# Patient Record
Sex: FEMALE | Race: WHITE | Employment: UNEMPLOYED | URBAN - METROPOLITAN AREA
[De-identification: names, ages, dates, MRNs, and addresses within clinical notes are randomized per-mention and may not be internally consistent; named-entity substitution may affect disease eponyms.]

---

## 2017-02-20 ENCOUNTER — OFFICE VISIT (OUTPATIENT)
Dept: SURGERY | Facility: CLINIC | Age: 42
End: 2017-02-20

## 2017-02-20 VITALS
SYSTOLIC BLOOD PRESSURE: 141 MMHG | HEIGHT: 60 IN | DIASTOLIC BLOOD PRESSURE: 91 MMHG | RESPIRATION RATE: 14 BRPM | HEART RATE: 71 BPM | WEIGHT: 173 LBS | BODY MASS INDEX: 33.96 KG/M2 | TEMPERATURE: 98 F

## 2017-02-20 DIAGNOSIS — K43.9 VENTRAL HERNIA WITHOUT OBSTRUCTION OR GANGRENE: Primary | ICD-10-CM

## 2017-02-20 DIAGNOSIS — Z98.891 H/O: CESAREAN SECTION: ICD-10-CM

## 2017-02-20 PROCEDURE — 99243 OFF/OP CNSLTJ NEW/EST LOW 30: CPT | Performed by: COLON & RECTAL SURGERY

## 2017-02-20 NOTE — H&P
New Patient Visit Note       Active Problems      1. Ventral hernia without obstruction or gangrene    2. H/O:  section        Chief Complaint   Patient presents with:  Hernia: New Pt.  Umbilical Hernia consult      History of Present Illness     Th Onset   • Heart Disorder Father      Stent   • Diabetes Father    • Hypertension Father    • Other[other] [OTHER] Mother      Thyroid disease   • Diabetes Mother    • Diabetes Maternal Grandmother    • Diabetes Maternal Grandfather    • Diabetes Paternal G constipation, blood in stool, abdominal distention and anal bleeding. Genitourinary: Negative for dysuria, urgency, frequency and difficulty urinating. Musculoskeletal: Negative for myalgias and arthralgias. Skin: Negative for color change and rash. midline incision from the umbilicus down to the pubic symphysis. At the apex of that incision there are 3 separate hernias that are palpable. They are separate from the umbilicus.   They appear to be incisional hernias possibly in combination with a previ palpable. They are separate from the umbilicus. They appear to be incisional hernias possibly in combination with a previous umbilical hernia. None are reducible. None are currently tender. There is no evidence of recurrent inguinal hernia.   Bowel lashell

## 2017-02-22 PROBLEM — K43.9 VENTRAL HERNIA WITHOUT OBSTRUCTION OR GANGRENE: Status: ACTIVE | Noted: 2017-02-22

## 2017-02-22 NOTE — PATIENT INSTRUCTIONS
This patient presents for evaluation of a bulge near the umbilicus. This patient has had a  and 2016. The baby is present in my office at the time of my evaluation. She has no pain at the umbilical region.   She has several lump surgery was confirmed with the patient.

## 2017-02-23 ENCOUNTER — HOSPITAL ENCOUNTER (EMERGENCY)
Age: 42
Discharge: HOME | End: 2017-02-23
Attending: EMERGENCY MEDICINE
Payer: MEDICAID

## 2017-02-23 ENCOUNTER — TELEPHONE (OUTPATIENT)
Dept: INTERNAL MEDICINE CLINIC | Facility: CLINIC | Age: 42
End: 2017-02-23

## 2017-02-23 VITALS
HEART RATE: 61 BPM | HEIGHT: 60 IN | SYSTOLIC BLOOD PRESSURE: 129 MMHG | WEIGHT: 160 LBS | RESPIRATION RATE: 18 BRPM | DIASTOLIC BLOOD PRESSURE: 75 MMHG | OXYGEN SATURATION: 98 % | TEMPERATURE: 97 F | BODY MASS INDEX: 31.41 KG/M2

## 2017-02-23 DIAGNOSIS — R51.9 ACUTE NONINTRACTABLE HEADACHE, UNSPECIFIED HEADACHE TYPE: Primary | ICD-10-CM

## 2017-02-23 LAB
BILIRUB UR QL STRIP.AUTO: NEGATIVE
COLOR UR AUTO: YELLOW
GLUCOSE UR STRIP.AUTO-MCNC: NEGATIVE MG/DL
KETONES UR STRIP.AUTO-MCNC: NEGATIVE MG/DL
NITRITE UR QL STRIP.AUTO: NEGATIVE
PH UR STRIP.AUTO: 7 [PH] (ref 4.5–8)
SP GR UR STRIP.AUTO: 1.02 (ref 1–1.03)
UROBILINOGEN UR STRIP.AUTO-MCNC: 0.2 MG/DL
WBC CLUMPS UR QL AUTO: PRESENT

## 2017-02-23 PROCEDURE — 96372 THER/PROPH/DIAG INJ SC/IM: CPT

## 2017-02-23 PROCEDURE — 99283 EMERGENCY DEPT VISIT LOW MDM: CPT

## 2017-02-23 PROCEDURE — 87086 URINE CULTURE/COLONY COUNT: CPT | Performed by: EMERGENCY MEDICINE

## 2017-02-23 PROCEDURE — 81001 URINALYSIS AUTO W/SCOPE: CPT | Performed by: EMERGENCY MEDICINE

## 2017-02-23 RX ORDER — KETOROLAC TROMETHAMINE 30 MG/ML
60 INJECTION, SOLUTION INTRAMUSCULAR; INTRAVENOUS ONCE
Status: COMPLETED | OUTPATIENT
Start: 2017-02-23 | End: 2017-02-23

## 2017-02-23 RX ORDER — AMOXICILLIN AND CLAVULANATE POTASSIUM 875; 125 MG/1; MG/1
1 TABLET, FILM COATED ORAL 2 TIMES DAILY
Qty: 20 TABLET | Refills: 0 | Status: SHIPPED | OUTPATIENT
Start: 2017-02-23 | End: 2017-03-05

## 2017-02-23 RX ORDER — KETOROLAC TROMETHAMINE 10 MG/1
10 TABLET, FILM COATED ORAL EVERY 6 HOURS PRN
Qty: 30 TABLET | Refills: 0 | Status: SHIPPED | OUTPATIENT
Start: 2017-02-23 | End: 2017-03-02

## 2017-02-23 NOTE — ED INITIAL ASSESSMENT (HPI)
Pt states she has head pressure and sinus congestion since Saturday. Pt states she has nasal drainage and slight sore throat.

## 2017-02-23 NOTE — TELEPHONE ENCOUNTER
Pt called w c/o pressure headache/ cough/ mild fever. Pt stated has tried sudafed and has not helped. Pt breastfeeding. Pt's daughter w similar s&s.

## 2017-02-23 NOTE — ED PROVIDER NOTES
Patient Seen in: [facility] Emergency Department In [place]    History   Patient presents with:  Headache (neurologic)    Stated Complaint: headache    HPI    21-year-old female presents for evaluation of headache and sinus congestion.   Symptoms have been daily       Family History   Problem Relation Age of Onset   • Heart Disorder Father      Stent   • Diabetes Father    • Hypertension Father    • Other[other] [OTHER] Mother      Thyroid disease   • Diabetes Mother    • Diabetes Maternal Grandmother    • D other components within normal limits   URINE MICROSCOPIC W REFLEX CULTURE   URINE CULTURE, ROUTINE       MDM     Medications   Ketorolac Tromethamine (TORADOL) 60 MG/2ML injection 60 mg (60 mg Intramuscular Given 2/23/17 1539)     Well-appearing 41-year-o

## 2017-02-24 ENCOUNTER — TELEPHONE (OUTPATIENT)
Dept: SURGERY | Facility: CLINIC | Age: 42
End: 2017-02-24

## 2017-02-24 NOTE — TELEPHONE ENCOUNTER
Pt was in [facility] 2/23, getting severe headaches when having BM. Pt. saw Tom Wakefield on 2/20 and has multiple umbilical hernias. Pt scheduled surgery 3/29. Pt denies NV, no light sensitivity. Per Pt pain severe \"she drops to her knees. \"

## 2017-02-24 NOTE — TELEPHONE ENCOUNTER
Pt was seen for this in ED, they informed her that they did not think the headaches are related to the hernia, gave her a shot of toradol and this seemed to help, but then she had another BM and now has headache. States she is not constipated and doesn't st

## 2017-02-26 ENCOUNTER — HOSPITAL ENCOUNTER (EMERGENCY)
Age: 42
Discharge: HOME | End: 2017-02-27
Attending: EMERGENCY MEDICINE
Payer: MEDICAID

## 2017-02-26 ENCOUNTER — APPOINTMENT (OUTPATIENT)
Dept: CT IMAGING | Age: 42
End: 2017-02-26
Attending: EMERGENCY MEDICINE
Payer: MEDICAID

## 2017-02-26 DIAGNOSIS — G44.009 CLUSTER HEADACHE, NOT INTRACTABLE, UNSPECIFIED CHRONICITY PATTERN: Primary | ICD-10-CM

## 2017-02-26 LAB
ALBUMIN SERPL-MCNC: 4.1 G/DL (ref 3.5–4.8)
ALP LIVER SERPL-CCNC: 101 U/L (ref 37–98)
ALT SERPL-CCNC: 71 U/L (ref 14–54)
AST SERPL-CCNC: 33 U/L (ref 15–41)
BASOPHILS # BLD AUTO: 0.02 X10(3) UL (ref 0–0.1)
BASOPHILS NFR BLD AUTO: 0.3 %
BILIRUB SERPL-MCNC: 0.4 MG/DL (ref 0.1–2)
BILIRUB UR QL STRIP.AUTO: NEGATIVE
BUN BLD-MCNC: 12 MG/DL (ref 8–20)
CALCIUM BLD-MCNC: 8.9 MG/DL (ref 8.3–10.3)
CHLORIDE: 105 MMOL/L (ref 101–111)
CO2: 25 MMOL/L (ref 22–32)
COLOR UR AUTO: YELLOW
CREAT BLD-MCNC: 0.79 MG/DL (ref 0.55–1.02)
EOSINOPHIL # BLD AUTO: 0.19 X10(3) UL (ref 0–0.3)
EOSINOPHIL NFR BLD AUTO: 2.9 %
ERYTHROCYTE [DISTWIDTH] IN BLOOD BY AUTOMATED COUNT: 13.4 % (ref 11.5–16)
GLUCOSE BLD-MCNC: 101 MG/DL (ref 70–99)
GLUCOSE UR STRIP.AUTO-MCNC: NEGATIVE MG/DL
HCT VFR BLD AUTO: 43.1 % (ref 34–50)
HGB BLD-MCNC: 14.7 G/DL (ref 12–16)
IMMATURE GRANULOCYTE COUNT: 0.01 X10(3) UL (ref 0–1)
IMMATURE GRANULOCYTE RATIO %: 0.2 %
KETONES UR STRIP.AUTO-MCNC: NEGATIVE MG/DL
LYMPHOCYTES # BLD AUTO: 2.84 X10(3) UL (ref 0.9–4)
LYMPHOCYTES NFR BLD AUTO: 43.4 %
M PROTEIN MFR SERPL ELPH: 8.1 G/DL (ref 6.1–8.3)
MCH RBC QN AUTO: 29.3 PG (ref 27–33.2)
MCHC RBC AUTO-ENTMCNC: 34.1 G/DL (ref 31–37)
MCV RBC AUTO: 86 FL (ref 81–100)
MONOCYTES # BLD AUTO: 0.51 X10(3) UL (ref 0.1–0.6)
MONOCYTES NFR BLD AUTO: 7.8 %
NEUTROPHIL ABS PRELIM: 2.97 X10 (3) UL (ref 1.3–6.7)
NEUTROPHILS # BLD AUTO: 2.97 X10(3) UL (ref 1.3–6.7)
NEUTROPHILS NFR BLD AUTO: 45.4 %
NITRITE UR QL STRIP.AUTO: NEGATIVE
PH UR STRIP.AUTO: 6.5 [PH] (ref 4.5–8)
PLATELET # BLD AUTO: 276 10(3)UL (ref 150–450)
POCT LOT NUMBER: NORMAL
POCT URINE PREGNANCY: NEGATIVE
POTASSIUM SERPL-SCNC: 3.1 MMOL/L (ref 3.6–5.1)
RBC # BLD AUTO: 5.01 X10(6)UL (ref 3.8–5.1)
RED CELL DISTRIBUTION WIDTH-SD: 42.2 FL (ref 35.1–46.3)
SED RATE-ML: 25 MM/HR (ref 0–25)
SODIUM SERPL-SCNC: 140 MMOL/L (ref 136–144)
SP GR UR STRIP.AUTO: 1.01 (ref 1–1.03)
UROBILINOGEN UR STRIP.AUTO-MCNC: 0.2 MG/DL
WBC # BLD AUTO: 6.5 X10(3) UL (ref 4–13)

## 2017-02-26 PROCEDURE — 81025 URINE PREGNANCY TEST: CPT

## 2017-02-26 PROCEDURE — 96374 THER/PROPH/DIAG INJ IV PUSH: CPT

## 2017-02-26 PROCEDURE — 99284 EMERGENCY DEPT VISIT MOD MDM: CPT

## 2017-02-26 PROCEDURE — 96375 TX/PRO/DX INJ NEW DRUG ADDON: CPT

## 2017-02-26 PROCEDURE — 96376 TX/PRO/DX INJ SAME DRUG ADON: CPT

## 2017-02-26 PROCEDURE — 85652 RBC SED RATE AUTOMATED: CPT | Performed by: EMERGENCY MEDICINE

## 2017-02-26 PROCEDURE — 80053 COMPREHEN METABOLIC PANEL: CPT | Performed by: EMERGENCY MEDICINE

## 2017-02-26 PROCEDURE — 81001 URINALYSIS AUTO W/SCOPE: CPT | Performed by: EMERGENCY MEDICINE

## 2017-02-26 PROCEDURE — 85025 COMPLETE CBC W/AUTO DIFF WBC: CPT | Performed by: EMERGENCY MEDICINE

## 2017-02-26 PROCEDURE — 87086 URINE CULTURE/COLONY COUNT: CPT | Performed by: EMERGENCY MEDICINE

## 2017-02-26 PROCEDURE — 70496 CT ANGIOGRAPHY HEAD: CPT

## 2017-02-26 RX ORDER — METOCLOPRAMIDE HYDROCHLORIDE 5 MG/ML
10 INJECTION INTRAMUSCULAR; INTRAVENOUS ONCE
Status: COMPLETED | OUTPATIENT
Start: 2017-02-26 | End: 2017-02-26

## 2017-02-26 RX ORDER — HYDROMORPHONE HYDROCHLORIDE 1 MG/ML
1 INJECTION, SOLUTION INTRAMUSCULAR; INTRAVENOUS; SUBCUTANEOUS EVERY 30 MIN PRN
Status: COMPLETED | OUTPATIENT
Start: 2017-02-26 | End: 2017-02-27

## 2017-02-26 RX ORDER — DIPHENHYDRAMINE HYDROCHLORIDE 50 MG/ML
25 INJECTION INTRAMUSCULAR; INTRAVENOUS ONCE
Status: COMPLETED | OUTPATIENT
Start: 2017-02-26 | End: 2017-02-26

## 2017-02-26 RX ORDER — KETOROLAC TROMETHAMINE 30 MG/ML
30 INJECTION, SOLUTION INTRAMUSCULAR; INTRAVENOUS ONCE
Status: COMPLETED | OUTPATIENT
Start: 2017-02-26 | End: 2017-02-26

## 2017-02-27 ENCOUNTER — TELEPHONE (OUTPATIENT)
Dept: SURGERY | Facility: CLINIC | Age: 42
End: 2017-02-27

## 2017-02-27 VITALS
DIASTOLIC BLOOD PRESSURE: 84 MMHG | OXYGEN SATURATION: 98 % | HEART RATE: 66 BPM | HEIGHT: 64 IN | WEIGHT: 160 LBS | RESPIRATION RATE: 16 BRPM | SYSTOLIC BLOOD PRESSURE: 140 MMHG | TEMPERATURE: 98 F | BODY MASS INDEX: 27.31 KG/M2

## 2017-02-27 RX ORDER — CHOLECALCIFEROL (VITAMIN D3) 25 MCG
1 TABLET,CHEWABLE ORAL DAILY
Status: ON HOLD | COMMUNITY
End: 2017-03-29

## 2017-02-27 RX ORDER — HYDROMORPHONE HYDROCHLORIDE 1 MG/ML
0.5 INJECTION, SOLUTION INTRAMUSCULAR; INTRAVENOUS; SUBCUTANEOUS ONCE
Status: COMPLETED | OUTPATIENT
Start: 2017-02-27 | End: 2017-02-27

## 2017-02-27 RX ORDER — HYDROCODONE BITARTRATE AND ACETAMINOPHEN 10; 325 MG/1; MG/1
1-2 TABLET ORAL EVERY 4 HOURS PRN
Qty: 20 TABLET | Refills: 0 | Status: SHIPPED | OUTPATIENT
Start: 2017-02-27 | End: 2017-03-06

## 2017-02-27 RX ORDER — ONDANSETRON 8 MG/1
8 TABLET, ORALLY DISINTEGRATING ORAL EVERY 8 HOURS PRN
Qty: 10 TABLET | Refills: 0 | Status: SHIPPED | OUTPATIENT
Start: 2017-02-27 | End: 2017-03-06

## 2017-02-27 NOTE — ED NOTES
Patient states pain slightly improved 8/10.  Awaiting for lab results to come back before going to ct

## 2017-02-27 NOTE — ED INITIAL ASSESSMENT (HPI)
Presents for co sharp headache onset at 2045 pain more on left side started in back of head took prescribed Toradol no relief seen here last Thursday for same became nauseated on the ride over

## 2017-02-27 NOTE — TELEPHONE ENCOUNTER
Pt dropped off [place] paperwork for  to take of her after surgery. Pt requests call when paperwork ready for pickup. Fwd to James Tovar for completion.

## 2017-03-01 ENCOUNTER — TELEPHONE (OUTPATIENT)
Dept: INTERNAL MEDICINE CLINIC | Facility: CLINIC | Age: 42
End: 2017-03-01

## 2017-03-09 ENCOUNTER — TELEPHONE (OUTPATIENT)
Dept: SURGERY | Facility: CLINIC | Age: 42
End: 2017-03-09

## 2017-03-29 ENCOUNTER — ANESTHESIA EVENT (OUTPATIENT)
Dept: SURGERY | Facility: HOSPITAL | Age: 42
End: 2017-03-29
Payer: MEDICAID

## 2017-03-29 ENCOUNTER — HOSPITAL ENCOUNTER (OUTPATIENT)
Facility: HOSPITAL | Age: 42
Discharge: HOME | End: 2017-03-31
Attending: COLON & RECTAL SURGERY | Admitting: COLON & RECTAL SURGERY
Payer: MEDICAID

## 2017-03-29 ENCOUNTER — ANESTHESIA (OUTPATIENT)
Dept: SURGERY | Facility: HOSPITAL | Age: 42
End: 2017-03-29
Payer: MEDICAID

## 2017-03-29 ENCOUNTER — SURGERY (OUTPATIENT)
Age: 42
End: 2017-03-29

## 2017-03-29 DIAGNOSIS — K43.9 VENTRAL HERNIA WITHOUT OBSTRUCTION OR GANGRENE: ICD-10-CM

## 2017-03-29 PROCEDURE — 81025 URINE PREGNANCY TEST: CPT | Performed by: COLON & RECTAL SURGERY

## 2017-03-29 PROCEDURE — S0028 INJECTION, FAMOTIDINE, 20 MG: HCPCS | Performed by: PHYSICIAN ASSISTANT

## 2017-03-29 PROCEDURE — 0WUF0JZ SUPPLEMENT ABDOMINAL WALL WITH SYNTHETIC SUBSTITUTE, OPEN APPROACH: ICD-10-PCS | Performed by: COLON & RECTAL SURGERY

## 2017-03-29 DEVICE — VENTRIO HERNIA PATCH CIRCLE
Type: IMPLANTABLE DEVICE | Site: ABDOMEN | Status: FUNCTIONAL
Brand: VENTRIO HERNIA PATCH

## 2017-03-29 RX ORDER — METOCLOPRAMIDE HYDROCHLORIDE 5 MG/ML
10 INJECTION INTRAMUSCULAR; INTRAVENOUS AS NEEDED
Status: DISCONTINUED | OUTPATIENT
Start: 2017-03-29 | End: 2017-03-29 | Stop reason: HOSPADM

## 2017-03-29 RX ORDER — ONDANSETRON 2 MG/ML
4 INJECTION INTRAMUSCULAR; INTRAVENOUS EVERY 6 HOURS PRN
Status: DISCONTINUED | OUTPATIENT
Start: 2017-03-29 | End: 2017-03-31

## 2017-03-29 RX ORDER — HYDRALAZINE HYDROCHLORIDE 20 MG/ML
10 INJECTION INTRAMUSCULAR; INTRAVENOUS ONCE
Status: COMPLETED | OUTPATIENT
Start: 2017-03-29 | End: 2017-03-29

## 2017-03-29 RX ORDER — MORPHINE SULFATE 2 MG/ML
INJECTION, SOLUTION INTRAMUSCULAR; INTRAVENOUS
Status: COMPLETED
Start: 2017-03-29 | End: 2017-03-29

## 2017-03-29 RX ORDER — DEXTROSE MONOHYDRATE 25 G/50ML
50 INJECTION, SOLUTION INTRAVENOUS
Status: DISCONTINUED | OUTPATIENT
Start: 2017-03-29 | End: 2017-03-29 | Stop reason: HOSPADM

## 2017-03-29 RX ORDER — TRAMADOL HYDROCHLORIDE 50 MG/1
100 TABLET ORAL ONCE AS NEEDED
Status: DISCONTINUED | OUTPATIENT
Start: 2017-03-29 | End: 2017-03-29 | Stop reason: HOSPADM

## 2017-03-29 RX ORDER — FAMOTIDINE 10 MG/ML
20 INJECTION, SOLUTION INTRAVENOUS 2 TIMES DAILY
Status: DISCONTINUED | OUTPATIENT
Start: 2017-03-29 | End: 2017-03-31

## 2017-03-29 RX ORDER — HYDRALAZINE HYDROCHLORIDE 20 MG/ML
INJECTION INTRAMUSCULAR; INTRAVENOUS
Status: COMPLETED
Start: 2017-03-29 | End: 2017-03-29

## 2017-03-29 RX ORDER — IBUPROFEN 400 MG/1
400 TABLET ORAL EVERY 6 HOURS PRN
Status: DISCONTINUED | OUTPATIENT
Start: 2017-03-29 | End: 2017-03-31

## 2017-03-29 RX ORDER — BACITRACIN 50000 [USP'U]/1
INJECTION, POWDER, LYOPHILIZED, FOR SOLUTION INTRAMUSCULAR AS NEEDED
Status: DISCONTINUED | OUTPATIENT
Start: 2017-03-29 | End: 2017-03-29 | Stop reason: HOSPADM

## 2017-03-29 RX ORDER — ONDANSETRON 2 MG/ML
4 INJECTION INTRAMUSCULAR; INTRAVENOUS AS NEEDED
Status: DISCONTINUED | OUTPATIENT
Start: 2017-03-29 | End: 2017-03-29 | Stop reason: HOSPADM

## 2017-03-29 RX ORDER — DIPHENHYDRAMINE HCL 25 MG
25 CAPSULE ORAL EVERY 4 HOURS PRN
Status: DISCONTINUED | OUTPATIENT
Start: 2017-03-29 | End: 2017-03-31

## 2017-03-29 RX ORDER — DEXTROSE, SODIUM CHLORIDE, AND POTASSIUM CHLORIDE 5; .45; .15 G/100ML; G/100ML; G/100ML
INJECTION INTRAVENOUS
Status: COMPLETED
Start: 2017-03-29 | End: 2017-03-29

## 2017-03-29 RX ORDER — TRAMADOL HYDROCHLORIDE 50 MG/1
50 TABLET ORAL EVERY 6 HOURS PRN
Status: DISCONTINUED | OUTPATIENT
Start: 2017-03-29 | End: 2017-03-31

## 2017-03-29 RX ORDER — DIPHENHYDRAMINE HYDROCHLORIDE 50 MG/ML
12.5 INJECTION INTRAMUSCULAR; INTRAVENOUS AS NEEDED
Status: DISCONTINUED | OUTPATIENT
Start: 2017-03-29 | End: 2017-03-29 | Stop reason: HOSPADM

## 2017-03-29 RX ORDER — SODIUM CHLORIDE, SODIUM LACTATE, POTASSIUM CHLORIDE, CALCIUM CHLORIDE 600; 310; 30; 20 MG/100ML; MG/100ML; MG/100ML; MG/100ML
INJECTION, SOLUTION INTRAVENOUS CONTINUOUS
Status: DISCONTINUED | OUTPATIENT
Start: 2017-03-29 | End: 2017-03-29

## 2017-03-29 RX ORDER — NALOXONE HYDROCHLORIDE 0.4 MG/ML
80 INJECTION, SOLUTION INTRAMUSCULAR; INTRAVENOUS; SUBCUTANEOUS AS NEEDED
Status: DISCONTINUED | OUTPATIENT
Start: 2017-03-29 | End: 2017-03-29 | Stop reason: HOSPADM

## 2017-03-29 RX ORDER — ACETAMINOPHEN 500 MG
1000 TABLET ORAL ONCE AS NEEDED
Status: DISCONTINUED | OUTPATIENT
Start: 2017-03-29 | End: 2017-03-29 | Stop reason: HOSPADM

## 2017-03-29 RX ORDER — DIPHENHYDRAMINE HYDROCHLORIDE 50 MG/ML
12.5 INJECTION INTRAMUSCULAR; INTRAVENOUS EVERY 4 HOURS PRN
Status: DISCONTINUED | OUTPATIENT
Start: 2017-03-29 | End: 2017-03-31

## 2017-03-29 RX ORDER — IBUPROFEN 600 MG/1
600 TABLET ORAL EVERY 6 HOURS PRN
Status: DISCONTINUED | OUTPATIENT
Start: 2017-03-29 | End: 2017-03-31

## 2017-03-29 RX ORDER — MIDAZOLAM HYDROCHLORIDE 1 MG/ML
INJECTION INTRAMUSCULAR; INTRAVENOUS
Status: COMPLETED
Start: 2017-03-29 | End: 2017-03-29

## 2017-03-29 RX ORDER — KETOROLAC TROMETHAMINE 30 MG/ML
30 INJECTION, SOLUTION INTRAMUSCULAR; INTRAVENOUS EVERY 6 HOURS PRN
Status: DISCONTINUED | OUTPATIENT
Start: 2017-03-29 | End: 2017-03-31

## 2017-03-29 RX ORDER — HYDROCODONE BITARTRATE AND ACETAMINOPHEN 5; 325 MG/1; MG/1
1 TABLET ORAL AS NEEDED
Status: DISCONTINUED | OUTPATIENT
Start: 2017-03-29 | End: 2017-03-29 | Stop reason: HOSPADM

## 2017-03-29 RX ORDER — MORPHINE SULFATE 2 MG/ML
2 INJECTION, SOLUTION INTRAMUSCULAR; INTRAVENOUS EVERY 2 HOUR PRN
Status: DISCONTINUED | OUTPATIENT
Start: 2017-03-29 | End: 2017-03-31

## 2017-03-29 RX ORDER — MORPHINE SULFATE 2 MG/ML
2 INJECTION, SOLUTION INTRAMUSCULAR; INTRAVENOUS EVERY 5 MIN PRN
Status: DISCONTINUED | OUTPATIENT
Start: 2017-03-29 | End: 2017-03-29 | Stop reason: HOSPADM

## 2017-03-29 RX ORDER — BUPIVACAINE HYDROCHLORIDE AND EPINEPHRINE 5; 5 MG/ML; UG/ML
INJECTION, SOLUTION EPIDURAL; INTRACAUDAL; PERINEURAL AS NEEDED
Status: DISCONTINUED | OUTPATIENT
Start: 2017-03-29 | End: 2017-03-29 | Stop reason: HOSPADM

## 2017-03-29 RX ORDER — KETOROLAC TROMETHAMINE 15 MG/ML
15 INJECTION, SOLUTION INTRAMUSCULAR; INTRAVENOUS EVERY 6 HOURS PRN
Status: DISCONTINUED | OUTPATIENT
Start: 2017-03-29 | End: 2017-03-31

## 2017-03-29 RX ORDER — HEPARIN SODIUM 5000 [USP'U]/ML
5000 INJECTION, SOLUTION INTRAVENOUS; SUBCUTANEOUS ONCE
Status: COMPLETED | OUTPATIENT
Start: 2017-03-29 | End: 2017-03-29

## 2017-03-29 RX ORDER — MEPERIDINE HYDROCHLORIDE 25 MG/ML
12.5 INJECTION INTRAMUSCULAR; INTRAVENOUS; SUBCUTANEOUS AS NEEDED
Status: DISCONTINUED | OUTPATIENT
Start: 2017-03-29 | End: 2017-03-29 | Stop reason: HOSPADM

## 2017-03-29 RX ORDER — MIDAZOLAM HYDROCHLORIDE 1 MG/ML
1 INJECTION INTRAMUSCULAR; INTRAVENOUS EVERY 5 MIN PRN
Status: DISCONTINUED | OUTPATIENT
Start: 2017-03-29 | End: 2017-03-29 | Stop reason: HOSPADM

## 2017-03-29 RX ORDER — LABETALOL HYDROCHLORIDE 5 MG/ML
5 INJECTION, SOLUTION INTRAVENOUS EVERY 5 MIN PRN
Status: DISCONTINUED | OUTPATIENT
Start: 2017-03-29 | End: 2017-03-29 | Stop reason: HOSPADM

## 2017-03-29 RX ORDER — TEMAZEPAM 15 MG/1
15 CAPSULE ORAL NIGHTLY PRN
Status: DISCONTINUED | OUTPATIENT
Start: 2017-03-29 | End: 2017-03-31

## 2017-03-29 RX ORDER — FAMOTIDINE 20 MG/1
20 TABLET ORAL 2 TIMES DAILY
Status: DISCONTINUED | OUTPATIENT
Start: 2017-03-29 | End: 2017-03-31

## 2017-03-29 RX ORDER — HEPARIN SODIUM 5000 [USP'U]/ML
5000 INJECTION, SOLUTION INTRAVENOUS; SUBCUTANEOUS EVERY 8 HOURS
Status: DISCONTINUED | OUTPATIENT
Start: 2017-03-29 | End: 2017-03-31

## 2017-03-29 RX ORDER — HYDROCODONE BITARTRATE AND ACETAMINOPHEN 5; 325 MG/1; MG/1
2 TABLET ORAL AS NEEDED
Status: DISCONTINUED | OUTPATIENT
Start: 2017-03-29 | End: 2017-03-29 | Stop reason: HOSPADM

## 2017-03-29 RX ORDER — DEXTROSE, SODIUM CHLORIDE, AND POTASSIUM CHLORIDE 5; .45; .15 G/100ML; G/100ML; G/100ML
INJECTION INTRAVENOUS CONTINUOUS
Status: DISCONTINUED | OUTPATIENT
Start: 2017-03-29 | End: 2017-03-31

## 2017-03-29 NOTE — ANESTHESIA POSTPROCEDURE EVALUATION
[address] Patient Status:  Surgery Admit   Age/Gender 38year old female MRN [id number]   [facility] SURGERY Attending Karla Valdez MD   Hosp Day # 0 PCP Erwin Bhat MD       Anesthesia Post-op Note    Procedure(s)

## 2017-03-29 NOTE — PROGRESS NOTES
NURSING ADMISSION NOTE      Patient admitted via Cart  Oriented to room. Safety precautions initiated. Bed in low position. Call light in reach. RECEIVED PT FROM PACU , ALERT AND ORIENT X 4 , ON O2 2L/NC , CPOX , O2 SAT 98% .  NO RESP DISTRESS NOTED

## 2017-03-29 NOTE — PROGRESS NOTES
Status report called to anesthesia. ptnt is holding breathe during b/p readings. Needs constant reminder to relax and try to rest. asking for water, asking for . Ok to give hydralazine prior to going to floor.

## 2017-03-29 NOTE — PROGRESS NOTES
Floor RN Ta Romero called with update prior to sending patient. Requesting anxiety meds for ptnt.  Was denied by anesthesia

## 2017-03-29 NOTE — OPERATIVE REPORT
[facility]  Op Note    Treva Pun Location: OR   [facility] [id number] MRN [id number]   Admission Date 3/29/2017 Operation Date 3/29/2017   Attending Physician Lucia Jorgensen MD Operating Physician Gt Smiley MD     Pre-Operative Diagnosis: Ventral her immediately beneath the fascia of were removed. Flaps were raised caudad, cephalad, right and left. A Ventrio composix mesh was placed below the fascia. The mesh was secured in place to the hernia defect with a 1 inch margin.  The suture used was #1 [employer]

## 2017-03-29 NOTE — H&P
Active Problems      1. Ventral hernia without obstruction or gangrene     2. H/O:  section         Chief Complaint   Patient presents with:  Hernia: New Pt.  Umbilical Hernia consult      History of Present Illness     This patient presents for e •  Other[other] [OTHER]  Mother          Thyroid disease    •  Diabetes  Mother      •  Diabetes  Maternal Grandmother      •  Diabetes  Maternal Grandfather      •  Diabetes  Paternal Grandmother      •  Diabetes  Paternal Grandfather        Social Histor Gastrointestinal: Negative for nausea, vomiting, abdominal pain, diarrhea, constipation, blood in stool, abdominal distention and anal bleeding. Genitourinary: Negative for dysuria, urgency, frequency and difficulty urinating.    Musculoskeletal: Negative Clinical exam reveals her abdomen to be soft. There is extreme laxity of the abdominal wall secondary to her recent pregnancy. Her BMI is 33.78. There is no evidence of ascites. Liver is within normal limits, spleen is not palpable.   There is a well-he

## 2017-03-29 NOTE — ANESTHESIA PREPROCEDURE EVALUATION
PRE-OP EVALUATION    Patient Name: Emory Ratliff    Pre-op Diagnosis: Ventral hernia without obstruction or gangrene [K43.9]    Procedure(s):  VENTRAL HERNIA REPAIR WITH MESH COMPLEX    Surgeon(s) and Role:     Tammie Linton MD - Primary    Pre-op vit Alcohol Use: No       Drug Use: No     Available pre-op labs reviewed.     Lab Results  Component Value Date   WBC 6.5 02/26/2017   RBC 5.01 02/26/2017   HGB 14.7 02/26/2017   HCT 43.1 02/26/2017   MCV 86.0 02/26/2017   MCH 29.3 02/26/2017   MCHC 34.1 02/26

## 2017-03-30 PROCEDURE — 96376 TX/PRO/DX INJ SAME DRUG ADON: CPT

## 2017-03-30 PROCEDURE — 96372 THER/PROPH/DIAG INJ SC/IM: CPT

## 2017-03-30 NOTE — PROGRESS NOTES
[facility]  Progress Note    Vimal Santiago Patient Status:  Outpatient in a Bed    3/22/1975 MRN [id number]   [facility] 3NW-A Attending Nuha Montez MD   Hosp Day # 1 PCP Julieta Martinez MD     Subjective:  No new complaints.  9/10 p Disturbance of skin sensation     Cervicalgia     Enthesopathy of wrist and carpus     AMA (advanced maternal age) multigravida 35+     H/O:  section     Gestational diabetes mellitus (GDM), antepartum     Ventral hernia without obstruction or hari

## 2017-03-30 NOTE — PAYOR COMM NOTE
Attending Physician: Minh Solis MD    Review Type: ADMISSION   Reviewer: Anh Lopez       Date: March 30, 2017 - 8:30 AM  Payor: Karen Chavez  Authorization Number: [id number]  Admit date: 3/29/2017  9:45 AM        Pre-Operative Diagnosis: Ventral he UNIT/ML injection 5,000 Units     Date Action Dose Route User    3/30/2017 0605 Given 5000 Units Subcutaneous (Right Lower Abdomen) Michael De La Cruz RN    3/29/2017 2149 Given 5000 Units Subcutaneous (Right Lower Abdomen) Michael De La Cruz RN    3/29/2017 151 Dose Route User    3/30/2017 0756 Given 50 mg Oral Denice Palmer RN    3/29/2017 2222 Given 50 mg Oral Brendon Cardoso RN          RESULTS LAST 24HRS:  Labs Reviewed   POCT PREGNANCY, URINE - Normal

## 2017-03-31 VITALS
DIASTOLIC BLOOD PRESSURE: 74 MMHG | OXYGEN SATURATION: 97 % | BODY MASS INDEX: 30.04 KG/M2 | TEMPERATURE: 98 F | WEIGHT: 153 LBS | HEIGHT: 60 IN | HEART RATE: 62 BPM | SYSTOLIC BLOOD PRESSURE: 122 MMHG | RESPIRATION RATE: 16 BRPM

## 2017-03-31 PROCEDURE — 85049 AUTOMATED PLATELET COUNT: CPT | Performed by: COLON & RECTAL SURGERY

## 2017-03-31 RX ORDER — TRAMADOL HYDROCHLORIDE 50 MG/1
50 TABLET ORAL AS NEEDED
Qty: 30 TABLET | Refills: 0 | Status: SHIPPED | OUTPATIENT
Start: 2017-03-31 | End: 2018-04-01 | Stop reason: ALTCHOICE

## 2017-03-31 RX ORDER — CEFADROXIL 500 MG/1
500 CAPSULE ORAL 2 TIMES DAILY
Qty: 20 CAPSULE | Refills: 0 | Status: SHIPPED | OUTPATIENT
Start: 2017-03-31 | End: 2017-04-10

## 2017-03-31 NOTE — PLAN OF CARE
GASTROINTESTINAL - ADULT    • Minimal or absence of nausea and vomiting Adequate for Discharge    • Maintains or returns to baseline bowel function Adequate for Discharge        PAIN - ADULT    • Verbalizes/displays adequate comfort level or patient's stat

## 2017-03-31 NOTE — DISCHARGE SUMMARY
[facility]  Discharge Summary    Rosa Cook Patient Status:  Outpatient in a Bed    3/22/1975 MRN [id number]   [facility] 3NW-A Attending Steph Torres MD   Hosp Day # 2 PCP Tristian Batista MD     Date of Admission: 3/29/2017    Da are no discharge medications for this patient. Follow up Visits: Follow-up with Shad Mera in approximately the next 7 days. Other Discharge Instructions:    1. Home today with drain. 2. Do not lift more than 15 pounds.   3. Do not drive a car o

## 2017-03-31 NOTE — PLAN OF CARE
GASTROINTESTINAL - ADULT    • Minimal or absence of nausea and vomiting Progressing        Patient/Family Goals    • Patient/Family Long Term Goal Progressing        RESPIRATORY - ADULT    • Achieves optimal ventilation and oxygenation Progressing        S

## 2017-03-31 NOTE — PROGRESS NOTES
[facility]  Progress Note    Madalyn Anger Patient Status:  Outpatient in a Bed    3/22/1975 MRN [id number]   [facility] 3NW-A Attending Rebel Melgar MD   Hosp Day # 2 PCP Myah Gloria MD     Subjective:  No new complaints.  Pain u between 19-24, adult     Pain in limb     Lesion of ulnar nerve     Disturbance of skin sensation     Cervicalgia     Enthesopathy of wrist and carpus     AMA (advanced maternal age) multigravida 35+     H/O:  section     Gestational diabetes melli

## 2017-04-04 NOTE — PAYOR COMM NOTE
Review Type: CONTINUED STAY  Reviewer: Jermain Dale     Date: April 4, 2017 - 10:45 AM  Payor: Rosmery Coelho  Authorization Number: [id number]  Admit date: 3/29/2017  9:45 AM     Outpatient in a Bed Procedure

## 2017-04-06 ENCOUNTER — OFFICE VISIT (OUTPATIENT)
Dept: SURGERY | Facility: CLINIC | Age: 42
End: 2017-04-06

## 2017-04-06 VITALS
TEMPERATURE: 98 F | DIASTOLIC BLOOD PRESSURE: 81 MMHG | HEIGHT: 60 IN | SYSTOLIC BLOOD PRESSURE: 128 MMHG | WEIGHT: 153 LBS | RESPIRATION RATE: 16 BRPM | HEART RATE: 66 BPM | BODY MASS INDEX: 30.04 KG/M2

## 2017-04-06 DIAGNOSIS — K43.9 VENTRAL HERNIA WITHOUT OBSTRUCTION OR GANGRENE: Primary | ICD-10-CM

## 2017-04-06 PROCEDURE — 99024 POSTOP FOLLOW-UP VISIT: CPT | Performed by: PHYSICIAN ASSISTANT

## 2017-04-06 NOTE — PROGRESS NOTES
Post Operative Visit Note       Active Problems  1. Ventral hernia without obstruction or gangrene         Chief Complaint   Patient presents with:  Post-Op: p/o VENTRAL HERNIA REPAIR WITH MESH COMPLEX on 3/29.  PT has RUQ ABD PN and a tingling feeling by d Hypertension Father    • Other[other] [OTHER] Mother      Thyroid disease   • Diabetes Mother    • Diabetes Maternal Grandmother    • Diabetes Maternal Grandfather    • Diabetes Paternal Grandmother    • Diabetes Paternal Grandfather      Social History behavioral problems and sleep disturbance. Physical Findings   /81 mmHg  Pulse 66  Temp(Src) 97.8 °F (36.6 °C) (Oral)  Resp 16  Ht 60\"  Wt 153 lb  BMI 29.88 kg/m2  Physical Exam   Constitutional: She is oriented to person, place, and time.  She until she is 6 weeks out from surgery. She does verbalize understanding of this. I have no further follow-up scheduled with this patient at this time. This patient can see me on an as-needed basis.  This patient should return urgently for any problems o

## 2018-01-15 ENCOUNTER — TELEPHONE (OUTPATIENT)
Dept: INTERNAL MEDICINE CLINIC | Facility: CLINIC | Age: 43
End: 2018-01-15

## 2018-01-15 ENCOUNTER — HOSPITAL ENCOUNTER (EMERGENCY)
Age: 43
Discharge: HOME | End: 2018-01-15
Payer: MEDICAID

## 2018-01-15 ENCOUNTER — HOSPITAL ENCOUNTER (EMERGENCY)
Age: 43
Discharge: HOME | End: 2018-01-15
Attending: EMERGENCY MEDICINE
Payer: MEDICAID

## 2018-01-15 VITALS
DIASTOLIC BLOOD PRESSURE: 106 MMHG | OXYGEN SATURATION: 99 % | HEIGHT: 60 IN | RESPIRATION RATE: 16 BRPM | TEMPERATURE: 98 F | HEART RATE: 72 BPM | BODY MASS INDEX: 30.43 KG/M2 | SYSTOLIC BLOOD PRESSURE: 140 MMHG | WEIGHT: 155 LBS

## 2018-01-15 VITALS
DIASTOLIC BLOOD PRESSURE: 98 MMHG | HEART RATE: 65 BPM | WEIGHT: 150 LBS | RESPIRATION RATE: 18 BRPM | BODY MASS INDEX: 29.45 KG/M2 | TEMPERATURE: 98 F | HEIGHT: 60 IN | OXYGEN SATURATION: 98 % | SYSTOLIC BLOOD PRESSURE: 172 MMHG

## 2018-01-15 DIAGNOSIS — K08.89 TOOTHACHE: Primary | ICD-10-CM

## 2018-01-15 DIAGNOSIS — K08.89 PAIN, DENTAL: ICD-10-CM

## 2018-01-15 DIAGNOSIS — K03.81 CRACKED TOOTH: Primary | ICD-10-CM

## 2018-01-15 PROCEDURE — 99284 EMERGENCY DEPT VISIT MOD MDM: CPT

## 2018-01-15 PROCEDURE — 99283 EMERGENCY DEPT VISIT LOW MDM: CPT

## 2018-01-15 RX ORDER — HYDROCODONE BITARTRATE AND ACETAMINOPHEN 5; 325 MG/1; MG/1
2 TABLET ORAL ONCE
Status: COMPLETED | OUTPATIENT
Start: 2018-01-15 | End: 2018-01-15

## 2018-01-15 RX ORDER — TRAMADOL HYDROCHLORIDE 50 MG/1
50 TABLET ORAL EVERY 6 HOURS PRN
Qty: 20 TABLET | Refills: 0 | Status: SHIPPED | OUTPATIENT
Start: 2018-01-15 | End: 2018-01-22

## 2018-01-15 RX ORDER — IBUPROFEN 600 MG/1
600 TABLET ORAL EVERY 8 HOURS PRN
Qty: 30 TABLET | Refills: 0 | Status: SHIPPED | OUTPATIENT
Start: 2018-01-15 | End: 2018-01-22

## 2018-01-15 RX ORDER — CLINDAMYCIN HYDROCHLORIDE 300 MG/1
300 CAPSULE ORAL 3 TIMES DAILY
Qty: 30 CAPSULE | Refills: 0 | Status: SHIPPED | OUTPATIENT
Start: 2018-01-15 | End: 2018-01-25

## 2018-01-15 NOTE — ED INITIAL ASSESSMENT (HPI)
\"I think I have a tooth infection, the right side of my face is swollen, chills, tooth is cracked\"

## 2018-01-15 NOTE — TELEPHONE ENCOUNTER
Patient called requesting to speak with the nurse.  Stated she was seen in the ER for cracked tooth and was given papin medication, but will be needing something stronger  Please call

## 2018-01-15 NOTE — TELEPHONE ENCOUNTER
Seen in Er today given tramadol , and abx ,and motrin .  Pain is 10 on pain scale tooth is throbbing , ear hurts on same side ,Took tramadol  at 2pm with no relief ,No dentist appt yet that she will make ,Asking for stronger pain med till seen by dentist

## 2018-01-15 NOTE — ED PROVIDER NOTES
Patient Seen in: [facility] In [place]    History   Patient presents with:  Dental Problem (dental)  Swelling Edema (cardiovascular, metabolic)    Stated Complaint: right side of face pain, possible tooth infection, tooth cracked    H SpO2: 99 %  O2 Device: None (Room air)    Current:BP (!) 140/106   Pulse 72   Temp 98.3 °F (36.8 °C)   Resp 16   Ht 152.4 cm (5')   Wt 70.3 kg   LMP 01/08/2018   SpO2 99%   BMI 30.27 kg/m²         Physical Exam   Constitutional: She is oriented to person, Close follow up with dentist, warm salt water gargles, patient could not tolerate amoxil in the past, clindamycin prescribed.      The patient is in good condition throughout her visit today and remains so upon discharge.  I discuss the plan of care with th

## 2018-01-16 NOTE — ED PROVIDER NOTES
Patient Seen in: [facility] In [place]    History   Patient presents with:  Dental Problem (dental)    Stated Complaint: dental pain, seen here earlier for same complaint    HPI    Patient presents here for pain control after practice Mouth/Throat: Uvula is midline and oropharynx is clear and moist. No oral lesions. No trismus in the jaw. Abnormal dentition. No dental abscesses, uvula swelling or dental caries.  No oropharyngeal exudate, posterior oropharyngeal edema, posterior oropharyn

## 2018-01-16 NOTE — ED INITIAL ASSESSMENT (HPI)
Pt to ed states seen here earlier for same states sent home w/Tramadol took one dose w/no relief pt co pain to right lower dental pain

## 2018-01-16 NOTE — TELEPHONE ENCOUNTER
Pt is on the phone trying to find dentist to pull tooth. Asking if unable to find dentist if dr Tara Mattson will rx small rx for norco took one last night in er and caused itching but can take with benadryl.  Will only call back if unable to locate dentist ( Pt i

## 2018-01-21 ENCOUNTER — TELEPHONE (OUTPATIENT)
Dept: FAMILY MEDICINE CLINIC | Facility: CLINIC | Age: 43
End: 2018-01-21

## 2018-01-22 NOTE — TELEPHONE ENCOUNTER
Pt called at 8:30pm reporting that she was started on Clindamycin for a dental infection about 6 days ago. She feels she is having a reaction to the medication. Since beginning medication she has felt dizzy and weak.   Today she had an episode of chest ti

## 2018-01-29 ENCOUNTER — TELEPHONE (OUTPATIENT)
Dept: INTERNAL MEDICINE CLINIC | Facility: CLINIC | Age: 43
End: 2018-01-29

## 2018-02-01 NOTE — TELEPHONE ENCOUNTER
Patient calling regarding [facility] results and medical record. Since Dr. Klaudia Dominguez was not an ordering Physician, we are unable to release a third party medical record to patient. She will have to get a hold of the facility and request it from there.       Ela

## 2018-04-01 ENCOUNTER — HOSPITAL ENCOUNTER (OUTPATIENT)
Age: 43
Discharge: HOME | End: 2018-04-01
Attending: FAMILY MEDICINE
Payer: MEDICAID

## 2018-04-01 VITALS
DIASTOLIC BLOOD PRESSURE: 95 MMHG | OXYGEN SATURATION: 97 % | RESPIRATION RATE: 20 BRPM | TEMPERATURE: 98 F | SYSTOLIC BLOOD PRESSURE: 138 MMHG | HEART RATE: 77 BPM

## 2018-04-01 DIAGNOSIS — J01.00 ACUTE MAXILLARY SINUSITIS, RECURRENCE NOT SPECIFIED: Primary | ICD-10-CM

## 2018-04-01 DIAGNOSIS — R09.82 POST-NASAL DRIP: ICD-10-CM

## 2018-04-01 DIAGNOSIS — J34.89 SINUS PRESSURE: ICD-10-CM

## 2018-04-01 PROCEDURE — 99204 OFFICE O/P NEW MOD 45 MIN: CPT

## 2018-04-01 PROCEDURE — 99213 OFFICE O/P EST LOW 20 MIN: CPT

## 2018-04-01 RX ORDER — AMOXICILLIN AND CLAVULANATE POTASSIUM 875; 125 MG/1; MG/1
1 TABLET, FILM COATED ORAL 2 TIMES DAILY
Qty: 20 TABLET | Refills: 0 | Status: SHIPPED | OUTPATIENT
Start: 2018-04-01 | End: 2018-04-11

## 2018-04-01 NOTE — ED PROVIDER NOTES
Patient [facility]en in: [facility]    History   Patient presents with:  Cough/URI    Stated Complaint: CONGESTION/HEADACHE X 1 WK/CHEST PAIN 1 DAY    HPI  42 yo F with hx of C.diff in the past considering your recent symptoms and pain 97.6 °F (36.4 °C)  Temp src: Temporal  SpO2: 97 %  O2 Device: None (Room air)    Current:/95   Pulse 77   Temp 97.6 °F (36.4 °C) (Temporal)   Resp 20   LMP 03/05/2018   SpO2 97%         Physical Exam  GEN: Not in any acute distress, making good conve heart problems or blood pressure issues like hypertension. It will raise your blood pressure.    · OTC Mucinex to help with thinning out and draining secretions  · Postural drainage discussed - after steaming and rinses sinuses with nasal saline / ej pot

## 2018-04-20 ENCOUNTER — HOSPITAL ENCOUNTER (EMERGENCY)
Age: 43
Discharge: HOME | End: 2018-04-20
Attending: EMERGENCY MEDICINE
Payer: MEDICAID

## 2018-04-20 ENCOUNTER — APPOINTMENT (OUTPATIENT)
Dept: ULTRASOUND IMAGING | Age: 43
End: 2018-04-20
Attending: EMERGENCY MEDICINE
Payer: MEDICAID

## 2018-04-20 VITALS
OXYGEN SATURATION: 98 % | WEIGHT: 160 LBS | HEIGHT: 60 IN | RESPIRATION RATE: 18 BRPM | BODY MASS INDEX: 31.41 KG/M2 | TEMPERATURE: 97 F | HEART RATE: 100 BPM | DIASTOLIC BLOOD PRESSURE: 85 MMHG | SYSTOLIC BLOOD PRESSURE: 154 MMHG

## 2018-04-20 DIAGNOSIS — O20.0 THREATENED ABORTION IN FIRST TRIMESTER: Primary | ICD-10-CM

## 2018-04-20 PROCEDURE — 86901 BLOOD TYPING SEROLOGIC RH(D): CPT | Performed by: EMERGENCY MEDICINE

## 2018-04-20 PROCEDURE — 36415 COLL VENOUS BLD VENIPUNCTURE: CPT

## 2018-04-20 PROCEDURE — 86900 BLOOD TYPING SEROLOGIC ABO: CPT | Performed by: EMERGENCY MEDICINE

## 2018-04-20 PROCEDURE — 84702 CHORIONIC GONADOTROPIN TEST: CPT | Performed by: EMERGENCY MEDICINE

## 2018-04-20 PROCEDURE — 76817 TRANSVAGINAL US OBSTETRIC: CPT | Performed by: EMERGENCY MEDICINE

## 2018-04-20 PROCEDURE — 80048 BASIC METABOLIC PNL TOTAL CA: CPT | Performed by: EMERGENCY MEDICINE

## 2018-04-20 PROCEDURE — 99284 EMERGENCY DEPT VISIT MOD MDM: CPT

## 2018-04-20 PROCEDURE — 85025 COMPLETE CBC W/AUTO DIFF WBC: CPT | Performed by: EMERGENCY MEDICINE

## 2018-04-20 PROCEDURE — 76801 OB US < 14 WKS SINGLE FETUS: CPT | Performed by: EMERGENCY MEDICINE

## 2018-04-20 NOTE — ED PROVIDER NOTES
Patient Seen in: [facility] Emergency Department In [place]    History   Patient presents with:  Pregnancy Issues (gynecologic)    Stated Complaint: vaginal bleeding in pregnancy    HPI    56-year-old female is here at the urging of her obstetrician for e Resp 18   Ht 152.4 cm (5')   Wt 72.6 kg   LMP 03/05/2018   SpO2 98%   BMI 31.25 kg/m²         Physical Exam      Constitutional: Pt is oriented to person, place, and time. Appears well-developed and well-nourished. Head: Normocephalic and atraumatic. RAINBOW DRAW GOLD       ED Course as of Apr 20 1700  ------------------------------------------------------------       MDM         Patient's beta-hCG today is just above 900, this is notably decreased from a beta-hCG of 1400 that the patient reports was

## 2018-04-20 NOTE — ED INITIAL ASSESSMENT (HPI)
Pt reports she is 6 weeks and 1 day pregnant and started spotting Wednesday. Bleeding got heavier Thursday and and saw her OB. She was referred to the ED today after reporting to her OB that she was bleeding.

## 2018-08-09 ENCOUNTER — TELEPHONE (OUTPATIENT)
Dept: INTERNAL MEDICINE CLINIC | Facility: CLINIC | Age: 43
End: 2018-08-09

## 2018-08-09 NOTE — TELEPHONE ENCOUNTER
Patient called requesting to speak to a nurse regarding severe headache and she feels very poorly please callback

## 2018-08-09 NOTE — TELEPHONE ENCOUNTER
Spoke with pt and c/o extreme nausea with some relief with tums then sx's returned ,not getting much sleep and having hot flashes,Pt does not think she is pregnant.  Called today since felt worse, Advised UC or appt in office next week

## 2018-08-27 ENCOUNTER — APPOINTMENT (OUTPATIENT)
Dept: GENERAL RADIOLOGY | Age: 43
End: 2018-08-27
Attending: EMERGENCY MEDICINE
Payer: MEDICAID

## 2018-08-27 ENCOUNTER — HOSPITAL ENCOUNTER (EMERGENCY)
Age: 43
Discharge: HOME | End: 2018-08-28
Attending: EMERGENCY MEDICINE
Payer: MEDICAID

## 2018-08-27 VITALS
DIASTOLIC BLOOD PRESSURE: 84 MMHG | BODY MASS INDEX: 31 KG/M2 | RESPIRATION RATE: 16 BRPM | SYSTOLIC BLOOD PRESSURE: 140 MMHG | TEMPERATURE: 98 F | OXYGEN SATURATION: 97 % | WEIGHT: 160.06 LBS | HEART RATE: 68 BPM

## 2018-08-27 DIAGNOSIS — T07.XXXA MULTIPLE CONTUSIONS: Primary | ICD-10-CM

## 2018-08-27 DIAGNOSIS — S52.125A CLOSED NONDISPLACED FRACTURE OF HEAD OF LEFT RADIUS, INITIAL ENCOUNTER: ICD-10-CM

## 2018-08-27 PROCEDURE — 73080 X-RAY EXAM OF ELBOW: CPT | Performed by: EMERGENCY MEDICINE

## 2018-08-27 PROCEDURE — 29105 APPLICATION LONG ARM SPLINT: CPT

## 2018-08-27 PROCEDURE — 99284 EMERGENCY DEPT VISIT MOD MDM: CPT

## 2018-08-27 PROCEDURE — 73562 X-RAY EXAM OF KNEE 3: CPT | Performed by: EMERGENCY MEDICINE

## 2018-08-28 ENCOUNTER — TELEPHONE (OUTPATIENT)
Dept: INTERNAL MEDICINE CLINIC | Facility: CLINIC | Age: 43
End: 2018-08-28

## 2018-08-28 DIAGNOSIS — S42.402B OPEN FRACTURE OF LEFT ELBOW, INITIAL ENCOUNTER: Primary | ICD-10-CM

## 2018-08-28 RX ORDER — TRAMADOL HYDROCHLORIDE 50 MG/1
TABLET ORAL EVERY 4 HOURS PRN
Qty: 20 TABLET | Refills: 0 | Status: SHIPPED | OUTPATIENT
Start: 2018-08-28 | End: 2018-09-04

## 2018-08-28 NOTE — TELEPHONE ENCOUNTER
Orthopedic  Dr. Prescott [facility] # [phone] [facility]  Pt calling would like a referral and a call back when ready and has apt with ortho tomorrow

## 2018-08-28 NOTE — ED INITIAL ASSESSMENT (HPI)
States she was mowing the lawn and fell through a window well approx 5 feet today.  C/o pain to both arms, chin, right lower leg

## 2018-08-28 NOTE — ED PROVIDER NOTES
Patient Seen in: [facility] Emergency Department In [place]    History   Patient presents with:  Fall (musculoskeletal, neurologic): pt fell down window well tonight, denies injury, pt c/o bilat arm amd left leg pain    Stated Complaint:     HPI    Mowing cyanosis or pallor  HEENT: Atraumatic  Neck: Supple and nontender  Left elbow: Bruising about the elbow. Tenderness over the olecranon process and radial head. Full active range of motion. Radial, median, ulnar nerves intact.   Radial pulse normal.  Left hours as needed., Normal, Disp-20 tablet, R-0

## 2018-08-29 ENCOUNTER — TELEPHONE (OUTPATIENT)
Dept: INTERNAL MEDICINE CLINIC | Facility: CLINIC | Age: 43
End: 2018-08-29

## 2018-08-29 NOTE — TELEPHONE ENCOUNTER
Pt fell in the window well and has a bruise on arm and leg. Pt had went to the ER and orthopedic. Pt was told to see her PCP to get a check up on her fall. Please call pt to discuss.

## 2018-08-30 ENCOUNTER — OFFICE VISIT (OUTPATIENT)
Dept: INTERNAL MEDICINE CLINIC | Facility: CLINIC | Age: 43
End: 2018-08-30
Payer: MEDICAID

## 2018-08-30 VITALS
RESPIRATION RATE: 16 BRPM | OXYGEN SATURATION: 99 % | WEIGHT: 166.75 LBS | DIASTOLIC BLOOD PRESSURE: 70 MMHG | BODY MASS INDEX: 33 KG/M2 | SYSTOLIC BLOOD PRESSURE: 114 MMHG | HEART RATE: 58 BPM | TEMPERATURE: 98 F

## 2018-08-30 DIAGNOSIS — T14.8XXA BRUISE: Primary | ICD-10-CM

## 2018-08-30 PROCEDURE — 99213 OFFICE O/P EST LOW 20 MIN: CPT | Performed by: FAMILY MEDICINE

## 2018-08-30 NOTE — PROGRESS NOTES
HPI:    Patient ID: Dusty Wright is a 41year old female.     HPI  3d ago cutting the grass and stepped onto [employer]ast[employer] cover window well    Feel down w injury to the R medial thigh, both elbows and chin   Seen in ER   Had ?fx L elbow   To see ortho nex t

## 2018-10-18 ENCOUNTER — IMMUNIZATION (OUTPATIENT)
Dept: INTERNAL MEDICINE CLINIC | Facility: CLINIC | Age: 43
End: 2018-10-18
Payer: MEDICAID

## 2018-10-18 DIAGNOSIS — Z23 NEED FOR VACCINATION: ICD-10-CM

## 2018-10-18 PROCEDURE — 90471 IMMUNIZATION ADMIN: CPT | Performed by: FAMILY MEDICINE

## 2018-10-18 PROCEDURE — 90686 IIV4 VACC NO PRSV 0.5 ML IM: CPT | Performed by: FAMILY MEDICINE

## 2018-11-24 ENCOUNTER — HOSPITAL ENCOUNTER (OUTPATIENT)
Age: 43
Discharge: HOME | End: 2018-11-24
Attending: FAMILY MEDICINE
Payer: MEDICAID

## 2018-11-24 VITALS
HEART RATE: 75 BPM | DIASTOLIC BLOOD PRESSURE: 84 MMHG | SYSTOLIC BLOOD PRESSURE: 142 MMHG | TEMPERATURE: 98 F | RESPIRATION RATE: 20 BRPM | OXYGEN SATURATION: 96 %

## 2018-11-24 DIAGNOSIS — J01.00 ACUTE NON-RECURRENT MAXILLARY SINUSITIS: Primary | ICD-10-CM

## 2018-11-24 PROCEDURE — 93005 ELECTROCARDIOGRAM TRACING: CPT

## 2018-11-24 PROCEDURE — 99214 OFFICE O/P EST MOD 30 MIN: CPT

## 2018-11-24 PROCEDURE — 93010 ELECTROCARDIOGRAM REPORT: CPT

## 2018-11-24 RX ORDER — BENZONATATE 100 MG/1
100 CAPSULE ORAL 3 TIMES DAILY PRN
Qty: 30 CAPSULE | Refills: 0 | Status: SHIPPED | OUTPATIENT
Start: 2018-11-24 | End: 2018-12-24

## 2018-11-24 RX ORDER — DOXYCYCLINE HYCLATE 100 MG/1
100 CAPSULE ORAL 2 TIMES DAILY
Qty: 14 CAPSULE | Refills: 0 | Status: SHIPPED | OUTPATIENT
Start: 2018-11-24 | End: 2018-12-01

## 2018-11-24 RX ORDER — FLUTICASONE PROPIONATE 50 MCG
2 SPRAY, SUSPENSION (ML) NASAL DAILY
Qty: 16 G | Refills: 0 | Status: SHIPPED | OUTPATIENT
Start: 2018-11-24 | End: 2018-12-24

## 2018-11-24 NOTE — ED INITIAL ASSESSMENT (HPI)
Complains of headaches, nasal congestion and cough for the last three days. Today started having bilateral ear pain.

## 2018-11-24 NOTE — ED PROVIDER NOTES
Patient Seen in: [facility] In [place]    History   Patient presents with:  Headache    Stated Complaint: HEADACHE/CHEST PAIN X 2 DAYS    HPI    82-year-old female with history of nephrolithiasis and migraine headache started getting some for stated complaint: HEADACHE/CHEST PAIN X 2 DAYS  Other systems are as noted in HPI. Constitutional and vital signs reviewed. All other systems reviewed and negative except as noted above.     Physical Exam     ED Triage Vitals [11/24/18 3907]   BP medications    benzonatate 100 MG Oral Cap  Take 1 capsule (100 mg total) by mouth 3 (three) times daily as needed for cough.   Qty: 30 capsule Refills: 0    Doxycycline Hyclate 100 MG Oral Cap  Take 1 capsule (100 mg total) by mouth 2 (two) times daily for

## 2018-11-26 ENCOUNTER — TELEPHONE (OUTPATIENT)
Dept: INTERNAL MEDICINE CLINIC | Facility: CLINIC | Age: 43
End: 2018-11-26

## 2018-11-26 NOTE — TELEPHONE ENCOUNTER
Patient still very sick and has ongoing headache, eyes hurt, entire body last Thursday she was seen IC for milder symptoms including chest pains on one side and slight sob; IC told her to call if feeling same or worse she should contact our office; she nee

## 2018-11-26 NOTE — TELEPHONE ENCOUNTER
C/o headache not relieved by excedrin or motrin, Ribs hurt along with back. ,has been clammy and sweaty but no fever Taking abx given at [facility]

## 2018-11-29 ENCOUNTER — OFFICE VISIT (OUTPATIENT)
Dept: INTERNAL MEDICINE CLINIC | Facility: CLINIC | Age: 43
End: 2018-11-29
Payer: MEDICAID

## 2018-11-29 VITALS
OXYGEN SATURATION: 97 % | SYSTOLIC BLOOD PRESSURE: 130 MMHG | RESPIRATION RATE: 16 BRPM | WEIGHT: 165.5 LBS | DIASTOLIC BLOOD PRESSURE: 82 MMHG | BODY MASS INDEX: 32.49 KG/M2 | TEMPERATURE: 99 F | HEIGHT: 59.75 IN | HEART RATE: 67 BPM

## 2018-11-29 DIAGNOSIS — J01.00 ACUTE MAXILLARY SINUSITIS, RECURRENCE NOT SPECIFIED: Primary | ICD-10-CM

## 2018-11-29 DIAGNOSIS — R00.2 HEART PALPITATIONS: ICD-10-CM

## 2018-11-29 PROCEDURE — 99213 OFFICE O/P EST LOW 20 MIN: CPT | Performed by: FAMILY MEDICINE

## 2018-11-29 NOTE — PROGRESS NOTES
CHIEF COMPLAINT:   Patient presents with: Follow - Up: [facility] 11/24/18 dt URI      HPI:   Sharlene Cook is a 41year old female who presents for f/u on Sinusitis from the .  Pt was put on Doxycycline,  Tessalon and flonase,  Pt has had symptoms for  5 day No    Drug use: No        REVIEW OF SYSTEMS:   GENERAL: feels well otherwise,  normal appetite  SKIN: no rashes or abnormal skin lesions  HEENT: See HPI  LUNGS: denies shortness of breath or wheezing, See HPI  CARDIOVASCULAR: denies chest pain or palpitati Px and labs. The patient indicates understanding of these issues and agrees to the plan. The patient is asked to call if sx's persist or worsen.

## 2018-12-04 ENCOUNTER — HOSPITAL ENCOUNTER (OUTPATIENT)
Dept: CV DIAGNOSTICS | Age: 43
Discharge: HOME | End: 2018-12-04
Attending: FAMILY MEDICINE
Payer: MEDICAID

## 2018-12-04 DIAGNOSIS — R00.2 HEART PALPITATIONS: ICD-10-CM

## 2018-12-04 PROCEDURE — 93226 XTRNL ECG REC<48 HR SCAN A/R: CPT | Performed by: FAMILY MEDICINE

## 2018-12-04 PROCEDURE — 93227 XTRNL ECG REC<48 HR R&I: CPT | Performed by: FAMILY MEDICINE

## 2018-12-04 PROCEDURE — 93225 XTRNL ECG REC<48 HRS REC: CPT | Performed by: FAMILY MEDICINE

## 2018-12-31 ENCOUNTER — LAB ENCOUNTER (OUTPATIENT)
Dept: LAB | Age: 43
End: 2018-12-31
Attending: OBSTETRICS & GYNECOLOGY
Payer: MEDICAID

## 2018-12-31 ENCOUNTER — OFFICE VISIT (OUTPATIENT)
Dept: OBGYN CLINIC | Facility: CLINIC | Age: 43
End: 2018-12-31
Payer: MEDICAID

## 2018-12-31 VITALS
HEART RATE: 65 BPM | BODY MASS INDEX: 32.2 KG/M2 | DIASTOLIC BLOOD PRESSURE: 88 MMHG | HEIGHT: 59.75 IN | WEIGHT: 164 LBS | SYSTOLIC BLOOD PRESSURE: 136 MMHG

## 2018-12-31 DIAGNOSIS — Z12.39 BREAST CANCER SCREENING: ICD-10-CM

## 2018-12-31 DIAGNOSIS — R30.0 DYSURIA: ICD-10-CM

## 2018-12-31 DIAGNOSIS — R10.2 PELVIC PAIN: ICD-10-CM

## 2018-12-31 DIAGNOSIS — E04.9 ENLARGED THYROID: ICD-10-CM

## 2018-12-31 DIAGNOSIS — Z12.4 CERVICAL CANCER SCREENING: ICD-10-CM

## 2018-12-31 DIAGNOSIS — Z01.419 WELL WOMAN EXAM: Primary | ICD-10-CM

## 2018-12-31 LAB
APPEARANCE: CLEAR
MULTISTIX LOT#: NORMAL NUMERIC
PH, URINE: 6 (ref 4.5–8)
PROTEIN (URINE DIPSTICK): 100 MG/DL
SPECIFIC GRAVITY: 1.02 (ref 1–1.03)
T4 FREE SERPL-MCNC: 1 NG/DL (ref 0.9–1.8)
TSI SER-ACNC: 1.33 MIU/ML (ref 0.35–5.5)
URINE-COLOR: YELLOW
UROBILINOGEN,SEMI-QN: 0.2 MG/DL (ref 0–1.9)

## 2018-12-31 PROCEDURE — 36415 COLL VENOUS BLD VENIPUNCTURE: CPT

## 2018-12-31 PROCEDURE — 88175 CYTOPATH C/V AUTO FLUID REDO: CPT

## 2018-12-31 PROCEDURE — 87086 URINE CULTURE/COLONY COUNT: CPT

## 2018-12-31 PROCEDURE — 99396 PREV VISIT EST AGE 40-64: CPT | Performed by: OBSTETRICS & GYNECOLOGY

## 2018-12-31 PROCEDURE — 84443 ASSAY THYROID STIM HORMONE: CPT

## 2018-12-31 PROCEDURE — 84439 ASSAY OF FREE THYROXINE: CPT

## 2018-12-31 PROCEDURE — 87624 HPV HI-RISK TYP POOLED RSLT: CPT

## 2018-12-31 PROCEDURE — 81002 URINALYSIS NONAUTO W/O SCOPE: CPT | Performed by: OBSTETRICS & GYNECOLOGY

## 2018-12-31 RX ORDER — PRENATAL VIT/IRON FUM/FOLIC AC 27MG-0.8MG
1 TABLET ORAL DAILY
COMMUNITY
End: 2019-08-05 | Stop reason: ALTCHOICE

## 2018-12-31 NOTE — PROGRESS NOTES
GYN H&P     12/31/2018  10:17 AM    CC: Patient presents with:  Physical: Pt would like to be chaecked for a UTI- Pt c/o sharp pains in her ovaries      HPI: patient is a 41year old [id number] here for her annual gyne exam.    Pt notes a sharp pain bila depression    • Sexually transmitted disease     HPV   • Unspecified condition originating in the  period 04   • Visual impairment     glasses, contacts     Past Surgical History:   Procedure Laterality Date   •       x2   • SHERRON Used    Substance and Sexual Activity      Alcohol use: No      Drug use: No      Sexual activity: Yes        Partners: Male    Other Topics      Concerns:         Service: Not Asked        Blood Transfusions: Not Asked        Caffeine Concern: Yes normal size          Rectal: not indicated  EXTREMITIES:  non tender, without edema      PLAN      1. Well woman exam  -discussed diet and exercise    2. Cervical cancer screening    - THINPREP PAP WITH HPV REFLEX REQUEST B; Future    3.  Breast cancer scre

## 2019-01-03 LAB — HPV I/H RISK 1 DNA SPEC QL NAA+PROBE: NEGATIVE

## 2019-01-04 ENCOUNTER — TELEPHONE (OUTPATIENT)
Dept: OBGYN CLINIC | Facility: CLINIC | Age: 44
End: 2019-01-04

## 2019-01-04 NOTE — TELEPHONE ENCOUNTER
PA initiated. [id number] Approved  RANR#[id number]  Valid 01/04/19-02/18/19    [id number] going to medical review. Case #[id number]  Faxed medical records to [phone].

## 2019-01-08 ENCOUNTER — OFFICE VISIT (OUTPATIENT)
Dept: INTERNAL MEDICINE CLINIC | Facility: CLINIC | Age: 44
End: 2019-01-08
Payer: MEDICAID

## 2019-01-08 ENCOUNTER — TELEPHONE (OUTPATIENT)
Dept: INTERNAL MEDICINE CLINIC | Facility: CLINIC | Age: 44
End: 2019-01-08

## 2019-01-08 VITALS
WEIGHT: 165.25 LBS | HEART RATE: 68 BPM | SYSTOLIC BLOOD PRESSURE: 128 MMHG | BODY MASS INDEX: 33.32 KG/M2 | TEMPERATURE: 98 F | OXYGEN SATURATION: 98 % | HEIGHT: 59 IN | RESPIRATION RATE: 16 BRPM | DIASTOLIC BLOOD PRESSURE: 74 MMHG

## 2019-01-08 DIAGNOSIS — E04.9 ENLARGED THYROID: Primary | ICD-10-CM

## 2019-01-08 DIAGNOSIS — I49.9 IRREGULAR HEART RATE: ICD-10-CM

## 2019-01-08 PROCEDURE — 99214 OFFICE O/P EST MOD 30 MIN: CPT | Performed by: FAMILY MEDICINE

## 2019-01-08 NOTE — PROGRESS NOTES
CHIEF COMPLAINT:     Patient presents with:  Thyroid Nodule: Was at Obgyn and they did thyroid check (lab), doctor felt a lump.  Lab work came out normal. wants pt to f/u with PCP for extensive testing for thryroid      HPI:   Hortensia Baron is a 41 year o suspicious lesions  HEAD: atraumatic, normocephalic  EYES: conjunctiva clear, EOM intact, PERRLA  EARS: TM's pearly, no bulging, no retraction, no fluid  NOSE: nostrils patent, no exudates, nasal mucosa pink and noninflamed  THROAT: oral mucosa pink, moist

## 2019-01-09 ENCOUNTER — PRIOR ORIGINAL RECORDS (OUTPATIENT)
Dept: OTHER | Age: 44
End: 2019-01-09

## 2019-01-09 NOTE — TELEPHONE ENCOUNTER
[facility] # [phone] (got off insurance card). Sheila berry. Gave me the info I needed to start PA. The reference # was [id number].  She then transferred me to the company that deals with the PA's (# [phone]), talked to Ramakrishna Jaime

## 2019-01-09 NOTE — TELEPHONE ENCOUNTER
[employer] faxed form stating we needed to send a recent physical exam, current sign and symptoms, past medical history, current/previous treatment, recent labs, and prior imaging of the neck. Sent fax, placed in the [address] file on my desk.

## 2019-01-10 NOTE — TELEPHONE ENCOUNTER
Received authorization for US from [employer]. Request ID [id number]. Aubrey Soto, LAURA aware. Pt aware as well and will call to schedule an appointment. Form sent to scan.

## 2019-01-15 ENCOUNTER — HOSPITAL ENCOUNTER (OUTPATIENT)
Dept: MAMMOGRAPHY | Age: 44
Discharge: HOME | End: 2019-01-15
Attending: OBSTETRICS & GYNECOLOGY
Payer: MEDICAID

## 2019-01-15 ENCOUNTER — HOSPITAL ENCOUNTER (OUTPATIENT)
Dept: ULTRASOUND IMAGING | Age: 44
Discharge: HOME | End: 2019-01-15
Attending: FAMILY MEDICINE
Payer: MEDICAID

## 2019-01-15 ENCOUNTER — HOSPITAL ENCOUNTER (OUTPATIENT)
Dept: ULTRASOUND IMAGING | Age: 44
Discharge: HOME | End: 2019-01-15
Attending: OBSTETRICS & GYNECOLOGY
Payer: MEDICAID

## 2019-01-15 DIAGNOSIS — Z12.39 BREAST CANCER SCREENING: ICD-10-CM

## 2019-01-15 DIAGNOSIS — R10.2 PELVIC PAIN: ICD-10-CM

## 2019-01-15 DIAGNOSIS — E04.9 ENLARGED THYROID: ICD-10-CM

## 2019-01-15 PROCEDURE — 76536 US EXAM OF HEAD AND NECK: CPT | Performed by: FAMILY MEDICINE

## 2019-01-15 PROCEDURE — 76856 US EXAM PELVIC COMPLETE: CPT | Performed by: OBSTETRICS & GYNECOLOGY

## 2019-01-15 PROCEDURE — 77063 BREAST TOMOSYNTHESIS BI: CPT | Performed by: OBSTETRICS & GYNECOLOGY

## 2019-01-15 PROCEDURE — 77067 SCR MAMMO BI INCL CAD: CPT | Performed by: OBSTETRICS & GYNECOLOGY

## 2019-01-15 PROCEDURE — 76830 TRANSVAGINAL US NON-OB: CPT | Performed by: OBSTETRICS & GYNECOLOGY

## 2019-01-17 ENCOUNTER — OFFICE VISIT (OUTPATIENT)
Dept: OTOLARYNGOLOGY | Facility: CLINIC | Age: 44
End: 2019-01-17
Payer: MEDICAID

## 2019-01-17 ENCOUNTER — TELEPHONE (OUTPATIENT)
Dept: OBGYN CLINIC | Facility: CLINIC | Age: 44
End: 2019-01-17

## 2019-01-17 VITALS
SYSTOLIC BLOOD PRESSURE: 143 MMHG | TEMPERATURE: 98 F | WEIGHT: 159 LBS | BODY MASS INDEX: 31.22 KG/M2 | DIASTOLIC BLOOD PRESSURE: 96 MMHG | HEIGHT: 60 IN

## 2019-01-17 DIAGNOSIS — E04.9 NODULAR GOITER, NON-TOXIC: ICD-10-CM

## 2019-01-17 DIAGNOSIS — R07.0 THROAT PAIN IN ADULT: Primary | ICD-10-CM

## 2019-01-17 PROCEDURE — 99243 OFF/OP CNSLTJ NEW/EST LOW 30: CPT | Performed by: OTOLARYNGOLOGY

## 2019-01-17 PROCEDURE — 99212 OFFICE O/P EST SF 10 MIN: CPT | Performed by: OTOLARYNGOLOGY

## 2019-01-17 RX ORDER — MONTELUKAST SODIUM 10 MG/1
10 TABLET ORAL NIGHTLY
Qty: 30 TABLET | Refills: 3 | Status: SHIPPED | OUTPATIENT
Start: 2019-01-17 | End: 2019-08-05 | Stop reason: ALTCHOICE

## 2019-01-17 RX ORDER — LORATADINE 10 MG/1
10 TABLET ORAL DAILY
Qty: 30 TABLET | Refills: 3 | Status: SHIPPED | OUTPATIENT
Start: 2019-01-17 | End: 2019-06-24

## 2019-01-17 RX ORDER — AZELASTINE 1 MG/ML
2 SPRAY, METERED NASAL 2 TIMES DAILY
Qty: 1 BOTTLE | Refills: 3 | Status: SHIPPED | OUTPATIENT
Start: 2019-01-17 | End: 2019-03-18 | Stop reason: ALTCHOICE

## 2019-01-17 NOTE — TELEPHONE ENCOUNTER
Pt sees pap results on MyChart but is not understanding the results   Pt would like to speak with a RN to go over some questions she has please

## 2019-01-17 NOTE — TELEPHONE ENCOUNTER
Patient had questions regarding ultrasound results. Explained results to patient. She verbalized understanding. No further questions or concerns.

## 2019-01-17 NOTE — PROGRESS NOTES
Blayne Pascual is a 41year old female.   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 Pupil - Right: Normal, Left: Normal. Fundus - Right: Normal, Left: Normal.   Neurological Normal Memory - Normal. Cranial nerves - Cranial nerves II through XII grossly intact.    Head/Face Normal Facial features -

## 2019-01-21 ENCOUNTER — OFFICE VISIT (OUTPATIENT)
Dept: INTERNAL MEDICINE CLINIC | Facility: CLINIC | Age: 44
End: 2019-01-21
Payer: MEDICAID

## 2019-01-21 ENCOUNTER — LAB ENCOUNTER (OUTPATIENT)
Dept: LAB | Age: 44
End: 2019-01-21
Attending: FAMILY MEDICINE
Payer: MEDICAID

## 2019-01-21 VITALS
HEART RATE: 70 BPM | OXYGEN SATURATION: 96 % | BODY MASS INDEX: 32 KG/M2 | SYSTOLIC BLOOD PRESSURE: 120 MMHG | TEMPERATURE: 99 F | RESPIRATION RATE: 19 BRPM | DIASTOLIC BLOOD PRESSURE: 82 MMHG | HEIGHT: 59.75 IN | WEIGHT: 163 LBS

## 2019-01-21 DIAGNOSIS — Z00.00 LABORATORY EXAM ORDERED AS PART OF ROUTINE GENERAL MEDICAL EXAMINATION: ICD-10-CM

## 2019-01-21 DIAGNOSIS — I49.3 FREQUENT PVCS: ICD-10-CM

## 2019-01-21 DIAGNOSIS — E04.1 THYROID NODULE: ICD-10-CM

## 2019-01-21 DIAGNOSIS — I10 ELEVATED BLOOD PRESSURE READING IN OFFICE WITH DIAGNOSIS OF HYPERTENSION: ICD-10-CM

## 2019-01-21 DIAGNOSIS — Z00.00 WELLNESS EXAMINATION: Primary | ICD-10-CM

## 2019-01-21 LAB
ALBUMIN SERPL-MCNC: 3.8 G/DL (ref 3.1–4.5)
ALBUMIN/GLOB SERPL: 0.9 {RATIO} (ref 1–2)
ALP LIVER SERPL-CCNC: 78 U/L (ref 37–98)
ALT SERPL-CCNC: 37 U/L (ref 14–54)
ANION GAP SERPL CALC-SCNC: 5 MMOL/L (ref 0–18)
AST SERPL-CCNC: 19 U/L (ref 15–41)
BASOPHILS # BLD AUTO: 0.04 X10(3) UL (ref 0–0.1)
BASOPHILS NFR BLD AUTO: 0.4 %
BILIRUB SERPL-MCNC: 0.4 MG/DL (ref 0.1–2)
BILIRUB UR QL STRIP.AUTO: NEGATIVE
BUN BLD-MCNC: 13 MG/DL (ref 8–20)
BUN/CREAT SERPL: 16.9 (ref 10–20)
CALCIUM BLD-MCNC: 9.1 MG/DL (ref 8.3–10.3)
CHLORIDE SERPL-SCNC: 108 MMOL/L (ref 101–111)
CHOLEST SMN-MCNC: 205 MG/DL (ref ?–200)
CLARITY UR REFRACT.AUTO: CLEAR
CO2 SERPL-SCNC: 26 MMOL/L (ref 22–32)
COLOR UR AUTO: YELLOW
CREAT BLD-MCNC: 0.77 MG/DL (ref 0.55–1.02)
EOSINOPHIL # BLD AUTO: 0.25 X10(3) UL (ref 0–0.3)
EOSINOPHIL NFR BLD AUTO: 2.7 %
ERYTHROCYTE [DISTWIDTH] IN BLOOD BY AUTOMATED COUNT: 12.3 % (ref 11.5–16)
GLOBULIN PLAS-MCNC: 4.2 G/DL (ref 2.8–4.4)
GLUCOSE BLD-MCNC: 88 MG/DL (ref 70–99)
GLUCOSE UR STRIP.AUTO-MCNC: NEGATIVE MG/DL
HAV IGM SER QL: 1.9 MG/DL (ref 1.8–2.5)
HCT VFR BLD AUTO: 41.8 % (ref 34–50)
HDLC SERPL-MCNC: 34 MG/DL (ref 40–59)
HGB BLD-MCNC: 13.6 G/DL (ref 12–16)
IMMATURE GRANULOCYTE COUNT: 0.03 X10(3) UL (ref 0–1)
IMMATURE GRANULOCYTE RATIO %: 0.3 %
KETONES UR STRIP.AUTO-MCNC: NEGATIVE MG/DL
LDLC SERPL CALC-MCNC: 138 MG/DL (ref ?–100)
LYMPHOCYTES # BLD AUTO: 1.4 X10(3) UL (ref 0.9–4)
LYMPHOCYTES NFR BLD AUTO: 15 %
M PROTEIN MFR SERPL ELPH: 8 G/DL (ref 6.4–8.2)
MCH RBC QN AUTO: 28.8 PG (ref 27–33.2)
MCHC RBC AUTO-ENTMCNC: 32.5 G/DL (ref 31–37)
MCV RBC AUTO: 88.6 FL (ref 81–100)
MONOCYTES # BLD AUTO: 0.58 X10(3) UL (ref 0.1–1)
MONOCYTES NFR BLD AUTO: 6.2 %
NEUTROPHIL ABS PRELIM: 7.03 X10 (3) UL (ref 1.3–6.7)
NEUTROPHILS # BLD AUTO: 7.03 X10(3) UL (ref 1.3–6.7)
NEUTROPHILS NFR BLD AUTO: 75.4 %
NITRITE UR QL STRIP.AUTO: NEGATIVE
NONHDLC SERPL-MCNC: 171 MG/DL (ref ?–130)
OSMOLALITY SERPL CALC.SUM OF ELEC: 288 MOSM/KG (ref 275–295)
PH UR STRIP.AUTO: 5 [PH] (ref 4.5–8)
PLATELET # BLD AUTO: 350 10(3)UL (ref 150–450)
POTASSIUM SERPL-SCNC: 3.8 MMOL/L (ref 3.6–5.1)
PROT UR STRIP.AUTO-MCNC: 30 MG/DL
RBC # BLD AUTO: 4.72 X10(6)UL (ref 3.8–5.1)
RED CELL DISTRIBUTION WIDTH-SD: 40.2 FL (ref 35.1–46.3)
SODIUM SERPL-SCNC: 139 MMOL/L (ref 136–144)
SP GR UR STRIP.AUTO: 1.01 (ref 1–1.03)
TRIGL SERPL-MCNC: 167 MG/DL (ref 30–149)
UROBILINOGEN UR STRIP.AUTO-MCNC: <2 MG/DL
VIT D+METAB SERPL-MCNC: 16.5 NG/ML (ref 30–100)
VLDLC SERPL CALC-MCNC: 33 MG/DL (ref 0–30)
WBC # BLD AUTO: 9.3 X10(3) UL (ref 4–13)

## 2019-01-21 PROCEDURE — 82306 VITAMIN D 25 HYDROXY: CPT

## 2019-01-21 PROCEDURE — 36415 COLL VENOUS BLD VENIPUNCTURE: CPT

## 2019-01-21 PROCEDURE — 80053 COMPREHEN METABOLIC PANEL: CPT

## 2019-01-21 PROCEDURE — 80061 LIPID PANEL: CPT

## 2019-01-21 PROCEDURE — 83735 ASSAY OF MAGNESIUM: CPT

## 2019-01-21 PROCEDURE — 81001 URINALYSIS AUTO W/SCOPE: CPT

## 2019-01-21 PROCEDURE — 99396 PREV VISIT EST AGE 40-64: CPT | Performed by: FAMILY MEDICINE

## 2019-01-21 PROCEDURE — 85025 COMPLETE CBC W/AUTO DIFF WBC: CPT

## 2019-01-21 NOTE — PROGRESS NOTES
HPI:    Patient ID: Devan Blue is a 41year old female.     HPI     HPI:   Devan Blue is a 41year old female who presents for a complete physical exam.     Wt Readings from Last 6 Encounters:  01/21/19 : 163 lb  01/17/19 : 159 lb  01/08/19 : 165 Onset   • Heart Disorder Father         Stent   • Diabetes Father    • Hypertension Father    • Diabetes Mother    • Other (Other) Mother         Thyroid disease   • Diabetes Maternal Grandmother    • Diabetes Maternal Grandfather    • Diabetes Paternal Gr cardiology   Consider B blocker   Discussed the holter monitor    Discussed the thyroid US and labs    Pt' s weight is Body mass index is 32.1 kg/m². , recommended low fat diet and aerobic exercise 30 minutes three times weekly.   The patient indicates under

## 2019-01-22 ENCOUNTER — OFFICE VISIT (OUTPATIENT)
Dept: CARDIOLOGY CLINIC | Facility: CLINIC | Age: 44
End: 2019-01-22
Payer: MEDICAID

## 2019-01-22 VITALS
BODY MASS INDEX: 32 KG/M2 | HEART RATE: 75 BPM | WEIGHT: 161 LBS | SYSTOLIC BLOOD PRESSURE: 154 MMHG | DIASTOLIC BLOOD PRESSURE: 105 MMHG | RESPIRATION RATE: 20 BRPM

## 2019-01-22 DIAGNOSIS — R00.2 PALPITATIONS: ICD-10-CM

## 2019-01-22 DIAGNOSIS — R07.9 CHEST PAIN, UNSPECIFIED TYPE: Primary | ICD-10-CM

## 2019-01-22 DIAGNOSIS — R06.00 DYSPNEA, UNSPECIFIED TYPE: ICD-10-CM

## 2019-01-22 PROCEDURE — 93005 ELECTROCARDIOGRAM TRACING: CPT | Performed by: INTERNAL MEDICINE

## 2019-01-22 PROCEDURE — 93000 ELECTROCARDIOGRAM COMPLETE: CPT | Performed by: INTERNAL MEDICINE

## 2019-01-22 PROCEDURE — 99212 OFFICE O/P EST SF 10 MIN: CPT | Performed by: INTERNAL MEDICINE

## 2019-01-22 PROCEDURE — 99244 OFF/OP CNSLTJ NEW/EST MOD 40: CPT | Performed by: INTERNAL MEDICINE

## 2019-01-22 NOTE — H&P
Nyasia Suazo is a 41year old female. HPI:   This is a pleasant 46-year-old with 7 children who is on no medications and now presents for cardiac assessment of vague chest pain dizziness shortness of breath palpitations since November.   We are asked by kidney    • Depression     not on meds   • Gestational diabetes 2014   • Kidney stones    • Migraines    • Ovarian cyst    • Post partum depression    • Sexually transmitted disease     HPV   • Unspecified condition originating in the  perio was told she had a murmur which I did not appreciate. If echocardiogram is abnormal Doppler study may be of benefit. Labs in January showed normal kidney function and hemoglobin.  - ELECTROCARDIOGRAM, COMPLETE    3.  Palpitations  Patient with isolated PA

## 2019-01-22 NOTE — PATIENT INSTRUCTIONS
Schedule stress echocardiogram  Drink plenty of water and avoid all caffeinated products  Monitor and record blood pressure and pulse for 1 week and call with results.   Also check blood pressure and pulse if not feeling well or headache

## 2019-01-23 ENCOUNTER — TELEPHONE (OUTPATIENT)
Dept: CARDIOLOGY CLINIC | Facility: CLINIC | Age: 44
End: 2019-01-23

## 2019-01-23 NOTE — TELEPHONE ENCOUNTER
PA obtined for CPT code [id number] from [address]. #[id number]. No PA necessary for CPT code [phone] 88 per [facility]. Reference #[id number]. Pt notified and has appt scheduled. Denies questions at this time.

## 2019-01-23 NOTE — TELEPHONE ENCOUNTER
PA required for stress/echo      CARD ECHO STRESS ECHO/REST AND STRESS(CPT=93350/65890 [facility] [place] [address]) [[id number]]  Order #: [id number] FUTURE   Priority: Routine  Class: EHV - No RFL   Resulting Agency: [facility]  Test ID: [id number]  Future Order Information

## 2019-01-24 ENCOUNTER — MED REC SCAN ONLY (OUTPATIENT)
Dept: CARDIOLOGY CLINIC | Facility: CLINIC | Age: 44
End: 2019-01-24

## 2019-01-24 DIAGNOSIS — R82.90 ABNORMAL URINE FINDING: Primary | ICD-10-CM

## 2019-01-24 DIAGNOSIS — E55.9 VITAMIN D DEFICIENCY: ICD-10-CM

## 2019-01-24 RX ORDER — ERGOCALCIFEROL 1.25 MG/1
50000 CAPSULE ORAL WEEKLY
Qty: 12 CAPSULE | Refills: 1 | Status: SHIPPED | OUTPATIENT
Start: 2019-01-24 | End: 2019-08-05 | Stop reason: ALTCHOICE

## 2019-01-26 ENCOUNTER — APPOINTMENT (OUTPATIENT)
Dept: LAB | Age: 44
End: 2019-01-26
Attending: FAMILY MEDICINE
Payer: MEDICAID

## 2019-01-26 DIAGNOSIS — R82.90 ABNORMAL URINE FINDING: ICD-10-CM

## 2019-01-26 PROCEDURE — 87086 URINE CULTURE/COLONY COUNT: CPT

## 2019-01-28 ENCOUNTER — TELEPHONE (OUTPATIENT)
Dept: OTOLARYNGOLOGY | Facility: CLINIC | Age: 44
End: 2019-01-28

## 2019-01-28 NOTE — TELEPHONE ENCOUNTER
Laryngocopy has been approved by [facility] from 1/22/19 thru 2/21/19. Approval number is [id number]. Called informed pt.  Pt has a follow up on 2/12/19

## 2019-02-04 ENCOUNTER — HOSPITAL ENCOUNTER (OUTPATIENT)
Dept: CV DIAGNOSTICS | Facility: HOSPITAL | Age: 44
Discharge: HOME | End: 2019-02-04
Attending: INTERNAL MEDICINE
Payer: MEDICAID

## 2019-02-04 DIAGNOSIS — R00.2 PALPITATIONS: ICD-10-CM

## 2019-02-04 DIAGNOSIS — R07.9 CHEST PAIN, UNSPECIFIED TYPE: ICD-10-CM

## 2019-02-04 DIAGNOSIS — R06.00 DYSPNEA, UNSPECIFIED TYPE: ICD-10-CM

## 2019-02-04 PROCEDURE — 93350 STRESS TTE ONLY: CPT | Performed by: INTERNAL MEDICINE

## 2019-02-04 PROCEDURE — 93018 CV STRESS TEST I&R ONLY: CPT | Performed by: INTERNAL MEDICINE

## 2019-02-04 PROCEDURE — 93017 CV STRESS TEST TRACING ONLY: CPT | Performed by: INTERNAL MEDICINE

## 2019-02-05 ENCOUNTER — TELEPHONE (OUTPATIENT)
Dept: CARDIOLOGY CLINIC | Facility: CLINIC | Age: 44
End: 2019-02-05

## 2019-02-05 NOTE — TELEPHONE ENCOUNTER
Pt requesting a call back to discuss results from echo stress test. Please call thank you [phone]

## 2019-02-12 ENCOUNTER — OFFICE VISIT (OUTPATIENT)
Dept: OTOLARYNGOLOGY | Facility: CLINIC | Age: 44
End: 2019-02-12
Payer: MEDICAID

## 2019-02-12 VITALS
DIASTOLIC BLOOD PRESSURE: 80 MMHG | BODY MASS INDEX: 31.61 KG/M2 | TEMPERATURE: 98 F | HEIGHT: 59.75 IN | WEIGHT: 161 LBS | SYSTOLIC BLOOD PRESSURE: 132 MMHG

## 2019-02-12 DIAGNOSIS — R07.0 THROAT PAIN IN ADULT: Primary | ICD-10-CM

## 2019-02-12 DIAGNOSIS — E04.9 NODULAR GOITER, NON-TOXIC: ICD-10-CM

## 2019-02-12 PROCEDURE — 99212 OFFICE O/P EST SF 10 MIN: CPT | Performed by: OTOLARYNGOLOGY

## 2019-02-12 PROCEDURE — 99214 OFFICE O/P EST MOD 30 MIN: CPT | Performed by: OTOLARYNGOLOGY

## 2019-02-12 PROCEDURE — 31575 DIAGNOSTIC LARYNGOSCOPY: CPT | Performed by: OTOLARYNGOLOGY

## 2019-02-12 NOTE — PROGRESS NOTES
Treva Arthur is a 41year old female. Patient presents with:   Follow - Up: Re throat pain/burning sensation; scope prior auth approved      HISTORY OF PRESENT ILLNESS  She presents with a burning sensation in the back of her throat which is worse when Exercise: No        Seat Belt: Yes      Family History   Problem Relation Age of Onset   • Heart Disorder Father         Stent   • Diabetes Father    • Hypertension Father    • Diabetes Mother    • Other (Other) Mother         Thyroid disease   • Diabetes Sitting, Cuff Size: adult)   Temp 98 °F (36.7 °C) (Tympanic)   Ht 4' 11.75\" (1.518 m)   Wt 161 lb (73 kg)   BMI 31.71 kg/m²        Constitutional Normal Overall appearance - Normal.   Psychiatric Normal Orientation - Oriented to time, place, person & situ follows.        Hypopharynx/Larynx:  Epiglottis is normal.  Arytenoids:  Bilateral: Arytenoids are normal.  Vocal folds-false  Bilateral: Vocal folds (false) are normal.  Vocal folds-true  Bilateral: Vocal folds (true) are normal.  Pyriform sinus:  Bilatera

## 2019-02-13 ENCOUNTER — TELEPHONE (OUTPATIENT)
Dept: CARDIOLOGY CLINIC | Facility: CLINIC | Age: 44
End: 2019-02-13

## 2019-02-13 NOTE — TELEPHONE ENCOUNTER
Pt was unclear if she was to call with her BP readings prior to appt, pls call [phone] thanks.

## 2019-02-13 NOTE — TELEPHONE ENCOUNTER
Your stress echo was normal for ischemia , do see note of pvc's noted with testing. Pt states she had arrhthymias. continue present management.  F/u with MB next week

## 2019-02-13 NOTE — TELEPHONE ENCOUNTER
Monitor and record blood pressure and pulse for 1 week and call with results, or thru MY CHART. explained relaxing for 5 minutes before checking. Pt states understanding.

## 2019-03-18 ENCOUNTER — OFFICE VISIT (OUTPATIENT)
Dept: INTERNAL MEDICINE CLINIC | Facility: CLINIC | Age: 44
End: 2019-03-18
Payer: MEDICAID

## 2019-03-18 VITALS
RESPIRATION RATE: 18 BRPM | DIASTOLIC BLOOD PRESSURE: 88 MMHG | TEMPERATURE: 99 F | SYSTOLIC BLOOD PRESSURE: 132 MMHG | OXYGEN SATURATION: 98 % | HEIGHT: 58.75 IN | WEIGHT: 161 LBS | HEART RATE: 88 BPM | BODY MASS INDEX: 32.89 KG/M2

## 2019-03-18 DIAGNOSIS — J01.40 ACUTE PANSINUSITIS, RECURRENCE NOT SPECIFIED: Primary | ICD-10-CM

## 2019-03-18 DIAGNOSIS — J02.9 SORE THROAT: ICD-10-CM

## 2019-03-18 LAB
CONTROL LINE PRESENT WITH A CLEAR BACKGROUND (YES/NO): YES YES/NO
KIT EXPIRATION DATE: NORMAL DATE
STREP GRP A CUL-SCR: NEGATIVE

## 2019-03-18 PROCEDURE — 87880 STREP A ASSAY W/OPTIC: CPT | Performed by: FAMILY MEDICINE

## 2019-03-18 PROCEDURE — 99213 OFFICE O/P EST LOW 20 MIN: CPT | Performed by: FAMILY MEDICINE

## 2019-03-18 RX ORDER — AZITHROMYCIN 250 MG/1
TABLET, FILM COATED ORAL
Qty: 6 TABLET | Refills: 0 | Status: ON HOLD | OUTPATIENT
Start: 2019-03-18 | End: 2019-06-16

## 2019-03-18 NOTE — PROGRESS NOTES
CHIEF COMPLAINT:   Patient presents with:  Sore Throat: started over weekend, Her son was seen by Dr. Deborah Ni as was sick. Pt states she has a very bad sore throat, chills, Body Aches, Sinus pressure, HA, Pt is tired, Cough.  Pts son was diagnosed with Pink e L hernia repair   • HERNIA VENTRAL REPAIR N/A 3/29/2017    Performed by Chandrakant Lee MD at [facility] MAIN OR   • LITHOTRIPSY  2001, 2007 & 11/11   • REMOVAL GALLBLADDER     • REPAIR ING HERNIA,5+Y/O,REDUCIBL           Social History    Tobacco Use      Smoki who presents with   Sore throat  Acute pansinusitis, recurrence not specified  (primary encounter diagnosis)    Orders Placed This Encounter      Rapid Strep      Meds & Refills for this Visit:  Requested Prescriptions     Signed Prescriptions Disp Refills

## 2019-03-19 ENCOUNTER — TELEPHONE (OUTPATIENT)
Dept: INTERNAL MEDICINE CLINIC | Facility: CLINIC | Age: 44
End: 2019-03-19

## 2019-03-19 RX ORDER — TRIAMTERENE AND HYDROCHLOROTHIAZIDE 37.5; 25 MG/1; MG/1
1 CAPSULE ORAL EVERY MORNING
Qty: 30 CAPSULE | Refills: 0 | Status: ON HOLD | OUTPATIENT
Start: 2019-03-19 | End: 2019-06-16

## 2019-03-19 NOTE — TELEPHONE ENCOUNTER
Pt called Mitra to report she remains with headache. Checked  B/p 1/2 hr ago 147/79 HR 72. Please advise.

## 2019-03-19 NOTE — TELEPHONE ENCOUNTER
Since Pt's BP keeps fluctuating up and down lets start her on just a diuretic to see if that will help her BP levels. Its mild enough that it should not cause her any issues. Dyazide 37.5/25 mg one daily #30. BP check in 2-3 weeks.

## 2019-03-19 NOTE — TELEPHONE ENCOUNTER
Patient has had headache since past weekend; Jarod Yell told her to take her bp while having these headaches; she would like to discuss bp and pulse numbers with nurse to triage please callback

## 2019-04-29 ENCOUNTER — LAB ENCOUNTER (OUTPATIENT)
Dept: LAB | Age: 44
End: 2019-04-29
Attending: OBSTETRICS & GYNECOLOGY
Payer: MEDICAID

## 2019-04-29 ENCOUNTER — TELEPHONE (OUTPATIENT)
Dept: OBGYN CLINIC | Facility: CLINIC | Age: 44
End: 2019-04-29

## 2019-04-29 DIAGNOSIS — N91.2 AMENORRHEA: Primary | ICD-10-CM

## 2019-04-29 DIAGNOSIS — N91.2 AMENORRHEA: ICD-10-CM

## 2019-04-29 PROCEDURE — 84703 CHORIONIC GONADOTROPIN ASSAY: CPT

## 2019-04-29 PROCEDURE — 36415 COLL VENOUS BLD VENIPUNCTURE: CPT

## 2019-04-29 NOTE — TELEPHONE ENCOUNTER
Patient called c/o missed menses. Her LMP was 04/01/19 but she stated was unlike her usual menses. Only lasted 3 days and was very light. She had 2 negative UPTs yesterday. She is also having some cramping and off and on nausea.  She states she has a histor

## 2019-05-13 ENCOUNTER — TELEPHONE (OUTPATIENT)
Dept: INTERNAL MEDICINE CLINIC | Facility: CLINIC | Age: 44
End: 2019-05-13

## 2019-05-13 DIAGNOSIS — Z11.59 MEASLES SCREENING: Primary | ICD-10-CM

## 2019-05-13 DIAGNOSIS — Z01.84 IMMUNITY STATUS TESTING: ICD-10-CM

## 2019-05-13 NOTE — TELEPHONE ENCOUNTER
Please callback patient she is concerned about measles outbreak and has household of young children as well as she and her  requests nurse callback

## 2019-05-18 ENCOUNTER — APPOINTMENT (OUTPATIENT)
Dept: LAB | Age: 44
End: 2019-05-18
Attending: FAMILY MEDICINE
Payer: MEDICAID

## 2019-05-18 DIAGNOSIS — Z01.84 IMMUNITY STATUS TESTING: ICD-10-CM

## 2019-05-18 PROCEDURE — 86765 RUBEOLA ANTIBODY: CPT

## 2019-05-18 PROCEDURE — 36415 COLL VENOUS BLD VENIPUNCTURE: CPT

## 2019-06-15 ENCOUNTER — HOSPITAL ENCOUNTER (INPATIENT)
Facility: HOSPITAL | Age: 44
LOS: 1 days | Discharge: HOME | DRG: 661 | End: 2019-06-16
Attending: EMERGENCY MEDICINE | Admitting: HOSPITALIST
Payer: MEDICAID

## 2019-06-15 ENCOUNTER — APPOINTMENT (OUTPATIENT)
Dept: CT IMAGING | Age: 44
DRG: 661 | End: 2019-06-15
Attending: EMERGENCY MEDICINE
Payer: MEDICAID

## 2019-06-15 DIAGNOSIS — N20.0 KIDNEY STONE: ICD-10-CM

## 2019-06-15 DIAGNOSIS — N10 ACUTE PYELONEPHRITIS: Primary | ICD-10-CM

## 2019-06-15 PROCEDURE — 99223 1ST HOSP IP/OBS HIGH 75: CPT | Performed by: HOSPITALIST

## 2019-06-15 PROCEDURE — 74177 CT ABD & PELVIS W/CONTRAST: CPT | Performed by: EMERGENCY MEDICINE

## 2019-06-15 RX ORDER — HYDROMORPHONE HYDROCHLORIDE 1 MG/ML
1 INJECTION, SOLUTION INTRAMUSCULAR; INTRAVENOUS; SUBCUTANEOUS ONCE
Status: COMPLETED | OUTPATIENT
Start: 2019-06-15 | End: 2019-06-15

## 2019-06-15 RX ORDER — DIPHENHYDRAMINE HYDROCHLORIDE 50 MG/ML
25 INJECTION INTRAMUSCULAR; INTRAVENOUS ONCE
Status: COMPLETED | OUTPATIENT
Start: 2019-06-15 | End: 2019-06-15

## 2019-06-15 RX ORDER — ONDANSETRON 2 MG/ML
4 INJECTION INTRAMUSCULAR; INTRAVENOUS EVERY 4 HOURS PRN
Status: DISCONTINUED | OUTPATIENT
Start: 2019-06-15 | End: 2019-06-16

## 2019-06-15 RX ORDER — ONDANSETRON 2 MG/ML
4 INJECTION INTRAMUSCULAR; INTRAVENOUS ONCE
Status: COMPLETED | OUTPATIENT
Start: 2019-06-15 | End: 2019-06-15

## 2019-06-15 RX ORDER — SODIUM CHLORIDE 9 MG/ML
INJECTION, SOLUTION INTRAVENOUS CONTINUOUS
Status: ACTIVE | OUTPATIENT
Start: 2019-06-16 | End: 2019-06-16

## 2019-06-16 ENCOUNTER — APPOINTMENT (OUTPATIENT)
Dept: GENERAL RADIOLOGY | Facility: HOSPITAL | Age: 44
DRG: 661 | End: 2019-06-16
Attending: UROLOGY
Payer: MEDICAID

## 2019-06-16 ENCOUNTER — ANESTHESIA (OUTPATIENT)
Dept: SURGERY | Facility: HOSPITAL | Age: 44
DRG: 661 | End: 2019-06-16
Payer: MEDICAID

## 2019-06-16 ENCOUNTER — ANESTHESIA EVENT (OUTPATIENT)
Dept: SURGERY | Facility: HOSPITAL | Age: 44
DRG: 661 | End: 2019-06-16
Payer: MEDICAID

## 2019-06-16 VITALS
TEMPERATURE: 98 F | HEIGHT: 59 IN | WEIGHT: 150 LBS | SYSTOLIC BLOOD PRESSURE: 111 MMHG | HEART RATE: 64 BPM | RESPIRATION RATE: 18 BRPM | BODY MASS INDEX: 30.24 KG/M2 | OXYGEN SATURATION: 98 % | DIASTOLIC BLOOD PRESSURE: 69 MMHG

## 2019-06-16 DIAGNOSIS — N39.0 UTI (URINARY TRACT INFECTION): Primary | ICD-10-CM

## 2019-06-16 DIAGNOSIS — N20.0 RENAL CALCULUS, BILATERAL: ICD-10-CM

## 2019-06-16 PROCEDURE — 99239 HOSP IP/OBS DSCHRG MGMT >30: CPT | Performed by: HOSPITALIST

## 2019-06-16 PROCEDURE — BT141ZZ FLUOROSCOPY OF KIDNEYS, URETERS AND BLADDER USING LOW OSMOLAR CONTRAST: ICD-10-PCS | Performed by: UROLOGY

## 2019-06-16 PROCEDURE — 0T788DZ DILATION OF BILATERAL URETERS WITH INTRALUMINAL DEVICE, VIA NATURAL OR ARTIFICIAL OPENING ENDOSCOPIC: ICD-10-PCS | Performed by: UROLOGY

## 2019-06-16 DEVICE — STENT URET 6F 28CM ULSMTH: Type: IMPLANTABLE DEVICE | Site: URETER | Status: FUNCTIONAL

## 2019-06-16 DEVICE — STENT URET 6F 26CM WO GW INL: Type: IMPLANTABLE DEVICE | Site: URETER | Status: FUNCTIONAL

## 2019-06-16 RX ORDER — METOCLOPRAMIDE HYDROCHLORIDE 5 MG/ML
10 INJECTION INTRAMUSCULAR; INTRAVENOUS AS NEEDED
Status: DISCONTINUED | OUTPATIENT
Start: 2019-06-16 | End: 2019-06-16 | Stop reason: HOSPADM

## 2019-06-16 RX ORDER — DEXTROSE MONOHYDRATE 25 G/50ML
50 INJECTION, SOLUTION INTRAVENOUS
Status: DISCONTINUED | OUTPATIENT
Start: 2019-06-16 | End: 2019-06-16 | Stop reason: HOSPADM

## 2019-06-16 RX ORDER — MIDAZOLAM HYDROCHLORIDE 1 MG/ML
1 INJECTION INTRAMUSCULAR; INTRAVENOUS EVERY 5 MIN PRN
Status: DISCONTINUED | OUTPATIENT
Start: 2019-06-16 | End: 2019-06-16 | Stop reason: HOSPADM

## 2019-06-16 RX ORDER — DIPHENHYDRAMINE HYDROCHLORIDE 50 MG/ML
INJECTION INTRAMUSCULAR; INTRAVENOUS
Status: COMPLETED
Start: 2019-06-16 | End: 2019-06-16

## 2019-06-16 RX ORDER — SODIUM CHLORIDE 9 MG/ML
INJECTION, SOLUTION INTRAVENOUS CONTINUOUS
Status: DISCONTINUED | OUTPATIENT
Start: 2019-06-16 | End: 2019-06-16 | Stop reason: HOSPADM

## 2019-06-16 RX ORDER — HYDROMORPHONE HYDROCHLORIDE 1 MG/ML
0.2 INJECTION, SOLUTION INTRAMUSCULAR; INTRAVENOUS; SUBCUTANEOUS EVERY 2 HOUR PRN
Status: DISCONTINUED | OUTPATIENT
Start: 2019-06-16 | End: 2019-06-16

## 2019-06-16 RX ORDER — ONDANSETRON 2 MG/ML
4 INJECTION INTRAMUSCULAR; INTRAVENOUS AS NEEDED
Status: DISCONTINUED | OUTPATIENT
Start: 2019-06-16 | End: 2019-06-16 | Stop reason: HOSPADM

## 2019-06-16 RX ORDER — HYDROMORPHONE HYDROCHLORIDE 1 MG/ML
0.4 INJECTION, SOLUTION INTRAMUSCULAR; INTRAVENOUS; SUBCUTANEOUS EVERY 5 MIN PRN
Status: DISCONTINUED | OUTPATIENT
Start: 2019-06-16 | End: 2019-06-16 | Stop reason: HOSPADM

## 2019-06-16 RX ORDER — MONTELUKAST SODIUM 10 MG/1
10 TABLET ORAL NIGHTLY
Status: DISCONTINUED | OUTPATIENT
Start: 2019-06-16 | End: 2019-06-16

## 2019-06-16 RX ORDER — ONDANSETRON 2 MG/ML
4 INJECTION INTRAMUSCULAR; INTRAVENOUS EVERY 6 HOURS PRN
Status: DISCONTINUED | OUTPATIENT
Start: 2019-06-16 | End: 2019-06-16

## 2019-06-16 RX ORDER — LIDOCAINE HYDROCHLORIDE 20 MG/ML
JELLY TOPICAL AS NEEDED
Status: DISCONTINUED | OUTPATIENT
Start: 2019-06-16 | End: 2019-06-16 | Stop reason: HOSPADM

## 2019-06-16 RX ORDER — PHENAZOPYRIDINE HYDROCHLORIDE 200 MG/1
200 TABLET, FILM COATED ORAL 3 TIMES DAILY PRN
Qty: 6 TABLET | Refills: 0 | Status: SHIPPED | OUTPATIENT
Start: 2019-06-16 | End: 2019-06-24 | Stop reason: ALTCHOICE

## 2019-06-16 RX ORDER — HYDROMORPHONE HYDROCHLORIDE 1 MG/ML
1 INJECTION, SOLUTION INTRAMUSCULAR; INTRAVENOUS; SUBCUTANEOUS ONCE
Status: COMPLETED | OUTPATIENT
Start: 2019-06-16 | End: 2019-06-16

## 2019-06-16 RX ORDER — DIPHENHYDRAMINE HYDROCHLORIDE 50 MG/ML
25 INJECTION INTRAMUSCULAR; INTRAVENOUS ONCE
Status: COMPLETED | OUTPATIENT
Start: 2019-06-16 | End: 2019-06-16

## 2019-06-16 RX ORDER — TIZANIDINE 2 MG/1
2 TABLET ORAL EVERY 6 HOURS PRN
Qty: 20 TABLET | Refills: 0 | Status: SHIPPED | OUTPATIENT
Start: 2019-06-16 | End: 2019-06-24 | Stop reason: ALTCHOICE

## 2019-06-16 RX ORDER — CEPHALEXIN 500 MG/1
500 CAPSULE ORAL 3 TIMES DAILY
Qty: 63 CAPSULE | Refills: 0 | Status: SHIPPED | OUTPATIENT
Start: 2019-06-16 | End: 2019-06-24 | Stop reason: ALTCHOICE

## 2019-06-16 RX ORDER — PHENAZOPYRIDINE HYDROCHLORIDE 200 MG/1
200 TABLET, FILM COATED ORAL 3 TIMES DAILY PRN
Status: DISCONTINUED | OUTPATIENT
Start: 2019-06-16 | End: 2019-06-16

## 2019-06-16 RX ORDER — CHOLECALCIFEROL (VITAMIN D3) 25 MCG
1 TABLET,CHEWABLE ORAL DAILY
Status: DISCONTINUED | OUTPATIENT
Start: 2019-06-16 | End: 2019-06-16

## 2019-06-16 RX ORDER — DIPHENHYDRAMINE HYDROCHLORIDE 50 MG/ML
12.5 INJECTION INTRAMUSCULAR; INTRAVENOUS AS NEEDED
Status: DISCONTINUED | OUTPATIENT
Start: 2019-06-16 | End: 2019-06-16 | Stop reason: HOSPADM

## 2019-06-16 RX ORDER — DIPHENHYDRAMINE HYDROCHLORIDE 50 MG/ML
12.5 INJECTION INTRAMUSCULAR; INTRAVENOUS EVERY 4 HOURS PRN
Status: DISCONTINUED | OUTPATIENT
Start: 2019-06-16 | End: 2019-06-16

## 2019-06-16 RX ORDER — CETIRIZINE HYDROCHLORIDE 10 MG/1
10 TABLET ORAL DAILY
Status: DISCONTINUED | OUTPATIENT
Start: 2019-06-16 | End: 2019-06-16

## 2019-06-16 RX ORDER — MEPERIDINE HYDROCHLORIDE 25 MG/ML
12.5 INJECTION INTRAMUSCULAR; INTRAVENOUS; SUBCUTANEOUS AS NEEDED
Status: DISCONTINUED | OUTPATIENT
Start: 2019-06-16 | End: 2019-06-16 | Stop reason: HOSPADM

## 2019-06-16 RX ORDER — HYDROMORPHONE HYDROCHLORIDE 1 MG/ML
0.8 INJECTION, SOLUTION INTRAMUSCULAR; INTRAVENOUS; SUBCUTANEOUS EVERY 2 HOUR PRN
Status: DISCONTINUED | OUTPATIENT
Start: 2019-06-16 | End: 2019-06-16

## 2019-06-16 RX ORDER — SODIUM CHLORIDE 9 MG/ML
INJECTION, SOLUTION INTRAVENOUS CONTINUOUS
Status: DISCONTINUED | OUTPATIENT
Start: 2019-06-16 | End: 2019-06-16

## 2019-06-16 RX ORDER — ACETAMINOPHEN 325 MG/1
650 TABLET ORAL EVERY 6 HOURS PRN
Status: DISCONTINUED | OUTPATIENT
Start: 2019-06-16 | End: 2019-06-16

## 2019-06-16 RX ORDER — TIZANIDINE 4 MG/1
2 TABLET ORAL EVERY 6 HOURS PRN
Status: DISCONTINUED | OUTPATIENT
Start: 2019-06-16 | End: 2019-06-16

## 2019-06-16 RX ORDER — HYDROMORPHONE HYDROCHLORIDE 1 MG/ML
0.4 INJECTION, SOLUTION INTRAMUSCULAR; INTRAVENOUS; SUBCUTANEOUS EVERY 2 HOUR PRN
Status: DISCONTINUED | OUTPATIENT
Start: 2019-06-16 | End: 2019-06-16

## 2019-06-16 RX ORDER — NALOXONE HYDROCHLORIDE 0.4 MG/ML
80 INJECTION, SOLUTION INTRAMUSCULAR; INTRAVENOUS; SUBCUTANEOUS AS NEEDED
Status: DISCONTINUED | OUTPATIENT
Start: 2019-06-16 | End: 2019-06-16 | Stop reason: HOSPADM

## 2019-06-16 RX ORDER — HYDROMORPHONE HYDROCHLORIDE 1 MG/ML
INJECTION, SOLUTION INTRAMUSCULAR; INTRAVENOUS; SUBCUTANEOUS
Status: COMPLETED
Start: 2019-06-16 | End: 2019-06-16

## 2019-06-16 NOTE — PLAN OF CARE
Pt returned from PACU, awake & alert. C/o abdominal cramping & pressure. Assisted to bathroom, voided 300ml. No nausea. Given ice chips & clear liquids. Will advance dieet as tolerated. Rates pain 6/10. Dilaudid not yet due.  Reviewed post op orders & plan

## 2019-06-16 NOTE — BRIEF OP NOTE
Pre-Operative Diagnosis: UTI (urinary tract infection) Right UPJ calculus, left renal pelvis calculus     Post-Operative Diagnosis: UTI (urinary tract infection) Right UPJ calculus, Left renal pelvis calculus      Procedure Performed:   Procedure(s):  Cyst

## 2019-06-16 NOTE — CONSULTS
[facility] Urology Consultation    Hortensia Baron Patient Status:  Inpatient    3/22/1975 MRN [id number]   Location [address] [place] [address] Attending Rufina Grey MD   Hosp Day # 1 PCP Preeti Gaspar MD     Reason for Consultation:  U Thyroid disease   • Diabetes Maternal Grandmother    • Diabetes Maternal Grandfather    • Diabetes Paternal Grandmother    • Ovarian Cancer Paternal Grandmother 47   • Diabetes Paternal Grandfather       reports that she has never smoked.  She has ne BMI 30.30 kg/m²     Intake/Output Summary (Last 24 hours) at 6/16/2019 0759  Last data filed at 6/16/2019 0522  Gross per 24 hour   Intake 374 ml   Output 375 ml   Net -1 ml     General: Calm, NAD  HEENT: NCAT, no scleral icterus  CV: RR  Pulmonary: breath hydronephrosis     2. Left renal pelvis calculus with mild hydronephrosis    3. UTI  -Non septic    4.  Nephrolithiasis  -Many bilateral stones up to ~10 mm on left and ~3 mm on right  -Hx of recurrent stones for ~25 yrs  -Strong family hx of stones      PL

## 2019-06-16 NOTE — H&P
[facility] HOSPITALIST  History and Physical     Hazle Desgarytis Patient Status:  Inpatient    3/22/1975 MRN [id number]   [facility] 3NW-A Attending Tod Mendez MD   Hosp Day # 1 PCP Sara Monreal MD     Chief Complaint: flank pain     Hi drink alcohol or use drugs.   Family History:   Family History   Problem Relation Age of Onset   • Heart Disorder Father         Stent   • Diabetes Father    • Hypertension Father    • Diabetes Mother    • Other (Other) Mother         Thyroid disease   • John Leigh Bilateral CVA tenderness  Abdomen: Soft, left lower quadrant tender, nondistended. Positive bowel sounds. No rebound or guarding. Neurologic: CNII-XII grossly intact. No focal neurological deficits. Musculoskeletal: Moves all extremities.   Extremities:

## 2019-06-16 NOTE — ED PROVIDER NOTES
Patient Seen in: THE [facility] Emergency Department In [place]    History   Patient presents with:  Abdomen/Flank Pain (GI/)    Stated Complaint: mid abd pain since this am    HPI    53-year-old female who presents to the emergency room today for complaint negative except as noted above.     Physical Exam     ED Triage Vitals [06/15/19 1932]   BP (!) 147/99   Pulse 82   Resp 18   Temp 99.2 °F (37.3 °C)   Temp src Temporal   SpO2 99 %   O2 Device None (Room air)       Current:/64   Pulse 75   Temp 99.2 ° W/ DIFFERENTIAL - Abnormal; Notable for the following components:    Lymphocyte Absolute 0.83 (*)     All other components within normal limits   LIPASE - Normal   CBC WITH DIFFERENTIAL WITH PLATELET    Narrative:      The following orders were created for identified.   ADRENALS:  No mass or enlargement.    AORTA/VASCULAR:  No aneurysm or dissection.    RETROPERITONEUM:  No mass or adenopathy.    BOWEL/MESENTERY:  No visible mass, obstruction, or bowel wall thickening.    ABDOMINAL WALL:  Postsurgical changes antibiotics. I spoke with the urologist and we discussed the case. Patient will be admitted to the hospital for further work-up and observation. She will need a ureteral stent at some point.   Patient will be admitted to the floor for the night and is st

## 2019-06-16 NOTE — ANESTHESIA POSTPROCEDURE EVALUATION
[address] Patient Status:  Inpatient   Age/Gender 45year old female MRN [id number]   Location [address] [facility] Attending Karina Razo MD   Hosp Day # 1 PCP Lena Alford MD       Anesthesia Post-op Note

## 2019-06-16 NOTE — PLAN OF CARE
Pt voiding in adequate amounts. Urine red/orange color. Received Zanaflex & pyridium for bladder discomfort & abdominal cramping. states relief. Pain currently 3/10. declined offer of Tylenol. Dr Chintan Tyler contacted re discharge.  Ok to Irene

## 2019-06-16 NOTE — H&P (VIEW-ONLY)
[facility]  History and Physical     Sharlene Cook Patient Status:  Inpatient    3/22/1975 MRN [id number]   [facility] 3NW-A Attending Alejandrina Steele MD   Hosp Day # 1 PCP Liya Alvares MD     Chief Complaint: flank pain     Hi drink alcohol or use drugs.   Family History:   Family History   Problem Relation Age of Onset   • Heart Disorder Father         Stent   • Diabetes Father    • Hypertension Father    • Diabetes Mother    • Other (Other) Mother         Thyroid disease   • Cherise Butler Bilateral CVA tenderness  Abdomen: Soft, left lower quadrant tender, nondistended. Positive bowel sounds. No rebound or guarding. Neurologic: CNII-XII grossly intact. No focal neurological deficits. Musculoskeletal: Moves all extremities.   Extremities:

## 2019-06-16 NOTE — ED INITIAL ASSESSMENT (HPI)
Pt, who had hernia repair in 2017, woke up this morning with nausea and diarrhea and abd pain by the umbilical area that radiates up to her chest. Pt sts she feels \"bloated. \"

## 2019-06-16 NOTE — PROGRESS NOTES
NURSING ADMISSION NOTE      Patient admitted via ambulance  Oriented to room. Safety precautions initiated. Bed in low position. Call light in reach. Alert and awake. Reports pain 4 on pain scale.  If sl to rt wrist instructed npo

## 2019-06-16 NOTE — ANESTHESIA PREPROCEDURE EVALUATION
PRE-OP EVALUATION    Patient Name: Lefty Mckinney    Pre-op Diagnosis: UTI (urinary tract infection) [N39.0]  Renal calculus, bilateral [N20.0]    Procedure(s):  Cystoscopy, Bilateral Retrograde Pyelogram, Bilateral Ureteral Stent Insertion    Surgeon(s) [COMPLETED] iohexol (OMNIPAQUE) 350 MG/ML injection 100 mL 100 mL Intravenous ONCE PRN   [COMPLETED] CefTRIAXone Sodium (ROCEPHIN) 1 g in sodium chloride 0.9% 100 mL MBP/ADD-vantage 1 g Intravenous Q24H   [] 0.9%  NaCl infusion  Intravenous Contin 3/29/2017    Performed by Adele Ny MD at [facility] MAIN OR   • LITHOTRIPSY  2001, 2007 & 11/11   • REMOVAL GALLBLADDER     • REPAIR ING HERNIA,5+Y/O,REDUCIBL       Social History    Tobacco Use      Smoking status: Never Smoker      Smokeless tobacco: Never

## 2019-06-17 NOTE — DISCHARGE SUMMARY
[facility] HOSPITALIST  DISCHARGE SUMMARY     Ayo Nice Patient Status:  Inpatient    3/22/1975 MRN [id number]   [facility] 3NW-A Attending No att. providers found   [facility] Day # 1 PCP Luciana Noriega MD     Date of Admission: 6/15/2019  Date 63 capsule  Refills:  0     Phenazopyridine HCl 200 MG Tabs  Commonly known as:  PYRIDIUM  Notes to patient:  Last given at 1:30 pm      Take 1 tablet (200 mg total) by mouth 3 (three) times daily as needed.    Quantity:  6 tablet  Refills:  0     tiZANidin [54-74] 64  Resp:  [11-18] 18  BP: (111-135)/(63-91) 111/69    Physical Exam:    General: No acute distress.    Respiratory: Clear to auscultation bilaterally  Cardiovascular: S1, S2  Abdomen: Soft, nontender, nondistended  ---------------------------------

## 2019-06-17 NOTE — OPERATIVE REPORT
[facility]    PATIENT'S NAME: Lena Labor   ATTENDING PHYSICIAN: Jannette Natarajan M.D.    OPERATING PHYSICIAN: Claudia Palacios DO   PATIENT ACCOUNT#:   [de-identified]    LOCATION:  [address] [place] [address]  MEDICAL RECORD #:   [id number]       DATE OF BIRTH hydronephrosis. There were numerous radiopaque bilateral renal calculi. There were diffuse cystitis changes seen within the bladder.     OPERATIVE TECHNIQUE:  After the appropriate informed consent was obtained and in the chart, the patient was given preo which was found to be too short and removed. A 6-Malaysian x 26 cm stent was then attempted to be placed, but this was also too short.   Finally, a 6-Malaysian x 28 cm stent was placed with some difficulty due to the high-grade obstructing stone, but finally it

## 2019-06-18 NOTE — PAYOR COMM NOTE
--------------  ADMISSION REVIEW     Payor: Joel Liang #:  [id number]  Authorization Number: [id number]    Admit date: 6/15/19  Admit time: 2354       Patient Seen in: [facility] In [place] Exam    Well-developed well-nourished female who is sitting on the gurney, she is awake and alert and appears in no acute discomfort or distress. Vital signs are stable she is afebrile    Head is normocephalic atraumatic conjunctiva is clear.   Sclerae a lesion, fluid collection, ductal dilatation, or atrophy.    SPLEEN:  No enlargement or focal lesion.    KIDNEYS:  There is marked right hydronephrosis and proximal right hydroureter.  At the right ureteropelvic junction there is a large 18 x 8 mm calculus. obstructing calculus in the proximal right ureter. 2. Moderate left hydronephrosis with a probable partially obstructing calculus in the left renal pelvis. 3. There are numerous smaller calculi in seen in both kidneys. 4. Probable fibroid uterus.   5. En hernia repair   • HERNIA VENTRAL REPAIR N/A 3/29/2017    Performed by Minh Solis MD at [address] [facility]   • LITHOTRIPSY  2001, 2007 & 11/11   • REMOVAL GALLBLADDER     • REPAIR ING HERNIA,5+Y/O,REDUCIBL       Allergies:    Wellbutrin [Bupropi*    DIZZINESS Psychiatric: Appropriate mood and affect.     Recent Labs   Lab 06/15/19  1939   WBC 7.6   HGB 14.2   MCV 88.5   .0     Recent Labs   Lab 06/15/19  1939   GLU 96   BUN 10   CREATSERUM 0.76   GFRAA 112   GFRNAA 97   CA 8.8   ALB 3.7      K 3.4 bedside.     History:       Past Medical History:   Diagnosis Date   • Arrhythmia     • Calculus of kidney              • Gestational diabetes 4/2014   • High blood pressure     • Kidney stones     • Migraines     • Ovarian cyst           • PVC (premature PRN  •  CefTRIAXone Sodium (ROCEPHIN) 1 g in sodium chloride 0.9% 100 mL MBP/ADD-vantage, 1 g, Intravenous, Q24H  •  [MAR Hold] diphenhydrAMINE HCl (BENADRYL) injection 12.5 mg, 12.5 mg, Intravenous, Q4H PRN     Review of Systems:  Pertinent items are note cystoscopy, bilateral retrograde pyelogram, bilateral ureteral stent placement.   We discussed risks including but not limited to bleeding, infection, pain, stent irritation, possible inability to place a stent requiring nephrostomy tube placement, ureteral

## 2019-06-24 RX ORDER — SODIUM CHLORIDE, SODIUM LACTATE, POTASSIUM CHLORIDE, CALCIUM CHLORIDE 600; 310; 30; 20 MG/100ML; MG/100ML; MG/100ML; MG/100ML
INJECTION, SOLUTION INTRAVENOUS CONTINUOUS
Status: CANCELLED | OUTPATIENT
Start: 2019-06-24

## 2019-06-24 RX ORDER — ACETAMINOPHEN 500 MG
1000 TABLET ORAL ONCE
Status: CANCELLED | OUTPATIENT
Start: 2019-06-24 | End: 2019-06-24

## 2019-07-08 ENCOUNTER — ANESTHESIA EVENT (OUTPATIENT)
Dept: SURGERY | Facility: HOSPITAL | Age: 44
End: 2019-07-08
Payer: MEDICAID

## 2019-07-09 ENCOUNTER — APPOINTMENT (OUTPATIENT)
Dept: GENERAL RADIOLOGY | Facility: HOSPITAL | Age: 44
End: 2019-07-09
Attending: UROLOGY
Payer: MEDICAID

## 2019-07-09 ENCOUNTER — ANESTHESIA (OUTPATIENT)
Dept: SURGERY | Facility: HOSPITAL | Age: 44
End: 2019-07-09
Payer: MEDICAID

## 2019-07-09 ENCOUNTER — HOSPITAL ENCOUNTER (OUTPATIENT)
Facility: HOSPITAL | Age: 44
Setting detail: HOSPITAL OUTPATIENT SURGERY
Discharge: HOME | End: 2019-07-09
Attending: UROLOGY | Admitting: UROLOGY
Payer: MEDICAID

## 2019-07-09 VITALS
HEIGHT: 59 IN | RESPIRATION RATE: 18 BRPM | OXYGEN SATURATION: 98 % | HEART RATE: 71 BPM | TEMPERATURE: 99 F | DIASTOLIC BLOOD PRESSURE: 63 MMHG | WEIGHT: 150 LBS | BODY MASS INDEX: 30.24 KG/M2 | SYSTOLIC BLOOD PRESSURE: 124 MMHG

## 2019-07-09 DIAGNOSIS — N20.0 RENAL CALCULI: ICD-10-CM

## 2019-07-09 LAB
POCT LOT NUMBER: NORMAL
POCT URINE PREGNANCY: NEGATIVE

## 2019-07-09 PROCEDURE — 81025 URINE PREGNANCY TEST: CPT | Performed by: UROLOGY

## 2019-07-09 PROCEDURE — 82365 CALCULUS SPECTROSCOPY: CPT | Performed by: UROLOGY

## 2019-07-09 PROCEDURE — 0TF68ZZ FRAGMENTATION IN RIGHT URETER, VIA NATURAL OR ARTIFICIAL OPENING ENDOSCOPIC: ICD-10-PCS | Performed by: UROLOGY

## 2019-07-09 PROCEDURE — 0T768DZ DILATION OF RIGHT URETER WITH INTRALUMINAL DEVICE, VIA NATURAL OR ARTIFICIAL OPENING ENDOSCOPIC: ICD-10-PCS | Performed by: UROLOGY

## 2019-07-09 DEVICE — STENT URET 6F 28CM ULSMTH: Type: IMPLANTABLE DEVICE | Site: URETER | Status: FUNCTIONAL

## 2019-07-09 RX ORDER — HYDROCODONE BITARTRATE AND ACETAMINOPHEN 5; 325 MG/1; MG/1
2 TABLET ORAL AS NEEDED
Status: COMPLETED | OUTPATIENT
Start: 2019-07-09 | End: 2019-07-09

## 2019-07-09 RX ORDER — SODIUM CHLORIDE, SODIUM LACTATE, POTASSIUM CHLORIDE, CALCIUM CHLORIDE 600; 310; 30; 20 MG/100ML; MG/100ML; MG/100ML; MG/100ML
INJECTION, SOLUTION INTRAVENOUS CONTINUOUS
Status: DISCONTINUED | OUTPATIENT
Start: 2019-07-09 | End: 2019-07-09

## 2019-07-09 RX ORDER — ACETAMINOPHEN 500 MG
1000 TABLET ORAL ONCE
Status: DISCONTINUED | OUTPATIENT
Start: 2019-07-09 | End: 2019-07-09 | Stop reason: HOSPADM

## 2019-07-09 RX ORDER — DEXTROSE MONOHYDRATE 25 G/50ML
50 INJECTION, SOLUTION INTRAVENOUS
Status: DISCONTINUED | OUTPATIENT
Start: 2019-07-09 | End: 2019-07-09

## 2019-07-09 RX ORDER — HYDROMORPHONE HYDROCHLORIDE 1 MG/ML
0.4 INJECTION, SOLUTION INTRAMUSCULAR; INTRAVENOUS; SUBCUTANEOUS EVERY 5 MIN PRN
Status: DISCONTINUED | OUTPATIENT
Start: 2019-07-09 | End: 2019-07-09

## 2019-07-09 RX ORDER — NALOXONE HYDROCHLORIDE 0.4 MG/ML
80 INJECTION, SOLUTION INTRAMUSCULAR; INTRAVENOUS; SUBCUTANEOUS AS NEEDED
Status: DISCONTINUED | OUTPATIENT
Start: 2019-07-09 | End: 2019-07-09

## 2019-07-09 RX ORDER — CEPHALEXIN 500 MG/1
500 TABLET ORAL EVERY EVENING
Qty: 30 TABLET | Refills: 0 | Status: SHIPPED | OUTPATIENT
Start: 2019-07-09 | End: 2019-08-05 | Stop reason: ALTCHOICE

## 2019-07-09 RX ORDER — METOCLOPRAMIDE HYDROCHLORIDE 5 MG/ML
10 INJECTION INTRAMUSCULAR; INTRAVENOUS AS NEEDED
Status: DISCONTINUED | OUTPATIENT
Start: 2019-07-09 | End: 2019-07-09

## 2019-07-09 RX ORDER — ONDANSETRON 2 MG/ML
4 INJECTION INTRAMUSCULAR; INTRAVENOUS AS NEEDED
Status: DISCONTINUED | OUTPATIENT
Start: 2019-07-09 | End: 2019-07-09

## 2019-07-09 RX ORDER — CEFAZOLIN SODIUM/WATER 2 G/20 ML
2 SYRINGE (ML) INTRAVENOUS ONCE
Status: COMPLETED | OUTPATIENT
Start: 2019-07-09 | End: 2019-07-09

## 2019-07-09 RX ORDER — LIDOCAINE HYDROCHLORIDE 20 MG/ML
JELLY TOPICAL AS NEEDED
Status: DISCONTINUED | OUTPATIENT
Start: 2019-07-09 | End: 2019-07-09 | Stop reason: HOSPADM

## 2019-07-09 RX ORDER — DEXAMETHASONE SODIUM PHOSPHATE 4 MG/ML
4 VIAL (ML) INJECTION AS NEEDED
Status: DISCONTINUED | OUTPATIENT
Start: 2019-07-09 | End: 2019-07-09

## 2019-07-09 RX ORDER — HYDROMORPHONE HYDROCHLORIDE 1 MG/ML
INJECTION, SOLUTION INTRAMUSCULAR; INTRAVENOUS; SUBCUTANEOUS
Status: COMPLETED
Start: 2019-07-09 | End: 2019-07-09

## 2019-07-09 RX ORDER — MEPERIDINE HYDROCHLORIDE 25 MG/ML
12.5 INJECTION INTRAMUSCULAR; INTRAVENOUS; SUBCUTANEOUS AS NEEDED
Status: DISCONTINUED | OUTPATIENT
Start: 2019-07-09 | End: 2019-07-09

## 2019-07-09 RX ORDER — HYDROCODONE BITARTRATE AND ACETAMINOPHEN 5; 325 MG/1; MG/1
1 TABLET ORAL AS NEEDED
Status: COMPLETED | OUTPATIENT
Start: 2019-07-09 | End: 2019-07-09

## 2019-07-09 RX ORDER — ACETAMINOPHEN 500 MG
1000 TABLET ORAL ONCE
COMMUNITY
End: 2019-08-05 | Stop reason: ALTCHOICE

## 2019-07-09 NOTE — ANESTHESIA POSTPROCEDURE EVALUATION
[address] Patient Status:  Hospital Outpatient Surgery   Age/Gender 45year old female MRN [id number]   [facility] SURGERY Attending Anay Moore, [address] Day # 0 PCP Dominique Figueredo MD       Anesthesia Post-op N

## 2019-07-09 NOTE — ANESTHESIA PREPROCEDURE EVALUATION
PRE-OP EVALUATION    Patient Name: Monica Alexis    Pre-op Diagnosis: Renal calculi [N20.0]    Procedure(s):  CYSTOSCOPY RIGHT RETROGRADE PYELOGRAM, RIGHT URETEROSCOPY, LASER LITHOTRIPSY, RIGHT URETERAL STENT EXCHANGE    Surgeon(s) and Role:     * [employer] CERVIX  2012   • CYSTOSCOPY STENT INSERTION Bilateral 6/16/2019    Performed by Eric Yu DO at [facility] MAIN OR   • CYSTOSCOPY,INSERT URETERAL STENT     • CYSTOSCOPY,MANIPULATN     • HERNIA SURGERY  1/2000    L hernia repair   • HERNIA VENTRAL REPAIR

## 2019-07-09 NOTE — OPERATIVE REPORT
[facility]    PATIENT'S NAME: hSraddha Gomez   ATTENDING PHYSICIAN: Miah Garcia DO   OPERATING PHYSICIAN: Miah Garcia DO   PATIENT ACCOUNT#:   [de-identified]    LOCATION:  PACU [facility] PACU 4 EDWP 10  MEDICAL RECORD #:   [id number]       DA was prepped and draped in the usual sterile fashion. A well-lubricated 21-Irish rigid cystoscope was then introduced into the bladder. Upon entering the bladder, there were no signs of cystitis.   The bilateral ureteral orifices were seen in their normal sign of any contrast extravasation. The ureteroscope was advanced up into the collecting system and there were only small calculi and fragments appreciated.   The ureteroscope was then slowly withdrawn along with the access sheath, reexamining the entire u La Cox DO  d: 07/09/2019 10:09:03  t: 07/09/2019 10:35:29  [facility] [id number]  [facility]/

## 2019-07-09 NOTE — INTERVAL H&P NOTE
H&P dated 6/16/19 by Dr. Christine Parmar was reviewed. The patient subsequently underwent bilateral ureteral stent placement. Patient seen in preoperative area.  We discussed the surgical plan for today for cystoscopy, right retrograde pyelogram, right ureterosco

## 2019-07-09 NOTE — BRIEF OP NOTE
Pre-Operative Diagnosis: Renal ureteral calculus     Post-Operative Diagnosis: Renal ureteral calculus     Procedure Performed:   Procedure(s):  CYSTOSCOPY RIGHT RETROGRADE PYELOGRAM, RIGHT URETEROSCOPY, LASER LITHOTRIPSY, RIGHT URETERAL STENT EXCHANGE

## 2019-07-09 NOTE — ANESTHESIA POSTPROCEDURE EVALUATION
[address] Patient Status:  Hospital Outpatient Surgery   Age/Gender 45year old female MRN [id number]   [facility] SURGERY Attending Celia [facility], [address] [facility] Day # 0 PCP Ras Anthony MD       Anesthesia Post-op N

## 2019-07-14 ENCOUNTER — HOSPITAL ENCOUNTER (EMERGENCY)
Age: 44
Discharge: HOME | End: 2019-07-14
Attending: EMERGENCY MEDICINE
Payer: MEDICAID

## 2019-07-14 ENCOUNTER — APPOINTMENT (OUTPATIENT)
Dept: GENERAL RADIOLOGY | Age: 44
End: 2019-07-14
Attending: EMERGENCY MEDICINE
Payer: MEDICAID

## 2019-07-14 VITALS
SYSTOLIC BLOOD PRESSURE: 124 MMHG | RESPIRATION RATE: 16 BRPM | HEIGHT: 60 IN | WEIGHT: 150 LBS | HEART RATE: 79 BPM | BODY MASS INDEX: 29.45 KG/M2 | TEMPERATURE: 98 F | OXYGEN SATURATION: 99 % | DIASTOLIC BLOOD PRESSURE: 69 MMHG

## 2019-07-14 DIAGNOSIS — N12 PYELONEPHRITIS: Primary | ICD-10-CM

## 2019-07-14 LAB
ALBUMIN SERPL-MCNC: 3.6 G/DL (ref 3.4–5)
ALBUMIN/GLOB SERPL: 0.8 {RATIO} (ref 1–2)
ALP LIVER SERPL-CCNC: 104 U/L (ref 37–98)
ALT SERPL-CCNC: 32 U/L (ref 13–56)
ANION GAP SERPL CALC-SCNC: 9 MMOL/L (ref 0–18)
AST SERPL-CCNC: 11 U/L (ref 15–37)
BASOPHILS # BLD AUTO: 0.04 X10(3) UL (ref 0–0.2)
BASOPHILS NFR BLD AUTO: 0.4 %
BILIRUB SERPL-MCNC: 0.4 MG/DL (ref 0.1–2)
BILIRUB UR QL STRIP.AUTO: NEGATIVE
BUN BLD-MCNC: 11 MG/DL (ref 7–18)
BUN/CREAT SERPL: 12.9 (ref 10–20)
CALCIUM BLD-MCNC: 9 MG/DL (ref 8.5–10.1)
CHLORIDE SERPL-SCNC: 108 MMOL/L (ref 98–112)
CO2 SERPL-SCNC: 23 MMOL/L (ref 21–32)
COLOR UR AUTO: YELLOW
CREAT BLD-MCNC: 0.85 MG/DL (ref 0.55–1.02)
DEPRECATED RDW RBC AUTO: 41.6 FL (ref 35.1–46.3)
EOSINOPHIL # BLD AUTO: 0.23 X10(3) UL (ref 0–0.7)
EOSINOPHIL NFR BLD AUTO: 2.2 %
ERYTHROCYTE [DISTWIDTH] IN BLOOD BY AUTOMATED COUNT: 13 % (ref 11–15)
GLOBULIN PLAS-MCNC: 4.5 G/DL (ref 2.8–4.4)
GLUCOSE BLD-MCNC: 96 MG/DL (ref 70–99)
GLUCOSE UR STRIP.AUTO-MCNC: NEGATIVE MG/DL
HCT VFR BLD AUTO: 43.5 % (ref 35–48)
HGB BLD-MCNC: 14.2 G/DL (ref 12–16)
IMM GRANULOCYTES # BLD AUTO: 0.04 X10(3) UL (ref 0–1)
IMM GRANULOCYTES NFR BLD: 0.4 %
KETONES UR STRIP.AUTO-MCNC: NEGATIVE MG/DL
LYMPHOCYTES # BLD AUTO: 1.83 X10(3) UL (ref 1–4)
LYMPHOCYTES NFR BLD AUTO: 17.5 %
M PROTEIN MFR SERPL ELPH: 8.1 G/DL (ref 6.4–8.2)
MCH RBC QN AUTO: 28.8 PG (ref 26–34)
MCHC RBC AUTO-ENTMCNC: 32.6 G/DL (ref 31–37)
MCV RBC AUTO: 88.2 FL (ref 80–100)
MONOCYTES # BLD AUTO: 0.67 X10(3) UL (ref 0.1–1)
MONOCYTES NFR BLD AUTO: 6.4 %
NEUTROPHILS # BLD AUTO: 7.62 X10 (3) UL (ref 1.5–7.7)
NEUTROPHILS # BLD AUTO: 7.62 X10(3) UL (ref 1.5–7.7)
NEUTROPHILS NFR BLD AUTO: 73.1 %
NITRITE UR QL STRIP.AUTO: POSITIVE
OSMOLALITY SERPL CALC.SUM OF ELEC: 289 MOSM/KG (ref 275–295)
PH UR STRIP.AUTO: 6 [PH] (ref 4.5–8)
PLATELET # BLD AUTO: 281 10(3)UL (ref 150–450)
POCT LOT NUMBER: NORMAL
POCT URINE PREGNANCY: NEGATIVE
POTASSIUM SERPL-SCNC: 3.5 MMOL/L (ref 3.5–5.1)
RBC # BLD AUTO: 4.93 X10(6)UL (ref 3.8–5.3)
SODIUM SERPL-SCNC: 140 MMOL/L (ref 136–145)
SP GR UR STRIP.AUTO: 1.02 (ref 1–1.03)
UROBILINOGEN UR STRIP.AUTO-MCNC: 0.2 MG/DL
WBC # BLD AUTO: 10.4 X10(3) UL (ref 4–11)
WBC #/AREA URNS AUTO: >50 /HPF
WBC CLUMPS UR QL AUTO: PRESENT

## 2019-07-14 PROCEDURE — 87077 CULTURE AEROBIC IDENTIFY: CPT | Performed by: EMERGENCY MEDICINE

## 2019-07-14 PROCEDURE — 80053 COMPREHEN METABOLIC PANEL: CPT | Performed by: EMERGENCY MEDICINE

## 2019-07-14 PROCEDURE — 87186 SC STD MICRODIL/AGAR DIL: CPT | Performed by: EMERGENCY MEDICINE

## 2019-07-14 PROCEDURE — 81001 URINALYSIS AUTO W/SCOPE: CPT | Performed by: EMERGENCY MEDICINE

## 2019-07-14 PROCEDURE — 85025 COMPLETE CBC W/AUTO DIFF WBC: CPT | Performed by: EMERGENCY MEDICINE

## 2019-07-14 PROCEDURE — 99284 EMERGENCY DEPT VISIT MOD MDM: CPT

## 2019-07-14 PROCEDURE — 99285 EMERGENCY DEPT VISIT HI MDM: CPT

## 2019-07-14 PROCEDURE — 87086 URINE CULTURE/COLONY COUNT: CPT | Performed by: EMERGENCY MEDICINE

## 2019-07-14 PROCEDURE — 74018 RADEX ABDOMEN 1 VIEW: CPT | Performed by: EMERGENCY MEDICINE

## 2019-07-14 PROCEDURE — 96361 HYDRATE IV INFUSION ADD-ON: CPT

## 2019-07-14 PROCEDURE — 96365 THER/PROPH/DIAG IV INF INIT: CPT

## 2019-07-14 PROCEDURE — 81025 URINE PREGNANCY TEST: CPT

## 2019-07-14 PROCEDURE — 96375 TX/PRO/DX INJ NEW DRUG ADDON: CPT

## 2019-07-14 PROCEDURE — 81015 MICROSCOPIC EXAM OF URINE: CPT | Performed by: EMERGENCY MEDICINE

## 2019-07-14 RX ORDER — LEVOFLOXACIN 500 MG/1
500 TABLET, FILM COATED ORAL DAILY
Qty: 10 TABLET | Refills: 0 | Status: SHIPPED | OUTPATIENT
Start: 2019-07-14 | End: 2019-08-07

## 2019-07-14 RX ORDER — KETOROLAC TROMETHAMINE 10 MG/1
10 TABLET, FILM COATED ORAL EVERY 6 HOURS PRN
Qty: 20 TABLET | Refills: 0 | Status: SHIPPED | OUTPATIENT
Start: 2019-07-14 | End: 2019-07-19

## 2019-07-14 RX ORDER — LEVOFLOXACIN 5 MG/ML
750 INJECTION, SOLUTION INTRAVENOUS ONCE
Status: COMPLETED | OUTPATIENT
Start: 2019-07-14 | End: 2019-07-14

## 2019-07-14 RX ORDER — KETOROLAC TROMETHAMINE 30 MG/ML
15 INJECTION, SOLUTION INTRAMUSCULAR; INTRAVENOUS ONCE
Status: COMPLETED | OUTPATIENT
Start: 2019-07-14 | End: 2019-07-14

## 2019-07-14 NOTE — ED PROVIDER NOTES
Patient Seen in: [facility] In [place]    History   Patient presents with:  Abdomen/Flank Pain (GI/)    Stated Complaint: Tuesday 7/9/19 had \"lithotripsy and cystoscopy\" and having bilateral flank pain*    HPI    65-year-old femal CYSTOSCOPY,INSERT URETERAL STENT     • CYSTOSCOPY,MANIPULATN     • HERNIA SURGERY  1/2000    L hernia repair   • HERNIA VENTRAL REPAIR N/A 3/29/2017    Performed by Argentina Hyatt MD at [facility]   • LITHOTRIPSY  2001, 2007 & 11/11   • OTHER Bilateral 201 Bowel sounds are normoactive. Bilateral flank discomfort on percussion. Extremities: No cyanosis, no edema or clubbing. Pulses are +2. Full range of motion is noted of the extremities without deformities. No tenderness. Neurologically intact. MDM   Patient had laboratory sent. Urinalysis performed. A KUB performed to assess for stent placement. Nonobstructive bowel gas pattern was noted on the KUB no free air bilateral nephrolithiasis and bilateral ureteral stents identified.   The pa possible for a visit in 2 days      Orestes Beverly MD  Cuello Siva Garcia [place] [phone]    Call in 2 days          Medications Prescribed:  Current Discharge Medication List    START taking these medications    levofloxacin 500 MG Oral

## 2019-07-18 ENCOUNTER — LAB ENCOUNTER (OUTPATIENT)
Dept: LAB | Age: 44
End: 2019-07-18
Attending: UROLOGY
Payer: MEDICAID

## 2019-07-18 DIAGNOSIS — N39.0 URINARY TRACT INFECTION WITHOUT HEMATURIA, SITE UNSPECIFIED: ICD-10-CM

## 2019-07-18 LAB
BILIRUB UR QL STRIP.AUTO: NEGATIVE
COLOR UR AUTO: YELLOW
GLUCOSE UR STRIP.AUTO-MCNC: NEGATIVE MG/DL
KETONES UR STRIP.AUTO-MCNC: NEGATIVE MG/DL
NITRITE UR QL STRIP.AUTO: NEGATIVE
PH UR STRIP.AUTO: 6 [PH] (ref 4.5–8)
PROT UR STRIP.AUTO-MCNC: 100 MG/DL
RBC #/AREA URNS AUTO: >10 /HPF
SP GR UR STRIP.AUTO: 1.01 (ref 1–1.03)
UROBILINOGEN UR STRIP.AUTO-MCNC: <2 MG/DL
WBC #/AREA URNS AUTO: >50 /HPF

## 2019-07-18 PROCEDURE — 81001 URINALYSIS AUTO W/SCOPE: CPT

## 2019-07-18 PROCEDURE — 87086 URINE CULTURE/COLONY COUNT: CPT

## 2019-07-19 ENCOUNTER — LAB ENCOUNTER (OUTPATIENT)
Dept: LAB | Age: 44
End: 2019-07-19
Attending: UROLOGY
Payer: MEDICAID

## 2019-07-19 DIAGNOSIS — E55.9 VITAMIN D DEFICIENCY: ICD-10-CM

## 2019-07-19 DIAGNOSIS — N20.0 RENAL CALCULI: ICD-10-CM

## 2019-07-19 LAB
PTH-INTACT SERPL-MCNC: 69.7 PG/ML (ref 18.5–88)
VIT D+METAB SERPL-MCNC: 40.5 NG/ML (ref 30–100)

## 2019-07-19 PROCEDURE — 83970 ASSAY OF PARATHORMONE: CPT

## 2019-07-19 PROCEDURE — 82330 ASSAY OF CALCIUM: CPT

## 2019-07-19 PROCEDURE — 82652 VIT D 1 25-DIHYDROXY: CPT

## 2019-07-19 PROCEDURE — 82306 VITAMIN D 25 HYDROXY: CPT

## 2019-07-19 PROCEDURE — 36415 COLL VENOUS BLD VENIPUNCTURE: CPT

## 2019-07-21 LAB
1,25-DIHYDROXYVITAMIN D: 78.9 PG/ML
CALCIUM IONIZED PH 7.4: 1.25 MMOL/L
CALCIUM IONIZED, SERUM: 1.29 MMOL/L

## 2019-07-23 ENCOUNTER — HOSPITAL ENCOUNTER (OUTPATIENT)
Facility: HOSPITAL | Age: 44
Setting detail: HOSPITAL OUTPATIENT SURGERY
Discharge: HOME | End: 2019-07-23
Attending: UROLOGY | Admitting: UROLOGY
Payer: MEDICAID

## 2019-07-23 ENCOUNTER — APPOINTMENT (OUTPATIENT)
Dept: GENERAL RADIOLOGY | Facility: HOSPITAL | Age: 44
End: 2019-07-23
Attending: UROLOGY
Payer: MEDICAID

## 2019-07-23 ENCOUNTER — ANESTHESIA (OUTPATIENT)
Dept: SURGERY | Facility: HOSPITAL | Age: 44
End: 2019-07-23

## 2019-07-23 ENCOUNTER — ANESTHESIA EVENT (OUTPATIENT)
Dept: SURGERY | Facility: HOSPITAL | Age: 44
End: 2019-07-23

## 2019-07-23 VITALS
SYSTOLIC BLOOD PRESSURE: 121 MMHG | RESPIRATION RATE: 16 BRPM | WEIGHT: 150 LBS | HEART RATE: 60 BPM | DIASTOLIC BLOOD PRESSURE: 82 MMHG | BODY MASS INDEX: 30.24 KG/M2 | TEMPERATURE: 98 F | HEIGHT: 59 IN | OXYGEN SATURATION: 97 %

## 2019-07-23 DIAGNOSIS — N20.0 RENAL CALCULI: ICD-10-CM

## 2019-07-23 LAB
POCT LOT NUMBER: NORMAL
POCT URINE PREGNANCY: NEGATIVE

## 2019-07-23 PROCEDURE — 81025 URINE PREGNANCY TEST: CPT | Performed by: UROLOGY

## 2019-07-23 PROCEDURE — 0TF48ZZ FRAGMENTATION IN LEFT KIDNEY PELVIS, VIA NATURAL OR ARTIFICIAL OPENING ENDOSCOPIC: ICD-10-PCS | Performed by: UROLOGY

## 2019-07-23 PROCEDURE — 0T778DZ DILATION OF LEFT URETER WITH INTRALUMINAL DEVICE, VIA NATURAL OR ARTIFICIAL OPENING ENDOSCOPIC: ICD-10-PCS | Performed by: UROLOGY

## 2019-07-23 DEVICE — STENT URET 6F 26CM WO GW INL: Type: IMPLANTABLE DEVICE | Site: URETER | Status: FUNCTIONAL

## 2019-07-23 RX ORDER — DEXTROSE MONOHYDRATE 25 G/50ML
50 INJECTION, SOLUTION INTRAVENOUS
Status: DISCONTINUED | OUTPATIENT
Start: 2019-07-23 | End: 2019-07-23

## 2019-07-23 RX ORDER — HYDROCODONE BITARTRATE AND ACETAMINOPHEN 5; 325 MG/1; MG/1
1 TABLET ORAL AS NEEDED
Status: DISCONTINUED | OUTPATIENT
Start: 2019-07-23 | End: 2019-07-23

## 2019-07-23 RX ORDER — SODIUM CHLORIDE, SODIUM LACTATE, POTASSIUM CHLORIDE, CALCIUM CHLORIDE 600; 310; 30; 20 MG/100ML; MG/100ML; MG/100ML; MG/100ML
INJECTION, SOLUTION INTRAVENOUS CONTINUOUS
Status: DISCONTINUED | OUTPATIENT
Start: 2019-07-23 | End: 2019-07-23

## 2019-07-23 RX ORDER — MIDAZOLAM HYDROCHLORIDE 1 MG/ML
1 INJECTION INTRAMUSCULAR; INTRAVENOUS EVERY 5 MIN PRN
Status: DISCONTINUED | OUTPATIENT
Start: 2019-07-23 | End: 2019-07-23

## 2019-07-23 RX ORDER — MEPERIDINE HYDROCHLORIDE 25 MG/ML
INJECTION INTRAMUSCULAR; INTRAVENOUS; SUBCUTANEOUS
Status: COMPLETED
Start: 2019-07-23 | End: 2019-07-23

## 2019-07-23 RX ORDER — CEFAZOLIN SODIUM/WATER 2 G/20 ML
2 SYRINGE (ML) INTRAVENOUS ONCE
Status: COMPLETED | OUTPATIENT
Start: 2019-07-23 | End: 2019-07-23

## 2019-07-23 RX ORDER — MEPERIDINE HYDROCHLORIDE 25 MG/ML
12.5 INJECTION INTRAMUSCULAR; INTRAVENOUS; SUBCUTANEOUS AS NEEDED
Status: DISCONTINUED | OUTPATIENT
Start: 2019-07-23 | End: 2019-07-23

## 2019-07-23 RX ORDER — ONDANSETRON 2 MG/ML
4 INJECTION INTRAMUSCULAR; INTRAVENOUS AS NEEDED
Status: DISCONTINUED | OUTPATIENT
Start: 2019-07-23 | End: 2019-07-23

## 2019-07-23 RX ORDER — DIPHENHYDRAMINE HYDROCHLORIDE 50 MG/ML
12.5 INJECTION INTRAMUSCULAR; INTRAVENOUS AS NEEDED
Status: DISCONTINUED | OUTPATIENT
Start: 2019-07-23 | End: 2019-07-23

## 2019-07-23 RX ORDER — KETOROLAC TROMETHAMINE 30 MG/ML
30 INJECTION, SOLUTION INTRAMUSCULAR; INTRAVENOUS ONCE
Status: COMPLETED | OUTPATIENT
Start: 2019-07-23 | End: 2019-07-23

## 2019-07-23 RX ORDER — HYDROMORPHONE HYDROCHLORIDE 1 MG/ML
0.4 INJECTION, SOLUTION INTRAMUSCULAR; INTRAVENOUS; SUBCUTANEOUS EVERY 5 MIN PRN
Status: DISCONTINUED | OUTPATIENT
Start: 2019-07-23 | End: 2019-07-23

## 2019-07-23 RX ORDER — LIDOCAINE HYDROCHLORIDE 20 MG/ML
JELLY TOPICAL AS NEEDED
Status: DISCONTINUED | OUTPATIENT
Start: 2019-07-23 | End: 2019-07-23 | Stop reason: HOSPADM

## 2019-07-23 RX ORDER — ACETAMINOPHEN 500 MG
1000 TABLET ORAL ONCE
Status: DISCONTINUED | OUTPATIENT
Start: 2019-07-23 | End: 2019-07-23

## 2019-07-23 RX ORDER — NALOXONE HYDROCHLORIDE 0.4 MG/ML
80 INJECTION, SOLUTION INTRAMUSCULAR; INTRAVENOUS; SUBCUTANEOUS AS NEEDED
Status: DISCONTINUED | OUTPATIENT
Start: 2019-07-23 | End: 2019-07-23

## 2019-07-23 RX ORDER — HYDROCODONE BITARTRATE AND ACETAMINOPHEN 5; 325 MG/1; MG/1
2 TABLET ORAL AS NEEDED
Status: DISCONTINUED | OUTPATIENT
Start: 2019-07-23 | End: 2019-07-23

## 2019-07-23 NOTE — ANESTHESIA PREPROCEDURE EVALUATION
PRE-OP EVALUATION    Patient Name: Blayne Pascual    Pre-op Diagnosis: Renal calculi [N20.0]    Procedure(s):  CYSTOSCOPY LEFT RETROGRADE PYELOGRAM, LEFT URETEROSCOPY, LASER LITHOTRIPSY, LEFT URETERAL STENT EXCHANGE, POSSIBLE RIGHT URETERAL STENT REMOVAL depression             (+) headaches                 Past Surgical History:   Procedure Laterality Date   •       x2   • CHOLECYSTECTOMY     • COLPOSCOPY, CERVIX W/UPPER ADJACENT VAGINA; W/BIOPSY(S), CERVIX     • CRYOCAUTERY OF CERVIX     • CY surgeon and patient. Plan/risks discussed with: patient                Options, risks and benefits of anesthesia as outlined in the anesthesia consent were reviewed with the patient. The consent was signed without further questions.

## 2019-07-23 NOTE — H&P
[facility] Urology H&P    Trey Beverage Patient Status:  Hospital Outpatient Surgery    3/22/1975 MRN [id number]   Location [address] [place] [address] PRE OP HOLDING Attending Cheli Rapp, [facility] Day # 0 PCP Deborah Cool MD     Chief complain arms about 1 month apart   • REMOVAL GALLBLADDER     • REPAIR ING HERNIA,5+Y/O,REDUCIBL       Family History   Problem Relation Age of Onset   • Heart Disorder Father         Stent   • Diabetes Father    • Hypertension Father    • Diabetes Mother    • Othe Migraine with aura, without mention of intractable migraine without mention of status migrainosus     Calculus of kidney     Body mass index between 19-24, adult     Pain in limb     Lesion of ulnar nerve     Disturbance of skin sensation     Cervicalgia

## 2019-07-23 NOTE — ANESTHESIA POSTPROCEDURE EVALUATION
[address] Patient Status:  Hospital Outpatient Surgery   Age/Gender 45year old female MRN [id number]   [facility] SURGERY Attending Fatmata Conrad, [address] [facility] Day # 0 PCP Preeti Gaspar MD       Anesthesia Post-op N

## 2019-07-24 NOTE — OPERATIVE REPORT
[address]    PATIENT'S NAME: Elisa Holt   ATTENDING PHYSICIAN: Alvarado Barber DO   OPERATING PHYSICIAN: Alvarado Barber DO   PATIENT ACCOUNT#:   [de-identified]    LOCATION:  [address] [place] [address] 1 [facility] [address] #:   [id number] and satisfactory general anesthesia had been achieved, the patient's legs were raised to a dorsal lithotomy position. The patient's groin was prepped and draped in the usual sterile fashion.   A well lubricated 21-Peruvian rigid cystoscope was then introduce ureteroscope was then slowly withdrawn, along with the access sheath, re-examining the entire ureter on the way out showing no ureteral trauma and no ureteral calculi fragments.   The Glidewire was replaced and coiled in the left renal pelvis and the ureter

## 2019-08-05 RX ORDER — ACETAMINOPHEN 325 MG/1
325 TABLET ORAL EVERY 6 HOURS PRN
COMMUNITY
End: 2019-11-13 | Stop reason: ALTCHOICE

## 2019-08-07 ENCOUNTER — HOSPITAL ENCOUNTER (OUTPATIENT)
Dept: GENERAL RADIOLOGY | Age: 44
Discharge: HOME | End: 2019-08-07
Attending: UROLOGY
Payer: MEDICAID

## 2019-08-07 ENCOUNTER — APPOINTMENT (OUTPATIENT)
Dept: LAB | Age: 44
End: 2019-08-07
Attending: FAMILY MEDICINE
Payer: MEDICAID

## 2019-08-07 DIAGNOSIS — N39.0 URINARY TRACT INFECTION WITHOUT HEMATURIA, SITE UNSPECIFIED: ICD-10-CM

## 2019-08-07 DIAGNOSIS — N20.0 KIDNEY STONE: ICD-10-CM

## 2019-08-07 LAB
BILIRUB UR QL STRIP.AUTO: NEGATIVE
COLOR UR AUTO: YELLOW
GLUCOSE UR STRIP.AUTO-MCNC: NEGATIVE MG/DL
KETONES UR STRIP.AUTO-MCNC: NEGATIVE MG/DL
NITRITE UR QL STRIP.AUTO: POSITIVE
PH UR STRIP.AUTO: 5 [PH] (ref 4.5–8)
PROT UR STRIP.AUTO-MCNC: 30 MG/DL
SP GR UR STRIP.AUTO: 1.01 (ref 1–1.03)
UROBILINOGEN UR STRIP.AUTO-MCNC: <2 MG/DL
WBC #/AREA URNS AUTO: >50 /HPF

## 2019-08-07 PROCEDURE — 87086 URINE CULTURE/COLONY COUNT: CPT

## 2019-08-07 PROCEDURE — 74018 RADEX ABDOMEN 1 VIEW: CPT | Performed by: UROLOGY

## 2019-08-07 PROCEDURE — 81001 URINALYSIS AUTO W/SCOPE: CPT

## 2019-08-07 PROCEDURE — 87186 SC STD MICRODIL/AGAR DIL: CPT

## 2019-08-07 PROCEDURE — 87077 CULTURE AEROBIC IDENTIFY: CPT

## 2019-08-13 ENCOUNTER — APPOINTMENT (OUTPATIENT)
Dept: GENERAL RADIOLOGY | Age: 44
End: 2019-08-13
Attending: EMERGENCY MEDICINE
Payer: MEDICAID

## 2019-08-13 ENCOUNTER — APPOINTMENT (OUTPATIENT)
Dept: CT IMAGING | Age: 44
End: 2019-08-13
Attending: EMERGENCY MEDICINE
Payer: MEDICAID

## 2019-08-13 ENCOUNTER — HOSPITAL ENCOUNTER (EMERGENCY)
Age: 44
Discharge: HOME | End: 2019-08-13
Attending: EMERGENCY MEDICINE
Payer: MEDICAID

## 2019-08-13 VITALS
BODY MASS INDEX: 30.24 KG/M2 | WEIGHT: 150 LBS | RESPIRATION RATE: 16 BRPM | TEMPERATURE: 98 F | DIASTOLIC BLOOD PRESSURE: 63 MMHG | HEIGHT: 59 IN | SYSTOLIC BLOOD PRESSURE: 123 MMHG | OXYGEN SATURATION: 99 % | HEART RATE: 74 BPM

## 2019-08-13 DIAGNOSIS — N13.4 HYDROURETER: ICD-10-CM

## 2019-08-13 DIAGNOSIS — N23 RENAL COLIC: ICD-10-CM

## 2019-08-13 DIAGNOSIS — N12 PYELONEPHRITIS: ICD-10-CM

## 2019-08-13 DIAGNOSIS — R55 SYNCOPE, VASOVAGAL: Primary | ICD-10-CM

## 2019-08-13 LAB
ALBUMIN SERPL-MCNC: 3.8 G/DL (ref 3.4–5)
ALBUMIN/GLOB SERPL: 0.9 {RATIO} (ref 1–2)
ALP LIVER SERPL-CCNC: 94 U/L (ref 37–98)
ALT SERPL-CCNC: 26 U/L (ref 13–56)
ANION GAP SERPL CALC-SCNC: 9 MMOL/L (ref 0–18)
AST SERPL-CCNC: 20 U/L (ref 15–37)
BASOPHILS # BLD AUTO: 0.04 X10(3) UL (ref 0–0.2)
BASOPHILS NFR BLD AUTO: 0.4 %
BILIRUB SERPL-MCNC: 0.5 MG/DL (ref 0.1–2)
BILIRUB UR QL STRIP.AUTO: NEGATIVE
BUN BLD-MCNC: 12 MG/DL (ref 7–18)
BUN/CREAT SERPL: 13.8 (ref 10–20)
CALCIUM BLD-MCNC: 8.7 MG/DL (ref 8.5–10.1)
CHLORIDE SERPL-SCNC: 108 MMOL/L (ref 98–112)
CLARITY UR REFRACT.AUTO: CLEAR
CO2 SERPL-SCNC: 22 MMOL/L (ref 21–32)
COLOR UR AUTO: YELLOW
CREAT BLD-MCNC: 0.87 MG/DL (ref 0.55–1.02)
DEPRECATED RDW RBC AUTO: 41 FL (ref 35.1–46.3)
EOSINOPHIL # BLD AUTO: 0.2 X10(3) UL (ref 0–0.7)
EOSINOPHIL NFR BLD AUTO: 1.9 %
ERYTHROCYTE [DISTWIDTH] IN BLOOD BY AUTOMATED COUNT: 13.1 % (ref 11–15)
GLOBULIN PLAS-MCNC: 4.2 G/DL (ref 2.8–4.4)
GLUCOSE BLD-MCNC: 93 MG/DL (ref 70–99)
GLUCOSE UR STRIP.AUTO-MCNC: NEGATIVE MG/DL
HCT VFR BLD AUTO: 41.9 % (ref 35–48)
HGB BLD-MCNC: 13.7 G/DL (ref 12–16)
IMM GRANULOCYTES # BLD AUTO: 0.03 X10(3) UL (ref 0–1)
IMM GRANULOCYTES NFR BLD: 0.3 %
KETONES UR STRIP.AUTO-MCNC: NEGATIVE MG/DL
LYMPHOCYTES # BLD AUTO: 2.96 X10(3) UL (ref 1–4)
LYMPHOCYTES NFR BLD AUTO: 27.5 %
M PROTEIN MFR SERPL ELPH: 8 G/DL (ref 6.4–8.2)
MCH RBC QN AUTO: 28.4 PG (ref 26–34)
MCHC RBC AUTO-ENTMCNC: 32.7 G/DL (ref 31–37)
MCV RBC AUTO: 86.7 FL (ref 80–100)
MONOCYTES # BLD AUTO: 0.82 X10(3) UL (ref 0.1–1)
MONOCYTES NFR BLD AUTO: 7.6 %
NEUTROPHILS # BLD AUTO: 6.73 X10 (3) UL (ref 1.5–7.7)
NEUTROPHILS # BLD AUTO: 6.73 X10(3) UL (ref 1.5–7.7)
NEUTROPHILS NFR BLD AUTO: 62.3 %
NITRITE UR QL STRIP.AUTO: POSITIVE
OSMOLALITY SERPL CALC.SUM OF ELEC: 287 MOSM/KG (ref 275–295)
PH UR STRIP.AUTO: 6 [PH] (ref 4.5–8)
PLATELET # BLD AUTO: 284 10(3)UL (ref 150–450)
POTASSIUM SERPL-SCNC: 3.2 MMOL/L (ref 3.5–5.1)
RBC # BLD AUTO: 4.83 X10(6)UL (ref 3.8–5.3)
SODIUM SERPL-SCNC: 139 MMOL/L (ref 136–145)
SP GR UR STRIP.AUTO: 1.01 (ref 1–1.03)
TROPONIN I SERPL-MCNC: <0.045 NG/ML (ref ?–0.04)
UROBILINOGEN UR STRIP.AUTO-MCNC: 0.2 MG/DL
WBC # BLD AUTO: 10.8 X10(3) UL (ref 4–11)
WBC #/AREA URNS AUTO: >50 /HPF
WBC CLUMPS UR QL AUTO: PRESENT

## 2019-08-13 PROCEDURE — 85025 COMPLETE CBC W/AUTO DIFF WBC: CPT | Performed by: EMERGENCY MEDICINE

## 2019-08-13 PROCEDURE — 87086 URINE CULTURE/COLONY COUNT: CPT | Performed by: EMERGENCY MEDICINE

## 2019-08-13 PROCEDURE — 71045 X-RAY EXAM CHEST 1 VIEW: CPT | Performed by: EMERGENCY MEDICINE

## 2019-08-13 PROCEDURE — 81001 URINALYSIS AUTO W/SCOPE: CPT | Performed by: EMERGENCY MEDICINE

## 2019-08-13 PROCEDURE — 36415 COLL VENOUS BLD VENIPUNCTURE: CPT

## 2019-08-13 PROCEDURE — 93010 ELECTROCARDIOGRAM REPORT: CPT

## 2019-08-13 PROCEDURE — 93005 ELECTROCARDIOGRAM TRACING: CPT

## 2019-08-13 PROCEDURE — 99285 EMERGENCY DEPT VISIT HI MDM: CPT

## 2019-08-13 PROCEDURE — 84484 ASSAY OF TROPONIN QUANT: CPT | Performed by: EMERGENCY MEDICINE

## 2019-08-13 PROCEDURE — 80053 COMPREHEN METABOLIC PANEL: CPT | Performed by: EMERGENCY MEDICINE

## 2019-08-13 PROCEDURE — 74176 CT ABD & PELVIS W/O CONTRAST: CPT | Performed by: EMERGENCY MEDICINE

## 2019-08-13 RX ORDER — SULFAMETHOXAZOLE AND TRIMETHOPRIM 800; 160 MG/1; MG/1
1 TABLET ORAL 2 TIMES DAILY
Qty: 14 TABLET | Refills: 0 | Status: SHIPPED | OUTPATIENT
Start: 2019-08-13 | End: 2019-08-20

## 2019-08-13 RX ORDER — TRAMADOL HYDROCHLORIDE 50 MG/1
50 TABLET ORAL EVERY 6 HOURS PRN
Status: ON HOLD | COMMUNITY
End: 2019-08-22

## 2019-08-14 LAB
ATRIAL RATE: 78 BPM
P AXIS: 47 DEGREES
P-R INTERVAL: 114 MS
Q-T INTERVAL: 406 MS
QRS DURATION: 72 MS
QTC CALCULATION (BEZET): 462 MS
R AXIS: 50 DEGREES
T AXIS: 35 DEGREES
VENTRICULAR RATE: 78 BPM

## 2019-08-14 NOTE — ED INITIAL ASSESSMENT (HPI)
Per - tramadol 50mg at 5pm, c/o dizziness/nausea/chest pain and passed out at home. H/o kidney stones.

## 2019-08-14 NOTE — ED PROVIDER NOTES
Patient Seen in: [facility] In [place]    History   Patient presents with:  Syncope (cardiovascular, neurologic)  Chest Pain Angina (cardiovascular)    Stated Complaint: syncope/chest pain    HPI    44-year-old female presents emergen Kristin Layton DO at [facility] MAIN OR   • CYSTOSCOPY URETEROSCOPY Right 7/9/2019    Performed by Fatmata Conrad DO at [facility] MAIN OR   • CYSTOSCOPY,INSERT URETERAL STENT     • CYSTOSCOPY,MANIPULATN     • HERNIA SURGERY  1/2000    L hernia repair   • HERNIA VENTRAL RE or edema 2+ distal pulses. Neuro: Cranial nerves II through XII intact with no gross focal sensory or motor abnormality.       ED Course     Labs Reviewed   COMP METABOLIC PANEL (14) - Abnormal; Notable for the following components:       Result Value    P Janet Hilton 2d Rndr(no Iv,no Oral)(cpt=74176)    Result Date: 8/13/2019  CONCLUSION:   1. There is interval placement of a left ureteral stent with resolution of previous left hydronephrosis.   There are multiple stone fragments along the course of the prox return to ED if any worsening problems. Patient was also instructed to followup with the appropriate physician. Patient verbalizes and agrees with plan. Patient discharged in good condition.                 Disposition and Plan     Clinical Impression:

## 2019-08-17 ENCOUNTER — MOBILE ENCOUNTER (OUTPATIENT)
Dept: FAMILY MEDICINE CLINIC | Facility: CLINIC | Age: 44
End: 2019-08-17

## 2019-08-18 NOTE — PROGRESS NOTES
Patient calling outpatient line for elevated BP and headache. Earlier this evening BP was normal but HA started coming on, last few BP checks were systolic 118H. Has lithotripsy planned for Thursday so was advised to avoid pain meds.    I recommended she ta

## 2019-08-22 ENCOUNTER — ANESTHESIA EVENT (OUTPATIENT)
Dept: SURGERY | Facility: HOSPITAL | Age: 44
End: 2019-08-22
Payer: MEDICAID

## 2019-08-22 ENCOUNTER — ANESTHESIA (OUTPATIENT)
Dept: SURGERY | Facility: HOSPITAL | Age: 44
End: 2019-08-22
Payer: MEDICAID

## 2019-08-22 ENCOUNTER — APPOINTMENT (OUTPATIENT)
Dept: GENERAL RADIOLOGY | Facility: HOSPITAL | Age: 44
End: 2019-08-22
Attending: UROLOGY
Payer: MEDICAID

## 2019-08-22 ENCOUNTER — HOSPITAL ENCOUNTER (OUTPATIENT)
Facility: HOSPITAL | Age: 44
Setting detail: HOSPITAL OUTPATIENT SURGERY
Discharge: HOME | End: 2019-08-22
Attending: UROLOGY | Admitting: UROLOGY
Payer: MEDICAID

## 2019-08-22 VITALS
WEIGHT: 154.88 LBS | HEART RATE: 78 BPM | RESPIRATION RATE: 14 BRPM | HEIGHT: 59 IN | OXYGEN SATURATION: 96 % | SYSTOLIC BLOOD PRESSURE: 101 MMHG | BODY MASS INDEX: 31.22 KG/M2 | DIASTOLIC BLOOD PRESSURE: 56 MMHG | TEMPERATURE: 98 F

## 2019-08-22 DIAGNOSIS — N20.0 NEPHROLITHIASIS: ICD-10-CM

## 2019-08-22 LAB
POCT LOT NUMBER: NORMAL
POCT URINE PREGNANCY: NEGATIVE

## 2019-08-22 PROCEDURE — 0TF68ZZ FRAGMENTATION IN RIGHT URETER, VIA NATURAL OR ARTIFICIAL OPENING ENDOSCOPIC: ICD-10-PCS | Performed by: UROLOGY

## 2019-08-22 PROCEDURE — 0T768DZ DILATION OF RIGHT URETER WITH INTRALUMINAL DEVICE, VIA NATURAL OR ARTIFICIAL OPENING ENDOSCOPIC: ICD-10-PCS | Performed by: UROLOGY

## 2019-08-22 PROCEDURE — 81025 URINE PREGNANCY TEST: CPT | Performed by: UROLOGY

## 2019-08-22 PROCEDURE — 0T9780Z DRAINAGE OF LEFT URETER WITH DRAINAGE DEVICE, VIA NATURAL OR ARTIFICIAL OPENING ENDOSCOPIC: ICD-10-PCS | Performed by: UROLOGY

## 2019-08-22 PROCEDURE — 82365 CALCULUS SPECTROSCOPY: CPT | Performed by: UROLOGY

## 2019-08-22 PROCEDURE — 0TC48ZZ EXTIRPATION OF MATTER FROM LEFT KIDNEY PELVIS, VIA NATURAL OR ARTIFICIAL OPENING ENDOSCOPIC: ICD-10-PCS | Performed by: UROLOGY

## 2019-08-22 PROCEDURE — BT141ZZ FLUOROSCOPY OF KIDNEYS, URETERS AND BLADDER USING LOW OSMOLAR CONTRAST: ICD-10-PCS | Performed by: UROLOGY

## 2019-08-22 DEVICE — STENT URET 6F 26CM WO GW INL: Type: IMPLANTABLE DEVICE | Site: URETER | Status: FUNCTIONAL

## 2019-08-22 DEVICE — STENT URET 6F 28CM ULSMTH: Type: IMPLANTABLE DEVICE | Site: URETER | Status: FUNCTIONAL

## 2019-08-22 RX ORDER — ACETAMINOPHEN 500 MG
1000 TABLET ORAL ONCE
Status: DISCONTINUED | OUTPATIENT
Start: 2019-08-22 | End: 2019-08-22 | Stop reason: HOSPADM

## 2019-08-22 RX ORDER — LEVOFLOXACIN 500 MG/1
500 TABLET, FILM COATED ORAL DAILY
Qty: 14 TABLET | Refills: 0 | Status: ON HOLD | OUTPATIENT
Start: 2019-08-22 | End: 2019-09-02

## 2019-08-22 RX ORDER — LIDOCAINE HYDROCHLORIDE 20 MG/ML
JELLY TOPICAL AS NEEDED
Status: DISCONTINUED | OUTPATIENT
Start: 2019-08-22 | End: 2019-08-22 | Stop reason: HOSPADM

## 2019-08-22 RX ORDER — CEFAZOLIN SODIUM/WATER 2 G/20 ML
SYRINGE (ML) INTRAVENOUS
Status: DISCONTINUED | OUTPATIENT
Start: 2019-08-22 | End: 2019-08-22 | Stop reason: HOSPADM

## 2019-08-22 RX ORDER — CEFAZOLIN SODIUM/WATER 2 G/20 ML
2 SYRINGE (ML) INTRAVENOUS ONCE
Status: DISCONTINUED | OUTPATIENT
Start: 2019-08-22 | End: 2019-08-22

## 2019-08-22 RX ORDER — KETOROLAC TROMETHAMINE 10 MG/1
10 TABLET, FILM COATED ORAL 3 TIMES DAILY PRN
Qty: 9 TABLET | Refills: 0 | Status: SHIPPED | OUTPATIENT
Start: 2019-08-22 | End: 2019-08-31

## 2019-08-22 RX ORDER — ONDANSETRON 2 MG/ML
4 INJECTION INTRAMUSCULAR; INTRAVENOUS AS NEEDED
Status: DISCONTINUED | OUTPATIENT
Start: 2019-08-22 | End: 2019-08-22

## 2019-08-22 RX ORDER — ACETAMINOPHEN 500 MG
1000 TABLET ORAL ONCE AS NEEDED
Status: COMPLETED | OUTPATIENT
Start: 2019-08-22 | End: 2019-08-22

## 2019-08-22 RX ORDER — SODIUM CHLORIDE, SODIUM LACTATE, POTASSIUM CHLORIDE, CALCIUM CHLORIDE 600; 310; 30; 20 MG/100ML; MG/100ML; MG/100ML; MG/100ML
INJECTION, SOLUTION INTRAVENOUS CONTINUOUS
Status: DISCONTINUED | OUTPATIENT
Start: 2019-08-22 | End: 2019-08-22

## 2019-08-22 RX ORDER — NALOXONE HYDROCHLORIDE 0.4 MG/ML
80 INJECTION, SOLUTION INTRAMUSCULAR; INTRAVENOUS; SUBCUTANEOUS AS NEEDED
Status: DISCONTINUED | OUTPATIENT
Start: 2019-08-22 | End: 2019-08-22

## 2019-08-22 RX ORDER — MEPERIDINE HYDROCHLORIDE 25 MG/ML
12.5 INJECTION INTRAMUSCULAR; INTRAVENOUS; SUBCUTANEOUS AS NEEDED
Status: DISCONTINUED | OUTPATIENT
Start: 2019-08-22 | End: 2019-08-22

## 2019-08-22 RX ORDER — IBUPROFEN 600 MG/1
600 TABLET ORAL ONCE AS NEEDED
Status: DISCONTINUED | OUTPATIENT
Start: 2019-08-22 | End: 2019-08-22

## 2019-08-22 RX ORDER — CIPROFLOXACIN 2 MG/ML
400 INJECTION, SOLUTION INTRAVENOUS ONCE
Status: DISCONTINUED | OUTPATIENT
Start: 2019-08-22 | End: 2019-08-22

## 2019-08-22 NOTE — ANESTHESIA POSTPROCEDURE EVALUATION
[address] Patient Status:  Hospital Outpatient Surgery   Age/Gender 45year old female MRN [id number]   Location [facility] Attending Justice Matias, [address] [facility] Day # 0 PCP Dhruv Paz MD       An

## 2019-08-22 NOTE — H&P
[facility] Urology H&P    Madalyn Morrison Patient Status:  Hospital Outpatient Surgery    3/22/1975 MRN [id number]   Location [facility] PRE OP HOLDING Attending Fabio Burgos, [address] [facility] Day # 0 PCP Myah Gloira MD     Chief complain repair   • HERNIA VENTRAL REPAIR N/A 3/29/2017    Performed by Arely Berg MD at [facility] MAIN OR   • LITHOTRIPSY  2001, 2007 & 11/11   • OTHER Bilateral 2012    nerve surgery to bilateral arms about 1 month apart   • REMOVAL GALLBLADDER     • [facility] Woo soft, NT, ND, no masses, no rebound, no guarding, no rigidity  : No CVAT        Laboratory Data:       Imaging:  CT Scan images reviewed    Hospital Problem List:  Patient Active Problem List:     Headache(784.0)     Dyspepsia and other specified disorde for stent with later treatment (ESWL or ureteroscopy), sepsis, urine extravasation, and urinary retention. The need for stent removal in 3-4 months (at the longest) was discussed and the fact that the stent is not permanent.         Patient verbalized under

## 2019-08-22 NOTE — BRIEF OP NOTE
Pre-Operative Diagnosis: Bilateral ureteral and renal calculi     Post-Operative Diagnosis: Same     Procedure Performed:   Procedure(s):  CYSTOSCOPY, BILATERAL RETROGRADE PYELOGRAM, BILATERAL URETEROSCOPY, LASER HOLMIUM LITHOTRIPSY, LEFT URETERAL STENT EX

## 2019-08-22 NOTE — ANESTHESIA PREPROCEDURE EVALUATION
PRE-OP EVALUATION    Patient Name: Devan Blue    Pre-op Diagnosis: Nephrolithiasis [N20.0]    Procedure(s):  CYSTOSCOPY, LEFT RETROGRADE PYELOGRAM, LEFT URETEROSCOPY, LASER HOLMIUM LITHOTRIPSY, LEFT URETERAL STENT EXCHANGE    Surgeon(s) and Role: 6/16/2019    Performed by Azeem Zhao DO at [address] URETEROSCOPY Left 7/23/2019    Performed by Azeem Zhao DO at [address] [place] URETEROSCOPY Right 7/9/2019    Performed by Azeem Zhao DO at [address] [facility]

## 2019-08-23 NOTE — OPERATIVE REPORT
[address]    PATIENT'S NAME: Harley Altagus   ATTENDING PHYSICIAN: Chandana Waters DO   OPERATING PHYSICIAN: Chandana Waters DO   PATIENT ACCOUNT#:   [de-identified]    LOCATION:  [address] [place] [address] [address]  MEDICAL RECORD #:   [id number] in the chart, the patient was given preoperative antibiotic, taken to the cystoscopy suite, and placed on the cystoscopy table in the supine position.   After bilateral sequential compression devices had been applied and satisfactory general anesthesia had entire ureter on the way out showing no ureteral trauma and no remaining ureteral calculi fragments.   The cystoscope was back-loaded over the safety Glidewire, and the 6-Nepali x 28 cm right ureteral stent was placed in conventional fashion and was seen to poor visualization, we elected to stop the ureteroscopy. The retrograde pyelogram was performed through the ureteroscope, opacifying the collecting system, showing no significant hydronephrosis and no sign of any contrast extravasation.   The ureteroscope

## 2019-08-26 LAB — CALCULI MASS: 70 MG

## 2019-08-30 ENCOUNTER — HOSPITAL ENCOUNTER (OUTPATIENT)
Dept: ULTRASOUND IMAGING | Age: 44
Discharge: HOME | End: 2019-08-30
Attending: UROLOGY
Payer: MEDICAID

## 2019-08-30 DIAGNOSIS — N20.0 NEPHROLITHIASIS: ICD-10-CM

## 2019-08-30 PROCEDURE — 76775 US EXAM ABDO BACK WALL LIM: CPT | Performed by: UROLOGY

## 2019-08-31 ENCOUNTER — APPOINTMENT (OUTPATIENT)
Dept: GENERAL RADIOLOGY | Age: 44
End: 2019-08-31
Attending: EMERGENCY MEDICINE
Payer: MEDICAID

## 2019-08-31 ENCOUNTER — HOSPITAL ENCOUNTER (OUTPATIENT)
Facility: HOSPITAL | Age: 44
Setting detail: OBSERVATION
Discharge: HOME | End: 2019-09-02
Attending: EMERGENCY MEDICINE | Admitting: HOSPITALIST
Payer: MEDICAID

## 2019-08-31 DIAGNOSIS — N12 PYELONEPHRITIS: Primary | ICD-10-CM

## 2019-08-31 PROBLEM — N13.2 HYDRONEPHROSIS WITH URINARY OBSTRUCTION DUE TO RENAL CALCULUS: Status: ACTIVE | Noted: 2019-08-31

## 2019-08-31 PROCEDURE — 74018 RADEX ABDOMEN 1 VIEW: CPT | Performed by: EMERGENCY MEDICINE

## 2019-08-31 PROCEDURE — 99225 SUBSEQUENT OBSERVATION CARE: CPT | Performed by: HOSPITALIST

## 2019-08-31 RX ORDER — ONDANSETRON 2 MG/ML
4 INJECTION INTRAMUSCULAR; INTRAVENOUS EVERY 4 HOURS PRN
Status: DISCONTINUED | OUTPATIENT
Start: 2019-08-31 | End: 2019-08-31

## 2019-08-31 RX ORDER — SODIUM CHLORIDE 9 MG/ML
INJECTION, SOLUTION INTRAVENOUS CONTINUOUS
Status: DISCONTINUED | OUTPATIENT
Start: 2019-08-31 | End: 2019-09-02

## 2019-08-31 RX ORDER — MORPHINE SULFATE 4 MG/ML
2 INJECTION, SOLUTION INTRAMUSCULAR; INTRAVENOUS EVERY 2 HOUR PRN
Status: DISCONTINUED | OUTPATIENT
Start: 2019-08-31 | End: 2019-09-02

## 2019-08-31 RX ORDER — POLYETHYLENE GLYCOL 3350 17 G/17G
17 POWDER, FOR SOLUTION ORAL DAILY PRN
Status: DISCONTINUED | OUTPATIENT
Start: 2019-08-31 | End: 2019-09-02

## 2019-08-31 RX ORDER — DOCUSATE SODIUM 100 MG/1
100 CAPSULE, LIQUID FILLED ORAL 2 TIMES DAILY
Status: DISCONTINUED | OUTPATIENT
Start: 2019-08-31 | End: 2019-09-02

## 2019-08-31 RX ORDER — SODIUM CHLORIDE 9 MG/ML
INJECTION, SOLUTION INTRAVENOUS CONTINUOUS
Status: ACTIVE | OUTPATIENT
Start: 2019-08-31 | End: 2019-08-31

## 2019-08-31 RX ORDER — BISACODYL 10 MG
10 SUPPOSITORY, RECTAL RECTAL
Status: DISCONTINUED | OUTPATIENT
Start: 2019-08-31 | End: 2019-09-02

## 2019-08-31 RX ORDER — METOCLOPRAMIDE HYDROCHLORIDE 5 MG/ML
10 INJECTION INTRAMUSCULAR; INTRAVENOUS EVERY 8 HOURS PRN
Status: DISCONTINUED | OUTPATIENT
Start: 2019-08-31 | End: 2019-09-02

## 2019-08-31 RX ORDER — SODIUM PHOSPHATE, DIBASIC AND SODIUM PHOSPHATE, MONOBASIC 7; 19 G/133ML; G/133ML
1 ENEMA RECTAL ONCE AS NEEDED
Status: DISCONTINUED | OUTPATIENT
Start: 2019-08-31 | End: 2019-09-02

## 2019-08-31 RX ORDER — MORPHINE SULFATE 4 MG/ML
4 INJECTION, SOLUTION INTRAMUSCULAR; INTRAVENOUS EVERY 2 HOUR PRN
Status: DISCONTINUED | OUTPATIENT
Start: 2019-08-31 | End: 2019-09-02

## 2019-08-31 RX ORDER — ONDANSETRON 2 MG/ML
4 INJECTION INTRAMUSCULAR; INTRAVENOUS EVERY 6 HOURS PRN
Status: DISCONTINUED | OUTPATIENT
Start: 2019-08-31 | End: 2019-09-02

## 2019-08-31 RX ORDER — DIPHENHYDRAMINE HYDROCHLORIDE 50 MG/ML
25 INJECTION INTRAMUSCULAR; INTRAVENOUS EVERY 6 HOURS PRN
Status: DISCONTINUED | OUTPATIENT
Start: 2019-08-31 | End: 2019-09-02

## 2019-08-31 RX ORDER — ACETAMINOPHEN 325 MG/1
650 TABLET ORAL EVERY 6 HOURS PRN
Status: DISCONTINUED | OUTPATIENT
Start: 2019-08-31 | End: 2019-09-02

## 2019-08-31 RX ORDER — HYDROMORPHONE HYDROCHLORIDE 1 MG/ML
1 INJECTION, SOLUTION INTRAMUSCULAR; INTRAVENOUS; SUBCUTANEOUS EVERY 2 HOUR PRN
Status: DISCONTINUED | OUTPATIENT
Start: 2019-08-31 | End: 2019-08-31

## 2019-08-31 RX ORDER — KETOROLAC TROMETHAMINE 30 MG/ML
30 INJECTION, SOLUTION INTRAMUSCULAR; INTRAVENOUS ONCE
Status: COMPLETED | OUTPATIENT
Start: 2019-08-31 | End: 2019-08-31

## 2019-08-31 NOTE — H&P
[facility]  History and Physical     Monica Alexis Patient Status:  Observation    3/22/1975 MRN [id number]   [facility] 3NW-A Attending Julia Mitchell MD   Hosp Day # 0 PCP Karyn Ross MD     Chief Complaint: Left flank pain STENT     • CYSTOSCOPY,MANIPULATN     • HERNIA SURGERY  1/2000    L hernia repair   • HERNIA VENTRAL REPAIR N/A 3/29/2017    Performed by Nuha Montez MD at [address] [facility] [address]   • LITHOTRIPSY  2001, 2007 & 11/11   • OTHER Bilateral 2012    nerve surgery to bilat Ht 4' 11\" (1.499 m)   Wt 150 lb (68 kg)   LMP 08/15/2019   SpO2 98%   Breastfeeding? No   BMI 30.30 kg/m²   General: No acute distress. Alert and oriented x 3. HEENT: Normocephalic atraumatic. Moist mucous membranes. EOM-I. PERRLA. Anicteric.   Neck: No l

## 2019-08-31 NOTE — PROGRESS NOTES
Routed to on call physician for review this morning, who subsequently contacted patient. Patient was advised to be seen at hospital and was subsequently admitted at EDW.

## 2019-08-31 NOTE — PROGRESS NOTES
Vss. Pt a&ox3. Pt on ra with 02 sats wnl. Lungs cta. Pt denies difficulty breathing or sob. Denies n/v. Reports she has been having episodes of diarrhea at home. States she had 4 bm's yesterday but none today.  Instructed to remain npo until seen by urology

## 2019-08-31 NOTE — ED PROVIDER NOTES
Patient Seen in: [facility] In [place]    History   Patient presents with:  Abdomen/Flank Pain (GI/)    Stated Complaint: flank pain; kidney ultrasound last night, MD called to say \"they looked swollen*    HPI    This is a 44-year- not well seen.     Dictated by: Soy Parry MD on 8/30/2019 at 22:10     Approved by: Soy Parry MD            Ct Abdomen+pelvis Kidneystone 2d Rndr(no Iv,no Oral)(cpt=74176)    Past Medical History:   Diagnosis Date   • Anxiety state    • Arrhythm use: No             Review of Systems    Positive for stated complaint: flank pain; kidney ultrasound last night, MD called to say \"they looked swollen*  Other systems are as noted in HPI. Constitutional and vital signs reviewed.       All other systems r Blood Urine Large (*)     Protein Urine 100 mg/dL (*)     Leukocyte Esterase Urine Small (*)     All other components within normal limits   URINE MICROSCOPIC W REFLEX CULTURE - Abnormal; Notable for the following components:    WBC Urine 21-50 (*)     RBC has a history of kidney stones and had lithotripsy performed on 8/22/19 and second stent on 6/22/19. FINDINGS:  Bowel gas pattern is non-obstructive. Cholecystectomy clips. Double pigtail nephroureteral stents.   Calcifications overlying expected loca Medication List              Present on Admission  Date Reviewed: 3/18/2019          ICD-10-CM Noted POA    Pyelonephritis N12 8/31/2019 Unknown

## 2019-08-31 NOTE — ED INITIAL ASSESSMENT (HPI)
Pt c/o left flank pain x 5 days ago. Now c/o bilateral flank pain and \"feels swollen\". Denies dysuria, but c/o hematuria.  Had out patient US kidneys last noc and was told to go home and take valium to see if it helps, but if pain got worse to go to ED fo

## 2019-09-01 ENCOUNTER — ANESTHESIA EVENT (OUTPATIENT)
Dept: SURGERY | Facility: HOSPITAL | Age: 44
End: 2019-09-01
Payer: MEDICAID

## 2019-09-01 ENCOUNTER — ANESTHESIA (OUTPATIENT)
Dept: SURGERY | Facility: HOSPITAL | Age: 44
End: 2019-09-01
Payer: MEDICAID

## 2019-09-01 ENCOUNTER — APPOINTMENT (OUTPATIENT)
Dept: GENERAL RADIOLOGY | Facility: HOSPITAL | Age: 44
End: 2019-09-01
Attending: UROLOGY
Payer: MEDICAID

## 2019-09-01 PROCEDURE — 0TP98DZ REMOVAL OF INTRALUMINAL DEVICE FROM URETER, VIA NATURAL OR ARTIFICIAL OPENING ENDOSCOPIC: ICD-10-PCS | Performed by: UROLOGY

## 2019-09-01 PROCEDURE — BT140ZZ FLUOROSCOPY OF KIDNEYS, URETERS AND BLADDER USING HIGH OSMOLAR CONTRAST: ICD-10-PCS | Performed by: UROLOGY

## 2019-09-01 PROCEDURE — 99225 SUBSEQUENT OBSERVATION CARE: CPT | Performed by: HOSPITALIST

## 2019-09-01 PROCEDURE — 0T768DZ DILATION OF RIGHT URETER WITH INTRALUMINAL DEVICE, VIA NATURAL OR ARTIFICIAL OPENING ENDOSCOPIC: ICD-10-PCS | Performed by: UROLOGY

## 2019-09-01 DEVICE — STENT URET 6F 28CM ULSMTH: Type: IMPLANTABLE DEVICE | Status: FUNCTIONAL

## 2019-09-01 RX ORDER — MEPERIDINE HYDROCHLORIDE 25 MG/ML
12.5 INJECTION INTRAMUSCULAR; INTRAVENOUS; SUBCUTANEOUS AS NEEDED
Status: DISCONTINUED | OUTPATIENT
Start: 2019-09-01 | End: 2019-09-01 | Stop reason: HOSPADM

## 2019-09-01 RX ORDER — SODIUM CHLORIDE, SODIUM LACTATE, POTASSIUM CHLORIDE, CALCIUM CHLORIDE 600; 310; 30; 20 MG/100ML; MG/100ML; MG/100ML; MG/100ML
INJECTION, SOLUTION INTRAVENOUS CONTINUOUS
Status: DISCONTINUED | OUTPATIENT
Start: 2019-09-01 | End: 2019-09-02

## 2019-09-01 RX ORDER — ONDANSETRON 2 MG/ML
4 INJECTION INTRAMUSCULAR; INTRAVENOUS AS NEEDED
Status: DISCONTINUED | OUTPATIENT
Start: 2019-09-01 | End: 2019-09-01 | Stop reason: HOSPADM

## 2019-09-01 RX ORDER — OXYBUTYNIN CHLORIDE 5 MG/1
5 TABLET ORAL 2 TIMES DAILY
Status: DISCONTINUED | OUTPATIENT
Start: 2019-09-01 | End: 2019-09-02

## 2019-09-01 RX ORDER — DIPHENHYDRAMINE HYDROCHLORIDE 50 MG/ML
12.5 INJECTION INTRAMUSCULAR; INTRAVENOUS AS NEEDED
Status: DISCONTINUED | OUTPATIENT
Start: 2019-09-01 | End: 2019-09-01 | Stop reason: HOSPADM

## 2019-09-01 RX ORDER — DIAZEPAM 5 MG/1
5 TABLET ORAL EVERY 8 HOURS PRN
Status: DISCONTINUED | OUTPATIENT
Start: 2019-09-01 | End: 2019-09-02

## 2019-09-01 RX ORDER — SODIUM CHLORIDE, SODIUM LACTATE, POTASSIUM CHLORIDE, CALCIUM CHLORIDE 600; 310; 30; 20 MG/100ML; MG/100ML; MG/100ML; MG/100ML
INJECTION, SOLUTION INTRAVENOUS CONTINUOUS
Status: DISCONTINUED | OUTPATIENT
Start: 2019-09-01 | End: 2019-09-01 | Stop reason: HOSPADM

## 2019-09-01 RX ORDER — HYDROMORPHONE HYDROCHLORIDE 1 MG/ML
0.4 INJECTION, SOLUTION INTRAMUSCULAR; INTRAVENOUS; SUBCUTANEOUS EVERY 5 MIN PRN
Status: DISCONTINUED | OUTPATIENT
Start: 2019-09-01 | End: 2019-09-01 | Stop reason: HOSPADM

## 2019-09-01 RX ORDER — MIDAZOLAM HYDROCHLORIDE 1 MG/ML
1 INJECTION INTRAMUSCULAR; INTRAVENOUS EVERY 5 MIN PRN
Status: DISCONTINUED | OUTPATIENT
Start: 2019-09-01 | End: 2019-09-01 | Stop reason: HOSPADM

## 2019-09-01 RX ORDER — ACETAMINOPHEN 500 MG
1000 TABLET ORAL ONCE AS NEEDED
Status: DISCONTINUED | OUTPATIENT
Start: 2019-09-01 | End: 2019-09-01 | Stop reason: HOSPADM

## 2019-09-01 RX ORDER — ACETAMINOPHEN AND CODEINE PHOSPHATE 300; 30 MG/1; MG/1
1 TABLET ORAL AS NEEDED
Status: DISCONTINUED | OUTPATIENT
Start: 2019-09-01 | End: 2019-09-01 | Stop reason: HOSPADM

## 2019-09-01 RX ORDER — NALOXONE HYDROCHLORIDE 0.4 MG/ML
80 INJECTION, SOLUTION INTRAMUSCULAR; INTRAVENOUS; SUBCUTANEOUS AS NEEDED
Status: DISCONTINUED | OUTPATIENT
Start: 2019-09-01 | End: 2019-09-01 | Stop reason: HOSPADM

## 2019-09-01 RX ORDER — LABETALOL HYDROCHLORIDE 5 MG/ML
5 INJECTION, SOLUTION INTRAVENOUS EVERY 5 MIN PRN
Status: DISCONTINUED | OUTPATIENT
Start: 2019-09-01 | End: 2019-09-01 | Stop reason: HOSPADM

## 2019-09-01 RX ORDER — ACETAMINOPHEN 325 MG/1
650 TABLET ORAL EVERY 6 HOURS PRN
Status: DISCONTINUED | OUTPATIENT
Start: 2019-09-01 | End: 2019-09-02

## 2019-09-01 RX ORDER — DEXTROSE MONOHYDRATE 25 G/50ML
50 INJECTION, SOLUTION INTRAVENOUS
Status: DISCONTINUED | OUTPATIENT
Start: 2019-09-01 | End: 2019-09-01 | Stop reason: HOSPADM

## 2019-09-01 RX ORDER — ENOXAPARIN SODIUM 100 MG/ML
40 INJECTION SUBCUTANEOUS DAILY
Status: DISCONTINUED | OUTPATIENT
Start: 2019-09-02 | End: 2019-09-02

## 2019-09-01 RX ORDER — METOCLOPRAMIDE HYDROCHLORIDE 5 MG/ML
10 INJECTION INTRAMUSCULAR; INTRAVENOUS AS NEEDED
Status: DISCONTINUED | OUTPATIENT
Start: 2019-09-01 | End: 2019-09-01 | Stop reason: HOSPADM

## 2019-09-01 RX ORDER — ACETAMINOPHEN AND CODEINE PHOSPHATE 300; 30 MG/1; MG/1
2 TABLET ORAL AS NEEDED
Status: DISCONTINUED | OUTPATIENT
Start: 2019-09-01 | End: 2019-09-01 | Stop reason: HOSPADM

## 2019-09-01 RX ORDER — ONDANSETRON 2 MG/ML
4 INJECTION INTRAMUSCULAR; INTRAVENOUS EVERY 6 HOURS PRN
Status: DISCONTINUED | OUTPATIENT
Start: 2019-09-01 | End: 2019-09-02

## 2019-09-01 RX ORDER — ONDANSETRON 4 MG/1
4 TABLET, FILM COATED ORAL EVERY 6 HOURS PRN
Status: DISCONTINUED | OUTPATIENT
Start: 2019-09-01 | End: 2019-09-02

## 2019-09-01 NOTE — ANESTHESIA POSTPROCEDURE EVALUATION
[address] Patient Status:  Observation   Age/Gender 45year old female MRN [id number]   [facility] SURGERY Attending [address] Day # 0 PCP Erwin Bhat MD       Anesthesia Post-op Note    Proced

## 2019-09-01 NOTE — PLAN OF CARE
Ml RN spoke with Delano Flores regarding urology consult, per  patient will be seen in the morning, okay to give patient a regular diet and NPO at midnight. Patient updated and verbalizes understanding. Will continue to monitor patient.

## 2019-09-01 NOTE — PROGRESS NOTES
[facility]  Progress Note     Jason Weiss Patient Status:  Observation    3/22/1975 MRN [id number]   [facility] 3NW-A Attending Wild Clau  [address] Day # 0 PCP Lena Alford MD     Chief Complaint: left flank pain Oral BID       ASSESSMENT / PLAN:     1. Acute pyelonephritis-we will continue on Zosyn blood and urine cultures pending. Urology on consult. Will continue IV pain medication  2. Bilateral hydronephrosis right greater than left.   We will continue IV flui

## 2019-09-01 NOTE — PLAN OF CARE
PT IS C/O OF  FLANK PAIN. DENIES ANY URINARY SYMPTOMS. PT IS VOIDING SOME KG URINE WITH SEDIMENT. PT ON OR SCHEDULED FOR THIS AFTERNOON. POC UPDATED, PT VERBALIZED UNDERSTANDING.

## 2019-09-01 NOTE — CONSULTS
[facility]  Report of Consultation    Mónica eSe Patient Status:  Observation    3/22/1975 MRN [id number]   [facility] 3NW-A Attending Monica Earing [address] Day # 0 PCP Arben Carias MD     Reason for Consultation:  CELESTE VENTRAL REPAIR N/A 3/29/2017    Performed by Anuja Jaffe MD at [facility] MAIN OR   • LITHOTRIPSY  2001, 2007 & 11/11   • OTHER Bilateral 2012    nerve surgery to bilateral arms about 1 month apart   • REMOVAL GALLBLADDER     • REPAIR ING HERNIA,5+Y/O,REDUCIBL Intravenous, Q6H PRN  •  morphINE sulfate (PF) 4 MG/ML injection 2 mg, 2 mg, Intravenous, Q2H PRN **OR** morphINE sulfate (PF) 4 MG/ML injection 4 mg, 4 mg, Intravenous, Q2H PRN    Review of Systems:  A comprehensive review of systems was negative except f Today, patient still complains of pain to bilateral sides of kidneys, epecially left with blood in   urine. Patient has a history of kidney stones and had lithotripsy performed on 8/22/19 and second stent on 6/22/19.            FINDINGS:    Bowel gas patte Bilateral urinary calculi are seen. The lower portions of the urinary tract stents are visible in the bladder, the upper   portions are not well seen.               Impression:  Patient Active Problem List:     Headache(784.0)     Dyspepsia and other speci

## 2019-09-01 NOTE — PLAN OF CARE
Patient medicated for pain, reports pain is mainly in her left flank and RLQ. Urine remains brown, strained with small amount of sediment noted. Patient remains NPO. Will continue to monitor patient.

## 2019-09-01 NOTE — BRIEF OP NOTE
Pre-Operative Diagnosis: B. Flank pain, B. Hydronephrosis, B. Renal stones, B. Stent pain     Post-Operative Diagnosis:  B. Flank pain, B. Hydronephrosis, B. Renal stones, B.  Stent pain      Procedure Performed:   Procedure(s):  CYSTOSCOPY, BILATERAL RETRO

## 2019-09-01 NOTE — PLAN OF CARE
Problem: PAIN - ADULT  Goal: Verbalizes/displays adequate comfort level or patient's stated pain goal  Description  INTERVENTIONS:  - Encourage pt to monitor pain and request assistance  - Assess pain using appropriate pain scale  - Administer analgesics patient instructed on plan to strain urine and verbalizes understanding. Contact plus precautions maintained, per patient she her last loose stool was yesterday. Denies nausea. Up ambulating independently. Will continue to monitor patient.

## 2019-09-02 VITALS
HEIGHT: 59 IN | OXYGEN SATURATION: 99 % | BODY MASS INDEX: 30.24 KG/M2 | TEMPERATURE: 98 F | RESPIRATION RATE: 18 BRPM | WEIGHT: 150 LBS | DIASTOLIC BLOOD PRESSURE: 80 MMHG | SYSTOLIC BLOOD PRESSURE: 128 MMHG | HEART RATE: 56 BPM

## 2019-09-02 PROCEDURE — 99217 OBSERVATION CARE DISCHARGE: CPT | Performed by: HOSPITALIST

## 2019-09-02 RX ORDER — IBUPROFEN 600 MG/1
600 TABLET ORAL EVERY 6 HOURS PRN
Status: DISCONTINUED | OUTPATIENT
Start: 2019-09-02 | End: 2019-09-02

## 2019-09-02 RX ORDER — IBUPROFEN 600 MG/1
600 TABLET ORAL EVERY 6 HOURS PRN
Qty: 30 TABLET | Refills: 0 | Status: ON HOLD | OUTPATIENT
Start: 2019-09-02 | End: 2019-10-15

## 2019-09-02 RX ORDER — CEFDINIR 300 MG/1
300 CAPSULE ORAL 2 TIMES DAILY
Qty: 14 CAPSULE | Refills: 0 | Status: SHIPPED | OUTPATIENT
Start: 2019-09-02 | End: 2019-09-18

## 2019-09-02 NOTE — PLAN OF CARE
Problem: PAIN - ADULT  Goal: Verbalizes/displays adequate comfort level or patient's stated pain goal  Description  INTERVENTIONS:  - Encourage pt to monitor pain and request assistance  - Assess pain using appropriate pain scale  - Administer analgesics understanding

## 2019-09-02 NOTE — OPERATIVE REPORT
[facility]    PATIENT'S NAME: Amna Tadeoorman   ATTENDING PHYSICIAN: Thad Sharp D.O.   OPERATING PHYSICIAN: Gurjit Arreola M.D.    PATIENT ACCOUNT#:   [de-identified]    LOCATION:  [address] [place] [address]  MEDICAL RECORD #:   [id number]       DATE OF BIRTH: gradually withdrawn back into the mid ureter, and a retrograde pyelogram was performed noting an abrupt transition with limited to any contrast going above the proximal ureter. The remainder of the ureter below the proximal ureter was normal in caliber.

## 2019-09-02 NOTE — PROGRESS NOTES
[facility]  Progress Note    Lisa Morales Patient Status:  Observation    3/22/1975 MRN [id number]   [facility] 3NW-A Attending Soy Reyes [address] Day # 0 PCP Ericka Gomez MD     Subjective:  Sendyaditi Carmen is a(n) 4 Palpitations     Acute pyelonephritis     Kidney stone     Elevated LFTs     Essential hypertension     Pyelonephritis     Hydronephrosis with urinary obstruction due to renal calculus      B. FLANK PAIN  B. HYDRONEPHROSIS  B.  RENAL STONES  R. Proximal ure

## 2019-09-02 NOTE — PROGRESS NOTES
Written and verbal discharge instructions given to patient, verbalizes understanding. All questions answered. IV discontinued in RAC, angio-cath tip intact, site free from redness, swelling, or drainage, patient denies pain at site. Dressing applied.   Pres

## 2019-09-02 NOTE — PROGRESS NOTES
[facility]  Progress Note     Kit Finger Patient Status:  Observation    3/22/1975 MRN [id number]   [facility] 3NW-A Attending Kael Smiley [address] Day # 0 PCP Mickey Vaughn MD     Chief Complaint: left flank pain in the last 168 hours. Imaging: Imaging data reviewed in Epic.     Medications:   • enoxaparin  40 mg Subcutaneous Daily   • Oxybutynin Chloride  5 mg Oral BID   • piperacillin-tazobactam  3.375 g Intravenous Q8H   • docusate sodium  100 mg Oral BID

## 2019-09-02 NOTE — PLAN OF CARE
Problem: PAIN - ADULT  Goal: Verbalizes/displays adequate comfort level or patient's stated pain goal  Description  INTERVENTIONS:  - Encourage pt to monitor pain and request assistance  - Assess pain using appropriate pain scale  - Administer analgesics applied, writing RN explained the benefits of wearing SCD's to the patient and the risks associated with not wearing them, patient verbalizes understanding and agreeable to wearing SCD's.  Patient will continue to maintain oxygen saturation at/above 93% on

## 2019-09-03 ENCOUNTER — PATIENT OUTREACH (OUTPATIENT)
Dept: CASE MANAGEMENT | Age: 44
End: 2019-09-03

## 2019-09-03 DIAGNOSIS — N10 ACUTE PYELONEPHRITIS: ICD-10-CM

## 2019-09-03 PROCEDURE — 1111F DSCHRG MED/CURRENT MED MERGE: CPT

## 2019-09-05 NOTE — PROGRESS NOTES
Condition Update Post Discharge   Discharge Date: 9/2/19  Contact Date: 9/3/2019    Consent Verification:  Assessment Completed With: Patient  HIPAA Verified? Yes    General:   • How have you been since your discharge from the hospital/facility?   Pt sta Oral Cap Take 1 capsule (300 mg total) by mouth 2 (two) times daily. Disp: 14 capsule Rfl: 0   diazepam 5 MG Oral Tab Take 1 tablet (5 mg total) by mouth every 6 (six) hours as needed.  Disp: 12 tablet Rfl: 0   acetaminophen 325 MG Oral Tab Take 325 mg by m No     NCM Reviewed upcoming Specialist Appt with patient     Yes    Interventions by NCM:  All d/c instructions reviewed with pt. Reviewed when to call MD vs when to go to ER/call 911. Reviewed s/s of worsening pyelonephritis.   Educated pt on the import

## 2019-09-06 PROCEDURE — 82507 ASSAY OF CITRATE: CPT

## 2019-09-06 PROCEDURE — 82340 ASSAY OF CALCIUM IN URINE: CPT

## 2019-09-06 PROCEDURE — 83945 ASSAY OF OXALATE: CPT

## 2019-09-06 PROCEDURE — 82436 ASSAY OF URINE CHLORIDE: CPT

## 2019-09-06 PROCEDURE — 84392 ASSAY OF URINE SULFATE: CPT

## 2019-09-07 ENCOUNTER — LAB ENCOUNTER (OUTPATIENT)
Dept: LAB | Age: 44
End: 2019-09-07
Attending: PHYSICIAN ASSISTANT
Payer: MEDICAID

## 2019-09-07 DIAGNOSIS — N20.0 NEPHROLITHIASIS: ICD-10-CM

## 2019-09-07 NOTE — DISCHARGE SUMMARY
[facility] HOSPITAL[facility]  DISCHARGE SUMMARY     Devan Blue Patient Status:  Observation    3/22/1975 MRN [id number]   [facility] 3NW-A Attending No att. providers found   [facility] Day # 0 PCP Oly Watson MD     Date of Admission: 2019  Da Instructions Prescription details   cefdinir 300 MG Caps  Commonly known as:  OMNICEF      Take 1 capsule (300 mg total) by mouth 2 (two) times daily.    Quantity:  14 capsule  Refills:  0     ibuprofen 600 MG Tabs  Commonly known as:  MOTRIN      Take 1 ta No focal neurological deficits. Musculoskeletal: Moves all extremities. Extremities: No edema.   -----------------------------------------------------------------------------------------------  PATIENT DISCHARGE INSTRUCTIONS: See electronic chart    Henrry Morgan

## 2019-09-13 LAB
CALCIUM URINE - PER 24H: 354 MG/D
CALCIUM URINE - PER VOL: 20.8 MG/DL
CHLORIDE URINE - PER 24H: 160 MMOL/D
CHLORIDE URINE - PER VOL.: 94 MMOL/L
CITRIC ACID, URINE - MG/DAY: 223 MG/D
CITRIC ACID, URINE - MG/L: 131 MG/L
CREATININE, URINE - PER 24H: 1428 MG/D
CREATININE, URINE - PER VOLUME: 84 MG/DL
HOURS COLLECTED: 24 HR
MAGNESIUM URINE - PER 24H: 121 MG/D
MAGNESIUM URINE - PER VOL: 7.1 MG/DL
OXALATE, URINE - MG/DAY: 20 MG/D
OXALATE, URINE - MG/L: 12 MG/L
PH, URINE: 5.75
PHOSPHORUS URINE - PER 24H: 1598 MG/D
PHOSPHORUS URINE - PER VOL: 94 MG/DL
POTASSIUM URINE - PER 24H: 51 MMOL/D
POTASSIUM URINE - PER VOL.: 30 MMOL/L
SODIUM URINE - PER VOL.: 112 MMOL/L
SODIUM URINE -PER 24H: 190 MMOL/D
SULFATE URINE - PER 24H: 26 MMOL/D
SULFATE URINE - PER VOL: 15 MMOL/L
TOTAL VOLUME: 1700 ML
URIC ACID URINE - PER 24H: 824 MG/D
URIC ACID URINE - PER VOL: 48.5 MG/DL
URINE SUPERSATURATION, CAHPO4: 3.28
URINE SUPERSATURATION, CAOX: 5.46
URINE SUPERSATURATION, UA CALC: 1.14

## 2019-09-15 NOTE — PROGRESS NOTES
Future Appointments  9/18/2019  10:40 AM   Sheree Colby, DO     [address] [address]  Patient notified via [address] Result has been released, patient account is currently active.
unsure

## 2019-09-18 PROCEDURE — 87077 CULTURE AEROBIC IDENTIFY: CPT | Performed by: UROLOGY

## 2019-09-18 PROCEDURE — 87086 URINE CULTURE/COLONY COUNT: CPT | Performed by: UROLOGY

## 2019-09-18 PROCEDURE — 87186 SC STD MICRODIL/AGAR DIL: CPT | Performed by: UROLOGY

## 2019-09-18 PROCEDURE — 81015 MICROSCOPIC EXAM OF URINE: CPT | Performed by: UROLOGY

## 2019-09-27 ENCOUNTER — APPOINTMENT (OUTPATIENT)
Dept: LAB | Age: 44
End: 2019-09-27
Attending: UROLOGY
Payer: MEDICAID

## 2019-09-27 DIAGNOSIS — N20.0 NEPHROLITHIASIS: ICD-10-CM

## 2019-09-27 PROCEDURE — 36415 COLL VENOUS BLD VENIPUNCTURE: CPT

## 2019-09-27 PROCEDURE — 80048 BASIC METABOLIC PNL TOTAL CA: CPT

## 2019-10-07 ENCOUNTER — OFFICE VISIT (OUTPATIENT)
Dept: INTERNAL MEDICINE CLINIC | Facility: CLINIC | Age: 44
End: 2019-10-07
Payer: MEDICAID

## 2019-10-07 VITALS
DIASTOLIC BLOOD PRESSURE: 84 MMHG | TEMPERATURE: 98 F | WEIGHT: 157 LBS | HEART RATE: 62 BPM | OXYGEN SATURATION: 98 % | RESPIRATION RATE: 16 BRPM | BODY MASS INDEX: 31.23 KG/M2 | SYSTOLIC BLOOD PRESSURE: 118 MMHG | HEIGHT: 59.5 IN

## 2019-10-07 DIAGNOSIS — I10 ESSENTIAL HYPERTENSION: ICD-10-CM

## 2019-10-07 DIAGNOSIS — N13.2 HYDRONEPHROSIS WITH URINARY OBSTRUCTION DUE TO RENAL CALCULUS: ICD-10-CM

## 2019-10-07 DIAGNOSIS — Z01.818 PREOP EXAMINATION: Primary | ICD-10-CM

## 2019-10-07 PROBLEM — N10 ACUTE PYELONEPHRITIS: Status: RESOLVED | Noted: 2019-06-15 | Resolved: 2019-10-07

## 2019-10-07 PROBLEM — R00.2 PALPITATIONS: Status: RESOLVED | Noted: 2019-01-22 | Resolved: 2019-10-07

## 2019-10-07 PROBLEM — N12 PYELONEPHRITIS: Status: RESOLVED | Noted: 2019-08-31 | Resolved: 2019-10-07

## 2019-10-07 PROBLEM — R07.9 CHEST PAIN: Status: RESOLVED | Noted: 2019-01-22 | Resolved: 2019-10-07

## 2019-10-07 PROBLEM — R06.00 DYSPNEA: Status: RESOLVED | Noted: 2019-01-22 | Resolved: 2019-10-07

## 2019-10-07 PROBLEM — N20.0 KIDNEY STONE: Status: RESOLVED | Noted: 2019-06-15 | Resolved: 2019-10-07

## 2019-10-07 PROCEDURE — 99243 OFF/OP CNSLTJ NEW/EST LOW 30: CPT | Performed by: FAMILY MEDICINE

## 2019-10-07 RX ORDER — CIPROFLOXACIN 500 MG/1
250 TABLET, FILM COATED ORAL DAILY
COMMUNITY
End: 2019-11-13 | Stop reason: ALTCHOICE

## 2019-10-07 NOTE — PROGRESS NOTES
HPI:    Patient ID: Jermain Thapa is a 45year old female. HPI  Jermain Thapa is a 45year old female.   HPI:   Here for preop exam for her upcoming urological procedure under the care of Dr Gurinder Sinclair      Date is 10/15/19 at [facility] PVC (premature ventricular contraction)    • Sexually transmitted disease     HPV   • Unspecified condition originating in the  period 04   • Visual impairment     glasses, contacts     Past Surgical History:   Procedure Laterality Date 98%   BMI 31.18 kg/m²   GENERAL: well developed, well nourished,in no apparent distress  SKIN: no rashes  NECK: supple,no adenopathy  LUNGS: clear to auscultation  CARDIO: RRR without murmur  GI: good BS's,no masses, HSM or tenderness  EXTREMITIES: no cyan

## 2019-10-08 ENCOUNTER — TELEPHONE (OUTPATIENT)
Dept: INTERNAL MEDICINE CLINIC | Facility: CLINIC | Age: 44
End: 2019-10-08

## 2019-10-10 ENCOUNTER — APPOINTMENT (OUTPATIENT)
Dept: LAB | Age: 44
End: 2019-10-10
Payer: MEDICAID

## 2019-10-10 DIAGNOSIS — N13.5 STRICTURE OF URETER: ICD-10-CM

## 2019-10-10 PROCEDURE — 36415 COLL VENOUS BLD VENIPUNCTURE: CPT

## 2019-10-10 PROCEDURE — 80051 ELECTROLYTE PANEL: CPT

## 2019-10-14 ENCOUNTER — ANESTHESIA EVENT (OUTPATIENT)
Dept: SURGERY | Facility: HOSPITAL | Age: 44
End: 2019-10-14
Payer: MEDICAID

## 2019-10-14 NOTE — ANESTHESIA PREPROCEDURE EVALUATION
PRE-OP EVALUATION    Patient Name: Lamar Barbosa    Pre-op Diagnosis: Stricture of ureter [N13.5]  Renal calculus, right [N20.0]    Procedure(s):  CYSTOSCOPY,  RIGHT RETROGRADE PYELOGRAM,  RIGHT URETEROSCOPY,  LASER LITHOTRIPSY,  BALLON DILATION OF RIGHT Roxy Carlin DO at [facility] MAIN OR   • CYSTOSCOPY URETEROSCOPY Left 7/23/2019    Performed by Azeem Zhao DO at [facility] MAIN OR   • CYSTOSCOPY URETEROSCOPY Right 7/9/2019    Performed by Azeem Zhao DO at [facility] MAIN OR   • Bowen

## 2019-10-15 ENCOUNTER — APPOINTMENT (OUTPATIENT)
Dept: GENERAL RADIOLOGY | Facility: HOSPITAL | Age: 44
End: 2019-10-15
Attending: UROLOGY
Payer: MEDICAID

## 2019-10-15 ENCOUNTER — HOSPITAL ENCOUNTER (OUTPATIENT)
Facility: HOSPITAL | Age: 44
Setting detail: HOSPITAL OUTPATIENT SURGERY
Discharge: HOME | End: 2019-10-15
Attending: UROLOGY | Admitting: UROLOGY
Payer: MEDICAID

## 2019-10-15 ENCOUNTER — ANESTHESIA (OUTPATIENT)
Dept: SURGERY | Facility: HOSPITAL | Age: 44
End: 2019-10-15
Payer: MEDICAID

## 2019-10-15 VITALS
BODY MASS INDEX: 31.84 KG/M2 | HEIGHT: 59.5 IN | DIASTOLIC BLOOD PRESSURE: 87 MMHG | TEMPERATURE: 98 F | OXYGEN SATURATION: 98 % | WEIGHT: 160.06 LBS | HEART RATE: 72 BPM | RESPIRATION RATE: 14 BRPM | SYSTOLIC BLOOD PRESSURE: 114 MMHG

## 2019-10-15 DIAGNOSIS — N13.5 STRICTURE OF URETER: Primary | ICD-10-CM

## 2019-10-15 DIAGNOSIS — N20.0 RENAL CALCULUS, RIGHT: ICD-10-CM

## 2019-10-15 PROCEDURE — BT1D0ZZ FLUOROSCOPY OF RIGHT KIDNEY, URETER AND BLADDER USING HIGH OSMOLAR CONTRAST: ICD-10-PCS | Performed by: UROLOGY

## 2019-10-15 PROCEDURE — 0T768DZ DILATION OF RIGHT URETER WITH INTRALUMINAL DEVICE, VIA NATURAL OR ARTIFICIAL OPENING ENDOSCOPIC: ICD-10-PCS | Performed by: UROLOGY

## 2019-10-15 PROCEDURE — 0TF38ZZ FRAGMENTATION IN RIGHT KIDNEY PELVIS, VIA NATURAL OR ARTIFICIAL OPENING ENDOSCOPIC: ICD-10-PCS | Performed by: UROLOGY

## 2019-10-15 PROCEDURE — 81025 URINE PREGNANCY TEST: CPT | Performed by: UROLOGY

## 2019-10-15 DEVICE — STENT URET 6F 28CM ULSMTH: Type: IMPLANTABLE DEVICE | Site: URETER | Status: FUNCTIONAL

## 2019-10-15 RX ORDER — ACETAMINOPHEN AND CODEINE PHOSPHATE 300; 30 MG/1; MG/1
2 TABLET ORAL AS NEEDED
Status: DISCONTINUED | OUTPATIENT
Start: 2019-10-15 | End: 2019-10-15

## 2019-10-15 RX ORDER — MEPERIDINE HYDROCHLORIDE 25 MG/ML
12.5 INJECTION INTRAMUSCULAR; INTRAVENOUS; SUBCUTANEOUS ONCE
Status: COMPLETED | OUTPATIENT
Start: 2019-10-15 | End: 2019-10-15

## 2019-10-15 RX ORDER — HYDROMORPHONE HYDROCHLORIDE 1 MG/ML
0.4 INJECTION, SOLUTION INTRAMUSCULAR; INTRAVENOUS; SUBCUTANEOUS EVERY 5 MIN PRN
Status: DISCONTINUED | OUTPATIENT
Start: 2019-10-15 | End: 2019-10-15

## 2019-10-15 RX ORDER — IBUPROFEN 600 MG/1
600 TABLET ORAL EVERY 6 HOURS PRN
Qty: 30 TABLET | Refills: 0 | Status: ON HOLD | OUTPATIENT
Start: 2019-10-15 | End: 2019-12-18

## 2019-10-15 RX ORDER — NALOXONE HYDROCHLORIDE 0.4 MG/ML
80 INJECTION, SOLUTION INTRAMUSCULAR; INTRAVENOUS; SUBCUTANEOUS AS NEEDED
Status: DISCONTINUED | OUTPATIENT
Start: 2019-10-15 | End: 2019-10-15

## 2019-10-15 RX ORDER — LABETALOL HYDROCHLORIDE 5 MG/ML
5 INJECTION, SOLUTION INTRAVENOUS EVERY 5 MIN PRN
Status: DISCONTINUED | OUTPATIENT
Start: 2019-10-15 | End: 2019-10-15

## 2019-10-15 RX ORDER — ACETAMINOPHEN 500 MG
1000 TABLET ORAL ONCE
Status: DISCONTINUED | OUTPATIENT
Start: 2019-10-15 | End: 2019-10-15

## 2019-10-15 RX ORDER — SODIUM CHLORIDE, SODIUM LACTATE, POTASSIUM CHLORIDE, CALCIUM CHLORIDE 600; 310; 30; 20 MG/100ML; MG/100ML; MG/100ML; MG/100ML
INJECTION, SOLUTION INTRAVENOUS CONTINUOUS
Status: DISCONTINUED | OUTPATIENT
Start: 2019-10-15 | End: 2019-10-15

## 2019-10-15 RX ORDER — ACETAMINOPHEN AND CODEINE PHOSPHATE 300; 30 MG/1; MG/1
1 TABLET ORAL AS NEEDED
Status: DISCONTINUED | OUTPATIENT
Start: 2019-10-15 | End: 2019-10-15

## 2019-10-15 RX ORDER — DIPHENHYDRAMINE HCL 25 MG
25 CAPSULE ORAL ONCE
Status: COMPLETED | OUTPATIENT
Start: 2019-10-15 | End: 2019-10-15

## 2019-10-15 RX ORDER — IBUPROFEN 200 MG
600 TABLET ORAL ONCE AS NEEDED
Status: DISCONTINUED | OUTPATIENT
Start: 2019-10-15 | End: 2019-10-15

## 2019-10-15 RX ORDER — ONDANSETRON 2 MG/ML
4 INJECTION INTRAMUSCULAR; INTRAVENOUS AS NEEDED
Status: DISCONTINUED | OUTPATIENT
Start: 2019-10-15 | End: 2019-10-15

## 2019-10-15 RX ORDER — DEXAMETHASONE SODIUM PHOSPHATE 4 MG/ML
4 VIAL (ML) INJECTION AS NEEDED
Status: DISCONTINUED | OUTPATIENT
Start: 2019-10-15 | End: 2019-10-15

## 2019-10-15 RX ORDER — CEFAZOLIN SODIUM/WATER 2 G/20 ML
2 SYRINGE (ML) INTRAVENOUS ONCE
Status: COMPLETED | OUTPATIENT
Start: 2019-10-15 | End: 2019-10-15

## 2019-10-15 RX ORDER — METOCLOPRAMIDE HYDROCHLORIDE 5 MG/ML
10 INJECTION INTRAMUSCULAR; INTRAVENOUS AS NEEDED
Status: DISCONTINUED | OUTPATIENT
Start: 2019-10-15 | End: 2019-10-15

## 2019-10-15 RX ORDER — MEPERIDINE HYDROCHLORIDE 25 MG/ML
INJECTION INTRAMUSCULAR; INTRAVENOUS; SUBCUTANEOUS
Status: COMPLETED
Start: 2019-10-15 | End: 2019-10-15

## 2019-10-15 RX ORDER — DEXTROSE MONOHYDRATE 25 G/50ML
50 INJECTION, SOLUTION INTRAVENOUS
Status: DISCONTINUED | OUTPATIENT
Start: 2019-10-15 | End: 2019-10-15

## 2019-10-15 NOTE — ANESTHESIA POSTPROCEDURE EVALUATION
[address] Patient Status:  Hospital Outpatient Surgery   Age/Gender 45year old female MRN [id number]   [facility] SURGERY Attending Celia [facility], [address] Day # 0 PCP Ras Anthony MD       Anesthesia Post-op N

## 2019-10-15 NOTE — H&P
H&P dated 10/7/19 by Dr. Stefany Zuleta was reviewed. No changes to be made. Patient seen in preoperative area with her  at the bedside.  We discussed the surgical plan for today and again discussed risks, benefits, alternatives, and post operative expect

## 2019-10-15 NOTE — BRIEF OP NOTE
Pre-Operative Diagnosis: Stricture of right ureter [N13.5]  Renal calculus, right [N20.0]     Post-Operative Diagnosis: Stricture of ureter [R75. 5]Renal calculus, right [N20.0]      Procedure Performed:   Procedure(s):  CYSTOSCOPY,  RIGHT RETROGRADE PYELOG

## 2019-10-15 NOTE — OPERATIVE REPORT
[address]    PATIENT'S NAME: Brittany Grant   ATTENDING PHYSICIAN: Jero Fitzpatrick DO   OPERATING PHYSICIAN: Jero Fitzpatrick DO   PATIENT ACCOUNT#:   [de-identified]    LOCATION:  [address] [place] [address] 9 [address]  MEDICAL RECORD #:   [id number] compression devices had been applied and satisfactory general anesthesia had been achieved, the patient's legs were raised to a dorsal lithotomy position. The patient's groin was prepped and draped in the usual sterile fashion.   A well-lubricated 21-Frenc holmium laser fiber was used with dusting settings to pulverize the calculi. A repeat retrograde pyelogram was performed and once again passed into the collecting system.   The access sheath was then removed along with the ureteroscope, reexamining the ent

## 2019-10-16 ENCOUNTER — TELEPHONE (OUTPATIENT)
Dept: INTERNAL MEDICINE CLINIC | Facility: CLINIC | Age: 44
End: 2019-10-16

## 2019-10-16 ENCOUNTER — IMMUNIZATION (OUTPATIENT)
Dept: INTERNAL MEDICINE CLINIC | Facility: CLINIC | Age: 44
End: 2019-10-16
Payer: MEDICAID

## 2019-10-16 DIAGNOSIS — Z23 NEED FOR VACCINATION: ICD-10-CM

## 2019-10-16 PROCEDURE — 90686 IIV4 VACC NO PRSV 0.5 ML IM: CPT | Performed by: FAMILY MEDICINE

## 2019-10-16 PROCEDURE — 90471 IMMUNIZATION ADMIN: CPT | Performed by: FAMILY MEDICINE

## 2019-10-16 NOTE — TELEPHONE ENCOUNTER
Patient calling in, requesting an appointment today for her flu shot. Pt just recently had surgery and was told to get the flu shot post-surg. Okay to schedule?

## 2019-10-30 ENCOUNTER — LAB ENCOUNTER (OUTPATIENT)
Dept: LAB | Age: 44
End: 2019-10-30
Attending: UROLOGY
Payer: MEDICAID

## 2019-10-30 ENCOUNTER — HOSPITAL ENCOUNTER (OUTPATIENT)
Dept: GENERAL RADIOLOGY | Age: 44
Discharge: HOME | End: 2019-10-30
Attending: UROLOGY
Payer: MEDICAID

## 2019-10-30 DIAGNOSIS — N20.0 NEPHROLITHIASIS: ICD-10-CM

## 2019-10-30 PROCEDURE — 87086 URINE CULTURE/COLONY COUNT: CPT

## 2019-10-30 PROCEDURE — 74018 RADEX ABDOMEN 1 VIEW: CPT | Performed by: UROLOGY

## 2019-10-30 PROCEDURE — 81001 URINALYSIS AUTO W/SCOPE: CPT

## 2019-12-13 ENCOUNTER — APPOINTMENT (OUTPATIENT)
Dept: LAB | Age: 44
End: 2019-12-13
Attending: UROLOGY
Payer: MEDICAID

## 2019-12-13 ENCOUNTER — HOSPITAL ENCOUNTER (OUTPATIENT)
Dept: ULTRASOUND IMAGING | Age: 44
Discharge: HOME | End: 2019-12-13
Attending: UROLOGY
Payer: MEDICAID

## 2019-12-13 DIAGNOSIS — N20.0 NEPHROLITHIASIS: ICD-10-CM

## 2019-12-13 DIAGNOSIS — N13.5 STRICTURE OF URETER: ICD-10-CM

## 2019-12-13 PROCEDURE — 83945 ASSAY OF OXALATE: CPT

## 2019-12-13 PROCEDURE — 82436 ASSAY OF URINE CHLORIDE: CPT

## 2019-12-13 PROCEDURE — 82340 ASSAY OF CALCIUM IN URINE: CPT

## 2019-12-13 PROCEDURE — 76770 US EXAM ABDO BACK WALL COMP: CPT | Performed by: UROLOGY

## 2019-12-13 PROCEDURE — 84392 ASSAY OF URINE SULFATE: CPT

## 2019-12-13 PROCEDURE — 82507 ASSAY OF CITRATE: CPT

## 2019-12-16 ENCOUNTER — HOSPITAL ENCOUNTER (EMERGENCY)
Age: 44
Discharge: LEFT BEFORE DISCHARGE | End: 2019-12-16
Payer: MEDICAID

## 2019-12-16 ENCOUNTER — HOSPITAL ENCOUNTER (OUTPATIENT)
Dept: GENERAL RADIOLOGY | Age: 44
Discharge: HOME | End: 2019-12-16
Attending: UROLOGY
Payer: MEDICAID

## 2019-12-16 ENCOUNTER — LAB ENCOUNTER (OUTPATIENT)
Dept: LAB | Age: 44
End: 2019-12-16
Attending: UROLOGY
Payer: MEDICAID

## 2019-12-16 ENCOUNTER — TELEPHONE (OUTPATIENT)
Dept: INTERNAL MEDICINE CLINIC | Facility: CLINIC | Age: 44
End: 2019-12-16

## 2019-12-16 DIAGNOSIS — N20.0 RENAL CALCULI: ICD-10-CM

## 2019-12-16 DIAGNOSIS — Z12.31 ENCOUNTER FOR SCREENING MAMMOGRAM FOR BREAST CANCER: Primary | ICD-10-CM

## 2019-12-16 DIAGNOSIS — R11.0 NAUSEA: ICD-10-CM

## 2019-12-16 PROCEDURE — 80048 BASIC METABOLIC PNL TOTAL CA: CPT

## 2019-12-16 PROCEDURE — 74018 RADEX ABDOMEN 1 VIEW: CPT | Performed by: UROLOGY

## 2019-12-16 PROCEDURE — 84702 CHORIONIC GONADOTROPIN TEST: CPT

## 2019-12-16 PROCEDURE — 81001 URINALYSIS AUTO W/SCOPE: CPT

## 2019-12-16 PROCEDURE — 36415 COLL VENOUS BLD VENIPUNCTURE: CPT

## 2019-12-16 PROCEDURE — 87086 URINE CULTURE/COLONY COUNT: CPT

## 2019-12-18 ENCOUNTER — ANESTHESIA (OUTPATIENT)
Dept: SURGERY | Facility: HOSPITAL | Age: 44
End: 2019-12-18
Payer: MEDICAID

## 2019-12-18 ENCOUNTER — APPOINTMENT (OUTPATIENT)
Dept: CT IMAGING | Age: 44
End: 2019-12-18
Attending: EMERGENCY MEDICINE
Payer: MEDICAID

## 2019-12-18 ENCOUNTER — ANESTHESIA EVENT (OUTPATIENT)
Dept: SURGERY | Facility: HOSPITAL | Age: 44
End: 2019-12-18
Payer: MEDICAID

## 2019-12-18 ENCOUNTER — HOSPITAL ENCOUNTER (OUTPATIENT)
Facility: HOSPITAL | Age: 44
Setting detail: OBSERVATION
Discharge: HOME | End: 2019-12-19
Attending: EMERGENCY MEDICINE | Admitting: HOSPITALIST
Payer: MEDICAID

## 2019-12-18 DIAGNOSIS — N23 RENAL COLIC: Primary | ICD-10-CM

## 2019-12-18 DIAGNOSIS — N20.1 RIGHT URETERAL STONE: ICD-10-CM

## 2019-12-18 PROBLEM — N13.5 URETERAL STRICTURE, RIGHT: Status: ACTIVE | Noted: 2019-12-18

## 2019-12-18 PROBLEM — N13.2 URETERAL STONE WITH HYDRONEPHROSIS: Status: ACTIVE | Noted: 2019-08-31

## 2019-12-18 PROCEDURE — 0TC68ZZ EXTIRPATION OF MATTER FROM RIGHT URETER, VIA NATURAL OR ARTIFICIAL OPENING ENDOSCOPIC: ICD-10-PCS | Performed by: UROLOGY

## 2019-12-18 PROCEDURE — 74176 CT ABD & PELVIS W/O CONTRAST: CPT | Performed by: EMERGENCY MEDICINE

## 2019-12-18 PROCEDURE — 99220 INITIAL OBSERVATION CARE,LEVL III: CPT | Performed by: INTERNAL MEDICINE

## 2019-12-18 PROCEDURE — 0T768DZ DILATION OF RIGHT URETER WITH INTRALUMINAL DEVICE, VIA NATURAL OR ARTIFICIAL OPENING ENDOSCOPIC: ICD-10-PCS | Performed by: UROLOGY

## 2019-12-18 DEVICE — STENT URET 6F 28CM ULSMTH: Type: IMPLANTABLE DEVICE | Site: URETER | Status: FUNCTIONAL

## 2019-12-18 RX ORDER — KETOROLAC TROMETHAMINE 30 MG/ML
30 INJECTION, SOLUTION INTRAMUSCULAR; INTRAVENOUS ONCE
Status: COMPLETED | OUTPATIENT
Start: 2019-12-18 | End: 2019-12-18

## 2019-12-18 RX ORDER — MORPHINE SULFATE 4 MG/ML
1 INJECTION, SOLUTION INTRAMUSCULAR; INTRAVENOUS EVERY 2 HOUR PRN
Status: DISCONTINUED | OUTPATIENT
Start: 2019-12-18 | End: 2019-12-19

## 2019-12-18 RX ORDER — ONDANSETRON 2 MG/ML
4 INJECTION INTRAMUSCULAR; INTRAVENOUS ONCE
Status: COMPLETED | OUTPATIENT
Start: 2019-12-18 | End: 2019-12-18

## 2019-12-18 RX ORDER — DIPHENHYDRAMINE HYDROCHLORIDE 50 MG/ML
12.5 INJECTION INTRAMUSCULAR; INTRAVENOUS EVERY 4 HOURS PRN
Status: DISCONTINUED | OUTPATIENT
Start: 2019-12-18 | End: 2019-12-19

## 2019-12-18 RX ORDER — METOCLOPRAMIDE HYDROCHLORIDE 5 MG/ML
10 INJECTION INTRAMUSCULAR; INTRAVENOUS EVERY 8 HOURS PRN
Status: DISCONTINUED | OUTPATIENT
Start: 2019-12-18 | End: 2019-12-19

## 2019-12-18 RX ORDER — ENOXAPARIN SODIUM 100 MG/ML
40 INJECTION SUBCUTANEOUS DAILY
Status: DISCONTINUED | OUTPATIENT
Start: 2019-12-18 | End: 2019-12-19

## 2019-12-18 RX ORDER — DEXAMETHASONE SODIUM PHOSPHATE 4 MG/ML
VIAL (ML) INJECTION AS NEEDED
Status: DISCONTINUED | OUTPATIENT
Start: 2019-12-18 | End: 2019-12-18 | Stop reason: SURG

## 2019-12-18 RX ORDER — MORPHINE SULFATE 4 MG/ML
2 INJECTION, SOLUTION INTRAMUSCULAR; INTRAVENOUS EVERY 2 HOUR PRN
Status: DISCONTINUED | OUTPATIENT
Start: 2019-12-18 | End: 2019-12-19

## 2019-12-18 RX ORDER — MORPHINE SULFATE 4 MG/ML
4 INJECTION, SOLUTION INTRAMUSCULAR; INTRAVENOUS EVERY 2 HOUR PRN
Status: DISCONTINUED | OUTPATIENT
Start: 2019-12-18 | End: 2019-12-19

## 2019-12-18 RX ORDER — SODIUM CHLORIDE 9 MG/ML
INJECTION, SOLUTION INTRAVENOUS CONTINUOUS
Status: DISCONTINUED | OUTPATIENT
Start: 2019-12-18 | End: 2019-12-18

## 2019-12-18 RX ORDER — SODIUM CHLORIDE 9 MG/ML
INJECTION, SOLUTION INTRAVENOUS CONTINUOUS
Status: DISCONTINUED | OUTPATIENT
Start: 2019-12-18 | End: 2019-12-19

## 2019-12-18 RX ORDER — MORPHINE SULFATE 4 MG/ML
4 INJECTION, SOLUTION INTRAMUSCULAR; INTRAVENOUS EVERY 30 MIN PRN
Status: DISCONTINUED | OUTPATIENT
Start: 2019-12-18 | End: 2019-12-18 | Stop reason: HOSPADM

## 2019-12-18 RX ORDER — MIDAZOLAM HYDROCHLORIDE 1 MG/ML
INJECTION INTRAMUSCULAR; INTRAVENOUS AS NEEDED
Status: DISCONTINUED | OUTPATIENT
Start: 2019-12-18 | End: 2019-12-18 | Stop reason: SURG

## 2019-12-18 RX ORDER — SODIUM CHLORIDE, SODIUM LACTATE, POTASSIUM CHLORIDE, CALCIUM CHLORIDE 600; 310; 30; 20 MG/100ML; MG/100ML; MG/100ML; MG/100ML
INJECTION, SOLUTION INTRAVENOUS CONTINUOUS PRN
Status: DISCONTINUED | OUTPATIENT
Start: 2019-12-18 | End: 2019-12-18 | Stop reason: SURG

## 2019-12-18 RX ORDER — KETOROLAC TROMETHAMINE 30 MG/ML
INJECTION, SOLUTION INTRAMUSCULAR; INTRAVENOUS AS NEEDED
Status: DISCONTINUED | OUTPATIENT
Start: 2019-12-18 | End: 2019-12-18 | Stop reason: SURG

## 2019-12-18 RX ORDER — LEVOFLOXACIN 5 MG/ML
500 INJECTION, SOLUTION INTRAVENOUS
Status: COMPLETED | OUTPATIENT
Start: 2019-12-18 | End: 2019-12-18

## 2019-12-18 RX ORDER — KETOROLAC TROMETHAMINE 15 MG/ML
15 INJECTION, SOLUTION INTRAMUSCULAR; INTRAVENOUS EVERY 6 HOURS PRN
Status: DISCONTINUED | OUTPATIENT
Start: 2019-12-18 | End: 2019-12-19

## 2019-12-18 RX ORDER — ONDANSETRON 2 MG/ML
4 INJECTION INTRAMUSCULAR; INTRAVENOUS EVERY 6 HOURS PRN
Status: DISCONTINUED | OUTPATIENT
Start: 2019-12-18 | End: 2019-12-19

## 2019-12-18 RX ORDER — DIPHENHYDRAMINE HCL 25 MG
25 CAPSULE ORAL EVERY 4 HOURS PRN
Status: DISCONTINUED | OUTPATIENT
Start: 2019-12-18 | End: 2019-12-19

## 2019-12-18 RX ORDER — MORPHINE SULFATE 4 MG/ML
4 INJECTION, SOLUTION INTRAMUSCULAR; INTRAVENOUS ONCE
Status: DISCONTINUED | OUTPATIENT
Start: 2019-12-18 | End: 2019-12-19

## 2019-12-18 RX ADMIN — LEVOFLOXACIN 500 MG: 5 INJECTION, SOLUTION INTRAVENOUS at 22:15:00

## 2019-12-18 RX ADMIN — SODIUM CHLORIDE, SODIUM LACTATE, POTASSIUM CHLORIDE, CALCIUM CHLORIDE: 600; 310; 30; 20 INJECTION, SOLUTION INTRAVENOUS at 22:04:00

## 2019-12-18 RX ADMIN — MIDAZOLAM HYDROCHLORIDE 2 MG: 1 INJECTION INTRAMUSCULAR; INTRAVENOUS at 22:00:00

## 2019-12-18 RX ADMIN — KETOROLAC TROMETHAMINE 30 MG: 30 INJECTION, SOLUTION INTRAMUSCULAR; INTRAVENOUS at 22:55:00

## 2019-12-18 RX ADMIN — DEXAMETHASONE SODIUM PHOSPHATE 4 MG: 4 MG/ML VIAL (ML) INJECTION at 22:41:00

## 2019-12-18 RX ADMIN — METOCLOPRAMIDE HYDROCHLORIDE 10 MG: 5 INJECTION INTRAMUSCULAR; INTRAVENOUS at 22:13:00

## 2019-12-18 NOTE — ED INITIAL ASSESSMENT (HPI)
States she has flank pain and likely a kidney stone. Had [address] and xrays as outpt.  C/o lower back pain and nausea

## 2019-12-18 NOTE — ED PROVIDER NOTES
Patient Seen in: [facility] [place]      History   Patient presents with:  Abdomen/Flank Pain  Headache  Nausea/Vomiting/Diarrhea    Stated Complaint: nausea head and \"swollen kidney\" really bad flank pain. \" came monday wait was • CYSTOSCOPY URETEROSCOPY Right 10/15/2019    Performed by Cheli Rapp DO at [facility] MAIN OR   • CYSTOSCOPY URETEROSCOPY Bilateral 9/1/2019    Performed by Abdirahman Dwyer MD at [address] [place] [address]   • CYSTOSCOPY URETEROSCOPY Left 8/22/2019    Performed by Edward Gonzalez Physical Exam    Alert and oriented patient appears uncomfortable HEENT exam is normal lungs are clear cardiovascular exam shows regular rhythm abdomen soft nontender extremity no, cyanosis or edema no rash back exam shows bilateral CVA tenderness

## 2019-12-19 VITALS
DIASTOLIC BLOOD PRESSURE: 55 MMHG | RESPIRATION RATE: 16 BRPM | BODY MASS INDEX: 30 KG/M2 | OXYGEN SATURATION: 98 % | SYSTOLIC BLOOD PRESSURE: 108 MMHG | HEART RATE: 53 BPM | WEIGHT: 155 LBS | TEMPERATURE: 99 F

## 2019-12-19 PROCEDURE — 99217 OBSERVATION CARE DISCHARGE: CPT | Performed by: HOSPITALIST

## 2019-12-19 RX ORDER — PHENAZOPYRIDINE HYDROCHLORIDE 200 MG/1
200 TABLET, FILM COATED ORAL 3 TIMES DAILY PRN
Status: DISCONTINUED | OUTPATIENT
Start: 2019-12-19 | End: 2019-12-19

## 2019-12-19 RX ORDER — ACETAMINOPHEN 500 MG
500 TABLET ORAL EVERY 6 HOURS PRN
Status: DISCONTINUED | OUTPATIENT
Start: 2019-12-19 | End: 2019-12-19

## 2019-12-19 RX ORDER — PHENAZOPYRIDINE HYDROCHLORIDE 200 MG/1
200 TABLET, FILM COATED ORAL 3 TIMES DAILY PRN
Qty: 6 TABLET | Refills: 0 | Status: SHIPPED | OUTPATIENT
Start: 2019-12-19 | End: 2019-12-21

## 2019-12-19 NOTE — ANESTHESIA POSTPROCEDURE EVALUATION
[address] Patient Status:  Observation   Age/Gender 45year old female MRN [id number]   [facility] SURGERY Attending Bassam Rand MD   Hosp Day # 0 PCP Luciana Noriega MD       Anesthesia Post-op Note    Procedure(s):

## 2019-12-19 NOTE — H&P
[facility]  History and Physical     Trey Beverage Patient Status:  Observation    3/22/1975 MRN [id number]   [facility] 3NW-A Attending Luciana Epley, MD   Hosp Day # 0 PCP Deborah Cool MD     Chief Complaint: flank pain    H Lucien Bertrand DO at [facility] MAIN OR   • CYSTOSCOPY URETEROSCOPY Right 7/9/2019    Performed by Joesph Flores DO at [facility] MAIN OR   • CYSTOSCOPY,INSERT URETERAL STENT     • CYSTOSCOPY,MANIPULATN     • HERNIA SURGERY  1/2000    L hernia repair   • HERNIA VENTRAL RE tablet, Rfl: 11        Review of Systems:   A comprehensive 14 point review of systems was completed. Pertinent positives and negatives noted in the HPI.     Physical Exam:    /88 (BP Location: Right arm)   Pulse 70   Temp 98.2 °F (36.8 °C) (Oral) full  · Jett: none    Plan of care discussed with pt and ER.      Deon Preston MD  12/18/2019

## 2019-12-19 NOTE — PROGRESS NOTES
PT RESTING IN BED, EASY NON LABORED BREATHING ON RA. CPOX AND TELEMETRY IN PLACE. PT TOLERATING REGULAR DIET. VOIDS ADEQUATE AMOUNT. UP AD RAHUL, STEADY GAIT. IV FLUIDS INFUSING WITHOUT DIFFICULTY. PLAN OF CARE DISCUSSED. ALL QUESTIONS ANSWERED.  INSTRUCTED T

## 2019-12-19 NOTE — ANESTHESIA PREPROCEDURE EVALUATION
PRE-OP EVALUATION    Patient Name: Staci Bailey    Pre-op Diagnosis: Right ureteral stone [N20.1]    Procedure(s):  CYSTOSCOPY, RIGHT URETEROSCOPY, LITHOTRIIPSY, STENT INSERTION    Surgeon(s) and Role:     * Zachary Beverly MD - Primary    Pre-op vitals re medications have been marked as taking for the 12/18/19 encounter [facility] Encounter). Allergies: Norco [Hydrocodone-Acetaminophen]; Wellbutrin [Bupropion Hcl]; Codeine Sulfate; Dilaudid [Hydromorphone];  Morphine; Seasonal; Vicodin [Hydrocodone-Aceta REPAIR N/A 3/29/2017    Performed by Kelly Machado MD at [facility] MAIN OR   • LITHOTRIPSY  2001, 2007 & 11/11   • OTHER Bilateral 2012    nerve surgery to bilateral arms about 1 month apart   • REMOVAL GALLBLADDER     • Venu Myles 12 Right 11/13/2

## 2019-12-19 NOTE — DISCHARGE PLANNING
DISCHARGE ORDERS DISCUSSED WITH PT, ALL QUESTIONS ANSWERED, DISCHARGE PAPERWORK GIVEN TO PT. PHARMACY TECH TO DELIVER HOME MEDICATION. [address] LEFT UNIT IN STABLE CONDITION.

## 2019-12-19 NOTE — BRIEF OP NOTE
Pre-Operative Diagnosis: Right ureteral stone [N20.1]     Post-Operative Diagnosis: Right ureteral stones [N20.1] , right ureter stricture, right renal stones     Procedure Performed:   Procedure(s):  CYSTOSCOPY, RIGHT URETEROSCOPY, RETROGRADE PYELOGRAM, S

## 2019-12-19 NOTE — OPERATIVE REPORT
[facility]    [facility]   ATTENDING PHYSICIAN: Gris Lizarraga D.O.   OPERATING PHYSICIAN: Maryanne Gomez M.D.    PATIENT ACCOUNT#:   [de-identified]    LOCATION:  [address] [place] [address]  MEDICAL RECORD #:   [id number]       DATE OF BIRTH:  03/ particle passage, the need for ureteral stent with subsequent removal, and the notable recurrent nature of her kidney stones. She would like to proceed. She understands we will only be working on the right side tonight.     OPERATIVE TECHNIQUE:  The patie still distal to this ureteral strictured area. I passed a flexible ureteroscope and with the stone basket was able to retrieve out these 2 fragments without difficulty. There was no resistance in removing them.   There was still edematous change in th [address] [facility] [id number]  JU/    cc: Michael Baez M.D.

## 2019-12-19 NOTE — PROGRESS NOTES
[facility]  Urology Progress Note    Monica Alexis Patient Status:  Observation    3/22/1975 MRN [id number]   [facility] 3NW-A Attending Ronit eSgura [address] Day # 0 PCP Karyn Ross MD     Subjective:  Monica Alexis i

## 2019-12-19 NOTE — CONSULTS
[facility]  Report of Consultation    Tobias Monsivais Patient Status:  Observation    3/22/1975 MRN [id number]   [facility] 3NW-A Attending Agueda Harrell MD   Hosp Day # 0 PCP Oleg Rodriguez MD     Reason for Consultation:  Right uret W/UP STRICTURE Right 10/15/2019    Cysto Rt RPG, Rt URS w/ Laser Litho Dilation of Rtight Stricture Rt URS Stent Exchange w/ Dr Hank House   • Gretta List Bilateral 6/16/2019    Performed by Janette Murry DO at [address] ITCHING    Comment:TABS  Dilaudid [Hydromorp*    ITCHING  Morphine                ITCHING  Seasonal                Runny nose  Vicodin [Hydrocodon*    ITCHING    Medications:    Current Facility-Administered Medications:   •  morphINE sulfate (PF) 4 MG/ML nontender      Laboratory Data:  Lab Results   Component Value Date    WBC 8.5 12/18/2019    HGB 13.6 12/18/2019    HCT 42.3 12/18/2019    .0 12/18/2019    CREATSERUM 0.79 12/18/2019    BUN 10 12/18/2019     12/18/2019    K 3.6 12/18/2019    C calcification. If needed a follow-up CT stone protocol may be done. 3. Interval removal of right double-J ureteral stent.     Dictated by: Blaine Thorpe MD on 12/16/2019 at 14:14     Approved by: Blaine Thorpe MD on 12/16/2019 at 14:15 gangrene     Thyroid nodule     Elevated LFTs     Essential hypertension     Hydronephrosis with urinary obstruction due to renal calculus     Renal colic      Bilateral renal stones  Large left sided burden but non obstructing  Right proximal ureter stone

## 2019-12-19 NOTE — ANESTHESIA PROCEDURE NOTES
Airway  Date/Time: 12/18/2019 10:05 PM  Urgency: elective    Airway not difficult    General Information and Staff    Patient location during procedure: OR  Anesthesiologist: Jannette Washington MD  Performed: anesthesiologist     Indications and Patient Condi

## 2019-12-19 NOTE — PROGRESS NOTES
[facility]OSPITAL[facility]  Progress Note     Hortensia Sensor Patient Status:  Observation    3/22/1975 MRN [id number]   [facility] 3NW-A Attending Felipa Cruz [address] Day # 0 PCP Preeti Gaspar MD     Chief Complaint: flank pain    S: Intravenous Once   • enoxaparin  40 mg Subcutaneous Daily       ASSESSMENT / PLAN:     1. Right proximal ureteral calculi with moderate hydronephrosis   1. NPO   2. Pain control  3. Antiemetic   4. UA neg for infection  5. Urology consulted   2.  HTN -being

## 2019-12-20 ENCOUNTER — PATIENT OUTREACH (OUTPATIENT)
Dept: CASE MANAGEMENT | Age: 44
End: 2019-12-20

## 2019-12-20 DIAGNOSIS — N23 RENAL COLIC: ICD-10-CM

## 2019-12-20 PROCEDURE — 1111F DSCHRG MED/CURRENT MED MERGE: CPT

## 2019-12-23 NOTE — PROGRESS NOTES
Initial Post Discharge Follow Up   Discharge Date: 12/19/19  Contact Date: 12/23/2019    Consent Verification:  Assessment Completed With: Patient  HIPAA Verified?   Yes    Discharge Dx:     Right proximal ureteral calculi with moderate hydronephrosis sodium restriction. A call was sent to Dr. Hayden Najera office on behalf of the patient. The patient has not been checking blood pressure while at home and denies any further weakness, confusion, nosebleeds, tingling/numbness, vision changes CP, or SOB.    • Do yo no  • Did you  your discharge medications when you left the hospital? Yes  • May I go over your medications with you to make sure we are not missing anything? yes  • Are there any reasons that keep you from taking your medication as prescribed?  No  A 911.  A call was made to Dr. Miriam Buckley office on the pt's behalf for a condition update/ pain management suggestions. Appointments were reviewed and stressed the importance of a close follow up with providers.  The patient verbalized understanding and will cont

## 2019-12-27 ENCOUNTER — OFFICE VISIT (OUTPATIENT)
Dept: INTERNAL MEDICINE CLINIC | Facility: CLINIC | Age: 44
End: 2019-12-27
Payer: MEDICAID

## 2019-12-27 ENCOUNTER — APPOINTMENT (OUTPATIENT)
Dept: LAB | Age: 44
End: 2019-12-27
Attending: FAMILY MEDICINE
Payer: MEDICAID

## 2019-12-27 VITALS
OXYGEN SATURATION: 98 % | DIASTOLIC BLOOD PRESSURE: 82 MMHG | TEMPERATURE: 98 F | HEIGHT: 60 IN | HEART RATE: 64 BPM | WEIGHT: 161.5 LBS | SYSTOLIC BLOOD PRESSURE: 136 MMHG | RESPIRATION RATE: 14 BRPM | BODY MASS INDEX: 31.71 KG/M2

## 2019-12-27 DIAGNOSIS — N64.52 BREAST DISCHARGE: ICD-10-CM

## 2019-12-27 DIAGNOSIS — E83.51 HYPOCALCEMIA: ICD-10-CM

## 2019-12-27 DIAGNOSIS — E78.00 HYPERCHOLESTEREMIA: ICD-10-CM

## 2019-12-27 DIAGNOSIS — N23 RENAL COLIC: Primary | ICD-10-CM

## 2019-12-27 DIAGNOSIS — E04.1 THYROID NODULE: ICD-10-CM

## 2019-12-27 PROCEDURE — 82310 ASSAY OF CALCIUM: CPT

## 2019-12-27 PROCEDURE — 84100 ASSAY OF PHOSPHORUS: CPT

## 2019-12-27 PROCEDURE — 1111F DSCHRG MED/CURRENT MED MERGE: CPT | Performed by: FAMILY MEDICINE

## 2019-12-27 PROCEDURE — 84146 ASSAY OF PROLACTIN: CPT

## 2019-12-27 PROCEDURE — 36415 COLL VENOUS BLD VENIPUNCTURE: CPT

## 2019-12-27 PROCEDURE — 80061 LIPID PANEL: CPT

## 2019-12-27 PROCEDURE — 83970 ASSAY OF PARATHORMONE: CPT

## 2019-12-27 PROCEDURE — 84443 ASSAY THYROID STIM HORMONE: CPT

## 2019-12-27 PROCEDURE — 99214 OFFICE O/P EST MOD 30 MIN: CPT | Performed by: FAMILY MEDICINE

## 2019-12-27 PROCEDURE — 84439 ASSAY OF FREE THYROXINE: CPT

## 2019-12-27 NOTE — PROGRESS NOTES
HPI:    Patient ID: Trinh Ty is a 45year old female.     HPI  Here for f/u from [facility] stay for stones   Has pain still in the flank area  L>R   Does have a stent in the R ureter    To see urology in office today to have stent removed  Is having breast issu diagnosis)  Breast discharge  Hypocalcemia  Thyroid nodule  Hypercholesteremia    Orders Placed This Encounter      Prolactin [E]      PTH, Intact [E]      TSH and Free T4 [E]      LIPID PANEL      Meds This Visit:  Requested Prescriptions      No prescrip

## 2019-12-30 ENCOUNTER — TELEPHONE (OUTPATIENT)
Dept: INTERNAL MEDICINE CLINIC | Facility: CLINIC | Age: 44
End: 2019-12-30

## 2019-12-30 NOTE — DISCHARGE SUMMARY
[facility] HOSPITALIST  DISCHARGE SUMMARY     Lizbet Jimenez Patient Status:  Observation    3/22/1975 MRN [id number]   [facility] 3NW-A Attending No att. providers found   [facility] Day # 0 PCP Cherylyn Sandhoff, MD     Date of Admission: 2019  Date (1080 MG) Tbcr  Commonly known as:  UROCIT-K      Take 1 tablet (10 mEq total) by mouth 2 (two) times daily with meals.    Quantity:  60 tablet  Refills:  11        ASK your doctor about these medications      Instructions Prescription details   Phenazopyri is important that the annual screening mammogram be scheduled at least a year and a day after your last screening mammogram to ensure your insurance plan will cover the cost, unless you are experiencing breast related symptoms.     Do NOT use deodorant, sonali rescheduling your appointment. You can also have your previous images sent to [facility] or [facility] AND [facility] prior to your appointment.  Films may be mailed to:     [facility] Attn: Radiology Imaging Library  [place] [address] Musculoskeletal: Moves all extremities. Extremities: No edema.   -----------------------------------------------------------------------------------------------  PATIENT DISCHARGE INSTRUCTIONS: See electronic chart    [facility]

## 2019-12-30 NOTE — TELEPHONE ENCOUNTER
Spoke with patient informing of results/Dr. Hernandez's message below: \"Other than the lipids, the labs are fine   Wt loss can help the chol\"    Patient verbalized understanding with no further questions or concerns.

## 2020-01-03 ENCOUNTER — APPOINTMENT (OUTPATIENT)
Dept: LAB | Age: 45
End: 2020-01-03
Attending: UROLOGY
Payer: MEDICAID

## 2020-01-03 ENCOUNTER — TELEPHONE (OUTPATIENT)
Dept: OTOLARYNGOLOGY | Facility: CLINIC | Age: 45
End: 2020-01-03

## 2020-01-03 DIAGNOSIS — N20.0 NEPHROLITHIASIS: ICD-10-CM

## 2020-01-03 DIAGNOSIS — E04.9 NODULAR GOITER, NON-TOXIC: Primary | ICD-10-CM

## 2020-01-03 LAB
ANION GAP SERPL CALC-SCNC: 8 MMOL/L (ref 0–18)
BUN BLD-MCNC: 19 MG/DL (ref 7–18)
BUN/CREAT SERPL: 22.1 (ref 10–20)
CALCIUM BLD-MCNC: 9.7 MG/DL (ref 8.5–10.1)
CHLORIDE SERPL-SCNC: 102 MMOL/L (ref 98–112)
CO2 SERPL-SCNC: 28 MMOL/L (ref 21–32)
CREAT BLD-MCNC: 0.86 MG/DL (ref 0.55–1.02)
GLUCOSE BLD-MCNC: 107 MG/DL (ref 70–99)
OSMOLALITY SERPL CALC.SUM OF ELEC: 289 MOSM/KG (ref 275–295)
PATIENT FASTING Y/N/NP: YES
POTASSIUM SERPL-SCNC: 2.9 MMOL/L (ref 3.5–5.1)
SODIUM SERPL-SCNC: 138 MMOL/L (ref 136–145)

## 2020-01-03 PROCEDURE — 80048 BASIC METABOLIC PNL TOTAL CA: CPT

## 2020-01-03 PROCEDURE — 36415 COLL VENOUS BLD VENIPUNCTURE: CPT

## 2020-01-06 ENCOUNTER — TELEPHONE (OUTPATIENT)
Dept: INTERNAL MEDICINE CLINIC | Facility: CLINIC | Age: 45
End: 2020-01-06

## 2020-01-06 NOTE — TELEPHONE ENCOUNTER
Pt advised no appts available today ,Advised wic for hives and benadryl over the counter     Pt also wanted Dr aware that her potassium is low but urologist is handling it and glucose was high

## 2020-01-10 ENCOUNTER — HOSPITAL ENCOUNTER (OUTPATIENT)
Dept: MAMMOGRAPHY | Facility: HOSPITAL | Age: 45
Discharge: HOME | End: 2020-01-10
Attending: FAMILY MEDICINE
Payer: MEDICAID

## 2020-01-10 ENCOUNTER — APPOINTMENT (OUTPATIENT)
Dept: LAB | Age: 45
End: 2020-01-10
Attending: UROLOGY
Payer: MEDICAID

## 2020-01-10 DIAGNOSIS — N64.52 BREAST DISCHARGE: ICD-10-CM

## 2020-01-10 DIAGNOSIS — N20.0 KIDNEY STONE: ICD-10-CM

## 2020-01-10 LAB
ANION GAP SERPL CALC-SCNC: 3 MMOL/L (ref 0–18)
BUN BLD-MCNC: 12 MG/DL (ref 7–18)
BUN/CREAT SERPL: 15.2 (ref 10–20)
CALCIUM BLD-MCNC: 8.9 MG/DL (ref 8.5–10.1)
CHLORIDE SERPL-SCNC: 111 MMOL/L (ref 98–112)
CO2 SERPL-SCNC: 26 MMOL/L (ref 21–32)
CREAT BLD-MCNC: 0.79 MG/DL (ref 0.55–1.02)
GLUCOSE BLD-MCNC: 92 MG/DL (ref 70–99)
OSMOLALITY SERPL CALC.SUM OF ELEC: 289 MOSM/KG (ref 275–295)
PATIENT FASTING Y/N/NP: YES
POTASSIUM SERPL-SCNC: 3.8 MMOL/L (ref 3.5–5.1)
SODIUM SERPL-SCNC: 140 MMOL/L (ref 136–145)

## 2020-01-10 PROCEDURE — 80048 BASIC METABOLIC PNL TOTAL CA: CPT

## 2020-01-10 PROCEDURE — 77062 BREAST TOMOSYNTHESIS BI: CPT | Performed by: FAMILY MEDICINE

## 2020-01-10 PROCEDURE — 36415 COLL VENOUS BLD VENIPUNCTURE: CPT

## 2020-01-10 PROCEDURE — 77066 DX MAMMO INCL CAD BI: CPT | Performed by: FAMILY MEDICINE

## 2020-01-17 ENCOUNTER — HOSPITAL ENCOUNTER (OUTPATIENT)
Dept: ULTRASOUND IMAGING | Age: 45
Discharge: HOME | End: 2020-01-17
Attending: OTOLARYNGOLOGY
Payer: MEDICAID

## 2020-01-17 DIAGNOSIS — E04.9 NODULAR GOITER, NON-TOXIC: ICD-10-CM

## 2020-01-17 PROCEDURE — 76536 US EXAM OF HEAD AND NECK: CPT | Performed by: OTOLARYNGOLOGY

## 2020-01-20 ENCOUNTER — TELEPHONE (OUTPATIENT)
Dept: INTERNAL MEDICINE CLINIC | Facility: CLINIC | Age: 45
End: 2020-01-20

## 2020-01-20 NOTE — TELEPHONE ENCOUNTER
Pt is concerned with findings: Atherosclerotic structures are noted within both breast.  Correlate for risk factors. Pt us still having stabbing breast pain with tingling and can express milk discharge. ,Pt has fam hx of cardiac issues .     Pt asking i

## 2020-01-20 NOTE — TELEPHONE ENCOUNTER
BREAST COMPOSITION:   Scattered areas fibroglandular density. FINDINGS:  No dominant lesion, skin thickening, nipple retraction or malignant calcification. Tomography demonstrates no focal or suspicious abnormality.   Atherosclerotic structures are not

## 2020-01-21 ENCOUNTER — OFFICE VISIT (OUTPATIENT)
Dept: OTOLARYNGOLOGY | Facility: CLINIC | Age: 45
End: 2020-01-21
Payer: MEDICAID

## 2020-01-21 VITALS
HEIGHT: 59 IN | SYSTOLIC BLOOD PRESSURE: 118 MMHG | WEIGHT: 156 LBS | BODY MASS INDEX: 31.45 KG/M2 | TEMPERATURE: 98 F | DIASTOLIC BLOOD PRESSURE: 75 MMHG

## 2020-01-21 DIAGNOSIS — E04.9 NODULAR GOITER, NON-TOXIC: Primary | ICD-10-CM

## 2020-01-21 PROCEDURE — 99213 OFFICE O/P EST LOW 20 MIN: CPT | Performed by: OTOLARYNGOLOGY

## 2020-01-21 RX ORDER — MONTELUKAST SODIUM 10 MG/1
10 TABLET ORAL NIGHTLY
COMMUNITY
Start: 2020-01-02 | End: 2020-05-05

## 2020-01-21 RX ORDER — LORATADINE 10 MG/1
10 TABLET ORAL
COMMUNITY
Start: 2020-01-02 | End: 2020-03-13 | Stop reason: ALTCHOICE

## 2020-01-21 RX ORDER — AZELASTINE 1 MG/ML
SPRAY, METERED NASAL
COMMUNITY
Start: 2020-01-02 | End: 2020-03-16 | Stop reason: ALTCHOICE

## 2020-01-21 RX ORDER — SULFAMETHOXAZOLE AND TRIMETHOPRIM 800; 160 MG/1; MG/1
TABLET ORAL
COMMUNITY
Start: 2020-01-02 | End: 2020-03-13 | Stop reason: ALTCHOICE

## 2020-01-21 NOTE — PROGRESS NOTES
Natalie Elena is a 45year old female.   Patient presents with:  Results: US thyroid done on 1-      HISTORY OF PRESENT ILLNESS  She presents with a burning sensation in the back of her throat which is worse when she lies down to sleep. Fritz Farias denies ref education: Not on file      Highest education level: Not on file    Tobacco Use      Smoking status: Never Smoker      Smokeless tobacco: Never Used    Substance and Sexual Activity      Alcohol use: No      Drug use: No      Sexual activity: Yes        Pa STENT INSERTION Bilateral 6/16/2019    Performed by Justino Mccall DO at [address] URETEROSCOPY Right 10/15/2019    Performed by Justino Mccall DO at [facility] MAIN OR   • CYSTOSCOPY URETEROSCOPY Bilateral 9/1/2019    Performed by Darylene Ear, Constitutional Normal Overall appearance - Normal.   Psychiatric Normal Orientation - Oriented to time, place, person & situation. Appropriate mood and affect.    Neck Exam Normal Inspection - Normal. Palpation - Normal. Parotid gland - Normal. Thyroid gl year to watch for stability of these nodules. Currently dealing with significant kidney issues. Jesica Barreto.  Judith Boyd MD    1/21/2020    1:12 PM

## 2020-02-18 ENCOUNTER — HOSPITAL ENCOUNTER (OUTPATIENT)
Dept: GENERAL RADIOLOGY | Age: 45
Discharge: HOME | End: 2020-02-18
Attending: UROLOGY
Payer: MEDICAID

## 2020-02-18 DIAGNOSIS — N20.0 KIDNEY STONE: ICD-10-CM

## 2020-02-18 PROCEDURE — 74018 RADEX ABDOMEN 1 VIEW: CPT | Performed by: UROLOGY

## 2020-02-27 ENCOUNTER — HOSPITAL ENCOUNTER (EMERGENCY)
Age: 45
Discharge: HOME | End: 2020-02-27
Attending: EMERGENCY MEDICINE
Payer: MEDICAID

## 2020-02-27 ENCOUNTER — TELEPHONE (OUTPATIENT)
Dept: INTERNAL MEDICINE CLINIC | Facility: CLINIC | Age: 45
End: 2020-02-27

## 2020-02-27 ENCOUNTER — APPOINTMENT (OUTPATIENT)
Dept: CT IMAGING | Age: 45
End: 2020-02-27
Attending: EMERGENCY MEDICINE
Payer: MEDICAID

## 2020-02-27 VITALS
RESPIRATION RATE: 16 BRPM | HEART RATE: 66 BPM | OXYGEN SATURATION: 99 % | HEIGHT: 59 IN | TEMPERATURE: 99 F | DIASTOLIC BLOOD PRESSURE: 66 MMHG | BODY MASS INDEX: 30.44 KG/M2 | SYSTOLIC BLOOD PRESSURE: 108 MMHG | WEIGHT: 151 LBS

## 2020-02-27 DIAGNOSIS — R10.9 ACUTE LEFT FLANK PAIN: Primary | ICD-10-CM

## 2020-02-27 LAB
ALBUMIN SERPL-MCNC: 3.9 G/DL (ref 3.4–5)
ALBUMIN/GLOB SERPL: 0.9 {RATIO} (ref 1–2)
ALP LIVER SERPL-CCNC: 68 U/L (ref 37–98)
ALT SERPL-CCNC: 40 U/L (ref 13–56)
ANION GAP SERPL CALC-SCNC: 6 MMOL/L (ref 0–18)
AST SERPL-CCNC: 21 U/L (ref 15–37)
BASOPHILS # BLD AUTO: 0.03 X10(3) UL (ref 0–0.2)
BASOPHILS NFR BLD AUTO: 0.4 %
BILIRUB SERPL-MCNC: 0.3 MG/DL (ref 0.1–2)
BILIRUB UR QL STRIP.AUTO: NEGATIVE
BUN BLD-MCNC: 11 MG/DL (ref 7–18)
BUN/CREAT SERPL: 14.7 (ref 10–20)
CALCIUM BLD-MCNC: 9.2 MG/DL (ref 8.5–10.1)
CHLORIDE SERPL-SCNC: 107 MMOL/L (ref 98–112)
CLARITY UR REFRACT.AUTO: CLEAR
CO2 SERPL-SCNC: 26 MMOL/L (ref 21–32)
COLOR UR AUTO: YELLOW
CREAT BLD-MCNC: 0.75 MG/DL (ref 0.55–1.02)
DEPRECATED RDW RBC AUTO: 42.1 FL (ref 35.1–46.3)
EOSINOPHIL # BLD AUTO: 0.14 X10(3) UL (ref 0–0.7)
EOSINOPHIL NFR BLD AUTO: 1.8 %
ERYTHROCYTE [DISTWIDTH] IN BLOOD BY AUTOMATED COUNT: 13 % (ref 11–15)
GLOBULIN PLAS-MCNC: 4.2 G/DL (ref 2.8–4.4)
GLUCOSE BLD-MCNC: 74 MG/DL (ref 70–99)
GLUCOSE UR STRIP.AUTO-MCNC: NEGATIVE MG/DL
HCT VFR BLD AUTO: 42.2 % (ref 35–48)
HGB BLD-MCNC: 13.5 G/DL (ref 12–16)
IMM GRANULOCYTES # BLD AUTO: 0.03 X10(3) UL (ref 0–1)
IMM GRANULOCYTES NFR BLD: 0.4 %
KETONES UR STRIP.AUTO-MCNC: NEGATIVE MG/DL
LYMPHOCYTES # BLD AUTO: 1.89 X10(3) UL (ref 1–4)
LYMPHOCYTES NFR BLD AUTO: 23.8 %
M PROTEIN MFR SERPL ELPH: 8.1 G/DL (ref 6.4–8.2)
MCH RBC QN AUTO: 28.5 PG (ref 26–34)
MCHC RBC AUTO-ENTMCNC: 32 G/DL (ref 31–37)
MCV RBC AUTO: 89.2 FL (ref 80–100)
MONOCYTES # BLD AUTO: 0.66 X10(3) UL (ref 0.1–1)
MONOCYTES NFR BLD AUTO: 8.3 %
NEUTROPHILS # BLD AUTO: 5.2 X10 (3) UL (ref 1.5–7.7)
NEUTROPHILS # BLD AUTO: 5.2 X10(3) UL (ref 1.5–7.7)
NEUTROPHILS NFR BLD AUTO: 65.3 %
NITRITE UR QL STRIP.AUTO: NEGATIVE
OSMOLALITY SERPL CALC.SUM OF ELEC: 286 MOSM/KG (ref 275–295)
PH UR STRIP.AUTO: 7 [PH] (ref 4.5–8)
PLATELET # BLD AUTO: 284 10(3)UL (ref 150–450)
POCT LOT NUMBER: NORMAL
POCT URINE PREGNANCY: NEGATIVE
POTASSIUM SERPL-SCNC: 3.5 MMOL/L (ref 3.5–5.1)
RBC # BLD AUTO: 4.73 X10(6)UL (ref 3.8–5.3)
SODIUM SERPL-SCNC: 139 MMOL/L (ref 136–145)
SP GR UR STRIP.AUTO: 1.01 (ref 1–1.03)
UROBILINOGEN UR STRIP.AUTO-MCNC: 0.2 MG/DL
WBC # BLD AUTO: 8 X10(3) UL (ref 4–11)

## 2020-02-27 PROCEDURE — 99284 EMERGENCY DEPT VISIT MOD MDM: CPT

## 2020-02-27 PROCEDURE — 96374 THER/PROPH/DIAG INJ IV PUSH: CPT

## 2020-02-27 PROCEDURE — 96376 TX/PRO/DX INJ SAME DRUG ADON: CPT

## 2020-02-27 PROCEDURE — 80053 COMPREHEN METABOLIC PANEL: CPT | Performed by: EMERGENCY MEDICINE

## 2020-02-27 PROCEDURE — 85025 COMPLETE CBC W/AUTO DIFF WBC: CPT | Performed by: EMERGENCY MEDICINE

## 2020-02-27 PROCEDURE — 81001 URINALYSIS AUTO W/SCOPE: CPT | Performed by: EMERGENCY MEDICINE

## 2020-02-27 PROCEDURE — 87086 URINE CULTURE/COLONY COUNT: CPT | Performed by: EMERGENCY MEDICINE

## 2020-02-27 PROCEDURE — 71275 CT ANGIOGRAPHY CHEST: CPT | Performed by: EMERGENCY MEDICINE

## 2020-02-27 PROCEDURE — 96361 HYDRATE IV INFUSION ADD-ON: CPT

## 2020-02-27 PROCEDURE — 81025 URINE PREGNANCY TEST: CPT

## 2020-02-27 PROCEDURE — 96375 TX/PRO/DX INJ NEW DRUG ADDON: CPT

## 2020-02-27 PROCEDURE — 76377 3D RENDER W/INTRP POSTPROCES: CPT

## 2020-02-27 PROCEDURE — 74176 CT ABD & PELVIS W/O CONTRAST: CPT | Performed by: EMERGENCY MEDICINE

## 2020-02-27 RX ORDER — KETOROLAC TROMETHAMINE 30 MG/ML
30 INJECTION, SOLUTION INTRAMUSCULAR; INTRAVENOUS ONCE
Status: COMPLETED | OUTPATIENT
Start: 2020-02-27 | End: 2020-02-27

## 2020-02-27 RX ORDER — DIPHENHYDRAMINE HYDROCHLORIDE 50 MG/ML
25 INJECTION INTRAMUSCULAR; INTRAVENOUS ONCE
Status: COMPLETED | OUTPATIENT
Start: 2020-02-27 | End: 2020-02-27

## 2020-02-27 RX ORDER — ONDANSETRON 2 MG/ML
4 INJECTION INTRAMUSCULAR; INTRAVENOUS ONCE
Status: COMPLETED | OUTPATIENT
Start: 2020-02-27 | End: 2020-02-27

## 2020-02-27 RX ORDER — DIAZEPAM 5 MG/1
5 TABLET ORAL 3 TIMES DAILY PRN
Qty: 15 TABLET | Refills: 0 | Status: SHIPPED | OUTPATIENT
Start: 2020-02-27 | End: 2020-03-05

## 2020-02-27 RX ORDER — HYDROMORPHONE HYDROCHLORIDE 1 MG/ML
0.5 INJECTION, SOLUTION INTRAMUSCULAR; INTRAVENOUS; SUBCUTANEOUS ONCE
Status: COMPLETED | OUTPATIENT
Start: 2020-02-27 | End: 2020-02-27

## 2020-02-27 RX ORDER — DIAZEPAM 5 MG/1
5 TABLET ORAL ONCE
Status: COMPLETED | OUTPATIENT
Start: 2020-02-27 | End: 2020-02-27

## 2020-02-27 NOTE — ED INITIAL ASSESSMENT (HPI)
States sharp left flank and abdomen onset yesterday with nausea and dizziness.  States has hx of kidney stones and has scheduled appointment for procedure

## 2020-02-27 NOTE — ED PROVIDER NOTES
Patient Seen in: [facility] Emergency Department In [place]      History   Patient presents with:  Abdomen/Flank Pain  Nausea/Vomiting/Diarrhea    Stated Complaint: left flank pain and nausea onset yesterday    HPI    Left flank pain associated with nause Physical Exam      Constitutional: Awake, alert, age appearing, non-toxic  Head: Normocephalic and atraumatic. Eyes: EOM are normal. Pupils are equal, round, and reactive to light. Neck: Normal range of motion. Neck supple. No JVD present. LIGHT GREEN   URINE CULTURE, ROUTINE   CBC W/ DIFFERENTIAL          I have personally visualized the Radiology studies and agree with interpretation from radiology.   Xr Abdomen (1 View) (cpt=74018)    Result Date: 2/18/2020  CONCLUSION:  Numerous left vijay Impression:  Acute left flank pain  (primary encounter diagnosis)    Disposition:  There is no disposition on file for this visit. There is no disposition time on file for this visit.     Follow-up:  Lorena Shi MD  [address] [place] [address] [address]  [place]

## 2020-02-27 NOTE — TELEPHONE ENCOUNTER
Patient would like nurse callback to discuss a supplement she has been taking:  Marine Collagen. It has been found to have lead in it.   Please call to triage

## 2020-02-27 NOTE — TELEPHONE ENCOUNTER
Pt green collagen /supplment for 5 days from 02/22-02/26 took dose of 2500 mg daily . Product has lead in it and not aware how much .  While on medication had metallic taste in mouth and developed  constipation and some dizziness on and off

## 2020-02-29 ENCOUNTER — APPOINTMENT (OUTPATIENT)
Dept: LAB | Age: 45
End: 2020-02-29
Attending: UROLOGY
Payer: MEDICAID

## 2020-02-29 DIAGNOSIS — N20.0 NEPHROLITHIASIS: ICD-10-CM

## 2020-02-29 PROCEDURE — 82340 ASSAY OF CALCIUM IN URINE: CPT

## 2020-02-29 PROCEDURE — 82436 ASSAY OF URINE CHLORIDE: CPT

## 2020-02-29 PROCEDURE — 82507 ASSAY OF CITRATE: CPT

## 2020-02-29 PROCEDURE — 83945 ASSAY OF OXALATE: CPT

## 2020-02-29 PROCEDURE — 84392 ASSAY OF URINE SULFATE: CPT

## 2020-03-04 ENCOUNTER — HOSPITAL ENCOUNTER (OUTPATIENT)
Dept: ULTRASOUND IMAGING | Age: 45
Discharge: HOME | End: 2020-03-04
Attending: UROLOGY
Payer: MEDICAID

## 2020-03-04 DIAGNOSIS — N20.0 KIDNEY STONE: ICD-10-CM

## 2020-03-04 PROCEDURE — 76775 US EXAM ABDO BACK WALL LIM: CPT | Performed by: UROLOGY

## 2020-03-05 ENCOUNTER — TELEPHONE (OUTPATIENT)
Dept: INTERNAL MEDICINE CLINIC | Facility: CLINIC | Age: 45
End: 2020-03-05

## 2020-03-05 LAB
CALCIUM URINE - PER 24H: 307 MG/D
CALCIUM URINE - PER VOL: 18.3 MG/DL
CHLORIDE URINE - PER 24H: 144 MMOL/D
CHLORIDE URINE - PER VOL.: 86 MMOL/L
CITRIC ACID, URINE - MG/DAY: 533 MG/D
CITRIC ACID, URINE - MG/L: 318 MG/L
CREATININE, URINE - PER 24H: 1759 MG/D
CREATININE, URINE - PER VOLUME: 105 MG/DL
HOURS COLLECTED: 24 HR
MAGNESIUM URINE - PER 24H: 69 MG/D
MAGNESIUM URINE - PER VOL: 4.1 MG/DL
OXALATE, URINE - MG/DAY: 17 MG/D
OXALATE, URINE - MG/L: 10 MG/L
PH, URINE: 6.49
PHOSPHORUS URINE - PER 24H: 804 MG/D
PHOSPHORUS URINE - PER VOL: 48 MG/DL
POTASSIUM URINE - PER 24H: 55 MMOL/D
POTASSIUM URINE - PER VOL.: 33 MMOL/L
SODIUM URINE - PER VOL.: 103 MMOL/L
SODIUM URINE -PER 24H: 173 MMOL/D
SULFATE URINE - PER 24H: 34 MMOL/D
SULFATE URINE - PER VOL: 20 MMOL/L
TOTAL VOLUME: 1675 ML
URIC ACID URINE - PER 24H: 717 MG/D
URIC ACID URINE - PER VOL: 42.8 MG/DL
URINE SUPERSATURATION, CAHPO4: 4.25
URINE SUPERSATURATION, CAOX: 4.17
URINE SUPERSATURATION, UA CALC: 0.22

## 2020-03-05 NOTE — TELEPHONE ENCOUNTER
Patient calling to request nurse callback; per a conversation she had with Dr Kaylin Hurst and they spoke about a surgery she was going to have; patient has scheduled surgery on 3/26/20 with DR Jim/Urology.  Please callback to discuss if she actually needs p

## 2020-03-07 ENCOUNTER — APPOINTMENT (OUTPATIENT)
Dept: CT IMAGING | Age: 45
End: 2020-03-07
Attending: EMERGENCY MEDICINE
Payer: MEDICAID

## 2020-03-07 ENCOUNTER — HOSPITAL ENCOUNTER (EMERGENCY)
Age: 45
Discharge: HOME | End: 2020-03-07
Attending: EMERGENCY MEDICINE
Payer: MEDICAID

## 2020-03-07 VITALS
HEART RATE: 78 BPM | DIASTOLIC BLOOD PRESSURE: 70 MMHG | SYSTOLIC BLOOD PRESSURE: 130 MMHG | RESPIRATION RATE: 16 BRPM | HEIGHT: 59 IN | WEIGHT: 140 LBS | OXYGEN SATURATION: 99 % | BODY MASS INDEX: 28.22 KG/M2 | TEMPERATURE: 98 F

## 2020-03-07 DIAGNOSIS — R10.9 FLANK PAIN: Primary | ICD-10-CM

## 2020-03-07 DIAGNOSIS — N30.01 ACUTE CYSTITIS WITH HEMATURIA: ICD-10-CM

## 2020-03-07 LAB
ALBUMIN SERPL-MCNC: 3.7 G/DL (ref 3.4–5)
ALBUMIN/GLOB SERPL: 0.8 {RATIO} (ref 1–2)
ALP LIVER SERPL-CCNC: 93 U/L (ref 37–98)
ALT SERPL-CCNC: 44 U/L (ref 13–56)
ANION GAP SERPL CALC-SCNC: 6 MMOL/L (ref 0–18)
AST SERPL-CCNC: 21 U/L (ref 15–37)
BASOPHILS # BLD AUTO: 0.03 X10(3) UL (ref 0–0.2)
BASOPHILS NFR BLD AUTO: 0.4 %
BILIRUB SERPL-MCNC: 0.3 MG/DL (ref 0.1–2)
BILIRUB UR QL STRIP.AUTO: NEGATIVE
BUN BLD-MCNC: 11 MG/DL (ref 7–18)
BUN/CREAT SERPL: 14.9 (ref 10–20)
CALCIUM BLD-MCNC: 8.9 MG/DL (ref 8.5–10.1)
CHLORIDE SERPL-SCNC: 109 MMOL/L (ref 98–112)
CO2 SERPL-SCNC: 23 MMOL/L (ref 21–32)
COLOR UR AUTO: YELLOW
CREAT BLD-MCNC: 0.74 MG/DL (ref 0.55–1.02)
DEPRECATED RDW RBC AUTO: 43 FL (ref 35.1–46.3)
EOSINOPHIL # BLD AUTO: 0.23 X10(3) UL (ref 0–0.7)
EOSINOPHIL NFR BLD AUTO: 2.7 %
ERYTHROCYTE [DISTWIDTH] IN BLOOD BY AUTOMATED COUNT: 13.4 % (ref 11–15)
GLOBULIN PLAS-MCNC: 4.5 G/DL (ref 2.8–4.4)
GLUCOSE BLD-MCNC: 79 MG/DL (ref 70–99)
GLUCOSE UR STRIP.AUTO-MCNC: NEGATIVE MG/DL
HCT VFR BLD AUTO: 42.6 % (ref 35–48)
HGB BLD-MCNC: 13.8 G/DL (ref 12–16)
IMM GRANULOCYTES # BLD AUTO: 0.02 X10(3) UL (ref 0–1)
IMM GRANULOCYTES NFR BLD: 0.2 %
KETONES UR STRIP.AUTO-MCNC: NEGATIVE MG/DL
LIPASE SERPL-CCNC: 203 U/L (ref 73–393)
LYMPHOCYTES # BLD AUTO: 1.77 X10(3) UL (ref 1–4)
LYMPHOCYTES NFR BLD AUTO: 20.9 %
M PROTEIN MFR SERPL ELPH: 8.2 G/DL (ref 6.4–8.2)
MCH RBC QN AUTO: 28.3 PG (ref 26–34)
MCHC RBC AUTO-ENTMCNC: 32.4 G/DL (ref 31–37)
MCV RBC AUTO: 87.5 FL (ref 80–100)
MONOCYTES # BLD AUTO: 0.73 X10(3) UL (ref 0.1–1)
MONOCYTES NFR BLD AUTO: 8.6 %
NEUTROPHILS # BLD AUTO: 5.68 X10 (3) UL (ref 1.5–7.7)
NEUTROPHILS # BLD AUTO: 5.68 X10(3) UL (ref 1.5–7.7)
NEUTROPHILS NFR BLD AUTO: 67.2 %
NITRITE UR QL STRIP.AUTO: NEGATIVE
OSMOLALITY SERPL CALC.SUM OF ELEC: 284 MOSM/KG (ref 275–295)
PH UR STRIP.AUTO: 6 [PH] (ref 4.5–8)
PLATELET # BLD AUTO: 284 10(3)UL (ref 150–450)
POCT URINE PREGNANCY: NEGATIVE
POTASSIUM SERPL-SCNC: 3.4 MMOL/L (ref 3.5–5.1)
PROCEDURE CONTROL: YES
RBC # BLD AUTO: 4.87 X10(6)UL (ref 3.8–5.3)
SODIUM SERPL-SCNC: 138 MMOL/L (ref 136–145)
SP GR UR STRIP.AUTO: 1.02 (ref 1–1.03)
UROBILINOGEN UR STRIP.AUTO-MCNC: 0.2 MG/DL
WBC # BLD AUTO: 8.5 X10(3) UL (ref 4–11)

## 2020-03-07 PROCEDURE — 87086 URINE CULTURE/COLONY COUNT: CPT | Performed by: EMERGENCY MEDICINE

## 2020-03-07 PROCEDURE — 80053 COMPREHEN METABOLIC PANEL: CPT | Performed by: EMERGENCY MEDICINE

## 2020-03-07 PROCEDURE — 83690 ASSAY OF LIPASE: CPT | Performed by: EMERGENCY MEDICINE

## 2020-03-07 PROCEDURE — 85025 COMPLETE CBC W/AUTO DIFF WBC: CPT | Performed by: EMERGENCY MEDICINE

## 2020-03-07 PROCEDURE — 96374 THER/PROPH/DIAG INJ IV PUSH: CPT

## 2020-03-07 PROCEDURE — 81025 URINE PREGNANCY TEST: CPT

## 2020-03-07 PROCEDURE — 99284 EMERGENCY DEPT VISIT MOD MDM: CPT

## 2020-03-07 PROCEDURE — 81001 URINALYSIS AUTO W/SCOPE: CPT | Performed by: EMERGENCY MEDICINE

## 2020-03-07 PROCEDURE — 74176 CT ABD & PELVIS W/O CONTRAST: CPT | Performed by: EMERGENCY MEDICINE

## 2020-03-07 PROCEDURE — 96361 HYDRATE IV INFUSION ADD-ON: CPT

## 2020-03-07 RX ORDER — SODIUM CHLORIDE 9 MG/ML
INJECTION, SOLUTION INTRAVENOUS ONCE
Status: COMPLETED | OUTPATIENT
Start: 2020-03-07 | End: 2020-03-07

## 2020-03-07 RX ORDER — KETOROLAC TROMETHAMINE 30 MG/ML
30 INJECTION, SOLUTION INTRAMUSCULAR; INTRAVENOUS ONCE
Status: COMPLETED | OUTPATIENT
Start: 2020-03-07 | End: 2020-03-07

## 2020-03-07 RX ORDER — POTASSIUM CHLORIDE 20 MEQ/1
20 TABLET, EXTENDED RELEASE ORAL ONCE
Status: COMPLETED | OUTPATIENT
Start: 2020-03-07 | End: 2020-03-07

## 2020-03-07 RX ORDER — SULFAMETHOXAZOLE AND TRIMETHOPRIM 800; 160 MG/1; MG/1
1 TABLET ORAL 2 TIMES DAILY
Qty: 20 TABLET | Refills: 0 | Status: SHIPPED | OUTPATIENT
Start: 2020-03-07 | End: 2020-03-17

## 2020-03-08 NOTE — ED PROVIDER NOTES
Patient Seen in: [facility] In [place]      History   Patient presents with:  Abdomen/Flank Pain: Left sided abd pain x2 weeks  Ear Problem Pain: left ear pain that began on Thursday    Stated Complaint: Left sided abd pain x2 weeks a     HPV   • Unspecified condition originating in the  period 04   • Visual impairment     glasses, contacts              Past Surgical History:   Procedure Laterality Date   •       x2   • CHOLECYSTECTOMY     • COLPOSCOPY, CERVIX Smoking status: Never Smoker      Smokeless tobacco: Never Used    Alcohol use: No    Drug use:  No             Review of Systems    Positive for stated complaint: Left sided abd pain x2 weeks and left ear pain that began on Thursday  Other systems are as Motor strength is 5 over 5 in all 4 extremities. There are no gross motor or sensory deficits appreciated. Patient is up and ambulatory in the emergency room with steady gait and no ataxia.            ED Course     Labs Reviewed   COMP METABOLIC PANEL (14) including a CBC, chemistries, BUN, and blood sugar all of which are unremarkable. Patient liver function tests and lipase are negative. Patient is not pregnant. Patient's urinalysis shows evidence of a urinary tract infection.   Patient was treated with

## 2020-03-10 ENCOUNTER — TELEPHONE (OUTPATIENT)
Dept: INTERNAL MEDICINE CLINIC | Facility: CLINIC | Age: 45
End: 2020-03-10

## 2020-03-10 ENCOUNTER — APPOINTMENT (OUTPATIENT)
Dept: LAB | Age: 45
End: 2020-03-10
Attending: FAMILY MEDICINE
Payer: MEDICAID

## 2020-03-10 DIAGNOSIS — Z32.00 ENCOUNTER FOR PREGNANCY TEST, RESULT UNKNOWN: ICD-10-CM

## 2020-03-10 DIAGNOSIS — Z32.00 ENCOUNTER FOR PREGNANCY TEST, RESULT UNKNOWN: Primary | ICD-10-CM

## 2020-03-10 LAB — HCG SERPL QL: NEGATIVE

## 2020-03-10 PROCEDURE — 36415 COLL VENOUS BLD VENIPUNCTURE: CPT

## 2020-03-10 PROCEDURE — 84703 CHORIONIC GONADOTROPIN ASSAY: CPT

## 2020-03-10 NOTE — TELEPHONE ENCOUNTER
Patient is feeling fatigue,nausea, headache, body heat and cramping and been spotting for 4 days.  So she would like to know if she can have an order put in for bld work for pregnancy

## 2020-03-13 ENCOUNTER — OFFICE VISIT (OUTPATIENT)
Dept: INTERNAL MEDICINE CLINIC | Facility: CLINIC | Age: 45
End: 2020-03-13
Payer: MEDICAID

## 2020-03-13 VITALS
HEART RATE: 64 BPM | BODY MASS INDEX: 31.65 KG/M2 | HEIGHT: 59 IN | RESPIRATION RATE: 16 BRPM | WEIGHT: 157 LBS | OXYGEN SATURATION: 98 % | SYSTOLIC BLOOD PRESSURE: 114 MMHG | DIASTOLIC BLOOD PRESSURE: 68 MMHG

## 2020-03-13 DIAGNOSIS — N13.2 URETERAL STONE WITH HYDRONEPHROSIS: ICD-10-CM

## 2020-03-13 DIAGNOSIS — I10 ESSENTIAL HYPERTENSION: ICD-10-CM

## 2020-03-13 DIAGNOSIS — Z01.818 PREOP EXAMINATION: Primary | ICD-10-CM

## 2020-03-13 PROCEDURE — 99243 OFF/OP CNSLTJ NEW/EST LOW 30: CPT | Performed by: FAMILY MEDICINE

## 2020-03-13 RX ORDER — OSELTAMIVIR PHOSPHATE 75 MG/1
75 CAPSULE ORAL DAILY
Qty: 10 CAPSULE | Refills: 0 | Status: SHIPPED | OUTPATIENT
Start: 2020-03-13 | End: 2020-05-05

## 2020-03-13 RX ORDER — PHENOL 1.4 %
1 AEROSOL, SPRAY (ML) MUCOUS MEMBRANE DAILY
COMMUNITY
Start: 2020-02-17 | End: 2020-07-29 | Stop reason: ALTCHOICE

## 2020-03-13 NOTE — PROGRESS NOTES
HPI:    Patient ID: Amador Castle is a 45year old female. HPI  Amador Presumtrent is a 45year old female.   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 Nestor Kim    • REMOVAL GALLBLADDER     • REMOVE STENT VIA Henrique Hernandeze Right 11/13/2019    Cysto Stent Removal w/ Dr Igor Grayson   • REPAIR ING HERNIA,5+Y/O,REDUCIBL          Social History:  Social History    Tobacco Use      Smoking status: Nev [Bupropi*    DIZZINESS    Comment:Headache  Toradol [Ketorolac *    OTHER (SEE COMMENTS)    Comment:Shortness of breath  Codeine Sulfate         ITCHING    Comment:TABS  Dilaudid [Hydromorp*    ITCHING  Morphine                ITCHING  Seasonal

## 2020-03-18 ENCOUNTER — APPOINTMENT (OUTPATIENT)
Dept: LAB | Age: 45
End: 2020-03-18
Payer: MEDICAID

## 2020-03-18 DIAGNOSIS — N20.0 LEFT NEPHROLITHIASIS: ICD-10-CM

## 2020-03-18 LAB
CHLORIDE SERPL-SCNC: 107 MMOL/L (ref 98–112)
CO2 SERPL-SCNC: 27 MMOL/L (ref 21–32)
POTASSIUM SERPL-SCNC: 3.9 MMOL/L (ref 3.5–5.1)
SODIUM SERPL-SCNC: 138 MMOL/L (ref 136–145)

## 2020-03-18 PROCEDURE — 80051 ELECTROLYTE PANEL: CPT

## 2020-03-18 PROCEDURE — 36415 COLL VENOUS BLD VENIPUNCTURE: CPT

## 2020-04-22 ENCOUNTER — LAB ENCOUNTER (OUTPATIENT)
Dept: LAB | Age: 45
End: 2020-04-22
Attending: UROLOGY
Payer: MEDICAID

## 2020-04-22 ENCOUNTER — HOSPITAL ENCOUNTER (OUTPATIENT)
Dept: GENERAL RADIOLOGY | Age: 45
Discharge: HOME | End: 2020-04-22
Attending: UROLOGY
Payer: MEDICAID

## 2020-04-22 DIAGNOSIS — N20.0 LEFT NEPHROLITHIASIS: ICD-10-CM

## 2020-04-22 PROCEDURE — 74018 RADEX ABDOMEN 1 VIEW: CPT | Performed by: UROLOGY

## 2020-04-22 PROCEDURE — 81001 URINALYSIS AUTO W/SCOPE: CPT

## 2020-04-22 PROCEDURE — 87086 URINE CULTURE/COLONY COUNT: CPT

## 2020-05-05 ENCOUNTER — OFFICE VISIT (OUTPATIENT)
Dept: OBGYN CLINIC | Facility: CLINIC | Age: 45
End: 2020-05-05
Payer: MEDICAID

## 2020-05-05 VITALS
DIASTOLIC BLOOD PRESSURE: 92 MMHG | SYSTOLIC BLOOD PRESSURE: 140 MMHG | TEMPERATURE: 99 F | HEIGHT: 59 IN | BODY MASS INDEX: 32.86 KG/M2 | WEIGHT: 163 LBS

## 2020-05-05 DIAGNOSIS — Z12.4 ENCOUNTER FOR SCREENING FOR CERVICAL CANCER: Primary | ICD-10-CM

## 2020-05-05 DIAGNOSIS — N93.0 POSTCOITAL BLEEDING: ICD-10-CM

## 2020-05-05 PROCEDURE — 87625 HPV TYPES 16 & 18 ONLY: CPT | Performed by: OBSTETRICS & GYNECOLOGY

## 2020-05-05 PROCEDURE — 99213 OFFICE O/P EST LOW 20 MIN: CPT | Performed by: OBSTETRICS & GYNECOLOGY

## 2020-05-05 PROCEDURE — 87624 HPV HI-RISK TYP POOLED RSLT: CPT | Performed by: OBSTETRICS & GYNECOLOGY

## 2020-05-05 PROCEDURE — 88175 CYTOPATH C/V AUTO FLUID REDO: CPT | Performed by: OBSTETRICS & GYNECOLOGY

## 2020-05-05 PROCEDURE — 87510 GARDNER VAG DNA DIR PROBE: CPT | Performed by: OBSTETRICS & GYNECOLOGY

## 2020-05-05 PROCEDURE — 87660 TRICHOMONAS VAGIN DIR PROBE: CPT | Performed by: OBSTETRICS & GYNECOLOGY

## 2020-05-05 PROCEDURE — 87480 CANDIDA DNA DIR PROBE: CPT | Performed by: OBSTETRICS & GYNECOLOGY

## 2020-05-05 NOTE — PROGRESS NOTES
GYN H&P     5/5/2020  12:36 PM    CC: Patient is here for abnormal vaginal bleeding    HPI: patient is a 39year old [id number] here for AUB. Pt reports that she has had occasional postcoital bleeding and has had prolonged menses.  Will last for 5 days, then due to anxiety according to patient   • Calculus of kidney    • Disorder of liver     elevated AST/ALT   • Disorder of thyroid     hyperthyroid- no meds   • Gestational diabetes 4/2014   • High blood pressure     No meds/diet control   • Kidney stones    • 2001, 2007 & 11/11   • OTHER Bilateral 2012    nerve surgery to bilateral arms about 1 month apart   • OTHER SURGICAL HISTORY  12/27/2019    cysto stent removal - dr. Triny Marsh    • REMOVAL GALLBLADDER     • Venu Myles 12 Right 11/13/2019 04/30/2020 (Exact Date)   BMI 32.92 kg/m²     Physical Exam   Vitals reviewed. Constitutional: She appears well-developed and well-nourished. Abdominal: Soft. Normal appearance. She exhibits no distension, no fluid wave and no mass.  There is tenderness she has her kidney surgery for further plan.  I stressed she could still get pregnant and needs to use condoms  - VAGINITIS/VAGINOSIS, DNA PROBE; Future      Marco Antonio Aldana MD

## 2020-05-08 ENCOUNTER — TELEPHONE (OUTPATIENT)
Dept: INTERNAL MEDICINE CLINIC | Facility: CLINIC | Age: 45
End: 2020-05-08

## 2020-05-08 DIAGNOSIS — E04.1 THYROID NODULE: ICD-10-CM

## 2020-05-08 DIAGNOSIS — R42 DIZZY: Primary | ICD-10-CM

## 2020-05-08 DIAGNOSIS — R11.0 NAUSEA: ICD-10-CM

## 2020-05-08 DIAGNOSIS — E83.51 HYPOCALCEMIA: ICD-10-CM

## 2020-05-08 NOTE — TELEPHONE ENCOUNTER
Pt states she just had kidney surgery and had suggested to her that she has full blood work ordered by pcp pt did say if had any questions to contact her

## 2020-05-08 NOTE — TELEPHONE ENCOUNTER
Pt has not had procedure yet for kidney it is scheduled 05/19/20. Pt saw Kala Richardson who is requesting her pcp order blood work to check thyroid,potassuimn , Iron, calcium,and Vit D due to her c/o of being nauseated and dizzy  . Gyne did not order labs since th

## 2020-05-09 ENCOUNTER — LABORATORY ENCOUNTER (OUTPATIENT)
Dept: LAB | Age: 45
End: 2020-05-09
Attending: FAMILY MEDICINE
Payer: MEDICAID

## 2020-05-09 DIAGNOSIS — R42 DIZZY: ICD-10-CM

## 2020-05-09 DIAGNOSIS — R11.0 NAUSEA: ICD-10-CM

## 2020-05-09 DIAGNOSIS — E83.51 HYPOCALCEMIA: ICD-10-CM

## 2020-05-09 DIAGNOSIS — E04.1 THYROID NODULE: ICD-10-CM

## 2020-05-09 PROCEDURE — 84443 ASSAY THYROID STIM HORMONE: CPT

## 2020-05-09 PROCEDURE — 36415 COLL VENOUS BLD VENIPUNCTURE: CPT

## 2020-05-09 PROCEDURE — 83540 ASSAY OF IRON: CPT

## 2020-05-09 PROCEDURE — 82306 VITAMIN D 25 HYDROXY: CPT

## 2020-05-09 PROCEDURE — 80053 COMPREHEN METABOLIC PANEL: CPT

## 2020-05-11 ENCOUNTER — TELEPHONE (OUTPATIENT)
Dept: OBGYN CLINIC | Facility: CLINIC | Age: 45
End: 2020-05-11

## 2020-05-11 RX ORDER — METRONIDAZOLE 7.5 MG/G
1 GEL VAGINAL NIGHTLY
Qty: 70 G | Refills: 0 | Status: SHIPPED | OUTPATIENT
Start: 2020-05-11 | End: 2020-05-16

## 2020-05-11 NOTE — PROGRESS NOTES
Please let pt know:  1. Her pap smear, cytology was normal but it was + for HPV.  It was not a high risk strain, so generally the recommendation is to repeat in 1 year, since her last pap in 2018 was normal. However, she has had postcoital bleeding--so I wo

## 2020-05-11 NOTE — PROGRESS NOTES
Patient informed of results. Colposcopy explained to patient. She will call to schedule. RX sent to pharmacy for BV. No further questions or concerns.

## 2020-05-12 ENCOUNTER — ULTRASOUND ENCOUNTER (OUTPATIENT)
Dept: OBGYN CLINIC | Facility: CLINIC | Age: 45
End: 2020-05-12
Payer: MEDICAID

## 2020-05-12 ENCOUNTER — TELEPHONE (OUTPATIENT)
Dept: OBGYN CLINIC | Facility: CLINIC | Age: 45
End: 2020-05-12

## 2020-05-12 ENCOUNTER — APPOINTMENT (OUTPATIENT)
Dept: LAB | Age: 45
End: 2020-05-12
Attending: FAMILY MEDICINE
Payer: MEDICAID

## 2020-05-12 DIAGNOSIS — N92.4 EXCESSIVE BLEEDING IN PREMENOPAUSAL PERIOD: Primary | ICD-10-CM

## 2020-05-12 DIAGNOSIS — R11.0 NAUSEA: ICD-10-CM

## 2020-05-12 DIAGNOSIS — E83.51 HYPOCALCEMIA: ICD-10-CM

## 2020-05-12 DIAGNOSIS — R42 DIZZY: ICD-10-CM

## 2020-05-12 DIAGNOSIS — E04.1 THYROID NODULE: ICD-10-CM

## 2020-05-12 PROCEDURE — 80053 COMPREHEN METABOLIC PANEL: CPT

## 2020-05-12 PROCEDURE — 83540 ASSAY OF IRON: CPT

## 2020-05-12 PROCEDURE — 36415 COLL VENOUS BLD VENIPUNCTURE: CPT

## 2020-05-12 PROCEDURE — 76856 US EXAM PELVIC COMPLETE: CPT | Performed by: OBSTETRICS & GYNECOLOGY

## 2020-05-12 PROCEDURE — 76830 TRANSVAGINAL US NON-OB: CPT | Performed by: OBSTETRICS & GYNECOLOGY

## 2020-05-12 RX ORDER — ERGOCALCIFEROL 1.25 MG/1
50000 CAPSULE ORAL WEEKLY
Qty: 12 CAPSULE | Refills: 0 | Status: SHIPPED | OUTPATIENT
Start: 2020-05-12 | End: 2020-07-29 | Stop reason: ALTCHOICE

## 2020-05-12 NOTE — TELEPHONE ENCOUNTER
Colposcopy should be scheduled with Dr. Timothy Dewey in [place] next available appt. Per Dr. Sophie Maki notes, this can wait until after patient has her kidney procedure, which is scheduled for 5/19/20.

## 2020-05-12 NOTE — TELEPHONE ENCOUNTER
Pt rescheduled   Future Appointments   Date Time Provider Duc Jeffries   5/19/2020 10:15 AM Triston Haile, DO [facility]   6/23/2020 10:30 AM Norma Rothman MD [facility]

## 2020-05-12 NOTE — TELEPHONE ENCOUNTER
Pt has a colpo scheduled on Thursday 5/14. I know it needs to be r/s to [place]. But the patient wanted to make sure she doesn't have to wait until she is dont with treatment. Her Urologist (Dr. Sanjuana Martínez) said it would be fine.   Please advise

## 2020-05-13 DIAGNOSIS — R77.9 ELEVATED BLOOD PROTEIN: Primary | ICD-10-CM

## 2020-05-13 NOTE — PROGRESS NOTES
Please let pt know her US showed her lining is slightly thickened. We need to do an EMB. We can do it at the same time as her colpo. This could be what is causing her bleeding.  Thank you

## 2020-05-14 ENCOUNTER — TELEPHONE (OUTPATIENT)
Dept: OBGYN CLINIC | Facility: CLINIC | Age: 45
End: 2020-05-14

## 2020-05-14 NOTE — TELEPHONE ENCOUNTER
Pt calling with questions about pelvic US results. Results reviewed and explained to patient; all questions answered. Pt advised EMB to be done at the same time as colpo. Patient verbalized understanding.

## 2020-05-15 ENCOUNTER — TELEPHONE (OUTPATIENT)
Dept: OBGYN CLINIC | Facility: CLINIC | Age: 45
End: 2020-05-15

## 2020-05-16 ENCOUNTER — LAB ENCOUNTER (OUTPATIENT)
Dept: LAB | Facility: HOSPITAL | Age: 45
End: 2020-05-16
Attending: UROLOGY
Payer: MEDICAID

## 2020-05-16 DIAGNOSIS — Z01.818 PRE-OP TESTING: ICD-10-CM

## 2020-05-18 ENCOUNTER — ANESTHESIA EVENT (OUTPATIENT)
Dept: SURGERY | Facility: HOSPITAL | Age: 45
DRG: 661 | End: 2020-05-18
Payer: MEDICAID

## 2020-05-18 NOTE — ANESTHESIA PREPROCEDURE EVALUATION
PRE-OP EVALUATION    Patient Name: Mónica See    Pre-op Diagnosis: Left nephrolithiasis [N20.0]    Procedure(s):  CYSTOSCOPY, LEFT URETERAL CATHETERIZATION, LEFT PERCUTANEOUS RENAL ACCESS, LEFT PERCUTANEOUS NEPHROLITHOTOMY  POSSIBLE LEFT URETERAL STEN • CYSTO/URETERO W/UP STRICTURE Right 10/15/2019    Cysto Rt RPG, Rt URS w/ Laser Litho Dilation of Rtight Stricture Rt URS Stent Exchange w/ Dr Alysha Chappell   • CYSTO/URETERO, STONE REMOVE Right 12/18/2019    right ureter and kidney stones extraction, URS, 03/07/2020    .0 03/07/2020     Lab Results   Component Value Date     05/12/2020    K 3.6 05/12/2020     05/12/2020    CO2 27.0 05/12/2020    BUN 10 05/12/2020    CREATSERUM 0.81 05/12/2020    GLU 86 05/12/2020    CA 8.9 05/12/2020

## 2020-05-18 NOTE — CM/SW NOTE
Patient was screened, no dc needs are identified at this time. RN to contact SW/CM if needs arise.     Britney Tavarez [facility]  [phone]

## 2020-05-19 ENCOUNTER — ANESTHESIA (OUTPATIENT)
Dept: SURGERY | Facility: HOSPITAL | Age: 45
DRG: 661 | End: 2020-05-19
Payer: MEDICAID

## 2020-05-19 ENCOUNTER — HOSPITAL ENCOUNTER (INPATIENT)
Facility: HOSPITAL | Age: 45
LOS: 1 days | Discharge: HOME | DRG: 661 | End: 2020-05-20
Attending: UROLOGY | Admitting: UROLOGY
Payer: MEDICAID

## 2020-05-19 ENCOUNTER — APPOINTMENT (OUTPATIENT)
Dept: GENERAL RADIOLOGY | Facility: HOSPITAL | Age: 45
DRG: 661 | End: 2020-05-19
Attending: UROLOGY
Payer: MEDICAID

## 2020-05-19 DIAGNOSIS — Z01.818 PRE-OP TESTING: ICD-10-CM

## 2020-05-19 DIAGNOSIS — N20.0 LEFT NEPHROLITHIASIS: Primary | ICD-10-CM

## 2020-05-19 PROCEDURE — 99252 IP/OBS CONSLTJ NEW/EST SF 35: CPT | Performed by: HOSPITALIST

## 2020-05-19 PROCEDURE — BT121ZZ FLUOROSCOPY OF LEFT KIDNEY USING LOW OSMOLAR CONTRAST: ICD-10-PCS | Performed by: UROLOGY

## 2020-05-19 PROCEDURE — 76000 FLUOROSCOPY <1 HR PHYS/QHP: CPT | Performed by: UROLOGY

## 2020-05-19 PROCEDURE — 0T778DZ DILATION OF LEFT URETER WITH INTRALUMINAL DEVICE, VIA NATURAL OR ARTIFICIAL OPENING ENDOSCOPIC: ICD-10-PCS | Performed by: UROLOGY

## 2020-05-19 PROCEDURE — 0TC43ZZ EXTIRPATION OF MATTER FROM LEFT KIDNEY PELVIS, PERCUTANEOUS APPROACH: ICD-10-PCS | Performed by: UROLOGY

## 2020-05-19 DEVICE — URETERAL STENT
Type: IMPLANTABLE DEVICE | Site: KIDNEY | Status: FUNCTIONAL
Brand: ASCERTA™

## 2020-05-19 RX ORDER — SODIUM CHLORIDE 9 MG/ML
INJECTION, SOLUTION INTRAVENOUS CONTINUOUS
Status: DISCONTINUED | OUTPATIENT
Start: 2020-05-19 | End: 2020-05-20

## 2020-05-19 RX ORDER — DIPHENHYDRAMINE HYDROCHLORIDE 50 MG/ML
12.5 INJECTION INTRAMUSCULAR; INTRAVENOUS EVERY 4 HOURS PRN
Status: DISCONTINUED | OUTPATIENT
Start: 2020-05-19 | End: 2020-05-20

## 2020-05-19 RX ORDER — DIPHENHYDRAMINE HYDROCHLORIDE 50 MG/ML
12.5 INJECTION INTRAMUSCULAR; INTRAVENOUS EVERY 10 MIN PRN
Status: DISCONTINUED | OUTPATIENT
Start: 2020-05-19 | End: 2020-05-19 | Stop reason: HOSPADM

## 2020-05-19 RX ORDER — SODIUM CHLORIDE, SODIUM LACTATE, POTASSIUM CHLORIDE, CALCIUM CHLORIDE 600; 310; 30; 20 MG/100ML; MG/100ML; MG/100ML; MG/100ML
INJECTION, SOLUTION INTRAVENOUS CONTINUOUS
Status: DISCONTINUED | OUTPATIENT
Start: 2020-05-19 | End: 2020-05-19 | Stop reason: HOSPADM

## 2020-05-19 RX ORDER — CEFAZOLIN SODIUM/WATER 2 G/20 ML
2 SYRINGE (ML) INTRAVENOUS ONCE
Status: COMPLETED | OUTPATIENT
Start: 2020-05-19 | End: 2020-05-19

## 2020-05-19 RX ORDER — ONDANSETRON 2 MG/ML
INJECTION INTRAMUSCULAR; INTRAVENOUS AS NEEDED
Status: DISCONTINUED | OUTPATIENT
Start: 2020-05-19 | End: 2020-05-19 | Stop reason: SURG

## 2020-05-19 RX ORDER — DEXAMETHASONE SODIUM PHOSPHATE 4 MG/ML
VIAL (ML) INJECTION AS NEEDED
Status: DISCONTINUED | OUTPATIENT
Start: 2020-05-19 | End: 2020-05-19 | Stop reason: SURG

## 2020-05-19 RX ORDER — ACETAMINOPHEN 325 MG/1
650 TABLET ORAL EVERY 4 HOURS PRN
Status: DISCONTINUED | OUTPATIENT
Start: 2020-05-19 | End: 2020-05-20

## 2020-05-19 RX ORDER — TRAMADOL HYDROCHLORIDE 50 MG/1
50 TABLET ORAL EVERY 6 HOURS PRN
Status: DISCONTINUED | OUTPATIENT
Start: 2020-05-19 | End: 2020-05-20

## 2020-05-19 RX ORDER — ONDANSETRON 4 MG/1
4 TABLET, FILM COATED ORAL EVERY 6 HOURS PRN
Status: DISCONTINUED | OUTPATIENT
Start: 2020-05-19 | End: 2020-05-20

## 2020-05-19 RX ORDER — DEXTROSE MONOHYDRATE 25 G/50ML
50 INJECTION, SOLUTION INTRAVENOUS
Status: DISCONTINUED | OUTPATIENT
Start: 2020-05-19 | End: 2020-05-19 | Stop reason: HOSPADM

## 2020-05-19 RX ORDER — DIPHENHYDRAMINE HYDROCHLORIDE 50 MG/ML
12.5 INJECTION INTRAMUSCULAR; INTRAVENOUS AS NEEDED
Status: DISCONTINUED | OUTPATIENT
Start: 2020-05-19 | End: 2020-05-19 | Stop reason: HOSPADM

## 2020-05-19 RX ORDER — HYDROMORPHONE HYDROCHLORIDE 1 MG/ML
1 INJECTION, SOLUTION INTRAMUSCULAR; INTRAVENOUS; SUBCUTANEOUS EVERY 2 HOUR PRN
Status: DISCONTINUED | OUTPATIENT
Start: 2020-05-19 | End: 2020-05-20

## 2020-05-19 RX ORDER — ROCURONIUM BROMIDE 10 MG/ML
INJECTION, SOLUTION INTRAVENOUS AS NEEDED
Status: DISCONTINUED | OUTPATIENT
Start: 2020-05-19 | End: 2020-05-19 | Stop reason: SURG

## 2020-05-19 RX ORDER — LIDOCAINE HYDROCHLORIDE 10 MG/ML
INJECTION, SOLUTION EPIDURAL; INFILTRATION; INTRACAUDAL; PERINEURAL AS NEEDED
Status: DISCONTINUED | OUTPATIENT
Start: 2020-05-19 | End: 2020-05-19 | Stop reason: SURG

## 2020-05-19 RX ORDER — HYDROMORPHONE HYDROCHLORIDE 1 MG/ML
0.5 INJECTION, SOLUTION INTRAMUSCULAR; INTRAVENOUS; SUBCUTANEOUS EVERY 2 HOUR PRN
Status: DISCONTINUED | OUTPATIENT
Start: 2020-05-19 | End: 2020-05-20

## 2020-05-19 RX ORDER — DIPHENHYDRAMINE HCL 25 MG
25 CAPSULE ORAL EVERY 4 HOURS PRN
Status: DISCONTINUED | OUTPATIENT
Start: 2020-05-19 | End: 2020-05-20

## 2020-05-19 RX ORDER — HYDROMORPHONE HYDROCHLORIDE 1 MG/ML
INJECTION, SOLUTION INTRAMUSCULAR; INTRAVENOUS; SUBCUTANEOUS
Status: COMPLETED
Start: 2020-05-19 | End: 2020-05-19

## 2020-05-19 RX ORDER — ACETAMINOPHEN 500 MG
1000 TABLET ORAL ONCE
Status: DISCONTINUED | OUTPATIENT
Start: 2020-05-19 | End: 2020-05-19 | Stop reason: HOSPADM

## 2020-05-19 RX ORDER — DIPHENHYDRAMINE HYDROCHLORIDE 50 MG/ML
INJECTION INTRAMUSCULAR; INTRAVENOUS
Status: COMPLETED
Start: 2020-05-19 | End: 2020-05-19

## 2020-05-19 RX ORDER — ONDANSETRON 2 MG/ML
4 INJECTION INTRAMUSCULAR; INTRAVENOUS EVERY 6 HOURS PRN
Status: DISCONTINUED | OUTPATIENT
Start: 2020-05-19 | End: 2020-05-20

## 2020-05-19 RX ORDER — HYDROMORPHONE HYDROCHLORIDE 1 MG/ML
0.4 INJECTION, SOLUTION INTRAMUSCULAR; INTRAVENOUS; SUBCUTANEOUS EVERY 5 MIN PRN
Status: DISCONTINUED | OUTPATIENT
Start: 2020-05-19 | End: 2020-05-19 | Stop reason: HOSPADM

## 2020-05-19 RX ORDER — ACETAMINOPHEN 500 MG
TABLET ORAL EVERY 6 HOURS PRN
Status: ON HOLD | COMMUNITY
End: 2020-07-07

## 2020-05-19 RX ORDER — ONDANSETRON 2 MG/ML
4 INJECTION INTRAMUSCULAR; INTRAVENOUS AS NEEDED
Status: DISCONTINUED | OUTPATIENT
Start: 2020-05-19 | End: 2020-05-19 | Stop reason: HOSPADM

## 2020-05-19 RX ORDER — CEFAZOLIN SODIUM/WATER 2 G/20 ML
2 SYRINGE (ML) INTRAVENOUS EVERY 8 HOURS
Status: COMPLETED | OUTPATIENT
Start: 2020-05-19 | End: 2020-05-20

## 2020-05-19 RX ORDER — MIDAZOLAM HYDROCHLORIDE 1 MG/ML
INJECTION INTRAMUSCULAR; INTRAVENOUS AS NEEDED
Status: DISCONTINUED | OUTPATIENT
Start: 2020-05-19 | End: 2020-05-19 | Stop reason: SURG

## 2020-05-19 RX ORDER — NALOXONE HYDROCHLORIDE 0.4 MG/ML
80 INJECTION, SOLUTION INTRAMUSCULAR; INTRAVENOUS; SUBCUTANEOUS AS NEEDED
Status: DISCONTINUED | OUTPATIENT
Start: 2020-05-19 | End: 2020-05-19 | Stop reason: HOSPADM

## 2020-05-19 RX ORDER — SODIUM CHLORIDE, SODIUM LACTATE, POTASSIUM CHLORIDE, CALCIUM CHLORIDE 600; 310; 30; 20 MG/100ML; MG/100ML; MG/100ML; MG/100ML
INJECTION, SOLUTION INTRAVENOUS CONTINUOUS
Status: DISCONTINUED | OUTPATIENT
Start: 2020-05-19 | End: 2020-05-19

## 2020-05-19 RX ORDER — BUPIVACAINE HYDROCHLORIDE AND EPINEPHRINE 5; 5 MG/ML; UG/ML
INJECTION, SOLUTION EPIDURAL; INTRACAUDAL; PERINEURAL AS NEEDED
Status: DISCONTINUED | OUTPATIENT
Start: 2020-05-19 | End: 2020-05-19 | Stop reason: HOSPADM

## 2020-05-19 RX ADMIN — ROCURONIUM BROMIDE 10 MG: 10 INJECTION, SOLUTION INTRAVENOUS at 11:38:00

## 2020-05-19 RX ADMIN — CEFAZOLIN SODIUM/WATER 2 G: 2 G/20 ML SYRINGE (ML) INTRAVENOUS at 10:45:00

## 2020-05-19 RX ADMIN — ROCURONIUM BROMIDE 70 MG: 10 INJECTION, SOLUTION INTRAVENOUS at 10:36:00

## 2020-05-19 RX ADMIN — SODIUM CHLORIDE, SODIUM LACTATE, POTASSIUM CHLORIDE, CALCIUM CHLORIDE: 600; 310; 30; 20 INJECTION, SOLUTION INTRAVENOUS at 10:30:00

## 2020-05-19 RX ADMIN — DEXAMETHASONE SODIUM PHOSPHATE 4 MG: 4 MG/ML VIAL (ML) INJECTION at 10:36:00

## 2020-05-19 RX ADMIN — ONDANSETRON 4 MG: 2 INJECTION INTRAMUSCULAR; INTRAVENOUS at 12:54:00

## 2020-05-19 RX ADMIN — LIDOCAINE HYDROCHLORIDE 50 MG: 10 INJECTION, SOLUTION EPIDURAL; INFILTRATION; INTRACAUDAL; PERINEURAL at 10:34:00

## 2020-05-19 RX ADMIN — SODIUM CHLORIDE, SODIUM LACTATE, POTASSIUM CHLORIDE, CALCIUM CHLORIDE: 600; 310; 30; 20 INJECTION, SOLUTION INTRAVENOUS at 11:46:00

## 2020-05-19 RX ADMIN — ROCURONIUM BROMIDE 10 MG: 10 INJECTION, SOLUTION INTRAVENOUS at 12:08:00

## 2020-05-19 RX ADMIN — MIDAZOLAM HYDROCHLORIDE 2 MG: 1 INJECTION INTRAMUSCULAR; INTRAVENOUS at 10:30:00

## 2020-05-19 NOTE — PLAN OF CARE
Pt is oriented x4 and drowsy. VSS. 2L NC and tolerating. Urine is red tinged in abernathy bag and draining to gravity. Nephrostomy drainage is red tinged and connected to abernathy bag. Pt has not tried clear liquids yet.  Pt is rating pain as severe and is being m saturation or ABGs  - Provide Smoking Cessation handout, if applicable  - Encourage broncho-pulmonary hygiene including cough, deep breathe, Incentive Spirometry  - Assess the need for suctioning and perform as needed  - Assess and instruct to report SOB o and assess patient for signs and symptoms of electrolyte imbalances  - Administer electrolyte replacement as ordered  - Monitor response to electrolyte replacements, including rhythm and repeat lab results as appropriate  - Fluid restriction as ordered  -

## 2020-05-19 NOTE — OPERATIVE REPORT
[address] [facility]  [id number]  5/19/2020      PREOPERATIVE DIAGNOSIS:  Left renal calculi  POSTOPERATIVE DIAGNOSIS:  Left renal calculi  PROCEDURE:    1.        Cystoscopy, left ureteral catheterization, left percutaneous renal access, lef was removed leaving the ureteral catheter in place. A 16-Yakut Jett catheter was placed in the bladder and was connected to gravity bag drainage. The ureteral catheter was then affixed to the Jett catheter externally with Tegaderm adhesive bandages. flexible cystoscope was passed and was used to access the superior and mid pole calyces all of which contained calculi up to 10 mm in size in the upper pole.  All of the calculi were grasped with a nitinol zero tipped basket and were able to be removed with admitted to the hospital for further care. We will plan to have her follow up in 1 to 2 weeks as an outpatient for office cystoscopy and stent removal.  A KUB will be necessary prior to that appointment.         Dennise Espana, [address]

## 2020-05-19 NOTE — H&P
[facility] Urology H&P    Jose Shahid Patient Status:  Inpatient    3/22/1975 MRN [id number]   Location [address] PRE OP HOLDING Attending Diego Flores, [address] [facility] Day # 0 PCP Dereje Christy MD     Chief complaint:  Left nephrolit 6/16/2019    Performed by Joesph Flores DO at [address] [place] [address] URETEROSCOPY Right 10/15/2019    Performed by Joesph Flores DO at [facility] MAIN OR   • CYSTOSCOPY URETEROSCOPY Bilateral 9/1/2019    Performed by Can Marie MD at [address] [place] [address] ITCHING    Comment:TABS  Dilaudid [Hydromorp*    ITCHING  Morphine                ITCHING  Seasonal                Runny nose  Vicodin [Hydrocodon*    ITCHING    Medications:    Current Facility-Administered Medications:   •  lactated ringers infusi the left collecting system which have not responded well to dusting attempts in past  -Intermittent flank pain though recent KUB showed no active stone passage     PLAN    1.  To OR for cystoscopy, left ureteral catheterization, left percutaneous renal acce

## 2020-05-19 NOTE — CONSULTS
[facility]  [address] Patient Status:  Inpatient    3/22/1975 MRN [id number]   [facility] 3NW-A Attending Erma Hall, [address] [facility] Day # 0 PCP Yaakov Rogers MD     Reason for consult: med mgmt    Requested b Performed by Justino Mccall DO at [address] URETEROSCOPY Right 10/15/2019    Performed by Justino Mccall DO at [facility] MAIN OR   • CYSTOSCOPY URETEROSCOPY Bilateral 9/1/2019    Performed by Roseann Eagle MD at [address] [place] Comment:Headache  Codeine Sulfate         ITCHING    Comment:TABS  Dilaudid [Hydromorp*    ITCHING  Morphine                ITCHING  Seasonal                Runny nose  Vicodin [Hydrocodon*    ITCHING    Medications:  No current facility-administered medic input(s): PTP, INR in the last 168 hours. No results for input(s): TROP, CK in the last 168 hours. Imaging: Imaging data reviewed in Epic. ASSESSMENT / PLAN:     1. L renal calculi s/p L percutaneous nephrostomy tube  1. Urology as primary  2.  L

## 2020-05-19 NOTE — ANESTHESIA PROCEDURE NOTES
Airway  Date/Time: 5/19/2020 10:39 AM  Urgency: elective    Airway not difficult    General Information and Staff    Patient location during procedure: OR  Anesthesiologist: Levy Kim MD  Performed: anesthesiologist     Indications and Patient Estrellita Baca

## 2020-05-19 NOTE — ANESTHESIA POSTPROCEDURE EVALUATION
[address] Patient Status:  Inpatient   Age/Gender 39year old female MRN [id number]   [facility] SURGERY Attending González Mitchell, [address] [facility] [place]note 2[place] Day # 0 PCP Agustin Gregorio MD       Anesthesia Post-op Note    Procedure(s

## 2020-05-20 VITALS
WEIGHT: 153 LBS | RESPIRATION RATE: 18 BRPM | BODY MASS INDEX: 30.84 KG/M2 | OXYGEN SATURATION: 96 % | TEMPERATURE: 98 F | HEIGHT: 59 IN | HEART RATE: 73 BPM | SYSTOLIC BLOOD PRESSURE: 120 MMHG | DIASTOLIC BLOOD PRESSURE: 72 MMHG

## 2020-05-20 PROCEDURE — 99232 SBSQ HOSP IP/OBS MODERATE 35: CPT | Performed by: HOSPITALIST

## 2020-05-20 RX ORDER — POLYETHYLENE GLYCOL 3350 17 G/17G
17 POWDER, FOR SOLUTION ORAL DAILY
Status: DISCONTINUED | OUTPATIENT
Start: 2020-05-20 | End: 2020-05-20

## 2020-05-20 RX ORDER — OXYCODONE HYDROCHLORIDE AND ACETAMINOPHEN 5; 325 MG/1; MG/1
2 TABLET ORAL EVERY 4 HOURS PRN
Status: DISCONTINUED | OUTPATIENT
Start: 2020-05-20 | End: 2020-05-20

## 2020-05-20 RX ORDER — CEFUROXIME AXETIL 500 MG/1
500 TABLET ORAL EVERY 12 HOURS SCHEDULED
Status: DISCONTINUED | OUTPATIENT
Start: 2020-05-20 | End: 2020-05-20

## 2020-05-20 RX ORDER — DIPHENHYDRAMINE HCL 25 MG
25 CAPSULE ORAL EVERY 6 HOURS PRN
Qty: 30 CAPSULE | Refills: 0 | Status: ON HOLD | OUTPATIENT
Start: 2020-05-20 | End: 2020-07-07

## 2020-05-20 RX ORDER — ONDANSETRON 4 MG/1
4 TABLET, ORALLY DISINTEGRATING ORAL EVERY 4 HOURS PRN
Qty: 20 TABLET | Refills: 0 | Status: ON HOLD | OUTPATIENT
Start: 2020-05-20 | End: 2020-07-07

## 2020-05-20 RX ORDER — OXYCODONE HYDROCHLORIDE AND ACETAMINOPHEN 5; 325 MG/1; MG/1
1 TABLET ORAL EVERY 6 HOURS PRN
Qty: 20 TABLET | Refills: 0 | Status: SHIPPED | OUTPATIENT
Start: 2020-05-20 | End: 2020-06-03 | Stop reason: ALTCHOICE

## 2020-05-20 RX ORDER — CEFUROXIME AXETIL 500 MG/1
500 TABLET ORAL 2 TIMES DAILY
Qty: 13 TABLET | Refills: 0 | Status: SHIPPED | OUTPATIENT
Start: 2020-05-20 | End: 2020-06-03 | Stop reason: ALTCHOICE

## 2020-05-20 RX ORDER — OXYCODONE HYDROCHLORIDE AND ACETAMINOPHEN 5; 325 MG/1; MG/1
1-2 TABLET ORAL EVERY 4 HOURS PRN
Status: DISCONTINUED | OUTPATIENT
Start: 2020-05-20 | End: 2020-05-20 | Stop reason: SDUPTHER

## 2020-05-20 RX ORDER — OXYCODONE HYDROCHLORIDE AND ACETAMINOPHEN 5; 325 MG/1; MG/1
1 TABLET ORAL EVERY 4 HOURS PRN
Status: DISCONTINUED | OUTPATIENT
Start: 2020-05-20 | End: 2020-05-20

## 2020-05-20 NOTE — PROGRESS NOTES
[facility]    Progress Note    Dusty Wright Patient Status:  Inpatient    3/22/1975 MRN [id number]   [facility] 3NW-A Attending Vaina Leger, DO   Hosp Day # 1 PCP Clarisa Whitt MD        Subjective:   Dusty Wright is a(n) AST 19 05/12/2020    ALT 31 05/12/2020    PTT 28.4 07/05/2012    INR 1.04 07/05/2012    PT 13.3 07/05/2012    T4F 0.9 12/27/2019    TSH 1.350 05/09/2020    SAUL 46 07/21/2011     03/07/2020    DDIMER 0.59 (H) 01/28/2014    ESRML 25 02/26/2017    M

## 2020-05-20 NOTE — PLAN OF CARE
Problem: PAIN - ADULT  Goal: Verbalizes/displays adequate comfort level or patient's stated pain goal  Description  INTERVENTIONS:  - Encourage pt to monitor pain and request assistance  - Assess pain using appropriate pain scale  - Administer analgesics Progressing     Problem: DISCHARGE PLANNING  Goal: Discharge to home or other facility with appropriate resources  Description  INTERVENTIONS:  - Identify barriers to discharge w/pt and caregiver  - Include patient/family/discharge partner in discharge daniella routine/schedule  - Consider collaborating with pharmacy to review patient's medication profile  5/20/2020 1641 by Castro Khan, RN  Outcome: Adequate for Discharge  5/20/2020 1101 by Castro Khan RN  Outcome: Progressing  Goal: Maintains adequate nutri intact  Description  INTERVENTIONS  - Assess and document risk factors for pressure ulcer development  - Assess and document skin integrity  - Monitor for areas of redness and/or skin breakdown  - Initiate interventions, skin care algorithm/standards of ca Scott Tran RN  Outcome: Progressing   Tolerated diet well , tolerated pain with oral pain medicine . Voids bloody urine . Nephrostomy site leaking , changed the dressing .  Vitals stable , updated with patient

## 2020-05-20 NOTE — PROGRESS NOTES
UROLOGY ADDENDUM    Pt tolerated 2 hours of NT clamping.    LEFT NEPH TUBE removed at bedside  Dressings applied    Surgically stable for d/c home later today  abx x 7 days  Stool regimen  Pain meds prn    F/u 1/2 wks office stent removal    Please refer to

## 2020-05-20 NOTE — PAYOR COMM NOTE
--------------  ADMISSION REVIEW     Payor: Joel Liang #:  [id number]  Authorization Number: [id number]    Admit date: 5/19/20  Admit time: 0947       Admitting Physician: DO Yonny Zaratei glasses, contacts     Past Surgical History:   Procedure Laterality Date   •       x2   • CHOLECYSTECTOMY     • COLPOSCOPY, CERVIX W/UPPER ADJACENT VAGINA; W/BIOPSY(S), CERVIX     • CRYOCAUTERY OF CERVIX     • CYSTO/URETERO W/UP STRICTURE Rig (Other) Mother         Thyroid disease   • Diabetes Maternal Grandmother    • Diabetes Maternal Grandfather    • Diabetes Paternal Grandmother    • Ovarian Cancer Paternal Grandmother 47   • Diabetes Paternal Grandfather       reports that she has never sm not elsewhere classified     Migraine with aura, without mention of intractable migraine without mention of status migrainosus     Calculus of kidney     Body mass index between 19-24, adult     Lesion of ulnar nerve     Cervicalgia     Enthesopathy of wri Hernán, [address] [place] [address] Group Urology            Electronically signed by Dionte May DO on 5/19/2020 10:15 AM         MEDICATIONS ADMINISTERED IN LAST 1 DAY:  acetaminophen (TYLENOL) tab 650 mg     Date Action Dose Route User    5/20/2020 1410 Given 0.4 mg Intravenous Rob Zaheer, RN    5/19/2020 1405 Given 0.4 mg Intravenous Rob Zaheer, RN    5/19/2020 1357 Given 0.4 mg Intravenous Rob Zaheer, RN    5/19/2020 1352 Given 0.4 mg Intravenous Rob Zaheer, RN    5/19/2020 1347 Giv Bag (none) Intravenous Lor Costa RN    5/19/2020 1801 New Bag (none) Intravenous India Mata RN      sugammadex Yousif Owusu) 200 MG/2ML injection     Date Action Dose Route User    5/19/2020 1309 Given 200 mg Intravenous Antonia Coles MD      traM and into the bladder.  Upon entering the bladder, the bilateral ureteral orifices were seen in their normal expected anatomic position.  A 5-Thai open-ended ureteral catheter was then used to intubate the left ureteral orifice and the ureteral catheter w guidance was used to dilate a tract from the skin into the renal pelvis.  A renal access sheath was then placed over the balloon dilator into the renal pelvis.  The balloon was deflated and removed.  The nephroscope was placed through the access sheath int skin was then closed using a 4-0 Monocryl in subcuticular fashion.  A sterile pressure dressing was applied.  The patient was then turned supine on the transfer cart where she was extubated and transferred to the postanesthesia care unit in stable conditio weeks - outpatient           Results:            Lab Results   Component Value Date     WBC 14.2 (H) 05/20/2020     HGB 11.8 (L) 05/20/2020     HCT 35.8 05/20/2020     .0 05/20/2020     CREATSERUM 0.78 05/20/2020     BUN 11 05/20/2020      05/

## 2020-05-20 NOTE — PLAN OF CARE
Problem: PAIN - ADULT  Goal: Verbalizes/displays adequate comfort level or patient's stated pain goal  Description  INTERVENTIONS:  - Encourage pt to monitor pain and request assistance  - Assess pain using appropriate pain scale  - Administer analgesics appropriate  - Identify discharge learning needs (meds, wound care, etc)  - Arrange for interpreters to assist at discharge as needed  - Consider post-discharge preferences of patient/family/discharge partner  - Complete POLST form as appropriate  - Assess symptoms of electrolyte imbalances  - Administer electrolyte replacement as ordered  - Monitor response to electrolyte replacements, including rhythm and repeat lab results as appropriate  - Fluid restriction as ordered  - Instruct patient on fluid and nut

## 2020-05-20 NOTE — PLAN OF CARE
Pt alert and orientatedx4. Room air. Pulse Ox. SCDs. Clear liquid diet and tolerating fairly well. Jett in place, will be removed at the end of the shift, output is light pink tingled urine.  Dilaudid and heat packs used for pain management, benadryl also falls.  - [place] fall precautions as indicated by assessment.  - Educate pt/family on patient safety including physical limitations  - Instruct pt to call for assistance with activity based on assessment  - Modify environment to reduce risk of injury  - indicated  - Manage/alleviate anxiety  - Monitor for signs/symptoms of CO2 retention  Outcome: Progressing     Problem: GASTROINTESTINAL - ADULT  Goal: Minimal or absence of nausea and vomiting  Description  INTERVENTIONS:  - Maintain adequate hydration wi retention  Description  INTERVENTIONS:  - Assess patient’s ability to void and empty bladder  - Monitor intake/output and perform bladder scan as needed  - Follow urinary retention protocol/standard of care  - Consider collaborating with pharmacy to review MUSCULOSKELETAL - ADULT  Goal: Return mobility to safest level of function  Description  INTERVENTIONS:  - Assess patient stability and activity tolerance for standing, transferring and ambulating w/ or w/o assistive devices  - Assist with transfers and am

## 2020-05-20 NOTE — PROGRESS NOTES
NURSING DISCHARGE NOTE    Discharged Home via Wheelchair. Accompanied by Support staff  Belongings Taken by patient/family discussed d/c instructions with patient . Answered all questions . Verbalized understanding . Pharmacy delivered all medicines .

## 2020-05-20 NOTE — PROGRESS NOTES
[facility]  Progress Note     Tevin Esteban Patient Status:  Inpatient    3/22/1975 MRN [id number]   [facility] 3NW-A Attending Yamileth Moseley, [address] [facility] Day # 1 PCP Terrence Law MD     Chief Complaint: L renal stones    S: P deficiency     Quality:  · DVT Prophylaxis: scd  · CODE status: full  · Jett: yes  ·   Estimated date of discharge: today?   Discharge is dependent on: pain control, tube removal. Ok to DC from my standpoint if cleared by urology  At this point Ms. Lita Gavin is

## 2020-05-21 ENCOUNTER — PATIENT OUTREACH (OUTPATIENT)
Dept: CASE MANAGEMENT | Age: 45
End: 2020-05-21

## 2020-05-21 DIAGNOSIS — Z02.9 ENCOUNTERS FOR ADMINISTRATIVE PURPOSE: ICD-10-CM

## 2020-05-21 DIAGNOSIS — N20.0 LEFT NEPHROLITHIASIS: ICD-10-CM

## 2020-05-21 PROCEDURE — 1111F DSCHRG MED/CURRENT MED MERGE: CPT

## 2020-05-21 NOTE — PROGRESS NOTES
Initial Post Discharge Follow Up   Discharge Date: 5/20/20  Contact Date: 5/21/2020    Consent Verification:  Assessment Completed With: Patient  HIPAA Verified?   Yes    Discharge Dx:   Nephrolithiasis    Was TCC ordered: yes    General:   • How have yo tablet (500 mg total) by mouth 2 (two) times daily. 13 tablet 0   • oxyCODONE-acetaminophen 5-325 MG Oral Tab Take 1 tablet by mouth every 6 (six) hours as needed. 20 tablet 0   • acetaminophen 500 MG Oral Tab Take 1,000 mg by mouth one time.      • ergocal are working properly. Once the video visit has started you will be placed in a waiting room until the provider begins the visit. If you are having additional issues or need to use a desktop/laptop, please follow the below steps:        1.        Close o instead. She denies having any fevers, n/v/c/d. She states that her using is a light pink color, like a watermelon. She has no issues urinating and did have a bowel movement today. NCM instructed on s/s of infection, which she denied having.  Med review com

## 2020-05-21 NOTE — PAYOR COMM NOTE
--------------  DISCHARGE REVIEW    Payor: Joel Liang #:  [id number]  Authorization Number: [id number]    Admit date: 5/19/20  Admit time:  4829  Discharge Date: 5/20/2020  5:01 PM     Admitting Physician: Ishan Moore

## 2020-05-22 ENCOUNTER — TELEMEDICINE (OUTPATIENT)
Dept: INTERNAL MEDICINE CLINIC | Facility: CLINIC | Age: 45
End: 2020-05-22

## 2020-05-22 DIAGNOSIS — N20.0 LEFT NEPHROLITHIASIS: Primary | ICD-10-CM

## 2020-05-22 PROCEDURE — 99214 OFFICE O/P EST MOD 30 MIN: CPT | Performed by: CLINICAL NURSE SPECIALIST

## 2020-05-22 NOTE — PROGRESS NOTES
TRANSITIONAL CARE CLINIC PHARMACIST MEDICATION RECONCILIATION        Amador Castle MRN [id number]    3/22/1975 PCP Erwin Bhat MD       Comments: Medication history completed by the [address] [place] [address] Pharmacist with the patient on the phone. for the nausea. Recommended taking the Ondansetron every 4 hours if she has nausea. Patient reports that she has been having a constant headache since she was in the hospital, and is complaining of pain from her procedure.   States her pain level was

## 2020-05-22 NOTE — PROGRESS NOTES
Video Visit  [address]      Please note that the following visit was completed using two-way, real-time interactive audio and/or video communication.   This has been done in good carine to provide continuity of care in the be the zofran for nausea. She reports she has not been eating well.         Allergies:    Hydrocodone-Acetami*    OTHER (SEE COMMENTS), PALPITATIONS,                           SHORTNESS OF BREATH    Comment:Chest felt heavy  Toradol [Ketorolac *    SHORTNESS Kidney stones    • Migraines    • Ovarian cyst    • Post partum depression    • PVC (premature ventricular contraction)    • Sexually transmitted disease     HPV   • Unspecified condition originating in the  period 04   • Visual impairment 12/27/2019    cysto stent removal - dr. Jing Hester    • [place] Right 11/13/2019    Cysto Stent Removal w/ Dr Kendal Moore   • REPAIR ING HERNIA,5+Y/O,REDUCIBL        Family History   Problem Relation Age of On syringe (21573) 10/18/2018, 10/16/2019   • FLUZONE 3 Yrs+ Quad Prsv Free 0.5 ml (27246) 10/18/2018   • HEP B, Ped/Adol 05/13/1995, 06/22/1995, 02/20/1996   • Hib, Unspecified Formulation 08/01/1995, 10/02/1995, 11/30/1995   • TDAP 06/16/2014, 10/04/2016 Time Appointment Department [facility])    Jun 03, 2020 11:00 AM CDT [facility] [place], [place])        Jun 05, 2020 11:15 AM CDT Video Visit with Julio C Wilkerson MD [facility] nausea is better controlled after zofran; able to tolerate eggs and toast for breakfast; reports taking oxycodone and states pain is now manageable 5/10 - patient states she is able to walk around the house after medication administration    Patient encour

## 2020-05-22 NOTE — PROGRESS NOTES
Dr. Nichole Hung Natasha 3, 11:00am  Scheduled in the [address] [place] [address] office    Dr. Jarvis Rao scheduled June 5, at 11:15am  Video or tele capable

## 2020-06-05 ENCOUNTER — TELEMEDICINE (OUTPATIENT)
Dept: INTERNAL MEDICINE CLINIC | Facility: CLINIC | Age: 45
End: 2020-06-05

## 2020-06-05 DIAGNOSIS — N13.2 URETERAL STONE WITH HYDRONEPHROSIS: ICD-10-CM

## 2020-06-05 DIAGNOSIS — I10 ESSENTIAL HYPERTENSION: ICD-10-CM

## 2020-06-05 DIAGNOSIS — E04.1 THYROID NODULE: Primary | ICD-10-CM

## 2020-06-05 DIAGNOSIS — R77.9 ELEVATED BLOOD PROTEIN: ICD-10-CM

## 2020-06-05 PROCEDURE — 99214 OFFICE O/P EST MOD 30 MIN: CPT | Performed by: FAMILY MEDICINE

## 2020-06-05 NOTE — PROGRESS NOTES
Mónica See is a 39year old female.   HPI:   Patient has agreed to do a video encounter w me today in lieu of a regular appointment in regards to her recent medical issues    Is seeing urology for the stones and recently has had surgery for it   Had a Ovarian cyst    • Post partum depression    • PVC (premature ventricular contraction)    • Sexually transmitted disease     HPV   • Unspecified condition originating in the  period 04   • Visual impairment     glasses, contacts      Social

## 2020-06-09 ENCOUNTER — TELEPHONE (OUTPATIENT)
Dept: INTERNAL MEDICINE CLINIC | Facility: CLINIC | Age: 45
End: 2020-06-09

## 2020-06-09 NOTE — TELEPHONE ENCOUNTER
Patient calling to ask if there are additional lab orders to be drawn besides protein lab?   Please advise

## 2020-06-11 ENCOUNTER — LAB ENCOUNTER (OUTPATIENT)
Dept: LAB | Age: 45
End: 2020-06-11
Attending: FAMILY MEDICINE
Payer: MEDICAID

## 2020-06-11 DIAGNOSIS — R77.9 ELEVATED BLOOD PROTEIN: ICD-10-CM

## 2020-06-11 PROCEDURE — 86334 IMMUNOFIX E-PHORESIS SERUM: CPT

## 2020-06-11 PROCEDURE — 84165 PROTEIN E-PHORESIS SERUM: CPT

## 2020-06-11 PROCEDURE — 36415 COLL VENOUS BLD VENIPUNCTURE: CPT

## 2020-06-11 PROCEDURE — 83883 ASSAY NEPHELOMETRY NOT SPEC: CPT

## 2020-06-12 ENCOUNTER — TELEPHONE (OUTPATIENT)
Dept: OBGYN CLINIC | Facility: CLINIC | Age: 45
End: 2020-06-12

## 2020-06-17 ENCOUNTER — APPOINTMENT (OUTPATIENT)
Dept: LAB | Age: 45
End: 2020-06-17
Attending: UROLOGY
Payer: MEDICAID

## 2020-06-17 ENCOUNTER — HOSPITAL ENCOUNTER (OUTPATIENT)
Dept: ULTRASOUND IMAGING | Age: 45
Discharge: HOME | End: 2020-06-17
Attending: UROLOGY
Payer: MEDICAID

## 2020-06-17 DIAGNOSIS — N20.0 KIDNEY STONE: ICD-10-CM

## 2020-06-17 DIAGNOSIS — N13.5 STRICTURE OF URETER: ICD-10-CM

## 2020-06-17 PROCEDURE — 82436 ASSAY OF URINE CHLORIDE: CPT

## 2020-06-17 PROCEDURE — 84392 ASSAY OF URINE SULFATE: CPT

## 2020-06-17 PROCEDURE — 76775 US EXAM ABDO BACK WALL LIM: CPT | Performed by: UROLOGY

## 2020-06-17 PROCEDURE — 82507 ASSAY OF CITRATE: CPT

## 2020-06-17 PROCEDURE — 83945 ASSAY OF OXALATE: CPT

## 2020-06-17 PROCEDURE — 82340 ASSAY OF CALCIUM IN URINE: CPT

## 2020-06-24 ENCOUNTER — TELEPHONE (OUTPATIENT)
Dept: INTERNAL MEDICINE CLINIC | Facility: CLINIC | Age: 45
End: 2020-06-24

## 2020-06-24 NOTE — TELEPHONE ENCOUNTER
Pt called to keep Dr Sabrina Day informed about recent specialist apt, and that was started on Triamterene-hctz.

## 2020-06-24 NOTE — TELEPHONE ENCOUNTER
Pt went to go see her urologist and had her do labs and is putting her on triamterene pt has question on this medication and would like a call back from nurse

## 2020-07-06 ENCOUNTER — APPOINTMENT (OUTPATIENT)
Dept: GENERAL RADIOLOGY | Age: 45
End: 2020-07-06
Attending: EMERGENCY MEDICINE
Payer: MEDICAID

## 2020-07-06 ENCOUNTER — TELEPHONE (OUTPATIENT)
Dept: INTERNAL MEDICINE CLINIC | Facility: CLINIC | Age: 45
End: 2020-07-06

## 2020-07-06 ENCOUNTER — TELEPHONE (OUTPATIENT)
Dept: CARDIOLOGY CLINIC | Facility: CLINIC | Age: 45
End: 2020-07-06

## 2020-07-06 ENCOUNTER — HOSPITAL ENCOUNTER (OUTPATIENT)
Facility: HOSPITAL | Age: 45
Setting detail: OBSERVATION
Discharge: HOME | End: 2020-07-07
Attending: EMERGENCY MEDICINE | Admitting: HOSPITALIST
Payer: MEDICAID

## 2020-07-06 DIAGNOSIS — R07.9 CHEST PAIN, RULE OUT ACUTE MYOCARDIAL INFARCTION: Primary | ICD-10-CM

## 2020-07-06 DIAGNOSIS — R07.89 OTHER CHEST PAIN: ICD-10-CM

## 2020-07-06 LAB
ALBUMIN SERPL-MCNC: 4.1 G/DL (ref 3.4–5)
ALBUMIN/GLOB SERPL: 0.8 {RATIO} (ref 1–2)
ALP LIVER SERPL-CCNC: 79 U/L (ref 37–98)
ALT SERPL-CCNC: 40 U/L (ref 13–56)
ANION GAP SERPL CALC-SCNC: 10 MMOL/L (ref 0–18)
AST SERPL-CCNC: 23 U/L (ref 15–37)
BASOPHILS # BLD AUTO: 0.06 X10(3) UL (ref 0–0.2)
BASOPHILS NFR BLD AUTO: 0.5 %
BILIRUB SERPL-MCNC: 0.3 MG/DL (ref 0.1–2)
BILIRUB UR QL STRIP.AUTO: NEGATIVE
BUN BLD-MCNC: 22 MG/DL (ref 7–18)
BUN/CREAT SERPL: 31.9 (ref 10–20)
CALCIUM BLD-MCNC: 9.1 MG/DL (ref 8.5–10.1)
CHLORIDE SERPL-SCNC: 105 MMOL/L (ref 98–112)
CLARITY UR REFRACT.AUTO: CLEAR
CO2 SERPL-SCNC: 20 MMOL/L (ref 21–32)
COLOR UR AUTO: YELLOW
CREAT BLD-MCNC: 0.69 MG/DL (ref 0.55–1.02)
D-DIMER: 0.34 UG/ML FEU (ref ?–0.5)
DEPRECATED RDW RBC AUTO: 40.9 FL (ref 35.1–46.3)
EOSINOPHIL # BLD AUTO: 0.18 X10(3) UL (ref 0–0.7)
EOSINOPHIL NFR BLD AUTO: 1.6 %
ERYTHROCYTE [DISTWIDTH] IN BLOOD BY AUTOMATED COUNT: 12.7 % (ref 11–15)
GLOBULIN PLAS-MCNC: 5 G/DL (ref 2.8–4.4)
GLUCOSE BLD-MCNC: 110 MG/DL (ref 70–99)
GLUCOSE UR STRIP.AUTO-MCNC: NEGATIVE MG/DL
HCT VFR BLD AUTO: 46 % (ref 35–48)
HGB BLD-MCNC: 15.3 G/DL (ref 12–16)
IMM GRANULOCYTES # BLD AUTO: 0.04 X10(3) UL (ref 0–1)
IMM GRANULOCYTES NFR BLD: 0.4 %
KETONES UR STRIP.AUTO-MCNC: NEGATIVE MG/DL
LEUKOCYTE ESTERASE UR QL STRIP.AUTO: NEGATIVE
LYMPHOCYTES # BLD AUTO: 2.26 X10(3) UL (ref 1–4)
LYMPHOCYTES NFR BLD AUTO: 20.6 %
M PROTEIN MFR SERPL ELPH: 9.1 G/DL (ref 6.4–8.2)
MCH RBC QN AUTO: 29.3 PG (ref 26–34)
MCHC RBC AUTO-ENTMCNC: 33.3 G/DL (ref 31–37)
MCV RBC AUTO: 88 FL (ref 80–100)
MONOCYTES # BLD AUTO: 0.72 X10(3) UL (ref 0.1–1)
MONOCYTES NFR BLD AUTO: 6.6 %
NEUTROPHILS # BLD AUTO: 7.72 X10 (3) UL (ref 1.5–7.7)
NEUTROPHILS # BLD AUTO: 7.72 X10(3) UL (ref 1.5–7.7)
NEUTROPHILS NFR BLD AUTO: 70.3 %
NITRITE UR QL STRIP.AUTO: NEGATIVE
OSMOLALITY SERPL CALC.SUM OF ELEC: 284 MOSM/KG (ref 275–295)
PH UR STRIP.AUTO: 6 [PH] (ref 4.5–8)
PLATELET # BLD AUTO: 389 10(3)UL (ref 150–450)
POCT LOT NUMBER: NORMAL
POCT URINE PREGNANCY: NEGATIVE
POTASSIUM SERPL-SCNC: 3.3 MMOL/L (ref 3.5–5.1)
PROT UR STRIP.AUTO-MCNC: NEGATIVE MG/DL
RBC # BLD AUTO: 5.23 X10(6)UL (ref 3.8–5.3)
SODIUM SERPL-SCNC: 135 MMOL/L (ref 136–145)
SP GR UR STRIP.AUTO: <=1.005 (ref 1–1.03)
TROPONIN I SERPL-MCNC: <0.045 NG/ML (ref ?–0.04)
UROBILINOGEN UR STRIP.AUTO-MCNC: 0.2 MG/DL
WBC # BLD AUTO: 11 X10(3) UL (ref 4–11)

## 2020-07-06 PROCEDURE — 71045 X-RAY EXAM CHEST 1 VIEW: CPT | Performed by: EMERGENCY MEDICINE

## 2020-07-06 NOTE — ED INITIAL ASSESSMENT (HPI)
States she has had elevated BP and intermittent elevated heart rate up to 120 and uneasy feeling in her chest.

## 2020-07-06 NOTE — TELEPHONE ENCOUNTER
Could all be anxiety    Rather unusual SE of Dyazide if that is the culprit     We can stop the med if OK w urology to see if the s/s resolve

## 2020-07-06 NOTE — TELEPHONE ENCOUNTER
Pt informed of Dr David Batista recommendations and will await for urologist recommendations. Will call our office w an update as needed.

## 2020-07-06 NOTE — TELEPHONE ENCOUNTER
Patient called and stated that her blood pressure was 149/100 with a pulse rate of 103. Patient stated that she gets adrenaline rushes that will come and go and last about 5 to 10 mins. She would like to speak with a nurse to discuss her symptoms.  Please a

## 2020-07-06 NOTE — TELEPHONE ENCOUNTER
Pt has c/o tachycardia & elevated bp readings. Per pt feels like \"rush\", labored breathing. Stated an average of bp above 956Q for systolic.      While on the phone pt took bp and got reading of 162/106 hr95    Per pt symptoms started since started

## 2020-07-06 NOTE — TELEPHONE ENCOUNTER
Spoke with Ramin Regan, states her blood pressures have been elevated was 141/91 at 4:40 today but has been elevated normal HR around 66 not it is 120 and slight head ache.   Chest pressure when climbing stairs and elevated heart rate also reports mild nausea this

## 2020-07-06 NOTE — TELEPHONE ENCOUNTER
Patient called in stating she has not seen Dr. Antonio Espinoza in about a year.  Patient states her PCP advised her to contact her cardiologist. Patient states she recently had kidney surgery on May 19th 2020 and is having some issues with her blood pressure 165/1

## 2020-07-07 ENCOUNTER — APPOINTMENT (OUTPATIENT)
Dept: CV DIAGNOSTICS | Facility: HOSPITAL | Age: 45
End: 2020-07-07
Attending: INTERNAL MEDICINE
Payer: MEDICAID

## 2020-07-07 VITALS
WEIGHT: 160.69 LBS | HEIGHT: 59 IN | RESPIRATION RATE: 18 BRPM | OXYGEN SATURATION: 98 % | BODY MASS INDEX: 32.4 KG/M2 | HEART RATE: 89 BPM | SYSTOLIC BLOOD PRESSURE: 130 MMHG | TEMPERATURE: 99 F | DIASTOLIC BLOOD PRESSURE: 77 MMHG

## 2020-07-07 PROBLEM — R07.9 CHEST PAIN: Status: ACTIVE | Noted: 2020-07-07

## 2020-07-07 PROBLEM — R07.9 CHEST PAIN, RULE OUT ACUTE MYOCARDIAL INFARCTION: Status: ACTIVE | Noted: 2020-07-07

## 2020-07-07 LAB
ANION GAP SERPL CALC-SCNC: 6 MMOL/L (ref 0–18)
ATRIAL RATE: 84 BPM
ATRIAL RATE: 88 BPM
BUN BLD-MCNC: 17 MG/DL (ref 7–18)
BUN/CREAT SERPL: 20.5 (ref 10–20)
CALCIUM BLD-MCNC: 9.3 MG/DL (ref 8.5–10.1)
CHLORIDE SERPL-SCNC: 105 MMOL/L (ref 98–112)
CHOLEST SMN-MCNC: 211 MG/DL (ref ?–200)
CO2 SERPL-SCNC: 25 MMOL/L (ref 21–32)
CREAT BLD-MCNC: 0.83 MG/DL (ref 0.55–1.02)
DEPRECATED RDW RBC AUTO: 41.4 FL (ref 35.1–46.3)
ERYTHROCYTE [DISTWIDTH] IN BLOOD BY AUTOMATED COUNT: 12.6 % (ref 11–15)
GLUCOSE BLD-MCNC: 108 MG/DL (ref 70–99)
HAV IGM SER QL: 2.4 MG/DL (ref 1.6–2.6)
HCT VFR BLD AUTO: 48.3 % (ref 35–48)
HDLC SERPL-MCNC: 34 MG/DL (ref 40–59)
HGB BLD-MCNC: 15.4 G/DL (ref 12–16)
LDLC SERPL CALC-MCNC: 131 MG/DL (ref ?–100)
MCH RBC QN AUTO: 28.6 PG (ref 26–34)
MCHC RBC AUTO-ENTMCNC: 31.9 G/DL (ref 31–37)
MCV RBC AUTO: 89.8 FL (ref 80–100)
NONHDLC SERPL-MCNC: 177 MG/DL (ref ?–130)
OSMOLALITY SERPL CALC.SUM OF ELEC: 284 MOSM/KG (ref 275–295)
P AXIS: 21 DEGREES
P AXIS: 24 DEGREES
P-R INTERVAL: 122 MS
P-R INTERVAL: 126 MS
PLATELET # BLD AUTO: 355 10(3)UL (ref 150–450)
POTASSIUM SERPL-SCNC: 3.4 MMOL/L (ref 3.5–5.1)
POTASSIUM SERPL-SCNC: 4.1 MMOL/L (ref 3.5–5.1)
Q-T INTERVAL: 346 MS
Q-T INTERVAL: 364 MS
QRS DURATION: 70 MS
QRS DURATION: 74 MS
QTC CALCULATION (BEZET): 418 MS
QTC CALCULATION (BEZET): 430 MS
R AXIS: 17 DEGREES
R AXIS: 28 DEGREES
RBC # BLD AUTO: 5.38 X10(6)UL (ref 3.8–5.3)
SARS-COV-2 RNA RESP QL NAA+PROBE: NOT DETECTED
SODIUM SERPL-SCNC: 136 MMOL/L (ref 136–145)
T AXIS: -14 DEGREES
T AXIS: -3 DEGREES
T4 FREE SERPL-MCNC: 1 NG/DL (ref 0.8–1.7)
TRIGL SERPL-MCNC: 229 MG/DL (ref 30–149)
TSI SER-ACNC: 1.89 MIU/ML (ref 0.36–3.74)
VENTRICULAR RATE: 84 BPM
VENTRICULAR RATE: 88 BPM
VLDLC SERPL CALC-MCNC: 46 MG/DL (ref 0–30)
WBC # BLD AUTO: 10.6 X10(3) UL (ref 4–11)

## 2020-07-07 PROCEDURE — 93350 STRESS TTE ONLY: CPT | Performed by: INTERNAL MEDICINE

## 2020-07-07 PROCEDURE — 93017 CV STRESS TEST TRACING ONLY: CPT | Performed by: INTERNAL MEDICINE

## 2020-07-07 PROCEDURE — 99245 OFF/OP CONSLTJ NEW/EST HI 55: CPT | Performed by: INTERNAL MEDICINE

## 2020-07-07 PROCEDURE — 93018 CV STRESS TEST I&R ONLY: CPT | Performed by: INTERNAL MEDICINE

## 2020-07-07 PROCEDURE — 99220 INITIAL OBSERVATION CARE,LEVL III: CPT | Performed by: HOSPITALIST

## 2020-07-07 RX ORDER — METOCLOPRAMIDE HYDROCHLORIDE 5 MG/ML
10 INJECTION INTRAMUSCULAR; INTRAVENOUS EVERY 8 HOURS PRN
Status: DISCONTINUED | OUTPATIENT
Start: 2020-07-07 | End: 2020-07-07

## 2020-07-07 RX ORDER — SODIUM PHOSPHATE, DIBASIC AND SODIUM PHOSPHATE, MONOBASIC 7; 19 G/133ML; G/133ML
1 ENEMA RECTAL ONCE AS NEEDED
Status: DISCONTINUED | OUTPATIENT
Start: 2020-07-07 | End: 2020-07-07

## 2020-07-07 RX ORDER — BISACODYL 10 MG
10 SUPPOSITORY, RECTAL RECTAL
Status: DISCONTINUED | OUTPATIENT
Start: 2020-07-07 | End: 2020-07-07

## 2020-07-07 RX ORDER — ONDANSETRON 2 MG/ML
4 INJECTION INTRAMUSCULAR; INTRAVENOUS EVERY 6 HOURS PRN
Status: DISCONTINUED | OUTPATIENT
Start: 2020-07-07 | End: 2020-07-07

## 2020-07-07 RX ORDER — TRIAMTERENE AND HYDROCHLOROTHIAZIDE 37.5; 25 MG/1; MG/1
1 CAPSULE ORAL EVERY MORNING
Status: DISCONTINUED | OUTPATIENT
Start: 2020-07-07 | End: 2020-07-07

## 2020-07-07 RX ORDER — POLYETHYLENE GLYCOL 3350 17 G/17G
17 POWDER, FOR SOLUTION ORAL DAILY PRN
Status: DISCONTINUED | OUTPATIENT
Start: 2020-07-07 | End: 2020-07-07

## 2020-07-07 RX ORDER — ACETAMINOPHEN 325 MG/1
650 TABLET ORAL EVERY 6 HOURS PRN
Status: DISCONTINUED | OUTPATIENT
Start: 2020-07-07 | End: 2020-07-07

## 2020-07-07 RX ORDER — POTASSIUM CHLORIDE 20 MEQ/1
40 TABLET, EXTENDED RELEASE ORAL EVERY 4 HOURS
Status: COMPLETED | OUTPATIENT
Start: 2020-07-07 | End: 2020-07-07

## 2020-07-07 NOTE — PROGRESS NOTES
Up walking in the hallway. Denies c/o chest discomfort. DC instruction given. Rx given. Verbalized understanding.

## 2020-07-07 NOTE — ED PROVIDER NOTES
Patient Seen in: [facility] [place]      History   Patient presents with:  Arrythmia/Palpitations    Stated Complaint: intermittent rapid heart rate     HPI    62-year-old  female who presents emergency room today for compl w/ Dr Toma Serrano   • CYSTO/URETERO, STONE REMOVE Right 12/18/2019    right ureter and kidney stones extraction, URS, stent placement   • CYSTOSCOPY STENT INSERTION Right 12/18/2019    Performed by Celso Menjivar MD at [address] [place] [address], [place] [address] HPI.  Constitutional and vital signs reviewed. All other systems reviewed and negative except as noted above. Physical Exam     ED Triage Vitals   BP 07/06/20 1845 (!) 147/96   Pulse 07/06/20 1845 95   Resp 07/06/20 1845 20   Temp 07/06/20 1845 99. Rare (*)     All other components within normal limits   CBC W/ DIFFERENTIAL - Abnormal; Notable for the following components:    Neutrophil Absolute Prelim 7.72 (*)     Neutrophil Absolute 7.72 (*)     All other components within normal limits   TROPONIN of 3.3. Troponin d-dimer negative. EKG however did show some new T wave inversions across inferior lateral leads. I contacted her cardiologist and we discussed the case. It was decided patient be admitted for further work-up.   I spoke with the Molly Da Silva

## 2020-07-07 NOTE — CONSULTS
[facility]  Cardiology Consultation    Treva Arthur Patient Status:  Observation    3/22/1975 MRN [id number]   [facility] 8NE-A Attending Ryan Cabral MD   Hosp Day # 0 PCP Dominique Figueredo MD     Reason for Consultation: Performed by Reggie Acuna MD at [address] [place] [address],  [address] Bilateral 6/16/2019    Performed by Brooks Mcdonnell DO at [address] URETEROSCOPY Right 10/15/2019    Performed by Brooks Mcdonnell DO at [address] [place] [address] she has never smoked. She has never used smokeless tobacco. She reports that she does not drink alcohol or use drugs.     Allergies:    Hydrocodone-Acetami*    OTHER (SEE COMMENTS), PALPITATIONS,                           SHORTNESS OF BREATH    Comment:Wilmer .0 07/06/2020    CREATSERUM 0.69 07/06/2020    BUN 22 07/06/2020     07/06/2020    K 3.3 07/06/2020     07/06/2020    CO2 20.0 07/06/2020     07/06/2020    CA 9.1 07/06/2020    ALB 4.1 07/06/2020    ALKPHO 79 07/06/2020    BILT 0.

## 2020-07-07 NOTE — H&P
[facility]  History and Physical     Lamar Barbosa Patient Status:  Observation    3/22/1975 MRN [id number]   [facility] 8NE-A Attending Annie Ace MD   Hosp Day # 0 PCP Nicki Jones MD     Chief Complaint: palpitations Azeem Zhao DO at [facility] MAIN OR   • CYSTOSCOPY URETEROSCOPY Bilateral 9/1/2019    Performed by Jillian Zacarias MD at [facility]   • CYSTOSCOPY URETEROSCOPY Left 8/22/2019    Performed by Azeem Zhao DO at [facility] MAIN OR   • CYSTOSCOPY URETEROSCOPY COMMENTS), PALPITATIONS,                           SHORTNESS OF BREATH    Comment:Chest felt heavy  Toradol [Ketorolac *    SHORTNESS OF BREATH  Tramadol                SHORTNESS OF BREATH  Percocet [Oxycodone*    PAIN    Comment:headache  Wellbutrin [Bupr extremities. Extremities: No edema or cyanosis. Integument: No rashes or lesions. Psychiatric: Appropriate mood and affect.       Diagnostic Data:      Labs:  Recent Labs   Lab 07/06/20  1856   WBC 11.0   HGB 15.3   MCV 88.0   .0       Recent Lab

## 2020-07-07 NOTE — TELEPHONE ENCOUNTER
FYI was admitted to [facility] if stress neg will be sent home on 30 day event monitor

## 2020-07-07 NOTE — PROGRESS NOTES
Preliminary Cardiac Diagnostic Note:    No EKG changes noted from baseline after pt walked 3:50 on carine protocol stopping due to dyspnea and left upper arm and shoulder discomfort  Few pvcs  Echo pending.     Soraida Bradford notified of completion of test

## 2020-07-07 NOTE — PLAN OF CARE
NURSING ADMISSION NOTE      Patient admitted via Ambulance  Oriented to room. Safety precautions initiated. Bed in low position. Call light in reach. pt brought by EMT's. Received report, pt stable on transport. Denied pain during transfer and now.  I

## 2020-07-08 ENCOUNTER — PATIENT OUTREACH (OUTPATIENT)
Dept: CASE MANAGEMENT | Age: 45
End: 2020-07-08

## 2020-07-08 ENCOUNTER — TELEPHONE (OUTPATIENT)
Dept: INTERNAL MEDICINE CLINIC | Facility: CLINIC | Age: 45
End: 2020-07-08

## 2020-07-08 DIAGNOSIS — R07.9 CHEST PAIN, RULE OUT ACUTE MYOCARDIAL INFARCTION: ICD-10-CM

## 2020-07-08 PROCEDURE — 1111F DSCHRG MED/CURRENT MED MERGE: CPT

## 2020-07-08 NOTE — PROGRESS NOTES
Initial Post Discharge Follow Up   Discharge Date: 7/7/20  Contact Date: 7/8/2020    Consent Verification:  Assessment Completed With: Patient  HIPAA Verified?   Yes    Discharge Dx:  Chest pain, rule out acute myocardial infarction    Was TCC ordered: n • metoprolol Tartrate 25 MG Oral Tab Take 1 tablet (25 mg total) by mouth 2x Daily(Beta Blocker). 60 tablet 1   • Triamterene-HCTZ 37.5-25 MG Oral Cap Take 1 capsule by mouth every morning.  30 capsule 11   • ergocalciferol 1.25 MG (67065 UT) Oral Cap [facility] approximately 90 minutes prior to your appointment time. Please finish all 16 ounces 60 minutes before your appointment time. DO NOT VOID/ELIMINATE THE WATER.      Patients having their menstrual cycle may have this exam; if you are wearing a tampon you concerns at this time and appreciated the call. CCM referral placed:  Not Applicable    [x]  Discharge Summary, Discharge medications reviewed/discussed/and reconciled against outpatient medications with patient,  and orders reviewed and discussed.  Any

## 2020-07-08 NOTE — TELEPHONE ENCOUNTER
Pt discharged 7-7-20, chest pain, rule out acute myocardial infarction. Pt does not have HFU appt scheduled at this time. HFU appt recommended by 7-21-20 as pt is a moderate risk for readmission.       Patient reports having some palpitations with exertion

## 2020-07-09 ENCOUNTER — TELEPHONE (OUTPATIENT)
Dept: INTERNAL MEDICINE CLINIC | Facility: CLINIC | Age: 45
End: 2020-07-09

## 2020-07-09 ENCOUNTER — TELEPHONE (OUTPATIENT)
Dept: CARDIOLOGY | Age: 45
End: 2020-07-09

## 2020-07-09 ENCOUNTER — HOSPITAL ENCOUNTER (EMERGENCY)
Age: 45
Discharge: STILL ADMITTED OR ADMINISTRATIVE | End: 2020-07-09
Payer: MEDICAID

## 2020-07-09 DIAGNOSIS — E04.1 THYROID NODULE: ICD-10-CM

## 2020-07-09 DIAGNOSIS — R07.89 OTHER CHEST PAIN: ICD-10-CM

## 2020-07-09 NOTE — TELEPHONE ENCOUNTER
Patient called stating that she was recently discharged from the hospital and was advised to contact PCP to get referral for cardiologist and endocrinologist.     See TE from 7/8.  TE sent to Dr. Leda Bruno for further recommendation

## 2020-07-13 ENCOUNTER — TELEPHONE (OUTPATIENT)
Dept: INTERNAL MEDICINE CLINIC | Facility: CLINIC | Age: 45
End: 2020-07-13

## 2020-07-13 ENCOUNTER — HOSPITAL ENCOUNTER (OUTPATIENT)
Dept: LAB | Facility: HOSPITAL | Age: 45
Discharge: HOME | End: 2020-07-13
Attending: UROLOGY
Payer: MEDICAID

## 2020-07-13 ENCOUNTER — HOSPITAL ENCOUNTER (OUTPATIENT)
Dept: CV DIAGNOSTICS | Facility: HOSPITAL | Age: 45
Discharge: HOME | End: 2020-07-13
Attending: INTERNAL MEDICINE
Payer: MEDICAID

## 2020-07-13 DIAGNOSIS — N20.0 NEPHROLITHIASIS: ICD-10-CM

## 2020-07-13 DIAGNOSIS — R07.89 OTHER CHEST PAIN: ICD-10-CM

## 2020-07-13 LAB
ANION GAP SERPL CALC-SCNC: 8 MMOL/L (ref 0–18)
BUN BLD-MCNC: 16 MG/DL (ref 7–18)
BUN/CREAT SERPL: 16.8 (ref 10–20)
CALCIUM BLD-MCNC: 9.5 MG/DL (ref 8.5–10.1)
CHLORIDE SERPL-SCNC: 106 MMOL/L (ref 98–112)
CO2 SERPL-SCNC: 23 MMOL/L (ref 21–32)
CREAT BLD-MCNC: 0.95 MG/DL (ref 0.55–1.02)
GLUCOSE BLD-MCNC: 112 MG/DL (ref 70–99)
OSMOLALITY SERPL CALC.SUM OF ELEC: 286 MOSM/KG (ref 275–295)
PATIENT FASTING Y/N/NP: YES
POTASSIUM SERPL-SCNC: 3.4 MMOL/L (ref 3.5–5.1)
SODIUM SERPL-SCNC: 137 MMOL/L (ref 136–145)

## 2020-07-13 PROCEDURE — 36415 COLL VENOUS BLD VENIPUNCTURE: CPT

## 2020-07-13 PROCEDURE — 93270 REMOTE 30 DAY ECG REV/REPORT: CPT | Performed by: INTERNAL MEDICINE

## 2020-07-13 PROCEDURE — 93272 ECG/REVIEW INTERPRET ONLY: CPT | Performed by: INTERNAL MEDICINE

## 2020-07-13 PROCEDURE — 93271 ECG/MONITORING AND ANALYSIS: CPT | Performed by: INTERNAL MEDICINE

## 2020-07-13 PROCEDURE — 80048 BASIC METABOLIC PNL TOTAL CA: CPT

## 2020-07-13 NOTE — TELEPHONE ENCOUNTER
Patient got multiple results from labs drawn and results that were given to her are not at all what she has been told by our office/pcp. She was told that she has \"liver disease, major thyroid issue as well as other results that alarmed her. \"  Please anil

## 2020-07-13 NOTE — TELEPHONE ENCOUNTER
Dr Basilia Knowles pt reports that when starting metoprolol she developed SOB. She contacted pharmacy which instructed her to d/c med. Once med was d/c SOB subsided. Pt notified that I will forward s/e to TO and update her chart.

## 2020-07-13 NOTE — TELEPHONE ENCOUNTER
Pt had multiple questions on health hx. Pt states that at her recent hospital stay and when scheduling test that she was told she has liver disease, thyroid dysfunction, hypertension, kidney disease and diabetes.  Pt states that she is unaware of any of

## 2020-07-18 ENCOUNTER — APPOINTMENT (OUTPATIENT)
Dept: ULTRASOUND IMAGING | Age: 45
End: 2020-07-18
Attending: EMERGENCY MEDICINE
Payer: MEDICAID

## 2020-07-18 ENCOUNTER — HOSPITAL ENCOUNTER (EMERGENCY)
Age: 45
Discharge: HOME | End: 2020-07-18
Attending: EMERGENCY MEDICINE
Payer: MEDICAID

## 2020-07-18 VITALS
HEART RATE: 72 BPM | TEMPERATURE: 97 F | DIASTOLIC BLOOD PRESSURE: 68 MMHG | HEIGHT: 59 IN | SYSTOLIC BLOOD PRESSURE: 111 MMHG | RESPIRATION RATE: 16 BRPM | OXYGEN SATURATION: 98 % | WEIGHT: 161 LBS | BODY MASS INDEX: 32.46 KG/M2

## 2020-07-18 DIAGNOSIS — M54.10 RADICULAR LEG PAIN: Primary | ICD-10-CM

## 2020-07-18 PROCEDURE — 99284 EMERGENCY DEPT VISIT MOD MDM: CPT

## 2020-07-18 PROCEDURE — 93971 EXTREMITY STUDY: CPT | Performed by: EMERGENCY MEDICINE

## 2020-07-18 RX ORDER — NAPROXEN 500 MG/1
500 TABLET ORAL 2 TIMES DAILY PRN
Qty: 20 TABLET | Refills: 0 | Status: SHIPPED | OUTPATIENT
Start: 2020-07-18 | End: 2020-08-18

## 2020-07-18 NOTE — ED PROVIDER NOTES
Patient Seen in: [facility] Emergency Department In [place]      History   Patient presents with:  Deep Vein Thrombosis    Stated Complaint: swelling and pain to right calf, no injury    HPI    Patient reports that she began having symptoms just less than Visual impairment     glasses, contacts              Past Surgical History:   Procedure Laterality Date   •   ,2016    x2   • CHOLECYSTECTOMY     • COLPOSCOPY, CERVIX W/UPPER ADJACENT VAGINA; W/BIOPSY(S), CERVIX     • CRYOCAUTERY OF CERVIX  20 REMOVE STENT VIA TRANSURETH Right 11/13/2019    Cysto Stent Removal w/ Dr Sher Button   • REPAIR ING HERNIA,5+Y/O,REDUCIBL                      Social History    Tobacco Use      Smoking status: Never Smoker      Smokeless tobacco: Never Used    Alcohol use: pronounced at L5-S1. There is grade 1 anterolisthesis secondary to bilateral L5 pars defects. MDM     Patient presents with pain radiating from the medial aspect of her foot up the posterior medial aspect of her leg.   This suggests radicular pain p

## 2020-07-18 NOTE — ED INITIAL ASSESSMENT (HPI)
Patient states she has had pain and swelling that started in left foot and has been spreading up to leg now with pain and swelling in left calf.  Painful to ambulate and now has numbness and tingling to the leg with cramps to the calf states headache and di

## 2020-07-20 ENCOUNTER — OFFICE VISIT (OUTPATIENT)
Dept: INTERNAL MEDICINE CLINIC | Facility: CLINIC | Age: 45
End: 2020-07-20
Payer: MEDICAID

## 2020-07-20 VITALS
BODY MASS INDEX: 33.26 KG/M2 | DIASTOLIC BLOOD PRESSURE: 80 MMHG | SYSTOLIC BLOOD PRESSURE: 122 MMHG | TEMPERATURE: 98 F | HEIGHT: 59 IN | WEIGHT: 165 LBS | HEART RATE: 78 BPM | RESPIRATION RATE: 16 BRPM

## 2020-07-20 DIAGNOSIS — I10 ESSENTIAL HYPERTENSION: ICD-10-CM

## 2020-07-20 DIAGNOSIS — E04.1 THYROID NODULE: ICD-10-CM

## 2020-07-20 DIAGNOSIS — M79.672 LEFT FOOT PAIN: Primary | ICD-10-CM

## 2020-07-20 PROCEDURE — 1111F DSCHRG MED/CURRENT MED MERGE: CPT | Performed by: FAMILY MEDICINE

## 2020-07-20 PROCEDURE — 3079F DIAST BP 80-89 MM HG: CPT | Performed by: FAMILY MEDICINE

## 2020-07-20 PROCEDURE — 3008F BODY MASS INDEX DOCD: CPT | Performed by: FAMILY MEDICINE

## 2020-07-20 PROCEDURE — 3074F SYST BP LT 130 MM HG: CPT | Performed by: FAMILY MEDICINE

## 2020-07-20 PROCEDURE — 99214 OFFICE O/P EST MOD 30 MIN: CPT | Performed by: FAMILY MEDICINE

## 2020-07-20 RX ORDER — PREDNISONE 10 MG/1
TABLET ORAL
Qty: 20 TABLET | Refills: 0 | Status: SHIPPED | OUTPATIENT
Start: 2020-07-20 | End: 2020-07-29 | Stop reason: ALTCHOICE

## 2020-07-20 NOTE — PROGRESS NOTES
Vida Yanez is a 39year old female.   HPI:   Here for f/u on his [facility] visits    Feels like people look at her like a hypochondriac    Was in ER again for the LLE that was swelling    Was worried about clot so went in     Started in the heel area to the a Social History:  Social History    Tobacco Use      Smoking status: Never Smoker      Smokeless tobacco: Never Used    Alcohol use: No    Drug use: No       REVIEW OF SYSTEMS:   GENERAL HEALTH: feels well otherwise  SKIN: denies rashes  RESPIRATORY: allison

## 2020-07-29 ENCOUNTER — OFFICE VISIT (OUTPATIENT)
Dept: INTERNAL MEDICINE CLINIC | Facility: CLINIC | Age: 45
End: 2020-07-29
Payer: MEDICAID

## 2020-07-29 VITALS
RESPIRATION RATE: 16 BRPM | SYSTOLIC BLOOD PRESSURE: 120 MMHG | DIASTOLIC BLOOD PRESSURE: 68 MMHG | WEIGHT: 162 LBS | TEMPERATURE: 98 F | BODY MASS INDEX: 32.66 KG/M2 | HEIGHT: 59 IN | HEART RATE: 70 BPM

## 2020-07-29 DIAGNOSIS — H00.015 HORDEOLUM EXTERNUM OF LEFT LOWER EYELID: Primary | ICD-10-CM

## 2020-07-29 PROCEDURE — 99213 OFFICE O/P EST LOW 20 MIN: CPT | Performed by: FAMILY MEDICINE

## 2020-07-29 PROCEDURE — 3078F DIAST BP <80 MM HG: CPT | Performed by: FAMILY MEDICINE

## 2020-07-29 PROCEDURE — 3074F SYST BP LT 130 MM HG: CPT | Performed by: FAMILY MEDICINE

## 2020-07-29 PROCEDURE — 3008F BODY MASS INDEX DOCD: CPT | Performed by: FAMILY MEDICINE

## 2020-07-29 RX ORDER — ERYTHROMYCIN 5 MG/G
1 OINTMENT OPHTHALMIC 3 TIMES DAILY
Qty: 1 TUBE | Refills: 0 | Status: SHIPPED | OUTPATIENT
Start: 2020-07-29 | End: 2020-08-05

## 2020-07-29 NOTE — PROGRESS NOTES
CHIEF COMPLAINT:     Patient presents with:  Sty: Pt states it started on Left lower eye lid and thinks it may be going onto the R eye. HPI:   Trey Goode is a 39year old female . Patient here for possible stye in the left eye.  It has been ther joints  LYMPH:  Denies lymphadenopathy  NEURO: Denies headaches or lightheadedness      EXAM:   /68 (BP Location: Left arm, Patient Position: Sitting, Cuff Size: adult)   Pulse 70   Temp 98.2 °F (36.8 °C) (Oral)   Resp 16   Ht 59\"   Wt 162 lb (73.5

## 2020-08-04 ENCOUNTER — TELEPHONE (OUTPATIENT)
Dept: OTOLARYNGOLOGY | Facility: CLINIC | Age: 45
End: 2020-08-04

## 2020-08-04 DIAGNOSIS — D35.1 PARATHYROID ADENOMA: Primary | ICD-10-CM

## 2020-08-04 NOTE — TELEPHONE ENCOUNTER
If she wishes to discuss having her thyroid removed please have her return to clinic to see me at her convenience

## 2020-08-04 NOTE — TELEPHONE ENCOUNTER
pt is concerned due to pt Hx family hx with thyroid cancer, pt recently protein and calcium elevated, increased fatigue, loss of hair,, pt states having increased palpations and wearing halter monitor.  Pt states she saw on new about person who ha

## 2020-08-04 NOTE — TELEPHONE ENCOUNTER
Patient has concerns about a lump in her neck, patient does not want to wait to due concerns about cancer, please call at:[phone],thanks.   *Family history of thyroid

## 2020-08-06 NOTE — TELEPHONE ENCOUNTER
Marilyn, can you get monitor results from [facility] and Advise? This is a patient of Dr. Fermín Carrera who was discharged from [facility] with [facility] Event Monitor. Thank you.

## 2020-08-11 ENCOUNTER — OFFICE VISIT (OUTPATIENT)
Dept: OTOLARYNGOLOGY | Facility: CLINIC | Age: 45
End: 2020-08-11
Payer: MEDICAID

## 2020-08-11 VITALS
WEIGHT: 162 LBS | SYSTOLIC BLOOD PRESSURE: 129 MMHG | DIASTOLIC BLOOD PRESSURE: 89 MMHG | TEMPERATURE: 98 F | BODY MASS INDEX: 32.66 KG/M2 | HEIGHT: 59 IN

## 2020-08-11 DIAGNOSIS — E04.1 THYROID NODULE: Primary | ICD-10-CM

## 2020-08-11 PROCEDURE — 3079F DIAST BP 80-89 MM HG: CPT | Performed by: OTOLARYNGOLOGY

## 2020-08-11 PROCEDURE — 99214 OFFICE O/P EST MOD 30 MIN: CPT | Performed by: OTOLARYNGOLOGY

## 2020-08-11 PROCEDURE — 3074F SYST BP LT 130 MM HG: CPT | Performed by: OTOLARYNGOLOGY

## 2020-08-11 PROCEDURE — 3008F BODY MASS INDEX DOCD: CPT | Performed by: OTOLARYNGOLOGY

## 2020-08-11 NOTE — PROGRESS NOTES
Sophia Ray is a 39year old female. Patient presents with:   Follow - Up: thyroid nodule- would like to discuss surgery      HISTORY OF PRESENT ILLNESS  She presents with a burning sensation in the back of her throat which is worse when she lies down kidney issues as well. Elevated levels of calcium in her urine. Chronically hypokalemic. Previous ultrasound in January of this year revealed subcentimeter nodules with one measuring 7 hyperparathyroid?   Last calcium level in early July was normal mm an  period 04   • Visual impairment     glasses, contacts       Past Surgical History:   Procedure Laterality Date   •   ,2016    x2   • CHOLECYSTECTOMY     • COLPOSCOPY, CERVIX W/UPPER ADJACENT VAGINA; W/BIOPSY(S), CERVIX     • CRYO GALLBLADDER  1998   • REMOVE STENT VIA TRANSURETH Right 11/13/2019    Cysto Stent Removal w/ Dr Stephen Schwarz   • REPAIR ING HERNIA,5+Y/O,REDUCIBL           REVIEW OF SYSTEMS    System Neg/Pos Details   Constitutional Negative Fatigue, fever and weight loss. Lips/teeth/gums - Normal. Tonsils - Normal. Oropharynx - Normal.   Nose/Mouth/Throat Normal Nares - Right: Normal Left: Normal. Septum -Normal  Turbinates - Right: Normal, Left: Normal.       Current Outpatient Medications:   •  naproxen 500 MG Oral Tab, T

## 2020-08-13 ENCOUNTER — TELEPHONE (OUTPATIENT)
Dept: OTOLARYNGOLOGY | Facility: CLINIC | Age: 45
End: 2020-08-13

## 2020-08-13 NOTE — TELEPHONE ENCOUNTER
Patient states insurance did not receive order. She is asking to resend it and asking for CPT code to follow up. Patient also wants to verify that [facility].  Please advise

## 2020-08-13 NOTE — TELEPHONE ENCOUNTER
Patient will be contacted once [facility] is approved. Turnaround time is 3-5 business days.

## 2020-08-18 ENCOUNTER — OFFICE VISIT (OUTPATIENT)
Dept: OBGYN CLINIC | Facility: CLINIC | Age: 45
End: 2020-08-18
Payer: MEDICAID

## 2020-08-18 VITALS
WEIGHT: 164 LBS | BODY MASS INDEX: 33 KG/M2 | TEMPERATURE: 99 F | SYSTOLIC BLOOD PRESSURE: 124 MMHG | DIASTOLIC BLOOD PRESSURE: 78 MMHG

## 2020-08-18 DIAGNOSIS — N93.0 POSTCOITAL BLEEDING: ICD-10-CM

## 2020-08-18 DIAGNOSIS — R87.810 CERVICAL HIGH RISK HUMAN PAPILLOMAVIRUS (HPV) DNA TEST POSITIVE: Primary | ICD-10-CM

## 2020-08-18 LAB — CONTROL LINE PRESENT WITH A CLEAR BACKGROUND (YES/NO): YES YES/NO

## 2020-08-18 PROCEDURE — 81025 URINE PREGNANCY TEST: CPT | Performed by: OBSTETRICS & GYNECOLOGY

## 2020-08-18 PROCEDURE — 3074F SYST BP LT 130 MM HG: CPT | Performed by: OBSTETRICS & GYNECOLOGY

## 2020-08-18 PROCEDURE — 88305 TISSUE EXAM BY PATHOLOGIST: CPT | Performed by: OBSTETRICS & GYNECOLOGY

## 2020-08-18 PROCEDURE — 57454 BX/CURETT OF CERVIX W/SCOPE: CPT | Performed by: OBSTETRICS & GYNECOLOGY

## 2020-08-18 PROCEDURE — 3078F DIAST BP <80 MM HG: CPT | Performed by: OBSTETRICS & GYNECOLOGY

## 2020-08-18 NOTE — PROGRESS NOTES
[facility]  Obstetrics and Gynecology  Colposcopy      Treva Arthur is a 39year old [id number] who presents today for colposcopy, secondary to postcoital bleeding, normal pap and HPV+  She has a h/o cryo about 6 years ago and has normal paps si

## 2020-08-22 ENCOUNTER — HOSPITAL ENCOUNTER (OUTPATIENT)
Dept: ULTRASOUND IMAGING | Age: 45
Discharge: HOME | End: 2020-08-22
Attending: OTOLARYNGOLOGY
Payer: MEDICAID

## 2020-08-22 ENCOUNTER — LAB ENCOUNTER (OUTPATIENT)
Dept: LAB | Age: 45
End: 2020-08-22
Attending: UROLOGY
Payer: MEDICAID

## 2020-08-22 DIAGNOSIS — N20.0 NEPHROLITHIASIS: ICD-10-CM

## 2020-08-22 DIAGNOSIS — E04.1 THYROID NODULE: ICD-10-CM

## 2020-08-22 LAB — PTH-INTACT SERPL-MCNC: 48.8 PG/ML (ref 18.5–88)

## 2020-08-22 PROCEDURE — 76536 US EXAM OF HEAD AND NECK: CPT | Performed by: OTOLARYNGOLOGY

## 2020-08-22 PROCEDURE — 84392 ASSAY OF URINE SULFATE: CPT

## 2020-08-22 PROCEDURE — 82507 ASSAY OF CITRATE: CPT

## 2020-08-22 PROCEDURE — 83945 ASSAY OF OXALATE: CPT

## 2020-08-22 PROCEDURE — 82340 ASSAY OF CALCIUM IN URINE: CPT

## 2020-08-22 PROCEDURE — 83970 ASSAY OF PARATHORMONE: CPT

## 2020-08-22 PROCEDURE — 36415 COLL VENOUS BLD VENIPUNCTURE: CPT

## 2020-08-22 PROCEDURE — 82436 ASSAY OF URINE CHLORIDE: CPT

## 2020-08-22 NOTE — PROGRESS NOTES
Please let pt know colpo bx MEHREEN 1. We will need to repeat her pap in 1 year.  She should keep appt for US as scheduled

## 2020-08-24 ENCOUNTER — PATIENT MESSAGE (OUTPATIENT)
Dept: OTOLARYNGOLOGY | Facility: CLINIC | Age: 45
End: 2020-08-24

## 2020-08-24 NOTE — TELEPHONE ENCOUNTER
Event monitor showed a brief episode of afib, otherwise no significant arrhythmias. Pt has appt with Friday with EP at [facility] so will defer to them.

## 2020-08-25 NOTE — TELEPHONE ENCOUNTER
Verified HIPAA and LM relaying RH instructions. To f/u with EP as she has scheduled, can call for appt for any further needs.

## 2020-08-26 NOTE — TELEPHONE ENCOUNTER
From: Geeta Alexander  To: Maria E Wong.  Ambrosio Noel MD  Sent: 8/24/2020 8:57 AM CDT  Subject: Test Results Question    I have a question about PTH, INTACT resulted on 8/22/20, 4:25 PM.    I realize that I most likely received this test result before Dr Ambrosio Noel had a

## 2020-08-27 LAB
CALCIUM URINE - PER 24H: 130 MG/D
CALCIUM URINE - PER VOL: 8.1 MG/DL
CHLORIDE URINE - PER 24H: 138 MMOL/D
CHLORIDE URINE - PER VOL.: 86 MMOL/L
CITRIC ACID, URINE - MG/DAY: 154 MG/D
CITRIC ACID, URINE - MG/L: 96 MG/L
CREATININE, URINE - PER 24H: 1504 MG/D
CREATININE, URINE - PER VOLUME: 94 MG/DL
HOURS COLLECTED: 24 HR
MAGNESIUM URINE - PER 24H: 64 MG/D
MAGNESIUM URINE - PER VOL: 4 MG/DL
OXALATE, URINE - MG/DAY: 24 MG/D
OXALATE, URINE - MG/L: 15 MG/L
PH, URINE: 6.36
PHOSPHORUS URINE - PER 24H: 976 MG/D
PHOSPHORUS URINE - PER VOL: 61 MG/DL
POTASSIUM URINE - PER 24H: 35 MMOL/D
POTASSIUM URINE - PER VOL.: 22 MMOL/L
SODIUM URINE - PER VOL.: 110 MMOL/L
SODIUM URINE -PER 24H: 176 MMOL/D
SULFATE URINE - PER 24H: 30 MMOL/D
SULFATE URINE - PER VOL: 19 MMOL/L
TOTAL VOLUME: 1600 ML
URIC ACID URINE - PER 24H: 824 MG/D
URIC ACID URINE - PER VOL: 51.5 MG/DL
URINE SUPERSATURATION, CAHPO4: 2.33
URINE SUPERSATURATION, CAOX: 3.4
URINE SUPERSATURATION, UA CALC: 0.36

## 2020-09-03 ENCOUNTER — TELEPHONE (OUTPATIENT)
Dept: OBGYN CLINIC | Facility: CLINIC | Age: 45
End: 2020-09-03

## 2020-09-03 ENCOUNTER — ULTRASOUND ENCOUNTER (OUTPATIENT)
Dept: OBGYN CLINIC | Facility: CLINIC | Age: 45
End: 2020-09-03
Payer: MEDICAID

## 2020-09-03 DIAGNOSIS — R93.89 THICKENED ENDOMETRIUM: Primary | ICD-10-CM

## 2020-09-03 PROCEDURE — 76856 US EXAM PELVIC COMPLETE: CPT | Performed by: OBSTETRICS & GYNECOLOGY

## 2020-09-03 PROCEDURE — 76830 TRANSVAGINAL US NON-OB: CPT | Performed by: OBSTETRICS & GYNECOLOGY

## 2020-09-03 NOTE — PROGRESS NOTES
Patient informed of results. Verbalized understanding. No further questions or concerns. Call transferred to [facility] to schedule.

## 2020-09-03 NOTE — PROGRESS NOTES
Please let pt know hre endometrium is still thickened. She has a complex medical issues and is still undergoing treatment for kidney them.  I would like her to come into the office so we can discuss treatment options, as the next step may be a D&C to diagno

## 2020-09-12 ENCOUNTER — OFFICE VISIT (OUTPATIENT)
Dept: OBGYN CLINIC | Facility: CLINIC | Age: 45
End: 2020-09-12
Payer: MEDICAID

## 2020-09-12 VITALS
DIASTOLIC BLOOD PRESSURE: 72 MMHG | SYSTOLIC BLOOD PRESSURE: 124 MMHG | HEIGHT: 59 IN | HEART RATE: 56 BPM | WEIGHT: 166.38 LBS | BODY MASS INDEX: 33.54 KG/M2

## 2020-09-12 DIAGNOSIS — R93.89 THICKENED ENDOMETRIUM: Primary | ICD-10-CM

## 2020-09-12 PROBLEM — R07.9 CHEST PAIN, RULE OUT ACUTE MYOCARDIAL INFARCTION: Status: RESOLVED | Noted: 2020-07-07 | Resolved: 2020-09-12

## 2020-09-12 PROBLEM — I49.3 PVC'S (PREMATURE VENTRICULAR CONTRACTIONS): Status: ACTIVE | Noted: 2020-09-12

## 2020-09-12 PROCEDURE — 3074F SYST BP LT 130 MM HG: CPT | Performed by: OBSTETRICS & GYNECOLOGY

## 2020-09-12 PROCEDURE — 3008F BODY MASS INDEX DOCD: CPT | Performed by: OBSTETRICS & GYNECOLOGY

## 2020-09-12 PROCEDURE — 3078F DIAST BP <80 MM HG: CPT | Performed by: OBSTETRICS & GYNECOLOGY

## 2020-09-12 PROCEDURE — 99213 OFFICE O/P EST LOW 20 MIN: CPT | Performed by: OBSTETRICS & GYNECOLOGY

## 2020-09-12 NOTE — PROGRESS NOTES
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 Niecy Donato    • OTHER SURGICAL HISTORY      C;ysto Lt PCNL Stent Placement    • OTHER SURGICAL HISTORY  06/03/2020    Cysto Stent Removal w/ Dr Robyn Madden Dyspepsia and other specified disorders of function of stomach     Depressive disorder, not elsewhere classified     Migraine with aura, without mention of intractable migraine without mention of status migrainosus     Left nephrolithiasis     Body mass in

## 2020-09-15 ENCOUNTER — TELEPHONE (OUTPATIENT)
Dept: OBGYN CLINIC | Facility: CLINIC | Age: 45
End: 2020-09-15

## 2020-09-15 ENCOUNTER — TELEPHONE (OUTPATIENT)
Dept: CARDIOLOGY | Age: 45
End: 2020-09-15

## 2020-09-15 DIAGNOSIS — R93.89 THICKENED ENDOMETRIUM: Primary | ICD-10-CM

## 2020-09-15 NOTE — TELEPHONE ENCOUNTER
Surgery scheduled for 10/8/2020 at 12:30PM  Post op   Future Appointments   Date Time Provider Duc Jeffries   10/8/2020  9:00 AM Slick Wagoner MD [address] [place] [address]   10/20/2020  3:45 PM Rosa Green MD [facility] [address]     Orders entered  Ad

## 2020-09-15 NOTE — TELEPHONE ENCOUNTER
----- Message from Gary Cash MD sent at 9/12/2020  3:46 PM CDT -----  Regarding: schedule for surgery  Please schedule for surgery:    Date: as per schedule    Surgeon: Jan Cushing    Assistant: none    Type of admit: outpatient    Preop dx:  Thickened en

## 2020-09-17 ENCOUNTER — TELEPHONE (OUTPATIENT)
Dept: INTERNAL MEDICINE CLINIC | Facility: CLINIC | Age: 45
End: 2020-09-17

## 2020-09-17 DIAGNOSIS — M79.672 LEFT FOOT PAIN: Primary | ICD-10-CM

## 2020-09-17 NOTE — TELEPHONE ENCOUNTER
Spoke with patient provided referral information to Dr. Tushar Augustin. Patient verbalized understanding and agreeable to POC.

## 2020-09-17 NOTE — TELEPHONE ENCOUNTER
Spoke with patient stating continued to have left foot discomfort, now radiating to her calf area, heat, burning sensation, no pain, no swelling, no redness, not warm to touch, no discoloration, patient denies SOB, CP or any other symptom at this time.  She

## 2020-09-17 NOTE — TELEPHONE ENCOUNTER
Patient was referred to see Dr. Kristi Ybarra podiatrist for left foot and calf discomfort. When she called to make an appointment they advised her that the doctor only sees patients for foot and ankle.  Please ask Dr. Lakisha Gooden is he can refer her to another special

## 2020-09-17 NOTE — TELEPHONE ENCOUNTER
Patient now has pain in calf and pain from ankle ongoing is traveling up leg; burning sensation started today patient would like nurse to triage

## 2020-09-18 NOTE — TELEPHONE ENCOUNTER
I spoke with pt for clarification. Pt states that she is having L calf pain that radiates to L ankle/foot. Describes pain as a burning/warmth sensation and as if \"something is dripping down her leg\".      Pt had a doppler 7/2020, but pt states this pain/s

## 2020-09-28 ENCOUNTER — LAB ENCOUNTER (OUTPATIENT)
Dept: LAB | Age: 45
End: 2020-09-28
Attending: UROLOGY
Payer: MEDICAID

## 2020-09-28 DIAGNOSIS — R10.9 FLANK PAIN: ICD-10-CM

## 2020-09-28 LAB
BILIRUB UR QL STRIP.AUTO: NEGATIVE
CLARITY UR REFRACT.AUTO: CLEAR
GLUCOSE UR STRIP.AUTO-MCNC: NEGATIVE MG/DL
KETONES UR STRIP.AUTO-MCNC: NEGATIVE MG/DL
LEUKOCYTE ESTERASE UR QL STRIP.AUTO: NEGATIVE
NITRITE UR QL STRIP.AUTO: NEGATIVE
PH UR STRIP.AUTO: 6 [PH] (ref 4.5–8)
PROT UR STRIP.AUTO-MCNC: NEGATIVE MG/DL
SP GR UR STRIP.AUTO: 1.01 (ref 1–1.03)
UROBILINOGEN UR STRIP.AUTO-MCNC: <2 MG/DL

## 2020-09-28 PROCEDURE — 81001 URINALYSIS AUTO W/SCOPE: CPT

## 2020-10-02 ENCOUNTER — TELEPHONE (OUTPATIENT)
Dept: INTERNAL MEDICINE CLINIC | Facility: CLINIC | Age: 45
End: 2020-10-02

## 2020-10-02 ENCOUNTER — TELEPHONE (OUTPATIENT)
Dept: OBGYN CLINIC | Facility: CLINIC | Age: 45
End: 2020-10-02

## 2020-10-02 DIAGNOSIS — N20.0 RENAL STONES: Primary | ICD-10-CM

## 2020-10-02 DIAGNOSIS — Z01.812 PRE-PROCEDURAL LABORATORY EXAMINATION: ICD-10-CM

## 2020-10-02 DIAGNOSIS — N91.2 AMENORRHEA: Primary | ICD-10-CM

## 2020-10-02 RX ORDER — ACETAMINOPHEN 500 MG
1000 TABLET ORAL ONCE
Status: CANCELLED | OUTPATIENT
Start: 2020-10-02 | End: 2020-10-02

## 2020-10-02 NOTE — TELEPHONE ENCOUNTER
Pt calling having questions about surgery. She is wanting to know about pain meds after surgery. And another doctor ordered a cat scan and would like Dr. Ulysses Garcia to be aware of findings.  Please advise

## 2020-10-02 NOTE — TELEPHONE ENCOUNTER
Per review of chart, patient is scheduled for hysteroscopy with polypectomy and d&c on 10/8/20. Has multiple side effects listed on chart with pain medication use.  CT scan of abd/pelvis done on 9/23/20 ordered by Dr. Aleida Caba:    IMPRESSION:     1. 1 mm o

## 2020-10-02 NOTE — TELEPHONE ENCOUNTER
Spoke with patient notified referral placed to Dr. Ana Aponte. Patient verbalized understanding and agreeable to POC. Bi-Rhombic Flap Text: The defect edges were debeveled with a #15 scalpel blade.  Given the location of the defect and the proximity to free margins a bi-rhombic flap was deemed most appropriate.  Using a sterile surgical marker, an appropriate rhombic flap was drawn incorporating the defect. The area thus outlined was incised deep to adipose tissue with a #15 scalpel blade.  The skin margins were undermined to an appropriate distance in all directions utilizing iris scissors.

## 2020-10-02 NOTE — TELEPHONE ENCOUNTER
Patient requesting referral for nephrologist  Dr. Nolberto Sánchez.     Referred by urologist to see nephrology

## 2020-10-02 NOTE — TELEPHONE ENCOUNTER
Per my chart message dated 09/29/2020, patient mentioned to Dr. Robyn Madden if it would be a good idea to see Nephrologist, Dr. Robyn Madden indicated would be a good idea to follow up with Nephrology. Referral pended. Please advise.

## 2020-10-05 ENCOUNTER — LABORATORY ENCOUNTER (OUTPATIENT)
Dept: LAB | Age: 45
End: 2020-10-05
Attending: OBSTETRICS & GYNECOLOGY
Payer: MEDICAID

## 2020-10-05 ENCOUNTER — OFFICE VISIT (OUTPATIENT)
Dept: PODIATRY CLINIC | Facility: CLINIC | Age: 45
End: 2020-10-05
Payer: MEDICAID

## 2020-10-05 ENCOUNTER — LAB ENCOUNTER (OUTPATIENT)
Dept: LAB | Age: 45
End: 2020-10-05
Attending: OBSTETRICS & GYNECOLOGY
Payer: MEDICAID

## 2020-10-05 DIAGNOSIS — R93.89 THICKENED ENDOMETRIUM: ICD-10-CM

## 2020-10-05 DIAGNOSIS — N91.2 AMENORRHEA: ICD-10-CM

## 2020-10-05 DIAGNOSIS — M76.829 POSTERIOR TIBIAL TENDON DYSFUNCTION: ICD-10-CM

## 2020-10-05 DIAGNOSIS — Z01.812 PRE-PROCEDURAL LABORATORY EXAMINATION: ICD-10-CM

## 2020-10-05 DIAGNOSIS — T14.8XXA SPRAIN: ICD-10-CM

## 2020-10-05 DIAGNOSIS — M79.672 LEFT FOOT PAIN: Primary | ICD-10-CM

## 2020-10-05 PROCEDURE — 84703 CHORIONIC GONADOTROPIN ASSAY: CPT

## 2020-10-05 PROCEDURE — 99203 OFFICE O/P NEW LOW 30 MIN: CPT | Performed by: PODIATRIST

## 2020-10-05 PROCEDURE — 85025 COMPLETE CBC W/AUTO DIFF WBC: CPT

## 2020-10-05 PROCEDURE — 36415 COLL VENOUS BLD VENIPUNCTURE: CPT

## 2020-10-05 PROCEDURE — 80048 BASIC METABOLIC PNL TOTAL CA: CPT

## 2020-10-05 NOTE — PROGRESS NOTES
yAo Nice is a 39year old female. Patient presents with:  New Patient: left foot pain - arch pin since July/2020  and toe pain after stubbing her foot onto diaper box last week - pain scale 6/10 - denies taking pain medication.         HPI:     Kaya Performed by Bahman Duarte MD at [address] [place] [address],  [address] Bilateral 6/16/2019    Performed by Irene Teran DO at [address] [place] [address] URETEROSCOPY Right 10/15/2019    Performed by Irene Teran DO at [address] [place] [address] History    Socioeconomic History      Marital status:       Spouse name: Not on file      Number of children: Not on file      Years of education: Not on file      Highest education level: Not on file    Tobacco Use      Smoking status: Never Smoke the foot are negative for fracture I do think use of the custom orthotic might be indicated but will start with a OTC orthotic brianna valdovinos and that was dispensed to the patient this date we will also initiate physical therapy for her posterior tib ten

## 2020-10-05 NOTE — TELEPHONE ENCOUNTER
Karla Martines MD  [facility] Nurse   Caller: Unspecified (3 days ago, 10:21 AM)             Is she able to take tylenol/ibuprofen? Most women are able to alternate 1000 mg tylenol and 600 mg ibuprofen.    It looks like she took percocet after h

## 2020-10-06 ENCOUNTER — ANESTHESIA EVENT (OUTPATIENT)
Dept: SURGERY | Facility: HOSPITAL | Age: 45
End: 2020-10-06
Payer: MEDICAID

## 2020-10-06 ENCOUNTER — TELEPHONE (OUTPATIENT)
Dept: OBGYN CLINIC | Facility: CLINIC | Age: 45
End: 2020-10-06

## 2020-10-06 NOTE — PROGRESS NOTES
Please let pt know she is not pregnant. Her potassium is slightly low, so during the next 2 days prior to surgery she should have a banana or other potassium rich food daily (it looks like this is typical for her).  Thank you

## 2020-10-06 NOTE — TELEPHONE ENCOUNTER
Patient is scheduled for hysteroscopy, polypectomy and d&c on 10/8/20    Contacted patient. Reports that her period just started; light bleeding at this time, just noted with wiping. Isn't certain how heavy it will become.  Wants to know if this will keep h

## 2020-10-08 ENCOUNTER — TELEPHONE (OUTPATIENT)
Dept: OBGYN CLINIC | Facility: CLINIC | Age: 45
End: 2020-10-08

## 2020-10-08 ENCOUNTER — ANESTHESIA (OUTPATIENT)
Dept: SURGERY | Facility: HOSPITAL | Age: 45
End: 2020-10-08
Payer: MEDICAID

## 2020-10-08 ENCOUNTER — HOSPITAL ENCOUNTER (OUTPATIENT)
Facility: HOSPITAL | Age: 45
Setting detail: HOSPITAL OUTPATIENT SURGERY
Discharge: HOME | End: 2020-10-08
Attending: OBSTETRICS & GYNECOLOGY | Admitting: OBSTETRICS & GYNECOLOGY
Payer: MEDICAID

## 2020-10-08 VITALS
BODY MASS INDEX: 32.49 KG/M2 | TEMPERATURE: 98 F | WEIGHT: 161.19 LBS | SYSTOLIC BLOOD PRESSURE: 113 MMHG | DIASTOLIC BLOOD PRESSURE: 70 MMHG | HEART RATE: 50 BPM | OXYGEN SATURATION: 100 % | HEIGHT: 59 IN | RESPIRATION RATE: 15 BRPM

## 2020-10-08 DIAGNOSIS — R93.89 THICKENED ENDOMETRIUM: Primary | ICD-10-CM

## 2020-10-08 PROCEDURE — 0UB98ZX EXCISION OF UTERUS, VIA NATURAL OR ARTIFICIAL OPENING ENDOSCOPIC, DIAGNOSTIC: ICD-10-PCS | Performed by: OBSTETRICS & GYNECOLOGY

## 2020-10-08 PROCEDURE — 58558 HYSTEROSCOPY BIOPSY: CPT | Performed by: OBSTETRICS & GYNECOLOGY

## 2020-10-08 PROCEDURE — 0UDB7ZX EXTRACTION OF ENDOMETRIUM, VIA NATURAL OR ARTIFICIAL OPENING, DIAGNOSTIC: ICD-10-PCS | Performed by: OBSTETRICS & GYNECOLOGY

## 2020-10-08 RX ORDER — ACETAMINOPHEN AND CODEINE PHOSPHATE 300; 30 MG/1; MG/1
2 TABLET ORAL AS NEEDED
Status: DISCONTINUED | OUTPATIENT
Start: 2020-10-08 | End: 2020-10-08

## 2020-10-08 RX ORDER — LIDOCAINE HYDROCHLORIDE 10 MG/ML
INJECTION, SOLUTION INFILTRATION; PERINEURAL AS NEEDED
Status: DISCONTINUED | OUTPATIENT
Start: 2020-10-08 | End: 2020-10-08

## 2020-10-08 RX ORDER — DIPHENHYDRAMINE HYDROCHLORIDE 50 MG/ML
INJECTION INTRAMUSCULAR; INTRAVENOUS AS NEEDED
Status: DISCONTINUED | OUTPATIENT
Start: 2020-10-08 | End: 2020-10-08 | Stop reason: SURG

## 2020-10-08 RX ORDER — OXYCODONE HYDROCHLORIDE AND ACETAMINOPHEN 5; 325 MG/1; MG/1
1-2 TABLET ORAL EVERY 4 HOURS PRN
Qty: 8 TABLET | Refills: 0 | Status: SHIPPED | OUTPATIENT
Start: 2020-10-08 | End: 2021-03-18

## 2020-10-08 RX ORDER — GLYCOPYRROLATE 0.2 MG/ML
INJECTION, SOLUTION INTRAMUSCULAR; INTRAVENOUS AS NEEDED
Status: DISCONTINUED | OUTPATIENT
Start: 2020-10-08 | End: 2020-10-08 | Stop reason: SURG

## 2020-10-08 RX ORDER — HYDROMORPHONE HYDROCHLORIDE 1 MG/ML
0.4 INJECTION, SOLUTION INTRAMUSCULAR; INTRAVENOUS; SUBCUTANEOUS EVERY 5 MIN PRN
Status: DISCONTINUED | OUTPATIENT
Start: 2020-10-08 | End: 2020-10-08

## 2020-10-08 RX ORDER — NALOXONE HYDROCHLORIDE 0.4 MG/ML
80 INJECTION, SOLUTION INTRAMUSCULAR; INTRAVENOUS; SUBCUTANEOUS AS NEEDED
Status: DISCONTINUED | OUTPATIENT
Start: 2020-10-08 | End: 2020-10-08

## 2020-10-08 RX ORDER — DEXTROSE MONOHYDRATE 25 G/50ML
50 INJECTION, SOLUTION INTRAVENOUS
Status: DISCONTINUED | OUTPATIENT
Start: 2020-10-08 | End: 2020-10-08

## 2020-10-08 RX ORDER — OXYCODONE HYDROCHLORIDE AND ACETAMINOPHEN 5; 325 MG/1; MG/1
1 TABLET ORAL EVERY 4 HOURS PRN
Status: DISCONTINUED | OUTPATIENT
Start: 2020-10-08 | End: 2020-10-08

## 2020-10-08 RX ORDER — KETAMINE HYDROCHLORIDE 50 MG/ML
INJECTION, SOLUTION, CONCENTRATE INTRAMUSCULAR; INTRAVENOUS AS NEEDED
Status: DISCONTINUED | OUTPATIENT
Start: 2020-10-08 | End: 2020-10-08 | Stop reason: SURG

## 2020-10-08 RX ORDER — SODIUM CHLORIDE, SODIUM LACTATE, POTASSIUM CHLORIDE, CALCIUM CHLORIDE 600; 310; 30; 20 MG/100ML; MG/100ML; MG/100ML; MG/100ML
INJECTION, SOLUTION INTRAVENOUS CONTINUOUS
Status: DISCONTINUED | OUTPATIENT
Start: 2020-10-08 | End: 2020-10-08

## 2020-10-08 RX ORDER — IBUPROFEN 200 MG
600 TABLET ORAL ONCE AS NEEDED
Status: DISCONTINUED | OUTPATIENT
Start: 2020-10-08 | End: 2020-10-08

## 2020-10-08 RX ORDER — LIDOCAINE HYDROCHLORIDE 10 MG/ML
INJECTION, SOLUTION EPIDURAL; INFILTRATION; INTRACAUDAL; PERINEURAL AS NEEDED
Status: DISCONTINUED | OUTPATIENT
Start: 2020-10-08 | End: 2020-10-08 | Stop reason: SURG

## 2020-10-08 RX ORDER — ACETAMINOPHEN AND CODEINE PHOSPHATE 300; 30 MG/1; MG/1
1 TABLET ORAL AS NEEDED
Status: DISCONTINUED | OUTPATIENT
Start: 2020-10-08 | End: 2020-10-08

## 2020-10-08 RX ORDER — IBUPROFEN 600 MG/1
600 TABLET ORAL EVERY 6 HOURS PRN
Qty: 30 TABLET | Refills: 0 | Status: SHIPPED | OUTPATIENT
Start: 2020-10-08 | End: 2021-03-18

## 2020-10-08 RX ORDER — ACETAMINOPHEN 500 MG
1000 TABLET ORAL ONCE
Status: ON HOLD | COMMUNITY
End: 2020-10-08

## 2020-10-08 RX ORDER — METOCLOPRAMIDE HYDROCHLORIDE 5 MG/ML
10 INJECTION INTRAMUSCULAR; INTRAVENOUS AS NEEDED
Status: DISCONTINUED | OUTPATIENT
Start: 2020-10-08 | End: 2020-10-08

## 2020-10-08 RX ORDER — MIDAZOLAM HYDROCHLORIDE 1 MG/ML
INJECTION INTRAMUSCULAR; INTRAVENOUS AS NEEDED
Status: DISCONTINUED | OUTPATIENT
Start: 2020-10-08 | End: 2020-10-08 | Stop reason: SURG

## 2020-10-08 RX ORDER — ONDANSETRON 2 MG/ML
4 INJECTION INTRAMUSCULAR; INTRAVENOUS AS NEEDED
Status: DISCONTINUED | OUTPATIENT
Start: 2020-10-08 | End: 2020-10-08

## 2020-10-08 RX ADMIN — GLYCOPYRROLATE 0.1 MG: 0.2 INJECTION, SOLUTION INTRAMUSCULAR; INTRAVENOUS at 12:36:00

## 2020-10-08 RX ADMIN — SODIUM CHLORIDE, SODIUM LACTATE, POTASSIUM CHLORIDE, CALCIUM CHLORIDE: 600; 310; 30; 20 INJECTION, SOLUTION INTRAVENOUS at 13:19:00

## 2020-10-08 RX ADMIN — DIPHENHYDRAMINE HYDROCHLORIDE 12.5 MG: 50 INJECTION INTRAMUSCULAR; INTRAVENOUS at 12:40:00

## 2020-10-08 RX ADMIN — KETAMINE HYDROCHLORIDE 25 MG: 50 INJECTION, SOLUTION, CONCENTRATE INTRAMUSCULAR; INTRAVENOUS at 12:40:00

## 2020-10-08 RX ADMIN — MIDAZOLAM HYDROCHLORIDE 2 MG: 1 INJECTION INTRAMUSCULAR; INTRAVENOUS at 12:36:00

## 2020-10-08 RX ADMIN — LIDOCAINE HYDROCHLORIDE 25 MG: 10 INJECTION, SOLUTION EPIDURAL; INFILTRATION; INTRACAUDAL; PERINEURAL at 12:38:00

## 2020-10-08 NOTE — ANESTHESIA POSTPROCEDURE EVALUATION
[address] Patient Status:  Hospital Outpatient Surgery   Age/Gender 39year old female MRN [id number]   [facility] SURGERY Attending Nel Rush MD   Hosp Day # 0 PCP Ashley Marroquin MD       Anesthesia Post-op Not

## 2020-10-08 NOTE — TELEPHONE ENCOUNTER
Pharmacy is adding a safety note to the prescription for Percocet to state \"Max 6 tabs per day\". Routed to Alberto Noble.

## 2020-10-08 NOTE — H&P
GYN H&P     10/8/2020  9:58 AM    CC: Patient is here for menorrhagia, thickened endometrium, D&C, hysteroscopy    HPI: patient is a 39year old [id number] here for her menorrhagia, thickened endometrium, D&C, hysteroscopy    Patient was initially seen for m • Disorder of liver     elevated AST/ALT   • Disorder of thyroid     hyperthyroid- no meds   • Gestational diabetes 4/2014   • High blood pressure     No meds/diet control   • Kidney stones    • Migraines    • Ovarian cyst    • Post partum depression PERCUTANEOUS Left 5/19/2020    Performed by Tab Haque DO at [facility] MAIN OR   • OTHER Bilateral 2012    nerve surgery to bilateral arms about 1 month apart   • OTHER SURGICAL HISTORY  12/27/2019    cysto stent removal - dr. Oates Sic Normal rate, regular rhythm and normal heart sounds. Pulmonary/Chest: Effort normal and breath sounds normal.          A/P: Patient is 39year old female here for   1.  Thickened endometrium        Patient Active Problem List:     Dyspepsia and other spe

## 2020-10-08 NOTE — OPERATIVE REPORT
OPERATIVE REPORT:     PATIENT: Doris Blood  MRN: [id number]  DATE OF PROCEDURE: 10/08/20    PRE-OP DIAGNOSIS:   1. 40 yo [id number] with thickened endometrium, menorrhagia  2. Cervical stenosis     POST-OP DIAGNOSIS:   Same    PROCEDURE(S):   1.  Dilation under direct visualization. The TRUCLEAR hysteroscopic rotary blade simultaneously aspirated and cut the thickened endometrial tissue without any complications.  This was repeat along the anterior, posterior and lateral surface of the endometrial cavity

## 2020-10-08 NOTE — ANESTHESIA PREPROCEDURE EVALUATION
PRE-OP EVALUATION    Patient Name: Alec Cotton    Pre-op Diagnosis:  Thickened endometrium [R93.89]    Procedure(s):  Hysteroscopic polypectomy with truclear and dilation and curettage    Surgeon(s) and Role:     Aquiles Ortiz MD - Primary    Pre- W/UP STRICTURE Right 10/15/2019    Cysto Rt RPG, Rt URS w/ Laser Litho Dilation of Rtight Stricture Rt URS Stent Exchange w/ Dr Governor Wick   • CYSTO/URETERO, STONE REMOVE Right 12/18/2019    right ureter and kidney stones extraction, URS, stent placement pre-op labs reviewed.   Lab Results   Component Value Date    WBC 8.7 10/05/2020    RBC 4.91 10/05/2020    HGB 14.3 10/05/2020    HCT 43.6 10/05/2020    MCV 88.8 10/05/2020    MCH 29.1 10/05/2020    MCHC 32.8 10/05/2020    RDW 13.2 10/05/2020    .0 1

## 2020-10-12 NOTE — PROGRESS NOTES
Please inform pt pathology from surgery was benign, meaning not cancerous or precancerous.  Thank you

## 2020-10-19 ENCOUNTER — OFFICE VISIT (OUTPATIENT)
Dept: PHYSICAL THERAPY | Age: 45
End: 2020-10-19
Attending: PODIATRIST
Payer: MEDICAID

## 2020-10-19 DIAGNOSIS — M76.829 POSTERIOR TIBIAL TENDON DYSFUNCTION: ICD-10-CM

## 2020-10-19 DIAGNOSIS — M79.672 LEFT FOOT PAIN: ICD-10-CM

## 2020-10-19 PROCEDURE — 97140 MANUAL THERAPY 1/> REGIONS: CPT | Performed by: PHYSICAL THERAPIST

## 2020-10-19 PROCEDURE — 97162 PT EVAL MOD COMPLEX 30 MIN: CPT | Performed by: PHYSICAL THERAPIST

## 2020-10-19 NOTE — PROGRESS NOTES
LOWER EXTREMITY EVALUATION:   Referring Physician: Dr. Maria Del Carmen Slade  Diagnosis: left foot posterior tibia dysfunction      Date of Service: 10/19/2020     PATIENT SUMMARY   Blayne Pascual is a 39year old female who presents to therapy today with complaints addition to pain related to kidney surgery, imaging indicates she has a spondylolisthesis. Pt and PT discussed evaluation findings, pathology, POC and HEP. Pt voiced understanding and performs HEP correctly without reported pain.  Skilled Physical Therapy Amado Moderate Complexity, Manual therapy       Total Timed Treatment: 10 min     Total Treatment Time: 45 min     Based on clinical rationale and outcome measures, this evaluation involved Moderate Complexity decision making due to 1-2 personal factors/com

## 2020-10-20 ENCOUNTER — OFFICE VISIT (OUTPATIENT)
Dept: OBGYN CLINIC | Facility: CLINIC | Age: 45
End: 2020-10-20
Payer: MEDICAID

## 2020-10-20 VITALS
BODY MASS INDEX: 32.58 KG/M2 | HEART RATE: 66 BPM | WEIGHT: 161.63 LBS | SYSTOLIC BLOOD PRESSURE: 106 MMHG | TEMPERATURE: 98 F | HEIGHT: 59 IN | DIASTOLIC BLOOD PRESSURE: 72 MMHG

## 2020-10-20 DIAGNOSIS — R93.89 THICKENED ENDOMETRIUM: Primary | ICD-10-CM

## 2020-10-20 PROCEDURE — 99024 POSTOP FOLLOW-UP VISIT: CPT | Performed by: OBSTETRICS & GYNECOLOGY

## 2020-10-20 PROCEDURE — 3008F BODY MASS INDEX DOCD: CPT | Performed by: OBSTETRICS & GYNECOLOGY

## 2020-10-20 PROCEDURE — 3078F DIAST BP <80 MM HG: CPT | Performed by: OBSTETRICS & GYNECOLOGY

## 2020-10-20 PROCEDURE — 3074F SYST BP LT 130 MM HG: CPT | Performed by: OBSTETRICS & GYNECOLOGY

## 2020-10-20 NOTE — PROGRESS NOTES
GYN Post Operative Note    Devan Blue is a 39year old [id number] who presents status post D&C, hysteroscopy, endometrial sampling performed on 10/8/20 for thickened endometrium. She reports she is doing ok today.    She is unsure what is going to hap

## 2020-10-21 ENCOUNTER — OFFICE VISIT (OUTPATIENT)
Dept: PHYSICAL THERAPY | Age: 45
End: 2020-10-21
Attending: PODIATRIST
Payer: MEDICAID

## 2020-10-21 PROCEDURE — 97110 THERAPEUTIC EXERCISES: CPT | Performed by: PHYSICAL THERAPIST

## 2020-10-21 NOTE — PROGRESS NOTES
Dx:  Left posterior tib dysfunction           Authorized # of Visits:  8  Fall Risk: standard         Precautions: n/a             Subjective:   Patient states her left foot is not bad today. She state there is some tingling running up the calf.     Johan Sanders

## 2020-10-26 ENCOUNTER — TELEPHONE (OUTPATIENT)
Dept: INTERNAL MEDICINE CLINIC | Facility: CLINIC | Age: 45
End: 2020-10-26

## 2020-10-26 DIAGNOSIS — Z23 NEED FOR PNEUMOCOCCAL VACCINATION: Primary | ICD-10-CM

## 2020-10-26 NOTE — TELEPHONE ENCOUNTER
Patient calling to see if she has to get pneumonia vaccine as it shows on her MyChart it is due;also she was told possibly her recent memory issues she has been experiencing is \"due to so many surgeries and anesthesia she has had? \" please ask doctor and

## 2020-10-26 NOTE — TELEPHONE ENCOUNTER
Spoke with patient discussed memory issues and TO recommendations, patient is scheduled for NV next week. PNA23 vaccine pended for your review and approval if appropriate. Please sign, no need to route back.

## 2020-10-26 NOTE — TELEPHONE ENCOUNTER
[id number] Nohemi Kim    Her memory issues are more d/t her stress w her own heallth issues as well as being a mom etc

## 2020-10-27 ENCOUNTER — TELEPHONE (OUTPATIENT)
Dept: PHYSICAL THERAPY | Age: 45
End: 2020-10-27

## 2020-10-27 ENCOUNTER — APPOINTMENT (OUTPATIENT)
Dept: PHYSICAL THERAPY | Age: 45
End: 2020-10-27
Attending: PODIATRIST
Payer: MEDICAID

## 2020-10-29 ENCOUNTER — OFFICE VISIT (OUTPATIENT)
Dept: PHYSICAL THERAPY | Age: 45
End: 2020-10-29
Attending: PODIATRIST
Payer: MEDICAID

## 2020-10-29 PROCEDURE — 97110 THERAPEUTIC EXERCISES: CPT | Performed by: PHYSICAL THERAPIST

## 2020-10-29 NOTE — PROGRESS NOTES
Dx:  Left posterior tib dysfunction           Authorized # of Visits:  8  Fall Risk: standard         Precautions: n/a             Subjective:   Patient states she has noticed that when she has back pain, there is no foot pain.  When there is foot pain, the

## 2020-11-02 ENCOUNTER — OFFICE VISIT (OUTPATIENT)
Dept: PHYSICAL THERAPY | Age: 45
End: 2020-11-02
Attending: PODIATRIST
Payer: MEDICAID

## 2020-11-02 PROCEDURE — 97110 THERAPEUTIC EXERCISES: CPT | Performed by: PHYSICAL THERAPIST

## 2020-11-02 PROCEDURE — 97140 MANUAL THERAPY 1/> REGIONS: CPT | Performed by: PHYSICAL THERAPIST

## 2020-11-02 NOTE — PROGRESS NOTES
Dx:  Left posterior tib dysfunction           Authorized # of Visits:  8  Fall Risk: standard         Precautions: n/a             Subjective:   Left foot pain increases with walking around. Patient states she does not wear shoes in her household.    Object 50 min  Total Treatment Time: 50 min

## 2020-11-03 ENCOUNTER — OFFICE VISIT (OUTPATIENT)
Dept: NEPHROLOGY | Facility: CLINIC | Age: 45
End: 2020-11-03
Payer: MEDICAID

## 2020-11-03 VITALS
WEIGHT: 167.5 LBS | HEIGHT: 59 IN | HEART RATE: 98 BPM | SYSTOLIC BLOOD PRESSURE: 120 MMHG | DIASTOLIC BLOOD PRESSURE: 86 MMHG | BODY MASS INDEX: 33.77 KG/M2 | RESPIRATION RATE: 18 BRPM

## 2020-11-03 DIAGNOSIS — N29 NEPHROCALCINOSIS: Primary | ICD-10-CM

## 2020-11-03 DIAGNOSIS — E83.59 NEPHROCALCINOSIS: Primary | ICD-10-CM

## 2020-11-03 DIAGNOSIS — E87.6 HYPOKALEMIA: ICD-10-CM

## 2020-11-03 PROCEDURE — 3079F DIAST BP 80-89 MM HG: CPT | Performed by: INTERNAL MEDICINE

## 2020-11-03 PROCEDURE — 99244 OFF/OP CNSLTJ NEW/EST MOD 40: CPT | Performed by: INTERNAL MEDICINE

## 2020-11-03 PROCEDURE — 3008F BODY MASS INDEX DOCD: CPT | Performed by: INTERNAL MEDICINE

## 2020-11-03 PROCEDURE — 3074F SYST BP LT 130 MM HG: CPT | Performed by: INTERNAL MEDICINE

## 2020-11-03 NOTE — PROGRESS NOTES
Nephrology Consult Note    REASON FOR CONSULT: Hypokalemia / Nephrolithiasis    ASSESSMENT/PLAN:        1) Hypokalemia- potassium has been intermittent low > 3.0 meq/L over the last several years which is difficult interpret as she has been on a variety of SOB/cough/hemoptysis  Denies abd or flank pain  Denies N/V/D  Denies change in urinary habits or gross hematuria  Denies LE edema  Denies skin rashes/myalgias/arthralgias    PMH:  Past Medical History:   Diagnosis Date   • Arrhythmia     fast heart rate du Valentín Fernández, DO at [facility] OR   • CYSTOSCOPY,URETEROSCOPY,LITHOTRIPSY Right 08/23/2019    Cysto, B/L RPG, URS, laser litho, stone ext, Rt stent Dr. Stephen Schwarz   • HERNIA SURGERY  1/2000    L hernia repair   • HERNIA VENTRAL REPAIR N/A 3/29/2017    Performed by Comment:TABS  Dilaudid [[employer]*    ITCHING    Comment:Headache  Morphine                ITCHING  Seasonal                Runny nose  Vicodin [Hydrocodon*    ITCHING    Social History:  Social History    Socioeconomic History      Marital status: Divorce

## 2020-11-04 ENCOUNTER — TELEPHONE (OUTPATIENT)
Dept: INTERNAL MEDICINE CLINIC | Facility: CLINIC | Age: 45
End: 2020-11-04

## 2020-11-04 ENCOUNTER — TELEPHONE (OUTPATIENT)
Dept: PHYSICAL THERAPY | Age: 45
End: 2020-11-04

## 2020-11-04 ENCOUNTER — APPOINTMENT (OUTPATIENT)
Dept: PHYSICAL THERAPY | Age: 45
End: 2020-11-04
Attending: PODIATRIST
Payer: MEDICAID

## 2020-11-04 NOTE — TELEPHONE ENCOUNTER
Spoke with patient stating over the weekend broke out in a rash, on her arms, legs and right thigh, patient indicates did not do anything different as far as changing lotions, soaps, etc. Patient stating also feels fatigue, headache, the rash looks like re

## 2020-11-04 NOTE — TELEPHONE ENCOUNTER
Patient is scheduled for tomorrow for a flu shot and pneumonia vaccine. Patient stated this weekend she broke out in a red rash on her arms, and legs. She would like to speak with a nurse. Please advise.

## 2020-11-05 ENCOUNTER — IMMUNIZATION (OUTPATIENT)
Dept: INTERNAL MEDICINE CLINIC | Facility: CLINIC | Age: 45
End: 2020-11-05
Payer: MEDICAID

## 2020-11-05 DIAGNOSIS — Z23 NEED FOR VACCINATION: ICD-10-CM

## 2020-11-05 PROCEDURE — 90686 IIV4 VACC NO PRSV 0.5 ML IM: CPT | Performed by: FAMILY MEDICINE

## 2020-11-05 PROCEDURE — 90471 IMMUNIZATION ADMIN: CPT | Performed by: FAMILY MEDICINE

## 2020-11-09 ENCOUNTER — TELEPHONE (OUTPATIENT)
Dept: PHYSICAL THERAPY | Age: 45
End: 2020-11-09

## 2020-11-09 ENCOUNTER — TELEPHONE (OUTPATIENT)
Dept: INTERNAL MEDICINE CLINIC | Facility: CLINIC | Age: 45
End: 2020-11-09

## 2020-11-09 ENCOUNTER — APPOINTMENT (OUTPATIENT)
Dept: PHYSICAL THERAPY | Age: 45
End: 2020-11-09
Attending: PODIATRIST
Payer: MEDICAID

## 2020-11-09 DIAGNOSIS — R21 RASH OF BODY: Primary | ICD-10-CM

## 2020-11-09 NOTE — TELEPHONE ENCOUNTER
Received call from , patient indicates TO recommended she try Hydrocortisone cream for her rash, she tried the cream and Benadryl but the rash only got worse. Patient was feeling tingling in down her legs and tongue last night no longer today.  Pa

## 2020-11-09 NOTE — TELEPHONE ENCOUNTER
Spoke with patient referred to Dr. Deborah Purdy. Patient verbalized understanding and agreeable to POC.

## 2020-11-10 ENCOUNTER — TELEPHONE (OUTPATIENT)
Dept: PHYSICAL THERAPY | Age: 45
End: 2020-11-10

## 2020-11-11 ENCOUNTER — APPOINTMENT (OUTPATIENT)
Dept: PHYSICAL THERAPY | Age: 45
End: 2020-11-11
Attending: PODIATRIST
Payer: MEDICAID

## 2020-11-16 ENCOUNTER — APPOINTMENT (OUTPATIENT)
Dept: PHYSICAL THERAPY | Age: 45
End: 2020-11-16
Attending: PODIATRIST
Payer: MEDICAID

## 2020-11-17 ENCOUNTER — LAB ENCOUNTER (OUTPATIENT)
Dept: LAB | Age: 45
End: 2020-11-17
Attending: INTERNAL MEDICINE
Payer: MEDICAID

## 2020-11-17 DIAGNOSIS — N29 NEPHROCALCINOSIS: ICD-10-CM

## 2020-11-17 DIAGNOSIS — E87.6 HYPOKALEMIA: ICD-10-CM

## 2020-11-17 DIAGNOSIS — E83.59 NEPHROCALCINOSIS: ICD-10-CM

## 2020-11-17 PROCEDURE — 83935 ASSAY OF URINE OSMOLALITY: CPT

## 2020-11-17 PROCEDURE — 82088 ASSAY OF ALDOSTERONE: CPT

## 2020-11-17 PROCEDURE — 83735 ASSAY OF MAGNESIUM: CPT

## 2020-11-17 PROCEDURE — 84550 ASSAY OF BLOOD/URIC ACID: CPT

## 2020-11-17 PROCEDURE — 80048 BASIC METABOLIC PNL TOTAL CA: CPT

## 2020-11-17 PROCEDURE — 84244 ASSAY OF RENIN: CPT

## 2020-11-17 PROCEDURE — 84133 ASSAY OF URINE POTASSIUM: CPT

## 2020-11-17 PROCEDURE — 84100 ASSAY OF PHOSPHORUS: CPT

## 2020-11-17 PROCEDURE — 36415 COLL VENOUS BLD VENIPUNCTURE: CPT

## 2020-11-19 ENCOUNTER — TELEPHONE (OUTPATIENT)
Dept: NEPHROLOGY | Facility: CLINIC | Age: 45
End: 2020-11-19

## 2020-11-20 ENCOUNTER — TELEPHONE (OUTPATIENT)
Dept: NEPHROLOGY | Facility: CLINIC | Age: 45
End: 2020-11-20

## 2020-11-20 NOTE — TELEPHONE ENCOUNTER
D/W pt- K 3.6 off thiazide even though she's been nauseous and not eating well- unlikely to have renal K wasting; can resume dyazide to decrease stone risk and given intermittent hypokalemia consider adding K-citrate as well but will defer to HERNANDEZ uribe

## 2020-12-04 ENCOUNTER — HOSPITAL ENCOUNTER (OUTPATIENT)
Dept: ULTRASOUND IMAGING | Age: 45
Discharge: HOME | End: 2020-12-04
Attending: UROLOGY
Payer: MEDICAID

## 2020-12-04 ENCOUNTER — LAB ENCOUNTER (OUTPATIENT)
Dept: LAB | Age: 45
End: 2020-12-04
Attending: UROLOGY
Payer: MEDICAID

## 2020-12-04 DIAGNOSIS — N13.5 STRICTURE OF URETER: ICD-10-CM

## 2020-12-04 DIAGNOSIS — N20.0 NEPHROLITHIASIS: ICD-10-CM

## 2020-12-04 PROCEDURE — 76770 US EXAM ABDO BACK WALL COMP: CPT | Performed by: UROLOGY

## 2020-12-04 PROCEDURE — 36415 COLL VENOUS BLD VENIPUNCTURE: CPT

## 2020-12-04 PROCEDURE — 80048 BASIC METABOLIC PNL TOTAL CA: CPT

## 2020-12-07 ENCOUNTER — APPOINTMENT (OUTPATIENT)
Dept: PHYSICAL THERAPY | Age: 45
End: 2020-12-07
Attending: PODIATRIST

## 2020-12-17 ENCOUNTER — LAB ENCOUNTER (OUTPATIENT)
Dept: LAB | Age: 45
End: 2020-12-17
Attending: UROLOGY
Payer: MEDICAID

## 2020-12-17 PROCEDURE — 83945 ASSAY OF OXALATE: CPT

## 2020-12-17 PROCEDURE — 82507 ASSAY OF CITRATE: CPT

## 2020-12-17 PROCEDURE — 84392 ASSAY OF URINE SULFATE: CPT

## 2020-12-17 PROCEDURE — 82340 ASSAY OF CALCIUM IN URINE: CPT

## 2020-12-17 PROCEDURE — 82436 ASSAY OF URINE CHLORIDE: CPT

## 2020-12-31 ENCOUNTER — MED REC SCAN ONLY (OUTPATIENT)
Dept: INTERNAL MEDICINE CLINIC | Facility: CLINIC | Age: 45
End: 2020-12-31

## 2021-01-09 ENCOUNTER — HOSPITAL ENCOUNTER (EMERGENCY)
Age: 46
Discharge: HOME | End: 2021-01-09
Attending: EMERGENCY MEDICINE
Payer: COMMERCIAL

## 2021-01-09 VITALS
HEART RATE: 74 BPM | DIASTOLIC BLOOD PRESSURE: 77 MMHG | RESPIRATION RATE: 16 BRPM | WEIGHT: 167.56 LBS | TEMPERATURE: 98 F | BODY MASS INDEX: 34 KG/M2 | OXYGEN SATURATION: 97 % | SYSTOLIC BLOOD PRESSURE: 145 MMHG

## 2021-01-09 DIAGNOSIS — J01.00 ACUTE NON-RECURRENT MAXILLARY SINUSITIS: Primary | ICD-10-CM

## 2021-01-09 PROCEDURE — 87081 CULTURE SCREEN ONLY: CPT | Performed by: PHYSICIAN ASSISTANT

## 2021-01-09 PROCEDURE — 99283 EMERGENCY DEPT VISIT LOW MDM: CPT | Performed by: EMERGENCY MEDICINE

## 2021-01-09 PROCEDURE — 87430 STREP A AG IA: CPT | Performed by: PHYSICIAN ASSISTANT

## 2021-01-09 RX ORDER — FLUTICASONE PROPIONATE 50 MCG
2 SPRAY, SUSPENSION (ML) NASAL DAILY
Qty: 16 G | Refills: 0 | Status: SHIPPED | OUTPATIENT
Start: 2021-01-09 | End: 2021-02-08

## 2021-01-09 RX ORDER — AMOXICILLIN AND CLAVULANATE POTASSIUM 875; 125 MG/1; MG/1
1 TABLET, FILM COATED ORAL 2 TIMES DAILY
Qty: 14 TABLET | Refills: 0 | Status: SHIPPED | OUTPATIENT
Start: 2021-01-09 | End: 2021-01-16

## 2021-01-09 NOTE — ED PROVIDER NOTES
Patient complains of symptoms that started yesterday. Patient reports sinus pressure. She has a history of sinus infections. No fever. She felt that her throat was very dry. She felt a fullness in her sinuses and even in her ears.   Briefly, she felt a

## 2021-01-09 NOTE — ED PROVIDER NOTES
Patient Seen in: [facility] In [place]      History   Patient presents with:  Sore Throat    Stated Complaint: sinus pressure HA and sore throat     HPI/Subjective:   79-year-old  female with history of hypertension, hypothyro Cysto Rt RPG, Rt URS w/ Laser Litho Dilation of Rtight Stricture Rt URS Stent Exchange w/ Dr Ana Vidal   • CYSTO/URETERO, STONE REMOVE Right 12/18/2019    right ureter and kidney stones extraction, URS, stent placement   • CYSTOSCOPY STENT INSERTION Right History    Tobacco Use      Smoking status: Never Smoker      Smokeless tobacco: Never Used    Alcohol use: No      Frequency: Never      Binge frequency: Never    Drug use:  No             Review of Systems    Positive for stated complaint: sinus pressure Musculoskeletal: Normal range of motion and neck supple. Thyroid: No thyromegaly. Lymphadenopathy:      Cervical: No cervical adenopathy. Skin:     Findings: No rash. Neurological:      General: No focal deficit present.       Mental Status: She

## 2021-01-10 LAB — SARS-COV-2 RNA RESP QL NAA+PROBE: NOT DETECTED

## 2021-01-13 ENCOUNTER — TELEPHONE (OUTPATIENT)
Dept: INTERNAL MEDICINE CLINIC | Facility: CLINIC | Age: 46
End: 2021-01-13

## 2021-01-13 DIAGNOSIS — Z12.31 ENCOUNTER FOR SCREENING MAMMOGRAM FOR MALIGNANT NEOPLASM OF BREAST: Primary | ICD-10-CM

## 2021-01-13 DIAGNOSIS — N64.4 BREAST PAIN: ICD-10-CM

## 2021-01-13 NOTE — TELEPHONE ENCOUNTER
Spoke with patient stating has family history of breast cancer, therefore her mammogram has to be diagnostic. She continues to have Sharp and throbbing pain in her breast, she was referred in the past to Dr. Rachel Ayers, would like a referral to her again.    Levi Hernandez

## 2021-01-13 NOTE — TELEPHONE ENCOUNTER
Patient is requesting an order for a mammogram. She also stated that she was referred to Dr. Nick Delatorre and the referral has . Please advise.

## 2021-01-25 ENCOUNTER — HOSPITAL ENCOUNTER (OUTPATIENT)
Dept: ULTRASOUND IMAGING | Age: 46
Discharge: HOME | End: 2021-01-25
Attending: FAMILY MEDICINE
Payer: COMMERCIAL

## 2021-01-25 ENCOUNTER — HOSPITAL ENCOUNTER (OUTPATIENT)
Dept: MAMMOGRAPHY | Age: 46
Discharge: HOME | End: 2021-01-25
Attending: FAMILY MEDICINE
Payer: COMMERCIAL

## 2021-01-25 DIAGNOSIS — N64.4 BREAST PAIN: ICD-10-CM

## 2021-01-25 DIAGNOSIS — Z12.31 ENCOUNTER FOR SCREENING MAMMOGRAM FOR MALIGNANT NEOPLASM OF BREAST: ICD-10-CM

## 2021-01-25 PROCEDURE — 77062 BREAST TOMOSYNTHESIS BI: CPT | Performed by: FAMILY MEDICINE

## 2021-01-25 PROCEDURE — 76641 ULTRASOUND BREAST COMPLETE: CPT | Performed by: FAMILY MEDICINE

## 2021-01-25 PROCEDURE — 77066 DX MAMMO INCL CAD BI: CPT | Performed by: FAMILY MEDICINE

## 2021-01-25 NOTE — IMAGING NOTE
Jermain Thapa is recommended for  Ultrasound guided biopsy of the Right Breast x 1 site and Left Breast x 1 site by .     History obtained:    INDICATIONS:  N64.4 Breast pain Z12.31 Encounter for screening mammogram for malignant neoplasm of breas mammogram.      Dictated by (CST): Bandar Ybarra MD on 1/25/2021 at 2:48 PM       Finalized by (CST): Bandar Ybarra MD on 1/25/2021 at 2:57 PM          Medications and allergies reviewed:  The following allergies were reported:   Hydrocodone-acetaminophenOT

## 2021-01-26 ENCOUNTER — TELEPHONE (OUTPATIENT)
Dept: INTERNAL MEDICINE CLINIC | Facility: CLINIC | Age: 46
End: 2021-01-26

## 2021-01-26 DIAGNOSIS — R92.8 ABNORMAL MAMMOGRAM: Primary | ICD-10-CM

## 2021-01-27 ENCOUNTER — PATIENT MESSAGE (OUTPATIENT)
Dept: INTERNAL MEDICINE CLINIC | Facility: CLINIC | Age: 46
End: 2021-01-27

## 2021-01-27 RX ORDER — DIAZEPAM 5 MG/1
TABLET ORAL
Qty: 2 TABLET | Refills: 0 | Status: SHIPPED | OUTPATIENT
Start: 2021-01-27 | End: 2021-03-18

## 2021-01-27 NOTE — TELEPHONE ENCOUNTER
From: Yovani Cordero  To: Mickey Vaughn MD  Sent: 1/27/2021 8:55 AM CST  Subject: Prescription Question    Good Morning Dr. Nelida Wisdom,    Is it possible to get Valium or something similar to make me more relaxed for my up coming double biopsies?  The nurse sug

## 2021-02-02 ENCOUNTER — TELEPHONE (OUTPATIENT)
Dept: MAMMOGRAPHY | Facility: HOSPITAL | Age: 46
End: 2021-02-02

## 2021-02-02 NOTE — TELEPHONE ENCOUNTER
Patient called with additional questions regarding her upcoming 2 site breast biopsy. Patient has anxiety meds from her physician and plans to come early to sign consent before taking them. Patient reassured and questions answered.  Patient has no further q

## 2021-02-05 ENCOUNTER — HOSPITAL ENCOUNTER (OUTPATIENT)
Dept: ULTRASOUND IMAGING | Age: 46
Discharge: HOME | End: 2021-02-05
Attending: FAMILY MEDICINE
Payer: COMMERCIAL

## 2021-02-05 ENCOUNTER — HOSPITAL ENCOUNTER (OUTPATIENT)
Dept: MAMMOGRAPHY | Age: 46
Discharge: HOME | End: 2021-02-05
Attending: FAMILY MEDICINE
Payer: COMMERCIAL

## 2021-02-05 DIAGNOSIS — R92.8 ABNORMAL MAMMOGRAM: ICD-10-CM

## 2021-02-05 PROCEDURE — 88305 TISSUE EXAM BY PATHOLOGIST: CPT | Performed by: FAMILY MEDICINE

## 2021-02-05 PROCEDURE — 77066 DX MAMMO INCL CAD BI: CPT | Performed by: FAMILY MEDICINE

## 2021-02-05 PROCEDURE — 19084 BX BREAST ADD LESION US IMAG: CPT | Performed by: FAMILY MEDICINE

## 2021-02-05 PROCEDURE — 19083 BX BREAST 1ST LESION US IMAG: CPT | Performed by: FAMILY MEDICINE

## 2021-02-05 NOTE — IMAGING NOTE
Pt post bilateral breast US biopsy. No bleeding or hematoma noted at the site. Steristrips placed and intact. Discharge instructions provided. Verbalized understanding of all instructions. Questions addressed. Emotional support provided.  Pt ambulatory to p

## 2021-02-08 NOTE — IMAGING NOTE
1502: Spoke with Fide Armstrong post Ultrasound Guided Right Breast biopsy and Ultrasound Guided Left Breast biopsy. Reinforced post biopsy care and instruction. Ms. Corinne Acton denies any issues with biopsy site- bleeding, drainage, redness, tenderness.      Patholo

## 2021-02-09 ENCOUNTER — TELEPHONE (OUTPATIENT)
Dept: INTERNAL MEDICINE CLINIC | Facility: CLINIC | Age: 46
End: 2021-02-09

## 2021-02-09 NOTE — TELEPHONE ENCOUNTER
Pt is requesting to talk to the nurse regarding her condition after her biopsy. Pt rather talk to the nurse 1st before she makes an appt with dr. Breanna Szymanski. Please call and advise.

## 2021-02-09 NOTE — TELEPHONE ENCOUNTER
Spoke with patient stating had breast biopsy and radiologist recommended she see a Breast specialist for the pain she is and was having prior to the biopsy.  Patient informed referral to Dr. Tommy Raman placed on 01/13/2021, she will call them to schedule an mack

## 2021-02-15 ENCOUNTER — HOSPITAL ENCOUNTER (OUTPATIENT)
Dept: ULTRASOUND IMAGING | Age: 46
Discharge: HOME | End: 2021-02-15
Attending: UROLOGY
Payer: COMMERCIAL

## 2021-02-15 ENCOUNTER — MED REC SCAN ONLY (OUTPATIENT)
Dept: INTERNAL MEDICINE CLINIC | Facility: CLINIC | Age: 46
End: 2021-02-15

## 2021-02-15 DIAGNOSIS — N20.0 NEPHROLITHIASIS: ICD-10-CM

## 2021-02-15 PROCEDURE — 76770 US EXAM ABDO BACK WALL COMP: CPT | Performed by: UROLOGY

## 2021-03-18 RX ORDER — MULTIVITAMIN
1 TABLET ORAL DAILY
COMMUNITY

## 2021-03-18 NOTE — H&P (VIEW-ONLY)
[facility] - History and Physical                                                                                         Patient presents with:  Establish Care  Abdominal Pain      HISTORY OF PRESENT ILLNESS:   Blayne Pascual is a 4 exertion 01/2019    I did see a cardiologists about this   • Diarrhea, unspecified 01/2020    I notice that certain things run right through me now   • Disorder of liver     elevated AST/ALT   • Disorder of thyroid     hyperthyroid- no meds   • Dizziness 0 legs and arms   • Sexually transmitted disease 2012    HPV   • Shortness of breath 01/2019    Seen cardiologist about this   • Sleep disturbance 01/2020    For years sometimes I can’t lay on my left side   • Stress 1994    Rough up bringing and also regula REPAIR N/A 3/29/2017    Performed by Adele Ny MD at [address] [facility] OR   • LITHOTRIPSY  2001, 2007 & 11/11   • NEPHROLITHOTOMY PERCUTANEOUS Left 5/19/2020    Performed by Andi Nelson DO at [facility] OR   • OTHER Bilateral 2012    nerve surgery to bilat visit):  Current Outpatient Medications   Medication Sig Dispense Refill   • PEG 3350-KCl-NaBcb-NaCl-NaSulf 236 g Oral Recon Soln Take as directed by physician 4000 mL 0   • Potassium Citrate ER 10 MEQ (1080 MG) Oral Tab CR Take 1 tablet (10 mEq total) by imaging and medications were reviewed. .  ASSESSMENT:       1. Left Sided Abdominal Pain  2. Rectal Pressure  3. NAFLD  4. Obesity  5.  History of Bilateral Nephrolithiasis    Differential includes functional, mskt related, urologic origin, gastritis,

## 2021-04-01 ENCOUNTER — HOSPITAL ENCOUNTER (EMERGENCY)
Age: 46
Discharge: HOME | End: 2021-04-01
Attending: EMERGENCY MEDICINE
Payer: COMMERCIAL

## 2021-04-01 ENCOUNTER — APPOINTMENT (OUTPATIENT)
Dept: CT IMAGING | Age: 46
End: 2021-04-01
Attending: EMERGENCY MEDICINE
Payer: COMMERCIAL

## 2021-04-01 ENCOUNTER — LAB ENCOUNTER (OUTPATIENT)
Dept: LAB | Age: 46
End: 2021-04-01
Attending: INTERNAL MEDICINE
Payer: COMMERCIAL

## 2021-04-01 VITALS
HEIGHT: 59 IN | HEART RATE: 63 BPM | BODY MASS INDEX: 32.86 KG/M2 | OXYGEN SATURATION: 98 % | WEIGHT: 163 LBS | RESPIRATION RATE: 18 BRPM | TEMPERATURE: 97 F | SYSTOLIC BLOOD PRESSURE: 124 MMHG | DIASTOLIC BLOOD PRESSURE: 75 MMHG

## 2021-04-01 DIAGNOSIS — N20.0 NEPHROLITHIASIS: ICD-10-CM

## 2021-04-01 DIAGNOSIS — O24.415 GESTATIONAL DIABETES MELLITUS (GDM) CONTROLLED ON ORAL HYPOGLYCEMIC DRUG, ANTEPARTUM: ICD-10-CM

## 2021-04-01 DIAGNOSIS — E55.9 VITAMIN D DEFICIENCY: ICD-10-CM

## 2021-04-01 DIAGNOSIS — E04.1 THYROID NODULE: ICD-10-CM

## 2021-04-01 DIAGNOSIS — R10.9 ABDOMINAL PAIN OF UNKNOWN ETIOLOGY: Primary | ICD-10-CM

## 2021-04-01 PROCEDURE — 74177 CT ABD & PELVIS W/CONTRAST: CPT | Performed by: EMERGENCY MEDICINE

## 2021-04-01 PROCEDURE — 81001 URINALYSIS AUTO W/SCOPE: CPT | Performed by: EMERGENCY MEDICINE

## 2021-04-01 PROCEDURE — 99284 EMERGENCY DEPT VISIT MOD MDM: CPT

## 2021-04-01 PROCEDURE — 85025 COMPLETE CBC W/AUTO DIFF WBC: CPT | Performed by: EMERGENCY MEDICINE

## 2021-04-01 PROCEDURE — 81025 URINE PREGNANCY TEST: CPT

## 2021-04-01 PROCEDURE — 36415 COLL VENOUS BLD VENIPUNCTURE: CPT

## 2021-04-01 PROCEDURE — 87086 URINE CULTURE/COLONY COUNT: CPT | Performed by: EMERGENCY MEDICINE

## 2021-04-01 PROCEDURE — 96361 HYDRATE IV INFUSION ADD-ON: CPT

## 2021-04-01 PROCEDURE — 80053 COMPREHEN METABOLIC PANEL: CPT | Performed by: EMERGENCY MEDICINE

## 2021-04-01 PROCEDURE — 83690 ASSAY OF LIPASE: CPT | Performed by: EMERGENCY MEDICINE

## 2021-04-01 PROCEDURE — 84443 ASSAY THYROID STIM HORMONE: CPT

## 2021-04-01 PROCEDURE — 84439 ASSAY OF FREE THYROXINE: CPT

## 2021-04-01 PROCEDURE — 96360 HYDRATION IV INFUSION INIT: CPT

## 2021-04-01 RX ORDER — SODIUM CHLORIDE 9 MG/ML
125 INJECTION, SOLUTION INTRAVENOUS CONTINUOUS
Status: DISCONTINUED | OUTPATIENT
Start: 2021-04-01 | End: 2021-04-01

## 2021-04-01 RX ORDER — DICYCLOMINE HCL 20 MG
20 TABLET ORAL ONCE
Status: COMPLETED | OUTPATIENT
Start: 2021-04-01 | End: 2021-04-01

## 2021-04-01 RX ORDER — DICYCLOMINE HCL 20 MG
20 TABLET ORAL
Qty: 15 TABLET | Refills: 0 | Status: SHIPPED | OUTPATIENT
Start: 2021-04-01 | End: 2021-04-05

## 2021-04-01 NOTE — ED PROVIDER NOTES
Patient Seen in: THE [facility] Emergency Department In [place]      History   Patient presents with:  Abdomen/Flank Pain    Stated Complaint: left flank pain    HPI/Subjective:   HPI    This is a 42 female who presents with left upper quadrant pain that she glasses or contacts since about 17 yrs old   • Calculus of kidney     hydronephrosis   • Change in hair 01/2020    My hair is thinning more than the usual   • Chest pain 01/2019    I did see a cardiologist about this   • Chest pain on exertion 01/2019    I Night sweats 01/2020    I feel like I’m on fire at night.    • Ovarian cyst    • Painful urination 1994    When passing stones   • Post partum depression    • PVC (premature ventricular contraction)    • Rash 11/2020    Broke out with unknown rash all over Right 7/9/2019    Performed by Yamileth Moseley DO at [address] [facility] MAIN OR   • CYSTOSCOPY,URETEROSCOPY,LITHOTRIPSY Right 08/23/2019    Cysto, B/L RPG, URS, laser litho, stone ext, Rt stent Dr. Kendal Moore   • [address]  1/2000    L hernia repair   • HERNIA GUILLE SKIN: Normal, warm, and dry. HEENT:  Pupils equally round and reactive to light. Conjuctiva clear. Oropharynx is clear and moist.   Lungs: Clear to auscultation bilaterally with no rales, no retractions, and no wheezing.   HEART:  Regular rate and rhyth pain  TECHNIQUE:  CT scanning was performed from the dome of the diaphragm to the pubic symphysis with non-ionic intravenous contrast material. Post contrast coronal MPR imaging was performed. Dose reduction techniques were used.  Dose information is trans pulmonary or pleural disease. OTHER:  Negative. CONCLUSION:  1. There is extensive bilateral nonobstructing nephrolithiasis. There are no obstructing stones detected.   There is mild distension right renal pelvis and proximal ureter without an [address] Person [phone]  [phone]    In 1 day      Shay Watkins, DO  [address] [phone]    In 1 day      Erma Hall, DO  [facility]     In 1 day            Med

## 2021-04-01 NOTE — ED INITIAL ASSESSMENT (HPI)
C/o left flank pain since yesterday. On and off nausea. Pain radiates to left upper abd area. No vomiting. C/o urinary frequency x few weeks.

## 2021-04-04 ENCOUNTER — LAB ENCOUNTER (OUTPATIENT)
Dept: LAB | Facility: HOSPITAL | Age: 46
End: 2021-04-04
Attending: INTERNAL MEDICINE
Payer: COMMERCIAL

## 2021-04-04 DIAGNOSIS — R10.9 STOMACH ACHE: ICD-10-CM

## 2021-04-06 ENCOUNTER — ANESTHESIA EVENT (OUTPATIENT)
Dept: ENDOSCOPY | Facility: HOSPITAL | Age: 46
End: 2021-04-06
Payer: COMMERCIAL

## 2021-04-07 ENCOUNTER — HOSPITAL ENCOUNTER (OUTPATIENT)
Facility: HOSPITAL | Age: 46
Setting detail: HOSPITAL OUTPATIENT SURGERY
Discharge: HOME | End: 2021-04-07
Attending: INTERNAL MEDICINE | Admitting: INTERNAL MEDICINE
Payer: COMMERCIAL

## 2021-04-07 ENCOUNTER — ANESTHESIA (OUTPATIENT)
Dept: ENDOSCOPY | Facility: HOSPITAL | Age: 46
End: 2021-04-07
Payer: COMMERCIAL

## 2021-04-07 VITALS
DIASTOLIC BLOOD PRESSURE: 55 MMHG | HEIGHT: 59 IN | OXYGEN SATURATION: 98 % | HEART RATE: 59 BPM | WEIGHT: 168 LBS | SYSTOLIC BLOOD PRESSURE: 98 MMHG | RESPIRATION RATE: 20 BRPM | TEMPERATURE: 98 F | BODY MASS INDEX: 33.87 KG/M2

## 2021-04-07 DIAGNOSIS — R10.9 STOMACH ACHE: Primary | ICD-10-CM

## 2021-04-07 DIAGNOSIS — K76.0 FATTY METAMORPHOSIS OF LIVER: ICD-10-CM

## 2021-04-07 DIAGNOSIS — R19.8 ANISMUS: ICD-10-CM

## 2021-04-07 PROCEDURE — 81025 URINE PREGNANCY TEST: CPT | Performed by: INTERNAL MEDICINE

## 2021-04-07 PROCEDURE — 0DJD8ZZ INSPECTION OF LOWER INTESTINAL TRACT, VIA NATURAL OR ARTIFICIAL OPENING ENDOSCOPIC: ICD-10-PCS | Performed by: INTERNAL MEDICINE

## 2021-04-07 PROCEDURE — 0DB98ZX EXCISION OF DUODENUM, VIA NATURAL OR ARTIFICIAL OPENING ENDOSCOPIC, DIAGNOSTIC: ICD-10-PCS | Performed by: INTERNAL MEDICINE

## 2021-04-07 PROCEDURE — 0DB68ZX EXCISION OF STOMACH, VIA NATURAL OR ARTIFICIAL OPENING ENDOSCOPIC, DIAGNOSTIC: ICD-10-PCS | Performed by: INTERNAL MEDICINE

## 2021-04-07 PROCEDURE — 88305 TISSUE EXAM BY PATHOLOGIST: CPT | Performed by: INTERNAL MEDICINE

## 2021-04-07 RX ORDER — LIDOCAINE HYDROCHLORIDE 10 MG/ML
INJECTION, SOLUTION EPIDURAL; INFILTRATION; INTRACAUDAL; PERINEURAL AS NEEDED
Status: DISCONTINUED | OUTPATIENT
Start: 2021-04-07 | End: 2021-04-07 | Stop reason: SURG

## 2021-04-07 RX ORDER — SODIUM CHLORIDE, SODIUM LACTATE, POTASSIUM CHLORIDE, CALCIUM CHLORIDE 600; 310; 30; 20 MG/100ML; MG/100ML; MG/100ML; MG/100ML
INJECTION, SOLUTION INTRAVENOUS CONTINUOUS
Status: DISCONTINUED | OUTPATIENT
Start: 2021-04-07 | End: 2021-04-07

## 2021-04-07 RX ADMIN — SODIUM CHLORIDE, SODIUM LACTATE, POTASSIUM CHLORIDE, CALCIUM CHLORIDE: 600; 310; 30; 20 INJECTION, SOLUTION INTRAVENOUS at 12:33:00

## 2021-04-07 RX ADMIN — LIDOCAINE HYDROCHLORIDE 50 MG: 10 INJECTION, SOLUTION EPIDURAL; INFILTRATION; INTRACAUDAL; PERINEURAL at 12:26:00

## 2021-04-07 RX ADMIN — LIDOCAINE HYDROCHLORIDE 50 MG: 10 INJECTION, SOLUTION EPIDURAL; INFILTRATION; INTRACAUDAL; PERINEURAL at 12:25:00

## 2021-04-07 NOTE — OPERATIVE REPORT
Treva Arthur Patient Status:  Hospital Outpatient Surgery    3/22/1975 MRN [id number]   Location [id number] [place] [address] Attending Hernan Eason DO   Hosp Day # 0 PCP Dominique Figueredo MD       PREOPERATIVE DIAGNOSIS/INDICATION 3 Remberto Ahumada  [facility]

## 2021-04-07 NOTE — ANESTHESIA PREPROCEDURE EVALUATION
PRE-OP EVALUATION    Patient Name: Dusty Wright    Admit Diagnosis: Stomach ache [R10.9]  Anismus [R19.8]  Fatty metamorphosis of liver [K76.0]    Pre-op Diagnosis: Stomach ache [R10.9]  Anismus [R19.8]  Fatty metamorphosis of liver [K76.0]    Hank Savory depression                        As per Epic:  Patient Active Problem List:     Dyspepsia and other specified disorders of function of stomach     Depressive disorder, not elsewhere classified     Migraine with aura, without mention of intractable migrain URETEROSCOPY Right 7/9/2019    Performed by Alexandra Chaudhary DO at [address] [facility] MAIN OR   • CYSTOSCOPY,URETEROSCOPY,LITHOTRIPSY Right 08/23/2019    Cysto, B/L RPG, URS, laser litho, stone ext, Rt stent Dr. Mccain Public   • [address]  1/[address]    L hernia repair intact; Patient does not demonstrate loose teeth to inspection. No notable dental history. Pulmonary  Comment: Unlabored ventilatory effort, no retractions.   Pulmonary exam normal.                 Other findings            ASA: 2   Plan: MAC  NPO

## 2021-04-07 NOTE — OPERATIVE REPORT
Malka Downing Patient Status:  Hospital Outpatient Surgery    3/22/1975 MRN [id number]   Location [id number] [place] [address] Attending Clair Lilly DO   Hosp Day # 0 PCP Josef Magana MD       PREOPERATIVE DIAGNOSIS/INDICATION: normal.  Transverse colon: The mucosa and vascular pattern were normal.  Descending colon: The mucosa and vascular pattern were normal.  Sigmoid colon: The mucosa and vascular pattern were normal.  Rectum:  The mucosa and vascular pattern were normal. Retro

## 2021-04-07 NOTE — ANESTHESIA POSTPROCEDURE EVALUATION
Birgit 94 Patient Status:  Hospital Outpatient Surgery   Age/Gender 42year old female MRN [id number]   Location [address] [place] [address] Attending 3 Remberto Ahumada [place] [facility] Day # 0 PCP MD Brooks Moore

## 2021-04-07 NOTE — INTERVAL H&P NOTE
Pre-op Diagnosis: Stomach ache [R10.9]  Anismus [R19.8]  Fatty metamorphosis of liver [K76.0]    The above referenced H&P was reviewed by Jojo Gloria DO on 4/7/2021, the patient was examined and no significant changes have occurred in the patient's co

## 2021-04-08 ENCOUNTER — TELEPHONE (OUTPATIENT)
Dept: INTERNAL MEDICINE CLINIC | Facility: CLINIC | Age: 46
End: 2021-04-08

## 2021-04-08 ENCOUNTER — OFFICE VISIT (OUTPATIENT)
Dept: INTERNAL MEDICINE CLINIC | Facility: CLINIC | Age: 46
End: 2021-04-08
Payer: COMMERCIAL

## 2021-04-08 VITALS
DIASTOLIC BLOOD PRESSURE: 100 MMHG | WEIGHT: 167 LBS | BODY MASS INDEX: 33.22 KG/M2 | SYSTOLIC BLOOD PRESSURE: 162 MMHG | RESPIRATION RATE: 12 BRPM | TEMPERATURE: 98 F | OXYGEN SATURATION: 99 % | HEIGHT: 59.5 IN | HEART RATE: 71 BPM

## 2021-04-08 DIAGNOSIS — R10.9 LEFT SIDED ABDOMINAL PAIN OF UNKNOWN CAUSE: Primary | ICD-10-CM

## 2021-04-08 PROCEDURE — 3077F SYST BP >= 140 MM HG: CPT | Performed by: FAMILY MEDICINE

## 2021-04-08 PROCEDURE — 3008F BODY MASS INDEX DOCD: CPT | Performed by: FAMILY MEDICINE

## 2021-04-08 PROCEDURE — 3080F DIAST BP >= 90 MM HG: CPT | Performed by: FAMILY MEDICINE

## 2021-04-08 PROCEDURE — 99214 OFFICE O/P EST MOD 30 MIN: CPT | Performed by: FAMILY MEDICINE

## 2021-04-08 RX ORDER — PREDNISONE 10 MG/1
TABLET ORAL
Qty: 20 TABLET | Refills: 0 | Status: SHIPPED | OUTPATIENT
Start: 2021-04-08 | End: 2021-06-18 | Stop reason: ALTCHOICE

## 2021-04-08 RX ORDER — OXYBUTYNIN CHLORIDE 5 MG/1
5 TABLET, EXTENDED RELEASE ORAL DAILY
COMMUNITY
Start: 2021-03-26 | End: 2021-06-18 | Stop reason: ALTCHOICE

## 2021-04-08 NOTE — PROGRESS NOTES
Amador Castle is a 42year old female.   HPI:   Here for f/u for her chronic pains in the abd LLQ and left flank   Did have Cscope and EGD yesterday that was OK  They told her its her stones   She called the urologist and awaiting call back    Valeria lockhart really know how I got them   • Fatigue 11/2019    I noticed that I’m alot more tired lately more than usual   • Flatulence/gas pain/belching 01/2020    Longer than this but this is an estimate   • Food intolerance 01/2020    I think I am lactose intolerant Longer than this but this is an estimate   • Unspecified condition originating in the  period 04   • Visual impairment     glasses, contacts   • Wears glasses 1989    Since I was about 17 yrs old   • Weight gain 2016    After last

## 2021-04-08 NOTE — TELEPHONE ENCOUNTER
Patient called complaining of ongoing stomach pain. Patient was referred to GI, but she had an appointment yesterday and they told her there were no abnormalities. Advised her to follow up with PCP. Patient wondering if she should come in to see Dr. Ana Noel, or if he has a better recommendation. Please advise. Scheduled appointment for today. Ok to keep or cancel?     Future Appointments   Date Time Provider Duc Jeffries   4/8/2021  2:30 PM Radha Fields MD [facility] [place]

## 2021-04-14 ENCOUNTER — HOSPITAL ENCOUNTER (OUTPATIENT)
Dept: ULTRASOUND IMAGING | Age: 46
Discharge: HOME | End: 2021-04-14
Attending: UROLOGY
Payer: COMMERCIAL

## 2021-04-14 DIAGNOSIS — N20.0 NEPHROLITHIASIS: ICD-10-CM

## 2021-04-14 PROCEDURE — 76770 US EXAM ABDO BACK WALL COMP: CPT | Performed by: UROLOGY

## 2021-04-15 ENCOUNTER — TELEPHONE (OUTPATIENT)
Dept: INTERNAL MEDICINE CLINIC | Facility: CLINIC | Age: 46
End: 2021-04-15

## 2021-04-15 RX ORDER — PREGABALIN 50 MG/1
50 CAPSULE ORAL 2 TIMES DAILY
Qty: 60 CAPSULE | Refills: 0 | Status: SHIPPED | OUTPATIENT
Start: 2021-04-15 | End: 2021-06-18

## 2021-04-15 NOTE — TELEPHONE ENCOUNTER
Spoke with patient advised should start taking Lyrica BID, rx pended for your review and approval if appropriate, I am unable to send. Patient asking if should f/u with you?

## 2021-04-15 NOTE — TELEPHONE ENCOUNTER
Patient is calling to update Dr. Carrion  with how the steroids are working. Patient stated she still has pain but not as bad. Patient still has nausea as well.  Patient stated she is following up with her neurologist. Patient is requesting advice from Dr. Proctor Balloon

## 2021-04-19 ENCOUNTER — TELEPHONE (OUTPATIENT)
Dept: INTERNAL MEDICINE CLINIC | Facility: CLINIC | Age: 46
End: 2021-04-19

## 2021-04-19 DIAGNOSIS — M54.6 THORACIC BACK PAIN, UNSPECIFIED BACK PAIN LATERALITY, UNSPECIFIED CHRONICITY: Primary | ICD-10-CM

## 2021-04-19 NOTE — TELEPHONE ENCOUNTER
Spoke with patient stating her Urologist Dr. Fransico Kraft recommended PT for her back pain. He is recommending TO to order PT. Patient asking if Dr. Andrew Haley can order PT. Please advise.    See Dr. Junior Gonzalez my chart message 4/17/2021

## 2021-04-19 NOTE — TELEPHONE ENCOUNTER
Patient received test results from her neurologist and they advised her to get physical therapy for 2 weeks to a month. Patient wants to get advice from Dr. Claudia Nugent since she is on lyrica.

## 2021-04-19 NOTE — TELEPHONE ENCOUNTER
PT order pended for your review and approval if appropriate. Please review dx or change if incorrect thank you. No need to route back patient is aware.

## 2021-04-24 NOTE — TELEPHONE ENCOUNTER
Cherylene Moulder, MD  P [facility] Clinical Staff  Caller: Unspecified (8 months ago)  Please let her know that she should have a repeat US of her thyroid since it has been 8 months since I last saw her.  RTC after scan

## 2021-04-24 NOTE — TELEPHONE ENCOUNTER
Informed patient of note below,Rn ordered thyroid US ,per pt has different insurance ,advised pt to call to update pt insurance ,pt verbalized understanding will call back since pt is driving ,please provide pt with central scheduling number [phone].

## 2021-04-26 ENCOUNTER — TELEPHONE (OUTPATIENT)
Dept: OTOLARYNGOLOGY | Facility: CLINIC | Age: 46
End: 2021-04-26

## 2021-04-26 NOTE — TELEPHONE ENCOUNTER
Pt returned the call regarding insurance and order for thyroid scan. Pt updated insurance to Andrea.  Call pt

## 2021-04-28 ENCOUNTER — HOSPITAL ENCOUNTER (OUTPATIENT)
Dept: ULTRASOUND IMAGING | Age: 46
Discharge: HOME | End: 2021-04-28
Attending: OTOLARYNGOLOGY
Payer: COMMERCIAL

## 2021-04-28 DIAGNOSIS — E04.1 THYROID NODULE: ICD-10-CM

## 2021-04-28 PROCEDURE — 76536 US EXAM OF HEAD AND NECK: CPT | Performed by: OTOLARYNGOLOGY

## 2021-04-30 ENCOUNTER — PATIENT MESSAGE (OUTPATIENT)
Dept: OTOLARYNGOLOGY | Facility: CLINIC | Age: 46
End: 2021-04-30

## 2021-04-30 DIAGNOSIS — E04.1 THYROID NODULE: Primary | ICD-10-CM

## 2021-04-30 NOTE — TELEPHONE ENCOUNTER
From: Madalyn Morrison  To: Vandana Moody. Felipe Villalobos MD  Sent: 4/30/2021 4:13 PM CDT  Subject: Test Results Question    I have a question about Ultrasound Scan Thyroid resulted on 4/28/21, 4:02 PM.    Hi, Dr. Felipe Villalobos,    Can you explain my test results to me?  Has the

## 2021-05-04 ENCOUNTER — OFFICE VISIT (OUTPATIENT)
Dept: PHYSICAL THERAPY | Age: 46
End: 2021-05-04
Attending: FAMILY MEDICINE
Payer: COMMERCIAL

## 2021-05-04 ENCOUNTER — TELEPHONE (OUTPATIENT)
Dept: OTOLARYNGOLOGY | Facility: CLINIC | Age: 46
End: 2021-05-04

## 2021-05-04 DIAGNOSIS — M54.6 THORACIC BACK PAIN, UNSPECIFIED BACK PAIN LATERALITY, UNSPECIFIED CHRONICITY: ICD-10-CM

## 2021-05-04 PROCEDURE — 97163 PT EVAL HIGH COMPLEX 45 MIN: CPT | Performed by: PHYSICAL THERAPIST

## 2021-05-04 NOTE — TELEPHONE ENCOUNTER
Per pt calling for test results. Per pt has an appt with another specialist and wants to have that info for the visit.  Please advise

## 2021-05-04 NOTE — PROGRESS NOTES
SPINE EVALUATION:   Referring Physician: Dr. Breanna Szymanski  Diagnosis: Thoracic back pain, unspecified back pain laterality, unspecified chronicity (M54.6)        Date of Service: 5/4/2021     PATIENT SUMMARY   Treva Arthur is a 42year old female who presents negative. Pain associated with movement included spinal SB left and cervical flex/exten in quadruped. Functional deficits include but are not limited to extreme difficulty performing housework as reported on functional survey.       Based on presentation, instructed in and issued a HEP for: quadruped UE/LE reaches     Charges: PT Eval High Complexity         Total Treatment Time: 45 min     Based on clinical rationale and outcome measures, this evaluation involved High Complexity decision making due to 2+ p

## 2021-05-05 NOTE — TELEPHONE ENCOUNTER
Ultrasound with stable nodules. No change since last scan. Please order a PTH to rule out hyperparathyroidism.  History of elevated urine calcium

## 2021-05-06 ENCOUNTER — OFFICE VISIT (OUTPATIENT)
Dept: PHYSICAL THERAPY | Age: 46
End: 2021-05-06
Attending: FAMILY MEDICINE
Payer: COMMERCIAL

## 2021-05-06 PROCEDURE — 97110 THERAPEUTIC EXERCISES: CPT | Performed by: PHYSICAL THERAPIST

## 2021-05-06 PROCEDURE — 97140 MANUAL THERAPY 1/> REGIONS: CPT | Performed by: PHYSICAL THERAPIST

## 2021-05-06 NOTE — PROGRESS NOTES
Dx: Thoracic back pain, unspecified back pain laterality, unspecified chronicity (M54.6)           Authorized # of Visits:  8  Fall Risk: standard         Precautions: n/a             Subjective: The patient states she feels \"hyper\" today.  She started

## 2021-05-11 ENCOUNTER — OFFICE VISIT (OUTPATIENT)
Dept: PHYSICAL THERAPY | Age: 46
End: 2021-05-11
Attending: FAMILY MEDICINE
Payer: COMMERCIAL

## 2021-05-11 PROCEDURE — 97140 MANUAL THERAPY 1/> REGIONS: CPT | Performed by: PHYSICAL THERAPIST

## 2021-05-12 ENCOUNTER — LAB ENCOUNTER (OUTPATIENT)
Dept: LAB | Age: 46
End: 2021-05-12
Attending: OTOLARYNGOLOGY
Payer: COMMERCIAL

## 2021-05-12 DIAGNOSIS — E04.1 THYROID NODULE: ICD-10-CM

## 2021-05-12 PROCEDURE — 83970 ASSAY OF PARATHORMONE: CPT

## 2021-05-12 PROCEDURE — 36415 COLL VENOUS BLD VENIPUNCTURE: CPT

## 2021-05-12 NOTE — PROGRESS NOTES
Dx: Thoracic back pain, unspecified back pain laterality, unspecified chronicity (M54.6)           Authorized # of Visits:  8  Fall Risk: standard         Precautions: n/a             Subjective:   Patient sates that she felt better after last treatment.  Janae Fontenotchild

## 2021-05-13 ENCOUNTER — APPOINTMENT (OUTPATIENT)
Dept: PHYSICAL THERAPY | Age: 46
End: 2021-05-13
Attending: FAMILY MEDICINE
Payer: COMMERCIAL

## 2021-05-15 ENCOUNTER — APPOINTMENT (OUTPATIENT)
Dept: CT IMAGING | Age: 46
End: 2021-05-15
Attending: EMERGENCY MEDICINE
Payer: COMMERCIAL

## 2021-05-15 ENCOUNTER — HOSPITAL ENCOUNTER (EMERGENCY)
Age: 46
Discharge: HOME | End: 2021-05-15
Attending: EMERGENCY MEDICINE
Payer: COMMERCIAL

## 2021-05-15 VITALS
OXYGEN SATURATION: 97 % | DIASTOLIC BLOOD PRESSURE: 81 MMHG | WEIGHT: 167.13 LBS | SYSTOLIC BLOOD PRESSURE: 147 MMHG | RESPIRATION RATE: 18 BRPM | TEMPERATURE: 99 F | HEART RATE: 66 BPM | BODY MASS INDEX: 33 KG/M2

## 2021-05-15 DIAGNOSIS — R10.9 ABDOMINAL PAIN OF UNKNOWN ETIOLOGY: Primary | ICD-10-CM

## 2021-05-15 DIAGNOSIS — N20.0 KIDNEY STONES: ICD-10-CM

## 2021-05-15 PROCEDURE — 99284 EMERGENCY DEPT VISIT MOD MDM: CPT

## 2021-05-15 PROCEDURE — 74176 CT ABD & PELVIS W/O CONTRAST: CPT | Performed by: EMERGENCY MEDICINE

## 2021-05-15 PROCEDURE — 96375 TX/PRO/DX INJ NEW DRUG ADDON: CPT

## 2021-05-15 PROCEDURE — 85025 COMPLETE CBC W/AUTO DIFF WBC: CPT | Performed by: EMERGENCY MEDICINE

## 2021-05-15 PROCEDURE — 96374 THER/PROPH/DIAG INJ IV PUSH: CPT

## 2021-05-15 PROCEDURE — 81025 URINE PREGNANCY TEST: CPT

## 2021-05-15 PROCEDURE — 96361 HYDRATE IV INFUSION ADD-ON: CPT

## 2021-05-15 PROCEDURE — 80053 COMPREHEN METABOLIC PANEL: CPT | Performed by: EMERGENCY MEDICINE

## 2021-05-15 PROCEDURE — 81001 URINALYSIS AUTO W/SCOPE: CPT | Performed by: EMERGENCY MEDICINE

## 2021-05-15 RX ORDER — HYDROMORPHONE HYDROCHLORIDE 1 MG/ML
0.5 INJECTION, SOLUTION INTRAMUSCULAR; INTRAVENOUS; SUBCUTANEOUS EVERY 30 MIN PRN
Status: DISCONTINUED | OUTPATIENT
Start: 2021-05-15 | End: 2021-05-15

## 2021-05-15 RX ORDER — AMOXICILLIN 500 MG/1
500 TABLET, FILM COATED ORAL 2 TIMES DAILY
COMMUNITY
End: 2021-06-18

## 2021-05-15 RX ORDER — ONDANSETRON 2 MG/ML
4 INJECTION INTRAMUSCULAR; INTRAVENOUS ONCE
Status: COMPLETED | OUTPATIENT
Start: 2021-05-15 | End: 2021-05-15

## 2021-05-15 RX ORDER — DIPHENHYDRAMINE HYDROCHLORIDE 50 MG/ML
25 INJECTION INTRAMUSCULAR; INTRAVENOUS ONCE
Status: COMPLETED | OUTPATIENT
Start: 2021-05-15 | End: 2021-05-15

## 2021-05-15 RX ORDER — HYOSCYAMINE SULFATE 0.125 MG
250 TABLET ORAL EVERY 4 HOURS PRN
Qty: 20 TABLET | Refills: 0 | Status: SHIPPED | OUTPATIENT
Start: 2021-05-15 | End: 2021-05-22

## 2021-05-15 NOTE — ED PROVIDER NOTES
Patient Seen in: THE [facility] Emergency Department In [place]      History   Patient presents with:  Abdomen/Flank Pain    Stated Complaint: left flank pain     HPI/Subjective:   HPI    82-year-old female comes to the hospital complaint of having difficulty alot more tired lately more than usual   • Flatulence/gas pain/belching 01/2020    Longer than this but this is an estimate   • Food intolerance 01/2020    I think I am lactose intolerant been that way for a few year   • Frequent urination Since early age originating in the  period 04   • Visual impairment     glasses, contacts   • Wears glasses 1989    Since I was about 17 yrs old   • Weight gain 2016    After last son was born              Past Surgical History:   Procedure 55168 Sridhar Boulder except as noted above.     Physical Exam     ED Triage Vitals [05/15/21 0951]   /76   Pulse 78   Resp 16   Temp 97.5 °F (36.4 °C)   Temp src Temporal   SpO2 98 %   O2 Device None (Room air)       Current:/81   Pulse 66   Temp 98.9 °F (37.2 °C) ( ---------                               -----------         ------                     CBC W/ DIFFERENTIAL[[id number]]          Abnormal            Final result                 Please view results for these tests on the individual orders.    POCT PREGNANCY Radiology Data Registry) which includes the Dose Index Registry. PATIENT STATED HISTORY: (As transcribed by Technologist)  The patient has left sided lower back pain for six days.     FINDINGS:  KIDNEYS:  There are innumerable bilateral nonobstructing vijay 5/15/21 1014)   sodium chloride 0.9% IV bolus 1,000 mL (1,000 mL Intravenous New Bag 5/15/21 1011)   ondansetron HCl (ZOFRAN) injection 4 mg (4 mg Intravenous Given 5/15/21 1013)   diphenhydrAMINE HCl (BENADRYL) IV PUSH injection 25 mg (25 mg Intravenous G Medication List    START taking these medications    Hyoscyamine Sulfate (LEVSIN) 0.125 MG Oral Tab  Take 2 tablets (250 mcg total) by mouth every 4 (four) hours as needed for Cramping.   Qty: 20 tablet Refills: 0

## 2021-05-17 ENCOUNTER — TELEPHONE (OUTPATIENT)
Dept: OTOLARYNGOLOGY | Facility: CLINIC | Age: 46
End: 2021-05-17

## 2021-05-17 DIAGNOSIS — E04.1 THYROID NODULE: Primary | ICD-10-CM

## 2021-05-17 PROCEDURE — 99213 OFFICE O/P EST LOW 20 MIN: CPT | Performed by: OTOLARYNGOLOGY

## 2021-05-17 NOTE — TELEPHONE ENCOUNTER
Pt called stating pt is scheduled for an appointment on 5-20-21 to discuss results. Pt lives one hour and 20 minutes away from the office. Can pt be scheduled for a video or telephone appointment.   Please call

## 2021-05-18 ENCOUNTER — OFFICE VISIT (OUTPATIENT)
Dept: PHYSICAL THERAPY | Age: 46
End: 2021-05-18
Attending: FAMILY MEDICINE
Payer: COMMERCIAL

## 2021-05-18 PROCEDURE — 97140 MANUAL THERAPY 1/> REGIONS: CPT | Performed by: PHYSICAL THERAPIST

## 2021-05-18 NOTE — TELEPHONE ENCOUNTER
Virtual Telephone Check-In    Kit Finger verbally consents to a Virtual/Telephone Check-In visit on 05/17/21. Patient has been referred to the [facility] website at [email or web address] to review the yearly Consent to Treat document.     Patient underst ultrasound demonstrates similar findings with a calcification within the left thyroid nodule.  No changes in size.  She still having significant kidney issues at this time which have taken over her life essentially.  No other signs, symptoms or complaints Partners: Male    Other Topics      Concerns:        Caffeine Concern: Yes          3 cups of soda daily        Exercise: No        Seat Belt: Yes      Family History   Problem Relation Age of Onset   • Heart Disorder Father         Stent   • Diabetes Fath notice that certain things run right through me now   • Disorder of liver     elevated AST/ALT   • Disorder of thyroid     hyperthyroid- no meds   • Dizziness 01/2019   • Easy bruising 01/2020    I find bruises on me and don’t really know how I got them Seen cardiologist about this   • Sleep disturbance 01/2020    For years sometimes I can’t lay on my left side   • Stress 1994    Rough up bringing and also regular family stresses   • Uncomfortable fullness after meals 01/2020    Longer than this but this loss.   ENMT Negative Drooling. Eyes Negative Blurred vision and vision changes. Respiratory Negative Dyspnea and wheezing. Cardio Negative Chest pain, irregular heartbeat/palpitations and syncope. GI Negative Abdominal pain and diarrhea.    Nelly Obrien

## 2021-05-18 NOTE — PROGRESS NOTES
Dx: Thoracic back pain, unspecified back pain laterality, unspecified chronicity (M54.6)           Authorized # of Visits:  8  Fall Risk: standard         Precautions: n/a            Discharge Summary  Pt has attended 4 visits in Physical Therapy.    Subjec activities\". Discussed progress with the patient. Recommended discharge as therapy is not providing pain relief. The patient was understanding and appreciative of the assistance in her care.   She will follow up with her physician to discuss further

## 2021-05-20 ENCOUNTER — APPOINTMENT (OUTPATIENT)
Dept: PHYSICAL THERAPY | Age: 46
End: 2021-05-20
Attending: FAMILY MEDICINE
Payer: COMMERCIAL

## 2021-05-24 ENCOUNTER — APPOINTMENT (OUTPATIENT)
Dept: PHYSICAL THERAPY | Age: 46
End: 2021-05-24
Attending: FAMILY MEDICINE
Payer: COMMERCIAL

## 2021-05-26 ENCOUNTER — APPOINTMENT (OUTPATIENT)
Dept: PHYSICAL THERAPY | Age: 46
End: 2021-05-26
Attending: FAMILY MEDICINE
Payer: COMMERCIAL

## 2021-06-01 ENCOUNTER — APPOINTMENT (OUTPATIENT)
Dept: PHYSICAL THERAPY | Age: 46
End: 2021-06-01
Attending: FAMILY MEDICINE
Payer: COMMERCIAL

## 2021-06-03 ENCOUNTER — APPOINTMENT (OUTPATIENT)
Dept: PHYSICAL THERAPY | Age: 46
End: 2021-06-03
Attending: FAMILY MEDICINE
Payer: COMMERCIAL

## 2021-06-10 ENCOUNTER — LAB ENCOUNTER (OUTPATIENT)
Dept: LAB | Age: 46
End: 2021-06-10
Attending: OBSTETRICS & GYNECOLOGY
Payer: COMMERCIAL

## 2021-06-10 DIAGNOSIS — N39.0 URINARY TRACT INFECTION WITHOUT HEMATURIA, SITE UNSPECIFIED: ICD-10-CM

## 2021-06-10 PROCEDURE — 87086 URINE CULTURE/COLONY COUNT: CPT

## 2021-06-10 PROCEDURE — 81001 URINALYSIS AUTO W/SCOPE: CPT

## 2021-06-18 RX ORDER — ACETAMINOPHEN 500 MG
1000 TABLET ORAL ONCE
Status: CANCELLED | OUTPATIENT
Start: 2021-06-18 | End: 2021-06-18

## 2021-06-23 ENCOUNTER — OFFICE VISIT (OUTPATIENT)
Dept: INTERNAL MEDICINE CLINIC | Facility: CLINIC | Age: 46
End: 2021-06-23
Payer: COMMERCIAL

## 2021-06-23 VITALS
RESPIRATION RATE: 16 BRPM | OXYGEN SATURATION: 99 % | HEART RATE: 68 BPM | DIASTOLIC BLOOD PRESSURE: 80 MMHG | TEMPERATURE: 98 F | WEIGHT: 166.75 LBS | BODY MASS INDEX: 33.62 KG/M2 | SYSTOLIC BLOOD PRESSURE: 130 MMHG | HEIGHT: 59 IN

## 2021-06-23 DIAGNOSIS — N20.0 RENAL CALCULUS, BILATERAL: ICD-10-CM

## 2021-06-23 DIAGNOSIS — Z01.818 PREOP EXAMINATION: Primary | ICD-10-CM

## 2021-06-23 DIAGNOSIS — I10 ESSENTIAL HYPERTENSION: ICD-10-CM

## 2021-06-23 PROBLEM — N23 RENAL COLIC: Status: RESOLVED | Noted: 2019-12-18 | Resolved: 2021-06-23

## 2021-06-23 PROBLEM — R07.9 CHEST PAIN: Status: RESOLVED | Noted: 2020-07-07 | Resolved: 2021-06-23

## 2021-06-23 PROCEDURE — 3075F SYST BP GE 130 - 139MM HG: CPT | Performed by: FAMILY MEDICINE

## 2021-06-23 PROCEDURE — 3079F DIAST BP 80-89 MM HG: CPT | Performed by: FAMILY MEDICINE

## 2021-06-23 PROCEDURE — 99243 OFF/OP CNSLTJ NEW/EST LOW 30: CPT | Performed by: FAMILY MEDICINE

## 2021-06-23 PROCEDURE — 3008F BODY MASS INDEX DOCD: CPT | Performed by: FAMILY MEDICINE

## 2021-06-23 NOTE — PROGRESS NOTES
Vida Yanez is a 42year old female. HPI:   Pt is here for a preop exam for her upcoming cysto, ureteroscopy/stent under the care of Dr Josh Mcdonald. Date is set for 6/26/21 at THE [facility] Hospital   General anesthesia to be used    No issues w anesthesia in hip pain   • Back problem     lower back   • Bloating 01/2020    Longer than this but this is an estimate   • Blood in urine 1994    Due to severe kidney stones   • Blurred vision 06/20    I always wore glasses or contacts since about 17 yrs old   • Calcul 1994    After first pregnancy   • Leg swelling 07/2020    My left leg slightly swelled up and my ankle area was hurtin   • Loss of appetite 01/2020    I noticed this lately too   • Migraines    • Nausea 01/2020    Longer than this but this is an estimate • OTHER Bilateral 2012    nerve surgery to bilateral arms about 1 month apart   • OTHER      percutaneous nephrolithotomy   • OTHER SURGICAL HISTORY  12/27/2019    cysto stent removal - dr. Kory Madrigal    • OTHER SURGICAL HISTORY      C;ysto Lt PCNL Stent

## 2021-06-26 ENCOUNTER — LAB ENCOUNTER (OUTPATIENT)
Dept: LAB | Age: 46
End: 2021-06-26
Attending: UROLOGY
Payer: COMMERCIAL

## 2021-06-26 DIAGNOSIS — Z01.818 PRE-OP TESTING: ICD-10-CM

## 2021-06-29 ENCOUNTER — HOSPITAL ENCOUNTER (OUTPATIENT)
Facility: HOSPITAL | Age: 46
Discharge: HOME | End: 2021-06-30
Attending: UROLOGY | Admitting: UROLOGY
Payer: COMMERCIAL

## 2021-06-29 ENCOUNTER — APPOINTMENT (OUTPATIENT)
Dept: GENERAL RADIOLOGY | Facility: HOSPITAL | Age: 46
End: 2021-06-29
Attending: UROLOGY
Payer: COMMERCIAL

## 2021-06-29 ENCOUNTER — ANESTHESIA EVENT (OUTPATIENT)
Dept: SURGERY | Facility: HOSPITAL | Age: 46
End: 2021-06-29
Payer: COMMERCIAL

## 2021-06-29 ENCOUNTER — ANESTHESIA (OUTPATIENT)
Dept: SURGERY | Facility: HOSPITAL | Age: 46
End: 2021-06-29
Payer: COMMERCIAL

## 2021-06-29 DIAGNOSIS — N20.0 RENAL CALCULI: ICD-10-CM

## 2021-06-29 DIAGNOSIS — Z01.818 PRE-OP TESTING: Primary | ICD-10-CM

## 2021-06-29 LAB — B-HCG UR QL: NEGATIVE

## 2021-06-29 PROCEDURE — 99214 OFFICE O/P EST MOD 30 MIN: CPT | Performed by: HOSPITALIST

## 2021-06-29 PROCEDURE — 0TC48ZZ EXTIRPATION OF MATTER FROM LEFT KIDNEY PELVIS, VIA NATURAL OR ARTIFICIAL OPENING ENDOSCOPIC: ICD-10-PCS | Performed by: UROLOGY

## 2021-06-29 PROCEDURE — BT14ZZZ FLUOROSCOPY OF KIDNEYS, URETERS AND BLADDER: ICD-10-PCS | Performed by: UROLOGY

## 2021-06-29 PROCEDURE — 0T788DZ DILATION OF BILATERAL URETERS WITH INTRALUMINAL DEVICE, VIA NATURAL OR ARTIFICIAL OPENING ENDOSCOPIC: ICD-10-PCS | Performed by: UROLOGY

## 2021-06-29 PROCEDURE — 0TC38ZZ EXTIRPATION OF MATTER FROM RIGHT KIDNEY PELVIS, VIA NATURAL OR ARTIFICIAL OPENING ENDOSCOPIC: ICD-10-PCS | Performed by: UROLOGY

## 2021-06-29 DEVICE — URETERAL STENT
Type: IMPLANTABLE DEVICE | Site: URETER | Status: FUNCTIONAL
Brand: ASCERTA™

## 2021-06-29 RX ORDER — DIPHENHYDRAMINE HCL 25 MG
25 CAPSULE ORAL EVERY 4 HOURS PRN
Status: DISCONTINUED | OUTPATIENT
Start: 2021-06-29 | End: 2021-06-30

## 2021-06-29 RX ORDER — OXYCODONE HYDROCHLORIDE 5 MG/1
10 TABLET ORAL EVERY 4 HOURS PRN
Status: DISCONTINUED | OUTPATIENT
Start: 2021-06-29 | End: 2021-06-29 | Stop reason: HOSPADM

## 2021-06-29 RX ORDER — OXYCODONE HYDROCHLORIDE 5 MG/1
15 TABLET ORAL EVERY 4 HOURS PRN
Status: DISCONTINUED | OUTPATIENT
Start: 2021-06-29 | End: 2021-06-29 | Stop reason: HOSPADM

## 2021-06-29 RX ORDER — DOXEPIN HYDROCHLORIDE 50 MG/1
1 CAPSULE ORAL DAILY
Status: DISCONTINUED | OUTPATIENT
Start: 2021-06-30 | End: 2021-06-30

## 2021-06-29 RX ORDER — NEOSTIGMINE METHYLSULFATE 1 MG/ML
INJECTION INTRAVENOUS AS NEEDED
Status: DISCONTINUED | OUTPATIENT
Start: 2021-06-29 | End: 2021-06-29 | Stop reason: SURG

## 2021-06-29 RX ORDER — HYDROMORPHONE HYDROCHLORIDE 1 MG/ML
INJECTION, SOLUTION INTRAMUSCULAR; INTRAVENOUS; SUBCUTANEOUS
Status: COMPLETED
Start: 2021-06-29 | End: 2021-06-29

## 2021-06-29 RX ORDER — NALOXONE HYDROCHLORIDE 0.4 MG/ML
80 INJECTION, SOLUTION INTRAMUSCULAR; INTRAVENOUS; SUBCUTANEOUS AS NEEDED
Status: DISCONTINUED | OUTPATIENT
Start: 2021-06-29 | End: 2021-06-29 | Stop reason: HOSPADM

## 2021-06-29 RX ORDER — HYDROMORPHONE HYDROCHLORIDE 1 MG/ML
0.2 INJECTION, SOLUTION INTRAMUSCULAR; INTRAVENOUS; SUBCUTANEOUS EVERY 2 HOUR PRN
Status: DISCONTINUED | OUTPATIENT
Start: 2021-06-29 | End: 2021-06-30

## 2021-06-29 RX ORDER — DIPHENHYDRAMINE HYDROCHLORIDE 50 MG/ML
25 INJECTION INTRAMUSCULAR; INTRAVENOUS EVERY 4 HOURS PRN
Status: DISCONTINUED | OUTPATIENT
Start: 2021-06-29 | End: 2021-06-29 | Stop reason: HOSPADM

## 2021-06-29 RX ORDER — CEFAZOLIN SODIUM/WATER 2 G/20 ML
2 SYRINGE (ML) INTRAVENOUS ONCE
Status: COMPLETED | OUTPATIENT
Start: 2021-06-29 | End: 2021-06-29

## 2021-06-29 RX ORDER — TRIAMTERENE AND HYDROCHLOROTHIAZIDE 37.5; 25 MG/1; MG/1
1 CAPSULE ORAL EVERY MORNING
Status: DISCONTINUED | OUTPATIENT
Start: 2021-06-29 | End: 2021-06-30

## 2021-06-29 RX ORDER — HYDROMORPHONE HYDROCHLORIDE 1 MG/ML
0.4 INJECTION, SOLUTION INTRAMUSCULAR; INTRAVENOUS; SUBCUTANEOUS EVERY 5 MIN PRN
Status: DISCONTINUED | OUTPATIENT
Start: 2021-06-29 | End: 2021-06-29 | Stop reason: HOSPADM

## 2021-06-29 RX ORDER — OXYCODONE HYDROCHLORIDE 5 MG/1
5 TABLET ORAL EVERY 4 HOURS PRN
Status: DISCONTINUED | OUTPATIENT
Start: 2021-06-29 | End: 2021-06-29 | Stop reason: HOSPADM

## 2021-06-29 RX ORDER — ONDANSETRON 2 MG/ML
INJECTION INTRAMUSCULAR; INTRAVENOUS AS NEEDED
Status: DISCONTINUED | OUTPATIENT
Start: 2021-06-29 | End: 2021-06-29 | Stop reason: SURG

## 2021-06-29 RX ORDER — ACETAMINOPHEN 325 MG/1
650 TABLET ORAL EVERY 4 HOURS PRN
Status: DISCONTINUED | OUTPATIENT
Start: 2021-06-29 | End: 2021-06-30

## 2021-06-29 RX ORDER — LIDOCAINE HYDROCHLORIDE 10 MG/ML
INJECTION, SOLUTION EPIDURAL; INFILTRATION; INTRACAUDAL; PERINEURAL AS NEEDED
Status: DISCONTINUED | OUTPATIENT
Start: 2021-06-29 | End: 2021-06-29 | Stop reason: SURG

## 2021-06-29 RX ORDER — ACETAMINOPHEN 500 MG
1000 TABLET ORAL ONCE AS NEEDED
Status: DISCONTINUED | OUTPATIENT
Start: 2021-06-29 | End: 2021-06-29 | Stop reason: HOSPADM

## 2021-06-29 RX ORDER — ROCURONIUM BROMIDE 10 MG/ML
INJECTION, SOLUTION INTRAVENOUS AS NEEDED
Status: DISCONTINUED | OUTPATIENT
Start: 2021-06-29 | End: 2021-06-29 | Stop reason: SURG

## 2021-06-29 RX ORDER — PHENAZOPYRIDINE HYDROCHLORIDE 100 MG/1
200 TABLET, FILM COATED ORAL 3 TIMES DAILY PRN
Status: DISCONTINUED | OUTPATIENT
Start: 2021-06-29 | End: 2021-06-30

## 2021-06-29 RX ORDER — DIPHENHYDRAMINE HYDROCHLORIDE 50 MG/ML
25 INJECTION INTRAMUSCULAR; INTRAVENOUS EVERY 4 HOURS PRN
Status: DISCONTINUED | OUTPATIENT
Start: 2021-06-29 | End: 2021-06-30

## 2021-06-29 RX ORDER — SODIUM CHLORIDE, SODIUM LACTATE, POTASSIUM CHLORIDE, CALCIUM CHLORIDE 600; 310; 30; 20 MG/100ML; MG/100ML; MG/100ML; MG/100ML
INJECTION, SOLUTION INTRAVENOUS CONTINUOUS
Status: DISCONTINUED | OUTPATIENT
Start: 2021-06-29 | End: 2021-06-29 | Stop reason: HOSPADM

## 2021-06-29 RX ORDER — LIDOCAINE HYDROCHLORIDE 20 MG/ML
JELLY TOPICAL AS NEEDED
Status: DISCONTINUED | OUTPATIENT
Start: 2021-06-29 | End: 2021-06-29 | Stop reason: HOSPADM

## 2021-06-29 RX ORDER — SODIUM CHLORIDE 9 MG/ML
INJECTION, SOLUTION INTRAVENOUS CONTINUOUS
Status: DISCONTINUED | OUTPATIENT
Start: 2021-06-29 | End: 2021-06-30

## 2021-06-29 RX ORDER — ONDANSETRON 2 MG/ML
4 INJECTION INTRAMUSCULAR; INTRAVENOUS AS NEEDED
Status: DISCONTINUED | OUTPATIENT
Start: 2021-06-29 | End: 2021-06-29 | Stop reason: HOSPADM

## 2021-06-29 RX ORDER — GLYCOPYRROLATE 0.2 MG/ML
INJECTION, SOLUTION INTRAMUSCULAR; INTRAVENOUS AS NEEDED
Status: DISCONTINUED | OUTPATIENT
Start: 2021-06-29 | End: 2021-06-29 | Stop reason: SURG

## 2021-06-29 RX ORDER — CEFAZOLIN SODIUM/WATER 2 G/20 ML
2 SYRINGE (ML) INTRAVENOUS EVERY 8 HOURS
Status: COMPLETED | OUTPATIENT
Start: 2021-06-29 | End: 2021-06-30

## 2021-06-29 RX ORDER — POTASSIUM CITRATE 5 MEQ/1
10 TABLET, EXTENDED RELEASE ORAL
Status: DISCONTINUED | OUTPATIENT
Start: 2021-06-30 | End: 2021-06-30

## 2021-06-29 RX ORDER — DEXAMETHASONE SODIUM PHOSPHATE 4 MG/ML
VIAL (ML) INJECTION AS NEEDED
Status: DISCONTINUED | OUTPATIENT
Start: 2021-06-29 | End: 2021-06-29 | Stop reason: SURG

## 2021-06-29 RX ORDER — METOCLOPRAMIDE HYDROCHLORIDE 5 MG/ML
10 INJECTION INTRAMUSCULAR; INTRAVENOUS AS NEEDED
Status: DISCONTINUED | OUTPATIENT
Start: 2021-06-29 | End: 2021-06-29 | Stop reason: HOSPADM

## 2021-06-29 RX ORDER — DIPHENHYDRAMINE HYDROCHLORIDE 50 MG/ML
INJECTION INTRAMUSCULAR; INTRAVENOUS
Status: COMPLETED
Start: 2021-06-29 | End: 2021-06-29

## 2021-06-29 RX ADMIN — ROCURONIUM BROMIDE 20 MG: 10 INJECTION, SOLUTION INTRAVENOUS at 13:54:00

## 2021-06-29 RX ADMIN — DEXAMETHASONE SODIUM PHOSPHATE 4 MG: 4 MG/ML VIAL (ML) INJECTION at 14:00:00

## 2021-06-29 RX ADMIN — ONDANSETRON 4 MG: 2 INJECTION INTRAMUSCULAR; INTRAVENOUS at 15:27:00

## 2021-06-29 RX ADMIN — NEOSTIGMINE METHYLSULFATE 1 MG: 1 INJECTION INTRAVENOUS at 15:29:00

## 2021-06-29 RX ADMIN — GLYCOPYRROLATE 0.2 MG: 0.2 INJECTION, SOLUTION INTRAMUSCULAR; INTRAVENOUS at 15:29:00

## 2021-06-29 RX ADMIN — LIDOCAINE HYDROCHLORIDE 50 MG: 10 INJECTION, SOLUTION EPIDURAL; INFILTRATION; INTRACAUDAL; PERINEURAL at 13:48:00

## 2021-06-29 RX ADMIN — SODIUM CHLORIDE, SODIUM LACTATE, POTASSIUM CHLORIDE, CALCIUM CHLORIDE: 600; 310; 30; 20 INJECTION, SOLUTION INTRAVENOUS at 13:45:00

## 2021-06-29 RX ADMIN — CEFAZOLIN SODIUM/WATER 2 G: 2 G/20 ML SYRINGE (ML) INTRAVENOUS at 13:55:00

## 2021-06-29 RX ADMIN — SODIUM CHLORIDE, SODIUM LACTATE, POTASSIUM CHLORIDE, CALCIUM CHLORIDE: 600; 310; 30; 20 INJECTION, SOLUTION INTRAVENOUS at 15:45:00

## 2021-06-29 NOTE — H&P
H&P dated 6/23/21 by Dr. Stefany Zuleta was reviewed. No changes to be made. Patient seen in preoperative area. We discussed the surgical plan for today and again discussed risks, benefits, alternatives, and post operative expectations.     Patient verbalized un

## 2021-06-29 NOTE — ANESTHESIA PREPROCEDURE EVALUATION
PRE-OP EVALUATION    Patient Name: Lamar Barbosa    Admit Diagnosis: Renal calculi [N20.0]    Pre-op Diagnosis: Renal calculi [N20.0]    CYSTOSCOPY, BILATERAL RETROGRADE PYELOGRAM, BILATERAL URETEROSCOPY WITH HOLMIUM LASER LITHOTRIPSY, BILATERAL URETERAL Pulmonary    Negative pulmonary ROS.                        Neuro/Psych                   (+) headaches                 Past Surgical History:   Procedure Laterality Date   •   ,2016    x2   • CHOLECYSTECTOMY     • COLO  (L) 05/15/2021    K 3.8 05/15/2021     05/15/2021    CO2 21.0 05/15/2021    BUN 12 05/15/2021    CREATSERUM 0.84 05/15/2021     (H) 05/15/2021    CA 9.0 05/15/2021            Airway      Mallampati: II  Mouth opening: >3 FB  TM dista

## 2021-06-29 NOTE — BRIEF OP NOTE
Pre-Operative Diagnosis: Renal calculi [N20.0]     Post-Operative Diagnosis: Renal calculi [N20.0]      Procedure Performed:   CYSTOSCOPY, BILATERAL RETROGRADE PYELOGRAM, BILATERAL URETEROSCOPY WITH HOLMIUM LASER LITHOTRIPSY, BILATERAL URETERAL STENT PLACE

## 2021-06-29 NOTE — ANESTHESIA PROCEDURE NOTES
Airway  Urgency: elective      General Information and Staff    Patient location during procedure: OR  Anesthesiologist: Nancy Bermudez MD  Performed: anesthesiologist     Indications and Patient Condition  Indications for airway management: anesthes

## 2021-06-29 NOTE — ANESTHESIA POSTPROCEDURE EVALUATION
[address] Patient Status:  Outpatient in a Bed   Age/Gender 42year old female MRN [id number]   [facility] SURGERY Attending Triston Haile, [address] Day # 0 PCP Justice Reid MD       Anesthesia Post-op Note    C

## 2021-06-30 VITALS
TEMPERATURE: 99 F | WEIGHT: 165.44 LBS | OXYGEN SATURATION: 94 % | SYSTOLIC BLOOD PRESSURE: 123 MMHG | HEART RATE: 58 BPM | BODY MASS INDEX: 33.35 KG/M2 | DIASTOLIC BLOOD PRESSURE: 68 MMHG | HEIGHT: 59 IN | RESPIRATION RATE: 18 BRPM

## 2021-06-30 LAB
ANION GAP SERPL CALC-SCNC: 5 MMOL/L (ref 0–18)
BUN BLD-MCNC: 11 MG/DL (ref 7–18)
BUN/CREAT SERPL: 11 (ref 10–20)
CALCIUM BLD-MCNC: 9.1 MG/DL (ref 8.5–10.1)
CHLORIDE SERPL-SCNC: 107 MMOL/L (ref 98–112)
CO2 SERPL-SCNC: 25 MMOL/L (ref 21–32)
CREAT BLD-MCNC: 1 MG/DL
DEPRECATED RDW RBC AUTO: 43 FL (ref 35.1–46.3)
ERYTHROCYTE [DISTWIDTH] IN BLOOD BY AUTOMATED COUNT: 13.2 % (ref 11–15)
GLUCOSE BLD-MCNC: 144 MG/DL (ref 70–99)
HCT VFR BLD AUTO: 41.7 %
HGB BLD-MCNC: 13.3 G/DL
MCH RBC QN AUTO: 28 PG (ref 26–34)
MCHC RBC AUTO-ENTMCNC: 31.9 G/DL (ref 31–37)
MCV RBC AUTO: 87.8 FL
OSMOLALITY SERPL CALC.SUM OF ELEC: 286 MOSM/KG (ref 275–295)
PLATELET # BLD AUTO: 326 10(3)UL (ref 150–450)
POTASSIUM SERPL-SCNC: 4 MMOL/L (ref 3.5–5.1)
RBC # BLD AUTO: 4.75 X10(6)UL
SODIUM SERPL-SCNC: 137 MMOL/L (ref 136–145)
WBC # BLD AUTO: 12 X10(3) UL (ref 4–11)

## 2021-06-30 PROCEDURE — 99214 OFFICE O/P EST MOD 30 MIN: CPT | Performed by: INTERNAL MEDICINE

## 2021-06-30 RX ORDER — HYDROCODONE BITARTRATE AND ACETAMINOPHEN 5; 325 MG/1; MG/1
1 TABLET ORAL EVERY 6 HOURS PRN
Qty: 12 TABLET | Refills: 0 | Status: SHIPPED | OUTPATIENT
Start: 2021-06-30 | End: 2021-07-28

## 2021-06-30 RX ORDER — HYDROCODONE BITARTRATE AND ACETAMINOPHEN 5; 325 MG/1; MG/1
2 TABLET ORAL EVERY 4 HOURS PRN
Status: DISCONTINUED | OUTPATIENT
Start: 2021-06-30 | End: 2021-06-30

## 2021-06-30 RX ORDER — DIAZEPAM 5 MG/1
5 TABLET ORAL EVERY 8 HOURS PRN
Status: DISCONTINUED | OUTPATIENT
Start: 2021-06-30 | End: 2021-06-30

## 2021-06-30 RX ORDER — PHENAZOPYRIDINE HYDROCHLORIDE 200 MG/1
200 TABLET, FILM COATED ORAL 3 TIMES DAILY PRN
Qty: 30 TABLET | Refills: 0 | Status: SHIPPED | OUTPATIENT
Start: 2021-06-30 | End: 2021-07-28

## 2021-06-30 RX ORDER — HYDROCODONE BITARTRATE AND ACETAMINOPHEN 5; 325 MG/1; MG/1
1 TABLET ORAL EVERY 6 HOURS PRN
Status: DISCONTINUED | OUTPATIENT
Start: 2021-06-30 | End: 2021-06-30

## 2021-06-30 RX ORDER — HYDROCODONE BITARTRATE AND ACETAMINOPHEN 5; 325 MG/1; MG/1
1 TABLET ORAL EVERY 4 HOURS PRN
Status: DISCONTINUED | OUTPATIENT
Start: 2021-06-30 | End: 2021-06-30

## 2021-06-30 NOTE — PROGRESS NOTES
[facility]  Urology Progress Note    Poonamsabrina Mckinney Patient Status:  Outpatient in a Bed    3/22/1975 MRN [id number]   [facility] 3SW-A Attending Eric Yu, [facility] Day # 0 PCP Brit Stokes MD     Subjective:  Guy Sher

## 2021-06-30 NOTE — CONSULTS
[facility] HOSPITALIST  CONSULT     Nyasia Suazo Patient Status:  Outpatient in a Bed    3/22/1975 MRN [id number]   [facility] 3SW-A Attending Benjamín Bobo, [address] [facility] Day # 0 PCP Marcy Hodge MD     Reason for consult: Xavi Marr of thyroid     hyperthyroid- no meds   • Dizziness 01/2019   • Easy bruising 01/2020    I find bruises on me and don’t really know how I got them   • Fatigue 11/2019    I noticed that I’m alot more tired lately more than usual   • Flatulence/gas pain/belch Stress 1994    Rough up bringing and also regular family stresses   • Uncomfortable fullness after meals 2020    Longer than this but this is an estimate   • Unspecified condition originating in the  period 04   • Visual impairment     glas History   Problem Relation Age of Onset   • Heart Disorder Father         Stent   • Diabetes Father    • Hypertension Father    • Diabetes Mother    • High Cholesterol Mother    • Hypertension Mother    • Thyroid disease Mother         hypothroid   • Other completed. Pertinent positives and negatives noted in the HPI.     Physical Exam:    /72 (BP Location: Left arm)   Pulse 91   Temp 98.1 °F (36.7 °C) (Oral)   Resp 16   Ht 4' 11\" (1.499 m)   Wt 165 lb 7.3 oz (75 kg)   LMP 06/14/2021   SpO2 95%   BM 1. Nephrolithiasis status post cystoscopy and stent placement with left flank pain  -Monitor  -Supportive care    2. Anxiety stable    3. Low back pain    4.   Hypertension stable on home medicines    Quality:  · DVT Prophylaxis: scd  · CODE status: f

## 2021-06-30 NOTE — PLAN OF CARE
PT AOX4 this PM. VSS on RA. , IS. SCDS bilaterally. Up and voiding in bathroom, straining all urine. Urine orange from pyridium. Taking tylenol for pain. IVFS infusing per orders. Safety precautions in place, call light in reach.  Providing education and

## 2021-06-30 NOTE — PLAN OF CARE
Patient rating pain 10 today, on valium pain went down to 8. She was started on norco and pain down to 5, patient is comfortable on norco to go home, given with benadryl due to history of itching in past. No itching today.  Written and verbal discharge inst

## 2021-06-30 NOTE — OPERATIVE REPORT
[facility]    PATIENT'S NAME: Surendra Bailey   ATTENDING PHYSICIAN: Duy Rivero DO   OPERATING PHYSICIAN: Duy Rivero DO   PATIENT ACCOUNT#:   [de-identified]    LOCATION:  [address] [place] [address]  MEDICAL RECORD #:   [id number]       DATE OF prepped and draped in the usual sterile fashion. A well-lubricated 21-Austrian rigid cystoscope was then introduced through a normal female urethra and into the bladder.   Upon entering the bladder, the bilateral ureteral orifices were seen in normal expecte the left renal collecting system over which a 4.8-Hebrew x 24 cm left ureteral stent was placed in conventional fashion. It was seen to coil within the left renal pelvis under fluoroscopic guidance with the bladder under cystoscopic guidance.   An carina

## 2021-06-30 NOTE — PROGRESS NOTES
[facility]PITAL[facility]  Progress Note     Malka Downing Patient Status:  Outpatient in a Bed    3/22/1975 MRN [id number]   [facility] 3SW-A Attending Justino Mccall, [address] [facility] Day # 0 PCP Josef Magana MD     Chief Complaint: Medical frank input(s): TROP, PBNP in the last 168 hours. Creatinine Kinase  No results for input(s): CK in the last 168 hours. Inflammatory Markers  No results for input(s): CRP, JEAN, LDH, DDIMER in the last 168 hours. Imaging: Imaging data reviewed in Epic.

## 2021-07-05 LAB
CALCULI MASS: 13 MG
CALCULI NUMBER: 2

## 2021-07-06 ENCOUNTER — TELEPHONE (OUTPATIENT)
Dept: INTERNAL MEDICINE CLINIC | Facility: CLINIC | Age: 46
End: 2021-07-06

## 2021-07-06 DIAGNOSIS — R92.8 ABNORMAL MAMMOGRAM OF BOTH BREASTS: Primary | ICD-10-CM

## 2021-07-06 NOTE — TELEPHONE ENCOUNTER
Repeat dx MMG/US orders needed. Breast US 2/2021 result notes:    Histology is benign and concordant with imaging findings. Follow-up diagnostic mammogram and ultrasound are recommended in 6 months.

## 2021-07-19 ENCOUNTER — HOSPITAL ENCOUNTER (OUTPATIENT)
Dept: MAMMOGRAPHY | Age: 46
Discharge: HOME | End: 2021-07-19
Attending: FAMILY MEDICINE
Payer: COMMERCIAL

## 2021-07-19 ENCOUNTER — HOSPITAL ENCOUNTER (OUTPATIENT)
Dept: ULTRASOUND IMAGING | Age: 46
Discharge: HOME | End: 2021-07-19
Attending: FAMILY MEDICINE
Payer: COMMERCIAL

## 2021-07-19 DIAGNOSIS — R92.8 ABNORMAL MAMMOGRAM OF BOTH BREASTS: ICD-10-CM

## 2021-07-19 PROCEDURE — 77066 DX MAMMO INCL CAD BI: CPT | Performed by: FAMILY MEDICINE

## 2021-07-19 PROCEDURE — 77062 BREAST TOMOSYNTHESIS BI: CPT | Performed by: FAMILY MEDICINE

## 2021-07-19 PROCEDURE — 76642 ULTRASOUND BREAST LIMITED: CPT | Performed by: FAMILY MEDICINE

## 2021-07-28 ENCOUNTER — OFFICE VISIT (OUTPATIENT)
Dept: OBGYN CLINIC | Facility: CLINIC | Age: 46
End: 2021-07-28
Payer: COMMERCIAL

## 2021-07-28 VITALS
BODY MASS INDEX: 33.59 KG/M2 | WEIGHT: 166.63 LBS | HEIGHT: 59 IN | DIASTOLIC BLOOD PRESSURE: 76 MMHG | SYSTOLIC BLOOD PRESSURE: 122 MMHG | HEART RATE: 70 BPM

## 2021-07-28 DIAGNOSIS — Z01.419 WELL WOMAN EXAM WITH ROUTINE GYNECOLOGICAL EXAM: Primary | ICD-10-CM

## 2021-07-28 DIAGNOSIS — Z12.4 SCREENING FOR MALIGNANT NEOPLASM OF CERVIX: ICD-10-CM

## 2021-07-28 DIAGNOSIS — R10.2 PELVIC PAIN: ICD-10-CM

## 2021-07-28 DIAGNOSIS — R22.31 MASS OF RIGHT AXILLA: ICD-10-CM

## 2021-07-28 PROCEDURE — 88175 CYTOPATH C/V AUTO FLUID REDO: CPT | Performed by: OBSTETRICS & GYNECOLOGY

## 2021-07-28 PROCEDURE — 99396 PREV VISIT EST AGE 40-64: CPT | Performed by: OBSTETRICS & GYNECOLOGY

## 2021-07-28 PROCEDURE — 87624 HPV HI-RISK TYP POOLED RSLT: CPT | Performed by: OBSTETRICS & GYNECOLOGY

## 2021-07-28 PROCEDURE — 3008F BODY MASS INDEX DOCD: CPT | Performed by: OBSTETRICS & GYNECOLOGY

## 2021-07-28 PROCEDURE — 3078F DIAST BP <80 MM HG: CPT | Performed by: OBSTETRICS & GYNECOLOGY

## 2021-07-28 PROCEDURE — 3074F SYST BP LT 130 MM HG: CPT | Performed by: OBSTETRICS & GYNECOLOGY

## 2021-07-28 NOTE — PROGRESS NOTES
GYN H&P     7/28/2021  8:11 AM    CC: Patient is here for annual exam    HPI: patient is a 42year old [id number] here for her annual exam  She underwent D&C, hysteroscopy for thickened endometrium on 10/8/2020.   She battles chronic kidney stones and follows Past Medical History:   Diagnosis Date   • Abdominal distention 01/2020    Longer than this but this is an estimate   • Abdominal hernia 3/2017    I had hernia repair around by my belly button & abdominal   • Abdominal pain 02/2020    Currently seeing obgy yue 01/2000    Mother told me I was born with one & my dr as well   • History of D&C 10/08/2020   • History of depression 07/2004    When I had a miscarriage   • History of mental disorder 07/2004    Family history of this on my mother’s side   • Indige 10/15/2019    Cysto Rt RPG, Rt URS w/ Laser Litho Dilation of Rtight Stricture Rt URS Stent Exchange w/ Dr Alysha Chappell   • CYSTO/URETERO, STONE REMOVE Right 12/18/2019    right ureter and kidney stones extraction, URS, stent placement   • CYSTOSCOPY,URETEROS facility-administered medications on file prior to visit.     Family History   Problem Relation Age of Onset   • Heart Disorder Father         Stent   • Diabetes Father    • Hypertension Father    • Diabetes Mother    • High Cholesterol Mother    • Hyperten There is no mass. Tenderness: There is no abdominal tenderness. There is no guarding or rebound. Hernia: No hernia is present.           Comments: Blue area area of tenderness   No LLQ pain     Genitourinary:     Labia:         Right: No rash, ten check US, also due to h/o thickened endometrium  Will order axillary US for right sided axillary LN  Advised her to make appt with PCP for multiple systemic symptoms  F/u pending results of all above    Michelle Epps MD

## 2021-07-29 LAB — HPV I/H RISK 1 DNA SPEC QL NAA+PROBE: NEGATIVE

## 2021-08-05 ENCOUNTER — HOSPITAL ENCOUNTER (OUTPATIENT)
Dept: MAMMOGRAPHY | Facility: HOSPITAL | Age: 46
Discharge: HOME | End: 2021-08-05
Attending: OBSTETRICS & GYNECOLOGY
Payer: COMMERCIAL

## 2021-08-05 DIAGNOSIS — R22.31 MASS OF RIGHT AXILLA: ICD-10-CM

## 2021-08-05 PROCEDURE — 76882 US LMTD JT/FCL EVL NVASC XTR: CPT | Performed by: OBSTETRICS & GYNECOLOGY

## 2021-08-13 ENCOUNTER — LAB ENCOUNTER (OUTPATIENT)
Dept: LAB | Facility: HOSPITAL | Age: 46
End: 2021-08-13
Attending: INTERNAL MEDICINE
Payer: COMMERCIAL

## 2021-08-13 ENCOUNTER — HOSPITAL ENCOUNTER (OUTPATIENT)
Dept: ULTRASOUND IMAGING | Facility: HOSPITAL | Age: 46
Discharge: HOME | End: 2021-08-13
Attending: INTERNAL MEDICINE
Payer: COMMERCIAL

## 2021-08-13 DIAGNOSIS — N20.0 NEPHROLITHIASIS: ICD-10-CM

## 2021-08-13 DIAGNOSIS — E04.1 THYROID NODULE: ICD-10-CM

## 2021-08-13 LAB
T4 FREE SERPL-MCNC: 0.9 NG/DL (ref 0.8–1.7)
TSI SER-ACNC: 1.17 MIU/ML (ref 0.36–3.74)

## 2021-08-13 PROCEDURE — 76536 US EXAM OF HEAD AND NECK: CPT | Performed by: INTERNAL MEDICINE

## 2021-08-13 PROCEDURE — 84439 ASSAY OF FREE THYROXINE: CPT

## 2021-08-13 PROCEDURE — 36415 COLL VENOUS BLD VENIPUNCTURE: CPT

## 2021-08-13 PROCEDURE — 84443 ASSAY THYROID STIM HORMONE: CPT

## 2021-08-16 ENCOUNTER — ULTRASOUND ENCOUNTER (OUTPATIENT)
Dept: OBGYN CLINIC | Facility: CLINIC | Age: 46
End: 2021-08-16
Payer: COMMERCIAL

## 2021-08-16 DIAGNOSIS — R10.2 PELVIC PAIN: Primary | ICD-10-CM

## 2021-08-16 PROCEDURE — 76830 TRANSVAGINAL US NON-OB: CPT | Performed by: OBSTETRICS & GYNECOLOGY

## 2021-08-16 PROCEDURE — 76856 US EXAM PELVIC COMPLETE: CPT | Performed by: OBSTETRICS & GYNECOLOGY

## 2021-08-17 ENCOUNTER — OFFICE VISIT (OUTPATIENT)
Dept: SURGERY | Facility: CLINIC | Age: 46
End: 2021-08-17
Payer: COMMERCIAL

## 2021-08-17 ENCOUNTER — TELEPHONE (OUTPATIENT)
Dept: INTERNAL MEDICINE CLINIC | Facility: CLINIC | Age: 46
End: 2021-08-17

## 2021-08-17 VITALS
BODY MASS INDEX: 33.47 KG/M2 | WEIGHT: 166 LBS | SYSTOLIC BLOOD PRESSURE: 122 MMHG | DIASTOLIC BLOOD PRESSURE: 75 MMHG | TEMPERATURE: 99 F | HEART RATE: 68 BPM | HEIGHT: 59 IN

## 2021-08-17 DIAGNOSIS — M79.18 MYOFASCIAL PAIN: Primary | ICD-10-CM

## 2021-08-17 DIAGNOSIS — N20.0 NEPHROLITHIASIS: ICD-10-CM

## 2021-08-17 DIAGNOSIS — R10.9 LEFT SIDED ABDOMINAL PAIN OF UNKNOWN CAUSE: Primary | ICD-10-CM

## 2021-08-17 DIAGNOSIS — I10 ESSENTIAL HYPERTENSION: ICD-10-CM

## 2021-08-17 PROCEDURE — 3078F DIAST BP <80 MM HG: CPT | Performed by: COLON & RECTAL SURGERY

## 2021-08-17 PROCEDURE — 3008F BODY MASS INDEX DOCD: CPT | Performed by: COLON & RECTAL SURGERY

## 2021-08-17 PROCEDURE — 99243 OFF/OP CNSLTJ NEW/EST LOW 30: CPT | Performed by: COLON & RECTAL SURGERY

## 2021-08-17 PROCEDURE — 3074F SYST BP LT 130 MM HG: CPT | Performed by: COLON & RECTAL SURGERY

## 2021-08-17 RX ORDER — TRIAMCINOLONE ACETONIDE 10 MG/ML
INJECTION, SUSPENSION INTRA-ARTICULAR; INTRALESIONAL
Qty: 1 ML | Refills: 0 | Status: SHIPPED | OUTPATIENT
Start: 2021-08-17 | End: 2021-12-21

## 2021-08-17 NOTE — TELEPHONE ENCOUNTER
Spoke with patient stating she does NOT have any issues with constipation, patient feels like a knot on the side of her abdomen, patient unable to move a certain way and gets very nauseas, patient believes has hernia, has had one in the past, and has seen

## 2021-08-17 NOTE — TELEPHONE ENCOUNTER
Patient called and stated she has discomfort on the left side of her belly button that Dr Andrew Haley is aware of.  Patient stated she was advised the last time she was seen to f/u with a urologist and a gynecologist. Patient stated she has f/u with suggested doc

## 2021-08-17 NOTE — PATIENT INSTRUCTIONS
The patient presents today in consultation of abdominal pain. The patient states that since around March or April of this year she has had pain in the left periumbilical area. She states that the pain awakens her at night.   She states that it has been the patient that as she is having pain at one site in this left periumbilical region that it would be worth doing a trigger point injection at this site. I believe this has a good chance of treating her pain that she is having.   I discussed that it may be

## 2021-08-17 NOTE — TELEPHONE ENCOUNTER
Given all her testing she has gone through I dont feel there is anything new going on    perhaps constipation related    can try OTC metamucil daily to see if helps

## 2021-08-17 NOTE — PROGRESS NOTES
Visit Note       Active Problems      1. Myofascial pain    2. Nephrolithiasis    3.  Essential hypertension          Chief Complaint   Patient presents with:  Hernia: EP ventral hernia- PT states had hernia surg with DJP in 2017 is having left side abd eloy significant for 2 prior  sections and a laparoscopic cholecystectomy. I acted as a scribe in this encounter. The physician obtained a history, independently performed a physical exam, and developed an assessment and plan.   The physician perf stones detected in either ureter.  There is mild right hydronephrosis which is similar to previous study and could represent an extrarenal pelvis   although recently passed stone is not excluded.    ADRENALS:  No mass or enlargement.     LIVER: Carey Saadiaon is [employer] reviewed by me today.     Past Medical History:   Diagnosis Date   • Abdominal distention 01/2020    Longer than this but this is an estimate   • Abdominal hernia 3/2017    I had hernia repair around by my belly button & abdominal   • Abdominal pain 02/2020 triamterene   • History of cardiac murmur 01/2000    Mother told me I was born with one & my dr as well   • History of D&C 10/08/2020   • History of depression 07/2004    When I had a miscarriage   • History of mental disorder 07/2004    Family history of CYSTO/URETERO W/UP STRICTURE Right 10/15/2019    Cysto Rt RPG, Rt URS w/ Laser Litho Dilation of Rtight Stricture Rt URS Stent Exchange w/ Dr Hudson Chavez   • CYSTO/URETERO, STONE REMOVE Right 12/18/2019    right ureter and kidney stones extraction, URS, sten Diabetes Sister    • Other Sister         Liver problem   • Other Daughter         Appendicitis   • Other Son         Appendicitis     Social History    Socioeconomic History      Marital status:       Spouse name: Not on file      Number of childr adenopathy. Does not bruise/bleed easily. Psychiatric/Behavioral: Negative for behavioral problems and sleep disturbance.         Physical Findings   /75   Pulse 68   Temp 98.6 °F (37 °C)   Ht 59\"   Wt 166 lb (75.3 kg)   LMP 07/08/2021 (Exact Date) Lymphadenopathy:      Cervical: No cervical adenopathy. Skin:     General: Skin is warm and dry. Findings: No rash. Neurological:      Mental Status: She is alert and oriented to person, place, and time.    Psychiatric:         Behavior: Behavior prior  sections and a laparoscopic cholecystectomy. Clinical examination of the abdomen reveals it to be soft, nondistended, mildly tender to palpation in the left periumbilical region, bowel sounds are normal activity normal pitch.   There  is n

## 2021-08-30 ENCOUNTER — OFFICE VISIT (OUTPATIENT)
Dept: SURGERY | Facility: CLINIC | Age: 46
End: 2021-08-30
Payer: COMMERCIAL

## 2021-08-30 VITALS
DIASTOLIC BLOOD PRESSURE: 102 MMHG | TEMPERATURE: 98 F | HEART RATE: 96 BPM | HEIGHT: 59 IN | WEIGHT: 168 LBS | SYSTOLIC BLOOD PRESSURE: 156 MMHG | BODY MASS INDEX: 33.87 KG/M2

## 2021-08-30 DIAGNOSIS — M79.18 MYOFASCIAL PAIN: Primary | ICD-10-CM

## 2021-08-30 PROCEDURE — 20552 NJX 1/MLT TRIGGER POINT 1/2: CPT | Performed by: COLON & RECTAL SURGERY

## 2021-08-30 PROCEDURE — 3077F SYST BP >= 140 MM HG: CPT | Performed by: COLON & RECTAL SURGERY

## 2021-08-30 PROCEDURE — 3080F DIAST BP >= 90 MM HG: CPT | Performed by: COLON & RECTAL SURGERY

## 2021-08-30 PROCEDURE — 3008F BODY MASS INDEX DOCD: CPT | Performed by: COLON & RECTAL SURGERY

## 2021-08-30 NOTE — PROGRESS NOTES
Follow Up Visit Note       Active Problems      1. Myofascial pain          Chief Complaint   Patient presents with: Injection: TRIGGER POINT INJECTION.   pt gives naval pain a 10/10        History of Present Illness        Allergies  Claudia Perla is allergic to m than usual   • Flatulence/gas pain/belching 01/2020    Longer than this but this is an estimate   • Food intolerance 01/2020    I think I am lactose intolerant been that way for a few year   • Frequent urination Since early age in my 20’s    All the time f 04   • Visual impairment     glasses, contacts   • Wears glasses 1989    Since I was about 17 yrs old   • Weight gain 2016    After last son was born     Past Surgical History:   Procedure Laterality Date   •   2014,2016    x2   • CHO Hypertension Mother    • Thyroid disease Mother         hypothroid   • Other Mother         Part of thyroid removed   • Diabetes Maternal Grandmother    • Cancer Maternal Grandmother         ovarian   • Ovarian Cancer Maternal Grandmother    • Other Matern Negative for hearing loss, nosebleeds, sore throat and trouble swallowing. Respiratory: Negative for apnea, cough, shortness of breath and wheezing. Cardiovascular: Negative for chest pain, palpitations and leg swelling.    Gastrointestinal: Negative indicated. The patient has severe remaining symptoms, she should report back to me for further consultation. She may have a suture granuloma or some other complication at the mesh to muscle interface that is causing her severe symptoms.   She may benefi

## 2021-08-31 ENCOUNTER — TELEPHONE (OUTPATIENT)
Dept: SURGERY | Facility: CLINIC | Age: 46
End: 2021-08-31

## 2021-08-31 NOTE — TELEPHONE ENCOUNTER
Pt had trigger point imjection yesterday and states it did not help at all and possible irritated the area.  Msg out to PA Per PA pt needs appt. msg sent to pt

## 2021-08-31 NOTE — PATIENT INSTRUCTIONS
This patient presents for further care and treatment for myofascial pain at the lateral border of a mesh graft in the central abdomen. It is on the left side of the umbilicus at the edge where the fascia and the mesh interface.     Using sterile technique,

## 2021-09-14 ENCOUNTER — LAB ENCOUNTER (OUTPATIENT)
Dept: LAB | Age: 46
End: 2021-09-14
Attending: UROLOGY
Payer: COMMERCIAL

## 2021-09-14 ENCOUNTER — OFFICE VISIT (OUTPATIENT)
Dept: SURGERY | Facility: CLINIC | Age: 46
End: 2021-09-14
Payer: COMMERCIAL

## 2021-09-14 VITALS — TEMPERATURE: 98 F | DIASTOLIC BLOOD PRESSURE: 90 MMHG | SYSTOLIC BLOOD PRESSURE: 136 MMHG | HEART RATE: 72 BPM

## 2021-09-14 DIAGNOSIS — I10 ESSENTIAL HYPERTENSION: ICD-10-CM

## 2021-09-14 DIAGNOSIS — Z01.818 PRE-OP TESTING: ICD-10-CM

## 2021-09-14 DIAGNOSIS — T81.89XD SUTURE GRANULOMA, SUBSEQUENT ENCOUNTER: Primary | ICD-10-CM

## 2021-09-14 PROBLEM — T81.89XA SUTURE GRANULOMA: Status: ACTIVE | Noted: 2021-09-14

## 2021-09-14 PROCEDURE — 84392 ASSAY OF URINE SULFATE: CPT

## 2021-09-14 PROCEDURE — 82340 ASSAY OF CALCIUM IN URINE: CPT

## 2021-09-14 PROCEDURE — 83945 ASSAY OF OXALATE: CPT

## 2021-09-14 PROCEDURE — 3080F DIAST BP >= 90 MM HG: CPT | Performed by: COLON & RECTAL SURGERY

## 2021-09-14 PROCEDURE — 99213 OFFICE O/P EST LOW 20 MIN: CPT | Performed by: COLON & RECTAL SURGERY

## 2021-09-14 PROCEDURE — 82436 ASSAY OF URINE CHLORIDE: CPT

## 2021-09-14 PROCEDURE — 3075F SYST BP GE 130 - 139MM HG: CPT | Performed by: COLON & RECTAL SURGERY

## 2021-09-14 PROCEDURE — 82507 ASSAY OF CITRATE: CPT

## 2021-09-14 NOTE — PROGRESS NOTES
Follow Up Visit Note       Active Problems      1. Suture granuloma, subsequent encounter    2. Essential hypertension          Chief Complaint   Patient presents with:  Abdominal Pain: abd pain about 8/10 at this time.  States kenalog injection did not hel documenting clinical information, independently interpreting results, coordinating care, and communication of any results that were available at today's visit.           Allergies  Sau[place]o Client is allergic to metoprolol, toradol [ketorolac tromethamine], tramadol, Longer than this but this is an estimate   • Food intolerance 01/2020    I think I am lactose intolerant been that way for a few year   • Frequent urination Since early age in my 20’s    All the time for years   • Frequent UTI 1994    Due to kidney stones Wears glasses 1989    Since I was about 17 yrs old   • Weight gain 2016    After last son was born     Past Surgical History:   Procedure Laterality Date   •   ,2016    x2   • CHOLECYSTECTOMY     • COLONOSCOPY N/A 2021    Procedur hypothroid   • Other Mother         Part of thyroid removed   • Diabetes Maternal Grandmother    • Cancer Maternal Grandmother         ovarian   • Ovarian Cancer Maternal Grandmother    • Other Maternal Grandmother         Ovarian Cancer   • Diabetes Mater palpitations and leg swelling. Gastrointestinal: Negative for abdominal distention, abdominal pain, anal bleeding, blood in stool, constipation, diarrhea, nausea and vomiting.    Genitourinary: Negative for difficulty urinating, dysuria, frequency and urg occasionally on the right lateral side of the umbilicus. The patient states that the pain is there constantly. It hurts worse to push on the area. She states that she has felt the pain like this for about 2 years.   She states it feels like there is a

## 2021-09-14 NOTE — PATIENT INSTRUCTIONS
The patient presents today for continued care and evaluation regarding her periumbilical pain. This patient underwent a trigger point injection to the site of her pain. She states that the trigger point actually made her pain worse.   She states she is discussed. The patient seemed to understand the conversation and its details. Consent for the procedure(s) was confirmed with the patient.

## 2021-09-20 ENCOUNTER — HOSPITAL ENCOUNTER (OUTPATIENT)
Dept: CT IMAGING | Age: 46
Discharge: HOME | End: 2021-09-20
Attending: UROLOGY
Payer: COMMERCIAL

## 2021-09-20 DIAGNOSIS — N13.30 BILATERAL HYDRONEPHROSIS: ICD-10-CM

## 2021-09-20 DIAGNOSIS — N20.0 NEPHROLITHIASIS: ICD-10-CM

## 2021-09-20 LAB
CALCIUM URINE - PER 24H: 207 MG/D
CALCIUM URINE - PER VOL: 10.9 MG/DL
CHLORIDE URINE - PER 24H: 146 MMOL/D
CHLORIDE URINE - PER VOL.: 77 MMOL/L
CITRIC ACID, URINE - MG/DAY: 165 MG/D
CITRIC ACID, URINE - MG/L: 87 MG/L
CREATININE, URINE - PER 24H: 1368 MG/D
CREATININE, URINE - PER VOLUME: 72 MG/DL
HOURS COLLECTED: 24 HR
MAGNESIUM URINE - PER 24H: 65 MG/D
MAGNESIUM URINE - PER VOL: 3.4 MG/DL
OXALATE, URINE - MG/DAY: 23 MG/D
OXALATE, URINE - MG/L: 12 MG/L
PH, URINE: 6.3
PHOSPHORUS URINE - PER 24H: 1216 MG/D
PHOSPHORUS URINE - PER VOL: 64 MG/DL
POTASSIUM URINE - PER 24H: 48 MMOL/D
POTASSIUM URINE - PER VOL.: 25 MMOL/L
SODIUM URINE - PER VOL.: 97 MMOL/L
SODIUM URINE -PER 24H: 184 MMOL/D
SULFATE URINE - PER 24H: 23 MMOL/D
SULFATE URINE - PER VOL: 12 MMOL/L
TOTAL VOLUME: 1900 ML
URIC ACID URINE - PER 24H: 707 MG/D
URIC ACID URINE - PER VOL: 37.2 MG/DL
URINE SUPERSATURATION, CAHPO4: 3.39
URINE SUPERSATURATION, CAOX: 4.06
URINE SUPERSATURATION, UA CALC: 0.3

## 2021-09-20 PROCEDURE — 74176 CT ABD & PELVIS W/O CONTRAST: CPT | Performed by: UROLOGY

## 2021-09-23 ENCOUNTER — TELEPHONE (OUTPATIENT)
Dept: SURGERY | Facility: CLINIC | Age: 46
End: 2021-09-23

## 2021-09-23 ENCOUNTER — HOSPITAL ENCOUNTER (OUTPATIENT)
Age: 46
Discharge: HOME | End: 2021-09-23
Attending: EMERGENCY MEDICINE
Payer: COMMERCIAL

## 2021-09-23 VITALS
DIASTOLIC BLOOD PRESSURE: 99 MMHG | TEMPERATURE: 98 F | HEART RATE: 58 BPM | RESPIRATION RATE: 18 BRPM | SYSTOLIC BLOOD PRESSURE: 155 MMHG

## 2021-09-23 DIAGNOSIS — R21 RASH: Primary | ICD-10-CM

## 2021-09-23 LAB — SARS-COV-2 RNA RESP QL NAA+PROBE: NOT DETECTED

## 2021-09-23 PROCEDURE — 99213 OFFICE O/P EST LOW 20 MIN: CPT

## 2021-09-23 NOTE — TELEPHONE ENCOUNTER
Pt. Contacted office stating she is having surgery w/ DJP on 10/1 for suture granuloma left mid abdomen and was wondering about her secondary insurance and whether the CFS will take that. I informed Pt.  She should contact her insurance or the CFS to find o

## 2021-09-23 NOTE — ED PROVIDER NOTES
Patient Seen in: [facility]      History   Patient presents with:  Rash Skin Problem    Stated Complaint: rash    Subjective:   HPI    Patient presents with a rash. The patient states that she has had this for almost 2 weeks.   She has a Disorder of thyroid     hyperthyroid- no meds   • Dizziness 01/2019   • Easy bruising 01/2020    I find bruises on me and don’t really know how I got them   • Fatigue 11/2019    I noticed that I’m alot more tired lately more than usual   • Flatulence/gas p • Stress 1994    Rough up bringing and also regular family stresses   • Uncomfortable fullness after meals 2020    Longer than this but this is an estimate   • Unspecified condition originating in the  period 04   • Visual impairment Tobacco Use      Smoking status: Never Smoker      Smokeless tobacco: Never Used    Vaping Use      Vaping Use: Never used    Alcohol use: No    Drug use:  No             Review of Systems    Positive for stated complaint: rash  Other systems are as noted i [facility] [address]  [facility] [address], [address] [facility], [address] [place] [address]  [phone]    Call in 1 day            Medications Prescribed:  Discharge Medication List as of 9/23/2021  5:

## 2021-09-27 ENCOUNTER — HOSPITAL ENCOUNTER (OUTPATIENT)
Dept: ULTRASOUND IMAGING | Age: 46
Discharge: HOME | End: 2021-09-27
Attending: UROLOGY
Payer: COMMERCIAL

## 2021-09-27 ENCOUNTER — TELEPHONE (OUTPATIENT)
Dept: OBGYN CLINIC | Facility: CLINIC | Age: 46
End: 2021-09-27

## 2021-09-27 ENCOUNTER — TELEPHONE (OUTPATIENT)
Dept: INTERNAL MEDICINE CLINIC | Facility: CLINIC | Age: 46
End: 2021-09-27

## 2021-09-27 DIAGNOSIS — N92.6 IRREGULAR BLEEDING: Primary | ICD-10-CM

## 2021-09-27 DIAGNOSIS — R21 RASH: Primary | ICD-10-CM

## 2021-09-27 DIAGNOSIS — N20.0 NEPHROLITHIASIS: ICD-10-CM

## 2021-09-27 PROCEDURE — 76770 US EXAM ABDO BACK WALL COMP: CPT | Performed by: UROLOGY

## 2021-09-27 NOTE — TELEPHONE ENCOUNTER
Spoke with patient who expressed frustration d/t not having an answer as to what is causing her rashes. Patient states she is having difficulty getting in with a dermatologist before Jan. 2022 or finding one who accepts her insurance.  Pictures of rash

## 2021-09-27 NOTE — TELEPHONE ENCOUNTER
Pt was seen at urgent care on 9/24 for rashes on her legs and arms. Per pt she was told that the rashes could be related to a immuno deficiency disorder. Pt stated that she will be following up with dermatology.     Pt wants to know if she should make an ap

## 2021-09-27 NOTE — TELEPHONE ENCOUNTER
Patient states that she has been bleeding for 9 day. (see mychart message). She woke up on Saturday with cramping with bright red blood and clots. She thinks that she may have been pregnant. Please call to advise.   She needs an order placed for an US per D

## 2021-09-27 NOTE — TELEPHONE ENCOUNTER
Patient states she has had bleeding for 9 days. States it is heavy. She thinks she was pregnant, however UPTs are negative. She had CT of Pelvis which showed left ovarian cyst of 3.3 cm. She was advised to get pelvic US but has not as of yet.    Patient is

## 2021-09-27 NOTE — TELEPHONE ENCOUNTER
Can we do an HCG quant? Also yes she should still have US done     Patient informed of Dr. Addis Morse recommendations. Verbalized understanding. No further questions or concerns.

## 2021-09-28 ENCOUNTER — LAB ENCOUNTER (OUTPATIENT)
Dept: LAB | Age: 46
End: 2021-09-28
Attending: OBSTETRICS & GYNECOLOGY
Payer: COMMERCIAL

## 2021-09-28 ENCOUNTER — LAB ENCOUNTER (OUTPATIENT)
Dept: LAB | Age: 46
End: 2021-09-28
Attending: COLON & RECTAL SURGERY
Payer: COMMERCIAL

## 2021-09-28 DIAGNOSIS — Z01.818 PRE-OP TESTING: Primary | ICD-10-CM

## 2021-09-28 DIAGNOSIS — Z01.818 PRE-OP TESTING: ICD-10-CM

## 2021-09-28 DIAGNOSIS — N92.6 IRREGULAR BLEEDING: ICD-10-CM

## 2021-09-28 DIAGNOSIS — R21 RASH: ICD-10-CM

## 2021-09-28 PROCEDURE — 85652 RBC SED RATE AUTOMATED: CPT

## 2021-09-28 PROCEDURE — 36415 COLL VENOUS BLD VENIPUNCTURE: CPT

## 2021-09-28 PROCEDURE — 86140 C-REACTIVE PROTEIN: CPT

## 2021-09-28 PROCEDURE — 84702 CHORIONIC GONADOTROPIN TEST: CPT

## 2021-09-28 PROCEDURE — 86038 ANTINUCLEAR ANTIBODIES: CPT

## 2021-09-28 NOTE — TELEPHONE ENCOUNTER
Spoke with patient notified lab orders placed for autoimmune labs. Patient verbalized understanding and agreeable to POC.

## 2021-09-29 ENCOUNTER — TELEPHONE (OUTPATIENT)
Dept: OBGYN CLINIC | Facility: CLINIC | Age: 46
End: 2021-09-29

## 2021-09-29 ENCOUNTER — LAB ENCOUNTER (OUTPATIENT)
Dept: LAB | Age: 46
End: 2021-09-29
Attending: COLON & RECTAL SURGERY
Payer: COMMERCIAL

## 2021-09-29 DIAGNOSIS — Z01.818 PRE-OP TESTING: ICD-10-CM

## 2021-09-29 PROCEDURE — 80053 COMPREHEN METABOLIC PANEL: CPT

## 2021-09-29 PROCEDURE — 36415 COLL VENOUS BLD VENIPUNCTURE: CPT

## 2021-09-29 NOTE — PROGRESS NOTES
Pt is not pregnant. She should have pelvic US and then needs an appt, as we need to figure out a longer term plan for her, as this is intermittently an issue fo rher.  Thank you

## 2021-09-29 NOTE — TELEPHONE ENCOUNTER
Pt is still actively bleeding and doesn't feel like this is normal. She cant get an oct 18 for her US. She would liek to talk to a nurse about it.

## 2021-09-29 NOTE — TELEPHONE ENCOUNTER
Patient was informed of negative HCG results. Advised to keep pelvic ultrasound scheduled or she can try to get in sooner with our office. No further questions or concerns.

## 2021-09-30 ENCOUNTER — APPOINTMENT (OUTPATIENT)
Dept: GENERAL RADIOLOGY | Age: 46
End: 2021-09-30
Attending: EMERGENCY MEDICINE
Payer: COMMERCIAL

## 2021-09-30 ENCOUNTER — APPOINTMENT (OUTPATIENT)
Dept: ULTRASOUND IMAGING | Age: 46
End: 2021-09-30
Attending: EMERGENCY MEDICINE
Payer: COMMERCIAL

## 2021-09-30 ENCOUNTER — HOSPITAL ENCOUNTER (EMERGENCY)
Age: 46
Discharge: HOME | End: 2021-09-30
Attending: EMERGENCY MEDICINE
Payer: COMMERCIAL

## 2021-09-30 VITALS
TEMPERATURE: 98 F | RESPIRATION RATE: 16 BRPM | HEART RATE: 72 BPM | DIASTOLIC BLOOD PRESSURE: 77 MMHG | SYSTOLIC BLOOD PRESSURE: 127 MMHG | WEIGHT: 168 LBS | OXYGEN SATURATION: 97 % | BODY MASS INDEX: 34 KG/M2

## 2021-09-30 DIAGNOSIS — R21 RASH: ICD-10-CM

## 2021-09-30 DIAGNOSIS — N93.8 DYSFUNCTIONAL UTERINE BLEEDING: Primary | ICD-10-CM

## 2021-09-30 PROCEDURE — 99284 EMERGENCY DEPT VISIT MOD MDM: CPT | Performed by: EMERGENCY MEDICINE

## 2021-09-30 PROCEDURE — 93005 ELECTROCARDIOGRAM TRACING: CPT

## 2021-09-30 PROCEDURE — 93975 VASCULAR STUDY: CPT | Performed by: EMERGENCY MEDICINE

## 2021-09-30 PROCEDURE — 96360 HYDRATION IV INFUSION INIT: CPT | Performed by: EMERGENCY MEDICINE

## 2021-09-30 PROCEDURE — 71045 X-RAY EXAM CHEST 1 VIEW: CPT | Performed by: EMERGENCY MEDICINE

## 2021-09-30 PROCEDURE — 85025 COMPLETE CBC W/AUTO DIFF WBC: CPT | Performed by: EMERGENCY MEDICINE

## 2021-09-30 PROCEDURE — 84484 ASSAY OF TROPONIN QUANT: CPT | Performed by: EMERGENCY MEDICINE

## 2021-09-30 PROCEDURE — 93010 ELECTROCARDIOGRAM REPORT: CPT | Performed by: EMERGENCY MEDICINE

## 2021-09-30 PROCEDURE — 76830 TRANSVAGINAL US NON-OB: CPT | Performed by: EMERGENCY MEDICINE

## 2021-09-30 PROCEDURE — 80048 BASIC METABOLIC PNL TOTAL CA: CPT | Performed by: EMERGENCY MEDICINE

## 2021-09-30 PROCEDURE — 76856 US EXAM PELVIC COMPLETE: CPT | Performed by: EMERGENCY MEDICINE

## 2021-09-30 RX ORDER — POTASSIUM CHLORIDE 20 MEQ/1
40 TABLET, EXTENDED RELEASE ORAL ONCE
Status: COMPLETED | OUTPATIENT
Start: 2021-09-30 | End: 2021-09-30

## 2021-09-30 NOTE — ED PROVIDER NOTES
Patient Seen in: THE [facility] In [place]      History   Patient presents with:  Eval-G  Fatigue    Stated Complaint: eval g - bleeding for 12days     Subjective:   HPI    Patient discomfort here with multiple complaints.   She says she h of kidney     hydronephrosis/ several times kidney stones/ 13 th procedure for stones   • Change in hair 01/2020    My hair is thinning more than the usual   • Chest pain 01/2019    I did see a cardiologist about this   • Chest pain on exertion 01/2019 Night sweats 01/2020    I feel like I’m on fire at night.    • Ovarian cyst    • Painful urination 1994    When passing stones   • Post partum depression    • PVC (premature ventricular contraction)    • Rash 11/2020    Broke out with unknown rash all over 12/27/2019    cysto stent removal - dr. Lucretia Cordero    • OTHER SURGICAL HISTORY      C;ysto Lt PCNL Stent Placement    • OTHER SURGICAL HISTORY  06/03/2020    Cysto Stent Removal w/ Dr Ana Vidal   • OTHER SURGICAL HISTORY  07/07/2021    Cysto/Stent Removal D almost spider vein like, but patient says these are exactly what she has had in the past and that they spontaneously resolved.                                    ED Course     Labs Reviewed   BASIC METABOLIC PANEL (8) - Abnormal; Notable for the following c Disposition and Plan     Clinical Impression:  Dysfunctional uterine bleeding  (primary encounter diagnosis)  Rash     Disposition:  There is no disposition on file for this visit. There is no disposition time on file for this visit.     Follow-up:

## 2021-09-30 NOTE — ED INITIAL ASSESSMENT (HPI)
C/o fatigue, exertional SOB,Blotchy skin, Vag bleeding since 9/19 using approx 8 ppd. Had 2 neg Covid.   Scheduled for poss mesh revision on abd hernia

## 2021-10-07 ENCOUNTER — TELEPHONE (OUTPATIENT)
Dept: OBGYN CLINIC | Facility: CLINIC | Age: 46
End: 2021-10-07

## 2021-10-07 NOTE — TELEPHONE ENCOUNTER
Patient called, she went to e.r. last week,went 9/30 and Dr pantoja told her to call our office for f/u with Dr. Sofya Prescott. Pt has question to a nurse regarding ultrasound appt that is schedule on 10/14.  Need advise

## 2021-10-12 NOTE — TELEPHONE ENCOUNTER
I would advise she be seen in the office for further plan as this is an ongoing issue. Thankyou     Patient informed of results. Verbalized understanding. No further questions or concerns.

## 2021-10-14 ENCOUNTER — OFFICE VISIT (OUTPATIENT)
Dept: SURGERY | Facility: CLINIC | Age: 46
End: 2021-10-14

## 2021-10-14 VITALS
BODY MASS INDEX: 33.67 KG/M2 | HEIGHT: 59 IN | WEIGHT: 167 LBS | HEART RATE: 86 BPM | TEMPERATURE: 98 F | DIASTOLIC BLOOD PRESSURE: 86 MMHG | SYSTOLIC BLOOD PRESSURE: 129 MMHG

## 2021-10-14 DIAGNOSIS — T81.89XD SUTURE GRANULOMA, SUBSEQUENT ENCOUNTER: Primary | ICD-10-CM

## 2021-10-14 DIAGNOSIS — M79.18 MYOFASCIAL PAIN: ICD-10-CM

## 2021-10-14 PROCEDURE — 99024 POSTOP FOLLOW-UP VISIT: CPT | Performed by: COLON & RECTAL SURGERY

## 2021-10-14 PROCEDURE — 3074F SYST BP LT 130 MM HG: CPT | Performed by: COLON & RECTAL SURGERY

## 2021-10-14 PROCEDURE — 3079F DIAST BP 80-89 MM HG: CPT | Performed by: COLON & RECTAL SURGERY

## 2021-10-14 PROCEDURE — 3008F BODY MASS INDEX DOCD: CPT | Performed by: COLON & RECTAL SURGERY

## 2021-10-14 NOTE — PROGRESS NOTES
Post Operative Visit Note       Active Problems  1. Suture granuloma, subsequent encounter    2.  Myofascial pain         Chief Complaint   Patient presents with:  Lump: PO - 10/1 excision of suture granuloma left mid abd -- Pt states she is feeling well bu heart rate due to anxiety according to patient, pvc   • Back pain 01/2018    Mid to lower back pain & hip pain   • Back problem     lower back   • Bloating 01/2020    Longer than this but this is an estimate   • Blood in urine 1994    Due to severe kidney Not severe but I have been itching for no apparent reason   • Kidney stones    • Leaking of urine 1994    After first pregnancy   • Leg swelling 07/2020    My left leg slightly swelled up and my ankle area was hurtin   • Loss of appetite 01/2020    I no stent Dr. Samano Mu   • [address]  1/2000    L hernia repair   • LITHOTRIPSY  2001, 2007 & 11/11   • NEEDLE BIOPSY LEFT  02/2021    fibroadenoma   • NEEDLE BIOPSY RIGHT  02/2021    fibroadenoma   • OTHER Bilateral 2012    nerve surgery to bilateral ar use: No      Drug use: No      Sexual activity: Yes        Partners: Male    Other Topics      Concerns:        Caffeine Concern: No        Exercise: No        Seat Belt: Yes       Current Outpatient Medications:   •  Triamterene-HCTZ 37.5-25 MG Oral Cap, or cellulitis.                  Assessment   Suture granuloma, subsequent encounter  (primary encounter diagnosis)  Myofascial pain      Plan   This patient underwent excision of suture granulomas in the left midabdomen after a previous ventral incisional h

## 2021-10-14 NOTE — PATIENT INSTRUCTIONS
This patient underwent excision of suture granulomas in the left midabdomen after a previous ventral incisional herniorrhaphy with mesh was performed. The removal of the suture granulomas took place on October 1, 2021.     The patient had severe left-sided

## 2021-11-16 ENCOUNTER — IMMUNIZATION (OUTPATIENT)
Dept: INTERNAL MEDICINE CLINIC | Facility: CLINIC | Age: 46
End: 2021-11-16
Payer: COMMERCIAL

## 2021-11-16 DIAGNOSIS — Z23 NEED FOR VACCINATION: Primary | ICD-10-CM

## 2021-11-16 PROCEDURE — 90686 IIV4 VACC NO PRSV 0.5 ML IM: CPT | Performed by: FAMILY MEDICINE

## 2021-11-16 PROCEDURE — 90471 IMMUNIZATION ADMIN: CPT | Performed by: FAMILY MEDICINE

## 2021-11-29 ENCOUNTER — APPOINTMENT (OUTPATIENT)
Dept: CT IMAGING | Age: 46
End: 2021-11-29
Attending: EMERGENCY MEDICINE
Payer: COMMERCIAL

## 2021-11-29 ENCOUNTER — HOSPITAL ENCOUNTER (EMERGENCY)
Age: 46
Discharge: HOME | End: 2021-11-29
Attending: EMERGENCY MEDICINE
Payer: COMMERCIAL

## 2021-11-29 VITALS
SYSTOLIC BLOOD PRESSURE: 137 MMHG | RESPIRATION RATE: 16 BRPM | WEIGHT: 160 LBS | HEIGHT: 59 IN | TEMPERATURE: 98 F | BODY MASS INDEX: 32.25 KG/M2 | DIASTOLIC BLOOD PRESSURE: 88 MMHG | OXYGEN SATURATION: 98 % | HEART RATE: 64 BPM

## 2021-11-29 DIAGNOSIS — R10.9 LEFT FLANK PAIN: ICD-10-CM

## 2021-11-29 DIAGNOSIS — R10.32 ABDOMINAL PAIN, LEFT LOWER QUADRANT: Primary | ICD-10-CM

## 2021-11-29 PROCEDURE — 81001 URINALYSIS AUTO W/SCOPE: CPT

## 2021-11-29 PROCEDURE — 96375 TX/PRO/DX INJ NEW DRUG ADDON: CPT | Performed by: EMERGENCY MEDICINE

## 2021-11-29 PROCEDURE — 80053 COMPREHEN METABOLIC PANEL: CPT | Performed by: EMERGENCY MEDICINE

## 2021-11-29 PROCEDURE — 85025 COMPLETE CBC W/AUTO DIFF WBC: CPT | Performed by: EMERGENCY MEDICINE

## 2021-11-29 PROCEDURE — 74176 CT ABD & PELVIS W/O CONTRAST: CPT | Performed by: EMERGENCY MEDICINE

## 2021-11-29 PROCEDURE — 99284 EMERGENCY DEPT VISIT MOD MDM: CPT | Performed by: EMERGENCY MEDICINE

## 2021-11-29 PROCEDURE — 96361 HYDRATE IV INFUSION ADD-ON: CPT | Performed by: EMERGENCY MEDICINE

## 2021-11-29 PROCEDURE — 81001 URINALYSIS AUTO W/SCOPE: CPT | Performed by: EMERGENCY MEDICINE

## 2021-11-29 PROCEDURE — 96374 THER/PROPH/DIAG INJ IV PUSH: CPT | Performed by: EMERGENCY MEDICINE

## 2021-11-29 RX ORDER — CEPHALEXIN 500 MG/1
500 CAPSULE ORAL 2 TIMES DAILY
Qty: 14 CAPSULE | Refills: 0 | Status: SHIPPED | OUTPATIENT
Start: 2021-11-29 | End: 2021-12-06

## 2021-11-29 RX ORDER — ONDANSETRON 2 MG/ML
4 INJECTION INTRAMUSCULAR; INTRAVENOUS ONCE
Status: COMPLETED | OUTPATIENT
Start: 2021-11-29 | End: 2021-11-29

## 2021-11-29 RX ORDER — HYDROMORPHONE HYDROCHLORIDE 1 MG/ML
0.5 INJECTION, SOLUTION INTRAMUSCULAR; INTRAVENOUS; SUBCUTANEOUS EVERY 30 MIN PRN
Status: DISCONTINUED | OUTPATIENT
Start: 2021-11-29 | End: 2021-11-29

## 2021-11-29 RX ORDER — DIPHENHYDRAMINE HYDROCHLORIDE 50 MG/ML
25 INJECTION INTRAMUSCULAR; INTRAVENOUS ONCE
Status: COMPLETED | OUTPATIENT
Start: 2021-11-29 | End: 2021-11-29

## 2021-11-30 NOTE — ED PROVIDER NOTES
Patient Seen in: THE [facility] In [place]      History   Patient presents with:  Swelling Edema  Abdomen/Flank Pain    Stated Complaint: swelling to left flank area, no injury    Subjective:   HPI    Patient presents with left abdomen/fl elevated AST/ALT   • Disorder of thyroid     hyperthyroid- no meds   • Dizziness 01/2019   • Easy bruising 01/2020    I find bruises on me and don’t really know how I got them   • Fatigue 11/2019    I noticed that I’m alot more tired lately more than usual can’t lay on my left side   • Stress     Rough up bringing and also regular family stresses   • Uncomfortable fullness after meals 2020    Longer than this but this is an estimate   • Unspecified condition originating in the  period 04 Social History    Tobacco Use      Smoking status: Never Smoker      Smokeless tobacco: Never Used    Vaping Use      Vaping Use: Never used    Alcohol use: No    Drug use:  No             Review of Systems    Positive for stated complaint: swelling t Platelet.   Procedure                               Abnormality         Status                     ---------                               -----------         ------                     CBC W/ DIFFERENTIAL[[id number]]                              Final resul RETROPERITONEUM:  Unremarkable. BOWEL/MESENTERY:  No acute process. ABDOMINAL WALL:  Redemonstration stable appearing postsurgical changes related to abdominal wall hernia surgery. Luli Band    BONES:  L5 pars defect bilateral with grade 1 anterior subluxation L quadrant  (primary encounter diagnosis)  Left flank pain     Disposition:  Discharge  11/29/2021  8:43 pm    Follow-up:  Adam Hilario MD  [address]          Fabio Burgos DO  [facility]

## 2021-12-21 ENCOUNTER — APPOINTMENT (OUTPATIENT)
Dept: ULTRASOUND IMAGING | Age: 46
End: 2021-12-21
Attending: EMERGENCY MEDICINE
Payer: COMMERCIAL

## 2021-12-21 ENCOUNTER — APPOINTMENT (OUTPATIENT)
Dept: CT IMAGING | Age: 46
End: 2021-12-21
Attending: EMERGENCY MEDICINE
Payer: COMMERCIAL

## 2021-12-21 ENCOUNTER — HOSPITAL ENCOUNTER (EMERGENCY)
Age: 46
Discharge: HOME | End: 2021-12-21
Attending: EMERGENCY MEDICINE
Payer: COMMERCIAL

## 2021-12-21 ENCOUNTER — APPOINTMENT (OUTPATIENT)
Dept: GENERAL RADIOLOGY | Age: 46
End: 2021-12-21
Attending: EMERGENCY MEDICINE
Payer: COMMERCIAL

## 2021-12-21 VITALS
SYSTOLIC BLOOD PRESSURE: 117 MMHG | TEMPERATURE: 98 F | BODY MASS INDEX: 31.85 KG/M2 | WEIGHT: 158 LBS | HEIGHT: 59 IN | OXYGEN SATURATION: 97 % | DIASTOLIC BLOOD PRESSURE: 79 MMHG | HEART RATE: 60 BPM | RESPIRATION RATE: 16 BRPM

## 2021-12-21 DIAGNOSIS — U07.1 COVID: Primary | ICD-10-CM

## 2021-12-21 DIAGNOSIS — U07.1 COVID-19: ICD-10-CM

## 2021-12-21 DIAGNOSIS — N13.39 OTHER HYDRONEPHROSIS: ICD-10-CM

## 2021-12-21 PROCEDURE — 71045 X-RAY EXAM CHEST 1 VIEW: CPT | Performed by: EMERGENCY MEDICINE

## 2021-12-21 PROCEDURE — 99284 EMERGENCY DEPT VISIT MOD MDM: CPT

## 2021-12-21 PROCEDURE — 96376 TX/PRO/DX INJ SAME DRUG ADON: CPT

## 2021-12-21 PROCEDURE — 85025 COMPLETE CBC W/AUTO DIFF WBC: CPT | Performed by: EMERGENCY MEDICINE

## 2021-12-21 PROCEDURE — 96374 THER/PROPH/DIAG INJ IV PUSH: CPT

## 2021-12-21 PROCEDURE — 81001 URINALYSIS AUTO W/SCOPE: CPT | Performed by: EMERGENCY MEDICINE

## 2021-12-21 PROCEDURE — 80053 COMPREHEN METABOLIC PANEL: CPT | Performed by: EMERGENCY MEDICINE

## 2021-12-21 PROCEDURE — 74176 CT ABD & PELVIS W/O CONTRAST: CPT | Performed by: EMERGENCY MEDICINE

## 2021-12-21 PROCEDURE — 99285 EMERGENCY DEPT VISIT HI MDM: CPT

## 2021-12-21 PROCEDURE — 85379 FIBRIN DEGRADATION QUANT: CPT | Performed by: EMERGENCY MEDICINE

## 2021-12-21 PROCEDURE — 93010 ELECTROCARDIOGRAM REPORT: CPT

## 2021-12-21 PROCEDURE — 93005 ELECTROCARDIOGRAM TRACING: CPT

## 2021-12-21 PROCEDURE — 96361 HYDRATE IV INFUSION ADD-ON: CPT

## 2021-12-21 PROCEDURE — 81025 URINE PREGNANCY TEST: CPT

## 2021-12-21 PROCEDURE — 76775 US EXAM ABDO BACK WALL LIM: CPT | Performed by: EMERGENCY MEDICINE

## 2021-12-21 PROCEDURE — 71275 CT ANGIOGRAPHY CHEST: CPT | Performed by: EMERGENCY MEDICINE

## 2021-12-21 RX ORDER — SODIUM CHLORIDE 9 MG/ML
125 INJECTION, SOLUTION INTRAVENOUS CONTINUOUS
Status: DISCONTINUED | OUTPATIENT
Start: 2021-12-21 | End: 2021-12-21

## 2021-12-21 RX ORDER — ONDANSETRON 2 MG/ML
4 INJECTION INTRAMUSCULAR; INTRAVENOUS ONCE
Status: COMPLETED | OUTPATIENT
Start: 2021-12-21 | End: 2021-12-21

## 2021-12-21 RX ORDER — ACETAMINOPHEN 500 MG
1000 TABLET ORAL ONCE
Status: COMPLETED | OUTPATIENT
Start: 2021-12-21 | End: 2021-12-21

## 2021-12-21 NOTE — ED PROVIDER NOTES
Patient Seen in: THE [facility] In [place]      History   Patient presents with:  Difficulty Breathing    Stated Complaint: jose started this am.  tested positive for covid wed - not vaccinated    Subjective:   HPI  This is a 30-year-old f got them   • Fatigue 11/2019    I noticed that I’m alot more tired lately more than usual   • Flatulence/gas pain/belching 01/2020    Longer than this but this is an estimate   • Food intolerance 01/2020    I think I am lactose intolerant been that way for is an estimate   • Unspecified condition originating in the  period 04   • Visual impairment     glasses, contacts   • Wears glasses 1989    Since I was about 17 yrs old   • Weight gain 2016    After last son was born              [de-identified] Drug use: No             Review of Systems    Positive for stated complaint: jose started this am.  tested positive for covid wed - not vaccinated  Other systems are as noted in HPI. Constitutional and vital signs reviewed.       All other systems reviewed Urobilinogen Urine 2.0 (*)     All other components within normal limits   UA MICROSCOPIC ONLY, URINE - Abnormal; Notable for the following components:    RBC Urine 6-10 (*)     Bacteria Urine 2+ (*)     Squamous Epi.  Cells Many (*)     All other component obtained. CBC, blood cell count 4.8. Hemoglobin 14. Platelet 883. CMP: BUN 7 creatinine 0.8. Glucose 118. . . Alk phos 247. D-dimer elevated at 0.81. Urine pregnancy test negative  Urinalysis negative nitrite.   Negative leuk est

## 2021-12-23 ENCOUNTER — TELEPHONE (OUTPATIENT)
Dept: INTERNAL MEDICINE CLINIC | Facility: CLINIC | Age: 46
End: 2021-12-23

## 2021-12-23 NOTE — TELEPHONE ENCOUNTER
Spoke with patient stating tested COVID positive on 12/18/2021 and received monoclonal antibody infusion on 12/21/2021, pt has had severe headaches since then, patient has take Tylenol and Excedrin, keeping hydrated, no relief.  She is unable to take ibupro

## 2021-12-23 NOTE — TELEPHONE ENCOUNTER
Spoke with patient informed per TO pain meds he can offer she is allergic to. Patient verbalized understanding and agreeable to POC.

## 2021-12-23 NOTE — TELEPHONE ENCOUNTER
Pt called and stated she had her covid antibody treatment yesterday and now she has a severe headache. Pt called where she had the treatment done, and they told her to call Dr. Deepika Vasquez to ask for something to treat her headache if tylenol does not work.

## 2022-01-12 NOTE — ED PROVIDER NOTES
Patient Seen in: THE [facility] Department In [place]    History   Patient presents with:  Headache (neurologic)  Nausea/Vomiting/Diarrhea (gastrointestinal)    Stated Complaint: HEADACHE/NAUSEA    HPI    Patient is complaining of headache off and o daily. Blood Glucose Monitoring Suppl (ERICK CONTOUR NEXT MONITOR) W/DEVICE Does not apply Kit,  1 kit by Does not apply route 5 x daily. Check glucose after breakfast, lunch, and dinner. Also check between meals.    Glucose Blood (ERICK CONTOUR NEXT TEST of the left eye. Fundi without hemorrhage or papilledema. Tympanic membranes and throat normal.  Oropharynx moist.  Neck: No meningismus or adenopathy  Lungs: Clear  Abdomen: Soft and nontender without mass or HSM  Neuro: Alert and oriented.   Speech carlotta Plan     Clinical Impression:  Cluster headache, not intractable, unspecified chronicity pattern  (primary encounter diagnosis)    Disposition:  Discharge    Follow-up:  Chantel Mejia, [address] [facility] [phone] negative...

## 2022-01-18 ENCOUNTER — TELEPHONE (OUTPATIENT)
Dept: INTERNAL MEDICINE CLINIC | Facility: CLINIC | Age: 47
End: 2022-01-18

## 2022-01-18 NOTE — TELEPHONE ENCOUNTER
Pt called wanting to know if she should keep her appt for tomorrow. She is coming in for an ER covid f/u (12/21 positive) and leg pain. Pt would like to double check if appt is still needed, please advise.     Future Appointments   Date Time Provider Duc Jeffries   1/19/2022  4:30 PM Elham Nava MD [facility] [place]

## 2022-01-19 ENCOUNTER — TELEMEDICINE (OUTPATIENT)
Dept: INTERNAL MEDICINE CLINIC | Facility: CLINIC | Age: 47
End: 2022-01-19

## 2022-01-19 DIAGNOSIS — R06.02 SOB (SHORTNESS OF BREATH): Primary | ICD-10-CM

## 2022-01-19 DIAGNOSIS — R74.8 ELEVATED LIVER ENZYMES: ICD-10-CM

## 2022-01-19 PROCEDURE — 99214 OFFICE O/P EST MOD 30 MIN: CPT | Performed by: FAMILY MEDICINE

## 2022-01-19 RX ORDER — ALBUTEROL SULFATE 90 UG/1
2 AEROSOL, METERED RESPIRATORY (INHALATION) EVERY 4 HOURS PRN
Qty: 1 EACH | Refills: 1 | Status: SHIPPED | OUTPATIENT
Start: 2022-01-19

## 2022-01-19 NOTE — PROGRESS NOTES
Aishwarya Dash is a 42year old female. HPI:   Patient has agreed to do a video encounter w me today in lieu of a regular appointment in regards to  her COVID.   Tested + on 12/15 at a [facility]    Seen in  ER on the 21st    Had a cxr and CT chest bc +d dimer run right through me now   • Disorder of liver     elevated AST/ALT   • Disorder of thyroid     hyperthyroid- no meds   • Dizziness 01/2019   • Easy bruising 01/2020    I find bruises on me and don’t really know how I got them   • Fatigue 11/2019    I noti Sleep disturbance 01/2020    For years sometimes I can’t lay on my left side   • Stress 1994    Rough up bringing and also regular family stresses   • Uncomfortable fullness after meals 01/2020    Longer than this but this is an estimate   • Unspecified co

## 2022-01-28 ENCOUNTER — HOSPITAL ENCOUNTER (OUTPATIENT)
Dept: GENERAL RADIOLOGY | Age: 47
Discharge: HOME | End: 2022-01-28
Attending: FAMILY MEDICINE
Payer: MEDICAID

## 2022-01-28 ENCOUNTER — LABORATORY ENCOUNTER (OUTPATIENT)
Dept: LAB | Age: 47
End: 2022-01-28
Attending: FAMILY MEDICINE
Payer: MEDICAID

## 2022-01-28 DIAGNOSIS — R06.02 SOB (SHORTNESS OF BREATH): ICD-10-CM

## 2022-01-28 DIAGNOSIS — R74.8 ELEVATED LIVER ENZYMES: ICD-10-CM

## 2022-01-28 LAB
ALBUMIN SERPL-MCNC: 4 G/DL (ref 3.4–5)
ALBUMIN/GLOB SERPL: 1.1 {RATIO} (ref 1–2)
ALP LIVER SERPL-CCNC: 82 U/L
ALT SERPL-CCNC: 48 U/L
ANION GAP SERPL CALC-SCNC: 9 MMOL/L (ref 0–18)
AST SERPL-CCNC: 24 U/L (ref 15–37)
BILIRUB SERPL-MCNC: 0.2 MG/DL (ref 0.1–2)
BUN BLD-MCNC: 15 MG/DL (ref 7–18)
CALCIUM BLD-MCNC: 9 MG/DL (ref 8.5–10.1)
CHLORIDE SERPL-SCNC: 106 MMOL/L (ref 98–112)
CO2 SERPL-SCNC: 25 MMOL/L (ref 21–32)
CREAT BLD-MCNC: 0.74 MG/DL
FASTING STATUS PATIENT QL REPORTED: NO
GLOBULIN PLAS-MCNC: 3.7 G/DL (ref 2.8–4.4)
GLUCOSE BLD-MCNC: 107 MG/DL (ref 70–99)
OSMOLALITY SERPL CALC.SUM OF ELEC: 291 MOSM/KG (ref 275–295)
POTASSIUM SERPL-SCNC: 3.5 MMOL/L (ref 3.5–5.1)
PROT SERPL-MCNC: 7.7 G/DL (ref 6.4–8.2)
SODIUM SERPL-SCNC: 140 MMOL/L (ref 136–145)

## 2022-01-28 PROCEDURE — 71046 X-RAY EXAM CHEST 2 VIEWS: CPT | Performed by: FAMILY MEDICINE

## 2022-01-28 PROCEDURE — 36415 COLL VENOUS BLD VENIPUNCTURE: CPT

## 2022-01-28 PROCEDURE — 80053 COMPREHEN METABOLIC PANEL: CPT

## 2022-01-30 NOTE — TELEPHONE ENCOUNTER
Last seen in 2020. She should have repeat US of the thyroid as well as Repeat PTH and calcium levels and RTC to see me after theses studies. Dx. Parathyroid adenoma.

## 2022-01-31 DIAGNOSIS — R79.89 ELEVATED LFTS: Primary | ICD-10-CM

## 2022-01-31 NOTE — TELEPHONE ENCOUNTER
YENY Castro, pt last US was back in 04/28/21, pt will have to wait closer to April to have scan, pt has [employer] and will need prior auth

## 2022-02-27 ENCOUNTER — APPOINTMENT (OUTPATIENT)
Dept: ULTRASOUND IMAGING | Age: 47
End: 2022-02-27
Attending: EMERGENCY MEDICINE
Payer: MEDICAID

## 2022-02-27 ENCOUNTER — APPOINTMENT (OUTPATIENT)
Dept: GENERAL RADIOLOGY | Age: 47
End: 2022-02-27
Attending: EMERGENCY MEDICINE
Payer: MEDICAID

## 2022-02-27 ENCOUNTER — HOSPITAL ENCOUNTER (EMERGENCY)
Age: 47
Discharge: HOME | End: 2022-02-27
Attending: EMERGENCY MEDICINE
Payer: MEDICAID

## 2022-02-27 VITALS
SYSTOLIC BLOOD PRESSURE: 138 MMHG | HEIGHT: 59 IN | DIASTOLIC BLOOD PRESSURE: 84 MMHG | TEMPERATURE: 99 F | BODY MASS INDEX: 33.26 KG/M2 | OXYGEN SATURATION: 98 % | RESPIRATION RATE: 16 BRPM | HEART RATE: 65 BPM | WEIGHT: 165 LBS

## 2022-02-27 DIAGNOSIS — R10.9 ABDOMINAL PAIN, UNSPECIFIED ABDOMINAL LOCATION: Primary | ICD-10-CM

## 2022-02-27 DIAGNOSIS — M79.602 LEFT ARM PAIN: ICD-10-CM

## 2022-02-27 LAB
ALBUMIN SERPL-MCNC: 3.6 G/DL (ref 3.4–5)
ALBUMIN/GLOB SERPL: 0.9 {RATIO} (ref 1–2)
ALP LIVER SERPL-CCNC: 95 U/L
ANION GAP SERPL CALC-SCNC: 7 MMOL/L (ref 0–18)
AST SERPL-CCNC: 35 U/L (ref 15–37)
ATRIAL RATE: 74 BPM
B-HCG UR QL: NEGATIVE
BASOPHILS # BLD AUTO: 0.03 X10(3) UL (ref 0–0.2)
BASOPHILS NFR BLD AUTO: 0.4 %
BILIRUB SERPL-MCNC: 0.2 MG/DL (ref 0.1–2)
BILIRUB UR QL STRIP.AUTO: NEGATIVE
BUN BLD-MCNC: 11 MG/DL (ref 7–18)
CALCIUM BLD-MCNC: 9 MG/DL (ref 8.5–10.1)
CHLORIDE SERPL-SCNC: 108 MMOL/L (ref 98–112)
CLARITY UR REFRACT.AUTO: CLEAR
CO2 SERPL-SCNC: 22 MMOL/L (ref 21–32)
COLOR UR AUTO: YELLOW
CREAT BLD-MCNC: 0.72 MG/DL
EOSINOPHIL # BLD AUTO: 0.16 X10(3) UL (ref 0–0.7)
EOSINOPHIL NFR BLD AUTO: 2 %
ERYTHROCYTE [DISTWIDTH] IN BLOOD BY AUTOMATED COUNT: 12.6 %
GLOBULIN PLAS-MCNC: 4.1 G/DL (ref 2.8–4.4)
GLUCOSE BLD-MCNC: 103 MG/DL (ref 70–99)
GLUCOSE UR STRIP.AUTO-MCNC: NEGATIVE MG/DL
HCT VFR BLD AUTO: 40.2 %
HGB BLD-MCNC: 13.6 G/DL
IMM GRANULOCYTES # BLD AUTO: 0.02 X10(3) UL (ref 0–1)
IMM GRANULOCYTES NFR BLD: 0.3 %
KETONES UR STRIP.AUTO-MCNC: NEGATIVE MG/DL
LEUKOCYTE ESTERASE UR QL STRIP.AUTO: NEGATIVE
LIPASE SERPL-CCNC: 147 U/L (ref 73–393)
LYMPHOCYTES # BLD AUTO: 1.38 X10(3) UL (ref 1–4)
LYMPHOCYTES NFR BLD AUTO: 17.5 %
MCH RBC QN AUTO: 29.5 PG (ref 26–34)
MCHC RBC AUTO-ENTMCNC: 33.8 G/DL (ref 31–37)
MCV RBC AUTO: 87.2 FL
MONOCYTES # BLD AUTO: 0.51 X10(3) UL (ref 0.1–1)
MONOCYTES NFR BLD AUTO: 6.5 %
NEUTROPHILS # BLD AUTO: 5.77 X10 (3) UL (ref 1.5–7.7)
NEUTROPHILS # BLD AUTO: 5.77 X10(3) UL (ref 1.5–7.7)
NEUTROPHILS NFR BLD AUTO: 73.3 %
NITRITE UR QL STRIP.AUTO: NEGATIVE
OSMOLALITY SERPL CALC.SUM OF ELEC: 284 MOSM/KG (ref 275–295)
P AXIS: 47 DEGREES
P-R INTERVAL: 138 MS
PH UR STRIP.AUTO: 7 [PH] (ref 5–8)
PLATELET # BLD AUTO: 322 10(3)UL (ref 150–450)
POTASSIUM SERPL-SCNC: 3.5 MMOL/L (ref 3.5–5.1)
PROT SERPL-MCNC: 7.7 G/DL (ref 6.4–8.2)
Q-T INTERVAL: 366 MS
QRS DURATION: 68 MS
QTC CALCULATION (BEZET): 406 MS
R AXIS: 40 DEGREES
RBC # BLD AUTO: 4.61 X10(6)UL
SODIUM SERPL-SCNC: 137 MMOL/L (ref 136–145)
SP GR UR STRIP.AUTO: 1.02 (ref 1–1.03)
T AXIS: 20 DEGREES
TROPONIN I HIGH SENSITIVITY: <3 NG/L
UROBILINOGEN UR STRIP.AUTO-MCNC: 0.2 MG/DL
VENTRICULAR RATE: 74 BPM
WBC # BLD AUTO: 7.9 X10(3) UL (ref 4–11)

## 2022-02-27 PROCEDURE — 84484 ASSAY OF TROPONIN QUANT: CPT | Performed by: EMERGENCY MEDICINE

## 2022-02-27 PROCEDURE — 36415 COLL VENOUS BLD VENIPUNCTURE: CPT

## 2022-02-27 PROCEDURE — 74019 RADEX ABDOMEN 2 VIEWS: CPT | Performed by: EMERGENCY MEDICINE

## 2022-02-27 PROCEDURE — 81025 URINE PREGNANCY TEST: CPT

## 2022-02-27 PROCEDURE — 99284 EMERGENCY DEPT VISIT MOD MDM: CPT

## 2022-02-27 PROCEDURE — 80053 COMPREHEN METABOLIC PANEL: CPT

## 2022-02-27 PROCEDURE — 83690 ASSAY OF LIPASE: CPT | Performed by: EMERGENCY MEDICINE

## 2022-02-27 PROCEDURE — 81001 URINALYSIS AUTO W/SCOPE: CPT

## 2022-02-27 PROCEDURE — 93005 ELECTROCARDIOGRAM TRACING: CPT

## 2022-02-27 PROCEDURE — 93971 EXTREMITY STUDY: CPT | Performed by: EMERGENCY MEDICINE

## 2022-02-27 PROCEDURE — 85025 COMPLETE CBC W/AUTO DIFF WBC: CPT

## 2022-02-27 PROCEDURE — 99285 EMERGENCY DEPT VISIT HI MDM: CPT

## 2022-02-27 PROCEDURE — 93010 ELECTROCARDIOGRAM REPORT: CPT

## 2022-02-28 ENCOUNTER — TELEPHONE (OUTPATIENT)
Dept: FAMILY MEDICINE CLINIC | Facility: CLINIC | Age: 47
End: 2022-02-28

## 2022-02-28 ENCOUNTER — TELEPHONE (OUTPATIENT)
Dept: INTERNAL MEDICINE CLINIC | Facility: CLINIC | Age: 47
End: 2022-02-28

## 2022-02-28 NOTE — TELEPHONE ENCOUNTER
Pt seen in ED yesterday for left arm pain. US negative for DVT. Rates pain today when touched 9-10 and when arm at rest 5. Left arm sensitive fold of elbow and under the arm. Left upper arm bump/swelling. No c/o chest pain, redness, fever or chills. Ibuprofen offers no relief. Schedule in office tomorrow or pt asking Dr if she should proceed back to ER.

## 2022-02-28 NOTE — TELEPHONE ENCOUNTER
Patient called yesterday. Notes several days of nausea and heart burn. Yesterday, started getting a pain into her L arm which has a squeezing quality. As well has had some SOB in the last few days. Discussed these signs are concerning for MI/angina. Recommended she was seen in the ED asap. Patient agreed and was arranging a ride. Directed to call 911 if worsening.      Jovi De La Vega, DO

## 2022-03-15 ENCOUNTER — TELEPHONE (OUTPATIENT)
Dept: OTOLARYNGOLOGY | Facility: CLINIC | Age: 47
End: 2022-03-15

## 2022-03-15 NOTE — TELEPHONE ENCOUNTER
Pt calling asking can she be seen for a lump near her collarbone  as well closer to her neck can MD look at that as well please advise

## 2022-03-16 ENCOUNTER — OFFICE VISIT (OUTPATIENT)
Dept: INTERNAL MEDICINE CLINIC | Facility: CLINIC | Age: 47
End: 2022-03-16
Payer: MEDICAID

## 2022-03-16 VITALS
DIASTOLIC BLOOD PRESSURE: 76 MMHG | WEIGHT: 169 LBS | HEIGHT: 59 IN | HEART RATE: 72 BPM | BODY MASS INDEX: 34.07 KG/M2 | RESPIRATION RATE: 16 BRPM | SYSTOLIC BLOOD PRESSURE: 118 MMHG | TEMPERATURE: 99 F

## 2022-03-16 DIAGNOSIS — R22.1 LOCALIZED SWELLING, MASS AND LUMP, NECK: Primary | ICD-10-CM

## 2022-03-16 PROCEDURE — 3008F BODY MASS INDEX DOCD: CPT | Performed by: FAMILY MEDICINE

## 2022-03-16 PROCEDURE — 99213 OFFICE O/P EST LOW 20 MIN: CPT | Performed by: FAMILY MEDICINE

## 2022-03-16 PROCEDURE — 3074F SYST BP LT 130 MM HG: CPT | Performed by: FAMILY MEDICINE

## 2022-03-16 PROCEDURE — 3078F DIAST BP <80 MM HG: CPT | Performed by: FAMILY MEDICINE

## 2022-03-16 RX ORDER — POTASSIUM CITRATE 10 MEQ/1
1 TABLET, EXTENDED RELEASE ORAL DAILY
COMMUNITY
Start: 2019-06-15

## 2022-03-16 NOTE — TELEPHONE ENCOUNTER
Spoke with patient and scheduled appt for 4/5/22. She saw PCP today for swelling and has Neck and thyroid US scheduled for 3/25/22. She will also have labs ordered done before appt.

## 2022-03-23 ENCOUNTER — TELEPHONE (OUTPATIENT)
Dept: INTERNAL MEDICINE CLINIC | Facility: CLINIC | Age: 47
End: 2022-03-23

## 2022-03-23 NOTE — TELEPHONE ENCOUNTER
[id number] [address] but can the ultrasound tech at least look at the area of the neck with the lump and see what is going on with it? Or how else can I get this approved?

## 2022-03-23 NOTE — TELEPHONE ENCOUNTER
Spoke with Jaquelin/referral dept, she stated US ordered by Dr. Ezio Taylor will also address head and neck area (codes added to appt). Per Rock Lee patient ok to cancel 1:30 appt for US ordered by [facility] as this will be a duplicate order. Patient notified of info above and verbalized understanding at this time.

## 2022-03-23 NOTE — TELEPHONE ENCOUNTER
Spoke with Aime Yu, who states since CPT codes for both US ordered by Dr. Bridget Regalado and  are the same, unable to authorize [address] as patient is already scheduled for other ultrasound. Aime Yu will check with [employer] regarding imaging orders and will call our office back.  Awaiting update

## 2022-03-23 NOTE — TELEPHONE ENCOUNTER
Per [address] Referral Dept. On 3/16 US head and neck CPT code 61911 ordered by Pablo Denis. Dr Wong Guardian ordered US thyroid with same CPT code 09838 on 1/31. Pt is scheduled for both on 3/25 but the US head and neck ordered by Pablo Denis can NOT be authorized d/t duplication of CPT.

## 2022-03-25 ENCOUNTER — HOSPITAL ENCOUNTER (OUTPATIENT)
Dept: ULTRASOUND IMAGING | Age: 47
Discharge: HOME | End: 2022-03-25
Attending: OTOLARYNGOLOGY
Payer: MEDICAID

## 2022-03-25 ENCOUNTER — APPOINTMENT (OUTPATIENT)
Dept: ULTRASOUND IMAGING | Age: 47
End: 2022-03-25
Attending: FAMILY MEDICINE
Payer: MEDICAID

## 2022-03-25 ENCOUNTER — APPOINTMENT (OUTPATIENT)
Dept: ULTRASOUND IMAGING | Age: 47
End: 2022-03-25
Attending: OTOLARYNGOLOGY
Payer: MEDICAID

## 2022-03-25 ENCOUNTER — LAB ENCOUNTER (OUTPATIENT)
Dept: LAB | Age: 47
End: 2022-03-25
Attending: FAMILY MEDICINE
Payer: MEDICAID

## 2022-03-25 DIAGNOSIS — N20.0 NEPHROLITHIASIS: ICD-10-CM

## 2022-03-25 DIAGNOSIS — D35.1 PARATHYROID ADENOMA: ICD-10-CM

## 2022-03-25 LAB
CALCIUM BLD-MCNC: 9.9 MG/DL (ref 8.5–10.1)
PTH-INTACT SERPL-MCNC: 33.9 PG/ML (ref 18.5–88)

## 2022-03-25 PROCEDURE — 82507 ASSAY OF CITRATE: CPT

## 2022-03-25 PROCEDURE — 82310 ASSAY OF CALCIUM: CPT

## 2022-03-25 PROCEDURE — 76536 US EXAM OF HEAD AND NECK: CPT | Performed by: OTOLARYNGOLOGY

## 2022-03-25 PROCEDURE — 82436 ASSAY OF URINE CHLORIDE: CPT

## 2022-03-25 PROCEDURE — 36415 COLL VENOUS BLD VENIPUNCTURE: CPT

## 2022-03-25 PROCEDURE — 84392 ASSAY OF URINE SULFATE: CPT

## 2022-03-25 PROCEDURE — 82340 ASSAY OF CALCIUM IN URINE: CPT

## 2022-03-25 PROCEDURE — 83970 ASSAY OF PARATHORMONE: CPT

## 2022-03-25 PROCEDURE — 83945 ASSAY OF OXALATE: CPT

## 2022-03-29 ENCOUNTER — TELEPHONE (OUTPATIENT)
Dept: INTERNAL MEDICINE CLINIC | Facility: CLINIC | Age: 47
End: 2022-03-29

## 2022-03-29 NOTE — TELEPHONE ENCOUNTER
Calling for Thyroid ultrasound results. Says she was told to cancel head/neck US and to just get the Thyroid US done. Informed the results have not been reviewed yet. Explained they have to be reviewed by doctor and she will receive a call after that.

## 2022-03-30 NOTE — TELEPHONE ENCOUNTER
[phone] with pt, gave results and recommendations. Pt stated she has an appt with Dr. Travis Granger on Tuesday 4/5/22 for follow up.

## 2022-04-02 LAB
CALCIUM URINE - PER 24H: 254 MG/D
CALCIUM URINE - PER VOL: 13.9 MG/DL
CHLORIDE URINE - PER 24H: 181 MMOL/D
CHLORIDE URINE - PER VOL.: 99 MMOL/L
CITRIC ACID, URINE - MG/DAY: 398 MG/D
CITRIC ACID, URINE - MG/L: 218 MG/L
CREATININE, URINE - PER 24H: 1825 MG/D
CREATININE, URINE - PER VOLUME: 100 MG/DL
HOURS COLLECTED: 24 HR
MAGNESIUM URINE - PER 24H: 117 MG/D
MAGNESIUM URINE - PER VOL: 6.4 MG/DL
OXALATE, URINE - MG/DAY: 27 MG/D
OXALATE, URINE - MG/L: 15 MG/L
PH, URINE: 6.81
PHOSPHORUS URINE - PER 24H: 912 MG/D
PHOSPHORUS URINE - PER VOL: 50 MG/DL
POTASSIUM URINE - PER 24H: 75 MMOL/D
POTASSIUM URINE - PER VOL.: 41 MMOL/L
SODIUM URINE - PER VOL.: 112 MMOL/L
SODIUM URINE -PER 24H: 204 MMOL/D
SULFATE URINE - PER 24H: 26 MMOL/D
SULFATE URINE - PER VOL: 14 MMOL/L
TOTAL VOLUME: 1825 ML
URIC ACID URINE - PER 24H: 829 MG/D
URIC ACID URINE - PER VOL: 45.4 MG/DL
URINE SUPERSATURATION, CAHPO4: 5.14
URINE SUPERSATURATION, CAOX: 4.77
URINE SUPERSATURATION, UA CALC: 0.12

## 2022-04-05 ENCOUNTER — OFFICE VISIT (OUTPATIENT)
Dept: OTOLARYNGOLOGY | Facility: CLINIC | Age: 47
End: 2022-04-05
Payer: MEDICAID

## 2022-04-05 ENCOUNTER — LAB ENCOUNTER (OUTPATIENT)
Dept: LAB | Facility: HOSPITAL | Age: 47
End: 2022-04-05
Attending: OTOLARYNGOLOGY
Payer: MEDICAID

## 2022-04-05 VITALS — WEIGHT: 165 LBS | HEIGHT: 59 IN | BODY MASS INDEX: 33.26 KG/M2

## 2022-04-05 DIAGNOSIS — E04.1 THYROID NODULE: Primary | ICD-10-CM

## 2022-04-05 LAB
T3FREE SERPL-MCNC: 3.21 PG/ML (ref 2.4–4.2)
T4 SERPL-MCNC: 11.9 UG/DL
TSI SER-ACNC: 1.42 MIU/ML (ref 0.36–3.74)

## 2022-04-05 PROCEDURE — 3008F BODY MASS INDEX DOCD: CPT | Performed by: OTOLARYNGOLOGY

## 2022-04-05 PROCEDURE — 99213 OFFICE O/P EST LOW 20 MIN: CPT | Performed by: OTOLARYNGOLOGY

## 2022-04-05 PROCEDURE — 84443 ASSAY THYROID STIM HORMONE: CPT | Performed by: OTOLARYNGOLOGY

## 2022-04-05 PROCEDURE — 84481 FREE ASSAY (FT-3): CPT | Performed by: OTOLARYNGOLOGY

## 2022-04-05 PROCEDURE — 36415 COLL VENOUS BLD VENIPUNCTURE: CPT | Performed by: OTOLARYNGOLOGY

## 2022-04-05 PROCEDURE — 84436 ASSAY OF TOTAL THYROXINE: CPT | Performed by: OTOLARYNGOLOGY

## 2022-06-01 ENCOUNTER — TELEPHONE (OUTPATIENT)
Dept: INTERNAL MEDICINE CLINIC | Facility: CLINIC | Age: 47
End: 2022-06-01

## 2022-06-01 DIAGNOSIS — E04.1 THYROID NODULE: Primary | ICD-10-CM

## 2022-06-01 NOTE — TELEPHONE ENCOUNTER
Pt changed insurance to [facility] and has an appt coming up with the Endocrinologist on 06/09/22 and Pt is not sure if she needs a referral, pt is requesting one just in case.        Patient requesting referral:yes    Is insurance up to date in Epic: yes  Is there already an open/pending/approved referral: no    Specialty: Endocrinologist   Refer to Provider [phone] first and last name - referral needs to be under collaborating MD's name): Sabas Ceron  Diagnosis/Why they are being seen:Dx: Thyroid nodule (E04.1)    Patient Call Back Number: [phone]      Sabas Ceron, [address] [phone]   Chely Grayson

## 2022-06-07 ENCOUNTER — APPOINTMENT (OUTPATIENT)
Dept: GENERAL RADIOLOGY | Facility: HOSPITAL | Age: 47
End: 2022-06-07
Attending: UROLOGY
Payer: MEDICAID

## 2022-06-07 ENCOUNTER — APPOINTMENT (OUTPATIENT)
Dept: CT IMAGING | Age: 47
End: 2022-06-07
Attending: EMERGENCY MEDICINE
Payer: MEDICAID

## 2022-06-07 ENCOUNTER — ANESTHESIA EVENT (OUTPATIENT)
Dept: SURGERY | Facility: HOSPITAL | Age: 47
End: 2022-06-07
Payer: MEDICAID

## 2022-06-07 ENCOUNTER — HOSPITAL ENCOUNTER (INPATIENT)
Facility: HOSPITAL | Age: 47
LOS: 1 days | Discharge: HOME | End: 2022-06-08
Attending: EMERGENCY MEDICINE | Admitting: HOSPITALIST
Payer: MEDICAID

## 2022-06-07 ENCOUNTER — ANESTHESIA (OUTPATIENT)
Dept: SURGERY | Facility: HOSPITAL | Age: 47
End: 2022-06-07
Payer: MEDICAID

## 2022-06-07 DIAGNOSIS — N12 PYELONEPHRITIS: ICD-10-CM

## 2022-06-07 DIAGNOSIS — N20.1 CALCULI, URETER: ICD-10-CM

## 2022-06-07 DIAGNOSIS — N20.0 KIDNEY STONES: Primary | ICD-10-CM

## 2022-06-07 PROBLEM — R73.9 HYPERGLYCEMIA: Status: ACTIVE | Noted: 2022-06-07

## 2022-06-07 LAB
ALBUMIN SERPL-MCNC: 3.9 G/DL (ref 3.4–5)
ALBUMIN/GLOB SERPL: 1 {RATIO} (ref 1–2)
ALP LIVER SERPL-CCNC: 79 U/L
ALT SERPL-CCNC: 42 U/L
ANION GAP SERPL CALC-SCNC: 5 MMOL/L (ref 0–18)
AST SERPL-CCNC: 21 U/L (ref 15–37)
B-HCG UR QL: NEGATIVE
BASOPHILS # BLD AUTO: 0.03 X10(3) UL (ref 0–0.2)
BASOPHILS NFR BLD AUTO: 0.4 %
BILIRUB SERPL-MCNC: 0.3 MG/DL (ref 0.1–2)
BILIRUB UR QL STRIP.AUTO: NEGATIVE
BUN BLD-MCNC: 9 MG/DL (ref 7–18)
CALCIUM BLD-MCNC: 9.1 MG/DL (ref 8.5–10.1)
CHLORIDE SERPL-SCNC: 108 MMOL/L (ref 98–112)
CLARITY UR REFRACT.AUTO: CLEAR
CO2 SERPL-SCNC: 25 MMOL/L (ref 21–32)
COLOR UR AUTO: YELLOW
CREAT BLD-MCNC: 0.77 MG/DL
EOSINOPHIL # BLD AUTO: 0.18 X10(3) UL (ref 0–0.7)
EOSINOPHIL NFR BLD AUTO: 2.1 %
ERYTHROCYTE [DISTWIDTH] IN BLOOD BY AUTOMATED COUNT: 13.5 %
GLOBULIN PLAS-MCNC: 4.1 G/DL (ref 2.8–4.4)
GLUCOSE BLD-MCNC: 109 MG/DL (ref 70–99)
GLUCOSE UR STRIP.AUTO-MCNC: NEGATIVE MG/DL
HCT VFR BLD AUTO: 42 %
HGB BLD-MCNC: 13.7 G/DL
IMM GRANULOCYTES # BLD AUTO: 0.04 X10(3) UL (ref 0–1)
IMM GRANULOCYTES NFR BLD: 0.5 %
KETONES UR STRIP.AUTO-MCNC: NEGATIVE MG/DL
LYMPHOCYTES # BLD AUTO: 1.49 X10(3) UL (ref 1–4)
LYMPHOCYTES NFR BLD AUTO: 17.4 %
MCH RBC QN AUTO: 29.1 PG (ref 26–34)
MCHC RBC AUTO-ENTMCNC: 32.6 G/DL (ref 31–37)
MCV RBC AUTO: 89.4 FL
MONOCYTES # BLD AUTO: 0.53 X10(3) UL (ref 0.1–1)
MONOCYTES NFR BLD AUTO: 6.2 %
NEUTROPHILS # BLD AUTO: 6.3 X10 (3) UL (ref 1.5–7.7)
NEUTROPHILS # BLD AUTO: 6.3 X10(3) UL (ref 1.5–7.7)
NEUTROPHILS NFR BLD AUTO: 73.4 %
NITRITE UR QL STRIP.AUTO: NEGATIVE
OSMOLALITY SERPL CALC.SUM OF ELEC: 285 MOSM/KG (ref 275–295)
PH UR STRIP.AUTO: 7 [PH] (ref 5–8)
PLATELET # BLD AUTO: 307 10(3)UL (ref 150–450)
POTASSIUM SERPL-SCNC: 3.6 MMOL/L (ref 3.5–5.1)
PROT SERPL-MCNC: 8 G/DL (ref 6.4–8.2)
RBC # BLD AUTO: 4.7 X10(6)UL
SARS-COV-2 RNA RESP QL NAA+PROBE: NOT DETECTED
SODIUM SERPL-SCNC: 138 MMOL/L (ref 136–145)
SP GR UR STRIP.AUTO: 1.01 (ref 1–1.03)
UROBILINOGEN UR STRIP.AUTO-MCNC: 0.2 MG/DL
WBC # BLD AUTO: 8.6 X10(3) UL (ref 4–11)

## 2022-06-07 PROCEDURE — 99223 1ST HOSP IP/OBS HIGH 75: CPT | Performed by: HOSPITALIST

## 2022-06-07 PROCEDURE — 74176 CT ABD & PELVIS W/O CONTRAST: CPT | Performed by: EMERGENCY MEDICINE

## 2022-06-07 PROCEDURE — 0T768DZ DILATION OF RIGHT URETER WITH INTRALUMINAL DEVICE, VIA NATURAL OR ARTIFICIAL OPENING ENDOSCOPIC: ICD-10-PCS | Performed by: UROLOGY

## 2022-06-07 PROCEDURE — 0TC68ZZ EXTIRPATION OF MATTER FROM RIGHT URETER, VIA NATURAL OR ARTIFICIAL OPENING ENDOSCOPIC: ICD-10-PCS | Performed by: UROLOGY

## 2022-06-07 DEVICE — URETERAL STENT WITH SIDE HOLES 6FX26CM
Type: IMPLANTABLE DEVICE | Site: URETER | Status: FUNCTIONAL
Brand: TRIA™ SOFT

## 2022-06-07 RX ORDER — ONDANSETRON 2 MG/ML
4 INJECTION INTRAMUSCULAR; INTRAVENOUS EVERY 6 HOURS PRN
Status: DISCONTINUED | OUTPATIENT
Start: 2022-06-07 | End: 2022-06-07 | Stop reason: HOSPADM

## 2022-06-07 RX ORDER — ACETAMINOPHEN AND CODEINE PHOSPHATE 300; 30 MG/1; MG/1
2 TABLET ORAL ONCE AS NEEDED
Status: DISCONTINUED | OUTPATIENT
Start: 2022-06-07 | End: 2022-06-07 | Stop reason: HOSPADM

## 2022-06-07 RX ORDER — GLYCOPYRROLATE 0.2 MG/ML
INJECTION, SOLUTION INTRAMUSCULAR; INTRAVENOUS AS NEEDED
Status: DISCONTINUED | OUTPATIENT
Start: 2022-06-07 | End: 2022-06-07 | Stop reason: SURG

## 2022-06-07 RX ORDER — HYDROMORPHONE HYDROCHLORIDE 1 MG/ML
1 INJECTION, SOLUTION INTRAMUSCULAR; INTRAVENOUS; SUBCUTANEOUS EVERY 30 MIN PRN
Status: DISCONTINUED | OUTPATIENT
Start: 2022-06-07 | End: 2022-06-08

## 2022-06-07 RX ORDER — SODIUM CHLORIDE 9 MG/ML
INJECTION, SOLUTION INTRAVENOUS CONTINUOUS
Status: ACTIVE | OUTPATIENT
Start: 2022-06-07 | End: 2022-06-07

## 2022-06-07 RX ORDER — PROCHLORPERAZINE EDISYLATE 5 MG/ML
5 INJECTION INTRAMUSCULAR; INTRAVENOUS EVERY 8 HOURS PRN
Status: DISCONTINUED | OUTPATIENT
Start: 2022-06-07 | End: 2022-06-07 | Stop reason: HOSPADM

## 2022-06-07 RX ORDER — ONDANSETRON 2 MG/ML
4 INJECTION INTRAMUSCULAR; INTRAVENOUS EVERY 4 HOURS PRN
Status: ACTIVE | OUTPATIENT
Start: 2022-06-07 | End: 2022-06-07

## 2022-06-07 RX ORDER — HYDROMORPHONE HYDROCHLORIDE 1 MG/ML
0.2 INJECTION, SOLUTION INTRAMUSCULAR; INTRAVENOUS; SUBCUTANEOUS EVERY 2 HOUR PRN
Status: DISCONTINUED | OUTPATIENT
Start: 2022-06-07 | End: 2022-06-08

## 2022-06-07 RX ORDER — ONDANSETRON 2 MG/ML
4 INJECTION INTRAMUSCULAR; INTRAVENOUS ONCE
Status: COMPLETED | OUTPATIENT
Start: 2022-06-07 | End: 2022-06-07

## 2022-06-07 RX ORDER — LIDOCAINE HYDROCHLORIDE 10 MG/ML
INJECTION, SOLUTION EPIDURAL; INFILTRATION; INTRACAUDAL; PERINEURAL AS NEEDED
Status: DISCONTINUED | OUTPATIENT
Start: 2022-06-07 | End: 2022-06-07 | Stop reason: SURG

## 2022-06-07 RX ORDER — ACETAMINOPHEN 500 MG
1000 TABLET ORAL ONCE AS NEEDED
Status: DISCONTINUED | OUTPATIENT
Start: 2022-06-07 | End: 2022-06-07 | Stop reason: HOSPADM

## 2022-06-07 RX ORDER — ONDANSETRON 2 MG/ML
4 INJECTION INTRAMUSCULAR; INTRAVENOUS EVERY 6 HOURS PRN
Status: DISCONTINUED | OUTPATIENT
Start: 2022-06-07 | End: 2022-06-08

## 2022-06-07 RX ORDER — ALBUTEROL SULFATE 90 UG/1
2 AEROSOL, METERED RESPIRATORY (INHALATION) EVERY 4 HOURS PRN
Status: DISCONTINUED | OUTPATIENT
Start: 2022-06-07 | End: 2022-06-08

## 2022-06-07 RX ORDER — SODIUM CHLORIDE, SODIUM LACTATE, POTASSIUM CHLORIDE, CALCIUM CHLORIDE 600; 310; 30; 20 MG/100ML; MG/100ML; MG/100ML; MG/100ML
INJECTION, SOLUTION INTRAVENOUS CONTINUOUS
Status: DISCONTINUED | OUTPATIENT
Start: 2022-06-07 | End: 2022-06-07 | Stop reason: HOSPADM

## 2022-06-07 RX ORDER — ONDANSETRON 2 MG/ML
INJECTION INTRAMUSCULAR; INTRAVENOUS AS NEEDED
Status: DISCONTINUED | OUTPATIENT
Start: 2022-06-07 | End: 2022-06-07 | Stop reason: SURG

## 2022-06-07 RX ORDER — DEXTROSE MONOHYDRATE 25 G/50ML
50 INJECTION, SOLUTION INTRAVENOUS
Status: DISCONTINUED | OUTPATIENT
Start: 2022-06-07 | End: 2022-06-07 | Stop reason: HOSPADM

## 2022-06-07 RX ORDER — TRIAMTERENE AND HYDROCHLOROTHIAZIDE 37.5; 25 MG/1; MG/1
1 CAPSULE ORAL EVERY MORNING
Status: DISCONTINUED | OUTPATIENT
Start: 2022-06-07 | End: 2022-06-08

## 2022-06-07 RX ORDER — SODIUM CHLORIDE 9 MG/ML
INJECTION, SOLUTION INTRAVENOUS CONTINUOUS
Status: DISCONTINUED | OUTPATIENT
Start: 2022-06-07 | End: 2022-06-08

## 2022-06-07 RX ORDER — HYDROCODONE BITARTRATE AND ACETAMINOPHEN 5; 300 MG/1; MG/1
TABLET ORAL
COMMUNITY

## 2022-06-07 RX ORDER — NALOXONE HYDROCHLORIDE 0.4 MG/ML
80 INJECTION, SOLUTION INTRAMUSCULAR; INTRAVENOUS; SUBCUTANEOUS AS NEEDED
Status: DISCONTINUED | OUTPATIENT
Start: 2022-06-07 | End: 2022-06-07 | Stop reason: HOSPADM

## 2022-06-07 RX ORDER — SODIUM CHLORIDE, SODIUM LACTATE, POTASSIUM CHLORIDE, CALCIUM CHLORIDE 600; 310; 30; 20 MG/100ML; MG/100ML; MG/100ML; MG/100ML
INJECTION, SOLUTION INTRAVENOUS CONTINUOUS PRN
Status: DISCONTINUED | OUTPATIENT
Start: 2022-06-07 | End: 2022-06-07 | Stop reason: SURG

## 2022-06-07 RX ORDER — NICOTINE POLACRILEX 4 MG
30 LOZENGE BUCCAL
Status: DISCONTINUED | OUTPATIENT
Start: 2022-06-07 | End: 2022-06-07 | Stop reason: HOSPADM

## 2022-06-07 RX ORDER — NEOSTIGMINE METHYLSULFATE 1 MG/ML
INJECTION INTRAVENOUS AS NEEDED
Status: DISCONTINUED | OUTPATIENT
Start: 2022-06-07 | End: 2022-06-07 | Stop reason: SURG

## 2022-06-07 RX ORDER — PROCHLORPERAZINE EDISYLATE 5 MG/ML
5 INJECTION INTRAMUSCULAR; INTRAVENOUS EVERY 8 HOURS PRN
Status: DISCONTINUED | OUTPATIENT
Start: 2022-06-07 | End: 2022-06-08

## 2022-06-07 RX ORDER — HYDROMORPHONE HYDROCHLORIDE 1 MG/ML
0.8 INJECTION, SOLUTION INTRAMUSCULAR; INTRAVENOUS; SUBCUTANEOUS EVERY 2 HOUR PRN
Status: DISCONTINUED | OUTPATIENT
Start: 2022-06-07 | End: 2022-06-08

## 2022-06-07 RX ORDER — HYDROMORPHONE HYDROCHLORIDE 1 MG/ML
0.5 INJECTION, SOLUTION INTRAMUSCULAR; INTRAVENOUS; SUBCUTANEOUS EVERY 30 MIN PRN
Status: ACTIVE | OUTPATIENT
Start: 2022-06-07 | End: 2022-06-07

## 2022-06-07 RX ORDER — NICOTINE POLACRILEX 4 MG
15 LOZENGE BUCCAL
Status: DISCONTINUED | OUTPATIENT
Start: 2022-06-07 | End: 2022-06-07 | Stop reason: HOSPADM

## 2022-06-07 RX ORDER — DEXAMETHASONE SODIUM PHOSPHATE 4 MG/ML
VIAL (ML) INJECTION AS NEEDED
Status: DISCONTINUED | OUTPATIENT
Start: 2022-06-07 | End: 2022-06-07 | Stop reason: SURG

## 2022-06-07 RX ORDER — ACETAMINOPHEN AND CODEINE PHOSPHATE 300; 30 MG/1; MG/1
1 TABLET ORAL ONCE AS NEEDED
Status: DISCONTINUED | OUTPATIENT
Start: 2022-06-07 | End: 2022-06-07 | Stop reason: HOSPADM

## 2022-06-07 RX ORDER — DIPHENHYDRAMINE HYDROCHLORIDE 50 MG/ML
25 INJECTION INTRAMUSCULAR; INTRAVENOUS ONCE
Status: COMPLETED | OUTPATIENT
Start: 2022-06-07 | End: 2022-06-07

## 2022-06-07 RX ORDER — HYDROMORPHONE HYDROCHLORIDE 1 MG/ML
0.4 INJECTION, SOLUTION INTRAMUSCULAR; INTRAVENOUS; SUBCUTANEOUS EVERY 2 HOUR PRN
Status: DISCONTINUED | OUTPATIENT
Start: 2022-06-07 | End: 2022-06-08

## 2022-06-07 RX ADMIN — GLYCOPYRROLATE 0.4 MG: 0.2 INJECTION, SOLUTION INTRAMUSCULAR; INTRAVENOUS at 20:58:00

## 2022-06-07 RX ADMIN — SODIUM CHLORIDE, SODIUM LACTATE, POTASSIUM CHLORIDE, CALCIUM CHLORIDE: 600; 310; 30; 20 INJECTION, SOLUTION INTRAVENOUS at 20:12:00

## 2022-06-07 RX ADMIN — LIDOCAINE HYDROCHLORIDE 50 MG: 10 INJECTION, SOLUTION EPIDURAL; INFILTRATION; INTRACAUDAL; PERINEURAL at 20:21:00

## 2022-06-07 RX ADMIN — DEXAMETHASONE SODIUM PHOSPHATE 8 MG: 4 MG/ML VIAL (ML) INJECTION at 20:21:00

## 2022-06-07 RX ADMIN — NEOSTIGMINE METHYLSULFATE 3 MG: 1 INJECTION INTRAVENOUS at 20:58:00

## 2022-06-07 RX ADMIN — ONDANSETRON 4 MG: 2 INJECTION INTRAMUSCULAR; INTRAVENOUS at 20:42:00

## 2022-06-07 NOTE — PLAN OF CARE
Problem: PAIN - ADULT  Goal: Verbalizes/displays adequate comfort level or patient's stated pain goal  Description: INTERVENTIONS:  - Encourage pt to monitor pain and request assistance  - Assess pain using appropriate pain scale  - Administer analgesics based on type and severity of pain and evaluate response  - Implement non-pharmacological measures as appropriate and evaluate response  - Consider cultural and social influences on pain and pain management  - Manage/alleviate anxiety  - Utilize distraction and/or relaxation techniques  - Monitor for opioid side effects  - Notify MD/LIP if interventions unsuccessful or patient reports new pain  - Anticipate increased pain with activity and pre-medicate as appropriate  Outcome: Progressing     Problem: RISK FOR INFECTION - ADULT  Goal: Absence of fever/infection during anticipated neutropenic period  Description: INTERVENTIONS  - Monitor WBC  - Administer growth factors as ordered  - Implement neutropenic guidelines  Outcome: Progressing     Problem: SAFETY ADULT - FALL  Goal: Free from fall injury  Description: INTERVENTIONS:  - Assess pt frequently for physical needs  - Identify cognitive and physical deficits and behaviors that affect risk of falls.   - [place] fall precautions as indicated by assessment.  - Educate pt/family on patient safety including physical limitations  - Instruct pt to call for assistance with activity based on assessment  - Modify environment to reduce risk of injury  - Provide assistive devices as appropriate  - Consider OT/PT consult to assist with strengthening/mobility  - Encourage toileting schedule  Outcome: Progressing     Problem: DISCHARGE PLANNING  Goal: Discharge to home or other facility with appropriate resources  Description: INTERVENTIONS:  - Identify barriers to discharge w/pt and caregiver  - Include patient/family/discharge partner in discharge planning  - Arrange for needed discharge resources and transportation as appropriate  - Identify discharge learning needs (meds, wound care, etc)  - Arrange for interpreters to assist at discharge as needed  - Consider post-discharge preferences of patient/family/discharge partner  - Complete POLST form as appropriate  - Assess patient's ability to be responsible for managing their own health  - Refer to Case Management Department for coordinating discharge planning if the patient needs post-hospital services based on physician/LIP order or complex needs related to functional status, cognitive ability or social support system  Outcome: Progressing

## 2022-06-07 NOTE — PLAN OF CARE
NURSING ADMISSION NOTE  Pt arrived from ED in stable condition    Patient admitted via Cart  Oriented to room. Safety precautions initiated. Bed in low position. Call light in reach.

## 2022-06-07 NOTE — ED QUICK NOTES
Orders for admission, patient is aware of plan and ready to go upstairs.  Any questions, please call ED RN maria fernanda at extension [phone]     Patient Covid vaccination status: Unvaccinated     COVID Test Ordered in ED: Rapid SARS-CoV-2 by PCR    COVID Suspicion at Admission: N/A    Running Infusions: 0     Mental Status/LOC at time of transport: a and ox3    Other pertinent information:   CIWA score: N/A   NIH score:  N/A

## 2022-06-08 VITALS
DIASTOLIC BLOOD PRESSURE: 85 MMHG | HEART RATE: 81 BPM | HEIGHT: 59 IN | RESPIRATION RATE: 20 BRPM | OXYGEN SATURATION: 96 % | BODY MASS INDEX: 33.26 KG/M2 | SYSTOLIC BLOOD PRESSURE: 140 MMHG | TEMPERATURE: 99 F | WEIGHT: 165 LBS

## 2022-06-08 PROCEDURE — 99239 HOSP IP/OBS DSCHRG MGMT >30: CPT | Performed by: HOSPITALIST

## 2022-06-08 RX ORDER — ACETAMINOPHEN 500 MG
1000 TABLET ORAL EVERY 6 HOURS PRN
Status: DISCONTINUED | OUTPATIENT
Start: 2022-06-08 | End: 2022-06-08

## 2022-06-08 RX ORDER — CEPHALEXIN 500 MG/1
500 CAPSULE ORAL 3 TIMES DAILY
Qty: 42 CAPSULE | Refills: 0 | Status: SHIPPED | OUTPATIENT
Start: 2022-06-08 | End: 2022-06-22

## 2022-06-08 NOTE — PROGRESS NOTES
06/08/22 1319   Clinical Encounter Type   Visited With Patient; Health care provider   Continue Visiting No  (unless requested)   Patient's Supportive Strategies/Resources Chely and family   Taxonomy   Intended Effects Build relationship of care and support   Methods Offer spiritual/Latter day support;Exploring hope;Explore spiritual/Latter day beliefs; Explore presence of God;Encourage sharing of feelings;Encourage someone to recognize their strengths;Collaborate with care team member   Interventions Acknowledge current situation; Active listening; Ask guided questions about chely; Ask questions to bring forth feelings; Facilitate advance care planning;Explain  role;Assist with identifying strengths;Identify supportive relationship(s); Prayer for healing;Provide compassionate touch   Responded to consult for advance directives. Provided education for Diandra Hernández and instructed to inform nursing if she would like to complete. For further spiritual care, please enter a consult in Epic, followed by contacting pager [phone] as needed.

## 2022-06-08 NOTE — OPERATIVE REPORT
[address]    PATIENT'S NAME: Xavier Pratt   ATTENDING PHYSICIAN: Genaro Pitts M.D. OPERATING PHYSICIAN: Barrrea Grant M.D. PATIENT ACCOUNT#:   [de-identified]    LOCATION:  [address] [place] [address]  MEDICAL RECORD #:   [id number]       YOB: 1975  ADMISSION DATE:       06/07/2022      OPERATION DATE:  06/07/2022    OPERATIVE REPORT      PREOPERATIVE DIAGNOSIS:  Right ureteral calculi. POSTOPERATIVE DIAGNOSIS:  Right ureteral calculi. PROCEDURE:  Cystoscopy, right ureteroscopy, stone extraction, stent placement. ANESTHESIA:  General.    COMPLICATIONS:  None. ESTIMATED BLOOD LOSS:  0 mL. SPECIMENS:  Right distal ureteral stone. INDICATIONS:  This is a 5year-old female who presents with 3 right ureteral stones and pyuria with significant pain. She presents now for stone extraction and stenting. I discussed her the risks of the procedure including bleeding, infection, ureteral injury, renal injury, as well as risks of anesthesia, including death. I also discussed with her the possibility that we would need to abort the procedure and place a stent to allow drainage of a possible infection. She understands this and wished to proceed. OPERATIVE TECHNIQUE:  The patient was placed in dorsal lithotomy position, prepped and draped in sterile fashion. A 22-Hungarian sheath with obturator was passed through the urethra. The obturator was removed. The bladder was evaluated demonstrating signs of cystitis. The distal ureteral stone was identified right at the orifice. I was able to pluck it out with a flexible grasper. This was sent off to analysis. Next, a Glidewire was passed up through the ureter, up past the stone. It seemed to coil in the upper part of the ureter. I then passed a flexible ureteroscope in through the urethra up next to the wire. I passed a second wire through the ureteroscope into the ureteral orifice and was able to follow that up into the ureter.   I then removed the stent within the ureteroscope, passed the ureteroscope up to the stone in the proximal ureter. The wire appeared to be coiled around and coming back around the stone. I elected to pull the wire back and I was able to push it down past this stone into the renal pelvis. At that point, there was purulent fluid. I elected to abort further ureteroscopy and removed the ureteroscope, passed a double-J stent over the wire under fluoroscopic guidance past the stone and into the renal pelvis. The wire was removed and a coil was seen in both the renal pelvis as well as the bladder. Patient's bladder was emptied. She was taken to the recovery room having tolerated the procedure well.     Dictated By Matthew Vega M.D.  d: 06/07/2022 21:26:07  t: 06/08/2022 00:45:13  Job [id number]  ZBLAYNE/

## 2022-06-08 NOTE — ANESTHESIA POSTPROCEDURE EVALUATION
[address] Patient Status:  Inpatient   Age/Gender 41year old female MRN [id number]   [facility] SURGERY Attending Jian Jenkins MD   Hosp Day # 0 PCP Rosana Crawford MD       Anesthesia Post-op Note    CYSTOSCOPY, RIGHT RETROGRADE PYELOGRAM, POSSIBLE RIGHT URETEROSCOPE WITH LASER LITHOTRIPSY, RIGHT URETERAL STENT PLACEMENT    Procedure Summary     Date: 06/07/22 Room / Location: [facility] MAIN OR 07 / [facility] MAIN OR    Anesthesia Start: 2012 Anesthesia Stop: 2111    Procedure: CYSTOSCOPY, RIGHT RETROGRADE PYELOGRAM, POSSIBLE RIGHT URETEROSCOPE WITH LASER LITHOTRIPSY, RIGHT URETERAL STENT PLACEMENT (N/A ) Diagnosis:       Calculi, ureter      (Calculi, ureter [N20.1])    Surgeons: Sandra Perera MD Anesthesiologist: Anika Leal MD    Anesthesia Type: general ASA Status: 2          Anesthesia Type: general    Vitals Value Taken Time   /78 06/07/22 2112   Temp 97.7 06/07/22 2112   Pulse 75 06/07/22 2112   Resp 13 06/07/22 2112   SpO2 96 06/07/22 2112       Patient Location: PACU    Anesthesia Type: general    Airway Patency: patent    Postop Pain Control: adequate    Mental Status: preanesthetic baseline    Nausea/Vomiting: none    Cardiopulmonary/Hydration status: stable euvolemic    Complications: no apparent anesthesia related complications    Postop vital signs: stable    Dental Exam: Unchanged from Preop    Patient to be discharged from PACU when criteria met.

## 2022-06-08 NOTE — PLAN OF CARE
NURSING DISCHARGE NOTE  Discharge instructions given. All questions answered to pt and family satisfaction. IV removed and intact. Discharged Home via Wheelchair. Accompanied by Family member and Support staff  Belongings Taken by patient/family.

## 2022-06-08 NOTE — ANESTHESIA PROCEDURE NOTES
Airway  Date/Time: 6/7/2022 8:23 PM  Urgency: elective    Airway not difficult    General Information and Staff    Patient location during procedure: OR  Anesthesiologist: Gemini Freeman MD  Performed: anesthesiologist     Indications and Patient Condition  Indications for airway management: anesthesia  Spontaneous Ventilation: absent  Sedation level: deep  Preoxygenated: yes  Patient position: sniffing  MILS not maintained throughout  Mask difficulty assessment: 1 - vent by mask  No planned trial extubation    Final Airway Details  Final airway type: endotracheal airway      Successful airway: ETT  Cuffed: yes   Successful intubation technique: direct laryngoscopy  Facilitating devices/methods: intubating stylet  Endotracheal tube insertion site: oral  Blade: Miller  Blade size: #3  ETT size (mm): 7.0    Cormack-Lehane Classification: grade IIA - partial view of glottis  Placement verified by: chest auscultation and capnometry   Cuff volume (mL): 5  Measured from: lips  ETT to lips (cm): 21  Number of attempts at approach: 1  Ventilation between attempts: none  Number of other approaches attempted: 0

## 2022-06-08 NOTE — BRIEF OP NOTE
Pre-Operative Diagnosis: Right ureteral calculi     Post-Operative Diagnosis: Right ureteral calculi     Procedure Performed:   CYSTOSCOPY, RIGHT URETEROSCOPY, STONE EXTRACTION, STENT PLACEMENT    Surgeon(s) and Role:     Jenny Cadena MD - Primary    Assistant(s):        Surgical Findings: as above     Specimen: right distal ureteral stone     Estimated Blood Loss: 0 cc    Dictation Number:  [id number]    Mishel Gandhi MD  6/7/2022  9:04 PM

## 2022-06-08 NOTE — PLAN OF CARE
Problem: PAIN - ADULT  Goal: Verbalizes/displays adequate comfort level or patient's stated pain goal  Description: INTERVENTIONS:  - Encourage pt to monitor pain and request assistance  - Assess pain using appropriate pain scale  - Administer analgesics based on type and severity of pain and evaluate response  - Implement non-pharmacological measures as appropriate and evaluate response  - Consider cultural and social influences on pain and pain management  - Manage/alleviate anxiety  - Utilize distraction and/or relaxation techniques  - Monitor for opioid side effects  - Notify MD/LIP if interventions unsuccessful or patient reports new pain  - Anticipate increased pain with activity and pre-medicate as appropriate  Outcome: Progressing     Problem: RISK FOR INFECTION - ADULT  Goal: Absence of fever/infection during anticipated neutropenic period  Description: INTERVENTIONS  - Monitor WBC  - Administer growth factors as ordered  - Implement neutropenic guidelines  Outcome: Progressing     Problem: SAFETY ADULT - FALL  Goal: Free from fall injury  Description: INTERVENTIONS:  - Assess pt frequently for physical needs  - Identify cognitive and physical deficits and behaviors that affect risk of falls.   - [place] fall precautions as indicated by assessment.  - Educate pt/family on patient safety including physical limitations  - Instruct pt to call for assistance with activity based on assessment  - Modify environment to reduce risk of injury  - Provide assistive devices as appropriate  - Consider OT/PT consult to assist with strengthening/mobility  - Encourage toileting schedule  Outcome: Progressing     Problem: DISCHARGE PLANNING  Goal: Discharge to home or other facility with appropriate resources  Description: INTERVENTIONS:  - Identify barriers to discharge w/pt and caregiver  - Include patient/family/discharge partner in discharge planning  - Arrange for needed discharge resources and transportation as appropriate  - Identify discharge learning needs (meds, wound care, etc)  - Arrange for interpreters to assist at discharge as needed  - Consider post-discharge preferences of patient/family/discharge partner  - Complete POLST form as appropriate  - Assess patient's ability to be responsible for managing their own health  - Refer to Case Management Department for coordinating discharge planning if the patient needs post-hospital services based on physician/LIP order or complex needs related to functional status, cognitive ability or social support system  Outcome: Progressing

## 2022-06-08 NOTE — PLAN OF CARE
Patient returned from PACU. Patient alert x 4. Patient on room air. Patient states she has mild pain declines medication at this time. Pulse ox on. Tele on. SCDs on. Patient voided. Patient up with standby assist. Call light within reach. Safety precautions maintained.

## 2022-06-09 NOTE — PAYOR COMM NOTE
--------------  DISCHARGE REVIEW    Payor: Joel Liang #:  [id number]  Authorization Number: [id number]    Admit date: 6/7/22  Admit time:   2:40 PM  Discharge Date: 6/8/2022  3:55 PM     Admitting Physician: Rudolph Lancaster MD  Attending Physician:  No att. providers found  Primary Care Physician: Rehana Burgos MD

## 2022-06-12 LAB — CALCULI MASS: 120 MG

## 2022-06-20 ENCOUNTER — TELEPHONE (OUTPATIENT)
Dept: INTERNAL MEDICINE CLINIC | Facility: CLINIC | Age: 47
End: 2022-06-20

## 2022-06-20 DIAGNOSIS — M79.672 LEFT FOOT PAIN: ICD-10-CM

## 2022-06-20 DIAGNOSIS — Z12.31 ENCOUNTER FOR SCREENING MAMMOGRAM FOR MALIGNANT NEOPLASM OF BREAST: Primary | ICD-10-CM

## 2022-06-20 NOTE — TELEPHONE ENCOUNTER
TO, DPM Wagner Mitchell has retired, who would you like to rec for pt? Last Mammo required US for right axilla lymph nodes.    Do we still order Mammo or just the [address], or both? ty

## 2022-06-20 NOTE — TELEPHONE ENCOUNTER
Patient calling to request order for a mammogram and a referral for podiatry for heel and ankle pain. Previous podiatrist she was seeing has retired.

## 2022-06-22 ENCOUNTER — EKG ENCOUNTER (OUTPATIENT)
Dept: LAB | Age: 47
End: 2022-06-22
Attending: UROLOGY
Payer: MEDICAID

## 2022-06-22 ENCOUNTER — LAB ENCOUNTER (OUTPATIENT)
Dept: LAB | Age: 47
End: 2022-06-22
Attending: UROLOGY
Payer: MEDICAID

## 2022-06-22 DIAGNOSIS — N20.1 URETERAL CALCULI: ICD-10-CM

## 2022-06-22 DIAGNOSIS — Z01.812 ENCOUNTER FOR PREOPERATIVE SCREENING LABORATORY TESTING FOR COVID-19 VIRUS: ICD-10-CM

## 2022-06-22 DIAGNOSIS — Z20.822 ENCOUNTER FOR PREOPERATIVE SCREENING LABORATORY TESTING FOR COVID-19 VIRUS: ICD-10-CM

## 2022-06-22 LAB
ATRIAL RATE: 61 BPM
P AXIS: 30 DEGREES
P-R INTERVAL: 126 MS
Q-T INTERVAL: 414 MS
QRS DURATION: 84 MS
QTC CALCULATION (BEZET): 416 MS
R AXIS: 30 DEGREES
SARS-COV-2 RNA RESP QL NAA+PROBE: NOT DETECTED
T AXIS: 6 DEGREES
VENTRICULAR RATE: 61 BPM

## 2022-06-22 PROCEDURE — 93005 ELECTROCARDIOGRAM TRACING: CPT

## 2022-06-22 PROCEDURE — 93010 ELECTROCARDIOGRAM REPORT: CPT | Performed by: INTERNAL MEDICINE

## 2022-06-23 ENCOUNTER — ANESTHESIA EVENT (OUTPATIENT)
Dept: SURGERY | Facility: HOSPITAL | Age: 47
End: 2022-06-23
Payer: MEDICAID

## 2022-06-24 ENCOUNTER — HOSPITAL ENCOUNTER (OUTPATIENT)
Facility: HOSPITAL | Age: 47
Setting detail: OBSERVATION
Discharge: HOME | End: 2022-06-26
Attending: UROLOGY | Admitting: UROLOGY
Payer: MEDICAID

## 2022-06-24 ENCOUNTER — APPOINTMENT (OUTPATIENT)
Dept: GENERAL RADIOLOGY | Facility: HOSPITAL | Age: 47
End: 2022-06-24
Attending: UROLOGY
Payer: MEDICAID

## 2022-06-24 ENCOUNTER — APPOINTMENT (OUTPATIENT)
Dept: INTERVENTIONAL RADIOLOGY/VASCULAR | Facility: HOSPITAL | Age: 47
End: 2022-06-24
Attending: UROLOGY
Payer: MEDICAID

## 2022-06-24 ENCOUNTER — ANESTHESIA (OUTPATIENT)
Dept: SURGERY | Facility: HOSPITAL | Age: 47
End: 2022-06-24
Payer: MEDICAID

## 2022-06-24 DIAGNOSIS — N20.1 URETERAL CALCULI: ICD-10-CM

## 2022-06-24 DIAGNOSIS — Z01.812 ENCOUNTER FOR PREOPERATIVE SCREENING LABORATORY TESTING FOR COVID-19 VIRUS: Primary | ICD-10-CM

## 2022-06-24 DIAGNOSIS — Z20.822 ENCOUNTER FOR PREOPERATIVE SCREENING LABORATORY TESTING FOR COVID-19 VIRUS: Primary | ICD-10-CM

## 2022-06-24 LAB
B-HCG UR QL: NEGATIVE
INR BLD: 1 (ref 0.8–1.2)
PROTHROMBIN TIME: 13.2 SECONDS (ref 11.6–14.8)

## 2022-06-24 PROCEDURE — 99243 OFF/OP CNSLTJ NEW/EST LOW 30: CPT | Performed by: INTERNAL MEDICINE

## 2022-06-24 PROCEDURE — 0TC68ZZ EXTIRPATION OF MATTER FROM RIGHT URETER, VIA NATURAL OR ARTIFICIAL OPENING ENDOSCOPIC: ICD-10-PCS | Performed by: UROLOGY

## 2022-06-24 PROCEDURE — 0TF68ZZ FRAGMENTATION IN RIGHT URETER, VIA NATURAL OR ARTIFICIAL OPENING ENDOSCOPIC: ICD-10-PCS | Performed by: UROLOGY

## 2022-06-24 PROCEDURE — 0T903ZX DRAINAGE OF RIGHT KIDNEY, PERCUTANEOUS APPROACH, DIAGNOSTIC: ICD-10-PCS | Performed by: ANESTHESIOLOGY

## 2022-06-24 PROCEDURE — 0TP98DZ REMOVAL OF INTRALUMINAL DEVICE FROM URETER, VIA NATURAL OR ARTIFICIAL OPENING ENDOSCOPIC: ICD-10-PCS | Performed by: UROLOGY

## 2022-06-24 RX ORDER — HYDROMORPHONE HYDROCHLORIDE 1 MG/ML
INJECTION, SOLUTION INTRAMUSCULAR; INTRAVENOUS; SUBCUTANEOUS
Status: COMPLETED
Start: 2022-06-24 | End: 2022-06-24

## 2022-06-24 RX ORDER — HYDROMORPHONE HYDROCHLORIDE 1 MG/ML
0.2 INJECTION, SOLUTION INTRAMUSCULAR; INTRAVENOUS; SUBCUTANEOUS EVERY 5 MIN PRN
Status: DISCONTINUED | OUTPATIENT
Start: 2022-06-24 | End: 2022-06-24 | Stop reason: HOSPADM

## 2022-06-24 RX ORDER — ACETAMINOPHEN 500 MG
1000 TABLET ORAL ONCE
Status: DISCONTINUED | OUTPATIENT
Start: 2022-06-24 | End: 2022-06-24 | Stop reason: HOSPADM

## 2022-06-24 RX ORDER — SODIUM CHLORIDE, SODIUM LACTATE, POTASSIUM CHLORIDE, CALCIUM CHLORIDE 600; 310; 30; 20 MG/100ML; MG/100ML; MG/100ML; MG/100ML
INJECTION, SOLUTION INTRAVENOUS CONTINUOUS
Status: DISCONTINUED | OUTPATIENT
Start: 2022-06-24 | End: 2022-06-26

## 2022-06-24 RX ORDER — NICOTINE POLACRILEX 4 MG
15 LOZENGE BUCCAL
Status: DISCONTINUED | OUTPATIENT
Start: 2022-06-24 | End: 2022-06-24 | Stop reason: HOSPADM

## 2022-06-24 RX ORDER — HYDROMORPHONE HYDROCHLORIDE 1 MG/ML
0.4 INJECTION, SOLUTION INTRAMUSCULAR; INTRAVENOUS; SUBCUTANEOUS EVERY 5 MIN PRN
Status: DISCONTINUED | OUTPATIENT
Start: 2022-06-24 | End: 2022-06-24 | Stop reason: HOSPADM

## 2022-06-24 RX ORDER — NEOSTIGMINE METHYLSULFATE 1 MG/ML
INJECTION INTRAVENOUS AS NEEDED
Status: DISCONTINUED | OUTPATIENT
Start: 2022-06-24 | End: 2022-06-24 | Stop reason: SURG

## 2022-06-24 RX ORDER — ONDANSETRON 2 MG/ML
4 INJECTION INTRAMUSCULAR; INTRAVENOUS EVERY 6 HOURS PRN
Status: DISCONTINUED | OUTPATIENT
Start: 2022-06-24 | End: 2022-06-24 | Stop reason: HOSPADM

## 2022-06-24 RX ORDER — SODIUM CHLORIDE, SODIUM LACTATE, POTASSIUM CHLORIDE, CALCIUM CHLORIDE 600; 310; 30; 20 MG/100ML; MG/100ML; MG/100ML; MG/100ML
INJECTION, SOLUTION INTRAVENOUS CONTINUOUS
Status: DISCONTINUED | OUTPATIENT
Start: 2022-06-24 | End: 2022-06-24 | Stop reason: HOSPADM

## 2022-06-24 RX ORDER — GLYCOPYRROLATE 0.2 MG/ML
INJECTION, SOLUTION INTRAMUSCULAR; INTRAVENOUS AS NEEDED
Status: DISCONTINUED | OUTPATIENT
Start: 2022-06-24 | End: 2022-06-24 | Stop reason: SURG

## 2022-06-24 RX ORDER — DEXTROSE MONOHYDRATE 25 G/50ML
50 INJECTION, SOLUTION INTRAVENOUS
Status: DISCONTINUED | OUTPATIENT
Start: 2022-06-24 | End: 2022-06-24 | Stop reason: HOSPADM

## 2022-06-24 RX ORDER — DIPHENHYDRAMINE HCL 25 MG
25 CAPSULE ORAL EVERY 6 HOURS PRN
Status: DISCONTINUED | OUTPATIENT
Start: 2022-06-24 | End: 2022-06-26

## 2022-06-24 RX ORDER — DEXAMETHASONE SODIUM PHOSPHATE 4 MG/ML
VIAL (ML) INJECTION AS NEEDED
Status: DISCONTINUED | OUTPATIENT
Start: 2022-06-24 | End: 2022-06-24 | Stop reason: SURG

## 2022-06-24 RX ORDER — MIDAZOLAM HYDROCHLORIDE 1 MG/ML
INJECTION INTRAMUSCULAR; INTRAVENOUS
Status: COMPLETED
Start: 2022-06-24 | End: 2022-06-24

## 2022-06-24 RX ORDER — NALOXONE HYDROCHLORIDE 0.4 MG/ML
80 INJECTION, SOLUTION INTRAMUSCULAR; INTRAVENOUS; SUBCUTANEOUS AS NEEDED
Status: DISCONTINUED | OUTPATIENT
Start: 2022-06-24 | End: 2022-06-24 | Stop reason: HOSPADM

## 2022-06-24 RX ORDER — LIDOCAINE HYDROCHLORIDE 10 MG/ML
INJECTION, SOLUTION INFILTRATION; PERINEURAL
Status: COMPLETED
Start: 2022-06-24 | End: 2022-06-24

## 2022-06-24 RX ORDER — CEFAZOLIN SODIUM/WATER 2 G/20 ML
2 SYRINGE (ML) INTRAVENOUS ONCE
Status: COMPLETED | OUTPATIENT
Start: 2022-06-24 | End: 2022-06-24

## 2022-06-24 RX ORDER — HYDROCODONE BITARTRATE AND ACETAMINOPHEN 5; 325 MG/1; MG/1
1 TABLET ORAL EVERY 6 HOURS PRN
Status: DISCONTINUED | OUTPATIENT
Start: 2022-06-24 | End: 2022-06-26

## 2022-06-24 RX ORDER — TRIAMTERENE AND HYDROCHLOROTHIAZIDE 37.5; 25 MG/1; MG/1
1 CAPSULE ORAL EVERY MORNING
Status: DISCONTINUED | OUTPATIENT
Start: 2022-06-24 | End: 2022-06-24

## 2022-06-24 RX ORDER — POTASSIUM CITRATE 5 MEQ/1
10 TABLET, EXTENDED RELEASE ORAL DAILY
Status: DISCONTINUED | OUTPATIENT
Start: 2022-06-24 | End: 2022-06-26

## 2022-06-24 RX ORDER — ALBUTEROL SULFATE 90 UG/1
2 AEROSOL, METERED RESPIRATORY (INHALATION) EVERY 4 HOURS PRN
Status: DISCONTINUED | OUTPATIENT
Start: 2022-06-24 | End: 2022-06-26

## 2022-06-24 RX ORDER — ROCURONIUM BROMIDE 10 MG/ML
INJECTION, SOLUTION INTRAVENOUS AS NEEDED
Status: DISCONTINUED | OUTPATIENT
Start: 2022-06-24 | End: 2022-06-24 | Stop reason: SURG

## 2022-06-24 RX ORDER — NICOTINE POLACRILEX 4 MG
30 LOZENGE BUCCAL
Status: DISCONTINUED | OUTPATIENT
Start: 2022-06-24 | End: 2022-06-24 | Stop reason: HOSPADM

## 2022-06-24 RX ORDER — DIPHENHYDRAMINE HYDROCHLORIDE 50 MG/ML
INJECTION INTRAMUSCULAR; INTRAVENOUS
Status: COMPLETED
Start: 2022-06-24 | End: 2022-06-24

## 2022-06-24 RX ORDER — HYDROMORPHONE HYDROCHLORIDE 1 MG/ML
0.6 INJECTION, SOLUTION INTRAMUSCULAR; INTRAVENOUS; SUBCUTANEOUS EVERY 5 MIN PRN
Status: DISCONTINUED | OUTPATIENT
Start: 2022-06-24 | End: 2022-06-24 | Stop reason: HOSPADM

## 2022-06-24 RX ORDER — ONDANSETRON 2 MG/ML
INJECTION INTRAMUSCULAR; INTRAVENOUS AS NEEDED
Status: DISCONTINUED | OUTPATIENT
Start: 2022-06-24 | End: 2022-06-24 | Stop reason: SURG

## 2022-06-24 RX ORDER — SCOLOPAMINE TRANSDERMAL SYSTEM 1 MG/1
1 PATCH, EXTENDED RELEASE TRANSDERMAL ONCE
Status: DISCONTINUED | OUTPATIENT
Start: 2022-06-24 | End: 2022-06-24 | Stop reason: HOSPADM

## 2022-06-24 RX ORDER — LEVOFLOXACIN 5 MG/ML
INJECTION, SOLUTION INTRAVENOUS
Status: COMPLETED
Start: 2022-06-24 | End: 2022-06-24

## 2022-06-24 RX ORDER — CEFAZOLIN SODIUM/WATER 2 G/20 ML
SYRINGE (ML) INTRAVENOUS
Status: DISPENSED
Start: 2022-06-24 | End: 2022-06-24

## 2022-06-24 RX ADMIN — NEOSTIGMINE METHYLSULFATE 4 MG: 1 INJECTION INTRAVENOUS at 13:46:00

## 2022-06-24 RX ADMIN — DEXAMETHASONE SODIUM PHOSPHATE 8 MG: 4 MG/ML VIAL (ML) INJECTION at 12:24:00

## 2022-06-24 RX ADMIN — GLYCOPYRROLATE 0.8 MG: 0.2 INJECTION, SOLUTION INTRAMUSCULAR; INTRAVENOUS at 13:46:00

## 2022-06-24 RX ADMIN — ROCURONIUM BROMIDE 25 MG: 10 INJECTION, SOLUTION INTRAVENOUS at 12:15:00

## 2022-06-24 RX ADMIN — CEFAZOLIN SODIUM/WATER 2 G: 2 G/20 ML SYRINGE (ML) INTRAVENOUS at 12:22:00

## 2022-06-24 RX ADMIN — ONDANSETRON 4 MG: 2 INJECTION INTRAMUSCULAR; INTRAVENOUS at 12:24:00

## 2022-06-24 NOTE — PROGRESS NOTES
06/24/22 1716   Clinical Encounter Type   Visited With Patient and family together;Health care provider   Routine Visit Introduction   Continue Visiting No  (unless requested)   Patient's Supportive Strategies/Resources family and carine   Taxonomy   Intended Effects Promote a sense of peace   Methods Collaborate with care team member;Offer spiritual/Mandaen support   Interventions Acknowledge current situation; Active listening;Ask questions to bring forth feelings;Prayer for healing; Identify supportive relationship(s)   Supported as there had been a change in patient healthcare needs today per her statement. 06/24/22 1716   Clinical Encounter Type   Visited With Patient and family together;Health care provider   Routine Visit Introduction   Continue Visiting No  (unless requested)   Patient's Supportive Strategies/Resources family and carine   Taxonomy   Intended Effects Promote a sense of peace   Methods Collaborate with care team member;Offer spiritual/Mandaen support   Interventions Acknowledge current situation; Active listening;Ask questions to bring forth feelings;Prayer for healing; Identify supportive relationship(s)   For further spiritual care, please enter a consult in Epic, followed by contacting pager [phone] as needed.

## 2022-06-24 NOTE — PROGRESS NOTES
[phone]: Paged Elba Wong to notify of new consult. Elba Wong arrived to see patient. 1635: Patient transported to Interventional Radiology for nephrostomy tube placement in stable condition. Significant other accompanied. 1937: Patient returned to the floor from Interventional Radiology in stable condition. Significant other present at bedside. 1940: Paged Robby Dowling to inquire about a diet after nephrostomy tube placement. Orders received for a regular diet.

## 2022-06-24 NOTE — INTERVAL H&P NOTE
Pre-op Diagnosis: RIGHT URETERAL CALCULI    The above referenced H&P was reviewed by Roberto Shahid MD on 6/24/2022, the patient was examined and no significant changes have occurred in the patient's condition since the H&P was performed. I discussed with the patient and/or legal representative the potential benefits, risks and side effects of this procedure; the likelihood of the patient achieving goals; and potential problems that might occur during recuperation. I discussed reasonable alternatives to the procedure, including risks, benefits and side effects related to the alternatives and risks related to not receiving this procedure. We will proceed with procedure as planned.

## 2022-06-24 NOTE — BRIEF OP NOTE
Pre-Operative Diagnosis: RIGHT URETERAL CALCULI     Post-Operative Diagnosis: RIGHT URETERAL CALCULI      Procedure Performed:   CYSTOURETHROSCOPY, BILATERAL RETROGRADE PYELOGRAM, RIGHT URETEROSCOPIC STONE EXTRACTION, LASER LITHOTRIPSY, AND REMOVAL OF RIGHT URETERAL STENT    Surgeon(s) and Role:     * Adrianna Calderon MD - Primary    Assistant(s):        Surgical Findings: ***     Specimen: ***     Estimated Blood Loss: No data recorded    Dictation Number:  ***    Fernando Nelson MD  6/24/2022  2:17 PM

## 2022-06-24 NOTE — ANESTHESIA PROCEDURE NOTES
Airway  Date/Time: 6/24/2022 12:19 PM  Urgency: elective    Airway not difficult    General Information and Staff    Patient location during procedure: OR  Anesthesiologist: Robbie Schwartz DO  Performed: anesthesiologist     Indications and Patient Condition  Indications for airway management: anesthesia  Sedation level: deep  Preoxygenated: yes  Patient position: sniffing  Mask difficulty assessment: 1 - vent by mask    Final Airway Details  Final airway type: endotracheal airway      Successful airway: ETT  Cuffed: yes   Successful intubation technique: direct laryngoscopy  Endotracheal tube insertion site: oral  Blade: Miller  Blade size: #3  ETT size (mm): 7.0    Cormack-Lehane Classification: grade I - full view of glottis  Placement verified by: chest auscultation and capnometry   Measured from: lips  ETT to lips (cm): 21  Number of attempts at approach: 1  Number of other approaches attempted: 0

## 2022-06-24 NOTE — ANESTHESIA POSTPROCEDURE EVALUATION
[address] Patient Status:  Hospital Outpatient Surgery   Age/Gender 41year old female MRN [id number]   [facility] SURGERY Attending Savanna Gould MD   Hosp Day # 0 PCP Teri Muir MD       Anesthesia Post-op Note    CYSTOURETHROSCOPY, BILATERAL RETROGRADE PYELOGRAM, RIGHT URETEROSCOPIC STONE EXTRACTION, LASER LITHOTRIPSY, AND REMOVAL OF RIGHT URETERAL STENT    Procedure Summary     Date: 06/24/22 Room / Location: [facility] MAIN OR 07 / [facility] MAIN OR    Anesthesia Start: 3106 Anesthesia Stop: 3117    Procedure: CYSTOURETHROSCOPY, BILATERAL RETROGRADE PYELOGRAM, RIGHT URETEROSCOPIC STONE EXTRACTION, LASER LITHOTRIPSY, AND REMOVAL OF RIGHT URETERAL STENT (Right Ureter) Diagnosis: (RIGHT URETERAL CALCULI)    Surgeons: Savanna Gould MD Anesthesiologist: Tarik Wray DO    Anesthesia Type: general ASA Status: 2          Anesthesia Type: general    Vitals Value Taken Time   /85 06/24/22 1409   Temp 97.6 06/24/22 1411   Pulse 94 06/24/22 1410   Resp 16 06/24/22 1410   SpO2 97 % 06/24/22 1410   Vitals shown include unvalidated device data. Patient Location: PACU    Anesthesia Type: general    Airway Patency: patent and extubated    Postop Pain Control: adequate    Mental Status: mildly sedated but able to meaningfully participate in the post-anesthesia evaluation    Nausea/Vomiting: none    Cardiopulmonary/Hydration status: stable euvolemic    Complications: no apparent anesthesia related complications    Postop vital signs: stable    Dental Exam: Unchanged from Preop    Patient to be discharged from PACU when criteria met.

## 2022-06-25 ENCOUNTER — APPOINTMENT (OUTPATIENT)
Dept: GENERAL RADIOLOGY | Facility: HOSPITAL | Age: 47
End: 2022-06-25
Attending: STUDENT IN AN ORGANIZED HEALTH CARE EDUCATION/TRAINING PROGRAM
Payer: MEDICAID

## 2022-06-25 PROCEDURE — 74018 RADEX ABDOMEN 1 VIEW: CPT | Performed by: STUDENT IN AN ORGANIZED HEALTH CARE EDUCATION/TRAINING PROGRAM

## 2022-06-25 PROCEDURE — 99213 OFFICE O/P EST LOW 20 MIN: CPT | Performed by: STUDENT IN AN ORGANIZED HEALTH CARE EDUCATION/TRAINING PROGRAM

## 2022-06-25 RX ORDER — ACETAMINOPHEN 325 MG/1
650 TABLET ORAL EVERY 6 HOURS PRN
Status: DISCONTINUED | OUTPATIENT
Start: 2022-06-25 | End: 2022-06-26

## 2022-06-25 NOTE — PROCEDURES
[facility]  Pre-Procedure Note    Name: Ruth Morris  MRN#: [id number]  : 3/22/1975    Procedure:  Right nephrostomy    Indication: Right ureteral obstruction    Allergies:      Metoprolol              SHORTNESS OF BREATH  Toradol [Ketorolac *    SHORTNESS OF BREATH  Tramadol                SHORTNESS OF BREATH  Dilaudid [Hydromorp*    ITCHING, PAIN    Comment:Headache - per pt this is only with long term use             - pt states she can tolerate this medication  Oxycodone               PAIN    Comment:headache  Wellbutrin [Bupropi*    PAIN, DIZZINESS    Comment:Headache  Codeine Sulfate         ITCHING    Comment:TABS  Hydrocodone             ITCHING  Morphine                ITCHING  Seasonal                Runny nose    Pertinent Medications:    Is patient on any Aspirin, Coumadin, or any other Anticoagulations/Antiplatelet medications? no      Mental Status:  Alert and Oriented      Health Status: Acceptable for Procedure    Impression and Plans:    Right ureteral obstruction. Retrograde stent placement not successful. Needs drainage. Will perform right percutaneous nephrostomy. I have reviewed the above information prior to procedure. I have discussed the risks and benefits and alternatives with the patient. The patient understands and agrees to proceed with plan of care.     Carlos La MD

## 2022-06-25 NOTE — PROCEDURES
[address] Patient Status:  Observation    3/22/1975 MRN [id number]   Location [facility] Attending Yojana Dias MD   Hosp Day # 0 PCP Lupis Graham MD         Brief Procedure Report    Pre-Operative Diagnosis: Right ureteral obstruction    Post-Operative Diagnosis: Same as above. Procedure Performed: US and fluoro guided right percutaneous nephrostomy placement. Anesthesia: 1% lidocaine    EBL: 0    Complications: None    Summary of Case: Right PCN performed. .  8 Fr Locking pigtail right nephrostomy catheter placed. Patient tolerated procedure well without immediate complication. Full report to follow in PACS.     Diann Ramsay

## 2022-06-25 NOTE — PROGRESS NOTES
Patient is saline locked, on room air, voiding well, has a nephrostomy tube on the right-attempted to cap off but pain was severe and drainage bag was replaced, ambulates independently, tolerating a regular diet but has a poor appetite, Norco given for pain. Patient updated on plan of care. Possible discharge tonight vs tomorrow pending pain and diet.

## 2022-06-26 VITALS
HEART RATE: 59 BPM | DIASTOLIC BLOOD PRESSURE: 61 MMHG | RESPIRATION RATE: 18 BRPM | WEIGHT: 168.19 LBS | SYSTOLIC BLOOD PRESSURE: 106 MMHG | BODY MASS INDEX: 33.91 KG/M2 | HEIGHT: 59 IN | OXYGEN SATURATION: 95 % | TEMPERATURE: 98 F

## 2022-06-26 PROCEDURE — 99213 OFFICE O/P EST LOW 20 MIN: CPT | Performed by: STUDENT IN AN ORGANIZED HEALTH CARE EDUCATION/TRAINING PROGRAM

## 2022-06-26 RX ORDER — SENNOSIDES 8.6 MG
8.6 TABLET ORAL 2 TIMES DAILY
Status: DISCONTINUED | OUTPATIENT
Start: 2022-06-26 | End: 2022-06-26

## 2022-06-26 RX ORDER — HYDROCODONE BITARTRATE AND ACETAMINOPHEN 5; 325 MG/1; MG/1
1-2 TABLET ORAL EVERY 6 HOURS PRN
Qty: 12 TABLET | Refills: 0 | Status: SHIPPED | OUTPATIENT
Start: 2022-06-26

## 2022-06-26 NOTE — PLAN OF CARE
Patient is alert and oriented x3. Up ad dima   Voiding freely. + output noted in NT  Passing gas. No BM. Denies nausea or vomiting. Tolerating diet; small amounts. Pain controlled. Patient assessed and ready for DC home  All instructions given to patient for home  All questions answered to patients level of satisfaction. PIV removed and intact  Patient able to successfully empty drainage bag. Extra supplies given to patient for home.

## 2022-06-29 LAB — CALCULI MASS: 42 MG

## 2022-07-01 NOTE — OPERATIVE REPORT
[facility]    PATIENT'S NAME: Barry Artis   ATTENDING PHYSICIAN: Gavin Ventura M.D. OPERATING PHYSICIAN: Gavin Ventura M.D. PATIENT ACCOUNT#:   [de-identified]    LOCATION:  [address] [place] [address]  MEDICAL RECORD #:   [id number]       YOB: 1975  ADMISSION DATE:       06/24/2022      OPERATION DATE:  06/24/2022    OPERATIVE REPORT      PREOPERATIVE DIAGNOSIS:  Right ureteral calculi. POSTOPERATIVE DIAGNOSIS:  Right ureteral calculi, tortuous right ureter. PROCEDURE:  Cystoscopy, bilateral retrograde pyelogram, right ureteroscopy, stone extraction, laser lithotripsy, and right stent removal.    ANESTHESIA:  General.    COMPLICATIONS:  None. ESTIMATED BLOOD LOSS:  Zero. SPECIMEN:  Right ureteral stone fragments. INDICATIONS:  This is a 56-year-old female who presents with initially 3 right ureteral stones. A distal ureteral stone was removed several weeks ago and a ureteroscopy was performed demonstrating more proximal ureteral stones. She had a question of pyuria and possible pyelonephritis and, as a result, further attempts at extraction were aborted and a stent was placed. The patient presents now for right ureteroscopy, laser lithotripsy, and stone extraction. I discussed with her the risks of this including bleeding, infection, ureteral injury, renal injury, as well as risks of anesthesia, including death. The patient had also noted some left-sided flank pain and we elected to perform a left retrograde pyelogram today for further evaluation of any ureteral obstruction on the left. OPERATIVE TECHNIQUE:  The patient was placed in the dorsal lithotomy position, prepped and draped in sterile fashion. A 22-Jamaican sheath with obturator was passed through the urethra. The obturator was removed. Next, using a 70 degree lens I performed a left retrograde pyelogram demonstrating normal course and contour of the ureter. Contrast drained well from the system.   Next, attention was turned to the right side. The distal end of the right stent was grasped with a flexible grasper and brought up to the meatus. A Glidewire was then passed up through the ureter. The stent was removed and a ureteral access sheath was passed over the wire under fluoroscopic guidance. The stylette was removed. The flexible ureteroscope was passed up the ureter to the stone. Holmium laser was used to fracture the stone. As the stone fractured, it was clear that a portion of the stone followed the torturous course of the ureter. It bent medially and then anteriorly. At that point we were unable to follow the ureter beyond fracturing the lower portion of that stone. At that point I attempted to manipulate the ureteroscope up into that portion of the ureter, however, it would not pass. During the course of this I did lose access with the Glidewire. I performed a retrograde pyelogram.  There was nearly complete obstruction. At times during multiple times with retrograde pyelogram I was able to identify the torturous course of the ureter and dilated renal pelvis and calices. I tried an angle-tip Glidewire through the ureteroscope as well as through open-ended catheters. I tried several different size wires, none would follow the course of the ureter. At this point I aborted further attempts, I removed the ureteroscope. The stone fragments were sent off to Pathology. The bladder was emptied and the patient was taken to the recovery room having tolerated the procedure well with plans for right percutaneous nephrostomy tube placement by Radiology.      Dictated By Saurabh Carlisle M.D.  d: 07/01/2022 05:12:10  t: 07/01/2022 12:14:13  [facility] [id number]  /

## 2022-07-04 ENCOUNTER — LAB ENCOUNTER (OUTPATIENT)
Dept: LAB | Facility: HOSPITAL | Age: 47
End: 2022-07-04
Attending: UROLOGY
Payer: MEDICAID

## 2022-07-04 DIAGNOSIS — Z01.812 ENCOUNTER FOR PREOPERATIVE SCREENING LABORATORY TESTING FOR COVID-19 VIRUS: ICD-10-CM

## 2022-07-04 DIAGNOSIS — Z20.822 ENCOUNTER FOR PREOPERATIVE SCREENING LABORATORY TESTING FOR COVID-19 VIRUS: ICD-10-CM

## 2022-07-04 DIAGNOSIS — R79.89 ELEVATED LFTS: ICD-10-CM

## 2022-07-05 ENCOUNTER — TELEPHONE (OUTPATIENT)
Dept: INTERNAL MEDICINE CLINIC | Facility: CLINIC | Age: 47
End: 2022-07-05

## 2022-07-05 DIAGNOSIS — R21 RASH: Primary | ICD-10-CM

## 2022-07-05 LAB — SARS-COV-2 RNA RESP QL NAA+PROBE: NOT DETECTED

## 2022-07-05 NOTE — TELEPHONE ENCOUNTER
Pt called requesting referral for derm. 2 years ago pt had a rash on her legs and arms but pt was unable to see the dermatologist she was given a referral for because of covid.     Pt is requesting referral for     Marino Martinez 89, PA-C  Dermatology  [address] [place] [address]  DOC [facility]

## 2022-07-05 NOTE — TELEPHONE ENCOUNTER
TO, pt is due for annual physical. She's requesting referral to derm. Would you like OV first? Derm referral pended. Please advise, thanks!

## 2022-07-06 ENCOUNTER — ANESTHESIA EVENT (OUTPATIENT)
Dept: SURGERY | Facility: HOSPITAL | Age: 47
End: 2022-07-06
Payer: MEDICAID

## 2022-07-07 ENCOUNTER — APPOINTMENT (OUTPATIENT)
Dept: GENERAL RADIOLOGY | Facility: HOSPITAL | Age: 47
End: 2022-07-07
Attending: UROLOGY
Payer: MEDICAID

## 2022-07-07 ENCOUNTER — HOSPITAL ENCOUNTER (OUTPATIENT)
Facility: HOSPITAL | Age: 47
Discharge: HOME | End: 2022-07-08
Attending: UROLOGY | Admitting: UROLOGY
Payer: MEDICAID

## 2022-07-07 ENCOUNTER — ANESTHESIA (OUTPATIENT)
Dept: SURGERY | Facility: HOSPITAL | Age: 47
End: 2022-07-07
Payer: MEDICAID

## 2022-07-07 LAB — B-HCG UR QL: NEGATIVE

## 2022-07-07 PROCEDURE — 0TC38ZZ EXTIRPATION OF MATTER FROM RIGHT KIDNEY PELVIS, VIA NATURAL OR ARTIFICIAL OPENING ENDOSCOPIC: ICD-10-PCS | Performed by: UROLOGY

## 2022-07-07 PROCEDURE — 0T768DZ DILATION OF RIGHT URETER WITH INTRALUMINAL DEVICE, VIA NATURAL OR ARTIFICIAL OPENING ENDOSCOPIC: ICD-10-PCS | Performed by: UROLOGY

## 2022-07-07 PROCEDURE — 99242 OFF/OP CONSLTJ NEW/EST SF 20: CPT | Performed by: HOSPITALIST

## 2022-07-07 PROCEDURE — 0TP5X0Z REMOVAL OF DRAINAGE DEVICE FROM KIDNEY, EXTERNAL APPROACH: ICD-10-PCS | Performed by: UROLOGY

## 2022-07-07 PROCEDURE — 76000 FLUOROSCOPY <1 HR PHYS/QHP: CPT | Performed by: UROLOGY

## 2022-07-07 PROCEDURE — BT1D0ZZ FLUOROSCOPY OF RIGHT KIDNEY, URETER AND BLADDER USING HIGH OSMOLAR CONTRAST: ICD-10-PCS | Performed by: UROLOGY

## 2022-07-07 DEVICE — URETERAL STENT
Type: IMPLANTABLE DEVICE | Site: URETER | Status: FUNCTIONAL
Brand: ASCERTA™

## 2022-07-07 RX ORDER — HYDROCODONE BITARTRATE AND ACETAMINOPHEN 5; 325 MG/1; MG/1
2 TABLET ORAL ONCE AS NEEDED
Status: DISCONTINUED | OUTPATIENT
Start: 2022-07-07 | End: 2022-07-07 | Stop reason: HOSPADM

## 2022-07-07 RX ORDER — ROCURONIUM BROMIDE 10 MG/ML
INJECTION, SOLUTION INTRAVENOUS AS NEEDED
Status: DISCONTINUED | OUTPATIENT
Start: 2022-07-07 | End: 2022-07-07 | Stop reason: SURG

## 2022-07-07 RX ORDER — HYDROMORPHONE HYDROCHLORIDE 1 MG/ML
0.6 INJECTION, SOLUTION INTRAMUSCULAR; INTRAVENOUS; SUBCUTANEOUS EVERY 5 MIN PRN
Status: DISCONTINUED | OUTPATIENT
Start: 2022-07-07 | End: 2022-07-07 | Stop reason: HOSPADM

## 2022-07-07 RX ORDER — MIDAZOLAM HYDROCHLORIDE 1 MG/ML
INJECTION INTRAMUSCULAR; INTRAVENOUS AS NEEDED
Status: DISCONTINUED | OUTPATIENT
Start: 2022-07-07 | End: 2022-07-07 | Stop reason: SURG

## 2022-07-07 RX ORDER — DOXEPIN HYDROCHLORIDE 50 MG/1
1 CAPSULE ORAL DAILY
Status: DISCONTINUED | OUTPATIENT
Start: 2022-07-07 | End: 2022-07-08

## 2022-07-07 RX ORDER — SCOLOPAMINE TRANSDERMAL SYSTEM 1 MG/1
1 PATCH, EXTENDED RELEASE TRANSDERMAL ONCE
Status: DISCONTINUED | OUTPATIENT
Start: 2022-07-07 | End: 2022-07-07 | Stop reason: HOSPADM

## 2022-07-07 RX ORDER — ACETAMINOPHEN 500 MG
1000 TABLET ORAL ONCE
Status: DISCONTINUED | OUTPATIENT
Start: 2022-07-07 | End: 2022-07-07 | Stop reason: HOSPADM

## 2022-07-07 RX ORDER — PHENAZOPYRIDINE HYDROCHLORIDE 100 MG/1
200 TABLET, FILM COATED ORAL 3 TIMES DAILY PRN
Status: DISCONTINUED | OUTPATIENT
Start: 2022-07-07 | End: 2022-07-08

## 2022-07-07 RX ORDER — ALBUTEROL SULFATE 90 UG/1
2 AEROSOL, METERED RESPIRATORY (INHALATION) EVERY 4 HOURS PRN
Status: DISCONTINUED | OUTPATIENT
Start: 2022-07-07 | End: 2022-07-08

## 2022-07-07 RX ORDER — SODIUM CHLORIDE, SODIUM LACTATE, POTASSIUM CHLORIDE, CALCIUM CHLORIDE 600; 310; 30; 20 MG/100ML; MG/100ML; MG/100ML; MG/100ML
INJECTION, SOLUTION INTRAVENOUS CONTINUOUS
Status: DISCONTINUED | OUTPATIENT
Start: 2022-07-07 | End: 2022-07-07 | Stop reason: HOSPADM

## 2022-07-07 RX ORDER — HYDROCODONE BITARTRATE AND ACETAMINOPHEN 5; 325 MG/1; MG/1
1 TABLET ORAL EVERY 6 HOURS PRN
Status: DISCONTINUED | OUTPATIENT
Start: 2022-07-07 | End: 2022-07-08

## 2022-07-07 RX ORDER — ONDANSETRON 2 MG/ML
4 INJECTION INTRAMUSCULAR; INTRAVENOUS EVERY 6 HOURS PRN
Status: DISCONTINUED | OUTPATIENT
Start: 2022-07-07 | End: 2022-07-07 | Stop reason: HOSPADM

## 2022-07-07 RX ORDER — DEXAMETHASONE SODIUM PHOSPHATE 4 MG/ML
VIAL (ML) INJECTION AS NEEDED
Status: DISCONTINUED | OUTPATIENT
Start: 2022-07-07 | End: 2022-07-07 | Stop reason: SURG

## 2022-07-07 RX ORDER — HYDROMORPHONE HYDROCHLORIDE 1 MG/ML
0.4 INJECTION, SOLUTION INTRAMUSCULAR; INTRAVENOUS; SUBCUTANEOUS EVERY 5 MIN PRN
Status: DISCONTINUED | OUTPATIENT
Start: 2022-07-07 | End: 2022-07-07 | Stop reason: HOSPADM

## 2022-07-07 RX ORDER — ONDANSETRON 2 MG/ML
INJECTION INTRAMUSCULAR; INTRAVENOUS AS NEEDED
Status: DISCONTINUED | OUTPATIENT
Start: 2022-07-07 | End: 2022-07-07 | Stop reason: SURG

## 2022-07-07 RX ORDER — NALOXONE HYDROCHLORIDE 0.4 MG/ML
80 INJECTION, SOLUTION INTRAMUSCULAR; INTRAVENOUS; SUBCUTANEOUS AS NEEDED
Status: DISCONTINUED | OUTPATIENT
Start: 2022-07-07 | End: 2022-07-07 | Stop reason: HOSPADM

## 2022-07-07 RX ORDER — PROCHLORPERAZINE EDISYLATE 5 MG/ML
5 INJECTION INTRAMUSCULAR; INTRAVENOUS EVERY 8 HOURS PRN
Status: DISCONTINUED | OUTPATIENT
Start: 2022-07-07 | End: 2022-07-07 | Stop reason: HOSPADM

## 2022-07-07 RX ORDER — GLYCOPYRROLATE 0.2 MG/ML
INJECTION, SOLUTION INTRAMUSCULAR; INTRAVENOUS AS NEEDED
Status: DISCONTINUED | OUTPATIENT
Start: 2022-07-07 | End: 2022-07-07 | Stop reason: SURG

## 2022-07-07 RX ORDER — DIPHENHYDRAMINE HYDROCHLORIDE 50 MG/ML
12.5 INJECTION INTRAMUSCULAR; INTRAVENOUS AS NEEDED
Status: DISCONTINUED | OUTPATIENT
Start: 2022-07-07 | End: 2022-07-07 | Stop reason: HOSPADM

## 2022-07-07 RX ORDER — HYDROCODONE BITARTRATE AND ACETAMINOPHEN 5; 325 MG/1; MG/1
1 TABLET ORAL ONCE AS NEEDED
Status: DISCONTINUED | OUTPATIENT
Start: 2022-07-07 | End: 2022-07-07 | Stop reason: HOSPADM

## 2022-07-07 RX ORDER — CEFAZOLIN SODIUM/WATER 2 G/20 ML
2 SYRINGE (ML) INTRAVENOUS ONCE
Status: COMPLETED | OUTPATIENT
Start: 2022-07-07 | End: 2022-07-07

## 2022-07-07 RX ORDER — CEFAZOLIN SODIUM/WATER 2 G/20 ML
2 SYRINGE (ML) INTRAVENOUS EVERY 8 HOURS
Status: COMPLETED | OUTPATIENT
Start: 2022-07-07 | End: 2022-07-08

## 2022-07-07 RX ORDER — NEOSTIGMINE METHYLSULFATE 1 MG/ML
INJECTION, SOLUTION INTRAVENOUS AS NEEDED
Status: DISCONTINUED | OUTPATIENT
Start: 2022-07-07 | End: 2022-07-07 | Stop reason: SURG

## 2022-07-07 RX ORDER — SODIUM CHLORIDE 9 MG/ML
INJECTION, SOLUTION INTRAVENOUS CONTINUOUS
Status: DISCONTINUED | OUTPATIENT
Start: 2022-07-07 | End: 2022-07-08

## 2022-07-07 RX ORDER — ALBUTEROL SULFATE 2.5 MG/3ML
2.5 SOLUTION RESPIRATORY (INHALATION) AS NEEDED
Status: DISCONTINUED | OUTPATIENT
Start: 2022-07-07 | End: 2022-07-07 | Stop reason: HOSPADM

## 2022-07-07 RX ORDER — ACETAMINOPHEN 500 MG
1000 TABLET ORAL ONCE AS NEEDED
Status: DISCONTINUED | OUTPATIENT
Start: 2022-07-07 | End: 2022-07-07 | Stop reason: HOSPADM

## 2022-07-07 RX ORDER — ACETAMINOPHEN 325 MG/1
650 TABLET ORAL
Status: DISCONTINUED | OUTPATIENT
Start: 2022-07-07 | End: 2022-07-08

## 2022-07-07 RX ORDER — LIDOCAINE HYDROCHLORIDE 10 MG/ML
INJECTION, SOLUTION EPIDURAL; INFILTRATION; INTRACAUDAL; PERINEURAL AS NEEDED
Status: DISCONTINUED | OUTPATIENT
Start: 2022-07-07 | End: 2022-07-07 | Stop reason: SURG

## 2022-07-07 RX ORDER — HYDROMORPHONE HYDROCHLORIDE 1 MG/ML
0.2 INJECTION, SOLUTION INTRAMUSCULAR; INTRAVENOUS; SUBCUTANEOUS EVERY 5 MIN PRN
Status: DISCONTINUED | OUTPATIENT
Start: 2022-07-07 | End: 2022-07-07 | Stop reason: HOSPADM

## 2022-07-07 RX ORDER — SODIUM CHLORIDE, SODIUM LACTATE, POTASSIUM CHLORIDE, CALCIUM CHLORIDE 600; 310; 30; 20 MG/100ML; MG/100ML; MG/100ML; MG/100ML
INJECTION, SOLUTION INTRAVENOUS CONTINUOUS
Status: DISCONTINUED | OUTPATIENT
Start: 2022-07-07 | End: 2022-07-07

## 2022-07-07 RX ORDER — LIDOCAINE HYDROCHLORIDE 20 MG/ML
JELLY TOPICAL AS NEEDED
Status: DISCONTINUED | OUTPATIENT
Start: 2022-07-07 | End: 2022-07-07 | Stop reason: HOSPADM

## 2022-07-07 RX ADMIN — NEOSTIGMINE METHYLSULFATE 3 MG: 1 INJECTION, SOLUTION INTRAVENOUS at 09:52:00

## 2022-07-07 RX ADMIN — ROCURONIUM BROMIDE 50 MG: 10 INJECTION, SOLUTION INTRAVENOUS at 08:29:00

## 2022-07-07 RX ADMIN — MIDAZOLAM HYDROCHLORIDE 2 MG: 1 INJECTION INTRAMUSCULAR; INTRAVENOUS at 08:27:00

## 2022-07-07 RX ADMIN — SODIUM CHLORIDE, SODIUM LACTATE, POTASSIUM CHLORIDE, CALCIUM CHLORIDE: 600; 310; 30; 20 INJECTION, SOLUTION INTRAVENOUS at 08:27:00

## 2022-07-07 RX ADMIN — CEFAZOLIN SODIUM/WATER 2 G: 2 G/20 ML SYRINGE (ML) INTRAVENOUS at 08:34:00

## 2022-07-07 RX ADMIN — GLYCOPYRROLATE 0.4 MG: 0.2 INJECTION, SOLUTION INTRAMUSCULAR; INTRAVENOUS at 09:52:00

## 2022-07-07 RX ADMIN — ONDANSETRON 4 MG: 2 INJECTION INTRAMUSCULAR; INTRAVENOUS at 09:34:00

## 2022-07-07 RX ADMIN — DEXAMETHASONE SODIUM PHOSPHATE 8 MG: 4 MG/ML VIAL (ML) INJECTION at 08:29:00

## 2022-07-07 RX ADMIN — LIDOCAINE HYDROCHLORIDE 50 MG: 10 INJECTION, SOLUTION EPIDURAL; INFILTRATION; INTRACAUDAL; PERINEURAL at 08:28:00

## 2022-07-07 NOTE — INTERVAL H&P NOTE
H&P dated 6/7/22 by Dr. Alonso Titus was reviewed. She subsequently underwent cystoscopy with Dr. Barbara Garcia and then right nephrostomy tube placement. Patient seen in preoperative area. We discussed the surgical plan for today and again discussed risks, benefits, alternatives, and post operative expectations. Patient verbalized understanding and all questions answered. She would like to proceed with surgery as planned. RIGHT side was confirmed and marked.     Chivo Evans, [address] [facility] Urology

## 2022-07-07 NOTE — PROGRESS NOTES
Called and spoke with [facility] PA about possible IV pain medications for patient. PA will discuss with Dr Jamison Parra and determine whether anything will be added on.

## 2022-07-07 NOTE — OPERATIVE REPORT
[facility]    PATIENT'S NAME: Jeannine Hackett   ATTENDING PHYSICIAN: Yoni Ureña DO   OPERATING PHYSICIAN: Yoni Ureña DO   PATIENT ACCOUNT#:   [de-identified]    LOCATION:  [address] [place] [address]  MEDICAL RECORD #:   [id number]       YOB: 1975  ADMISSION DATE:       07/07/2022      OPERATION DATE:  07/07/2022    OPERATIVE REPORT      PREOPERATIVE DIAGNOSIS:  Right ureteral and renal calculi, right tortuous proximal ureter. POSTOPERATIVE DIAGNOSIS:  Right ureteral and renal calculi, right tortuous proximal ureter. PROCEDURE:    1. Cystoscopy, right retrograde pyelogram with right ureteroscopy and laser lithotripsy, right ureteral stent placement. 2.   Right nephrostomy tube removal.  3.   Intraoperative interpretation of fluoroscopy. ANESTHESIA:  General.    ESTIMATED BLOOD LOSS:  None. SPECIMENS:  Right renal calculi fragments. DRAINS:  6-Sami x 28 cm right ureteral stent. COMPLICATIONS:  None. INDICATIONS:  This is a 51-year-old female with a history of recurrent urolithiasis who previously was admitted with obstructing right ureteral calculi and had undergone right ureteral stent placement followed by right ureteroscopy on June 24, 2022, with Dr. Barbara Garcia. Due to a tortuous right proximal ureter, access to the kidney was lost intraoperatively and a right nephrostomy tube was subsequently placed. After discussion of risks, benefits, and alternatives, the patient elected to proceed with repeat operative intervention. FINDINGS:  Tortuous right proximal ureter with small right proximal ureteral calculi up to approximately 4 mm in size. There were right renal calculi up to approximately 6 mm in size. There was no significant right hydronephrosis. The indwelling right nephrostomy tube was seen in the lower pole of the kidney and was removed at the end of the procedure.     OPERATIVE TECHNIQUE:  After the appropriate informed consent was obtained and in the chart, the patient was given preoperative antibiotics, was taken to the operating room and placed on the operating room table in supine position. After bilateral sequential compression devices had been applied and satisfactory general anesthesia had been achieved, the patient's legs were raised to a dorsal lithotomy position. The patient's groin was prepped and draped in the usual sterile fashion. A well-lubricated 21-Georgian rigid cystoscope was then introduced through a normal female urethra and into the bladder. Upon entering the bladder, the bilateral ureteral orifices were seen in their normal expected anatomic position. The bladder was drained and refilled with sterile irrigant. Systematic examination of the bladder showed no signs of foreign body, no diverticula, no extrinsic pressure defect, no neoplasm, and only 1+ trabeculation. A 5-Georgian open-ended ureteral catheter was then used to intubate the right ureteral orifice. Retrograde pyelogram was performed showing no filling defects within the ureter and a tortuous right proximal ureter with no sign of any hydronephrosis. There was a nephrostomy tube seen entering the kidney within the lower pole calyx extending to the lower portion of the renal pelvis. At this point, the nephrostomy drainage tube was tied off by the nursing staff to prevent continued drainage from the nephrostomy tube. A 0.035-inch Glidewire was passed through the ureteral catheter, navigated through the tortuous proximal ureter, and coiled within the right renal pelvis. The cystoscope and ureteral catheter were removed. A dual-lumen catheter was placed over the Glidewire and was used to pass a second Glidewire, which was also seen to coil within the right renal pelvis under fluoroscopic guidance. One of the Caresse Lean was then affixed to the drapes and used as a safety wire throughout the case.   An 6- to 13-Georgian ureteral access sheath was then placed over the working Yusef Schwab and was passed all the way up under fluoroscopic guidance to the renal pelvis. It was passed very slowly through the tortuous segment, but there was no resistance and it passed easily over the Glidewire. The inner sheath and Glidewire were removed. There was evidence of small proximal calculi within the tortuous portion of the ureter, calculi up to approximately 4 mm in size. However, they were seen to push back into the right renal collecting system with placement of the Glidewire. The flexible ureteroscope was then passed through the access sheath into the right renal collecting system which was systematically examined, and there were multiple calculi seen up to approximately 6 mm in size. All the calculi were then pulverized using a 200-micron holmium laser. Representative fragments were grasped with a ZeroTip nitinol stone basket and were extracted and will be sent as specimen. The entire collecting system was systematically examined, and there were no significant calculi fragments appreciated. At this point, the right nephrostomy tube was removed by the nursing staff while observing the nephrostomy catheter with the ureteroscope within the kidney and it was seen to uncoil and was removed without any difficulty. The right nephrostomy catheter was carefully examined and was seen to be in entirety. Repeat retrograde pyelogram was performed, once again opacifying the collecting system, and then the access sheath and the ureteroscope were slowly withdrawn, reexamining the entire ureter on the way out. Careful attention was used to the entire ureter and especially the proximal tortuous portion and there was no evidence of any remaining calculus or fragments and there was no evidence of any ureteral trauma.   The cystoscope was then back-loaded over the safety Glidewire and a 6-Ethiopian x 28 cm right ureteral stent was placed in conventional fashion and was seen to coil within the right renal pelvis under fluoroscopic guidance and within the bladder under cystoscopic guidance. The bladder was drained. The cystoscope was removed. Lidocaine 2% jelly was placed in the urethra. A sterile pressure dressing was then applied on the right nephrostomy tube site, which showed no evidence of any drainage. The procedure was then terminated. All sponge, needle, and instrument counts were correct.      Dictated By Ashley Shaikh DO  d: 07/07/2022 83:22:23  t: 07/07/2022 14:21:26  [facility] [id number]  [facility]/

## 2022-07-07 NOTE — ANESTHESIA PROCEDURE NOTES
Airway  Date/Time: 7/7/2022 8:31 AM  Urgency: elective    Airway not difficult    General Information and Staff    Patient location during procedure: OR  Anesthesiologist: Damaris Diaz MD  Resident/CRNA: Jose Garsia CRNA  Performed: CRNA     Indications and Patient Condition  Indications for airway management: anesthesia  Spontaneous Ventilation: absent  Sedation level: deep  Preoxygenated: yes  Patient position: sniffing  Mask difficulty assessment: 1 - vent by mask    Final Airway Details  Final airway type: endotracheal airway      Successful airway: ETT  Cuffed: yes   Successful intubation technique: direct laryngoscopy  Endotracheal tube insertion site: oral  Blade: Miller  Blade size: #3  ETT size (mm): 7.0    Cormack-Lehane Classification: grade I - full view of glottis  Placement verified by: chest auscultation and capnometry   Measured from: lips  ETT to lips (cm): 21  Number of attempts at approach: 1

## 2022-07-07 NOTE — BRIEF OP NOTE
Pre-Operative Diagnosis: RIGHT ureteral and renal calculi, tortuous right ureter     Post-Operative Diagnosis: RIGHT ureteral and renal calculi, tortuous right ureter     Procedure Performed:   CYSTOSCOPY, RIGHT URETEROSCOPY WITH LASER LITHOTRIPSY, RIGHT RETROGRADE PYELOGRAM, RIGHT URETERAL STENT PLACEMENT, RIGHT NEPHROSTOMY REMOVAL    Surgeon(s) and Role:     * Karla Mccall DO - Primary    Assistant(s):   None     Surgical Findings: Tortuous right proximal ureter, small right proximal ureteral calculi up to 4 mm in size, right renal calculi up to 6 mm in size. No right hydronephrosis. The right nephrostomy tube was removed without difficulty.       Specimen: Right renal calculus fragments     Estimated Blood Loss: None    Dictation Number:  [id number]    Aye Romo DO  7/7/2022  10:17 AM

## 2022-07-07 NOTE — PLAN OF CARE
Pt a/ox4. Pain not controlled on arrival to unit, started on pyridium and norco per urology. Heat packs applied to right flank/abdomen. No IV pain meds ordered, see previous note. Urine bloody in color, monitoring output. IVF as ordered. Up with standby assist.   Plan to home tomorrow when cleared by all MDs.

## 2022-07-07 NOTE — PROGRESS NOTES
Patient reporting a feeling of \"pressure\" and difficulty passing urine. Voiding in BR, yellow/red urine. Discussed concerns with Dr Tio Gunn, order received to check a bladder scan. If number is high notify MD. Bladder scan for 112 ml. Will monitor urinary output.

## 2022-07-08 VITALS
HEIGHT: 59 IN | SYSTOLIC BLOOD PRESSURE: 98 MMHG | HEART RATE: 55 BPM | BODY MASS INDEX: 33.54 KG/M2 | OXYGEN SATURATION: 95 % | RESPIRATION RATE: 20 BRPM | DIASTOLIC BLOOD PRESSURE: 48 MMHG | WEIGHT: 166.38 LBS | TEMPERATURE: 99 F

## 2022-07-08 LAB
ANION GAP SERPL CALC-SCNC: 6 MMOL/L (ref 0–18)
BASOPHILS # BLD AUTO: 0.01 X10(3) UL (ref 0–0.2)
BASOPHILS NFR BLD AUTO: 0.1 %
BUN BLD-MCNC: 15 MG/DL (ref 7–18)
CALCIUM BLD-MCNC: 9.3 MG/DL (ref 8.5–10.1)
CHLORIDE SERPL-SCNC: 107 MMOL/L (ref 98–112)
CO2 SERPL-SCNC: 26 MMOL/L (ref 21–32)
CREAT BLD-MCNC: 0.84 MG/DL
EOSINOPHIL # BLD AUTO: 0 X10(3) UL (ref 0–0.7)
EOSINOPHIL NFR BLD AUTO: 0 %
ERYTHROCYTE [DISTWIDTH] IN BLOOD BY AUTOMATED COUNT: 12.8 %
GLUCOSE BLD-MCNC: 142 MG/DL (ref 70–99)
HCT VFR BLD AUTO: 36.6 %
HGB BLD-MCNC: 12.1 G/DL
IMM GRANULOCYTES # BLD AUTO: 0.11 X10(3) UL (ref 0–1)
IMM GRANULOCYTES NFR BLD: 0.9 %
LYMPHOCYTES # BLD AUTO: 1.02 X10(3) UL (ref 1–4)
LYMPHOCYTES NFR BLD AUTO: 8.1 %
MCH RBC QN AUTO: 30 PG (ref 26–34)
MCHC RBC AUTO-ENTMCNC: 33.1 G/DL (ref 31–37)
MCV RBC AUTO: 90.6 FL
MONOCYTES # BLD AUTO: 0.85 X10(3) UL (ref 0.1–1)
MONOCYTES NFR BLD AUTO: 6.7 %
NEUTROPHILS # BLD AUTO: 10.63 X10 (3) UL (ref 1.5–7.7)
NEUTROPHILS # BLD AUTO: 10.63 X10(3) UL (ref 1.5–7.7)
NEUTROPHILS NFR BLD AUTO: 84.2 %
OSMOLALITY SERPL CALC.SUM OF ELEC: 291 MOSM/KG (ref 275–295)
PLATELET # BLD AUTO: 310 10(3)UL (ref 150–450)
POTASSIUM SERPL-SCNC: 4 MMOL/L (ref 3.5–5.1)
RBC # BLD AUTO: 4.04 X10(6)UL
SODIUM SERPL-SCNC: 139 MMOL/L (ref 136–145)
WBC # BLD AUTO: 12.6 X10(3) UL (ref 4–11)

## 2022-07-08 PROCEDURE — 99213 OFFICE O/P EST LOW 20 MIN: CPT | Performed by: HOSPITALIST

## 2022-07-08 RX ORDER — CEFUROXIME AXETIL 500 MG/1
250 TABLET ORAL 2 TIMES DAILY
Qty: 5 TABLET | Refills: 0 | Status: SHIPPED | OUTPATIENT
Start: 2022-07-08 | End: 2022-07-08

## 2022-07-08 RX ORDER — CEFUROXIME AXETIL 250 MG/1
250 TABLET ORAL 2 TIMES DAILY
Qty: 5 TABLET | Refills: 0 | Status: SHIPPED | OUTPATIENT
Start: 2022-07-08 | End: 2022-07-11

## 2022-07-08 NOTE — PLAN OF CARE
Pt a/ox4. Output much improved this shift, denies any further issues with feeling like she cannot urinate, no further clots noted. Up as tolerated to BR. Plan to home this evening when ride available.

## 2022-07-08 NOTE — PROGRESS NOTES
Patient discharged to home this evening. Reviewed all discharge education at bedside with patient and spouse. All questions answered. Verbalizes understanding of all teaching. Script for abx delivered to patient room prior to discharge. All belongings collected and sent with patient at time of discharge. Patient declined wheelchair to [facility] entrance, escorted to elevator and released to the care of her family.

## 2022-07-08 NOTE — PLAN OF CARE
Aox4, patient c/o cramping to abdomen, heat packs offered and administered with relief, Norco and Pydrium given with relief, difficulty voiding, yellow/red in color, passed blood clot, MD notified, BS < 200ml, IVF as ordered, up ad dima, no further needs, will continue to monitor.

## 2022-07-08 NOTE — PLAN OF CARE
2212: Page sent to Dr. Taylor Cross from Urology with update on patient passing small clot and feeling pressure in abdomen. Response back for bladder can and abernathy if > 200 mL. Will monitor.

## 2022-07-11 LAB — CALCULI MASS: 3 MG

## 2022-07-20 ENCOUNTER — HOSPITAL ENCOUNTER (OUTPATIENT)
Dept: MAMMOGRAPHY | Age: 47
Discharge: HOME | End: 2022-07-20
Attending: FAMILY MEDICINE
Payer: MEDICAID

## 2022-07-20 DIAGNOSIS — Z12.31 ENCOUNTER FOR SCREENING MAMMOGRAM FOR MALIGNANT NEOPLASM OF BREAST: ICD-10-CM

## 2022-07-20 PROCEDURE — 77067 SCR MAMMO BI INCL CAD: CPT | Performed by: FAMILY MEDICINE

## 2022-07-20 PROCEDURE — 77063 BREAST TOMOSYNTHESIS BI: CPT | Performed by: FAMILY MEDICINE

## 2022-08-09 ENCOUNTER — OFFICE VISIT (OUTPATIENT)
Dept: OBGYN CLINIC | Facility: CLINIC | Age: 47
End: 2022-08-09
Payer: MEDICAID

## 2022-08-09 VITALS
WEIGHT: 168 LBS | HEIGHT: 59 IN | HEART RATE: 75 BPM | DIASTOLIC BLOOD PRESSURE: 84 MMHG | SYSTOLIC BLOOD PRESSURE: 118 MMHG | BODY MASS INDEX: 33.87 KG/M2

## 2022-08-09 DIAGNOSIS — R10.2 PELVIC PAIN: ICD-10-CM

## 2022-08-09 DIAGNOSIS — Z12.4 SCREENING FOR MALIGNANT NEOPLASM OF CERVIX: ICD-10-CM

## 2022-08-09 DIAGNOSIS — Z01.419 WELL WOMAN EXAM WITH ROUTINE GYNECOLOGICAL EXAM: Primary | ICD-10-CM

## 2022-08-09 DIAGNOSIS — R35.0 URINARY FREQUENCY: ICD-10-CM

## 2022-08-09 DIAGNOSIS — Z12.31 ENCOUNTER FOR SCREENING MAMMOGRAM FOR MALIGNANT NEOPLASM OF BREAST: ICD-10-CM

## 2022-08-09 PROBLEM — R93.89 THICKENED ENDOMETRIUM: Status: RESOLVED | Noted: 2020-09-12 | Resolved: 2022-08-09

## 2022-08-09 PROCEDURE — 3074F SYST BP LT 130 MM HG: CPT | Performed by: OBSTETRICS & GYNECOLOGY

## 2022-08-09 PROCEDURE — 99212 OFFICE O/P EST SF 10 MIN: CPT | Performed by: OBSTETRICS & GYNECOLOGY

## 2022-08-09 PROCEDURE — 3008F BODY MASS INDEX DOCD: CPT | Performed by: OBSTETRICS & GYNECOLOGY

## 2022-08-09 PROCEDURE — 99396 PREV VISIT EST AGE 40-64: CPT | Performed by: OBSTETRICS & GYNECOLOGY

## 2022-08-09 PROCEDURE — 3079F DIAST BP 80-89 MM HG: CPT | Performed by: OBSTETRICS & GYNECOLOGY

## 2022-08-09 RX ORDER — MIRABEGRON 25 MG/1
TABLET, FILM COATED, EXTENDED RELEASE ORAL
COMMUNITY
Start: 2022-07-30

## 2022-08-11 LAB — HPV MRNA E6/E7: NOT DETECTED

## 2022-08-17 ENCOUNTER — OFFICE VISIT (OUTPATIENT)
Dept: INTERNAL MEDICINE CLINIC | Facility: CLINIC | Age: 47
End: 2022-08-17
Payer: MEDICAID

## 2022-08-17 VITALS
OXYGEN SATURATION: 99 % | DIASTOLIC BLOOD PRESSURE: 88 MMHG | RESPIRATION RATE: 16 BRPM | WEIGHT: 167.63 LBS | HEART RATE: 83 BPM | BODY MASS INDEX: 33.35 KG/M2 | TEMPERATURE: 99 F | SYSTOLIC BLOOD PRESSURE: 128 MMHG | HEIGHT: 59.5 IN

## 2022-08-17 DIAGNOSIS — R03.0 ELEVATED BLOOD PRESSURE READING: Primary | ICD-10-CM

## 2022-08-17 DIAGNOSIS — R73.9 HYPERGLYCEMIA: ICD-10-CM

## 2022-08-17 PROCEDURE — 3074F SYST BP LT 130 MM HG: CPT | Performed by: FAMILY MEDICINE

## 2022-08-17 PROCEDURE — 99214 OFFICE O/P EST MOD 30 MIN: CPT | Performed by: FAMILY MEDICINE

## 2022-08-17 PROCEDURE — 3079F DIAST BP 80-89 MM HG: CPT | Performed by: FAMILY MEDICINE

## 2022-08-17 PROCEDURE — 3008F BODY MASS INDEX DOCD: CPT | Performed by: FAMILY MEDICINE

## 2022-08-17 RX ORDER — AMLODIPINE BESYLATE 5 MG/1
5 TABLET ORAL DAILY
Qty: 30 TABLET | Refills: 0 | Status: SHIPPED | OUTPATIENT
Start: 2022-08-17

## 2022-08-18 ENCOUNTER — LABORATORY ENCOUNTER (OUTPATIENT)
Dept: LAB | Age: 47
End: 2022-08-18
Attending: FAMILY MEDICINE
Payer: MEDICAID

## 2022-08-18 LAB
ANION GAP SERPL CALC-SCNC: 5 MMOL/L (ref 0–18)
BUN BLD-MCNC: 16 MG/DL (ref 7–18)
CALCIUM BLD-MCNC: 9.5 MG/DL (ref 8.5–10.1)
CHLORIDE SERPL-SCNC: 105 MMOL/L (ref 98–112)
CO2 SERPL-SCNC: 25 MMOL/L (ref 21–32)
CREAT BLD-MCNC: 0.93 MG/DL
EST. AVERAGE GLUCOSE BLD GHB EST-MCNC: 111 MG/DL (ref 68–126)
FASTING STATUS PATIENT QL REPORTED: YES
GFR SERPLBLD BASED ON 1.73 SQ M-ARVRAT: 77 ML/MIN/1.73M2 (ref 60–?)
GLUCOSE BLD-MCNC: 94 MG/DL (ref 70–99)
HBA1C MFR BLD: 5.5 % (ref ?–5.7)
OSMOLALITY SERPL CALC.SUM OF ELEC: 281 MOSM/KG (ref 275–295)
POTASSIUM SERPL-SCNC: 3.7 MMOL/L (ref 3.5–5.1)
SODIUM SERPL-SCNC: 135 MMOL/L (ref 136–145)

## 2022-08-26 ENCOUNTER — OFFICE VISIT (OUTPATIENT)
Dept: PODIATRY CLINIC | Facility: CLINIC | Age: 47
End: 2022-08-26
Payer: MEDICAID

## 2022-08-26 DIAGNOSIS — M79.671 RIGHT FOOT PAIN: ICD-10-CM

## 2022-08-26 DIAGNOSIS — M72.2 PLANTAR FASCIITIS OF LEFT FOOT: Primary | ICD-10-CM

## 2022-08-26 DIAGNOSIS — M79.672 LEFT FOOT PAIN: ICD-10-CM

## 2022-08-26 DIAGNOSIS — M76.61 ACHILLES TENDONITIS, BILATERAL: ICD-10-CM

## 2022-08-26 DIAGNOSIS — M76.62 ACHILLES TENDONITIS, BILATERAL: ICD-10-CM

## 2022-08-26 DIAGNOSIS — M72.2 PLANTAR FASCIITIS OF RIGHT FOOT: ICD-10-CM

## 2022-08-26 PROCEDURE — 99243 OFF/OP CNSLTJ NEW/EST LOW 30: CPT | Performed by: STUDENT IN AN ORGANIZED HEALTH CARE EDUCATION/TRAINING PROGRAM

## 2022-08-26 RX ORDER — METHYLPREDNISOLONE 4 MG/1
TABLET ORAL
Qty: 21 TABLET | Refills: 0 | Status: SHIPPED | OUTPATIENT
Start: 2022-08-26

## 2022-09-07 ENCOUNTER — APPOINTMENT (OUTPATIENT)
Dept: GENERAL RADIOLOGY | Age: 47
End: 2022-09-07
Attending: PHYSICIAN ASSISTANT
Payer: MEDICAID

## 2022-09-07 ENCOUNTER — HOSPITAL ENCOUNTER (OUTPATIENT)
Age: 47
Discharge: HOME | End: 2022-09-07
Payer: MEDICAID

## 2022-09-07 VITALS
DIASTOLIC BLOOD PRESSURE: 92 MMHG | TEMPERATURE: 99 F | SYSTOLIC BLOOD PRESSURE: 144 MMHG | HEART RATE: 96 BPM | HEIGHT: 59 IN | WEIGHT: 162 LBS | OXYGEN SATURATION: 97 % | RESPIRATION RATE: 16 BRPM | BODY MASS INDEX: 32.66 KG/M2

## 2022-09-07 DIAGNOSIS — S62.002A CLOSED NONDISPLACED FRACTURE OF SCAPHOID OF LEFT WRIST, UNSPECIFIED PORTION OF SCAPHOID, INITIAL ENCOUNTER: ICD-10-CM

## 2022-09-07 DIAGNOSIS — S62.651A CLOSED NONDISPLACED FRACTURE OF MIDDLE PHALANX OF LEFT INDEX FINGER, INITIAL ENCOUNTER: Primary | ICD-10-CM

## 2022-09-07 PROCEDURE — 73110 X-RAY EXAM OF WRIST: CPT | Performed by: PHYSICIAN ASSISTANT

## 2022-09-07 PROCEDURE — 73130 X-RAY EXAM OF HAND: CPT | Performed by: PHYSICIAN ASSISTANT

## 2022-09-07 PROCEDURE — 99213 OFFICE O/P EST LOW 20 MIN: CPT

## 2022-09-07 PROCEDURE — 29125 APPL SHORT ARM SPLINT STATIC: CPT

## 2022-09-07 PROCEDURE — 99214 OFFICE O/P EST MOD 30 MIN: CPT

## 2022-09-07 NOTE — ED INITIAL ASSESSMENT (HPI)
Pt aox4. Pt c/o left wrist, and 2nd digit pain and swelling after falling in yard today. + left radial pulse. Pt has limited ROM to left 2nd digit.

## 2022-09-08 ENCOUNTER — HOSPITAL ENCOUNTER (OUTPATIENT)
Age: 47
Discharge: HOME | End: 2022-09-08
Payer: MEDICAID

## 2022-09-08 ENCOUNTER — APPOINTMENT (OUTPATIENT)
Dept: GENERAL RADIOLOGY | Age: 47
End: 2022-09-08
Attending: NURSE PRACTITIONER
Payer: MEDICAID

## 2022-09-08 ENCOUNTER — TELEPHONE (OUTPATIENT)
Dept: INTERNAL MEDICINE CLINIC | Facility: CLINIC | Age: 47
End: 2022-09-08

## 2022-09-08 ENCOUNTER — TELEPHONE (OUTPATIENT)
Dept: OBGYN CLINIC | Facility: CLINIC | Age: 47
End: 2022-09-08

## 2022-09-08 VITALS
OXYGEN SATURATION: 98 % | SYSTOLIC BLOOD PRESSURE: 134 MMHG | RESPIRATION RATE: 16 BRPM | HEART RATE: 68 BPM | DIASTOLIC BLOOD PRESSURE: 96 MMHG | HEIGHT: 59 IN | WEIGHT: 162 LBS | BODY MASS INDEX: 32.66 KG/M2 | TEMPERATURE: 98 F

## 2022-09-08 DIAGNOSIS — S93.401A MODERATE RIGHT ANKLE SPRAIN, INITIAL ENCOUNTER: Primary | ICD-10-CM

## 2022-09-08 DIAGNOSIS — S62.102A CLOSED FRACTURE OF LEFT WRIST, INITIAL ENCOUNTER: Primary | ICD-10-CM

## 2022-09-08 DIAGNOSIS — S62.601A CLOSED DISPLACED FRACTURE OF PHALANX OF LEFT INDEX FINGER, UNSPECIFIED PHALANX, INITIAL ENCOUNTER: ICD-10-CM
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
## 2022-09-08 PROCEDURE — 73000 X-RAY EXAM OF COLLAR BONE: CPT | Performed by: NURSE PRACTITIONER

## 2022-09-08 PROCEDURE — 99214 OFFICE O/P EST MOD 30 MIN: CPT

## 2022-09-08 PROCEDURE — 73080 X-RAY EXAM OF ELBOW: CPT | Performed by: NURSE PRACTITIONER

## 2022-09-08 PROCEDURE — 73630 X-RAY EXAM OF FOOT: CPT | Performed by: NURSE PRACTITIONER

## 2022-09-08 PROCEDURE — 73610 X-RAY EXAM OF ANKLE: CPT | Performed by: NURSE PRACTITIONER

## 2022-09-08 PROCEDURE — 99215 OFFICE O/P EST HI 40 MIN: CPT

## 2022-09-08 RX ORDER — CYCLOBENZAPRINE HCL 10 MG
10 TABLET ORAL 3 TIMES DAILY PRN
Qty: 20 TABLET | Refills: 0 | Status: SHIPPED | OUTPATIENT
Start: 2022-09-08 | End: 2022-09-15

## 2022-09-08 NOTE — TELEPHONE ENCOUNTER
Patient calling to request a referral to ortho, she was seen at [facility]. Patient states she fractured her finger and wrist. Patient has [employer]

## 2022-09-08 NOTE — ED INITIAL ASSESSMENT (HPI)
Pt aox4. Pt was seen yesterday for tripping in [facility]. Pt states after arriving home from [facility] yesterday pt states felt worse. Pt c/o vianca ankle swelling/pain, vianca shoulder pain, and left elbow pain.

## 2022-09-09 ENCOUNTER — TELEPHONE (OUTPATIENT)
Dept: OBGYN CLINIC | Facility: CLINIC | Age: 47
End: 2022-09-09

## 2022-09-15 ENCOUNTER — OFFICE VISIT (OUTPATIENT)
Dept: INTERNAL MEDICINE CLINIC | Facility: CLINIC | Age: 47
End: 2022-09-15
Payer: MEDICAID

## 2022-09-15 VITALS
WEIGHT: 169.19 LBS | OXYGEN SATURATION: 97 % | SYSTOLIC BLOOD PRESSURE: 136 MMHG | HEIGHT: 59 IN | BODY MASS INDEX: 34.11 KG/M2 | TEMPERATURE: 98 F | DIASTOLIC BLOOD PRESSURE: 88 MMHG | HEART RATE: 66 BPM

## 2022-09-15 DIAGNOSIS — I10 BENIGN ESSENTIAL HTN: Primary | ICD-10-CM

## 2022-09-15 DIAGNOSIS — I49.3 PVC'S (PREMATURE VENTRICULAR CONTRACTIONS): ICD-10-CM

## 2022-09-15 PROCEDURE — 3075F SYST BP GE 130 - 139MM HG: CPT | Performed by: FAMILY MEDICINE

## 2022-09-15 PROCEDURE — 3079F DIAST BP 80-89 MM HG: CPT | Performed by: FAMILY MEDICINE

## 2022-09-15 PROCEDURE — 3008F BODY MASS INDEX DOCD: CPT | Performed by: FAMILY MEDICINE

## 2022-09-15 PROCEDURE — 99213 OFFICE O/P EST LOW 20 MIN: CPT | Performed by: FAMILY MEDICINE

## 2022-09-15 RX ORDER — AMLODIPINE BESYLATE 5 MG/1
5 TABLET ORAL DAILY
Qty: 90 TABLET | Refills: 1 | Status: SHIPPED | OUTPATIENT
Start: 2022-09-15

## 2022-09-15 RX ORDER — TRIAMTERENE AND HYDROCHLOROTHIAZIDE 37.5; 25 MG/1; MG/1
1 CAPSULE ORAL EVERY MORNING
Qty: 90 CAPSULE | Refills: 1 | Status: SHIPPED | OUTPATIENT
Start: 2022-09-15

## 2022-09-16 ENCOUNTER — ULTRASOUND ENCOUNTER (OUTPATIENT)
Dept: OBGYN CLINIC | Facility: CLINIC | Age: 47
End: 2022-09-16

## 2022-09-16 DIAGNOSIS — R10.2 PELVIC PAIN: Primary | ICD-10-CM

## 2022-09-16 PROCEDURE — 76856 US EXAM PELVIC COMPLETE: CPT | Performed by: OBSTETRICS & GYNECOLOGY

## 2022-09-16 PROCEDURE — 76830 TRANSVAGINAL US NON-OB: CPT | Performed by: OBSTETRICS & GYNECOLOGY

## 2022-09-20 ENCOUNTER — OFFICE VISIT (OUTPATIENT)
Dept: ORTHOPEDICS CLINIC | Facility: CLINIC | Age: 47
End: 2022-09-20

## 2022-09-20 VITALS — BODY MASS INDEX: 34.07 KG/M2 | WEIGHT: 169 LBS | HEIGHT: 59 IN

## 2022-09-20 DIAGNOSIS — S63.501A WRIST SPRAIN, RIGHT, INITIAL ENCOUNTER: Primary | ICD-10-CM

## 2022-09-20 PROCEDURE — 99204 OFFICE O/P NEW MOD 45 MIN: CPT | Performed by: PHYSICIAN ASSISTANT

## 2022-09-20 PROCEDURE — 3008F BODY MASS INDEX DOCD: CPT | Performed by: PHYSICIAN ASSISTANT

## 2022-09-20 NOTE — PROGRESS NOTES
Reviewed US, please let pt know she has evidence of ovulation, but nothing surgically acute or to explain her pain, as we had discussed could be multiple etiologies. (see notes, she had mutiple urological surgeries).  She can follow up to discuss treatment options if desired but for now no acute issues

## 2022-09-21 ENCOUNTER — TELEPHONE (OUTPATIENT)
Dept: OBGYN CLINIC | Facility: CLINIC | Age: 47
End: 2022-09-21

## 2022-09-21 NOTE — TELEPHONE ENCOUNTER
Patient calling for US results. She states that her cycle was 4 days early. Her cycle was heavy for 5 days which is not normal for her. She is having bad cramping and she is bloated.   Please call to advise

## 2022-09-23 ENCOUNTER — OFFICE VISIT (OUTPATIENT)
Dept: OCCUPATIONAL MEDICINE | Age: 47
End: 2022-09-23
Attending: PHYSICIAN ASSISTANT

## 2022-09-23 PROCEDURE — 97110 THERAPEUTIC EXERCISES: CPT

## 2022-09-23 PROCEDURE — 97165 OT EVAL LOW COMPLEX 30 MIN: CPT

## 2022-09-26 ENCOUNTER — OFFICE VISIT (OUTPATIENT)
Dept: OCCUPATIONAL MEDICINE | Age: 47
End: 2022-09-26
Attending: PHYSICIAN ASSISTANT

## 2022-09-26 DIAGNOSIS — S63.501A WRIST SPRAIN, RIGHT, INITIAL ENCOUNTER: ICD-10-CM

## 2022-09-26 PROCEDURE — 97022 WHIRLPOOL THERAPY: CPT

## 2022-09-26 PROCEDURE — 97110 THERAPEUTIC EXERCISES: CPT

## 2022-09-26 PROCEDURE — 97140 MANUAL THERAPY 1/> REGIONS: CPT

## 2022-09-26 NOTE — PROGRESS NOTES
Diagnosis:        Wrist sprain, right, initial encounter (T47.371Z) Referring Provider: Lakisha Schmitt  Date of Evaluation:    9/23/22    Precautions:  None Next MD visit:   10/3/22  Date of Surgery: n/a   Insurance Primary/Secondary: [employer] MEDICAID / N/A     # Auth Visits: 8 from 9/23/22-11/22/22            Subjective: \"The wrist really only feels like spaghetti and sensitive when I take the brace off. The finger I sometimes get stabbing pains. \"    Pain: 5/10      Objective: Wrist flexion=55, extension=75      Assessment: Pt making improvement in ROM of the wrist and digit. Pain generally well managed throughout exercises and improvement in fluidity of motion noted following use of heat. Pt has continued to utilize wrist brace and delilah taping to digits. Goals: (to be met in 8 visits)  Pt will be independent and compliant with comprehensive HEP to maintain progress achieved in OT  Pt will increase D2 ROM to be [facility] for use while grasping the steering wheel. 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 Date: 9/26/2022  TX#: 2/8 Date:                 TX#: 3/ Date:                 TX#: 4/ Date:                 TX#: 5/ Date:    Tx#: 6/   Dry heat whirlpool x 10  Min with AROM       Gentle STM to wrist       Gentle AROM to wrist, digits       Gentle blocking to all digits in hook fist    Towel walks       HEP: HEP upgrades in bold    Charges: 1 WP, 1 MT, 1 TE       Total Timed Treatment: 45 min  Total Treatment Time: 45 min

## 2022-10-03 ENCOUNTER — OFFICE VISIT (OUTPATIENT)
Dept: ORTHOPEDICS CLINIC | Facility: CLINIC | Age: 47
End: 2022-10-03
Payer: MEDICAID

## 2022-10-03 VITALS — HEIGHT: 59 IN | WEIGHT: 169 LBS | BODY MASS INDEX: 34.07 KG/M2

## 2022-10-03 DIAGNOSIS — S63.501A WRIST SPRAIN, RIGHT, INITIAL ENCOUNTER: Primary | ICD-10-CM

## 2022-10-03 PROCEDURE — 99213 OFFICE O/P EST LOW 20 MIN: CPT | Performed by: PHYSICIAN ASSISTANT

## 2022-10-03 PROCEDURE — 3008F BODY MASS INDEX DOCD: CPT | Performed by: PHYSICIAN ASSISTANT

## 2022-10-06 ENCOUNTER — OFFICE VISIT (OUTPATIENT)
Dept: OCCUPATIONAL MEDICINE | Age: 47
End: 2022-10-06
Attending: PHYSICIAN ASSISTANT
Payer: MEDICAID

## 2022-10-06 DIAGNOSIS — S63.501A WRIST SPRAIN, RIGHT, INITIAL ENCOUNTER: ICD-10-CM

## 2022-10-06 PROCEDURE — 97110 THERAPEUTIC EXERCISES: CPT

## 2022-10-06 PROCEDURE — 97140 MANUAL THERAPY 1/> REGIONS: CPT

## 2022-10-06 PROCEDURE — 97035 APP MDLTY 1+ULTRASOUND EA 15: CPT

## 2022-10-06 NOTE — PROGRESS NOTES
Diagnosis:        Wrist sprain, right, initial encounter (R19.904Y) Referring Provider: Zhane Mcdonald  Date of Evaluation:    9/23/22    Precautions:  None Next MD visit:   10/3/22  Date of Surgery: n/a   Insurance Primary/Secondary: BLUE CROSS MEDICAID / N/A     # Auth Visits: 8 from 9/23/22-11/22/22            Subjective: \"The tip of my finger feels numb. I can't bear any weight through my wrist.\"    Pain: 5/10      Objective: Wrist flexion=65, extension=75  D2 MCP=90, PIP=75, DIP=40 WAW=709      Assessment: Pt making improvement in ROM of the wrist and digit. Pt guarding the UE and demo difficulty allowing performance of ROM to end ranges. Able to incorporate all digits into in-hand manipulation and digit isolation tasks. Goals: (to be met in 8 visits)  Pt will be independent and compliant with comprehensive HEP to maintain progress achieved in OT  Pt will increase D2 ROM to be [employer] [facility] [employer] for use while grasping the steering wheel. 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 Date: 9/26/2022  TX#: 2/8 Date: 10/6/22                TX#: 3/8 Date:                 TX#: 4/ Date:                 TX#: 5/ Date:    Tx#: 6/   Dry heat whirlpool x 10  Min with AROM US 1.2 w/cm2, 3 mhz, 50%, 8 min to dorsal wrist      Gentle STM to wrist STM to same      Gentle AROM to wrist, digits Gentle A/AAROM to wrist and digits  Place/hold      Gentle blocking to all digits in hook fist    Towel walks In-hand manipulation with chips      HEP: HEP upgrades in bold    Charges: 1 US, 1 MT, 1 TE       Total Timed Treatment: 45 min  Total Treatment Time: 45 min

## 2022-10-10 ENCOUNTER — APPOINTMENT (OUTPATIENT)
Dept: OCCUPATIONAL MEDICINE | Age: 47
End: 2022-10-10
Attending: PHYSICIAN ASSISTANT
Payer: MEDICAID

## 2022-10-13 ENCOUNTER — APPOINTMENT (OUTPATIENT)
Dept: OCCUPATIONAL MEDICINE | Age: 47
End: 2022-10-13
Attending: PHYSICIAN ASSISTANT
Payer: MEDICAID

## 2022-10-13 ENCOUNTER — TELEPHONE (OUTPATIENT)
Dept: PHYSICAL THERAPY | Facility: HOSPITAL | Age: 47
End: 2022-10-13

## 2022-10-17 ENCOUNTER — TELEPHONE (OUTPATIENT)
Dept: OBGYN CLINIC | Facility: CLINIC | Age: 47
End: 2022-10-17

## 2022-10-17 ENCOUNTER — LAB ENCOUNTER (OUTPATIENT)
Dept: LAB | Age: 47
End: 2022-10-17
Attending: OBSTETRICS & GYNECOLOGY
Payer: MEDICAID

## 2022-10-17 ENCOUNTER — OFFICE VISIT (OUTPATIENT)
Dept: OCCUPATIONAL MEDICINE | Age: 47
End: 2022-10-17
Attending: PHYSICIAN ASSISTANT
Payer: MEDICAID

## 2022-10-17 DIAGNOSIS — N91.2 AMENORRHEA: Primary | ICD-10-CM

## 2022-10-17 DIAGNOSIS — S63.501A WRIST SPRAIN, RIGHT, INITIAL ENCOUNTER: ICD-10-CM

## 2022-10-17 LAB — HCG SERPL QL: NEGATIVE

## 2022-10-17 PROCEDURE — 97110 THERAPEUTIC EXERCISES: CPT

## 2022-10-17 PROCEDURE — 97140 MANUAL THERAPY 1/> REGIONS: CPT

## 2022-10-17 PROCEDURE — 97022 WHIRLPOOL THERAPY: CPT

## 2022-10-17 NOTE — TELEPHONE ENCOUNTER
Yes that is fine. I would just do qualitative hcg, no need for quantitative if negative. Does she have regular menstrual cycles? Please note her LMP.

## 2022-10-17 NOTE — PROGRESS NOTES
Diagnosis:        Wrist sprain, right, initial encounter (C58.951E) Referring Provider: Marycarmen Padilla  Date of Evaluation:    9/23/22    Precautions:  None Next MD visit:   10/24/22  Date of Surgery: n/a   Insurance Primary/Secondary: BLUE CROSS MEDICAID / N/A     # Auth Visits: 8 from 9/23/22-11/22/22            Subjective: \"Pt indicates she's had multiple family issues over the past week. \"  \"My wrist can move a little more, but I can't handle any weight. I can bring the fingers down more, but it's stiff. \"    Pain: 1/10      Objective:   D2 MCP=95, PIP=80, DIP=40 CKL=288      Assessment: Pt making slow improvement in ROM of the wrist and digit. Improvement noted following use of heat. Pt encouraged to fully complete movement regularly as pt continues to guard to end ranges of digit motion. ROM of the wrist improved, however pt continues to note pain with any type of WB activity at home. Goals: (to be met in 8 visits)  Pt will be independent and compliant with comprehensive HEP to maintain progress achieved in OT  Pt will increase D2 ROM to be [facility] for use while grasping the steering wheel. 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 Measurements   Date: 9/26/2022  TX#: 2/8 Date: 10/6/22                TX#: 3/8 Date: 10/17/22               TX#: 4/8 Date:                 TX#: 5/ Date:    Tx#: 6/   Dry heat whirlpool x 10  Min with AROM US 1.2 w/cm2, 3 mhz, 50%, 8 min to dorsal wrist Dry heat whirlpool x 10  Min with AROM     Gentle STM to wrist STM to same A/AA/gentle PROM to wrist and digits     Gentle AROM to wrist, digits Gentle A/AAROM to wrist and digits  Place/hold In-hand manipulation with pegs (4) to place, full grasp to remove Gentle blocking to all digits in hook fist    Towel walks In-hand manipulation with chips [employer] yellow  Each digit x 15  Tripod pinch x 20    End range tendon glides, place/hold       HEP: HEP upgrades in bold    Charges: 1 WP, 1 MT, 1 TE       Total Timed Treatment: 45 min  Total Treatment Time: 45 min

## 2022-10-17 NOTE — TELEPHONE ENCOUNTER
Pt called, she is 6 days late and she wants  Urine and blood test order for pregnancy test. PT said, her levels is always low and home pregnancy test on her don't work. She had it done this way before. Please notify pt.

## 2022-10-17 NOTE — TELEPHONE ENCOUNTER
LMP 9/18/21. Patient has regular cycles. Patient has not been feeling well the last couple of days and is concerned she is pregnant. Order for hcg qualitative placed. Patient to get done today and will get notified with results tomorrow. Understanding expressed.

## 2022-10-18 ENCOUNTER — TELEPHONE (OUTPATIENT)
Dept: PHYSICAL THERAPY | Facility: HOSPITAL | Age: 47
End: 2022-10-18

## 2022-10-18 ENCOUNTER — OFFICE VISIT (OUTPATIENT)
Dept: FAMILY MEDICINE CLINIC | Facility: CLINIC | Age: 47
End: 2022-10-18
Payer: MEDICAID

## 2022-10-18 VITALS
SYSTOLIC BLOOD PRESSURE: 130 MMHG | TEMPERATURE: 98 F | WEIGHT: 171 LBS | OXYGEN SATURATION: 95 % | HEART RATE: 68 BPM | DIASTOLIC BLOOD PRESSURE: 90 MMHG | BODY MASS INDEX: 34.47 KG/M2 | HEIGHT: 59 IN

## 2022-10-18 DIAGNOSIS — J06.9 UPPER RESPIRATORY TRACT INFECTION, UNSPECIFIED TYPE: Primary | ICD-10-CM

## 2022-10-18 LAB
CONTROL LINE PRESENT WITH A CLEAR BACKGROUND (YES/NO): YES YES/NO
KIT LOT #: NORMAL NUMERIC

## 2022-10-18 PROCEDURE — 3008F BODY MASS INDEX DOCD: CPT | Performed by: PHYSICIAN ASSISTANT

## 2022-10-18 PROCEDURE — 3075F SYST BP GE 130 - 139MM HG: CPT | Performed by: PHYSICIAN ASSISTANT

## 2022-10-18 PROCEDURE — 87637 SARSCOV2&INF A&B&RSV AMP PRB: CPT | Performed by: PHYSICIAN ASSISTANT

## 2022-10-18 PROCEDURE — 3080F DIAST BP >= 90 MM HG: CPT | Performed by: PHYSICIAN ASSISTANT

## 2022-10-18 PROCEDURE — 87880 STREP A ASSAY W/OPTIC: CPT | Performed by: PHYSICIAN ASSISTANT

## 2022-10-18 PROCEDURE — 87081 CULTURE SCREEN ONLY: CPT | Performed by: PHYSICIAN ASSISTANT

## 2022-10-18 PROCEDURE — 99213 OFFICE O/P EST LOW 20 MIN: CPT | Performed by: PHYSICIAN ASSISTANT

## 2022-10-19 LAB
FLUAV + FLUBV RNA SPEC NAA+PROBE: NOT DETECTED
FLUAV + FLUBV RNA SPEC NAA+PROBE: NOT DETECTED
RSV RNA SPEC NAA+PROBE: NOT DETECTED
SARS-COV-2 RNA RESP QL NAA+PROBE: NOT DETECTED

## 2022-10-20 ENCOUNTER — APPOINTMENT (OUTPATIENT)
Dept: GENERAL RADIOLOGY | Age: 47
End: 2022-10-20
Attending: EMERGENCY MEDICINE
Payer: MEDICAID

## 2022-10-20 ENCOUNTER — TELEPHONE (OUTPATIENT)
Dept: INTERNAL MEDICINE CLINIC | Facility: CLINIC | Age: 47
End: 2022-10-20

## 2022-10-20 ENCOUNTER — HOSPITAL ENCOUNTER (EMERGENCY)
Age: 47
Discharge: HOME | End: 2022-10-20
Attending: EMERGENCY MEDICINE
Payer: MEDICAID

## 2022-10-20 ENCOUNTER — APPOINTMENT (OUTPATIENT)
Dept: OCCUPATIONAL MEDICINE | Age: 47
End: 2022-10-20
Attending: PHYSICIAN ASSISTANT
Payer: MEDICAID

## 2022-10-20 VITALS
OXYGEN SATURATION: 97 % | SYSTOLIC BLOOD PRESSURE: 134 MMHG | TEMPERATURE: 98 F | BODY MASS INDEX: 33.87 KG/M2 | RESPIRATION RATE: 18 BRPM | DIASTOLIC BLOOD PRESSURE: 84 MMHG | WEIGHT: 168 LBS | HEIGHT: 59 IN | HEART RATE: 74 BPM

## 2022-10-20 DIAGNOSIS — J01.90 ACUTE SINUSITIS, RECURRENCE NOT SPECIFIED, UNSPECIFIED LOCATION: Primary | ICD-10-CM

## 2022-10-20 LAB
B-HCG UR QL: NEGATIVE
SARS-COV-2 RNA RESP QL NAA+PROBE: NOT DETECTED

## 2022-10-20 PROCEDURE — 99283 EMERGENCY DEPT VISIT LOW MDM: CPT

## 2022-10-20 PROCEDURE — 99284 EMERGENCY DEPT VISIT MOD MDM: CPT

## 2022-10-20 PROCEDURE — 71046 X-RAY EXAM CHEST 2 VIEWS: CPT | Performed by: EMERGENCY MEDICINE

## 2022-10-20 PROCEDURE — 81025 URINE PREGNANCY TEST: CPT

## 2022-10-20 RX ORDER — AZITHROMYCIN 250 MG/1
TABLET, FILM COATED ORAL
Qty: 6 TABLET | Refills: 0 | Status: SHIPPED | OUTPATIENT
Start: 2022-10-20 | End: 2022-10-25

## 2022-10-20 NOTE — TELEPHONE ENCOUNTER
Pt stated she has sore throat, congestion that goes all the way through her ribs, chills, swollen glands and feels like her tongue is swollen. Pt was seen at [facility] on 10/18 and tested negative for flu, strep and covid. Pt stated she still is not feeling well. Pt called to schedule appointment. No avail appts until Monday. Okay to squeeze pt in today? Should pt proceed to UC/ER instead of OV?      Confirmed [phone] as best call back for pt

## 2022-10-20 NOTE — ED INITIAL ASSESSMENT (HPI)
Since Saturday has had cough, post nasal drip and sore throat since Saturday. Seen at the [address] [place] [address] and had negative covid, strep and flu a and b.   States cough and sinus congested still persist

## 2022-10-24 ENCOUNTER — APPOINTMENT (OUTPATIENT)
Dept: OCCUPATIONAL MEDICINE | Age: 47
End: 2022-10-24
Attending: PHYSICIAN ASSISTANT
Payer: MEDICAID

## 2022-10-24 ENCOUNTER — TELEMEDICINE (OUTPATIENT)
Dept: INTERNAL MEDICINE CLINIC | Facility: CLINIC | Age: 47
End: 2022-10-24

## 2022-10-24 DIAGNOSIS — J98.01 BRONCHOSPASM: Primary | ICD-10-CM

## 2022-10-24 RX ORDER — PREDNISONE 10 MG/1
TABLET ORAL
Qty: 20 TABLET | Refills: 0 | Status: SHIPPED | OUTPATIENT
Start: 2022-10-24

## 2022-10-27 ENCOUNTER — APPOINTMENT (OUTPATIENT)
Dept: OCCUPATIONAL MEDICINE | Age: 47
End: 2022-10-27
Attending: PHYSICIAN ASSISTANT
Payer: MEDICAID

## 2022-10-28 ENCOUNTER — OFFICE VISIT (OUTPATIENT)
Dept: UROLOGY | Facility: HOSPITAL | Age: 47
End: 2022-10-28
Attending: OBSTETRICS & GYNECOLOGY
Payer: MEDICAID

## 2022-10-28 VITALS — TEMPERATURE: 98 F | WEIGHT: 168 LBS | HEIGHT: 59 IN | BODY MASS INDEX: 33.87 KG/M2

## 2022-10-28 DIAGNOSIS — N81.11 CYSTOCELE, MIDLINE: ICD-10-CM

## 2022-10-28 DIAGNOSIS — N81.84 PELVIC MUSCLE WASTING: ICD-10-CM

## 2022-10-28 DIAGNOSIS — N81.6 RECTOCELE: ICD-10-CM

## 2022-10-28 DIAGNOSIS — N39.3 FEMALE STRESS INCONTINENCE: Primary | ICD-10-CM

## 2022-10-28 DIAGNOSIS — N39.41 URGE INCONTINENCE: ICD-10-CM

## 2022-10-28 DIAGNOSIS — R35.1 NOCTURIA: ICD-10-CM

## 2022-10-28 LAB
BILIRUB UR QL: NEGATIVE
CLARITY UR: CLEAR
COLOR UR: YELLOW
CONTROL RUN WITHIN 24 HOURS?: YES
GLUCOSE UR-MCNC: NEGATIVE MG/DL
KETONES UR-MCNC: NEGATIVE MG/DL
LEUKOCYTE ESTERASE UR QL STRIP.AUTO: NEGATIVE
LEUKOCYTE ESTERASE URINE: NEGATIVE
NITRITE UR QL STRIP.AUTO: NEGATIVE
NITRITE URINE: NEGATIVE
PH UR: 6 [PH] (ref 5–8)
PROT UR-MCNC: 100 MG/DL
SP GR UR STRIP: 1.02 (ref 1–1.03)
UROBILINOGEN UR STRIP-ACNC: <2
VIT C UR-MCNC: NEGATIVE MG/DL

## 2022-10-28 PROCEDURE — 99212 OFFICE O/P EST SF 10 MIN: CPT

## 2022-10-28 PROCEDURE — 51701 INSERT BLADDER CATHETER: CPT

## 2022-10-28 PROCEDURE — 81001 URINALYSIS AUTO W/SCOPE: CPT | Performed by: OBSTETRICS & GYNECOLOGY

## 2022-10-28 PROCEDURE — 81002 URINALYSIS NONAUTO W/O SCOPE: CPT | Performed by: OBSTETRICS & GYNECOLOGY

## 2022-10-28 PROCEDURE — 87086 URINE CULTURE/COLONY COUNT: CPT | Performed by: OBSTETRICS & GYNECOLOGY

## 2022-11-14 ENCOUNTER — OFFICE VISIT (OUTPATIENT)
Dept: UROLOGY | Facility: HOSPITAL | Age: 47
End: 2022-11-14
Payer: MEDICAID

## 2022-11-14 VITALS — HEIGHT: 59 IN | BODY MASS INDEX: 33.87 KG/M2 | WEIGHT: 168 LBS

## 2022-11-14 DIAGNOSIS — N39.3 FEMALE STRESS INCONTINENCE: Primary | ICD-10-CM

## 2022-11-14 LAB
CONTROL RUN WITHIN 24 HOURS?: YES
LEUKOCYTE ESTERASE URINE: NEGATIVE
NITRITE URINE: NEGATIVE

## 2022-11-14 PROCEDURE — 51797 INTRAABDOMINAL PRESSURE TEST: CPT

## 2022-11-14 PROCEDURE — 51784 ANAL/URINARY MUSCLE STUDY: CPT

## 2022-11-14 PROCEDURE — 81002 URINALYSIS NONAUTO W/O SCOPE: CPT

## 2022-11-14 PROCEDURE — 51741 ELECTRO-UROFLOWMETRY FIRST: CPT

## 2022-11-14 PROCEDURE — 51729 CYSTOMETROGRAM W/VP&UP: CPT

## 2022-11-14 NOTE — PROCEDURES
Patient here with  for urodynamic testing. Patient visibly uncomfortable with full bladder. Procedure explained and confirmed by patient. See evaluation form for results. Both verbal and written discharge instructions were given. Patient did not tolerate procedure well. Patient will follow up with Dr. Marilu Donovan on 23. URODYNAMIC EVALUATION    PATIENT HISTORY:    Prolapse: Yes; (reports shifts for voids and BM)  KIKE:  Yes; reports more bothersome than UUI  UUI:  Yes  Nocturia:  more than 4  Frequency:  less than 1 hour; reports voiding 8x/hour; also reports shifting to void  Incomplete Emptying:  Yes; reports shifting to empty bladder  Constipation:  No; shifts for BM; bowel regimen reviewed and handouts provided. Last void prior to UDS testin minutes  Current urge to void? Strong; pt crying d/t urgency  OAB meds stopped prior to test?  NA  Other symptoms? Surgery? []  No  [x]  Yes, specify date: surgery not scheduled at time of UDS testing; surgical packet provided and informed consent signed and scanned into pt's chart. PATIENT DIAGNOSIS:  Cystocele, Midline N81.11, Rectocele, Midline R15.9, Urge Incontinence N39.41 and Stress Incontinence N39.3    MEDICATION: No outpatient medications have been marked as taking for the 22 encounter (Office Visit) with [facility] PROCEDURE.        ALLERGIES:  Metoprolol, Toradol [Ketorolac Tromethamine], Tramadol, Dilaudid [Hydromorphone], Oxycodone, Wellbutrin [Bupropion Hcl], Codeine Sulfate, Hydrocodone, Morphine, and Seasonal      EXAM:  Urinalysis Dip:  Today's Results   Component Date Value    control run 2022 Yes     Blood Urine 2022 Trace (A)     Nitrite Urine 2022 Negative     Leukocyte esterase urine 2022 Negative       [place] ( >30 degrees ):  [x]  Mobile  []  Fixed    Perineal Sensation:  [x]  Normal  []  Abnormal    Additional Notes:    PROLAPSE (past introitus):  [x]  Yes  []  No  Prolapse reduced for testing? [x]  Yes  []  No  [x]  Pessary #1 ring with support    Speculum  []  Proctoswab                        Vag Packing    Additional Notes:    UROFLOWMETRY:  Voided Volume:            139+            mL  Maximum Flow Rate:                  14              mL/sec  Average flow rate:                 5           mL/sec  Post-void Residual:          425               mL  Pattern:  []  Normal  []  Poor flow     [x]  Intermittent  []  Other  Void:   []  Typical  []  Atypical    Additional Notes: Uroflow obtained unreduced. Pt tearful and visibly uncomfortable wanting to void. Pt reports drinking a lot of water prior to testing. CYSTOMETRY:  Urethral Catheter:  Fr 7 / tdoc  Abd Catheter:     Fr 7 / tdoc   Infusion:  Water Rate 50 mL/min  Temp:  Room  Position:  [x]  Sit  []  Stand  []  Supine  First sensation:   12 mL  First desire to void:               114 mL  Strong desire to void:              122 mL  Maximum cystometric capacity:    163 mL  Detrusor Activity:  [x]  Unstable   []  Stable  Urge leakage? [x]  Yes []  No  Volume at 1st unhibited detrusor cont:   17 mL  Detrusor instability provoked by:    [x]  Spontaneous  []  Coughing  [x]  Filling  []  Valsalva  []  Other    Additional Notes:  Multiple uninhibited contractions noted throughout testing with large leaks. URETHRAL FUNCTION:  Valsava (vesical) Leak Point Pressures:    Volume Leak Point Pressure Leak? Cough Valsalva      100mL 140 111    cm H2O [x]  Yes, in the absence of an uninhibited contraction []  No         REDUCED: #1 ring with support  Volume Leak Point Pressure Leak? Cough Valsalva      150mL 184 149    cm H2O []  Yes [x]  No       Genuine Stress Incontinence demonstrated?    [x]  Yes; at 100, unreduced, in the absence of an uninhibited contraction  []  No    Resting Urethral Pressure Profile:     Functional Urethral Length:     0.5     cm         0.3        cm     Maximum UCP:             7                  cm -6                   cm       PRESSURE/FLOW STUDY:  Voided volume:   14     mL  Maximum flow rate:     3   mL/sec  Pressure Detrusor (at maximum flow):     29       cm H2O  Post void residual:    300           mL  Voiding mechanism:  [x]  Abnormal  []  Normal  []  Strain to void   []  Weak detrusor  Void:   []  Typical   []  Atypical    Additional Notes: Flow study obtained reduced with #1 ring with support pessary. #1 ring with support pessary removed upon completion of testing. Pt visibly uncomfortable and tearful throughout testing.  Testing completed per pt's request.    EMG:  [x]  Reactive []  Non-Reactive    11/14/2022 12:30 PM     PERFORMED BY:  JANIE Roche RN    URODYNAMIC PHYSICIAN INTERPRETATION    IMPRESSION:   139/425cc & 14/300cc  residential 163cc  DO @ 17cc  KIKE @ 100cc  Post-Procedure Diagnoses: Detrusor instability [N31.9], Incomplete bladder emptying [R39.14], and Stress urinary incontinence [N39.3]    Comments:      Mian Sandhu DO   11/14/2022   2:38 PM

## 2022-11-15 ENCOUNTER — IMMUNIZATION (OUTPATIENT)
Dept: INTERNAL MEDICINE CLINIC | Facility: CLINIC | Age: 47
End: 2022-11-15
Payer: MEDICAID

## 2022-11-15 DIAGNOSIS — Z23 NEED FOR VACCINATION: Primary | ICD-10-CM

## 2022-11-15 PROCEDURE — 90686 IIV4 VACC NO PRSV 0.5 ML IM: CPT | Performed by: FAMILY MEDICINE

## 2022-11-15 PROCEDURE — 90471 IMMUNIZATION ADMIN: CPT | Performed by: FAMILY MEDICINE

## 2022-12-10 ENCOUNTER — OFFICE VISIT (OUTPATIENT)
Dept: FAMILY MEDICINE CLINIC | Facility: CLINIC | Age: 47
End: 2022-12-10
Payer: MEDICAID

## 2022-12-10 VITALS
BODY MASS INDEX: 34.27 KG/M2 | OXYGEN SATURATION: 98 % | TEMPERATURE: 99 F | HEIGHT: 59 IN | WEIGHT: 170 LBS | DIASTOLIC BLOOD PRESSURE: 86 MMHG | RESPIRATION RATE: 16 BRPM | SYSTOLIC BLOOD PRESSURE: 112 MMHG | HEART RATE: 76 BPM

## 2022-12-10 DIAGNOSIS — J32.9 VIRAL SINUSITIS: Primary | ICD-10-CM

## 2022-12-10 DIAGNOSIS — B97.89 VIRAL SINUSITIS: Primary | ICD-10-CM

## 2022-12-10 PROCEDURE — 3074F SYST BP LT 130 MM HG: CPT | Performed by: NURSE PRACTITIONER

## 2022-12-10 PROCEDURE — 3008F BODY MASS INDEX DOCD: CPT | Performed by: NURSE PRACTITIONER

## 2022-12-10 PROCEDURE — 3079F DIAST BP 80-89 MM HG: CPT | Performed by: NURSE PRACTITIONER

## 2022-12-10 PROCEDURE — 99213 OFFICE O/P EST LOW 20 MIN: CPT | Performed by: NURSE PRACTITIONER

## 2022-12-10 RX ORDER — BENZONATATE 200 MG/1
200 CAPSULE ORAL 3 TIMES DAILY PRN
Qty: 20 CAPSULE | Refills: 0 | Status: SHIPPED | OUTPATIENT
Start: 2022-12-10 | End: 2022-12-17

## 2022-12-12 ENCOUNTER — TELEMEDICINE (OUTPATIENT)
Dept: INTERNAL MEDICINE CLINIC | Facility: CLINIC | Age: 47
End: 2022-12-12
Payer: MEDICAID

## 2022-12-12 DIAGNOSIS — J01.90 ACUTE NON-RECURRENT SINUSITIS, UNSPECIFIED LOCATION: Primary | ICD-10-CM

## 2022-12-12 RX ORDER — AZITHROMYCIN 250 MG/1
TABLET, FILM COATED ORAL
Qty: 6 TABLET | Refills: 0 | Status: SHIPPED | OUTPATIENT
Start: 2022-12-12 | End: 2022-12-17

## 2022-12-29 ENCOUNTER — TELEMEDICINE (OUTPATIENT)
Dept: INTERNAL MEDICINE CLINIC | Facility: CLINIC | Age: 47
End: 2022-12-29
Payer: MEDICAID

## 2022-12-29 DIAGNOSIS — J34.89 SINUS PAIN: Primary | ICD-10-CM

## 2022-12-29 PROCEDURE — 99213 OFFICE O/P EST LOW 20 MIN: CPT | Performed by: FAMILY MEDICINE

## 2022-12-29 RX ORDER — HYDROCODONE BITARTRATE AND ACETAMINOPHEN 5; 325 MG/1; MG/1
1 TABLET ORAL EVERY 8 HOURS PRN
Qty: 20 TABLET | Refills: 0 | Status: SHIPPED | OUTPATIENT
Start: 2022-12-29

## 2022-12-29 RX ORDER — AMOXICILLIN 875 MG/1
875 TABLET, COATED ORAL 2 TIMES DAILY
Qty: 20 TABLET | Refills: 0 | Status: SHIPPED | OUTPATIENT
Start: 2022-12-29

## 2023-01-12 ENCOUNTER — VIRTUAL PHONE E/M (OUTPATIENT)
Dept: UROLOGY | Facility: HOSPITAL | Age: 48
End: 2023-01-12
Attending: OBSTETRICS & GYNECOLOGY
Payer: MEDICAID

## 2023-01-12 DIAGNOSIS — N81.6 RECTOCELE: ICD-10-CM

## 2023-01-12 DIAGNOSIS — R33.9 INCOMPLETE BLADDER EMPTYING: ICD-10-CM

## 2023-01-12 DIAGNOSIS — N39.41 URGE INCONTINENCE: ICD-10-CM

## 2023-01-12 DIAGNOSIS — N32.81 DETRUSOR INSTABILITY: ICD-10-CM

## 2023-01-12 DIAGNOSIS — N39.3 FEMALE STRESS INCONTINENCE: Primary | ICD-10-CM

## 2023-01-12 DIAGNOSIS — N81.11 CYSTOCELE, MIDLINE: ICD-10-CM

## 2023-01-12 NOTE — PROGRESS NOTES
Pt presents w/ initial c/o UI  Urodynamic testing undergone without complication. Results reviewed with patient via telephone  Given circumstances surrounding COVID-19 this visit is being conducted as a televisit with pt's consent. Pt in safe, private environment for televisit, provider located in the office setting    139/425cc & 14/300cc  [facility] 163cc  DO @ 17cc  KIKE @ 100cc    Discussed with patient mgmt options for DO/UUI, KIKE, incomplete bladder emptying  Biggest bother is UUI  Has tried meds, no improvement  Did PT in the past  Reports h/o UTIs  H/o kidney stones    Discussed dietary and behavioral modifications for mgmt of urinary symptoms  Discussed weight management and benefits of weight loss on urinary symptoms  Reviewed AUA/SUFU guidelines on mgmt of non-neurogenic OAB  Discussed pharmacologic and nonpharmacologic mgmt options of urinary symptoms - reviewed risks, benefits, alternatives, and goals of treatment  Discussed specific risks related to OAB meds including, but not limited to dry mouth, constipation, blurry vision, cognitive changes, and BP elevation. Thorough discussion of surgical risks, benefits, and alternatives including, but not limited to bleeding/clots, infection, injury to nearby organs (urethra, bladder, ureters, bowel, blood vessels), mesh erosion, dyspareunia, de malvin UUI, worsening KIKE, recurrence, voiding dysfunction, and pain. All questions answered.   Pt will proceed bladder diet/drill, bowel mgmt, pelvic floor PT    Follow up after PT    Dr. Renzo Oneil    Pre op packet provided

## 2023-01-26 NOTE — PROGRESS NOTES
Normal labs Sotyktu Counseling:  I discussed the most common side effects of Sotyktu including: common cold, sore throat, sinus infections, cold sores, canker sores, folliculitis, and acne.  I also discussed more serious side effects of Sotyktu including but not limited to: serious allergic reactions; increased risk for infections such as TB; cancers such as lymphomas; rhabdomyolysis and elevated CPK; and elevated triglycerides and liver enzymes.

## 2023-01-30 ENCOUNTER — TELEPHONE (OUTPATIENT)
Dept: PHYSICAL THERAPY | Age: 48
End: 2023-01-30

## 2023-02-01 ENCOUNTER — OFFICE VISIT (OUTPATIENT)
Dept: PHYSICAL THERAPY | Age: 48
End: 2023-02-01
Attending: OBSTETRICS & GYNECOLOGY
Payer: MEDICAID

## 2023-02-01 DIAGNOSIS — R33.9 INCOMPLETE BLADDER EMPTYING: ICD-10-CM

## 2023-02-01 DIAGNOSIS — N81.11 CYSTOCELE, MIDLINE: ICD-10-CM

## 2023-02-01 DIAGNOSIS — N39.41 URGE INCONTINENCE: ICD-10-CM

## 2023-02-01 DIAGNOSIS — N32.81 DETRUSOR INSTABILITY: ICD-10-CM

## 2023-02-01 DIAGNOSIS — N81.6 RECTOCELE: ICD-10-CM

## 2023-02-01 DIAGNOSIS — N39.3 FEMALE STRESS INCONTINENCE: ICD-10-CM

## 2023-02-01 PROCEDURE — 97163 PT EVAL HIGH COMPLEX 45 MIN: CPT

## 2023-02-01 PROCEDURE — 97112 NEUROMUSCULAR REEDUCATION: CPT

## 2023-02-01 PROCEDURE — 97535 SELF CARE MNGMENT TRAINING: CPT

## 2023-02-01 NOTE — PATIENT INSTRUCTIONS
DIAPHRAGMATIC BREATHING     The diaphragm is a dome shaped muscle that forms the floor of the rib cage. It is the most efficient muscle for breathing and using this muscle aides in relaxation. Diaphragmatic breathing is an important technique to learn because it helps settle down or relax the autonomic nervous system. The correct use of diaphragmatic breathing can help to quiet brain activity resulting in the relaxation of all the muscles and organs of the body. This is accomplished by slow rhythmic breathing concentrated in the diaphragm muscle rather than the upper chest.      HOW TO DO PROPER RELAXATION BREATHING  Start by lying on your back or reclining in a chair in a relaxed position. Place your hands around the lower portion of your rib cage. Relax your jaw by placing your tongue on the roof of your mouth and keeping your teeth slightly apart. Take a deep breath in for 4 count through your nose, letting your rib cage widen and your abdomen expand for 7 second hold. Keep your upper chest, neck and shoulders relaxed as you breathe in. As you breathe out for 8 count through your mouth, allow your abdomen and chest to fall. Exhale completely then bulge perineum. Remember to breathe slowly. Do not force your breathing. Practice this for _10__minutes.                                DAILY VOIDING LOG    Name_________________________         Date_____________    Time of Day     Type & Amount  of Food & Fluid Intake Amount Voided   Seconds Amount of  Leakage  S /M /L Was  Urge  Present  1 /2 /3 BM  Straining Activity With  Leakage   Midnight         1:00 am         2:00 am         3:00 am          4:00 am         5:00 am         6:00 am         7:00 am         8:00 am         9:00 am         10:00 am         11:00 am         Noon         1:00 pm         2:00 pm         3:00 pm         4:00 pm         5:00 pm         6:00 pm         7:00 pm         8:00 pm         9:00 pm         10:00 pm         11:00 pm Comments                Number of pads used today

## 2023-02-08 ENCOUNTER — OFFICE VISIT (OUTPATIENT)
Dept: PHYSICAL THERAPY | Age: 48
End: 2023-02-08
Attending: OBSTETRICS & GYNECOLOGY
Payer: MEDICAID

## 2023-02-08 PROCEDURE — 97112 NEUROMUSCULAR REEDUCATION: CPT

## 2023-02-08 PROCEDURE — 97140 MANUAL THERAPY 1/> REGIONS: CPT

## 2023-02-08 PROCEDURE — 97110 THERAPEUTIC EXERCISES: CPT

## 2023-02-08 PROCEDURE — 97535 SELF CARE MNGMENT TRAINING: CPT

## 2023-02-11 ENCOUNTER — HOSPITAL ENCOUNTER (EMERGENCY)
Age: 48
Discharge: HOME | End: 2023-02-11
Attending: EMERGENCY MEDICINE
Payer: MEDICAID

## 2023-02-11 ENCOUNTER — APPOINTMENT (OUTPATIENT)
Dept: GENERAL RADIOLOGY | Age: 48
End: 2023-02-11
Attending: EMERGENCY MEDICINE
Payer: MEDICAID

## 2023-02-11 VITALS
SYSTOLIC BLOOD PRESSURE: 151 MMHG | TEMPERATURE: 97 F | DIASTOLIC BLOOD PRESSURE: 86 MMHG | BODY MASS INDEX: 33.26 KG/M2 | OXYGEN SATURATION: 98 % | HEART RATE: 77 BPM | WEIGHT: 165 LBS | HEIGHT: 59 IN | RESPIRATION RATE: 16 BRPM

## 2023-02-11 DIAGNOSIS — R07.9 CHEST PAIN OF UNCERTAIN ETIOLOGY: Primary | ICD-10-CM

## 2023-02-11 LAB
ALBUMIN SERPL-MCNC: 3.7 G/DL (ref 3.4–5)
ALBUMIN/GLOB SERPL: 0.9 {RATIO} (ref 1–2)
ALP LIVER SERPL-CCNC: 90 U/L
ALT SERPL-CCNC: 39 U/L
ANION GAP SERPL CALC-SCNC: 7 MMOL/L (ref 0–18)
AST SERPL-CCNC: 21 U/L (ref 15–37)
BASOPHILS # BLD AUTO: 0.04 X10(3) UL (ref 0–0.2)
BASOPHILS NFR BLD AUTO: 0.4 %
BILIRUB SERPL-MCNC: 0.1 MG/DL (ref 0.1–2)
BILIRUB UR QL STRIP.AUTO: NEGATIVE
BUN BLD-MCNC: 11 MG/DL (ref 7–18)
CALCIUM BLD-MCNC: 8.9 MG/DL (ref 8.5–10.1)
CHLORIDE SERPL-SCNC: 106 MMOL/L (ref 98–112)
CLARITY UR REFRACT.AUTO: CLEAR
CO2 SERPL-SCNC: 23 MMOL/L (ref 21–32)
COLOR UR AUTO: YELLOW
CREAT BLD-MCNC: 0.78 MG/DL
D DIMER PPP FEU-MCNC: 0.27 UG/ML FEU (ref ?–0.5)
EOSINOPHIL # BLD AUTO: 0.1 X10(3) UL (ref 0–0.7)
EOSINOPHIL NFR BLD AUTO: 0.9 %
ERYTHROCYTE [DISTWIDTH] IN BLOOD BY AUTOMATED COUNT: 12.8 %
GFR SERPLBLD BASED ON 1.73 SQ M-ARVRAT: 95 ML/MIN/1.73M2 (ref 60–?)
GLOBULIN PLAS-MCNC: 4.1 G/DL (ref 2.8–4.4)
GLUCOSE BLD-MCNC: 113 MG/DL (ref 70–99)
GLUCOSE UR STRIP.AUTO-MCNC: NEGATIVE MG/DL
HCT VFR BLD AUTO: 38.3 %
HGB BLD-MCNC: 12.5 G/DL
IMM GRANULOCYTES # BLD AUTO: 0.03 X10(3) UL (ref 0–1)
IMM GRANULOCYTES NFR BLD: 0.3 %
KETONES UR STRIP.AUTO-MCNC: NEGATIVE MG/DL
LEUKOCYTE ESTERASE UR QL STRIP.AUTO: NEGATIVE
LYMPHOCYTES # BLD AUTO: 2.13 X10(3) UL (ref 1–4)
LYMPHOCYTES NFR BLD AUTO: 20.1 %
MCH RBC QN AUTO: 26.7 PG (ref 26–34)
MCHC RBC AUTO-ENTMCNC: 32.6 G/DL (ref 31–37)
MCV RBC AUTO: 81.7 FL
MONOCYTES # BLD AUTO: 0.66 X10(3) UL (ref 0.1–1)
MONOCYTES NFR BLD AUTO: 6.2 %
NEUTROPHILS # BLD AUTO: 7.66 X10 (3) UL (ref 1.5–7.7)
NEUTROPHILS # BLD AUTO: 7.66 X10(3) UL (ref 1.5–7.7)
NEUTROPHILS NFR BLD AUTO: 72.1 %
NITRITE UR QL STRIP.AUTO: NEGATIVE
OSMOLALITY SERPL CALC.SUM OF ELEC: 282 MOSM/KG (ref 275–295)
PH UR STRIP.AUTO: 7 [PH] (ref 5–8)
PLATELET # BLD AUTO: 394 10(3)UL (ref 150–450)
POTASSIUM SERPL-SCNC: 3 MMOL/L (ref 3.5–5.1)
PROT SERPL-MCNC: 7.8 G/DL (ref 6.4–8.2)
PROT UR STRIP.AUTO-MCNC: NEGATIVE MG/DL
RBC # BLD AUTO: 4.69 X10(6)UL
SARS-COV-2 RNA RESP QL NAA+PROBE: NOT DETECTED
SODIUM SERPL-SCNC: 136 MMOL/L (ref 136–145)
SP GR UR STRIP.AUTO: 1.01 (ref 1–1.03)
TROPONIN I HIGH SENSITIVITY: 4 NG/L
UROBILINOGEN UR STRIP.AUTO-MCNC: 0.2 MG/DL
WBC # BLD AUTO: 10.6 X10(3) UL (ref 4–11)

## 2023-02-11 PROCEDURE — 71045 X-RAY EXAM CHEST 1 VIEW: CPT | Performed by: EMERGENCY MEDICINE

## 2023-02-11 PROCEDURE — 85025 COMPLETE CBC W/AUTO DIFF WBC: CPT | Performed by: EMERGENCY MEDICINE

## 2023-02-11 PROCEDURE — 81015 MICROSCOPIC EXAM OF URINE: CPT | Performed by: EMERGENCY MEDICINE

## 2023-02-11 PROCEDURE — 84484 ASSAY OF TROPONIN QUANT: CPT | Performed by: EMERGENCY MEDICINE

## 2023-02-11 PROCEDURE — 99284 EMERGENCY DEPT VISIT MOD MDM: CPT

## 2023-02-11 PROCEDURE — 81001 URINALYSIS AUTO W/SCOPE: CPT | Performed by: EMERGENCY MEDICINE

## 2023-02-11 PROCEDURE — 80053 COMPREHEN METABOLIC PANEL: CPT | Performed by: EMERGENCY MEDICINE

## 2023-02-11 PROCEDURE — 99285 EMERGENCY DEPT VISIT HI MDM: CPT

## 2023-02-11 PROCEDURE — 36415 COLL VENOUS BLD VENIPUNCTURE: CPT

## 2023-02-11 PROCEDURE — 93005 ELECTROCARDIOGRAM TRACING: CPT

## 2023-02-11 PROCEDURE — 85379 FIBRIN DEGRADATION QUANT: CPT | Performed by: EMERGENCY MEDICINE

## 2023-02-11 PROCEDURE — 93010 ELECTROCARDIOGRAM REPORT: CPT

## 2023-02-11 RX ORDER — POTASSIUM CHLORIDE 20 MEQ/1
40 TABLET, EXTENDED RELEASE ORAL ONCE
Status: COMPLETED | OUTPATIENT
Start: 2023-02-11 | End: 2023-02-11

## 2023-02-11 NOTE — ED INITIAL ASSESSMENT (HPI)
Pt c/o chest pressure and feels like she cant catch her breath. Pt states she can not stop yawning. C/O nausea this morning.

## 2023-02-12 LAB
ATRIAL RATE: 94 BPM
P AXIS: 49 DEGREES
P-R INTERVAL: 128 MS
Q-T INTERVAL: 344 MS
QRS DURATION: 74 MS
QTC CALCULATION (BEZET): 430 MS
R AXIS: 44 DEGREES
T AXIS: 26 DEGREES
VENTRICULAR RATE: 94 BPM

## 2023-02-13 RX ORDER — AMLODIPINE BESYLATE 5 MG/1
TABLET ORAL
Qty: 90 TABLET | Refills: 0 | Status: SHIPPED | OUTPATIENT
Start: 2023-02-13

## 2023-02-15 ENCOUNTER — TELEPHONE (OUTPATIENT)
Dept: PHYSICAL THERAPY | Age: 48
End: 2023-02-15

## 2023-02-15 ENCOUNTER — OFFICE VISIT (OUTPATIENT)
Dept: PHYSICAL THERAPY | Age: 48
End: 2023-02-15
Attending: OBSTETRICS & GYNECOLOGY
Payer: MEDICAID

## 2023-02-15 PROCEDURE — 97140 MANUAL THERAPY 1/> REGIONS: CPT

## 2023-02-15 PROCEDURE — 97112 NEUROMUSCULAR REEDUCATION: CPT

## 2023-02-15 PROCEDURE — 97535 SELF CARE MNGMENT TRAINING: CPT

## 2023-02-15 PROCEDURE — 97110 THERAPEUTIC EXERCISES: CPT

## 2023-02-22 ENCOUNTER — APPOINTMENT (OUTPATIENT)
Dept: PHYSICAL THERAPY | Age: 48
End: 2023-02-22
Attending: OBSTETRICS & GYNECOLOGY
Payer: MEDICAID

## 2023-02-27 ENCOUNTER — TELEMEDICINE (OUTPATIENT)
Dept: INTERNAL MEDICINE CLINIC | Facility: CLINIC | Age: 48
End: 2023-02-27
Payer: MEDICAID

## 2023-02-27 ENCOUNTER — TELEPHONE (OUTPATIENT)
Dept: PHYSICAL THERAPY | Age: 48
End: 2023-02-27

## 2023-02-27 DIAGNOSIS — J02.9 ACUTE PHARYNGITIS, UNSPECIFIED ETIOLOGY: Primary | ICD-10-CM

## 2023-02-27 PROCEDURE — 99213 OFFICE O/P EST LOW 20 MIN: CPT | Performed by: FAMILY MEDICINE

## 2023-02-27 RX ORDER — AMOXICILLIN AND CLAVULANATE POTASSIUM 875; 125 MG/1; MG/1
1 TABLET, FILM COATED ORAL 2 TIMES DAILY
Qty: 20 TABLET | Refills: 0 | Status: SHIPPED | OUTPATIENT
Start: 2023-02-27 | End: 2023-03-09

## 2023-03-01 ENCOUNTER — TELEPHONE (OUTPATIENT)
Dept: PHYSICAL THERAPY | Facility: HOSPITAL | Age: 48
End: 2023-03-01

## 2023-03-01 ENCOUNTER — APPOINTMENT (OUTPATIENT)
Dept: PHYSICAL THERAPY | Age: 48
End: 2023-03-01
Attending: OBSTETRICS & GYNECOLOGY
Payer: MEDICAID

## 2023-03-02 ENCOUNTER — TELEPHONE (OUTPATIENT)
Dept: INTERNAL MEDICINE CLINIC | Facility: CLINIC | Age: 48
End: 2023-03-02

## 2023-03-02 DIAGNOSIS — B97.89 VIRAL SINUSITIS: ICD-10-CM

## 2023-03-02 DIAGNOSIS — J32.9 VIRAL SINUSITIS: ICD-10-CM

## 2023-03-02 RX ORDER — BENZONATATE 200 MG/1
200 CAPSULE ORAL 3 TIMES DAILY PRN
Qty: 20 CAPSULE | Refills: 0 | Status: SHIPPED | OUTPATIENT
Start: 2023-03-02 | End: 2023-03-09

## 2023-03-02 RX ORDER — METHYLPREDNISOLONE 4 MG/1
TABLET ORAL
Qty: 1 EACH | Refills: 0 | Status: SHIPPED | OUTPATIENT
Start: 2023-03-02

## 2023-03-02 NOTE — TELEPHONE ENCOUNTER
Can Try some steroids with her or Tessalon perles for the cough. Would she like to try one or both of these medications?

## 2023-03-02 NOTE — TELEPHONE ENCOUNTER
Spoke w pt, she would like to try the steroids. She has about 4 tessalon Perles left from an October Rx. She's taken them and they offer some relief. Makes her drowsy. Pt stated if SM thinks she should take all 3 (abx, steroids, and benzonatate), then she can refill benzonatate. But she will definitely try steroid dose pack. Please send RX to [id number]    Thanks!

## 2023-03-02 NOTE — TELEPHONE ENCOUNTER
Pt called in. Had VV on 2/27:    Pt states no improvement of s/s since then. Taking abx, doing nasal rinses. She states at night the cough is worse from the PND. Drainage yellow/brown. Cough is worse since Monday and has chest soreness from all the coughing. Sometimes cough is productive. Tried Benedryl at night, doesn't help much. Pt states she can't take anything with Dextromethorphan, because it makes her \"hyper\". Any further recs? Or just needs to let it run it's course?

## 2023-03-06 ENCOUNTER — TELEPHONE (OUTPATIENT)
Dept: INTERNAL MEDICINE CLINIC | Facility: CLINIC | Age: 48
End: 2023-03-06

## 2023-03-06 ENCOUNTER — TELEPHONE (OUTPATIENT)
Dept: OBGYN CLINIC | Facility: CLINIC | Age: 48
End: 2023-03-06

## 2023-03-06 NOTE — TELEPHONE ENCOUNTER
41year old patient complaining of frequent menses. Had a period at the beginning of February. Then about a week later, started bleeding again. Currently is experiencing spotting. Last OV date: 8/9/22 with Dr. Erica Maldonado for annual  Recent Test/Labs: pelvic ultrasound 9/20/22 done for pelvic pain. Nothing to explain pain noted. Endometrium ave 8 mm   Recommendations: assisted with scheduling appt. ER/bleeding precautions given. Patient states understanding. Routed to Dr. Erica Maldonado to see if repeat ultrasound would be advised given patient's c/o AUB. Patient is aware that [facility] staff will contact her to schedule if ultrasound is desired.

## 2023-03-06 NOTE — TELEPHONE ENCOUNTER
Per Dr. Joshua Doe,     think for now though we will hold off, as this has been an ongoing thing for years--and ultimately she probably needs to think about doing something but might need to be with another partner given the timeline. So for now, keep appt as scheduled.      Detailed message left notifying patient

## 2023-03-08 ENCOUNTER — APPOINTMENT (OUTPATIENT)
Dept: PHYSICAL THERAPY | Age: 48
End: 2023-03-08
Attending: OBSTETRICS & GYNECOLOGY
Payer: MEDICAID

## 2023-03-08 ENCOUNTER — OFFICE VISIT (OUTPATIENT)
Dept: INTERNAL MEDICINE CLINIC | Facility: CLINIC | Age: 48
End: 2023-03-08
Payer: MEDICAID

## 2023-03-08 VITALS
OXYGEN SATURATION: 99 % | DIASTOLIC BLOOD PRESSURE: 85 MMHG | HEIGHT: 59 IN | HEART RATE: 98 BPM | TEMPERATURE: 99 F | BODY MASS INDEX: 34.23 KG/M2 | SYSTOLIC BLOOD PRESSURE: 130 MMHG | WEIGHT: 169.81 LBS

## 2023-03-08 DIAGNOSIS — I10 BENIGN ESSENTIAL HTN: Primary | ICD-10-CM

## 2023-03-08 DIAGNOSIS — R07.89 CHEST PAIN, ATYPICAL: ICD-10-CM

## 2023-03-08 DIAGNOSIS — K92.1 BLOODY STOOL: ICD-10-CM

## 2023-03-08 PROCEDURE — 99214 OFFICE O/P EST MOD 30 MIN: CPT | Performed by: FAMILY MEDICINE

## 2023-03-08 PROCEDURE — 3075F SYST BP GE 130 - 139MM HG: CPT | Performed by: FAMILY MEDICINE

## 2023-03-08 PROCEDURE — 3079F DIAST BP 80-89 MM HG: CPT | Performed by: FAMILY MEDICINE

## 2023-03-08 PROCEDURE — 3008F BODY MASS INDEX DOCD: CPT | Performed by: FAMILY MEDICINE

## 2023-03-08 RX ORDER — AMLODIPINE BESYLATE 5 MG/1
5 TABLET ORAL 2 TIMES DAILY
Qty: 90 TABLET | Refills: 0 | COMMUNITY
Start: 2023-03-08

## 2023-03-09 ENCOUNTER — PATIENT MESSAGE (OUTPATIENT)
Dept: INTERNAL MEDICINE CLINIC | Facility: CLINIC | Age: 48
End: 2023-03-09

## 2023-03-09 DIAGNOSIS — K92.1 BLOODY STOOL: Primary | ICD-10-CM

## 2023-03-09 NOTE — TELEPHONE ENCOUNTER
Pt called in with update. Also please see mcm with picture of blood in stool. She said she passed BM today and there was blood in the stool again. She knows she doesn't have a hemorrhoid. She's only had chicken soup for the past few days, so she knows it's not from colored food. Pt is doing metamucil and hydrating. Pt stated that pain on left side has worsened since yesterday. Pt states she is not \"double over in pain\". It's tolerable, about a 4-5 out of 10 on pain scale. Advised her to send in a picture of stool since she took a picture of it. LOV 3/8:  (K92.1) Bloody stool  Plan: suspect constipation     Had nl Cscope   Increase hydration   CPM metamucil   Allyson 1 mo    Please advise on what next steps for pt. Thanks!

## 2023-03-15 ENCOUNTER — APPOINTMENT (OUTPATIENT)
Dept: PHYSICAL THERAPY | Age: 48
End: 2023-03-15
Attending: OBSTETRICS & GYNECOLOGY
Payer: MEDICAID

## 2023-03-15 ENCOUNTER — OFFICE VISIT (OUTPATIENT)
Dept: OBGYN CLINIC | Facility: CLINIC | Age: 48
End: 2023-03-15
Payer: MEDICAID

## 2023-03-15 VITALS
HEART RATE: 98 BPM | SYSTOLIC BLOOD PRESSURE: 134 MMHG | HEIGHT: 59 IN | BODY MASS INDEX: 34.5 KG/M2 | WEIGHT: 171.13 LBS | DIASTOLIC BLOOD PRESSURE: 82 MMHG

## 2023-03-15 DIAGNOSIS — N92.4 EXCESSIVE BLEEDING IN PREMENOPAUSAL PERIOD: Primary | ICD-10-CM

## 2023-03-15 PROCEDURE — 3079F DIAST BP 80-89 MM HG: CPT | Performed by: OBSTETRICS & GYNECOLOGY

## 2023-03-15 PROCEDURE — 3008F BODY MASS INDEX DOCD: CPT | Performed by: OBSTETRICS & GYNECOLOGY

## 2023-03-15 PROCEDURE — 3075F SYST BP GE 130 - 139MM HG: CPT | Performed by: OBSTETRICS & GYNECOLOGY

## 2023-03-15 PROCEDURE — 99214 OFFICE O/P EST MOD 30 MIN: CPT | Performed by: OBSTETRICS & GYNECOLOGY

## 2023-03-16 ENCOUNTER — OFFICE VISIT (OUTPATIENT)
Dept: PHYSICAL THERAPY | Age: 48
End: 2023-03-16
Attending: OBSTETRICS & GYNECOLOGY
Payer: MEDICAID

## 2023-03-16 PROCEDURE — 97112 NEUROMUSCULAR REEDUCATION: CPT

## 2023-03-17 ENCOUNTER — TELEPHONE (OUTPATIENT)
Dept: FAMILY MEDICINE CLINIC | Facility: CLINIC | Age: 48
End: 2023-03-17

## 2023-03-17 NOTE — TELEPHONE ENCOUNTER
Pt called with swelling in legs intermittently for months, now more regular with standing, more red and warm, no SOB,     Encouraged her to get a follow up and get treatment but based on length of this, it is OK

## 2023-03-20 ENCOUNTER — TELEPHONE (OUTPATIENT)
Dept: INTERNAL MEDICINE CLINIC | Facility: CLINIC | Age: 48
End: 2023-03-20

## 2023-03-20 ENCOUNTER — TELEPHONE (OUTPATIENT)
Dept: PHYSICAL THERAPY | Facility: HOSPITAL | Age: 48
End: 2023-03-20

## 2023-03-20 DIAGNOSIS — I10 BENIGN ESSENTIAL HTN: Primary | ICD-10-CM

## 2023-03-20 RX ORDER — VALSARTAN 80 MG/1
80 TABLET ORAL DAILY
Qty: 30 TABLET | Refills: 0 | Status: SHIPPED | OUTPATIENT
Start: 2023-03-20

## 2023-03-20 NOTE — TELEPHONE ENCOUNTER
TO- has OV scheduled with  3/21, see sooner? Any further recommendations? Spoke with patient via phone for further triage. Patient confirms symptoms as reported in note below, but added the following:     -since starting amlodipine she has always had some swelling to lower legs, but this past Thursday/Friday \"swelling to both legs was much worse, and happens every time she stands up (even moving from bed to bathroom\"  -reports \"redness (blotchiness) now happens with swelling- from top of knees/sides of legs down\"  -confirms \"swelling and redness normally resolve with laying down, but not today- while swelling is better not completely resolved like it was before when elevating them. \"    -patient stopped amlodipine, last dose was \"past Saturday morning\"  - Denies shortness of breath, difficulty breathing or problems walking. Denies leg pain \"feels more like a pressure\". Feels \"very tired, has headache and feels a bit nauseated\".

## 2023-03-20 NOTE — TELEPHONE ENCOUNTER
Spoke with patient via phone. Informed of TO's recommendations. Patient confirmed new script can be sent to [facility] in [place]. Confirmed with patient need to keep OV scheduled with SM in the morning. Patient verbalized understanding.       Medication order placed

## 2023-03-20 NOTE — TELEPHONE ENCOUNTER
Pt called c/o swelling in both legs. She states when she stands for periods of time her ankles and knees swell. She states her legs are red and hot. Pt states this has been going on for a few months. See te from 3/17/23 from [employer]. Pt is scheduled at soonest opening, pt would like to know if she can be squeezed in today or what recommendations there are prior to her appt.      Future Appointments   Date Time Provider Duc Jeffries   3/21/2023 11:00 AM FIDEL Florian [facility] [place]

## 2023-03-21 ENCOUNTER — APPOINTMENT (OUTPATIENT)
Dept: PHYSICAL THERAPY | Age: 48
End: 2023-03-21
Attending: OBSTETRICS & GYNECOLOGY
Payer: MEDICAID

## 2023-03-21 ENCOUNTER — OFFICE VISIT (OUTPATIENT)
Dept: INTERNAL MEDICINE CLINIC | Facility: CLINIC | Age: 48
End: 2023-03-21
Payer: MEDICAID

## 2023-03-21 VITALS
TEMPERATURE: 98 F | HEIGHT: 59 IN | DIASTOLIC BLOOD PRESSURE: 78 MMHG | WEIGHT: 169.38 LBS | HEART RATE: 76 BPM | OXYGEN SATURATION: 99 % | RESPIRATION RATE: 12 BRPM | SYSTOLIC BLOOD PRESSURE: 120 MMHG | BODY MASS INDEX: 34.15 KG/M2

## 2023-03-21 DIAGNOSIS — M79.89 LEG SWELLING: ICD-10-CM

## 2023-03-21 DIAGNOSIS — I10 BENIGN ESSENTIAL HTN: Primary | ICD-10-CM

## 2023-03-21 PROCEDURE — 3008F BODY MASS INDEX DOCD: CPT | Performed by: FAMILY MEDICINE

## 2023-03-21 PROCEDURE — 3078F DIAST BP <80 MM HG: CPT | Performed by: FAMILY MEDICINE

## 2023-03-21 PROCEDURE — 3074F SYST BP LT 130 MM HG: CPT | Performed by: FAMILY MEDICINE

## 2023-03-21 PROCEDURE — 99214 OFFICE O/P EST MOD 30 MIN: CPT | Performed by: FAMILY MEDICINE

## 2023-03-27 ENCOUNTER — TELEPHONE (OUTPATIENT)
Dept: OBGYN CLINIC | Facility: CLINIC | Age: 48
End: 2023-03-27

## 2023-03-27 NOTE — TELEPHONE ENCOUNTER
Patient is having ultrasound with radiology at THE [facility] [place], [place]. They will be obtaining PA for ultrasound. LMP: 2/24/23  Possible faint positive upt  Advised that she can check again in a week to see if positive if no menses. Questions answered about ultrasound. Patient states understanding.

## 2023-03-27 NOTE — TELEPHONE ENCOUNTER
Pt states referral not processed with her insurance. They do not have anything. Please look into this referral, pt is having it through [facility] .    Nurses pt also states she is having pregnancy symptoms and fears she might be pregnant, so important for her to have this test. She is also asking if a lab test can be done?

## 2023-03-28 ENCOUNTER — HOSPITAL ENCOUNTER (OUTPATIENT)
Dept: ULTRASOUND IMAGING | Age: 48
Discharge: HOME | End: 2023-03-28
Attending: OBSTETRICS & GYNECOLOGY
Payer: MEDICAID

## 2023-03-28 ENCOUNTER — TELEPHONE (OUTPATIENT)
Dept: OBGYN CLINIC | Facility: CLINIC | Age: 48
End: 2023-03-28

## 2023-03-28 ENCOUNTER — OFFICE VISIT (OUTPATIENT)
Dept: PHYSICAL THERAPY | Age: 48
End: 2023-03-28
Attending: OBSTETRICS & GYNECOLOGY
Payer: MEDICAID

## 2023-03-28 DIAGNOSIS — N92.4 EXCESSIVE BLEEDING IN PREMENOPAUSAL PERIOD: ICD-10-CM

## 2023-03-28 DIAGNOSIS — N92.4 EXCESSIVE BLEEDING IN PREMENOPAUSAL PERIOD: Primary | ICD-10-CM

## 2023-03-28 PROCEDURE — 76830 TRANSVAGINAL US NON-OB: CPT | Performed by: OBSTETRICS & GYNECOLOGY

## 2023-03-28 PROCEDURE — 97112 NEUROMUSCULAR REEDUCATION: CPT

## 2023-03-28 PROCEDURE — 76856 US EXAM PELVIC COMPLETE: CPT | Performed by: OBSTETRICS & GYNECOLOGY

## 2023-03-28 NOTE — TELEPHONE ENCOUNTER
Contacted patient. Explained that transabdominal and transvaginal ultrasound prior auth was obtained, however the hospital was not able to get an authorization for the doppler studies. We can complete an appeal for the doppler studies or we can change order to just transabd and transvaginal ultrasound for today and check with Dr. Harsha Wharton when she returns to office regarding doppler studies. Patient desires to have [phone] only today. New order placed.   Message sent to Dr. Harsha Wharton

## 2023-03-28 NOTE — TELEPHONE ENCOUNTER
Pt's insurance has denied the portion of pt's US that shows the blood vessels ( [phone]). Pt's US appointment is today, and pt has been notified yesterday that a part of the [address] has not been authorized. Please note CPT's [id number] & [id number] are authorized.     P2P # [phone] Option # 4  Reference # [id number]    The following is the letter received today from [address] [place] [address]:

## 2023-03-29 ENCOUNTER — APPOINTMENT (OUTPATIENT)
Dept: PHYSICAL THERAPY | Age: 48
End: 2023-03-29
Attending: OBSTETRICS & GYNECOLOGY
Payer: MEDICAID

## 2023-04-03 ENCOUNTER — OFFICE VISIT (OUTPATIENT)
Dept: INTERNAL MEDICINE CLINIC | Facility: CLINIC | Age: 48
End: 2023-04-03
Payer: MEDICAID

## 2023-04-03 ENCOUNTER — TELEPHONE (OUTPATIENT)
Dept: PHYSICAL THERAPY | Facility: HOSPITAL | Age: 48
End: 2023-04-03

## 2023-04-03 VITALS
DIASTOLIC BLOOD PRESSURE: 78 MMHG | BODY MASS INDEX: 34.44 KG/M2 | HEIGHT: 59 IN | WEIGHT: 170.81 LBS | OXYGEN SATURATION: 98 % | SYSTOLIC BLOOD PRESSURE: 122 MMHG | HEART RATE: 68 BPM | TEMPERATURE: 98 F

## 2023-04-03 DIAGNOSIS — N91.2 ABSENT MENSES: ICD-10-CM

## 2023-04-03 DIAGNOSIS — E04.1 THYROID NODULE: ICD-10-CM

## 2023-04-03 DIAGNOSIS — R73.09 ELEVATED GLUCOSE: ICD-10-CM

## 2023-04-03 DIAGNOSIS — I10 BENIGN ESSENTIAL HTN: Primary | ICD-10-CM

## 2023-04-03 DIAGNOSIS — M79.89 LEG SWELLING: ICD-10-CM

## 2023-04-03 PROCEDURE — 99214 OFFICE O/P EST MOD 30 MIN: CPT | Performed by: FAMILY MEDICINE

## 2023-04-03 PROCEDURE — 3078F DIAST BP <80 MM HG: CPT | Performed by: FAMILY MEDICINE

## 2023-04-03 PROCEDURE — 3008F BODY MASS INDEX DOCD: CPT | Performed by: FAMILY MEDICINE

## 2023-04-03 PROCEDURE — 3074F SYST BP LT 130 MM HG: CPT | Performed by: FAMILY MEDICINE

## 2023-04-03 RX ORDER — VALSARTAN 80 MG/1
80 TABLET ORAL DAILY
Qty: 90 TABLET | Refills: 0 | Status: SHIPPED | OUTPATIENT
Start: 2023-04-03

## 2023-04-04 ENCOUNTER — LAB ENCOUNTER (OUTPATIENT)
Dept: LAB | Age: 48
End: 2023-04-04
Attending: FAMILY MEDICINE
Payer: MEDICAID

## 2023-04-04 ENCOUNTER — APPOINTMENT (OUTPATIENT)
Dept: PHYSICAL THERAPY | Age: 48
End: 2023-04-04
Attending: OBSTETRICS & GYNECOLOGY
Payer: MEDICAID

## 2023-04-04 LAB
B-HCG SERPL-ACNC: <1 MIU/ML
CHOLEST SERPL-MCNC: 192 MG/DL (ref ?–200)
EST. AVERAGE GLUCOSE BLD GHB EST-MCNC: 117 MG/DL (ref 68–126)
FASTING PATIENT LIPID ANSWER: YES
HBA1C MFR BLD: 5.7 % (ref ?–5.7)
HDLC SERPL-MCNC: 32 MG/DL (ref 40–59)
LDLC SERPL CALC-MCNC: 130 MG/DL (ref ?–100)
NONHDLC SERPL-MCNC: 160 MG/DL (ref ?–130)
TRIGL SERPL-MCNC: 166 MG/DL (ref 30–149)
VLDLC SERPL CALC-MCNC: 30 MG/DL (ref 0–30)

## 2023-04-05 ENCOUNTER — APPOINTMENT (OUTPATIENT)
Dept: PHYSICAL THERAPY | Age: 48
End: 2023-04-05
Attending: OBSTETRICS & GYNECOLOGY
Payer: MEDICAID

## 2023-04-11 ENCOUNTER — OFFICE VISIT (OUTPATIENT)
Dept: PHYSICAL THERAPY | Age: 48
End: 2023-04-11
Attending: OBSTETRICS & GYNECOLOGY
Payer: MEDICAID

## 2023-04-11 ENCOUNTER — TELEPHONE (OUTPATIENT)
Dept: INTERNAL MEDICINE CLINIC | Facility: CLINIC | Age: 48
End: 2023-04-11

## 2023-04-11 PROCEDURE — 97112 NEUROMUSCULAR REEDUCATION: CPT

## 2023-04-11 NOTE — TELEPHONE ENCOUNTER
TO: please advise. Spoke with pt. She stated since taking Valsartan she has had CP/nausea/ HA's. Describes CP as intermittent on the left side under breast. Feels worse with certain movements/twists. Does not radiate, except one day her left arm felt \"funny\". No SOB  Pt also stated while taking it, her hands and feet get so cold. Pt did not take it on Friday and states she had no s/s. Saturday she took valsartan again and she was nauseous, sweaty and looked pale. Slept for a few hours and felt better. Has not taken Valsartan on mon/today. Feels fine. Denies HA, CP, nausea, cold hands/feet. Advised pt if CP returns to be examined in ER/IC. In the meantime, we'll wait to hear from TO and not to take Valsartan until he advises.  She v/u

## 2023-04-11 NOTE — TELEPHONE ENCOUNTER
Pt called stating she was recently put on Valsartan. She states after taking it her hands and feet got cold, has CP, and nausea. Pt states she stopped taking it on Monday. Pt would like to know what is recommended at this time.

## 2023-04-11 NOTE — TELEPHONE ENCOUNTER
Spoke with pt. Gave recs below. Pt does not think it is anxiety related at all. Pt will stop it for a week, than restart valsartan at half dose next week. Advised pt to give us an update after a week of being at half dose.      Pt v/u

## 2023-04-12 ENCOUNTER — APPOINTMENT (OUTPATIENT)
Dept: PHYSICAL THERAPY | Age: 48
End: 2023-04-12
Attending: OBSTETRICS & GYNECOLOGY
Payer: MEDICAID

## 2023-04-18 ENCOUNTER — OFFICE VISIT (OUTPATIENT)
Dept: PHYSICAL THERAPY | Age: 48
End: 2023-04-18
Attending: OBSTETRICS & GYNECOLOGY
Payer: MEDICAID

## 2023-04-18 PROCEDURE — 97112 NEUROMUSCULAR REEDUCATION: CPT

## 2023-04-18 PROCEDURE — 97140 MANUAL THERAPY 1/> REGIONS: CPT

## 2023-04-19 ENCOUNTER — APPOINTMENT (OUTPATIENT)
Dept: PHYSICAL THERAPY | Age: 48
End: 2023-04-19
Attending: OBSTETRICS & GYNECOLOGY
Payer: MEDICAID

## 2023-04-25 ENCOUNTER — OFFICE VISIT (OUTPATIENT)
Dept: PHYSICAL THERAPY | Age: 48
End: 2023-04-25
Attending: OBSTETRICS & GYNECOLOGY
Payer: MEDICAID

## 2023-04-25 PROCEDURE — 97110 THERAPEUTIC EXERCISES: CPT

## 2023-04-25 PROCEDURE — 97140 MANUAL THERAPY 1/> REGIONS: CPT

## 2023-04-26 ENCOUNTER — APPOINTMENT (OUTPATIENT)
Dept: PHYSICAL THERAPY | Age: 48
End: 2023-04-26
Attending: OBSTETRICS & GYNECOLOGY
Payer: MEDICAID

## 2023-05-02 ENCOUNTER — TELEPHONE (OUTPATIENT)
Dept: PHYSICAL THERAPY | Facility: HOSPITAL | Age: 48
End: 2023-05-02

## 2023-05-02 ENCOUNTER — APPOINTMENT (OUTPATIENT)
Dept: PHYSICAL THERAPY | Age: 48
End: 2023-05-02
Attending: OBSTETRICS & GYNECOLOGY
Payer: MEDICAID

## 2023-05-03 ENCOUNTER — APPOINTMENT (OUTPATIENT)
Dept: PHYSICAL THERAPY | Age: 48
End: 2023-05-03
Attending: OBSTETRICS & GYNECOLOGY
Payer: MEDICAID

## 2023-05-04 ENCOUNTER — OFFICE VISIT (OUTPATIENT)
Dept: FAMILY MEDICINE CLINIC | Facility: CLINIC | Age: 48
End: 2023-05-04
Payer: MEDICAID

## 2023-05-04 VITALS
RESPIRATION RATE: 16 BRPM | WEIGHT: 174 LBS | SYSTOLIC BLOOD PRESSURE: 102 MMHG | OXYGEN SATURATION: 97 % | HEART RATE: 69 BPM | DIASTOLIC BLOOD PRESSURE: 68 MMHG | BODY MASS INDEX: 35.08 KG/M2 | TEMPERATURE: 97 F | HEIGHT: 59 IN

## 2023-05-04 DIAGNOSIS — Z20.818 EXPOSURE TO STREP THROAT: ICD-10-CM

## 2023-05-04 DIAGNOSIS — R51.9 INTRACTABLE HEADACHE, UNSPECIFIED CHRONICITY PATTERN, UNSPECIFIED HEADACHE TYPE: ICD-10-CM

## 2023-05-04 DIAGNOSIS — J02.9 SORE THROAT: Primary | ICD-10-CM

## 2023-05-04 LAB
CONTROL LINE PRESENT WITH A CLEAR BACKGROUND (YES/NO): YES YES/NO
KIT LOT #: NORMAL NUMERIC
OPERATOR ID: NORMAL
POCT LOT NUMBER: NORMAL
RAPID SARS-COV-2 BY PCR: NOT DETECTED
STREP GRP A CUL-SCR: NEGATIVE

## 2023-05-04 PROCEDURE — 87880 STREP A ASSAY W/OPTIC: CPT | Performed by: NURSE PRACTITIONER

## 2023-05-04 PROCEDURE — U0002 COVID-19 LAB TEST NON-CDC: HCPCS | Performed by: NURSE PRACTITIONER

## 2023-05-04 PROCEDURE — 3008F BODY MASS INDEX DOCD: CPT | Performed by: NURSE PRACTITIONER

## 2023-05-04 PROCEDURE — 3074F SYST BP LT 130 MM HG: CPT | Performed by: NURSE PRACTITIONER

## 2023-05-04 PROCEDURE — 99213 OFFICE O/P EST LOW 20 MIN: CPT | Performed by: NURSE PRACTITIONER

## 2023-05-04 PROCEDURE — 3078F DIAST BP <80 MM HG: CPT | Performed by: NURSE PRACTITIONER

## 2023-05-05 ENCOUNTER — HOSPITAL ENCOUNTER (OUTPATIENT)
Age: 48
Discharge: HOME | End: 2023-05-05
Attending: EMERGENCY MEDICINE
Payer: MEDICAID

## 2023-05-05 ENCOUNTER — APPOINTMENT (OUTPATIENT)
Dept: CT IMAGING | Age: 48
End: 2023-05-05
Attending: EMERGENCY MEDICINE
Payer: MEDICAID

## 2023-05-05 VITALS
WEIGHT: 173 LBS | HEART RATE: 60 BPM | SYSTOLIC BLOOD PRESSURE: 134 MMHG | HEIGHT: 59 IN | TEMPERATURE: 99 F | BODY MASS INDEX: 34.88 KG/M2 | DIASTOLIC BLOOD PRESSURE: 87 MMHG | RESPIRATION RATE: 16 BRPM | OXYGEN SATURATION: 97 %

## 2023-05-05 DIAGNOSIS — R10.12 ABDOMINAL PAIN, LEFT UPPER QUADRANT: Primary | ICD-10-CM

## 2023-05-05 LAB
#MXD IC: 0.2 X10ˆ3/UL (ref 0.1–1)
#MXD IC: 0.3 X10ˆ3/UL (ref 0.1–1)
B-HCG UR QL: NEGATIVE
BUN BLD-MCNC: 11 MG/DL (ref 7–18)
CHLORIDE BLD-SCNC: 104 MMOL/L (ref 98–112)
CO2 BLD-SCNC: 26 MMOL/L (ref 21–32)
CREAT BLD-MCNC: 0.7 MG/DL
GFR SERPLBLD BASED ON 1.73 SQ M-ARVRAT: 108 ML/MIN/1.73M2 (ref 60–?)
GLUCOSE BLD-MCNC: 91 MG/DL (ref 70–99)
HCT VFR BLD AUTO: 38.6 %
HCT VFR BLD AUTO: >57.5 %
HCT VFR BLD CALC: 41 %
HGB BLD-MCNC: 12.4 G/DL
HGB BLD-MCNC: 20.6 G/DL
ISTAT IONIZED CALCIUM FOR CHEM 8: 1.23 MMOL/L (ref 1.12–1.32)
LYMPHOCYTES # BLD AUTO: 0.7 X10ˆ3/UL (ref 1–4)
LYMPHOCYTES # BLD AUTO: 1.8 X10ˆ3/UL (ref 1–4)
LYMPHOCYTES NFR BLD AUTO: 21.4 %
LYMPHOCYTES NFR BLD AUTO: 22.8 %
MCH RBC QN AUTO: 28.1 PG (ref 26–34)
MCH RBC QN AUTO: 28.6 PG (ref 26–34)
MCHC RBC AUTO-ENTMCNC: 32.1 G/DL (ref 31–37)
MCHC RBC AUTO-ENTMCNC: 33 G/DL (ref 31–37)
MCV RBC AUTO: 86.7 FL (ref 80–100)
MCV RBC AUTO: 87.3 FL (ref 80–100)
MIXED CELL %: 3.9 %
MIXED CELL %: 5.1 %
NEUTROPHILS # BLD AUTO: 2.4 X10ˆ3/UL (ref 1.5–7.7)
NEUTROPHILS # BLD AUTO: 5.9 X10ˆ3/UL (ref 1.5–7.7)
NEUTROPHILS NFR BLD AUTO: 73.3 %
NEUTROPHILS NFR BLD AUTO: 73.5 %
PLATELET # BLD AUTO: 110 X10ˆ3/UL (ref 150–450)
PLATELET # BLD AUTO: 327 X10ˆ3/UL (ref 150–450)
POCT BILIRUBIN URINE: NEGATIVE
POCT GLUCOSE URINE: NEGATIVE MG/DL
POCT KETONE URINE: NEGATIVE MG/DL
POCT NITRITE URINE: NEGATIVE
POCT PH URINE: 7 (ref 5–8)
POCT PROTEIN URINE: 30 MG/DL
POCT SPECIFIC GRAVITY URINE: 1.02
POCT URINE CLARITY: CLEAR
POCT URINE COLOR: YELLOW
POCT UROBILINOGEN URINE: 0.2 MG/DL
POTASSIUM BLD-SCNC: 3.9 MMOL/L (ref 3.6–5.1)
RBC # BLD AUTO: 4.42 X10ˆ6/UL
RBC # BLD AUTO: >7 X10ˆ6/UL
SODIUM BLD-SCNC: 140 MMOL/L (ref 136–145)
WBC # BLD AUTO: 3.3 X10ˆ3/UL (ref 4–11)
WBC # BLD AUTO: 8 X10ˆ3/UL (ref 4–11)

## 2023-05-05 PROCEDURE — 80047 BASIC METABLC PNL IONIZED CA: CPT

## 2023-05-05 PROCEDURE — 85025 COMPLETE CBC W/AUTO DIFF WBC: CPT | Performed by: EMERGENCY MEDICINE

## 2023-05-05 PROCEDURE — 96374 THER/PROPH/DIAG INJ IV PUSH: CPT

## 2023-05-05 PROCEDURE — 99215 OFFICE O/P EST HI 40 MIN: CPT

## 2023-05-05 PROCEDURE — 96361 HYDRATE IV INFUSION ADD-ON: CPT

## 2023-05-05 PROCEDURE — 96375 TX/PRO/DX INJ NEW DRUG ADDON: CPT

## 2023-05-05 PROCEDURE — 81002 URINALYSIS NONAUTO W/O SCOPE: CPT | Performed by: EMERGENCY MEDICINE

## 2023-05-05 PROCEDURE — 74177 CT ABD & PELVIS W/CONTRAST: CPT | Performed by: EMERGENCY MEDICINE

## 2023-05-05 PROCEDURE — S0028 INJECTION, FAMOTIDINE, 20 MG: HCPCS

## 2023-05-05 PROCEDURE — 99214 OFFICE O/P EST MOD 30 MIN: CPT

## 2023-05-05 PROCEDURE — 81025 URINE PREGNANCY TEST: CPT

## 2023-05-05 RX ORDER — SODIUM CHLORIDE 9 MG/ML
1000 INJECTION, SOLUTION INTRAVENOUS ONCE
Status: COMPLETED | OUTPATIENT
Start: 2023-05-05 | End: 2023-05-05

## 2023-05-05 RX ORDER — ONDANSETRON 2 MG/ML
4 INJECTION INTRAMUSCULAR; INTRAVENOUS ONCE
Status: COMPLETED | OUTPATIENT
Start: 2023-05-05 | End: 2023-05-05

## 2023-05-05 RX ORDER — FAMOTIDINE 10 MG/ML
20 INJECTION, SOLUTION INTRAVENOUS ONCE
Status: COMPLETED | OUTPATIENT
Start: 2023-05-05 | End: 2023-05-05

## 2023-05-05 NOTE — ED INITIAL ASSESSMENT (HPI)
Patient reports left abdominal pain since yesterday. Patient reports that OTC remedies are not working. Patient also reports nausea.

## 2023-05-05 NOTE — DISCHARGE INSTRUCTIONS
Pepcid 20 mg daily for 2 weeks  [place] diet  Avoid caffeine and alcohol  Go to emergency room if you feel worse  Follow-up with your primary care physician on Monday

## 2023-05-09 ENCOUNTER — OFFICE VISIT (OUTPATIENT)
Dept: PHYSICAL THERAPY | Age: 48
End: 2023-05-09
Attending: OBSTETRICS & GYNECOLOGY
Payer: MEDICAID

## 2023-05-09 PROCEDURE — 97112 NEUROMUSCULAR REEDUCATION: CPT

## 2023-05-16 ENCOUNTER — OFFICE VISIT (OUTPATIENT)
Dept: PHYSICAL THERAPY | Age: 48
End: 2023-05-16
Attending: OBSTETRICS & GYNECOLOGY
Payer: MEDICAID

## 2023-05-16 PROCEDURE — 97110 THERAPEUTIC EXERCISES: CPT

## 2023-05-17 ENCOUNTER — APPOINTMENT (OUTPATIENT)
Dept: PHYSICAL THERAPY | Age: 48
End: 2023-05-17
Attending: OBSTETRICS & GYNECOLOGY
Payer: MEDICAID

## 2023-05-30 ENCOUNTER — OFFICE VISIT (OUTPATIENT)
Dept: PHYSICAL THERAPY | Age: 48
End: 2023-05-30
Attending: OBSTETRICS & GYNECOLOGY
Payer: MEDICAID

## 2023-05-30 PROCEDURE — 97112 NEUROMUSCULAR REEDUCATION: CPT

## 2023-06-20 ENCOUNTER — TELEPHONE (OUTPATIENT)
Dept: INTERNAL MEDICINE CLINIC | Facility: CLINIC | Age: 48
End: 2023-06-20

## 2023-06-20 DIAGNOSIS — Z12.31 ENCOUNTER FOR SCREENING MAMMOGRAM FOR MALIGNANT NEOPLASM OF BREAST: Primary | ICD-10-CM

## 2023-06-22 ENCOUNTER — VIRTUAL PHONE E/M (OUTPATIENT)
Dept: UROLOGY | Facility: HOSPITAL | Age: 48
End: 2023-06-22
Attending: OBSTETRICS & GYNECOLOGY
Payer: MEDICAID

## 2023-06-22 DIAGNOSIS — N39.41 URGE INCONTINENCE: ICD-10-CM

## 2023-06-22 DIAGNOSIS — N81.11 CYSTOCELE, MIDLINE: ICD-10-CM

## 2023-06-22 DIAGNOSIS — N39.3 FEMALE STRESS INCONTINENCE: ICD-10-CM

## 2023-06-22 DIAGNOSIS — R33.9 INCOMPLETE BLADDER EMPTYING: Primary | ICD-10-CM

## 2023-06-22 DIAGNOSIS — N32.81 DETRUSOR INSTABILITY: ICD-10-CM

## 2023-06-22 DIAGNOSIS — N81.6 RECTOCELE: ICD-10-CM

## 2023-06-22 NOTE — PROGRESS NOTES
Patient to follow up bulge & UI  Given circumstances surrounding COVID-19 this visit is being conducted as a televisit with pt's consent. Pt in safe, private environment for televisit, provider located in the office setting    She is currently using pelvic floor PT    Muscles getting stronger  Some bulge sx persists  Some urgency persists    KIKE persists  Less UUI, now rare      LMP 04/30/2023 (Approximate)       Impression/Plan:  (R33.9) Incomplete bladder emptying  (primary encounter diagnosis)    (N39.3) Female stress incontinence    (N39.41) Urge incontinence    (N32.81) Detrusor instability    (N81.6) Rectocele    (N81.11) Cystocele, midline      Discussion Items:   Urodynamics and cystoscopy for evaluation of LUTS  Behavioral and pharmacologic treatments for OAB  Nonsurgical and surgical treatments for Stress Urinary Incontinence  Pelvic muscle rehabilitation including pelvic floor PT  discussed further mgmt of voiding dysfunction - CISC vs indwelling catheter    Discussed dietary and behavioral modifications for mgmt of urinary symptoms  Discussed weight management and benefits of weight loss on urinary symptoms  Reviewed AUA/SUFU guidelines on mgmt of non-neurogenic OAB  Discussed pharmacologic and nonpharmacologic mgmt options of urinary symptoms - reviewed risks, benefits, alternatives, and goals of treatment  Discussed specific risks related to OAB meds including, but not limited to dry mouth, constipation, blurry vision, cognitive changes, and BP elevation. Discussed management options for KIKE including expectant management, pelvic floor PT, pessary, and surgery  Discussed risks and benefits associated with each option  Discussed urodynamic testing    Plan for CISC teaching    All questions answered  She understands and agrees to plan    Return for CISC.     Bailey Rodgers, DO, FACOG, FACS

## 2023-06-29 ENCOUNTER — NURSE ONLY (OUTPATIENT)
Dept: UROLOGY | Facility: HOSPITAL | Age: 48
End: 2023-06-29
Attending: OBSTETRICS & GYNECOLOGY
Payer: MEDICAID

## 2023-06-29 VITALS
HEIGHT: 59 IN | BODY MASS INDEX: 34.88 KG/M2 | WEIGHT: 173 LBS | SYSTOLIC BLOOD PRESSURE: 140 MMHG | DIASTOLIC BLOOD PRESSURE: 75 MMHG

## 2023-06-29 DIAGNOSIS — R33.9 INCOMPLETE BLADDER EMPTYING: Primary | ICD-10-CM

## 2023-06-29 PROCEDURE — 51701 INSERT BLADDER CATHETER: CPT

## 2023-07-03 ENCOUNTER — OFFICE VISIT (OUTPATIENT)
Dept: INTERNAL MEDICINE CLINIC | Facility: CLINIC | Age: 48
End: 2023-07-03
Payer: MEDICAID

## 2023-07-03 VITALS
RESPIRATION RATE: 16 BRPM | HEIGHT: 59 IN | OXYGEN SATURATION: 98 % | TEMPERATURE: 98 F | HEART RATE: 68 BPM | WEIGHT: 175.38 LBS | BODY MASS INDEX: 35.36 KG/M2 | SYSTOLIC BLOOD PRESSURE: 116 MMHG | DIASTOLIC BLOOD PRESSURE: 86 MMHG

## 2023-07-03 DIAGNOSIS — N91.2 AMENORRHEA: ICD-10-CM

## 2023-07-03 DIAGNOSIS — I10 BENIGN ESSENTIAL HTN: Primary | ICD-10-CM

## 2023-07-03 PROBLEM — N20.0 KIDNEY STONES: Status: RESOLVED | Noted: 2022-06-07 | Resolved: 2023-07-03

## 2023-07-03 PROBLEM — R10.2 PELVIC PAIN: Status: RESOLVED | Noted: 2022-08-09 | Resolved: 2023-07-03

## 2023-07-03 PROBLEM — N12 PYELONEPHRITIS: Status: RESOLVED | Noted: 2022-06-07 | Resolved: 2023-07-03

## 2023-07-03 PROCEDURE — 3008F BODY MASS INDEX DOCD: CPT | Performed by: FAMILY MEDICINE

## 2023-07-03 PROCEDURE — 3079F DIAST BP 80-89 MM HG: CPT | Performed by: FAMILY MEDICINE

## 2023-07-03 PROCEDURE — 3074F SYST BP LT 130 MM HG: CPT | Performed by: FAMILY MEDICINE

## 2023-07-03 PROCEDURE — 99213 OFFICE O/P EST LOW 20 MIN: CPT | Performed by: FAMILY MEDICINE

## 2023-07-05 ENCOUNTER — TELEPHONE (OUTPATIENT)
Dept: UROLOGY | Facility: HOSPITAL | Age: 48
End: 2023-07-05

## 2023-07-05 NOTE — TELEPHONE ENCOUNTER
TC from patient on nursing line regarding CISC. TC to patient regarding her difficulties she is experiencing with her CISC. She feels there is [place] blockage\" when straight cathing herself. She is able to get a flash of urine, but then feels she cannot advance the cathter any further and \"feels a blockage or suction\" of the catheter. It is difficult for her to explain. Denies s/sx of UTI. Denies s/sx of kidney stones. Patient lives an hour away and does not have childcare. Declines coming in today, but was instructed to call us tomorrow, Thursday, 7/6/23, with a voiding update. Instructed to increase fluids, drink 4-6 glasses of water a day, advance catheter, twist and reposition cathter if necessary, disregard catheter and try again with a different catheter. Continue CISC 3x a day as well record voids/catheterizations. Patient feels she has enough supplies currently. Will follow. Addendum:  TC to patient regarding follow up on CISC after yesterday's discussion. Patient feels the CISC procedure is going well now and has not felt \"a blockage\" since chatting yesterday and trouble shooting her CISC technique. She has increased her fluid intake and used an additional catheter when feeling the \"blockage\" with success in CISC. Informed patient to call back with questions or concerns if necessary, otherwise we will see her next Friday, 7/14/23 for her appointment with Dr. Jamison Parra.

## 2023-07-14 ENCOUNTER — OFFICE VISIT (OUTPATIENT)
Dept: UROLOGY | Facility: HOSPITAL | Age: 48
End: 2023-07-14
Attending: OBSTETRICS & GYNECOLOGY
Payer: MEDICAID

## 2023-07-14 VITALS — BODY MASS INDEX: 35.28 KG/M2 | RESPIRATION RATE: 18 BRPM | HEIGHT: 59 IN | WEIGHT: 175 LBS

## 2023-07-14 DIAGNOSIS — N81.6 RECTOCELE: ICD-10-CM

## 2023-07-14 DIAGNOSIS — N32.81 DETRUSOR INSTABILITY: ICD-10-CM

## 2023-07-14 DIAGNOSIS — R35.1 NOCTURIA: ICD-10-CM

## 2023-07-14 DIAGNOSIS — N81.11 CYSTOCELE, MIDLINE: ICD-10-CM

## 2023-07-14 DIAGNOSIS — N39.3 FEMALE STRESS INCONTINENCE: ICD-10-CM

## 2023-07-14 DIAGNOSIS — R33.9 INCOMPLETE BLADDER EMPTYING: Primary | ICD-10-CM

## 2023-07-14 PROCEDURE — 87086 URINE CULTURE/COLONY COUNT: CPT | Performed by: OBSTETRICS & GYNECOLOGY

## 2023-07-14 PROCEDURE — 99212 OFFICE O/P EST SF 10 MIN: CPT

## 2023-07-14 PROCEDURE — 87077 CULTURE AEROBIC IDENTIFY: CPT | Performed by: OBSTETRICS & GYNECOLOGY

## 2023-07-17 ENCOUNTER — TELEPHONE (OUTPATIENT)
Dept: UROLOGY | Facility: HOSPITAL | Age: 48
End: 2023-07-17

## 2023-07-17 DIAGNOSIS — R35.1 NOCTURIA: Primary | ICD-10-CM

## 2023-07-17 RX ORDER — CEPHALEXIN 500 MG/1
500 CAPSULE ORAL 2 TIMES DAILY
Qty: 14 CAPSULE | Refills: 0 | Status: SHIPPED | OUTPATIENT
Start: 2023-07-17 | End: 2023-07-24

## 2023-07-17 NOTE — TELEPHONE ENCOUNTER
Telephone call to patient with test results    Ucx + for 10,000 - 50,000 CFU/ML Alpha hemolytic Streptococcus Not Enterococcus    Allergies reviewed    Discussed with QASIM Shelley Keflex 500mg PO BID x 7 days    Will send antibiotics to pt's preferred pharmacy    Encouraged PO hydration, AZO prn for pain    Encouraged to avoid bladder irritants such as caffeine, alcohol, or carbonation    Patient with question regarding how often to [address], discussed to as needed per Dr. Elsie Rodas last visit note    All questions answered     Encouraged to call if symptoms worsen or fail to improve     Office number provided [phone]    Follow up as scheduled, sooner prn    Patient understands and agrees to plan

## 2023-07-21 ENCOUNTER — HOSPITAL ENCOUNTER (OUTPATIENT)
Dept: MAMMOGRAPHY | Age: 48
Discharge: HOME | End: 2023-07-21
Attending: FAMILY MEDICINE
Payer: MEDICAID

## 2023-07-21 DIAGNOSIS — Z12.31 ENCOUNTER FOR SCREENING MAMMOGRAM FOR MALIGNANT NEOPLASM OF BREAST: ICD-10-CM

## 2023-07-21 PROCEDURE — 77067 SCR MAMMO BI INCL CAD: CPT | Performed by: FAMILY MEDICINE

## 2023-07-21 PROCEDURE — 77063 BREAST TOMOSYNTHESIS BI: CPT | Performed by: FAMILY MEDICINE

## 2023-08-03 ENCOUNTER — NURSE ONLY (OUTPATIENT)
Dept: UROLOGY | Facility: HOSPITAL | Age: 48
End: 2023-08-03
Attending: OBSTETRICS & GYNECOLOGY
Payer: MEDICAID

## 2023-08-03 VITALS — HEIGHT: 59 IN | RESPIRATION RATE: 18 BRPM | BODY MASS INDEX: 35.28 KG/M2 | WEIGHT: 175 LBS

## 2023-08-03 PROCEDURE — 64566 NEUROELTRD STIM POST TIBIAL: CPT

## 2023-08-03 NOTE — PROGRESS NOTES
[facility]  URGENT PC Therapy Note    Date:  8/3/2023    Patient Name:  Beatriz Godinez  Patient :  3/22/1975   Patient MRN:  [id number]  Patient Age:  57year old      Diagnosis:  N39.41 Urge Incontinence and R39.15 Urgency of Urination    Procedure:  Right URGENT PC Therapy:  Posterior tibial nerve stimulation treatment  25107 - posterial tibial neuro stimulation, percutaneous needle electrode, single treatment, includes programming    PTNS Evaluation:  PTNS Session: 1 (Baseline)  Patient reports feeling: N/A  Nocturia: 4+ (10-15x)  Frequency: <1 hr (Every 30-45 mins, CISC 2-3x day (morning, afternoon, before bedtime))  Patient wears pads: Yes  Pads per day: 3  Pads per night: 2  Physician:  Dr. Meena Wisdom    RN:  Ap Jack RN     Indications:  Urinary frequency, urge incontinence, with subjectively severe urge incontinence and inadequate to anti-cholinergic meds. Informed consent was obtained with discussion of risk, benefits and goal, and limits of the procedure including but not limited to bleeding, infection, andnerve injury were discussed and the patient desired to proceed. Description of Procedure: The patient was properly identified and positioned in a semi reclined, comfortable seated position with their feet elevated in a recliner or exam room chair. The insertion site for the needle electrode was identified on the lower inner aspect of the Right leg. This position utilized was approximately 5 cm cephalad to the medial malleolus and one finger breath/2cm posterior to the tibia. Preparation of the needle electrode insertion site was performed using an alcohol pad to clean skin of the above-noted needle insertion site. The needle electrode/guide tube assembly was placed over the identified and cleaned insertion site. The needle was positioned so that it created a 60 degree angle that was maintained between the electrode itself and the ankle.   The stop plug in the guide tube was removed to release the needle electrode. A gentle tap of the needle electrode was used to khalil the skin. Once the needle electrode had been placed into the skin, the guide tube was removed and the needle was gently advanced maintaining a 60 degree angle. Consistent with recommended placement, approximately half the tip of the needle electrode was inserted leaving about 2 cm of needle exposed. The one-way fit connector of the lead wire was inserted into the stimulator's connection site. A surface electrode was placed over the medial aspect of the calcaneus on the lower extremity utilized for stimulation. For this patient, we utilized the Right lower extremity. The needle electrode clip was attached to the needle. The stimulator device was turned on. The test mode was entered. Current adjustment was slowly increased while observing the patient's foot for response. Adjustments were made advancing the needle further in to obtain optimal sensation. An optimal sensation in the foot was noted. Once an optimal response was noted, therapy mode was then initiated. The pt was given a bell to ring for the RN to be able to call for any needed adjustments. Therapy continued without complication for 30 minutes. No additional current adjustments were needed. Once 30 minutes of therapy and completed stimulator was turned off and the needle electrode clip was removed from the needle electrode. The needle was removed from the leg and no significant bleeding was noted. Gentle pressure at the needle insertion site facilitate optimal hemostasis. The surface electrode was removed from the medial calcaneus. The lead wire and lead set components were discarded in an appropriate safe container. This completed the therapy. The patient tolerated the procedure well.     Plan: Weekly PTNS, 8/10/23 @ 11am.  CISC PRN for Incomplete bladder emptying   Encouraged to keep diary this week  Bowel regimen reviewed

## 2023-08-10 ENCOUNTER — NURSE ONLY (OUTPATIENT)
Dept: UROLOGY | Facility: HOSPITAL | Age: 48
End: 2023-08-10
Attending: OBSTETRICS & GYNECOLOGY
Payer: MEDICAID

## 2023-08-10 VITALS — RESPIRATION RATE: 18 BRPM | WEIGHT: 175 LBS | BODY MASS INDEX: 35.28 KG/M2 | HEIGHT: 59 IN

## 2023-08-10 DIAGNOSIS — N39.41 URGE INCONTINENCE: ICD-10-CM

## 2023-08-10 DIAGNOSIS — R35.1 NOCTURIA: Primary | ICD-10-CM

## 2023-08-10 DIAGNOSIS — N39.3 FEMALE STRESS INCONTINENCE: ICD-10-CM

## 2023-08-10 DIAGNOSIS — R33.9 INCOMPLETE BLADDER EMPTYING: ICD-10-CM

## 2023-08-10 LAB
BILIRUB UR QL: NEGATIVE
CLARITY UR: CLEAR
CONTROL RUN WITHIN 24 HOURS?: YES
GLUCOSE UR-MCNC: NORMAL MG/DL
KETONES UR-MCNC: NEGATIVE MG/DL
LEUKOCYTE ESTERASE UR QL STRIP.AUTO: NEGATIVE
LEUKOCYTE ESTERASE URINE: NEGATIVE
NITRITE UR QL STRIP.AUTO: NEGATIVE
NITRITE URINE: NEGATIVE
PH UR: 6 [PH] (ref 5–8)
PROT UR-MCNC: 70 MG/DL
SP GR UR STRIP: 1.02 (ref 1–1.03)
UROBILINOGEN UR STRIP-ACNC: NORMAL

## 2023-08-10 PROCEDURE — 87086 URINE CULTURE/COLONY COUNT: CPT

## 2023-08-10 PROCEDURE — 64566 NEUROELTRD STIM POST TIBIAL: CPT

## 2023-08-10 PROCEDURE — 81001 URINALYSIS AUTO W/SCOPE: CPT

## 2023-08-10 PROCEDURE — 81002 URINALYSIS NONAUTO W/O SCOPE: CPT

## 2023-08-10 NOTE — PROGRESS NOTES
[facility]  URGENT PC Therapy Note    Date:  8/10/2023    Patient Name:  Ulysses Rocha  Patient :  3/22/1975   Patient MRN:  [id number]  Patient Age:  57year old      Diagnosis:  N39.41 Urge Incontinence and R39.15 Urgency of Urination    Procedure:  Left URGENT PC Therapy:  Posterior tibial nerve stimulation treatment  58093 - posterial tibial neuro stimulation, percutaneous needle electrode, single treatment, includes programming    PTNS Evaluation:  PTNS Session: 2  Patient reports feeling: Same  Nocturia:  (8-12)  Frequency: <1 hr  Patient wears pads: Yes  Pads per day: 3  Pads per night: 1  Physician:  Dr. Debi Chambers    RN:  Crystal Harley RN     Indications:  Urinary frequency, urge incontinence, with subjectively severe urge incontinence and inadequate to anti-cholinergic meds. Informed consent was obtained with discussion of risk, benefits and goal, and limits of the procedure including but not limited to bleeding, infection, andnerve injury were discussed and the patient desired to proceed. Description of Procedure: The patient was properly identified and positioned in a semi reclined, comfortable seated position with their feet elevated in a recliner or exam room chair. The insertion site for the needle electrode was identified on the lower inner aspect of the Left leg. This position utilized was approximately 5 cm cephalad to the medial malleolus and one finger breath/2cm posterior to the tibia. Preparation of the needle electrode insertion site was performed using an alcohol pad to clean skin of the above-noted needle insertion site. The needle electrode/guide tube assembly was placed over the identified and cleaned insertion site. The needle was positioned so that it created a 60 degree angle that was maintained between the electrode itself and the ankle. The stop plug in the guide tube was removed to release the needle electrode.     A gentle tap of the needle electrode was used to khalil the skin. Once the needle electrode had been placed into the skin, the guide tube was removed and the needle was gently advanced maintaining a 60 degree angle. Consistent with recommended placement, approximately half the tip of the needle electrode was inserted leaving about 2 cm of needle exposed. The one-way fit connector of the lead wire was inserted into the stimulator's connection site. A surface electrode was placed over the medial aspect of the calcaneus on the lower extremity utilized for stimulation. For this patient, we utilized the Left lower extremity. The needle electrode clip was attached to the needle. The stimulator device was turned on. The test mode was entered. Current adjustment was slowly increased while observing the patient's foot for response. Adjustments were made advancing the needle further in to obtain optimal sensation. An optimal sensation in the foot was noted. Once an optimal response was noted, therapy mode was then initiated. The pt was given a bell to ring for the RN to be able to call for any needed adjustments. Therapy continued without complication for 30 minutes. No additional current adjustments were needed. Once 30 minutes of therapy and completed stimulator was turned off and the needle electrode clip was removed from the needle electrode. The needle was removed from the leg and no significant bleeding was noted. Gentle pressure at the needle insertion site facilitate optimal hemostasis. The surface electrode was removed from the medial calcaneus. The lead wire and lead set components were discarded in an appropriate safe container. This completed the therapy. The patient tolerated the procedure well. Plan:Weekly PTNS next scheduled 8/17/2023    Bowel regimen reviewed   Reviewed voiding diary and pt is drinking very limited fluids -encouraged 40 -60 ounces of water daily.   Recommended to consume adequate fluid volume for several days and CISC x 3 per day and  prn If residuals are greater than 150cc then continue to CISC x3 per day. If residual is less than 150ml then  CISC prn. Pt reports that she has plenty of catheters at home. Discussed with patient using the techniques she learn in PFPT to time her voids every 2 hours and try not to give in to the urges. Pt states that when she was on the keflex that the Nocturia was significantly decreased . Currently Nocturia is 8-12. TORB DR Barrera urine culture and U/A -Pt straight cath 45 ml  specimen -urine clear, non- odorous . Pt not given empiric antibiotic will wait for culture. Pt encouraged to limit fluids after 7 pm to possible decrease nocturia . Pt agrees with above POC and verbalized understanding.

## 2023-08-16 ENCOUNTER — HOSPITAL ENCOUNTER (EMERGENCY)
Age: 48
Discharge: HOME | End: 2023-08-16
Attending: EMERGENCY MEDICINE
Payer: MEDICAID

## 2023-08-16 ENCOUNTER — APPOINTMENT (OUTPATIENT)
Dept: ULTRASOUND IMAGING | Age: 48
End: 2023-08-16
Attending: EMERGENCY MEDICINE
Payer: MEDICAID

## 2023-08-16 ENCOUNTER — NURSE ONLY (OUTPATIENT)
Dept: UROLOGY | Facility: HOSPITAL | Age: 48
End: 2023-08-16
Attending: OBSTETRICS & GYNECOLOGY
Payer: MEDICAID

## 2023-08-16 ENCOUNTER — TELEPHONE (OUTPATIENT)
Dept: OBGYN CLINIC | Facility: CLINIC | Age: 48
End: 2023-08-16

## 2023-08-16 VITALS
BODY MASS INDEX: 32.25 KG/M2 | TEMPERATURE: 97 F | WEIGHT: 160 LBS | RESPIRATION RATE: 16 BRPM | OXYGEN SATURATION: 97 % | DIASTOLIC BLOOD PRESSURE: 95 MMHG | HEART RATE: 77 BPM | SYSTOLIC BLOOD PRESSURE: 124 MMHG | HEIGHT: 59 IN

## 2023-08-16 VITALS — HEIGHT: 59 IN | WEIGHT: 175 LBS | BODY MASS INDEX: 35.28 KG/M2 | RESPIRATION RATE: 18 BRPM

## 2023-08-16 DIAGNOSIS — N39.41 URGE INCONTINENCE: Primary | ICD-10-CM

## 2023-08-16 DIAGNOSIS — N93.8 DYSFUNCTIONAL UTERINE BLEEDING: Primary | ICD-10-CM

## 2023-08-16 LAB
ANION GAP SERPL CALC-SCNC: 5 MMOL/L (ref 0–18)
B-HCG UR QL: NEGATIVE
BASOPHILS # BLD AUTO: 0.04 X10(3) UL (ref 0–0.2)
BASOPHILS NFR BLD AUTO: 0.5 %
BILIRUB UR QL STRIP.AUTO: NEGATIVE
BUN BLD-MCNC: 17 MG/DL (ref 7–18)
CALCIUM BLD-MCNC: 9.4 MG/DL (ref 8.5–10.1)
CHLORIDE SERPL-SCNC: 106 MMOL/L (ref 98–112)
CLARITY UR REFRACT.AUTO: CLEAR
CO2 SERPL-SCNC: 26 MMOL/L (ref 21–32)
COLOR UR AUTO: YELLOW
CREAT BLD-MCNC: 0.9 MG/DL
EGFRCR SERPLBLD CKD-EPI 2021: 80 ML/MIN/1.73M2 (ref 60–?)
EOSINOPHIL # BLD AUTO: 0.25 X10(3) UL (ref 0–0.7)
EOSINOPHIL NFR BLD AUTO: 3.3 %
ERYTHROCYTE [DISTWIDTH] IN BLOOD BY AUTOMATED COUNT: 12.9 %
GLUCOSE BLD-MCNC: 109 MG/DL (ref 70–99)
GLUCOSE UR STRIP.AUTO-MCNC: NEGATIVE MG/DL
HCT VFR BLD AUTO: 44.6 %
HGB BLD-MCNC: 15.1 G/DL
IMM GRANULOCYTES # BLD AUTO: 0.01 X10(3) UL (ref 0–1)
IMM GRANULOCYTES NFR BLD: 0.1 %
KETONES UR STRIP.AUTO-MCNC: NEGATIVE MG/DL
LEUKOCYTE ESTERASE UR QL STRIP.AUTO: NEGATIVE
LYMPHOCYTES # BLD AUTO: 1.97 X10(3) UL (ref 1–4)
LYMPHOCYTES NFR BLD AUTO: 25.9 %
MCH RBC QN AUTO: 30 PG (ref 26–34)
MCHC RBC AUTO-ENTMCNC: 33.9 G/DL (ref 31–37)
MCV RBC AUTO: 88.7 FL
MONOCYTES # BLD AUTO: 0.67 X10(3) UL (ref 0.1–1)
MONOCYTES NFR BLD AUTO: 8.8 %
NEUTROPHILS # BLD AUTO: 4.68 X10 (3) UL (ref 1.5–7.7)
NEUTROPHILS # BLD AUTO: 4.68 X10(3) UL (ref 1.5–7.7)
NEUTROPHILS NFR BLD AUTO: 61.4 %
NITRITE UR QL STRIP.AUTO: NEGATIVE
OSMOLALITY SERPL CALC.SUM OF ELEC: 286 MOSM/KG (ref 275–295)
PH UR STRIP.AUTO: 6 [PH] (ref 5–8)
PLATELET # BLD AUTO: 353 10(3)UL (ref 150–450)
POTASSIUM SERPL-SCNC: 3.6 MMOL/L (ref 3.5–5.1)
RBC # BLD AUTO: 5.03 X10(6)UL
SODIUM SERPL-SCNC: 137 MMOL/L (ref 136–145)
SP GR UR STRIP.AUTO: 1.02 (ref 1–1.03)
UROBILINOGEN UR STRIP.AUTO-MCNC: 0.2 MG/DL
WBC # BLD AUTO: 7.6 X10(3) UL (ref 4–11)

## 2023-08-16 PROCEDURE — 76856 US EXAM PELVIC COMPLETE: CPT | Performed by: EMERGENCY MEDICINE

## 2023-08-16 PROCEDURE — 81001 URINALYSIS AUTO W/SCOPE: CPT | Performed by: EMERGENCY MEDICINE

## 2023-08-16 PROCEDURE — 81015 MICROSCOPIC EXAM OF URINE: CPT | Performed by: EMERGENCY MEDICINE

## 2023-08-16 PROCEDURE — 99284 EMERGENCY DEPT VISIT MOD MDM: CPT

## 2023-08-16 PROCEDURE — 81025 URINE PREGNANCY TEST: CPT

## 2023-08-16 PROCEDURE — 85025 COMPLETE CBC W/AUTO DIFF WBC: CPT | Performed by: EMERGENCY MEDICINE

## 2023-08-16 PROCEDURE — 80048 BASIC METABOLIC PNL TOTAL CA: CPT | Performed by: EMERGENCY MEDICINE

## 2023-08-16 PROCEDURE — 93975 VASCULAR STUDY: CPT | Performed by: EMERGENCY MEDICINE

## 2023-08-16 PROCEDURE — 64566 NEUROELTRD STIM POST TIBIAL: CPT

## 2023-08-16 PROCEDURE — 96360 HYDRATION IV INFUSION INIT: CPT

## 2023-08-16 NOTE — TELEPHONE ENCOUNTER
Pt calling states period started on 10th, then a day ago started with heavy and clotty bleeding. States its very heavy and she admits its scary to her.

## 2023-08-16 NOTE — PROGRESS NOTES
[facility]  URGENT PC Therapy Note    Date:  2023    Patient Name:  Beatriz Godinez  Patient :  3/22/1975   Patient MRN:  [id number]  Patient Age:  57year old      Diagnosis:  N39.41 Urge Incontinence    Procedure:  Right URGENT PC Therapy:  Posterior tibial nerve stimulation treatment  66796 - posterial tibial neuro stimulation, percutaneous needle electrode, single treatment, includes programming    PTNS Evaluation:  PTNS Session: 3  Patient reports feeling: Better  Nocturia: 4+ (Reports using the bathroom 6-8 times prior to bedtime. During the night wakes up evry 1-1.5 hours which is an improvement for the patient.)  Frequency: <1 hr (Patient reports improvement. Po fluids encouraged at last visit. Reports voiding every 1-1.5 hours during the week.)  Patient wears pads: Yes  Pads per day: 3 (Reports wearing 3 to 4 panty liners. Wet throughout the day.)  Pads per night: 2 (Reports wearing 2 to 3 panty liners during the night. Reports liners are wet throughout the night.)  Physician:  Dr. Meena Wisdom    RN:  Tonie Melendez RN     Indications:  Urinary frequency, urge incontinence, with subjectively severe urge incontinence and inadequate to anti-cholinergic meds. Informed consent was obtained with discussion of risk, benefits and goal, and limits of the procedure including but not limited to bleeding, infection, andnerve injury were discussed and the patient desired to proceed. Description of Procedure: The patient was properly identified and positioned in a semi reclined, comfortable seated position with their feet elevated in a recliner or exam room chair. The insertion site for the needle electrode was identified on the lower inner aspect of the Right leg. This position utilized was approximately 5 cm cephalad to the medial malleolus and one finger breath/2cm posterior to the tibia.     Preparation of the needle electrode insertion site was performed using an alcohol pad to clean skin of the above-noted needle insertion site. The needle electrode/guide tube assembly was placed over the identified and cleaned insertion site. The needle was positioned so that it created a 60 degree angle that was maintained between the electrode itself and the ankle. The stop plug in the guide tube was removed to release the needle electrode. A gentle tap of the needle electrode was used to khalil the skin. Once the needle electrode had been placed into the skin, the guide tube was removed and the needle was gently advanced maintaining a 60 degree angle. Consistent with recommended placement, approximately half the tip of the needle electrode was inserted leaving about 2 cm of needle exposed. The one-way fit connector of the lead wire was inserted into the stimulator's connection site. A surface electrode was placed over the medial aspect of the calcaneus on the lower extremity utilized for stimulation. For this patient, we utilized the Right lower extremity. The needle electrode clip was attached to the needle. The stimulator device was turned on. The test mode was entered. Current adjustment was slowly increased while observing the patient's foot for response. Adjustments were made advancing the needle further in to obtain optimal sensation. An optimal toe flexion and sensation in the foot was noted. Once an optimal response was noted, therapy mode was then initiated. The pt was given a bell to ring for the RN to be able to call for any needed adjustments. Therapy continued without complication for 30 minutes. No additional current adjustments were needed. Once 30 minutes of therapy and completed stimulator was turned off and the needle electrode clip was removed from the needle electrode. The needle was removed from the leg and no significant bleeding was noted. Gentle pressure at the needle insertion site facilitate optimal hemostasis.   The surface electrode was removed from the medial calcaneus. The lead wire and lead set components were discarded in an appropriate safe container. This completed the therapy. The patient tolerated the procedure well. Diagnosis:  N39.41 Urge Incontinence     Plan:  Limit fluids 2 hours prior to bedtime. Po fluid encouraged during day hours. Bladder training. Time trips to bathroom. Bladder diary scanned into [employer]. CISC as ordered. Continue with bladder diary.   Call with s/sx of UTI   All questions answered   She understands and agrees to plan      Follow up in one week for next PTNS treatment, sooner prn

## 2023-08-16 NOTE — TELEPHONE ENCOUNTER
Patient reports menses started on 8/10- normal period for 5 days, then day 6 she had heavier bleeding- changing a super tampon almost every hour since yesterday, woke up this morning and had bled through her clothing and onto the bed, passing golf ball sized clots every time she changes her tampon. Cramping pain rated at a 5 on a 1-10 scale. Ibuprofen does not help. Patient reports dizziness, lightheadedness and shortness of breath. Advised patient to go to the emergency room now for evaluation due to excessive bleeding and symptoms of anemia. Patient verbalized understanding and agrees with plan. Patient will call for follow-up appointment after ER evaluation to discuss options that were given for heavy bleeding by Dr. Nnamdi Escobedo at her last office visit on 3/15/23.

## 2023-08-17 ENCOUNTER — TELEPHONE (OUTPATIENT)
Dept: OBGYN CLINIC | Facility: CLINIC | Age: 48
End: 2023-08-17

## 2023-08-17 NOTE — TELEPHONE ENCOUNTER
Spoke to patient and still reports vaginal bleeding. Precautions provided. Office visit scheduled for tomorrow. No further questions.

## 2023-08-18 ENCOUNTER — OFFICE VISIT (OUTPATIENT)
Dept: OBGYN CLINIC | Facility: CLINIC | Age: 48
End: 2023-08-18
Payer: MEDICAID

## 2023-08-18 VITALS
WEIGHT: 166.19 LBS | SYSTOLIC BLOOD PRESSURE: 110 MMHG | DIASTOLIC BLOOD PRESSURE: 60 MMHG | HEIGHT: 65 IN | BODY MASS INDEX: 27.69 KG/M2

## 2023-08-18 DIAGNOSIS — R93.89 THICKENED ENDOMETRIUM: ICD-10-CM

## 2023-08-18 DIAGNOSIS — N92.0 MENORRHAGIA WITH REGULAR CYCLE: ICD-10-CM

## 2023-08-18 DIAGNOSIS — N93.9 ABNORMAL UTERINE BLEEDING (AUB): Primary | ICD-10-CM

## 2023-08-18 DIAGNOSIS — N92.4 EXCESSIVE BLEEDING IN PREMENOPAUSAL PERIOD: ICD-10-CM

## 2023-08-18 LAB
CONTROL LINE PRESENT WITH A CLEAR BACKGROUND (YES/NO): YES YES/NO
PREGNANCY TEST, URINE: NEGATIVE

## 2023-08-18 PROCEDURE — 88305 TISSUE EXAM BY PATHOLOGIST: CPT | Performed by: OBSTETRICS & GYNECOLOGY

## 2023-08-18 NOTE — PROCEDURES
Procedure: Endometrial biopsy     Date of Procedure: 08/18/23    Pre-procedure diagnosis:   AUB    Post-procedure diagnosis:    AUB    Indications:   57year old female [id number] who presents for endometrial biopsy 2/2 AUB    Procedure details: The procedure, risks, benefits and alternatives were discussed with the patient. The patient was informed of risks including but not limited to the risk of bleeding, infection, injury and insufficient tissue collection. All questions and concerns were addressed. The patient provided verbal and written consent. The patient was placed in a supine position with feet positioned into stirrups. A sterile speculum was placed into the vagina and the cervix was visualized with findings noted below. The cervix was cleaned and prepped with betadine. Lidocaine gel was placed on the anterior lip of the cervix. An Sveta was placed on the anterior lip of the cervix. The endometrial Pipelle was then advanced through the cervical canal. The uterus sounded to 8 cm. The Pipelle was then engaged with suction force noted and advance in various angles along the uterine cavity. A moderate amount of endometrial tissue was noted and collected to be sent to pathology. This was repeated for 1 more pass. The Sveta was removed. Good hemostasis noted. The patient tolerated the procedure well. The patient was advised to return as needed. Findings:   Normal cervix, no lesions   Normal uterus, sounded to 8 cm   Moderate amount of endometrial tissue   S/p EMB  Good hemostasis     Disposition: Stable    Complications: None    Follow up:  as needed    Rohini Wynn MD   [facility] - OBGYN      Discussed with patient that there will not be further notification of normal or benign results other than receiving results on [facility].  A .com message or telephone call will be placed by the physician and/or office staff if results are abnormal.     Note to patient and family   The [facility] makes medical notes available to patients in the interest of transparency. However, please be advised that this is a medical document. It is intended as [id number] communication. It is written and medical language may contain abbreviations or verbiage that are technical and unfamiliar. It may appear blunt or direct. Medical documents are intended to carry relevant information, facts as evident, and the clinical opinion of the practitioner. This note could include assistance by [employer] recognition.  Errors in content may be related to improper recognition by the system; efforts to review and correct have been done but errors may still exist.

## 2023-08-24 ENCOUNTER — NURSE ONLY (OUTPATIENT)
Dept: UROLOGY | Facility: HOSPITAL | Age: 48
End: 2023-08-24
Attending: OBSTETRICS & GYNECOLOGY
Payer: MEDICAID

## 2023-08-24 ENCOUNTER — TELEPHONE (OUTPATIENT)
Dept: INTERNAL MEDICINE CLINIC | Facility: CLINIC | Age: 48
End: 2023-08-24

## 2023-08-24 VITALS — RESPIRATION RATE: 18 BRPM | WEIGHT: 166 LBS | BODY MASS INDEX: 27.66 KG/M2 | HEIGHT: 65 IN

## 2023-08-24 DIAGNOSIS — R73.9 HYPERGLYCEMIA: Primary | ICD-10-CM

## 2023-08-24 DIAGNOSIS — N13.2 URETERAL STONE WITH HYDRONEPHROSIS: ICD-10-CM

## 2023-08-24 DIAGNOSIS — E55.9 VITAMIN D DEFICIENCY: ICD-10-CM

## 2023-08-24 DIAGNOSIS — N13.5 URETERAL STRICTURE, RIGHT: ICD-10-CM

## 2023-08-24 DIAGNOSIS — N39.41 URGE INCONTINENCE: Primary | ICD-10-CM

## 2023-08-24 PROCEDURE — 64566 NEUROELTRD STIM POST TIBIAL: CPT

## 2023-08-24 NOTE — TELEPHONE ENCOUNTER
Pt called in and stated she needs new referrals for [facility] specialist since [facility] is not [facility]. Pt requesting referral for new Endo and uro within [facility]. Pended for Dr. Alicia Layton and Dr. Aki Alvarez. Please review and sign if agree or have different recs.

## 2023-08-24 NOTE — PROGRESS NOTES
[facility]  URGENT PC Therapy Note    Date:  2023    Patient Name:  Aicha April  Patient :  3/22/1975   Patient MRN:  [id number]  Patient Age:  57year old      Physician:  Dr. Regan Humphrey  RN:  Monica Kemp RN     Patient presents for PTNS treatment . Reports 30 % improvement so far. Nocturia x 7  Urinary frequency 1 to 1.5 hours. Denies s/sx of UTI currently    Bowels regular  Not using vaginal estrogen cream twice weekly     Procedure:  Left URGENT PC Therapy:  Posterior tibial nerve stimulation treatment  06525 - posterial tibial neuro stimulation, percutaneous needle electrode, single treatment, includes programming  Description of Procedure:    Informed consent was obtained with discussion of risk, benefits and goal, and limits of the procedure including but not limited to bleeding, infection, andnerve injury were discussed and the patient desired to proceed. The patient was properly identified and positioned in a semi reclined, comfortable seated position with their feet elevated in a recliner or exam room chair. The insertion site for the needle electrode was identified on the lower inner aspect of the Left leg. This position utilized was approximately 5 cm cephalad to the medial malleolus and one finger breath/2cm posterior to the tibia. Preparation of the needle electrode insertion site was performed using an alcohol pad to clean skin of the above-noted needle insertion site. The needle electrode/guide tube assembly was placed over the identified and cleaned insertion site. The needle was positioned so that it created a 60 degree angle that was maintained between the electrode itself and the ankle. The stop plug in the guide tube was removed to release the needle electrode. A gentle tap of the needle electrode was used to khalil the skin.   Once the needle electrode had been placed into the skin, the guide tube was removed and the needle was gently advanced maintaining a 60 degree angle. Consistent with recommended placement, approximately half the tip of the needle electrode was inserted leaving about 2 cm of needle exposed. The one-way fit connector of the lead wire was inserted into the stimulator's connection site. A surface electrode was placed over the medial aspect of the calcaneus on the lower extremity utilized for stimulation. For this patient, we utilized the Left lower extremity. The needle electrode clip was attached to the needle. The stimulator device was turned on. The test mode was entered. Current adjustment was slowly increased while observing the patient's foot for response. Adjustments were made advancing the needle further in to obtain optimal sensation. An optimal sensation in the foot was noted. Once an optimal response was noted, therapy mode was then initiated. The pt was given a bell to ring for the RN to be able to call for any needed adjustments. Therapy continued without complication for 30 minutes. No additional current adjustments were needed. Once 30 minutes of therapy and completed stimulator was turned off and the needle electrode clip was removed from the needle electrode. The needle was removed from the leg and no significant bleeding was noted. Gentle pressure at the needle insertion site facilitate optimal hemostasis. The surface electrode was removed from the medial calcaneus. The lead wire and lead set components were discarded in an appropriate safe container. This completed the therapy. The patient tolerated the procedure well.         Diagnosis:  N39.41 Urge Incontinence    Plan:  CISC when needed  Call with s/sx of UTI   All questions answered   She understands and agrees to plan     Follow up in one week  for next PTNS treatment, sooner prn

## 2023-08-28 ENCOUNTER — OFFICE VISIT (OUTPATIENT)
Dept: FAMILY MEDICINE CLINIC | Facility: CLINIC | Age: 48
End: 2023-08-28
Payer: MEDICAID

## 2023-08-28 VITALS
DIASTOLIC BLOOD PRESSURE: 78 MMHG | RESPIRATION RATE: 16 BRPM | SYSTOLIC BLOOD PRESSURE: 120 MMHG | BODY MASS INDEX: 33.95 KG/M2 | WEIGHT: 168.38 LBS | OXYGEN SATURATION: 97 % | HEART RATE: 88 BPM | HEIGHT: 59 IN

## 2023-08-28 DIAGNOSIS — J01.00 ACUTE NON-RECURRENT MAXILLARY SINUSITIS: Primary | ICD-10-CM

## 2023-08-28 DIAGNOSIS — J02.9 SORE THROAT: ICD-10-CM

## 2023-08-28 RX ORDER — FLUTICASONE PROPIONATE 50 MCG
2 SPRAY, SUSPENSION (ML) NASAL DAILY
Qty: 1 EACH | Refills: 0 | Status: SHIPPED | OUTPATIENT
Start: 2023-08-28 | End: 2023-09-11

## 2023-08-28 RX ORDER — AMOXICILLIN AND CLAVULANATE POTASSIUM 875; 125 MG/1; MG/1
1 TABLET, FILM COATED ORAL 2 TIMES DAILY
Qty: 20 TABLET | Refills: 0 | Status: SHIPPED | OUTPATIENT
Start: 2023-08-28 | End: 2023-09-07

## 2023-08-28 NOTE — PATIENT INSTRUCTIONS
-Augmentin as prescribed   -Push fluids and plenty of rest    -OTC cough medicine such as Mucinex DM or Robitussin DM (guaifenesin and dextromethorphan) as packet insert for dry and congested cough. -Soothing cough drops as packet insert   -Flonase.  Stop if any nose bleeds  -Good handwashing, to prevent spread of virus    -Face mask helps prevent viral infections    Follow up in 3-5 days for worsening symptoms with clinic or PCP

## 2023-08-31 ENCOUNTER — NURSE ONLY (OUTPATIENT)
Dept: UROLOGY | Facility: HOSPITAL | Age: 48
End: 2023-08-31
Attending: OBSTETRICS & GYNECOLOGY
Payer: MEDICAID

## 2023-08-31 ENCOUNTER — NURSE TRIAGE (OUTPATIENT)
Dept: INTERNAL MEDICINE CLINIC | Facility: CLINIC | Age: 48
End: 2023-08-31

## 2023-08-31 VITALS — HEIGHT: 59 IN | TEMPERATURE: 98 F | WEIGHT: 168 LBS | BODY MASS INDEX: 33.87 KG/M2

## 2023-08-31 DIAGNOSIS — N39.41 URGE INCONTINENCE: Primary | ICD-10-CM

## 2023-08-31 DIAGNOSIS — R35.1 NOCTURIA: ICD-10-CM

## 2023-08-31 PROCEDURE — 64566 NEUROELTRD STIM POST TIBIAL: CPT

## 2023-09-01 ENCOUNTER — HOSPITAL ENCOUNTER (OUTPATIENT)
Age: 48
Discharge: HOME | End: 2023-09-01
Payer: MEDICAID

## 2023-09-01 ENCOUNTER — APPOINTMENT (OUTPATIENT)
Dept: ULTRASOUND IMAGING | Age: 48
End: 2023-09-01
Attending: PHYSICIAN ASSISTANT
Payer: MEDICAID

## 2023-09-01 ENCOUNTER — APPOINTMENT (OUTPATIENT)
Dept: GENERAL RADIOLOGY | Age: 48
End: 2023-09-01
Attending: PHYSICIAN ASSISTANT
Payer: MEDICAID

## 2023-09-01 VITALS
RESPIRATION RATE: 22 BRPM | HEIGHT: 59 IN | WEIGHT: 168 LBS | BODY MASS INDEX: 33.87 KG/M2 | DIASTOLIC BLOOD PRESSURE: 79 MMHG | TEMPERATURE: 98 F | HEART RATE: 67 BPM | SYSTOLIC BLOOD PRESSURE: 133 MMHG | OXYGEN SATURATION: 93 %

## 2023-09-01 DIAGNOSIS — M79.652 PAIN OF LEFT THIGH: Primary | ICD-10-CM

## 2023-09-01 DIAGNOSIS — M54.16 LUMBAR RADICULOPATHY: ICD-10-CM

## 2023-09-01 PROCEDURE — 99214 OFFICE O/P EST MOD 30 MIN: CPT

## 2023-09-01 PROCEDURE — 93971 EXTREMITY STUDY: CPT | Performed by: PHYSICIAN ASSISTANT

## 2023-09-01 PROCEDURE — 99215 OFFICE O/P EST HI 40 MIN: CPT

## 2023-09-01 PROCEDURE — 73560 X-RAY EXAM OF KNEE 1 OR 2: CPT | Performed by: PHYSICIAN ASSISTANT

## 2023-09-01 RX ORDER — CYCLOBENZAPRINE HCL 10 MG
10 TABLET ORAL 3 TIMES DAILY PRN
Qty: 10 TABLET | Refills: 0 | Status: SHIPPED | OUTPATIENT
Start: 2023-09-01 | End: 2023-09-08

## 2023-09-01 NOTE — DISCHARGE INSTRUCTIONS
Ice and elevate the affected area. Tylenol as needed for pain. Activity as tolerated. Take the muscle relaxer as needed. This medication may make you feel sleepy. Do not drink alcohol or drive while you are on this medication. Follow-up with primary care. See your discharge paperwork for referral information. If there are any new, changing or worsening symptoms return for reevaluation or go to the ER.

## 2023-09-03 NOTE — PATIENT INSTRUCTIONS
-Wear supportive shoe gear and inserts at all times with ambulation. Avoid walking barefoot.  -Perform stretching exercises 3-5 times daily.  -Take medrol dosepak as prescribed. -Follow-up in 1 month for reevaluation. fair plus

## 2023-09-07 ENCOUNTER — NURSE ONLY (OUTPATIENT)
Dept: UROLOGY | Facility: HOSPITAL | Age: 48
End: 2023-09-07
Attending: OBSTETRICS & GYNECOLOGY
Payer: MEDICAID

## 2023-09-07 VITALS — RESPIRATION RATE: 18 BRPM | HEIGHT: 59 IN | WEIGHT: 168 LBS | BODY MASS INDEX: 33.87 KG/M2

## 2023-09-07 DIAGNOSIS — N39.41 URGE INCONTINENCE: Primary | ICD-10-CM

## 2023-09-07 PROCEDURE — 64566 NEUROELTRD STIM POST TIBIAL: CPT

## 2023-09-07 NOTE — PROGRESS NOTES
[facility]  URGENT PC Therapy Note    Date:  2023    Patient Name:  Luis Alberto Villavicencio  Patient :  3/22/1975   Patient MRN:  [id number]  Patient Age:  57year old      Physician:  Dr. iL Hale  RN:  Andrew Young RN     Patient presents for PTNS treatment . Reports minimal improvement so far. Nocturia x 10. Patient reports using bathroom 6-8 times prior to actually falling asleep. Patient reports after she actually falls asleep she wakes up every 1.5- 2 hours  Urinary frequency <1 hour   Denies s/sx of UTI currently    Bowels regular  Not Using vaginal estrogen cream twice weekly     Procedure:  Left URGENT PC Therapy:  Posterior tibial nerve stimulation treatment  64665 - posterial tibial neuro stimulation, percutaneous needle electrode, single treatment, includes programming  Description of Procedure:    Informed consent was obtained with discussion of risk, benefits and goal, and limits of the procedure including but not limited to bleeding, infection, andnerve injury were discussed and the patient desired to proceed. The patient was properly identified and positioned in a semi reclined, comfortable seated position with their feet elevated in a recliner or exam room chair. The insertion site for the needle electrode was identified on the lower inner aspect of the Left leg. This position utilized was approximately 5 cm cephalad to the medial malleolus and one finger breath/2cm posterior to the tibia. Preparation of the needle electrode insertion site was performed using an alcohol pad to clean skin of the above-noted needle insertion site. The needle electrode/guide tube assembly was placed over the identified and cleaned insertion site. The needle was positioned so that it created a 60 degree angle that was maintained between the electrode itself and the ankle. The stop plug in the guide tube was removed to release the needle electrode.     A gentle tap of the needle electrode was used to khalil the skin. Once the needle electrode had been placed into the skin, the guide tube was removed and the needle was gently advanced maintaining a 60 degree angle. Consistent with recommended placement, approximately half the tip of the needle electrode was inserted leaving about 2 cm of needle exposed. The one-way fit connector of the lead wire was inserted into the stimulator's connection site. A surface electrode was placed over the medial aspect of the calcaneus on the lower extremity utilized for stimulation. For this patient, we utilized the Left lower extremity. The needle electrode clip was attached to the needle. The stimulator device was turned on. The test mode was entered. Current adjustment was slowly increased while observing the patient's foot for response. Adjustments were made advancing the needle further in to obtain optimal sensation. An optimal sensation in the foot was noted. Once an optimal response was noted, therapy mode was then initiated. The pt was given a bell to ring for the RN to be able to call for any needed adjustments. Therapy continued without complication for 30 minutes. No additional current adjustments were needed. Once 30 minutes of therapy and completed stimulator was turned off and the needle electrode clip was removed from the needle electrode. The needle was removed from the leg and no significant bleeding was noted. Gentle pressure at the needle insertion site facilitate optimal hemostasis. The surface electrode was removed from the medial calcaneus. The lead wire and lead set components were discarded in an appropriate safe container. This completed the therapy. The patient tolerated the procedure well. Diagnosis:  N39.41 Urge Incontinence    Plan:  Bladder training. Bladder diet. Drink fluids throughout the day. Limit po fluids prior to bedtime.   CISC as directed  Call with s/sx of UTI   All questions answered   She understands and agrees to plan     Follow up in one week for next PTNS treatment, sooner prn

## 2023-09-07 NOTE — PROCEDURES
[facility]  URGENT PC Therapy Note    Date:  2023    Patient Name:  Daron Trujillo  Patient :  3/22/1975   Patient MRN:  [id number]  Patient Age:  57year old      Diagnosis:  N39.41 Urge Incontinence    Procedure:  Left URGENT PC Therapy:  Posterior tibial nerve stimulation treatment  57775 - posterial tibial neuro stimulation, percutaneous needle electrode, single treatment, includes programming    PTNS Evaluation:  PTNS Session: 6  Patient reports feeling: Same  Nocturia: 4+ (Reports 10 times per night. Reports being up multiple times (6 to 8 times) prior to actually falling asleep. Once asleep reports every 1.5- 2 hours.)  Frequency: <1 hr (Reports 20 to 30 times throughout the day.)  Patient wears pads: Yes  Pads per day: 3 (Panty liners wet throughout the day.)  Pads per night: 2 (Reports panty liners wet in the am.)  Physician:  Dr. Shayne Alford    RN:  Giovana Bill RN     Indications:  Urinary frequency, urge incontinence, with subjectively severe urge incontinence and inadequate to anti-cholinergic meds. Informed consent was obtained with discussion of risk, benefits and goal, and limits of the procedure including but not limited to bleeding, infection, andnerve injury were discussed and the patient desired to proceed. Description of Procedure: The patient was properly identified and positioned in a semi reclined, comfortable seated position with their feet elevated in a recliner or exam room chair. The insertion site for the needle electrode was identified on the lower inner aspect of the Left leg. This position utilized was approximately 5 cm cephalad to the medial malleolus and one finger breath/2cm posterior to the tibia. Preparation of the needle electrode insertion site was performed using an alcohol pad to clean skin of the above-noted needle insertion site.     The needle electrode/guide tube assembly was placed over the identified and cleaned insertion site. The needle was positioned so that it created a 60 degree angle that was maintained between the electrode itself and the ankle. The stop plug in the guide tube was removed to release the needle electrode. A gentle tap of the needle electrode was used to khalil the skin. Once the needle electrode had been placed into the skin, the guide tube was removed and the needle was gently advanced maintaining a 60 degree angle. Consistent with recommended placement, approximately half the tip of the needle electrode was inserted leaving about 2 cm of needle exposed. The one-way fit connector of the lead wire was inserted into the stimulator's connection site. A surface electrode was placed over the medial aspect of the calcaneus on the lower extremity utilized for stimulation. For this patient, we utilized the Left lower extremity. The needle electrode clip was attached to the needle. The stimulator device was turned on. The test mode was entered. Current adjustment was slowly increased while observing the patient's foot for response. Adjustments were made advancing the needle further in to obtain optimal sensation. An optimal sensation in the foot was noted. Once an optimal response was noted, therapy mode was then initiated. The pt was given a bell to ring for the RN to be able to call for any needed adjustments. Therapy continued without complication for 30 minutes. No additional current adjustments were needed. Once 30 minutes of therapy and completed stimulator was turned off and the needle electrode clip was removed from the needle electrode. The needle was removed from the leg and no significant bleeding was noted. Gentle pressure at the needle insertion site facilitate optimal hemostasis. The surface electrode was removed from the medial calcaneus. The lead wire and lead set components were discarded in an appropriate safe container. This completed the therapy.   The patient tolerated the procedure well.     Plan:

## 2023-09-14 ENCOUNTER — NURSE ONLY (OUTPATIENT)
Dept: UROLOGY | Facility: HOSPITAL | Age: 48
End: 2023-09-14
Attending: OBSTETRICS & GYNECOLOGY
Payer: MEDICAID

## 2023-09-14 VITALS — WEIGHT: 168 LBS | BODY MASS INDEX: 33.87 KG/M2 | HEIGHT: 59 IN

## 2023-09-14 DIAGNOSIS — N39.41 URGE INCONTINENCE: Primary | ICD-10-CM

## 2023-09-14 PROCEDURE — 64566 NEUROELTRD STIM POST TIBIAL: CPT

## 2023-09-17 ENCOUNTER — HOSPITAL ENCOUNTER (EMERGENCY)
Age: 48
Discharge: HOME | End: 2023-09-17
Attending: EMERGENCY MEDICINE
Payer: COMMERCIAL

## 2023-09-17 ENCOUNTER — APPOINTMENT (OUTPATIENT)
Dept: CT IMAGING | Age: 48
End: 2023-09-17
Attending: EMERGENCY MEDICINE
Payer: COMMERCIAL

## 2023-09-17 ENCOUNTER — APPOINTMENT (OUTPATIENT)
Dept: ULTRASOUND IMAGING | Age: 48
End: 2023-09-17
Attending: EMERGENCY MEDICINE
Payer: COMMERCIAL

## 2023-09-17 VITALS
BODY MASS INDEX: 33.26 KG/M2 | HEART RATE: 59 BPM | HEIGHT: 59 IN | RESPIRATION RATE: 18 BRPM | TEMPERATURE: 99 F | WEIGHT: 165 LBS | DIASTOLIC BLOOD PRESSURE: 93 MMHG | SYSTOLIC BLOOD PRESSURE: 143 MMHG | OXYGEN SATURATION: 97 %

## 2023-09-17 DIAGNOSIS — R31.9 URINARY TRACT INFECTION WITH HEMATURIA, SITE UNSPECIFIED: Primary | ICD-10-CM

## 2023-09-17 DIAGNOSIS — R10.9 FLANK PAIN: ICD-10-CM

## 2023-09-17 DIAGNOSIS — N39.0 URINARY TRACT INFECTION WITH HEMATURIA, SITE UNSPECIFIED: Primary | ICD-10-CM

## 2023-09-17 PROBLEM — N13.9 OBSTRUCTIVE UROPATHY: Status: ACTIVE | Noted: 2023-09-17

## 2023-09-17 LAB
ALBUMIN SERPL-MCNC: 3.5 G/DL (ref 3.4–5)
ALBUMIN/GLOB SERPL: 0.9 {RATIO} (ref 1–2)
ALP LIVER SERPL-CCNC: 83 U/L
ALT SERPL-CCNC: 39 U/L
ANION GAP SERPL CALC-SCNC: 6 MMOL/L (ref 0–18)
AST SERPL-CCNC: 16 U/L (ref 15–37)
B-HCG UR QL: NEGATIVE
BASOPHILS # BLD AUTO: 0.03 X10(3) UL (ref 0–0.2)
BASOPHILS NFR BLD AUTO: 0.3 %
BILIRUB SERPL-MCNC: 0.2 MG/DL (ref 0.1–2)
BILIRUB UR QL STRIP.AUTO: NEGATIVE
BUN BLD-MCNC: 11 MG/DL (ref 7–18)
CALCIUM BLD-MCNC: 9 MG/DL (ref 8.5–10.1)
CHLORIDE SERPL-SCNC: 108 MMOL/L (ref 98–112)
CLARITY UR REFRACT.AUTO: CLEAR
CO2 SERPL-SCNC: 24 MMOL/L (ref 21–32)
COLOR UR AUTO: YELLOW
CREAT BLD-MCNC: 0.7 MG/DL
EGFRCR SERPLBLD CKD-EPI 2021: 108 ML/MIN/1.73M2 (ref 60–?)
EOSINOPHIL # BLD AUTO: 0.2 X10(3) UL (ref 0–0.7)
EOSINOPHIL NFR BLD AUTO: 1.8 %
ERYTHROCYTE [DISTWIDTH] IN BLOOD BY AUTOMATED COUNT: 12.8 %
GLOBULIN PLAS-MCNC: 4 G/DL (ref 2.8–4.4)
GLUCOSE BLD-MCNC: 111 MG/DL (ref 70–99)
GLUCOSE UR STRIP.AUTO-MCNC: NEGATIVE MG/DL
HCT VFR BLD AUTO: 40.6 %
HGB BLD-MCNC: 13.8 G/DL
IMM GRANULOCYTES # BLD AUTO: 0.03 X10(3) UL (ref 0–1)
IMM GRANULOCYTES NFR BLD: 0.3 %
KETONES UR STRIP.AUTO-MCNC: NEGATIVE MG/DL
LIPASE SERPL-CCNC: 50 U/L (ref 13–75)
LYMPHOCYTES # BLD AUTO: 1.53 X10(3) UL (ref 1–4)
LYMPHOCYTES NFR BLD AUTO: 13.5 %
MCH RBC QN AUTO: 30.3 PG (ref 26–34)
MCHC RBC AUTO-ENTMCNC: 34 G/DL (ref 31–37)
MCV RBC AUTO: 89 FL
MONOCYTES # BLD AUTO: 0.66 X10(3) UL (ref 0.1–1)
MONOCYTES NFR BLD AUTO: 5.8 %
NEUTROPHILS # BLD AUTO: 8.92 X10 (3) UL (ref 1.5–7.7)
NEUTROPHILS # BLD AUTO: 8.92 X10(3) UL (ref 1.5–7.7)
NEUTROPHILS NFR BLD AUTO: 78.3 %
NITRITE UR QL STRIP.AUTO: POSITIVE
OSMOLALITY SERPL CALC.SUM OF ELEC: 286 MOSM/KG (ref 275–295)
PH UR STRIP.AUTO: 6 [PH] (ref 5–8)
PLATELET # BLD AUTO: 351 10(3)UL (ref 150–450)
POTASSIUM SERPL-SCNC: 3.8 MMOL/L (ref 3.5–5.1)
PROT SERPL-MCNC: 7.5 G/DL (ref 6.4–8.2)
RBC # BLD AUTO: 4.56 X10(6)UL
RBC #/AREA URNS AUTO: >10 /HPF
SODIUM SERPL-SCNC: 138 MMOL/L (ref 136–145)
SP GR UR STRIP.AUTO: 1.01 (ref 1–1.03)
UROBILINOGEN UR STRIP.AUTO-MCNC: 0.2 MG/DL
WBC # BLD AUTO: 11.4 X10(3) UL (ref 4–11)
WBC #/AREA URNS AUTO: >50 /HPF

## 2023-09-17 PROCEDURE — 81015 MICROSCOPIC EXAM OF URINE: CPT | Performed by: EMERGENCY MEDICINE

## 2023-09-17 PROCEDURE — 87186 SC STD MICRODIL/AGAR DIL: CPT | Performed by: EMERGENCY MEDICINE

## 2023-09-17 PROCEDURE — 99285 EMERGENCY DEPT VISIT HI MDM: CPT

## 2023-09-17 PROCEDURE — 87086 URINE CULTURE/COLONY COUNT: CPT | Performed by: EMERGENCY MEDICINE

## 2023-09-17 PROCEDURE — 80053 COMPREHEN METABOLIC PANEL: CPT | Performed by: EMERGENCY MEDICINE

## 2023-09-17 PROCEDURE — 96375 TX/PRO/DX INJ NEW DRUG ADDON: CPT

## 2023-09-17 PROCEDURE — 87077 CULTURE AEROBIC IDENTIFY: CPT | Performed by: EMERGENCY MEDICINE

## 2023-09-17 PROCEDURE — 81001 URINALYSIS AUTO W/SCOPE: CPT | Performed by: EMERGENCY MEDICINE

## 2023-09-17 PROCEDURE — 74176 CT ABD & PELVIS W/O CONTRAST: CPT | Performed by: EMERGENCY MEDICINE

## 2023-09-17 PROCEDURE — 83690 ASSAY OF LIPASE: CPT | Performed by: EMERGENCY MEDICINE

## 2023-09-17 PROCEDURE — 81025 URINE PREGNANCY TEST: CPT

## 2023-09-17 PROCEDURE — 76775 US EXAM ABDO BACK WALL LIM: CPT | Performed by: EMERGENCY MEDICINE

## 2023-09-17 PROCEDURE — 96365 THER/PROPH/DIAG IV INF INIT: CPT

## 2023-09-17 PROCEDURE — 85025 COMPLETE CBC W/AUTO DIFF WBC: CPT | Performed by: EMERGENCY MEDICINE

## 2023-09-17 PROCEDURE — 96376 TX/PRO/DX INJ SAME DRUG ADON: CPT

## 2023-09-17 RX ORDER — KETOROLAC TROMETHAMINE 15 MG/ML
15 INJECTION, SOLUTION INTRAMUSCULAR; INTRAVENOUS ONCE
Status: DISCONTINUED | OUTPATIENT
Start: 2023-09-17 | End: 2023-09-17

## 2023-09-17 RX ORDER — HYDROMORPHONE HYDROCHLORIDE 1 MG/ML
1 INJECTION, SOLUTION INTRAMUSCULAR; INTRAVENOUS; SUBCUTANEOUS EVERY 30 MIN PRN
Status: DISCONTINUED | OUTPATIENT
Start: 2023-09-17 | End: 2023-09-17

## 2023-09-17 RX ORDER — CEFUROXIME AXETIL 500 MG/1
500 TABLET ORAL 2 TIMES DAILY
Qty: 14 TABLET | Refills: 0 | Status: SHIPPED | OUTPATIENT
Start: 2023-09-17 | End: 2023-09-24

## 2023-09-17 RX ORDER — HYDROMORPHONE HYDROCHLORIDE 1 MG/ML
0.5 INJECTION, SOLUTION INTRAMUSCULAR; INTRAVENOUS; SUBCUTANEOUS ONCE
Status: DISCONTINUED | OUTPATIENT
Start: 2023-09-17 | End: 2023-09-17

## 2023-09-17 RX ORDER — HYDROMORPHONE HYDROCHLORIDE 1 MG/ML
1 INJECTION, SOLUTION INTRAMUSCULAR; INTRAVENOUS; SUBCUTANEOUS ONCE
Status: COMPLETED | OUTPATIENT
Start: 2023-09-17 | End: 2023-09-17

## 2023-09-17 NOTE — CONSULTS
[facility]  Report of Consultation    Hue Hernandez Patient Status:  Emergency    3/22/1975 MRN [id number]   Location [address] [facility] Attending Hao Hernandez MD   Hosp Day # 0 PCP Jamee Alba MD     Reason for Consultation:  Bilateral flank pain      Consult canceled, patient not admitted after CT showed no obstructing stone  CT ABDOMEN+PELVIS KIDNEYSTONE 2D RNDR(NO IV,NO ORAL)(CPT=74176)    Result Date: 2023  CONCLUSION:  There is some minimal perinephric stranding noted of the right ureter. This could represent urinary tract infection. Minimal left and mild right hydronephrosis are essentially stable from prior exams. There are nonobstructing bilateral renal calculi present as described above. LOCATION:  [facility]   Dictated by (CST): Teddy Cook MD on 2023 at 10:13 AM     Finalized by (CST): Teddy Cook MD on 2023 at 10:16 AM       US KIDNEYS (LSR=[id number])    Result Date: 2023  CONCLUSION:  Mild right hydronephrosis is similar to prior exams. There are nonobstructing bilateral renal calculi present.    LOCATION:  [facility]     Dictated by (CST): Teddy Cook MD on 2023 at 8:30 AM     Finalized by (CST): Teddy Cook MD on 2023 at 8:33 AM          Willie Ventura MD

## 2023-09-21 ENCOUNTER — NURSE ONLY (OUTPATIENT)
Dept: UROLOGY | Facility: HOSPITAL | Age: 48
End: 2023-09-21
Attending: OBSTETRICS & GYNECOLOGY
Payer: MEDICAID

## 2023-09-21 VITALS — WEIGHT: 165 LBS | HEIGHT: 59 IN | BODY MASS INDEX: 33.26 KG/M2

## 2023-09-21 DIAGNOSIS — R35.1 NOCTURIA: ICD-10-CM

## 2023-09-21 DIAGNOSIS — N39.41 URGE INCONTINENCE: Primary | ICD-10-CM

## 2023-09-21 PROCEDURE — 64566 NEUROELTRD STIM POST TIBIAL: CPT

## 2023-09-21 NOTE — PROGRESS NOTES
[facility]  URGENT PC Therapy Note    Date:  2023    Patient Name:  Luis Alberto Villavicencio  Patient :  3/22/1975   Patient MRN:  [id number]  Patient Age:  57year old      Diagnosis:  N39.41 Urge Incontinence and R35.0 Urinary Frequency    Procedure:  Left URGENT PC Therapy:  Posterior tibial nerve stimulation treatment  72354 - posterial tibial neuro stimulation, percutaneous needle electrode, single treatment, includes programming    PTNS Evaluation:  PTNS Session: 8  Patient reports feeling: Better  Nocturia: 4+ (reports 4-11x; average 8)  Frequency: <1 hr (reports 19-27x during the day; average 22)  Patient wears pads: Yes  Pads per day: 3  Pads per night: 2  Physician:  Dr. Li [facility]    RN:  Brittney Brar RN     Indications:  Urinary frequency, urge incontinence, with subjectively severe urge incontinence and inadequate to anti-cholinergic meds. Informed consent was obtained with discussion of risk, benefits and goal, and limits of the procedure including but not limited to bleeding, infection, andnerve injury were discussed and the patient desired to proceed. Description of Procedure: The patient was properly identified and positioned in a semi reclined, comfortable seated position with their feet elevated in a recliner or exam room chair. The insertion site for the needle electrode was identified on the lower inner aspect of the Left leg. This position utilized was approximately 5 cm cephalad to the medial malleolus and one finger breath/2cm posterior to the tibia. Preparation of the needle electrode insertion site was performed using an alcohol pad to clean skin of the above-noted needle insertion site. The needle electrode/guide tube assembly was placed over the identified and cleaned insertion site. The needle was positioned so that it created a 60 degree angle that was maintained between the electrode itself and the ankle.   The stop plug in the guide tube was removed to release the needle electrode. A gentle tap of the needle electrode was used to khalil the skin. Once the needle electrode had been placed into the skin, the guide tube was removed and the needle was gently advanced maintaining a 60 degree angle. Consistent with recommended placement, approximately half the tip of the needle electrode was inserted leaving about 2 cm of needle exposed. The one-way fit connector of the lead wire was inserted into the stimulator's connection site. A surface electrode was placed over the medial aspect of the calcaneus on the lower extremity utilized for stimulation. For this patient, we utilized the Left lower extremity. The needle electrode clip was attached to the needle. The stimulator device was turned on. The test mode was entered. Current adjustment was slowly increased while observing the patient's foot for response. Adjustments were made advancing the needle further in to obtain optimal sensation. An optimal toe flexion, fanning of the toes, extension of the foot, and sensation in the foot was noted. Once an optimal response was noted, therapy mode was then initiated. The pt was given a bell to ring for the RN to be able to call for any needed adjustments. Therapy continued without complication for 30 minutes. No additional current adjustments were needed. Once 30 minutes of therapy and completed stimulator was turned off and the needle electrode clip was removed from the needle electrode. The needle was removed from the leg and no significant bleeding was noted. Gentle pressure at the needle insertion site facilitate optimal hemostasis. The surface electrode was removed from the medial calcaneus. The lead wire and lead set components were discarded in an appropriate safe container. This completed the therapy. The patient tolerated the procedure well. Plan: Return for weekly PTNS 9/28/23.

## 2023-09-28 ENCOUNTER — NURSE ONLY (OUTPATIENT)
Dept: UROLOGY | Facility: HOSPITAL | Age: 48
End: 2023-09-28
Attending: OBSTETRICS & GYNECOLOGY
Payer: MEDICAID

## 2023-09-28 VITALS — RESPIRATION RATE: 18 BRPM | BODY MASS INDEX: 33.26 KG/M2 | HEIGHT: 59 IN | WEIGHT: 165 LBS

## 2023-09-28 DIAGNOSIS — N39.41 URGE INCONTINENCE: Primary | ICD-10-CM

## 2023-09-28 LAB
BILIRUB UR QL: NEGATIVE
COLOR UR: YELLOW
CONTROL RUN WITHIN 24 HOURS?: YES
GLUCOSE UR-MCNC: NORMAL MG/DL
KETONES UR-MCNC: NEGATIVE MG/DL
LEUKOCYTE ESTERASE UR QL STRIP.AUTO: 500
NITRITE URINE: POSITIVE
PH UR: 6 [PH] (ref 5–8)
PROT UR-MCNC: 70 MG/DL
SP GR UR STRIP: 1.02 (ref 1–1.03)
UROBILINOGEN UR STRIP-ACNC: NORMAL
WBC #/AREA URNS AUTO: >50 /HPF

## 2023-09-28 PROCEDURE — 64566 NEUROELTRD STIM POST TIBIAL: CPT

## 2023-09-28 PROCEDURE — 87086 URINE CULTURE/COLONY COUNT: CPT

## 2023-09-28 PROCEDURE — 81001 URINALYSIS AUTO W/SCOPE: CPT

## 2023-09-28 PROCEDURE — 87186 SC STD MICRODIL/AGAR DIL: CPT

## 2023-09-28 PROCEDURE — 87077 CULTURE AEROBIC IDENTIFY: CPT

## 2023-09-28 PROCEDURE — 81002 URINALYSIS NONAUTO W/O SCOPE: CPT

## 2023-09-28 RX ORDER — NITROFURANTOIN 25; 75 MG/1; MG/1
100 CAPSULE ORAL 2 TIMES DAILY
Qty: 14 CAPSULE | Refills: 0 | Status: SHIPPED | OUTPATIENT
Start: 2023-09-28 | End: 2023-10-05

## 2023-09-28 NOTE — PROGRESS NOTES
[facility]  URGENT PC Therapy Note    Date:  2023    Patient Name:  Belgica Gonzalez  Patient :  3/22/1975   Patient MRN:  [id number]  Patient Age:  57year old      Physician:  Dr. Roslyn Real  RN:  Sophy Kemp RN     Patient presents for PTNS treatment . Reports 30-40 % improvement so far. Nocturia x 4-10 times. Reports getting up multiple times prior to actually falling asleep. Once asleep will sleep through out the night. Reports Nocturia   Urinary frequency 1-2 hours  Reports  s/sx of UTI currently. Reports cloudy urine today. Bowels regular  Not Using vaginal estrogen cream twice weekly     Procedure:  Right URGENT PC Therapy:  Posterior tibial nerve stimulation treatment  13022 - posterial tibial neuro stimulation, percutaneous needle electrode, single treatment, includes programming  Description of Procedure:    Informed consent was obtained with discussion of risk, benefits and goal, and limits of the procedure including but not limited to bleeding, infection, andnerve injury were discussed and the patient desired to proceed. The patient was properly identified and positioned in a semi reclined, comfortable seated position with their feet elevated in a recliner or exam room chair. The insertion site for the needle electrode was identified on the lower inner aspect of the Right leg. This position utilized was approximately 5 cm cephalad to the medial malleolus and one finger breath/2cm posterior to the tibia. Preparation of the needle electrode insertion site was performed using an alcohol pad to clean skin of the above-noted needle insertion site. The needle electrode/guide tube assembly was placed over the identified and cleaned insertion site. The needle was positioned so that it created a 60 degree angle that was maintained between the electrode itself and the ankle.   The stop plug in the guide tube was removed to release the needle electrode. A gentle tap of the needle electrode was used to khalil the skin. Once the needle electrode had been placed into the skin, the guide tube was removed and the needle was gently advanced maintaining a 60 degree angle. Consistent with recommended placement, approximately half the tip of the needle electrode was inserted leaving about 2 cm of needle exposed. The one-way fit connector of the lead wire was inserted into the stimulator's connection site. A surface electrode was placed over the medial aspect of the calcaneus on the lower extremity utilized for stimulation. For this patient, we utilized the Right lower extremity. The needle electrode clip was attached to the needle. The stimulator device was turned on. The test mode was entered. Current adjustment was slowly increased while observing the patient's foot for response. Adjustments were made advancing the needle further in to obtain optimal sensation. An optimal sensation in the foot was noted. Once an optimal response was noted, therapy mode was then initiated. The pt was given a bell to ring for the RN to be able to call for any needed adjustments. Therapy continued without complication for 30 minutes. No additional current adjustments were needed. Once 30 minutes of therapy and completed stimulator was turned off and the needle electrode clip was removed from the needle electrode. The needle was removed from the leg and no significant bleeding was noted. Gentle pressure at the needle insertion site facilitate optimal hemostasis. The surface electrode was removed from the medial calcaneus. The lead wire and lead set components were discarded in an appropriate safe container. This completed the therapy. The patient tolerated the procedure well. Pt up to void in bathroom with specimen cup. Sent for urine culture.      Diagnosis:  N39.41 Urge Incontinence    Plan:  Urine culture sent, Jesus Hauser, israelic Macrobid 100 mg po bid x 7 days  Blood- moderate, Nitrates- Positive, Leucocytes- Small. Patient denies being on Mensis at this time. Limit fluids 2 hours prior to bedtime. Po fluid encouraged during day hours. Bladder training. Time trips to bathroom. Bladder diary scanned into [facility]. CISC as ordered. Continue with bladder diary.   Call with s/sx of UTI   All questions answered   She understands and agrees to plan     Follow up in one week for next PTNS treatment, sooner prn

## 2023-10-02 ENCOUNTER — TELEPHONE (OUTPATIENT)
Dept: UROLOGY | Facility: HOSPITAL | Age: 48
End: 2023-10-02

## 2023-10-02 DIAGNOSIS — R35.1 NOCTURIA: Primary | ICD-10-CM

## 2023-10-02 DIAGNOSIS — R30.0 DYSURIA: ICD-10-CM

## 2023-10-02 RX ORDER — SULFAMETHOXAZOLE AND TRIMETHOPRIM 800; 160 MG/1; MG/1
1 TABLET ORAL 2 TIMES DAILY
Qty: 14 TABLET | Refills: 0 | Status: SHIPPED | OUTPATIENT
Start: 2023-10-02

## 2023-10-02 NOTE — TELEPHONE ENCOUNTER
Telephone call to patient with test results    Ucx + for >100,000 CFU/ML Klebsiella pneumoniae     Allergies reviewed    Discussed with QASIM Barrera stop macrobid, start Bactrim DS po bid x 7 days. If no improvement after 5 days, extend to 10 days. Will send antibiotics to pt's preferred pharmacy. Encouraged PO hydration, AZO prn for pain    Encouraged to avoid bladder irritants such as caffeine, alcohol, or carbonation    All questions answered     Follow up as scheduled, sooner prn    Patient understands and agrees to plan.

## 2023-10-05 ENCOUNTER — NURSE ONLY (OUTPATIENT)
Dept: UROLOGY | Facility: HOSPITAL | Age: 48
End: 2023-10-05
Attending: OBSTETRICS & GYNECOLOGY
Payer: COMMERCIAL

## 2023-10-05 VITALS — HEIGHT: 59 IN | BODY MASS INDEX: 33.26 KG/M2 | WEIGHT: 165 LBS

## 2023-10-05 DIAGNOSIS — N39.41 URGE INCONTINENCE: Primary | ICD-10-CM

## 2023-10-05 PROCEDURE — 64566 NEUROELTRD STIM POST TIBIAL: CPT

## 2023-10-05 NOTE — PROGRESS NOTES
[facility]  URGENT PC Therapy Note    Date:  10/5/2023    Patient Name:  Belgica Gonzalez  Patient :  3/22/1975   Patient MRN:  [id number]  Patient Age:  57year old      Physician:  Dr. Roslyn Real  RN:  Loy Lucia RN     Patient presents for PTNS treatment 10/12. Reports positive improvement so far. Nocturia x 4-8  Urinary frequency 13-24x during the day   Currently being treated for acute UTI, feeling better  Bowels regular     Procedure:  Left URGENT PC Therapy:  Posterior tibial nerve stimulation treatment  09508 - posterial tibial neuro stimulation, percutaneous needle electrode, single treatment, includes programming  Description of Procedure:    Informed consent was obtained with discussion of risk, benefits and goal, and limits of the procedure including but not limited to bleeding, infection, andnerve injury were discussed and the patient desired to proceed. The patient was properly identified and positioned in a semi reclined, comfortable seated position with their feet elevated in a recliner or exam room chair. The insertion site for the needle electrode was identified on the lower inner aspect of the Left leg. This position utilized was approximately 5 cm cephalad to the medial malleolus and one finger breath/2cm posterior to the tibia. Preparation of the needle electrode insertion site was performed using an alcohol pad to clean skin of the above-noted needle insertion site. The needle electrode/guide tube assembly was placed over the identified and cleaned insertion site. The needle was positioned so that it created a 60 degree angle that was maintained between the electrode itself and the ankle. The stop plug in the guide tube was removed to release the needle electrode. A gentle tap of the needle electrode was used to khalil the skin.   Once the needle electrode had been placed into the skin, the guide tube was removed and the needle was gently advanced maintaining a 60 degree angle. Consistent with recommended placement, approximately half the tip of the needle electrode was inserted leaving about 2 cm of needle exposed. The one-way fit connector of the lead wire was inserted into the stimulator's connection site. A surface electrode was placed over the medial aspect of the calcaneus on the lower extremity utilized for stimulation. For this patient, we utilized the Left lower extremity. The needle electrode clip was attached to the needle. The stimulator device was turned on. The test mode was entered. Current adjustment was slowly increased while observing the patient's foot for response. Adjustments were made advancing the needle further in to obtain optimal sensation. An optimal toe flexion and sensation in the foot was noted. Once an optimal response was noted, therapy mode was then initiated. The pt was given a bell to ring for the RN to be able to call for any needed adjustments. Therapy continued without complication for 30 minutes. No additional current adjustments were needed. Once 30 minutes of therapy and completed stimulator was turned off and the needle electrode clip was removed from the needle electrode. The needle was removed from the leg and no significant bleeding was noted. Gentle pressure at the needle insertion site facilitate optimal hemostasis. The surface electrode was removed from the medial calcaneus. The lead wire and lead set components were discarded in an appropriate safe container. This completed the therapy. The patient tolerated the procedure well.     Diagnosis:  N39.41 Urge Incontinence, R35.0 Urinary Frequency, and R39.15 Urgency of Urination    Plan:  Call with s/sx of UTI   All questions answered   She understands and agrees to plan     Follow up in one week for next PTNS treatment, sooner prn

## 2023-10-12 ENCOUNTER — NURSE ONLY (OUTPATIENT)
Dept: UROLOGY | Facility: HOSPITAL | Age: 48
End: 2023-10-12
Attending: OBSTETRICS & GYNECOLOGY
Payer: MEDICAID

## 2023-10-12 VITALS — RESPIRATION RATE: 18 BRPM | HEIGHT: 59 IN | BODY MASS INDEX: 33.26 KG/M2 | WEIGHT: 165 LBS

## 2023-10-12 DIAGNOSIS — N39.41 URGE INCONTINENCE: Primary | ICD-10-CM

## 2023-10-12 PROCEDURE — 64566 NEUROELTRD STIM POST TIBIAL: CPT

## 2023-10-12 NOTE — PROGRESS NOTES
[facility]  URGENT PC Therapy Note    Date:  10/12/2023    Patient Name:  Scott Baum  Patient :  3/22/1975   Patient MRN:  [id number]  Patient Age:  57year old      Physician:  Dr. Agueda Bates  RN:  Lee Muir RN     Patient presents for PTNS treatment . Reports 30 percent improvement so far. Nocturia x 7 to 8. Reports being up multiple times prior to actually falling asleep for the night. Urinary frequency 1 to 2 hours during the day. Denies s/sx of UTI currently. Bowels regular  Not Using vaginal estrogen cream twice weekly     Procedure:  Right URGENT PC Therapy:  Posterior tibial nerve stimulation treatment  12087 - posterial tibial neuro stimulation, percutaneous needle electrode, single treatment, includes programming  Description of Procedure:    Informed consent was obtained with discussion of risk, benefits and goal, and limits of the procedure including but not limited to bleeding, infection, andnerve injury were discussed and the patient desired to proceed. The patient was properly identified and positioned in a semi reclined, comfortable seated position with their feet elevated in a recliner or exam room chair. The insertion site for the needle electrode was identified on the lower inner aspect of the Right leg. This position utilized was approximately 5 cm cephalad to the medial malleolus and one finger breath/2cm posterior to the tibia. Preparation of the needle electrode insertion site was performed using an alcohol pad to clean skin of the above-noted needle insertion site. The needle electrode/guide tube assembly was placed over the identified and cleaned insertion site. The needle was positioned so that it created a 60 degree angle that was maintained between the electrode itself and the ankle. The stop plug in the guide tube was removed to release the needle electrode.     A gentle tap of the needle electrode was used to khalil the skin. Once the needle electrode had been placed into the skin, the guide tube was removed and the needle was gently advanced maintaining a 60 degree angle. Consistent with recommended placement, approximately half the tip of the needle electrode was inserted leaving about 2 cm of needle exposed. The one-way fit connector of the lead wire was inserted into the stimulator's connection site. A surface electrode was placed over the medial aspect of the calcaneus on the lower extremity utilized for stimulation. For this patient, we utilized the Right lower extremity. The needle electrode clip was attached to the needle. The stimulator device was turned on. The test mode was entered. Current adjustment was slowly increased while observing the patient's foot for response. Adjustments were made advancing the needle further in to obtain optimal sensation. An optimal sensation in the foot was noted. Once an optimal response was noted, therapy mode was then initiated. The pt was given a bell to ring for the RN to be able to call for any needed adjustments. Therapy continued without complication for 30 minutes. No additional current adjustments were needed. Once 30 minutes of therapy and completed stimulator was turned off and the needle electrode clip was removed from the needle electrode. The needle was removed from the leg and no significant bleeding was noted. Gentle pressure at the needle insertion site facilitate optimal hemostasis. The surface electrode was removed from the medial calcaneus. The lead wire and lead set components were discarded in an appropriate safe container. This completed the therapy. The patient tolerated the procedure well. Diagnosis:  N39.41 Urge Incontinence    Plan:  CISC as ordered. Continue with bladder diary. Po fluid encouraged during day hours. Bladder training. Time trips to bathroom. Bladder diary scanned into [employer].    Call with s/sx of UTI   All questions answered   She understands and agrees to plan     Follow up in one week for next PTNS treatment, sooner prn

## 2023-10-19 ENCOUNTER — OFFICE VISIT (OUTPATIENT)
Dept: INTERNAL MEDICINE CLINIC | Facility: CLINIC | Age: 48
End: 2023-10-19
Payer: MEDICAID

## 2023-10-19 ENCOUNTER — NURSE ONLY (OUTPATIENT)
Dept: UROLOGY | Facility: HOSPITAL | Age: 48
End: 2023-10-19
Attending: OBSTETRICS & GYNECOLOGY
Payer: MEDICAID

## 2023-10-19 VITALS — RESPIRATION RATE: 16 BRPM | HEIGHT: 59 IN | WEIGHT: 165 LBS | BODY MASS INDEX: 33.26 KG/M2

## 2023-10-19 VITALS
OXYGEN SATURATION: 97 % | SYSTOLIC BLOOD PRESSURE: 138 MMHG | TEMPERATURE: 98 F | HEIGHT: 59 IN | HEART RATE: 76 BPM | RESPIRATION RATE: 14 BRPM | WEIGHT: 168.38 LBS | BODY MASS INDEX: 33.95 KG/M2 | DIASTOLIC BLOOD PRESSURE: 90 MMHG

## 2023-10-19 DIAGNOSIS — Z86.39 HISTORY OF VITAMIN D DEFICIENCY: ICD-10-CM

## 2023-10-19 DIAGNOSIS — N39.41 URGE INCONTINENCE: Primary | ICD-10-CM

## 2023-10-19 DIAGNOSIS — R42 DIZZINESS: Primary | ICD-10-CM

## 2023-10-19 DIAGNOSIS — E04.1 THYROID NODULE: ICD-10-CM

## 2023-10-19 DIAGNOSIS — N13.5 URETERAL STRICTURE, RIGHT: ICD-10-CM

## 2023-10-19 DIAGNOSIS — R35.1 NOCTURIA: ICD-10-CM

## 2023-10-19 DIAGNOSIS — R53.83 FATIGUE, UNSPECIFIED TYPE: ICD-10-CM

## 2023-10-19 DIAGNOSIS — N13.2 URETERAL STONE WITH HYDRONEPHROSIS: ICD-10-CM

## 2023-10-19 PROCEDURE — 3075F SYST BP GE 130 - 139MM HG: CPT | Performed by: FAMILY MEDICINE

## 2023-10-19 PROCEDURE — 64566 NEUROELTRD STIM POST TIBIAL: CPT

## 2023-10-19 PROCEDURE — 99214 OFFICE O/P EST MOD 30 MIN: CPT | Performed by: FAMILY MEDICINE

## 2023-10-19 PROCEDURE — 3008F BODY MASS INDEX DOCD: CPT | Performed by: FAMILY MEDICINE

## 2023-10-19 PROCEDURE — 3080F DIAST BP >= 90 MM HG: CPT | Performed by: FAMILY MEDICINE

## 2023-10-19 NOTE — PROGRESS NOTES
[facility]  URGENT PC Therapy Note    Date:  10/19/2023    Patient Name:  Trish Solo  Patient :  3/22/1975   Patient MRN:  [id number]  Patient Age:  57year old      Diagnosis:  N39.41 Urge Incontinence and R35.0 Urinary Frequency    Procedure:  Left URGENT PC Therapy:  Posterior tibial nerve stimulation treatment  05096 - posterial tibial neuro stimulation, percutaneous needle electrode, single treatment, includes programming    PTNS Evaluation:  PTNS Session: 12  Patient reports feeling: Better  Nocturia: 3 (reports getting up mutiple times prior to falling asleep. Improvement noted once she falls asleep)  Frequency: 1-2 hrs  Patient wears pads: Yes  Pads per day: 3  Pads per night: 3  Physician:  Dr. Zina Bradley    RN:  Dillan Long RN     Indications:  Urinary frequency, urge incontinence, with subjectively severe urge incontinence and inadequate to anti-cholinergic meds. Informed consent was obtained with discussion of risk, benefits and goal, and limits of the procedure including but not limited to bleeding, infection, andnerve injury were discussed and the patient desired to proceed. Description of Procedure: The patient was properly identified and positioned in a semi reclined, comfortable seated position with their feet elevated in a recliner or exam room chair. The insertion site for the needle electrode was identified on the lower inner aspect of the Left leg. This position utilized was approximately 5 cm cephalad to the medial malleolus and one finger breath/2cm posterior to the tibia. Preparation of the needle electrode insertion site was performed using an alcohol pad to clean skin of the above-noted needle insertion site. The needle electrode/guide tube assembly was placed over the identified and cleaned insertion site.   The needle was positioned so that it created a 60 degree angle that was maintained between the electrode itself and the ankle.  The stop plug in the guide tube was removed to release the needle electrode. A gentle tap of the needle electrode was used to khalil the skin. Once the needle electrode had been placed into the skin, the guide tube was removed and the needle was gently advanced maintaining a 60 degree angle. Consistent with recommended placement, approximately half the tip of the needle electrode was inserted leaving about 2 cm of needle exposed. The one-way fit connector of the lead wire was inserted into the stimulator's connection site. A surface electrode was placed over the medial aspect of the calcaneus on the lower extremity utilized for stimulation. For this patient, we utilized the Left lower extremity. The needle electrode clip was attached to the needle. The stimulator device was turned on. The test mode was entered. Current adjustment was slowly increased while observing the patient's foot for response. Adjustments were made advancing the needle further in to obtain optimal sensation. An optimal sensation in the foot was noted. Once an optimal response was noted, therapy mode was then initiated. The pt was given a bell to ring for the RN to be able to call for any needed adjustments. Therapy continued without complication for 30 minutes. No additional current adjustments were needed. Once 30 minutes of therapy and completed stimulator was turned off and the needle electrode clip was removed from the needle electrode. The needle was removed from the leg and no significant bleeding was noted. Gentle pressure at the needle insertion site facilitate optimal hemostasis. The surface electrode was removed from the medial calcaneus. The lead wire and lead set components were discarded in an appropriate safe container. This completed the therapy. The patient tolerated the procedure well.     Plan:

## 2023-10-20 ENCOUNTER — LAB ENCOUNTER (OUTPATIENT)
Dept: LAB | Age: 48
End: 2023-10-20
Attending: FAMILY MEDICINE
Payer: MEDICAID

## 2023-10-20 DIAGNOSIS — R53.83 FATIGUE, UNSPECIFIED TYPE: ICD-10-CM

## 2023-10-20 DIAGNOSIS — R42 DIZZINESS: ICD-10-CM

## 2023-10-20 DIAGNOSIS — N91.2 AMENORRHEA: ICD-10-CM

## 2023-10-20 DIAGNOSIS — Z86.39 HISTORY OF VITAMIN D DEFICIENCY: ICD-10-CM

## 2023-10-20 LAB
ALBUMIN SERPL-MCNC: 3.7 G/DL (ref 3.4–5)
ALBUMIN/GLOB SERPL: 0.9 {RATIO} (ref 1–2)
ALP LIVER SERPL-CCNC: 78 U/L
ALT SERPL-CCNC: 45 U/L
ANION GAP SERPL CALC-SCNC: 5 MMOL/L (ref 0–18)
AST SERPL-CCNC: 26 U/L (ref 15–37)
BASOPHILS # BLD AUTO: 0.04 X10(3) UL (ref 0–0.2)
BASOPHILS NFR BLD AUTO: 0.6 %
BILIRUB SERPL-MCNC: 0.4 MG/DL (ref 0.1–2)
BUN BLD-MCNC: 12 MG/DL (ref 7–18)
CALCIUM BLD-MCNC: 8.9 MG/DL (ref 8.5–10.1)
CHLORIDE SERPL-SCNC: 110 MMOL/L (ref 98–112)
CO2 SERPL-SCNC: 24 MMOL/L (ref 21–32)
CREAT BLD-MCNC: 0.82 MG/DL
EGFRCR SERPLBLD CKD-EPI 2021: 89 ML/MIN/1.73M2 (ref 60–?)
EOSINOPHIL # BLD AUTO: 0.17 X10(3) UL (ref 0–0.7)
EOSINOPHIL NFR BLD AUTO: 2.5 %
ERYTHROCYTE [DISTWIDTH] IN BLOOD BY AUTOMATED COUNT: 12.8 %
EST. AVERAGE GLUCOSE BLD GHB EST-MCNC: 111 MG/DL (ref 68–126)
FASTING STATUS PATIENT QL REPORTED: YES
GLOBULIN PLAS-MCNC: 4.1 G/DL (ref 2.8–4.4)
GLUCOSE BLD-MCNC: 96 MG/DL (ref 70–99)
HBA1C MFR BLD: 5.5 % (ref ?–5.7)
HCG UR QL: NEGATIVE
HCT VFR BLD AUTO: 41.2 %
HGB BLD-MCNC: 13.7 G/DL
IMM GRANULOCYTES # BLD AUTO: 0.02 X10(3) UL (ref 0–1)
IMM GRANULOCYTES NFR BLD: 0.3 %
LYMPHOCYTES # BLD AUTO: 1.42 X10(3) UL (ref 1–4)
LYMPHOCYTES NFR BLD AUTO: 20.7 %
MAGNESIUM SERPL-MCNC: 2.1 MG/DL (ref 1.6–2.6)
MCH RBC QN AUTO: 29.8 PG (ref 26–34)
MCHC RBC AUTO-ENTMCNC: 33.3 G/DL (ref 31–37)
MCV RBC AUTO: 89.6 FL
MONOCYTES # BLD AUTO: 0.41 X10(3) UL (ref 0.1–1)
MONOCYTES NFR BLD AUTO: 6 %
NEUTROPHILS # BLD AUTO: 4.79 X10 (3) UL (ref 1.5–7.7)
NEUTROPHILS # BLD AUTO: 4.79 X10(3) UL (ref 1.5–7.7)
NEUTROPHILS NFR BLD AUTO: 69.9 %
OSMOLALITY SERPL CALC.SUM OF ELEC: 288 MOSM/KG (ref 275–295)
PLATELET # BLD AUTO: 328 10(3)UL (ref 150–450)
POTASSIUM SERPL-SCNC: 3.7 MMOL/L (ref 3.5–5.1)
PROT SERPL-MCNC: 7.8 G/DL (ref 6.4–8.2)
RBC # BLD AUTO: 4.6 X10(6)UL
SODIUM SERPL-SCNC: 139 MMOL/L (ref 136–145)
TSI SER-ACNC: 0.91 MIU/ML (ref 0.36–3.74)
VIT B12 SERPL-MCNC: 650 PG/ML (ref 193–986)
VIT D+METAB SERPL-MCNC: 20.4 NG/ML (ref 30–100)
WBC # BLD AUTO: 6.9 X10(3) UL (ref 4–11)

## 2023-10-20 PROCEDURE — 84443 ASSAY THYROID STIM HORMONE: CPT

## 2023-10-20 PROCEDURE — 83735 ASSAY OF MAGNESIUM: CPT

## 2023-10-20 PROCEDURE — 80053 COMPREHEN METABOLIC PANEL: CPT

## 2023-10-20 PROCEDURE — 36415 COLL VENOUS BLD VENIPUNCTURE: CPT

## 2023-10-20 PROCEDURE — 85025 COMPLETE CBC W/AUTO DIFF WBC: CPT

## 2023-10-20 PROCEDURE — 82607 VITAMIN B-12: CPT

## 2023-10-20 PROCEDURE — 81025 URINE PREGNANCY TEST: CPT

## 2023-10-20 PROCEDURE — 82306 VITAMIN D 25 HYDROXY: CPT

## 2023-10-20 PROCEDURE — 83036 HEMOGLOBIN GLYCOSYLATED A1C: CPT

## 2023-10-23 ENCOUNTER — TELEPHONE (OUTPATIENT)
Dept: INTERNAL MEDICINE CLINIC | Facility: CLINIC | Age: 48
End: 2023-10-23

## 2023-10-23 NOTE — TELEPHONE ENCOUNTER
Patient called in advising that the scheduling dept had advised her that she will need a PA for US since she has [address]. Please advise.      Future Appointments   Date Time Provider Duc Jeffries   10/30/2023 11:15 AM [facility]

## 2023-10-23 NOTE — TELEPHONE ENCOUNTER
Sent WebEx message to [employer]. In referral dept to clarify who would handle this. Will await to hear back.

## 2023-10-23 NOTE — TELEPHONE ENCOUNTER
Per Nancie Leger - the referral has been approved. it is listed as an Internal US so as long as it takes place at an In Network facility, then no authorization is needed. Thank you! Spoke with pt and relayed recs from Nancie Hardy.  She v/u

## 2023-10-24 DIAGNOSIS — E55.9 VITAMIN D DEFICIENCY: Primary | ICD-10-CM

## 2023-10-24 RX ORDER — ERGOCALCIFEROL 1.25 MG/1
50000 CAPSULE ORAL WEEKLY
Qty: 24 CAPSULE | Refills: 0 | Status: SHIPPED | OUTPATIENT
Start: 2023-10-24

## 2023-10-27 ENCOUNTER — OFFICE VISIT (OUTPATIENT)
Dept: UROLOGY | Facility: HOSPITAL | Age: 48
End: 2023-10-27
Attending: OBSTETRICS & GYNECOLOGY

## 2023-10-27 VITALS — HEIGHT: 59 IN | WEIGHT: 165 LBS | BODY MASS INDEX: 33.26 KG/M2 | RESPIRATION RATE: 16 BRPM

## 2023-10-27 DIAGNOSIS — N81.6 RECTOCELE: ICD-10-CM

## 2023-10-27 DIAGNOSIS — N81.84 PELVIC MUSCLE WASTING: ICD-10-CM

## 2023-10-27 DIAGNOSIS — N39.41 URGE INCONTINENCE: ICD-10-CM

## 2023-10-27 DIAGNOSIS — N32.81 DETRUSOR INSTABILITY: Primary | ICD-10-CM

## 2023-10-27 DIAGNOSIS — N39.0 RECURRENT UTI: ICD-10-CM

## 2023-10-27 DIAGNOSIS — R33.9 INCOMPLETE BLADDER EMPTYING: ICD-10-CM

## 2023-10-27 DIAGNOSIS — N81.11 CYSTOCELE, MIDLINE: ICD-10-CM

## 2023-10-27 DIAGNOSIS — N39.3 FEMALE STRESS INCONTINENCE: ICD-10-CM

## 2023-10-27 DIAGNOSIS — R30.0 DYSURIA: ICD-10-CM

## 2023-10-27 DIAGNOSIS — R35.1 NOCTURIA: ICD-10-CM

## 2023-10-27 LAB
BILIRUB UR QL: NEGATIVE
CLARITY UR: CLEAR
CONTROL RUN WITHIN 24 HOURS?: YES
GLUCOSE UR-MCNC: NORMAL MG/DL
KETONES UR-MCNC: NEGATIVE MG/DL
LEUKOCYTE ESTERASE UR QL STRIP.AUTO: NEGATIVE
LEUKOCYTE ESTERASE URINE: NEGATIVE
NITRITE UR QL STRIP.AUTO: NEGATIVE
NITRITE URINE: NEGATIVE
PH UR: 6 [PH] (ref 5–8)
PROT UR-MCNC: 50 MG/DL
SP GR UR STRIP: 1.02 (ref 1–1.03)
UROBILINOGEN UR STRIP-ACNC: NORMAL

## 2023-10-27 PROCEDURE — 51701 INSERT BLADDER CATHETER: CPT | Performed by: OBSTETRICS & GYNECOLOGY

## 2023-10-27 PROCEDURE — 81001 URINALYSIS AUTO W/SCOPE: CPT | Performed by: OBSTETRICS & GYNECOLOGY

## 2023-10-27 PROCEDURE — 99212 OFFICE O/P EST SF 10 MIN: CPT

## 2023-10-27 PROCEDURE — 81002 URINALYSIS NONAUTO W/O SCOPE: CPT | Performed by: OBSTETRICS & GYNECOLOGY

## 2023-10-27 PROCEDURE — 87086 URINE CULTURE/COLONY COUNT: CPT | Performed by: OBSTETRICS & GYNECOLOGY

## 2023-10-27 RX ORDER — SULFAMETHOXAZOLE AND TRIMETHOPRIM 400; 80 MG/1; MG/1
1 TABLET ORAL NIGHTLY
Qty: 90 TABLET | Refills: 0 | Status: SHIPPED | OUTPATIENT
Start: 2023-10-27

## 2023-10-30 ENCOUNTER — HOSPITAL ENCOUNTER (OUTPATIENT)
Dept: ULTRASOUND IMAGING | Age: 48
Discharge: HOME | End: 2023-10-30
Attending: FAMILY MEDICINE
Payer: MEDICAID

## 2023-10-30 DIAGNOSIS — E04.1 THYROID NODULE: ICD-10-CM

## 2023-10-30 DIAGNOSIS — E04.1 THYROID NODULE: Primary | ICD-10-CM

## 2023-10-30 PROCEDURE — 76536 US EXAM OF HEAD AND NECK: CPT | Performed by: FAMILY MEDICINE

## 2023-11-16 ENCOUNTER — NURSE ONLY (OUTPATIENT)
Dept: UROLOGY | Facility: HOSPITAL | Age: 48
End: 2023-11-16
Attending: OBSTETRICS & GYNECOLOGY
Payer: MEDICAID

## 2023-11-16 ENCOUNTER — TELEPHONE (OUTPATIENT)
Dept: OBGYN CLINIC | Facility: CLINIC | Age: 48
End: 2023-11-16

## 2023-11-16 VITALS — HEIGHT: 59 IN | RESPIRATION RATE: 18 BRPM | BODY MASS INDEX: 33.26 KG/M2 | WEIGHT: 165 LBS

## 2023-11-16 DIAGNOSIS — N39.41 URGE INCONTINENCE: Primary | ICD-10-CM

## 2023-11-16 PROCEDURE — 64566 NEUROELTRD STIM POST TIBIAL: CPT

## 2023-11-16 NOTE — TELEPHONE ENCOUNTER
Spoke to patient. Patient reports prolonged heavy bleeding and passing large clots. Menses started on 11/7/23, changing a large pad from every hour to every 2-3 hours, passing clots the size of quarters to palm size. Cramping pain is intermittent, rated at a 4 on a 1-10 scale. Patient is not able to take ibuprofen. Normal cycle in September and October. Patient was seen for AUB on 8/18/23 and had EMB- Dr. Jen Brown recommended medical management options to regulate cycle. Patient states she has not been able to decide on the best method- would like to discuss options further. Patient reports dizziness, lightheadedness and general fatigue. Instructed patient to go to the ER for management of heavy bleeding and symptoms of anemia. Advised office visit to discuss options once she has been treated and stabilized at the ER. Patient verbalized understanding and agrees with plan.

## 2023-11-16 NOTE — TELEPHONE ENCOUNTER
Patient was seen here for abnormal bleeding. She is also being treated by Dr Delio Villalobos. She is aging having abnormal bleeding that is very heavy. Dr Delio Villalobos wanted patient to let us know because she is concerned that with how heavy she is bleeding that she may be anemic.    Please call to advise

## 2023-11-16 NOTE — PROGRESS NOTES
[facility]  URGENT PC Therapy Note    Date:  2023    Patient Name:  Luis Alberto Villavicencio  Patient :  3/22/1975   Patient MRN:  [id number]  Patient Age:  57year old      Diagnosis:  N39.41 Urge Incontinence and R35.0 Urinary Frequency    Procedure:  Right URGENT PC Therapy:  Posterior tibial nerve stimulation treatment  19906 - posterial tibial neuro stimulation, percutaneous needle electrode, single treatment, includes programming    PTNS Evaluation:  PTNS Session: Monthly Follow-Up  Patient reports feeling: Better  Nocturia: 4+ (Reports getting up mutiple times prior to falling asleep. Improvement noted once she falls asleep)  Frequency: 1-2 hrs (CISC prn, not in last 2.5 weeks)  Patient wears pads: Yes  Pads per day: 2 (1-2)  Pads per night: 2 (1-2)  Denies s/sx UTI  Bowels regular, daily fiber - Discussed Miralax PRN reviewed  Physician:  Dr. Li Hale    RN:  Akanksha Noel RN     Indications:  Urinary frequency, urge incontinence, with subjectively severe urge incontinence and inadequate to anti-cholinergic meds. Informed consent was obtained with discussion of risk, benefits and goal, and limits of the procedure including but not limited to bleeding, infection, andnerve injury were discussed and the patient desired to proceed. Description of Procedure: The patient was properly identified and positioned in a semi reclined, comfortable seated position with their feet elevated in a recliner or exam room chair. The insertion site for the needle electrode was identified on the lower inner aspect of the Right leg. This position utilized was approximately 5 cm cephalad to the medial malleolus and one finger breath/2cm posterior to the tibia. Preparation of the needle electrode insertion site was performed using an alcohol pad to clean skin of the above-noted needle insertion site.     The needle electrode/guide tube assembly was placed over the identified and cleaned insertion site. The needle was positioned so that it created a 60 degree angle that was maintained between the electrode itself and the ankle. The stop plug in the guide tube was removed to release the needle electrode. A gentle tap of the needle electrode was used to khalil the skin. Once the needle electrode had been placed into the skin, the guide tube was removed and the needle was gently advanced maintaining a 60 degree angle. Consistent with recommended placement, approximately half the tip of the needle electrode was inserted leaving about 2 cm of needle exposed. The one-way fit connector of the lead wire was inserted into the stimulator's connection site. A surface electrode was placed over the medial aspect of the calcaneus on the lower extremity utilized for stimulation. For this patient, we utilized the Right lower extremity. The needle electrode clip was attached to the needle. The stimulator device was turned on. The test mode was entered. Current adjustment was slowly increased while observing the patient's foot for response. Adjustments were made advancing the needle further in to obtain optimal sensation. An optimal sensation in the foot was noted. Once an optimal response was noted, therapy mode was then initiated. The pt was given a bell to ring for the RN to be able to call for any needed adjustments. Therapy continued without complication for 30 minutes. No additional current adjustments were needed. Once 30 minutes of therapy and completed stimulator was turned off and the needle electrode clip was removed from the needle electrode. The needle was removed from the leg and no significant bleeding was noted. Gentle pressure at the needle insertion site facilitate optimal hemostasis. The surface electrode was removed from the medial calcaneus. The lead wire and lead set components were discarded in an appropriate safe container. This completed the therapy. The patient tolerated the procedure well.     Plan:  Call with s/sx of UTI   All questions answered   She understands and agrees to plan      Follow up next month for PTNS treatment, sooner prn

## 2023-11-21 ENCOUNTER — PATIENT MESSAGE (OUTPATIENT)
Dept: OBGYN CLINIC | Facility: CLINIC | Age: 48
End: 2023-11-21

## 2023-11-21 ENCOUNTER — HOSPITAL ENCOUNTER (OUTPATIENT)
Age: 48
Discharge: HOME | End: 2023-11-21
Attending: EMERGENCY MEDICINE
Payer: MEDICAID

## 2023-11-21 VITALS
SYSTOLIC BLOOD PRESSURE: 164 MMHG | RESPIRATION RATE: 20 BRPM | DIASTOLIC BLOOD PRESSURE: 94 MMHG | HEART RATE: 115 BPM | OXYGEN SATURATION: 94 % | WEIGHT: 164 LBS | HEIGHT: 59 IN | TEMPERATURE: 99 F | BODY MASS INDEX: 33.06 KG/M2

## 2023-11-21 DIAGNOSIS — U07.1 COVID: Primary | ICD-10-CM

## 2023-11-21 LAB
POCT INFLUENZA A: NEGATIVE
POCT INFLUENZA B: NEGATIVE
S PYO AG THROAT QL IA.RAPID: NEGATIVE
SARS-COV-2 RNA RESP QL NAA+PROBE: DETECTED

## 2023-11-21 PROCEDURE — 99213 OFFICE O/P EST LOW 20 MIN: CPT

## 2023-11-21 PROCEDURE — 99214 OFFICE O/P EST MOD 30 MIN: CPT

## 2023-11-21 PROCEDURE — 87502 INFLUENZA DNA AMP PROBE: CPT | Performed by: EMERGENCY MEDICINE

## 2023-11-21 PROCEDURE — 87651 STREP A DNA AMP PROBE: CPT | Performed by: EMERGENCY MEDICINE

## 2023-11-21 RX ORDER — NIRMATRELVIR AND RITONAVIR 300-100 MG
KIT ORAL
Qty: 30 TABLET | Refills: 0 | Status: SHIPPED | OUTPATIENT
Start: 2023-11-21 | End: 2023-11-21

## 2023-11-21 RX ORDER — NIRMATRELVIR AND RITONAVIR 300-100 MG
KIT ORAL
Qty: 30 TABLET | Refills: 0 | Status: SHIPPED | OUTPATIENT
Start: 2023-11-21

## 2023-11-22 NOTE — TELEPHONE ENCOUNTER
Spoke to patient via telephone who reports:    Bleeding began 11/7/23  Light today  \"I will see how the day progresses but there is heavy clots and bright red blood when I wipe myself not as heavy as before. \"  Clots are smaller than a tennis ball when she has them. \"Still clotting just not as big better today\"    Inquiring about hormone testing recommended by Dr. Lydia Cabral office per patient. Patient did test positive for COVID on 11/21/23    Advised to continue to monitor for new/worsening symptoms. Er precautions provided. patient verbalized understanding.

## 2023-11-22 NOTE — TELEPHONE ENCOUNTER
COVID-19 infection can cause abnormal uterine bleeding. Therefore I would recommend to continue to monitor symptoms. I recommend for her to follow-up in the office if her symptoms are persistent for 3 months or longer. No hormonal testing is indicated at this time as it will not change the plan of care. Thank you.

## 2023-12-06 ENCOUNTER — OFFICE VISIT (OUTPATIENT)
Facility: CLINIC | Age: 48
End: 2023-12-06
Payer: MEDICAID

## 2023-12-06 VITALS — SYSTOLIC BLOOD PRESSURE: 134 MMHG | OXYGEN SATURATION: 98 % | DIASTOLIC BLOOD PRESSURE: 84 MMHG | HEART RATE: 68 BPM

## 2023-12-06 DIAGNOSIS — Z83.49 FAMILY HISTORY OF THYROID DISEASE: ICD-10-CM

## 2023-12-06 DIAGNOSIS — E04.2 MULTIPLE THYROID NODULES: Primary | ICD-10-CM

## 2023-12-06 PROCEDURE — 3079F DIAST BP 80-89 MM HG: CPT | Performed by: STUDENT IN AN ORGANIZED HEALTH CARE EDUCATION/TRAINING PROGRAM

## 2023-12-06 PROCEDURE — 99204 OFFICE O/P NEW MOD 45 MIN: CPT | Performed by: STUDENT IN AN ORGANIZED HEALTH CARE EDUCATION/TRAINING PROGRAM

## 2023-12-06 PROCEDURE — 3075F SYST BP GE 130 - 139MM HG: CPT | Performed by: STUDENT IN AN ORGANIZED HEALTH CARE EDUCATION/TRAINING PROGRAM

## 2023-12-06 NOTE — PROGRESS NOTES
Endocrinology Clinic Note    Name: Ulysses Rocha    Date: 12/6/2023      HISTORY OF PRESENT ILLNESS   Ulysses Rocha is a 57year old female  who presents for consultation for thyroid nodules    Initial HPI consult in Dec 2023  Chief Complaint   Patient presents with    New Patient     NP: Dx: Hyperglycemia, Vitamin D deficiency-referred by PCP Rochell Cushing, MD.    Pt here to establish care for thyroid issues. Pt would like to discuss recent thyroid US and Labs. Thyroid Problem     -Previous ENDO Hx w Dr. Tereza Davidson ([facility])-LOV 8/24/21  -Hx of thyroid nodule-US Thyroid done 10/30/23  -Labs done 10/20/23     Previously with Charlene Davidson, LV 8/2021  Reviewed US showing MNG, subcentimeter with recommendation of periodic US assessment  TFTs normal range    +FH of thyroid disease (mother had partial thyroidectomy, unclear if malignancy), 2 sisters also have thyroid issues    10/2023 - thyroid US shows 4 thyroid nodules , stable in size, all subcentimeter   10/2023 - TSH 0.91    Had seen ENT Dr Linda Harmon [facility]) as well who also recommended surveillance      REVIEW OF SYSTEMS  Ten point review of systems has been performed and is otherwise negative and/or non-contributory, except as described above. Medications:     Current Outpatient Medications:     nirmatrelvir-ritonavir (PAXLOVID, 300/100,) 300-100 MG Oral Tablet Therapy Pack, Take two nirmatrelvir tablets (300mg) with one ritonavir tablet (100mg) together twice daily for 5 days. , Disp: 30 tablet, Rfl: 0    sulfamethoxazole-trimethoprim (BACTRIM) 400-80 MG Oral Tab, Take 1 tablet by mouth nightly., Disp: 90 tablet, Rfl: 0    ergocalciferol 1.25 MG (60589 UT) Oral Cap, Take 1 capsule (50,000 Units total) by mouth once a week., Disp: 24 capsule, Rfl: 0    FIBER OR, Take 1 tablet by mouth daily. , Disp: , Rfl:     triamterene-hydroCHLOROthiazide 37.5-25 MG Oral Cap, Take 1 capsule by mouth every morning., Disp: 90 capsule, Rfl: 1    potassium citrate 10 MEQ (1080 MG) Oral Tab CR, Take 1 tablet (10 mEq total) by mouth daily. , Disp: , Rfl:     albuterol (PROAIR HFA) 108 (90 Base) MCG/ACT Inhalation Aero Soln, Inhale 2 puffs into the lungs every 4 (four) hours as needed for Wheezing., Disp: 1 each, Rfl: 1    Multiple Vitamin (ONE-DAILY MULTI VITAMINS) Oral Tab, Take 1 tablet by mouth daily. , Disp: , Rfl:      Allergies:    Allergies   Allergen Reactions    Amlodipine SWELLING     Leg swelling    Metoprolol SHORTNESS OF BREATH    Toradol [Ketorolac Tromethamine] SHORTNESS OF BREATH    Tramadol SHORTNESS OF BREATH    Dilaudid [Hydromorphone] ITCHING and PAIN     Headache - per pt this is only with long term use - pt states she can tolerate this medication    Oxycodone PAIN     headache    Wellbutrin [Bupropion Hcl] PAIN and DIZZINESS     Headache    Valsartan OTHER (SEE COMMENTS)     Chest pain      Codeine Sulfate ITCHING     TABS    Hydrocodone ITCHING    Morphine ITCHING    Seasonal Runny nose       Social History:   Social History     Socioeconomic History    Marital status: Life Partner   Tobacco Use    Smoking status: Never    Smokeless tobacco: Never   Vaping Use    Vaping Use: Never used   Substance and Sexual Activity    Alcohol use: No    Drug use: No    Sexual activity: Yes     Partners: Male   Other Topics Concern    Caffeine Concern No    Exercise No    Seat Belt Yes       Medical History:   Reviewed      Surgical history:   Past Surgical History:   Procedure Laterality Date      ,2016    x2    CHOLECYSTECTOMY      COLONOSCOPY N/A 2021    Procedure: COLONOSCOPY, ESOPHAGOGASTRODUODENOSCOPY with biopsy;  Surgeon: Qiana Palumbo DO;  Location: [address] [place] [address] ENDOSCOPY    COLPOSCOPY, CERVIX W/UPPER ADJACENT VAGINA; W/BIOPSY(S), CERVIX      CRYOCAUTERY OF CERVIX      CYSTO/URETERO W/UP STRICTURE Right 10/15/2019    Cysto Rt RPG, Rt URS w/ Laser Litho Dilation of Rtight Stricture Rt URS Stent Exchange w/ ELLE Hare REMOVE Right 12/18/2019    right ureter and kidney stones extraction, URS, stent placement    CYSTOSCOPY,URETEROSCOPY,LITHOTRIPSY Right 08/23/2019    Cysto, B/L RPG, URS, laser litho, stone ext, Rt stent Dr. Carmina Calvillo  1/2000    L hernia repair    KIDNEY SURGERY Right     stent placement 6/2022    LITHOTRIPSY  2001, 2007 & 11/11    NEEDLE BIOPSY LEFT  02/2021    fibroadenoma    NEEDLE BIOPSY RIGHT  02/2021    fibroadenoma    OTHER Bilateral 2012    nerve surgery to bilateral arms about 1 month apart    OTHER      percutaneous nephrolithotomy    OTHER SURGICAL HISTORY  12/27/2019    cysto stent removal - dr. Heather Stern      C;ysto Lt PCNL Stent Placement     OTHER SURGICAL HISTORY  06/03/2020    Cysto Stent Removal w/ Dr Amelia Hylton HISTORY  07/07/2021    Cysto/Stent Removal Dr. Lisha Ruiz Right 11/13/2019    Cysto Stent Removal w/ Dr Dimitri Maynard HERNIA,5+Y/O,REDUCIBL         PHYSICAL EXAMINATION:  /84   Pulse 68   LMP 11/07/2023 (Exact Date)   SpO2 98%     CONSTITUTIONAL:  awake, alert, cooperative, no apparent distress, and appears stated age  PSYCH: normal affect  EYES:  (-) proptosis, (-) stare  ENT:  Normocephalic, atraumatic  NECK:  thyroid not enlarged and no palpable nodules  LUNGS: breathing comfortably  CARDIOVASCULAR:  regular rate       Labs:  Reviewed    Imaging:  Reviewed    ASSESSMENT/PLAN:      ICD-10-CM    1. Multiple thyroid nodules  E04.2 US THYROID (CPT=76536)     TSH and Free T4     Triiodothyronine (T3) Total     Thyroid peroxidase & thyroglobulin ab      2. Family history of thyroid disease  Z83.49         Pt currently have 4 nodules,  none of which meet FNA criteria. She is biochemically and clinically euthyroid. Reviewed FNA criteria and ASTON guidelines for TT/lobectomy depending on FNA results.   [facility] (transferring from [place])   10/2023 thyroid US and labs stable.   - full thyroid panel including thyroid ab prior to next appointment   - annual thyroid US (next in Oct 2024)  - all questions answered for pt and     Return to Clinic in Oct 2024    12/6/2023  Raquel Rollins MD

## 2023-12-08 ENCOUNTER — TELEPHONE (OUTPATIENT)
Dept: SURGERY | Facility: CLINIC | Age: 48
End: 2023-12-08

## 2023-12-08 ENCOUNTER — OFFICE VISIT (OUTPATIENT)
Dept: SURGERY | Facility: CLINIC | Age: 48
End: 2023-12-08
Payer: MEDICAID

## 2023-12-08 DIAGNOSIS — N39.0 RECURRENT UTI: Primary | ICD-10-CM

## 2023-12-08 DIAGNOSIS — N20.0 NEPHROLITHIASIS: ICD-10-CM

## 2023-12-08 LAB
APPEARANCE: CLEAR
BILIRUBIN: NEGATIVE
GLUCOSE (URINE DIPSTICK): NEGATIVE MG/DL
KETONES (URINE DIPSTICK): NEGATIVE MG/DL
LEUKOCYTES: NEGATIVE
MULTISTIX LOT#: ABNORMAL NUMERIC
NITRITE, URINE: NEGATIVE
PH, URINE: 6 (ref 4.5–8)
SPECIFIC GRAVITY: 1.01 (ref 1–1.03)
URINE-COLOR: YELLOW
UROBILINOGEN,SEMI-QN: 0.2 MG/DL (ref 0–1.9)

## 2023-12-08 PROCEDURE — 99204 OFFICE O/P NEW MOD 45 MIN: CPT | Performed by: SURGERY

## 2023-12-08 PROCEDURE — 81003 URINALYSIS AUTO W/O SCOPE: CPT | Performed by: SURGERY

## 2023-12-08 NOTE — TELEPHONE ENCOUNTER
Hi,    Can you please schedule this patient for surgery. Also, can you arrange for the patient to drop a urine sample for urine culture 1-2 weeks prior to the scheduled surgery date? Thanks,  [facility]     Urology Surgery Request  Surgeon: Francisca Lira  Location (if known): [facility]  Procedure: Cystoscopy, LEFT ureteroscopy, laser lithotripsy, stent placement, RIGHT retrograde pyelogram, possible ureteroscopy  Anesthesia: General   Time Frame: Next available  Time required: 75 minutes  Diagnosis: Nephrolithiasis  Special Equipment: C-arm    Antibiotics: per hospital protocol unless checked below   ___ Levaquin 500 mg IV   ___ Gemcitabine 2 g/100 mL NS bladder instillation to be given in OR    Estimated Post Op/Follow Up Appt:   1 week for cystoscopy/stent removal in clinic with me. Please schedule appointment at time of surgery scheduling.

## 2023-12-14 ENCOUNTER — NURSE ONLY (OUTPATIENT)
Dept: UROLOGY | Facility: HOSPITAL | Age: 48
End: 2023-12-14
Payer: MEDICAID

## 2023-12-14 VITALS — RESPIRATION RATE: 18 BRPM | HEIGHT: 59 IN | BODY MASS INDEX: 33.06 KG/M2 | WEIGHT: 164 LBS

## 2023-12-14 DIAGNOSIS — N39.41 URGE INCONTINENCE: Primary | ICD-10-CM

## 2023-12-14 PROCEDURE — 64566 NEUROELTRD STIM POST TIBIAL: CPT

## 2023-12-14 NOTE — PROGRESS NOTES
[facility]  URGENT PC Therapy Note    Date:  2023    Patient Name:  Hue Hernandez  Patient :  3/22/1975   Patient MRN:  [id number]  Patient Age:  57year old      Physician:  Dr. Bailey Rodgers  RN:  Young Richter RN     Patient presents for PTNS treatment monthly Reports some improvement so far. Nocturia x Reports voiding at least ten times prior to actually falling asleep. Once sleeping reports waking up 4 to 5 times Reports voiding at least ten times prior to actually falling asleep. Once sleeping reports waking up 4 to 5 times   Urinary frequency 1-2 hours  Denies s/sx of UTI currently denies  Not sing vaginal estrogen cream twice weekly     Procedure:  Left URGENT PC Therapy:  Posterior tibial nerve stimulation treatment  24332 - posterial tibial neuro stimulation, percutaneous needle electrode, single treatment, includes programming  Description of Procedure:    Informed consent was obtained with discussion of risk, benefits and goal, and limits of the procedure including but not limited to bleeding, infection, andnerve injury were discussed and the patient desired to proceed. The patient was properly identified and positioned in a semi reclined, comfortable seated position with their feet elevated in a recliner or exam room chair. The insertion site for the needle electrode was identified on the lower inner aspect of the Left leg. This position utilized was approximately 5 cm cephalad to the medial malleolus and one finger breath/2cm posterior to the tibia. Preparation of the needle electrode insertion site was performed using an alcohol pad to clean skin of the above-noted needle insertion site. The needle electrode/guide tube assembly was placed over the identified and cleaned insertion site. The needle was positioned so that it created a 60 degree angle that was maintained between the electrode itself and the ankle.   The stop plug in the guide tube was removed to release the needle electrode. A gentle tap of the needle electrode was used to khalil the skin. Once the needle electrode had been placed into the skin, the guide tube was removed and the needle was gently advanced maintaining a 60 degree angle. Consistent with recommended placement, approximately half the tip of the needle electrode was inserted leaving about 2 cm of needle exposed. The one-way fit connector of the lead wire was inserted into the stimulator's connection site. A surface electrode was placed over the medial aspect of the calcaneus on the lower extremity utilized for stimulation. For this patient, we utilized the Left lower extremity. The needle electrode clip was attached to the needle. The stimulator device was turned on. The test mode was entered. Current adjustment was slowly increased while observing the patient's foot for response. Adjustments were made advancing the needle further in to obtain optimal sensation. An optimal sensation in the foot was noted. Once an optimal response was noted, therapy mode was then initiated. The pt was given a bell to ring for the RN to be able to call for any needed adjustments. Therapy continued without complication for 30 minutes. No additional current adjustments were needed. Once 30 minutes of therapy and completed stimulator was turned off and the needle electrode clip was removed from the needle electrode. The needle was removed from the leg and no significant bleeding was noted. Gentle pressure at the needle insertion site facilitate optimal hemostasis. The surface electrode was removed from the medial calcaneus. The lead wire and lead set components were discarded in an appropriate safe container. This completed the therapy. The patient tolerated the procedure well.         Diagnosis:  N39.41 Urge Incontinence    Plan:  Call with s/sx of UTI   All questions answered   She understands and agrees to plan Follow up in one month for next PTNS treatment, sooner prn

## 2023-12-15 ENCOUNTER — LAB ENCOUNTER (OUTPATIENT)
Dept: LAB | Age: 48
End: 2023-12-15
Attending: SURGERY
Payer: MEDICAID

## 2023-12-15 DIAGNOSIS — N20.0 NEPHROLITHIASIS: ICD-10-CM

## 2023-12-15 PROCEDURE — 82507 ASSAY OF CITRATE: CPT

## 2023-12-15 PROCEDURE — 84560 ASSAY OF URINE/URIC ACID: CPT

## 2023-12-15 PROCEDURE — 84105 ASSAY OF URINE PHOSPHORUS: CPT

## 2023-12-15 PROCEDURE — 82340 ASSAY OF CALCIUM IN URINE: CPT

## 2023-12-15 PROCEDURE — 84300 ASSAY OF URINE SODIUM: CPT

## 2023-12-15 PROCEDURE — 84133 ASSAY OF URINE POTASSIUM: CPT

## 2023-12-15 PROCEDURE — 83945 ASSAY OF OXALATE: CPT

## 2023-12-15 PROCEDURE — 82436 ASSAY OF URINE CHLORIDE: CPT

## 2023-12-15 PROCEDURE — 84392 ASSAY OF URINE SULFATE: CPT

## 2023-12-15 PROCEDURE — 83735 ASSAY OF MAGNESIUM: CPT

## 2023-12-15 PROCEDURE — 83986 ASSAY PH BODY FLUID NOS: CPT

## 2024-01-04 LAB
AMMONIA, URINE: 30 MEQ/24 HR
AMMONIA, URINE: ABNORMAL UG/DL
BRUSHITE: 1.48 RATIO
CHLORIDE URINE: 127 MMOL/24 HR
CHLORIDE, URINE: 69 MMOL/L
CITRIC ACID (CITRATE): 123 MG/L
CITRIC ACID(CITRATE): 226 MG/24 HR
CREATININE, URINE: 1477.5 MG/24 HR
CREATININE, URINE: 80.3 MG/DL
CYSTINE, QUANT, UR: 9.86 MG/24 HR
CYSTINE, QUANTITATIVE, URINE: 5.36 MG/L
LC CALCIUM OXALATE: 2.26 RATIO
LC CALCIUM, URINE: 10.2 MG/DL
LC CALCIUM, URINE: 187.7 MG/24 HR
MAGNESIUM, UR(24 HR): 98 MG/24 HR
MAGNESIUM, URINE: 5.3 MG/DL
MONOSODIUM URATE: 3.09 RATIO
OSMOLALITY, URINE: 494 MOSMOL/KG
OXALATES, URINE: 13 MG/24 HR
OXALATES, URINE: 7 MG/L
PH, 24 HR URINE: 6.1
PHOSPHORUS, URINE: 47.9 MG/DL
PHOSPHORUS, URINE: 881.4 MG/24 HR
POTASSIUM, URINE: 26 MMOL/L
POTASSIUM, URINE: 47.8 MMOL/24 HR
SODIUM, URINE: 153 MMOL/24 HR
SODIUM, URINE: 83 MMOL/L
STRUVITE: 0.02 RATIO
SULFATE, URINE: 19 MEQ/L
SULFATE, URINE: 35 MEQ/24 HR
URIC ACID, URINE: 35 MG/DL
URIC ACID, URINE: 644 MG/24 HR
URIC ACID: 1.18 RATIO
URINE VOLUME (PRESERVATIVE): 1840 ML/24 HR
URINE VOLUME: 1840 ML/24 HR

## 2024-01-11 ENCOUNTER — LABORATORY ENCOUNTER (OUTPATIENT)
Dept: LAB | Age: 49
End: 2024-01-11
Attending: SURGERY
Payer: MEDICAID

## 2024-01-11 ENCOUNTER — EKG ENCOUNTER (OUTPATIENT)
Dept: LAB | Age: 49
End: 2024-01-11
Attending: SURGERY
Payer: MEDICAID

## 2024-01-11 DIAGNOSIS — N20.0 NEPHROLITHIASIS: ICD-10-CM

## 2024-01-11 LAB
ANION GAP SERPL CALC-SCNC: 3 MMOL/L (ref 0–18)
ATRIAL RATE: 58 BPM
BUN BLD-MCNC: 14 MG/DL (ref 9–23)
CALCIUM BLD-MCNC: 9.3 MG/DL (ref 8.5–10.1)
CHLORIDE SERPL-SCNC: 108 MMOL/L (ref 98–112)
CO2 SERPL-SCNC: 26 MMOL/L (ref 21–32)
CREAT BLD-MCNC: 0.95 MG/DL
EGFRCR SERPLBLD CKD-EPI 2021: 75 ML/MIN/1.73M2 (ref 60–?)
FASTING STATUS PATIENT QL REPORTED: YES
GLUCOSE BLD-MCNC: 99 MG/DL (ref 70–99)
OSMOLALITY SERPL CALC.SUM OF ELEC: 285 MOSM/KG (ref 275–295)
P AXIS: 40 DEGREES
P-R INTERVAL: 122 MS
POTASSIUM SERPL-SCNC: 3.6 MMOL/L (ref 3.5–5.1)
Q-T INTERVAL: 416 MS
QRS DURATION: 86 MS
QTC CALCULATION (BEZET): 408 MS
R AXIS: 50 DEGREES
SODIUM SERPL-SCNC: 137 MMOL/L (ref 136–145)
T AXIS: 27 DEGREES
VENTRICULAR RATE: 58 BPM

## 2024-01-11 PROCEDURE — 87086 URINE CULTURE/COLONY COUNT: CPT

## 2024-01-11 PROCEDURE — 80048 BASIC METABOLIC PNL TOTAL CA: CPT

## 2024-01-11 PROCEDURE — 36415 COLL VENOUS BLD VENIPUNCTURE: CPT

## 2024-01-11 PROCEDURE — 93005 ELECTROCARDIOGRAM TRACING: CPT

## 2024-01-11 PROCEDURE — 93010 ELECTROCARDIOGRAM REPORT: CPT | Performed by: INTERNAL MEDICINE

## 2024-01-15 NOTE — PROGRESS NOTES
Yash Mccoy,    I have reviewed your urine test results. Tests show no significant abnormalities (no signs of infection in the urine). No changes to the plan as we had previously discussed. Please let me know if you have any questions.    Thanks,  Bryant Garza MD

## 2024-01-18 ENCOUNTER — NURSE ONLY (OUTPATIENT)
Dept: UROLOGY | Facility: HOSPITAL | Age: 49
End: 2024-01-18
Attending: OBSTETRICS & GYNECOLOGY
Payer: MEDICAID

## 2024-01-18 VITALS — WEIGHT: 164 LBS | RESPIRATION RATE: 18 BRPM | HEIGHT: 59 IN | BODY MASS INDEX: 33.06 KG/M2

## 2024-01-18 DIAGNOSIS — N39.41 URGE INCONTINENCE: Primary | ICD-10-CM

## 2024-01-18 PROCEDURE — 64566 NEUROELTRD STIM POST TIBIAL: CPT

## 2024-01-18 NOTE — PROGRESS NOTES
[facility]  URGENT PC Therapy Note    Date:  2024    Patient Name:  Nadine Calix  Patient :  3/22/1975   Patient MRN:  [id number]  Patient Age:  48 year old      Physician:  Dr. Lara Jim  RN:  Nola COX CMA     Patient presents for PTNS monthly treatment. Reports same improvement so far.   Nocturia- 6-10x before bed, once asleep 3-4x  Urinary frequency 1-2hrs  Denies s/sx of UTI currently- denies   Bowels regular  Not using vaginal estrogen cream twice weekly     Procedure:  Right URGENT PC Therapy:  Posterior tibial nerve stimulation treatment  68303 - posterial tibial neuro stimulation, percutaneous needle electrode, single treatment, includes programming  Description of Procedure:    Informed consent was obtained with discussion of risk, benefits and goal, and limits of the procedure including but not limited to bleeding, infection, andnerve injury were discussed and the patient desired to proceed.  The patient was properly identified and positioned in a semi reclined, comfortable seated position with their feet elevated in a recliner or exam room chair.    The insertion site for the needle electrode was identified on the lower inner aspect of the Right leg.  This position utilized was approximately 5 cm cephalad to the medial malleolus and one finger breath/2cm posterior to the tibia.    Preparation of the needle electrode insertion site was performed using an alcohol pad to clean skin of the above-noted needle insertion site.    The needle electrode/guide tube assembly was placed over the identified and cleaned insertion site.  The needle was positioned so that it created a 60 degree angle that was maintained between the electrode itself and the ankle.  The stop plug in the guide tube was removed to release the needle electrode.    A gentle tap of the needle electrode was used to khalil the skin.  Once the needle electrode had been placed into the skin, the guide  tube was removed and the needle was gently advanced maintaining a 60 degree angle.  Consistent with recommended placement, approximately half the tip of the needle electrode was inserted leaving about 2 cm of needle exposed.      The one-way fit connector of the lead wire was inserted into the stimulator's connection site.  A surface electrode was placed over the medial aspect of the calcaneus on the lower extremity utilized for stimulation.  For this patient, we utilized the Right lower extremity.    The needle electrode clip was attached to the needle.The stimulator device was turned on. The test mode was entered.  Current adjustment was slowly increased while observing the patient's foot for response. Adjustments were made advancing the needle further in to obtain optimal sensation.    An optimal sensation in the foot was noted.Once an optimal response was noted, therapy mode was then initiated. The pt was given a bell to ring for the RN to be able to call for any needed adjustments.    Therapy continued without complication for 30 minutes.  No additional current adjustments were needed.    Once 30 minutes of therapy and completed stimulator was turned off and the needle electrode clip was removed from the needle electrode.  The needle was removed from the leg and no significant bleeding was noted.  Gentle pressure at the needle insertion site facilitate optimal hemostasis.  The surface electrode was removed from the medial calcaneus.    The lead wire and lead set components were discarded in an appropriate safe container.    This completed the therapy.  The patient tolerated the procedure well.    Diagnosis:  N39.41 Urge Incontinence    Plan:     Call with s/sx of UTI   All questions answered   She understands and agrees to plan     Follow up in one month for next PTNS treatment, sooner prn     Reports surgical procedure on Monday 01/22/24 with Dr. Garza.

## 2024-01-22 ENCOUNTER — ANESTHESIA (OUTPATIENT)
Dept: SURGERY | Facility: HOSPITAL | Age: 49
End: 2024-01-22
Payer: MEDICAID

## 2024-01-22 ENCOUNTER — ANESTHESIA EVENT (OUTPATIENT)
Dept: SURGERY | Facility: HOSPITAL | Age: 49
End: 2024-01-22
Payer: MEDICAID

## 2024-01-22 ENCOUNTER — APPOINTMENT (OUTPATIENT)
Dept: GENERAL RADIOLOGY | Facility: HOSPITAL | Age: 49
End: 2024-01-22
Attending: SURGERY
Payer: MEDICAID

## 2024-01-22 ENCOUNTER — HOSPITAL ENCOUNTER (OUTPATIENT)
Facility: HOSPITAL | Age: 49
Setting detail: HOSPITAL OUTPATIENT SURGERY
Discharge: HOME | End: 2024-01-22
Attending: SURGERY | Admitting: SURGERY
Payer: MEDICAID

## 2024-01-22 VITALS
HEIGHT: 59 IN | HEART RATE: 72 BPM | RESPIRATION RATE: 16 BRPM | DIASTOLIC BLOOD PRESSURE: 95 MMHG | WEIGHT: 165 LBS | TEMPERATURE: 98 F | BODY MASS INDEX: 33.26 KG/M2 | SYSTOLIC BLOOD PRESSURE: 149 MMHG | OXYGEN SATURATION: 97 %

## 2024-01-22 DIAGNOSIS — N20.0 NEPHROLITHIASIS: Primary | ICD-10-CM

## 2024-01-22 DIAGNOSIS — N13.9 OBSTRUCTIVE UROPATHY: ICD-10-CM

## 2024-01-22 LAB — B-HCG UR QL: NEGATIVE

## 2024-01-22 PROCEDURE — 0TC18ZZ EXTIRPATION OF MATTER FROM LEFT KIDNEY, VIA NATURAL OR ARTIFICIAL OPENING ENDOSCOPIC: ICD-10-PCS | Performed by: SURGERY

## 2024-01-22 PROCEDURE — 52356 CYSTO/URETERO W/LITHOTRIPSY: CPT | Performed by: SURGERY

## 2024-01-22 PROCEDURE — 74420 UROGRAPHY RTRGR +-KUB: CPT | Performed by: SURGERY

## 2024-01-22 PROCEDURE — BT1D0ZZ FLUOROSCOPY OF RIGHT KIDNEY, URETER AND BLADDER USING HIGH OSMOLAR CONTRAST: ICD-10-PCS | Performed by: SURGERY

## 2024-01-22 PROCEDURE — 0T778DZ DILATION OF LEFT URETER WITH INTRALUMINAL DEVICE, VIA NATURAL OR ARTIFICIAL OPENING ENDOSCOPIC: ICD-10-PCS | Performed by: SURGERY

## 2024-01-22 DEVICE — URETERAL STENT
Type: IMPLANTABLE DEVICE | Site: URETER | Status: FUNCTIONAL
Brand: ASCERTA™

## 2024-01-22 RX ORDER — SULFAMETHOXAZOLE AND TRIMETHOPRIM 800; 160 MG/1; MG/1
1 TABLET ORAL 2 TIMES DAILY
Qty: 4 TABLET | Refills: 0 | Status: SHIPPED | OUTPATIENT
Start: 2024-01-22 | End: 2024-01-24

## 2024-01-22 RX ORDER — ONDANSETRON 2 MG/ML
4 INJECTION INTRAMUSCULAR; INTRAVENOUS EVERY 6 HOURS PRN
Status: DISCONTINUED | OUTPATIENT
Start: 2024-01-22 | End: 2024-01-22

## 2024-01-22 RX ORDER — ONDANSETRON 2 MG/ML
INJECTION INTRAMUSCULAR; INTRAVENOUS AS NEEDED
Status: DISCONTINUED | OUTPATIENT
Start: 2024-01-22 | End: 2024-01-22 | Stop reason: SURG

## 2024-01-22 RX ORDER — DEXTROSE MONOHYDRATE 25 G/50ML
50 INJECTION, SOLUTION INTRAVENOUS
Status: DISCONTINUED | OUTPATIENT
Start: 2024-01-22 | End: 2024-01-22

## 2024-01-22 RX ORDER — HYDROMORPHONE HYDROCHLORIDE 1 MG/ML
0.6 INJECTION, SOLUTION INTRAMUSCULAR; INTRAVENOUS; SUBCUTANEOUS EVERY 5 MIN PRN
Status: DISCONTINUED | OUTPATIENT
Start: 2024-01-22 | End: 2024-01-22

## 2024-01-22 RX ORDER — SODIUM CHLORIDE, SODIUM LACTATE, POTASSIUM CHLORIDE, CALCIUM CHLORIDE 600; 310; 30; 20 MG/100ML; MG/100ML; MG/100ML; MG/100ML
INJECTION, SOLUTION INTRAVENOUS CONTINUOUS
Status: DISCONTINUED | OUTPATIENT
Start: 2024-01-22 | End: 2024-01-22

## 2024-01-22 RX ORDER — NALOXONE HYDROCHLORIDE 0.4 MG/ML
0.08 INJECTION, SOLUTION INTRAMUSCULAR; INTRAVENOUS; SUBCUTANEOUS AS NEEDED
Status: DISCONTINUED | OUTPATIENT
Start: 2024-01-22 | End: 2024-01-22

## 2024-01-22 RX ORDER — ACETAMINOPHEN 500 MG
1000 TABLET ORAL ONCE
Status: DISCONTINUED | OUTPATIENT
Start: 2024-01-22 | End: 2024-01-22 | Stop reason: HOSPADM

## 2024-01-22 RX ORDER — OXYCODONE HYDROCHLORIDE 5 MG/1
5 TABLET ORAL EVERY 4 HOURS PRN
Qty: 10 TABLET | Refills: 0 | Status: SHIPPED | OUTPATIENT
Start: 2024-01-22

## 2024-01-22 RX ORDER — SCOLOPAMINE TRANSDERMAL SYSTEM 1 MG/1
1 PATCH, EXTENDED RELEASE TRANSDERMAL ONCE
Status: DISCONTINUED | OUTPATIENT
Start: 2024-01-22 | End: 2024-01-22 | Stop reason: HOSPADM

## 2024-01-22 RX ORDER — OXYBUTYNIN CHLORIDE 5 MG/1
5 TABLET ORAL ONCE
Status: COMPLETED | OUTPATIENT
Start: 2024-01-22 | End: 2024-01-22

## 2024-01-22 RX ORDER — HYDROMORPHONE HYDROCHLORIDE 1 MG/ML
0.4 INJECTION, SOLUTION INTRAMUSCULAR; INTRAVENOUS; SUBCUTANEOUS EVERY 5 MIN PRN
Status: DISCONTINUED | OUTPATIENT
Start: 2024-01-22 | End: 2024-01-22

## 2024-01-22 RX ORDER — LIDOCAINE HYDROCHLORIDE 10 MG/ML
INJECTION, SOLUTION EPIDURAL; INFILTRATION; INTRACAUDAL; PERINEURAL AS NEEDED
Status: DISCONTINUED | OUTPATIENT
Start: 2024-01-22 | End: 2024-01-22 | Stop reason: SURG

## 2024-01-22 RX ORDER — DIPHENHYDRAMINE HYDROCHLORIDE 50 MG/ML
INJECTION INTRAMUSCULAR; INTRAVENOUS
Status: COMPLETED
Start: 2024-01-22 | End: 2024-01-22

## 2024-01-22 RX ORDER — ACETAMINOPHEN 500 MG
1000 TABLET ORAL ONCE AS NEEDED
Status: DISCONTINUED | OUTPATIENT
Start: 2024-01-22 | End: 2024-01-22

## 2024-01-22 RX ORDER — DEXAMETHASONE SODIUM PHOSPHATE 4 MG/ML
VIAL (ML) INJECTION AS NEEDED
Status: DISCONTINUED | OUTPATIENT
Start: 2024-01-22 | End: 2024-01-22 | Stop reason: SURG

## 2024-01-22 RX ORDER — CEFAZOLIN SODIUM/WATER 2 G/20 ML
2 SYRINGE (ML) INTRAVENOUS ONCE
Status: COMPLETED | OUTPATIENT
Start: 2024-01-22 | End: 2024-01-22

## 2024-01-22 RX ORDER — CEFAZOLIN SODIUM/WATER 2 G/20 ML
SYRINGE (ML) INTRAVENOUS
Status: DISCONTINUED
Start: 2024-01-22 | End: 2024-01-22

## 2024-01-22 RX ORDER — HYDROCODONE BITARTRATE AND ACETAMINOPHEN 5; 325 MG/1; MG/1
2 TABLET ORAL ONCE AS NEEDED
Status: DISCONTINUED | OUTPATIENT
Start: 2024-01-22 | End: 2024-01-22

## 2024-01-22 RX ORDER — HYDROMORPHONE HYDROCHLORIDE 1 MG/ML
0.2 INJECTION, SOLUTION INTRAMUSCULAR; INTRAVENOUS; SUBCUTANEOUS EVERY 5 MIN PRN
Status: DISCONTINUED | OUTPATIENT
Start: 2024-01-22 | End: 2024-01-22

## 2024-01-22 RX ORDER — NICOTINE POLACRILEX 4 MG
30 LOZENGE BUCCAL
Status: DISCONTINUED | OUTPATIENT
Start: 2024-01-22 | End: 2024-01-22

## 2024-01-22 RX ORDER — HYDROCODONE BITARTRATE AND ACETAMINOPHEN 5; 325 MG/1; MG/1
1 TABLET ORAL ONCE AS NEEDED
Status: DISCONTINUED | OUTPATIENT
Start: 2024-01-22 | End: 2024-01-22

## 2024-01-22 RX ORDER — HYDROMORPHONE HYDROCHLORIDE 1 MG/ML
INJECTION, SOLUTION INTRAMUSCULAR; INTRAVENOUS; SUBCUTANEOUS
Status: COMPLETED
Start: 2024-01-22 | End: 2024-01-22

## 2024-01-22 RX ORDER — PHENAZOPYRIDINE HYDROCHLORIDE 100 MG/1
100 TABLET, FILM COATED ORAL 3 TIMES DAILY PRN
Qty: 10 TABLET | Refills: 0 | Status: SHIPPED | OUTPATIENT
Start: 2024-01-22

## 2024-01-22 RX ORDER — TAMSULOSIN HYDROCHLORIDE 0.4 MG/1
0.4 CAPSULE ORAL EVERY EVENING
Qty: 14 CAPSULE | Refills: 0 | Status: SHIPPED | OUTPATIENT
Start: 2024-01-22 | End: 2024-02-05

## 2024-01-22 RX ORDER — NICOTINE POLACRILEX 4 MG
15 LOZENGE BUCCAL
Status: DISCONTINUED | OUTPATIENT
Start: 2024-01-22 | End: 2024-01-22

## 2024-01-22 RX ORDER — POLYETHYLENE GLYCOL 3350 17 G/17G
17 POWDER, FOR SOLUTION ORAL DAILY PRN
Qty: 12 EACH | Refills: 1 | Status: SHIPPED | OUTPATIENT
Start: 2024-01-22

## 2024-01-22 RX ORDER — DIPHENHYDRAMINE HYDROCHLORIDE 50 MG/ML
12.5 INJECTION INTRAMUSCULAR; INTRAVENOUS ONCE
Status: COMPLETED | OUTPATIENT
Start: 2024-01-22 | End: 2024-01-22

## 2024-01-22 RX ADMIN — DEXAMETHASONE SODIUM PHOSPHATE 4 MG: 4 MG/ML VIAL (ML) INJECTION at 15:13:00

## 2024-01-22 RX ADMIN — SODIUM CHLORIDE, SODIUM LACTATE, POTASSIUM CHLORIDE, CALCIUM CHLORIDE: 600; 310; 30; 20 INJECTION, SOLUTION INTRAVENOUS at 15:00:00

## 2024-01-22 RX ADMIN — LIDOCAINE HYDROCHLORIDE 50 MG: 10 INJECTION, SOLUTION EPIDURAL; INFILTRATION; INTRACAUDAL; PERINEURAL at 15:04:00

## 2024-01-22 RX ADMIN — ONDANSETRON 4 MG: 2 INJECTION INTRAMUSCULAR; INTRAVENOUS at 15:13:00

## 2024-01-22 RX ADMIN — CEFAZOLIN SODIUM/WATER 2 G: 2 G/20 ML SYRINGE (ML) INTRAVENOUS at 15:11:00

## 2024-01-22 NOTE — OPERATIVE REPORT
Urology Operative Note    Attending Surgeon: Bryant Garza MD    Assistant Surgeon: None    Patient Name: Nadine Calix    Date of Surgery: 1/22/2024    Preoperative Diagnosis: Left nephrolithiasis    Postoperative Diagnosis: Same    Procedure Performed:   1) Cystoscopy, LEFT ureteroscopy, laser lithotripsy, basket stone extraction, retrograde pyelogram, ureteral stent placement  2) Right retrograde pyelogram    Indication:  Patient is a 48 year old female who presented with multiple left renal stones. The patient was counseled on options, risks, and benefits and elected to undergo the above procedure. We discussed risks including, but not limited to, bleeding, infection, damage to surrounding structures, need for repeat procedure(s). The patient understood these risks and wished to proceed with surgery.    Findings:  Cystoscopy - normal urethra without strictures, normal bladder. At least 20 stones and fragments removed from left kidney, extensive Jero's plaques under the surface noted as well. No distinct strictures on right retrograde pyelogram, but the contrast did not drain very well from the kidney from the start of the case to the end of the case.    Anterior prolapse on exam.    Procedure:  The patient was taken to the operating room and a timeout was performed confirming the correct patient and procedure. The patient was prepped and draped in lithotomy position after undergoing general anesthesia. Pre-operative prophylactic antibiotics were given in the form of Cefazolin.    The cystoscope was inserted per urethra and the bladder was inspected and drained.     The right ureter was cannulated with a sensor wire followed by an open ended catheter into the distal ureter, and then the wire was removed. A retrograde pyelogram was performed and there were no distinct strictures. The contrast did not drain well from the right kidney though by the end of the case.    The left ureter was cannulated with a Sensor  wire, and the wire was passed into the renal pelvis which I confirmed using fluoroscopy.     A 12/14 Togolese ureteral access sheath was inserted over the first wire. It was advanced without resistance to the mid ureter. The inner cannula was removed and a second wire was inserted through the sheath and into the kidney, which was confirmed fluoroscopically.  The access sheath was removed and the second safety wire was secured to the drape.  The access sheath was then reinserted over the first working wire up to the proximal ureter. The first wire and inner cannula of the access sheath were removed, leaving the safety wire in place alongside the access sheath.     The flexible ureteroscope was advanced into the sheath and up into the renal pelvis. Multiple stones and Jero's plaques were seen throughout the entire kidney. The larger stones were fragmented, and then the fragments and smaller stones were extracted using a zero tip basket. Any smaller remaining stone fragments were lasered to dust, and thoroughly irrigated. All renal calyces were surveyed once more, and no large fragments were seen. A retrograde pyelogram was performed through the scope and showed no extravasation. The ureter was inspected while slowly removing the scope and access sheath, and it appeared healthy without stone fragments seen.    A 6-Togolese x 22 cm JJ ureteral stent was inserted over the remaining wire. The proximal coil was formed in the kidney under fluoroscopic guidance. The distal coil was formed within the bladder using the pusher and fluoroscopy. The stent string was removed prior to stent placement.    The bladder was drained and the cystoscope was removed. The patient was awoken from anesthesia and transferred to PACU in stable condition. The patient tolerated the procedure well. All instrument/supply counts were correct at the end of the case.    Specimens:   Stone fragments for chemical analysis    Estimated Blood Loss:  1  mL    Tubes/Drains:  6-Hebrew x 22 cm JJ left ureteral stent    Complications:   None immediate    Condition from OR:  Stable    Plan:   The patient will follow up in the office for stent removal in 1 week. Lasix renal scan after stent is removed to assess for functional obstruction on the right.      Bryant Garza MD  Staff Urologist  [facility]  Office: [phone]

## 2024-01-22 NOTE — H&P
UROLOGY PRE-OPERATIVE HISTORY & PHYSICAL      Nadine Calix Patient Status:  Hospital Outpatient Surgery    3/22/1975 MRN [id number]   [facility] PERIOPERATIVE SERVICE Attending Bryant Garza MD   Hosp Day # 0 PCP Fan Hernandez MD     Primary Care Provider: Fan Hernandez MD     Chief Complaint:   Nephrolithiasis, overactive bladder     HPI:   Nadine Calix is a 48 year old female with history of anxiety, GERD, hypertension, OAB improved after PTNS referred for recurrent nephrolithiasis.     She has been previously followed by multiple urologists at [facility]".  Her most recent stone surgery was a right ureteroscopy, laser lithotripsy, stent placement, nephrostomy tube removal with Dr. Jim on 2022.  She has a tortuous proximal right ureter and during a prior ureteroscopy access was lost to the kidney so nephrostomy tube was placed.  She is currently on hydrochlorothiazide and potassium citrate.  She has not had a 24-hour urine study for a while.     She recently presented to the ED on 2023 for abdominal pain bilaterally.  I reviewed her CT scan and this showed bilateral nephrolithiasis left greater than right.  On the right side her largest renal stone is approximately 5 mm.  On the left her largest stone is 9 mm, with at least a 4 or 5 additional 5-6 mm stones.  Mild right hydronephrosis.  Some of the calcifications are likely intraparenchymal based on the appearance.  There were no ureteral stones present, but there was some mild perinephric stranding around the right ureter that could have been related to ascending infection or recently passed stone.  Urinalysis at that time grew Klebsiella.     She continues to have mild intermittent bilateral flank pain. OAB slightly improved after PTNS - used to void 20-25 times per night now down to 15 times per night.    UCx negative on 24.    History:     Past Medical History:   Diagnosis Date    Abdominal distention 2020     Longer than this but this is an estimate    Abdominal hernia 03/2017    I had hernia repair around by my belly button & abdominal    Abdominal pain 02/2020    Currently seeing obgyn, urologist & nephrologist    Anxiety state     resolved    Arrhythmia     fast heart rate due to anxiety according to patient, pvc    Back pain 01/2018    Mid to lower back pain & hip pain    Back problem     lower back    Bloating 01/2020    Longer than this but this is an estimate    Blood in urine 1994    Due to severe kidney stones    Blurred vision 06/2020    I always wore glasses or contacts since about 14 yrs old    Calculus of kidney     hydronephrosis/ several times kidney stones/ 13 th procedure for stones    Change in hair 01/2020    My hair is thinning more than the usual    Chest pain 01/2019    I did see a cardiologist about this    Chest pain on exertion 01/2019    I did see a cardiologists about this    COVID-19 12/2021    headache, back pain, shortness of breath, sore throat, runny nose, chills. Not hospitalized    COVID-19 11/21/2023    high fever, fatigue, not hospitalized    Depression     resolved    Diarrhea, unspecified 01/2020    I notice that certain things run right through me now    Disorder of liver     elevated AST/ALT    Disorder of thyroid     nodules only    Dizziness 01/2019    Easy bruising 01/2020    I find bruises on me and don’t really know how I got them    Essential hypertension     Fatigue 11/2019    I noticed that I’m alot more tired lately more than usual    Flatulence/gas pain/belching 01/2020    Longer than this but this is an estimate    Food intolerance 01/2020    I think I am lactose intolerant been that way for a few year    Frequent urination Since early age in my 20’s    All the time for years    Frequent UTI 1994    Due to kidney stones blockages    Gestational diabetes 04/2014    Heart palpitations 01/2019    Pvc’s and irregular heart rate    Hemorrhoids 11/2016 or 2018    After last  pregnancy or a couple years after that    High blood pressure     triamterene    History of cardiac murmur 2000    Mother told me I was born with one & my dr as well    History of D&C 10/08/2020    History of depression 2004    When I had a miscarriage    History of mental disorder 2004    Family history of this on my mother’s side    Indigestion 2020    Longer than this but this is an estimate    Itch of skin 2020    Not severe but I have been itching for no apparent reason    Kidney stones     Leaking of urine     After first pregnancy    Leg swelling 2020    My left leg slightly swelled up and my ankle area was hurtin    Loss of appetite 2020    I noticed this lately too    Migraines     Nausea 2020    Longer than this but this is an estimate    Night sweats 2020    I feel like I’m on fire at night.    Ovarian cyst     Painful urination     When passing stones    Post partum depression     PVC (premature ventricular contraction)     Rash 2020    Broke out with unknown rash all over legs and arms    Sexually transmitted disease     HPV    Shortness of breath 2019    Seen cardiologist about this    Sleep disturbance 2020    For years sometimes I can’t lay on my left side    Stress     Rough up bringing and also regular family stresses    Uncomfortable fullness after meals 2020    Longer than this but this is an estimate    Unspecified condition originating in the  period 2004    Visual impairment     glasses    Wears glasses 1989    Since I was about 14 yrs old    Weight gain 2016    After last son was born       Past Surgical History:   Procedure Laterality Date      ,2016    x2    CHOLECYSTECTOMY      COLONOSCOPY N/A 2021    Procedure: COLONOSCOPY, ESOPHAGOGASTRODUODENOSCOPY with biopsy;  Surgeon: Prashant Lilly DO;  Location:  ENDOSCOPY    COLPOSCOPY, CERVIX W/UPPER ADJACENT VAGINA; W/BIOPSY(S), CERVIX       CRYOCAUTERY OF CERVIX  2012    CYSTO/URETERO W/UP STRICTURE Right 10/15/2019    Cysto Rt RPG, Rt URS w/ Laser Litho Dilation of Rtight Stricture Rt URS Stent Exchange w/ Dr Jim    CYSTO/URETERO, STONE REMOVE Right 12/18/2019    right ureter and kidney stones extraction, URS, stent placement    CYSTOSCOPY,URETEROSCOPY,LITHOTRIPSY Right 08/23/2019    Cysto, B/L RPG, URS, laser litho, stone ext, Rt stent Dr. Jim    HERNIA SURGERY  1/2000    L hernia repair    KIDNEY SURGERY Right     stent placement 6/2022    LITHOTRIPSY  2001, 2007 & 11/11    NEEDLE BIOPSY LEFT  02/2021    fibroadenoma    NEEDLE BIOPSY RIGHT  02/2021    fibroadenoma    OTHER Bilateral 2012    nerve surgery to bilateral arms about 1 month apart    OTHER      percutaneous nephrolithotomy    OTHER SURGICAL HISTORY  12/27/2019    cysto stent removal - dr. jim     OTHER SURGICAL HISTORY      C;ysto Lt PCNL Stent Placement     OTHER SURGICAL HISTORY  06/03/2020    Cysto Stent Removal w/ Dr Jim    OTHER SURGICAL HISTORY  07/07/2021    Cysto/Stent Removal Dr. Jim    REMOVAL GALLBLADDER  1998    REMOVE STENT VIA TRANSURETH Right 11/13/2019    Cysto Stent Removal w/ Dr Jim    REPAIR ING HERNIA,5+Y/O,REDUCIBL         Family History   Problem Relation Age of Onset    Heart Disorder Father         Stent    Diabetes Father     Hypertension Father     Diabetes Mother     High Cholesterol Mother     Hypertension Mother     Thyroid disease Mother         hypothroid    Other (Other) Mother         Part of thyroid removed    Diabetes Maternal Grandmother     Cancer Maternal Grandmother         ovarian    Ovarian Cancer Maternal Grandmother     Other (Other) Maternal Grandmother         Ovarian Cancer    Diabetes Maternal Grandfather     Diabetes Paternal Grandmother     Diabetes Paternal Grandfather     Hypertension Sister     Diabetes Sister     Other (Other) Sister         Liver problem    Other (Other) Daughter         Appendicitis     Other (Other) Son         Appendicitis       Social History     Socioeconomic History    Marital status: Life Partner   Tobacco Use    Smoking status: Never    Smokeless tobacco: Never   Vaping Use    Vaping Use: Never used   Substance and Sexual Activity    Alcohol use: No    Drug use: No    Sexual activity: Yes     Partners: Male   Other Topics Concern    Caffeine Concern No    Exercise No    Seat Belt Yes       Medications:  No current outpatient medications on file.       Allergies:  Allergies   Allergen Reactions    Amlodipine SWELLING     Leg swelling    Metoprolol SHORTNESS OF BREATH    Toradol [Ketorolac Tromethamine] SHORTNESS OF BREATH    Tramadol SHORTNESS OF BREATH    Dilaudid [Hydromorphone] ITCHING and PAIN     Headache - per pt this is only with long term use - pt states she can tolerate this medication    Oxycodone PAIN     headache    Wellbutrin [Bupropion Hcl] PAIN and DIZZINESS     Headache    Valsartan OTHER (SEE COMMENTS)     Chest pain      Codeine Sulfate ITCHING     TABS    Hydrocodone ITCHING    Morphine ITCHING    Seasonal Runny nose       Review of Systems:   A comprehensive 10-point review of systems was completed.  Pertinent positives and negatives are noted in the the HPI.    Physical Exam:   Vital Signs:  Blood pressure 117/77, pulse 63, temperature 98 °F (36.7 °C), temperature source Temporal, resp. rate 16, height 4' 11\" (1.499 m), weight 165 lb (74.8 kg), last menstrual period 11/07/2023, SpO2 100%, not currently breastfeeding.     CONSTITUTIONAL: Well developed, well nourished, in no acute distress  NEUROLOGIC: Alert and oriented  HEAD: Normocephalic, atraumatic  EYES: Sclera non-icteric  ENT: Hearing intact, moist mucous membranes  NECK: No obvious goiter or masses  RESPIRATORY: Normal respiratory effort  SKIN: No evident rashes  ABDOMEN: Soft, non-tender, non-distended    Laboratory Data:  Lab Results   Component Value Date    WBC 6.9 10/20/2023    HGB 13.7 10/20/2023    PLT  328.0 10/20/2023     Lab Results   Component Value Date     01/11/2024    K 3.6 01/11/2024     01/11/2024    CO2 26.0 01/11/2024    BUN 14 01/11/2024    GLU 99 01/11/2024    GFRAA 97 07/08/2022    AST 26 10/20/2023    ALT 45 10/20/2023    TP 7.8 10/20/2023    ALB 3.7 10/20/2023    PHOS 3.4 11/17/2020    CA 9.3 01/11/2024    MG 2.1 10/20/2023       Urinalysis Results (last three years):  Recent Labs     04/01/21  1304 05/15/21  1007 06/10/21  1040 11/29/21  1827 12/21/21  1134 02/27/22  1246 06/07/22  0955 10/28/22  1201 02/11/23  1734 05/05/23  1106 08/10/23  1150 08/16/23  1809 09/17/23  0632 09/28/23  1021 10/27/23  1149 12/08/23  1053   COLORUR Yellow Yellow Yellow Yellow Yellow Yellow Yellow Yellow Yellow  --  Light-Yellow Yellow Yellow Yellow Light-Yellow  --    CLARITY Clear Clear Hazy* Clear Clear Clear Clear Clear Clear  --  Clear Clear Clear Turbid* Clear  --    SPECGRAVITY >=1.030 1.025 1.018 >=1.030 1.025 1.020 1.015 1.021 1.010 1.020 1.019 1.020 1.015 1.018 1.017 1.015   PHURINE 5.5 6.0 5.0 5.5 6.5 7.0 7.0 6.0 7.0  --  6.0 6.0 6.0 6.0 6.0 6.0   PROUR 30 mg/dL* 30 mg/dL* 30* 30 mg/dL* >=300* 100 mg/dL* Trace* 100* Negative  --  70* Trace* 100 mg/dL* 70* 50*  --    GLUUR Negative Negative Negative Negative Negative Negative Negative Negative Negative Negative Normal Negative Negative Normal Normal  --    KETUR Negative Negative Negative Negative Negative Negative Negative Negative Negative  --  Negative Negative Negative Negative Negative  --    BILUR Negative Negative Negative Negative  --  Negative Negative Negative Negative  --  Negative Negative Negative Negative Negative  --    BLOODURINE Moderate* Moderate* Moderate* Small* Large* Small* Large* Small* Large*  --  1+* Moderate* Large* 1+* 1+*  --    NITRITE Negative Negative Negative Negative Negative Negative Negative Negative Negative  --  Negative Negative Positive* 2+* Negative Negative   UROBILINOGEN 0.2 0.2 <2.0 0.2 2.0* 0.2 0.2 <2.0  0.2  --  Normal 0.2 0.2 Normal Normal  --    LEUUR Negative Negative Negative Negative Negative Negative Small* Negative Negative  --  Negative Negative Large* 500* Negative  --    UASA  --   --   --   --   --   --   --  Negative  --   --   --   --   --   --   --   --    WBCUR 6-10* 1-5 6-10* 1-5 1-5 1-5 21-50* 1-5 1-5  --  1-5 1-5 >50* >50* 1-5  --    RBCUR 0-2 3-5* >10* 0-2 6-10* 0-2 3-5* 0-2 0-2  --  0-2 6-10* >10* 6-10* 0-2  --    BACUR 2+* None Seen None Seen Rare* 2+* 1+* 2+* None Seen None Seen  --  None Seen Rare* 1+* 2+* None Seen  --        Urine Culture Results (last three years):  Lab Results   Component Value Date    URINECUL No Growth 2 Days 01/11/2024    URINECUL No Growth at 18-24 hrs. 10/27/2023    URINECUL >100,000 CFU/ML Klebsiella pneumoniae (A) 09/28/2023    URINECUL >100,000 CFU/ML Klebsiella pneumoniae (A) 09/17/2023    URINECUL No Growth at 18-24 hrs. 08/10/2023    URINECUL (A) 07/14/2023     10,000 - 50,000 CFU/ML Alpha hemolytic Streptococcus    URINECUL No Growth at 18-24 hrs. 10/28/2022    URINECUL No Growth at 18-24 hrs. 06/07/2022    URINECUL  06/10/2021     10-50,000 cfu/ml Multiple species present-probable contamination.    URINECUL >100,000 CFU/ML Mixed gram positive anjelica 04/01/2021    URINECUL No Growth 2 Days 04/22/2020    URINECUL No Growth at 18-24 hrs. 03/07/2020    URINECUL <10,000 CFU/ML Gram positive anjelica 02/27/2020    URINECUL No Growth 2 Days 12/16/2019    URINECUL 10,000 - 50,000 CFU/ML Gram positive anjelica 10/30/2019       PSA:  No results found for: \"PSA\", \"PERCENTPSA\", \"PSAS\", \"PSAULTRA\"     Imaging (last three days):  No results found.     Assessment:   Nadine Calix is a 48 year old female with history of anxiety, GERD, hypertension, OAB improved after PTNS referred for recurrent nephrolithiasis.     She has been previously followed by multiple urologists at \[facility]".  Her most recent stone surgery was a right ureteroscopy, laser lithotripsy, stent placement,  nephrostomy tube removal with Dr. Jim on 7/8/2022.  She has a tortuous proximal right ureter and during a prior ureteroscopy access was lost to the kidney so nephrostomy tube was placed.  She is currently on hydrochlorothiazide and potassium citrate.  She has not had a 24-hour urine study for a while.     She recently presented to the ED on 9/17/2023 for abdominal pain bilaterally.  I reviewed her CT scan and this showed bilateral nephrolithiasis left greater than right.  On the right side her largest renal stone is approximately 5 mm.  On the left her largest stone is 9 mm, with at least a 4 or 5 additional 5-6 mm stones.  Mild right hydronephrosis.  Some of the calcifications are likely intraparenchymal based on the appearance.  There were no ureteral stones present, but there was some mild perinephric stranding around the right ureter that could have been related to ascending infection or recently passed stone.  Urinalysis at that time grew Klebsiella.     She continues to have mild intermittent bilateral flank pain. OAB slightly improved after PTNS - used to void 20-25 times per night now down to 15 times per night.    Plan:   - OR for cystoscopy, left ureteroscopy, laser lithotripsy, stent placement, right retrograde pyelogram given possible history of ureteral strictures and/or obstruction   - Informed consent obtained - risks and benefits explained, and all questions answered    I have personally reviewed all relevant medical records, labs, and imaging.     Bryant Garza MD  Staff Urologist  [facility]  Office: [phone]

## 2024-01-22 NOTE — ANESTHESIA PROCEDURE NOTES
Airway  Date/Time: 1/22/2024 3:06 PM  Urgency: elective      General Information and Staff    Patient location during procedure: OR  Anesthesiologist: Felipa Post MD  Performed: anesthesiologist   Performed by: Felipa Post MD  Authorized by: Felipa Post MD      Indications and Patient Condition  Indications for airway management: anesthesia  Sedation level: deep  Preoxygenated: yes  Patient position: sniffing  Mask difficulty assessment: 1 - vent by mask    Final Airway Details  Final airway type: supraglottic airway      Successful airway: classic  Size 4       Number of attempts at approach: 1

## 2024-01-22 NOTE — DISCHARGE INSTRUCTIONS
You had cystoscopy, ureteroscopy, and stent placement in the operating room today.    Instructions:    - No heavy lifting or strenuous activity for 1 day. You may resume regular activity tomorrow.    - Your follow-up appointment is listed below. Please contact us at [phone] if you need to change your appointment.    Your appointments       Date & Time Appointment Department ([place])              Jan 29, 2024 11:00 AM CST Post Op Visit with Lorena Rubin PA-C [facility], [facility] ([facility])         - You have a stent (small plastic tube) inside your kidney and ureter to allow the swelling from surgery to resolve. This is only temporary and must be removed, so you should not forget about it. We will remove your stent via a brief cystoscopy in clinic when you return. If you have to miss this appointment for some reason please make sure you re-schedule it.     - With a ureteral stent in place you may have some discomfort on your side/flank. You can feel pain worsen when you urinate, so try to avoid holding urine and void every 2-3 hours. You also may notice increased blood in the urine as you increase your activity. If this happens increase your hydration and take it easy for a day. Warm baths/hot packs can help with the discomfort as well as your prescribed medications and Advil/Motrin (if you are able to take these).     - You may experience mild pain after the procedure for a few days.  If the pain becomes intolerable please contact our office or go to the nearest Emergency Room or Urgent Care. You should take over the counter ibuprofen (AKA motrin, advil) for mild pain (provided you do not have a medical condition such as stomach ulcers or kidney disease which prohibits you from taking these). You may alternate this with tylenol as well. If pain is still not relieved by tylenol and/or ibuprofen, you may take narcotic pain medication if prescribed (typically  oxycodone or tramadol). If you are taking narcotic pain medication this can make you constipated, so you should take over the counter stool softeners or miralax if prescribed.    - Warm pack or hot baths often help with discomfort after cystoscopy.    - If you take blood thinners (such as aspirin or plavix) please hold these medications until 3 days after surgery.     - You may experience burning and frequency of urination over the next few days. This will improve after a few days if you stay well hydrated. If you were prescribed phenazopyridine (Pyridium) this may relieve urinary discomfort but you can only take this for 3 days. Pyridium will make your urine orange.     - You are likely to see some blood in your urine (pink or light red urine) that should clear up within a few days. Staying well hydrated should help this clear up. If you notice the urine stays dark red or there are multiple large blood clots despite good hydration, please call the urology clinic ([phone]).     - Try to abstain from alcohol, coffee, tea, artificial sweeteners, and spicy food for the next 48 hours as these can irritate the bladder.     - If you develop fevers / chills, difficulty urinating, or abdominal pain that does not improve with pain medications, please call the office.     - Drink 1.5 to 2 liters of fluid today (water is preferable). If you are on a fluid restriction due to other medical reasons then you need to adhere to your fluid restriction recommendations.    Bryant Garza MD  Staff Urologist  [facility]  Office: [phone]

## 2024-01-22 NOTE — ANESTHESIA PREPROCEDURE EVALUATION
PRE-OP EVALUATION    Patient Name: Nadine Calix    Admit Diagnosis: Nephrolithiasis [N20.0]    Pre-op Diagnosis: Nephrolithiasis [N20.0]    CYSTOSCOPY, LEFT URETEROSCOPY, LASER LITHOTRIPSY, STENT PLACEMENT, RIGHT RETROGRADE PYELOGRAM, POSSIBLE RIGHT URETEROSCOPY    Anesthesia Procedure: CYSTOSCOPY, LEFT URETEROSCOPY, LASER LITHOTRIPSY, STENT PLACEMENT, RIGHT RETROGRADE PYELOGRAM, POSSIBLE RIGHT URETEROSCOPY (Bilateral: Ureter)    Surgeon(s) and Role:     * Bryant Garza MD - Primary    Pre-op vitals reviewed.  Temp: 98 °F (36.7 °C)  Pulse: 63  Resp: 16  BP: 117/77  SpO2: 100 %  Body mass index is 33.33 kg/m².    Current medications reviewed.  Hospital Medications:   acetaminophen (Tylenol Extra Strength) tab 1,000 mg  1,000 mg Oral Once    scopolamine (Transderm-Scop) 1 MG/3DAYS patch 1 patch  1 patch Transdermal Once    lactated ringers infusion   Intravenous Continuous    ceFAZolin (Ancef) 2 g in 20mL IV syringe premix  2 g Intravenous Once    ceFAZolin (Ancef) 2 g/20mL IV syringe premix           Outpatient Medications:     Medications Prior to Admission   Medication Sig Dispense Refill Last Dose    sulfamethoxazole-trimethoprim (BACTRIM) 400-80 MG Oral Tab Take 1 tablet by mouth nightly. 90 tablet 0 1/20/2024    ergocalciferol 1.25 MG (56528 UT) Oral Cap Take 1 capsule (50,000 Units total) by mouth once a week. 24 capsule 0 Past Week    FIBER OR Take 1 tablet by mouth daily.   Past Week    triamterene-hydroCHLOROthiazide 37.5-25 MG Oral Cap Take 1 capsule by mouth every morning. 90 capsule 1 1/22/2024 at 0900    potassium citrate 10 MEQ (1080 MG) Oral Tab CR Take 1 tablet (10 mEq total) by mouth daily.   1/22/2024    Multiple Vitamin (ONE-DAILY MULTI VITAMINS) Oral Tab Take 1 tablet by mouth daily.   Past Week    albuterol (PROAIR HFA) 108 (90 Base) MCG/ACT Inhalation Aero Soln Inhale 2 puffs into the lungs every 4 (four) hours as needed for Wheezing. 1 each 1 More than a month       Allergies: Amlodipine,  Metoprolol, Toradol [ketorolac tromethamine], Tramadol, Dilaudid [hydromorphone], Oxycodone, Wellbutrin [bupropion hcl], Valsartan, Codeine sulfate, Hydrocodone, Morphine, and Seasonal      Anesthesia Evaluation    Patient summary reviewed.    Anesthetic Complications  (-) history of anesthetic complications         GI/Hepatic/Renal                                 Cardiovascular                (+) obesity  (+) hypertension                                     Endo/Other    Negative endo/other ROS.                              Pulmonary    Negative pulmonary ROS.                       Neuro/Psych      (+) depression             (+) headaches (migraines)                   Past Surgical History:   Procedure Laterality Date      ,2016    x2    CHOLECYSTECTOMY      COLONOSCOPY N/A 2021    Procedure: COLONOSCOPY, ESOPHAGOGASTRODUODENOSCOPY with biopsy;  Surgeon: Prashant Lilly DO;  Location:  ENDOSCOPY    COLPOSCOPY, CERVIX W/UPPER ADJACENT VAGINA; W/BIOPSY(S), CERVIX      CRYOCAUTERY OF CERVIX      CYSTO/URETERO W/UP STRICTURE Right 10/15/2019    Cysto Rt RPG, Rt URS w/ Laser Litho Dilation of Rtight Stricture Rt URS Stent Exchange w/ Dr Jim    CYSTO/URETERO, STONE REMOVE Right 2019    right ureter and kidney stones extraction, URS, stent placement    CYSTOSCOPY,URETEROSCOPY,LITHOTRIPSY Right 2019    Cysto, B/L RPG, URS, laser litho, stone ext, Rt stent Dr. Jim    HERNIA SURGERY  2000    L hernia repair    KIDNEY SURGERY Right     stent placement 2022    LITHOTRIPSY  ,  &     NEEDLE BIOPSY LEFT  2021    fibroadenoma    NEEDLE BIOPSY RIGHT  2021    fibroadenoma    OTHER Bilateral     nerve surgery to bilateral arms about 1 month apart    OTHER      percutaneous nephrolithotomy    OTHER SURGICAL HISTORY  2019    cysto stent removal - dr. jim     OTHER SURGICAL HISTORY      C;ysto Lt PCNL Stent Placement     OTHER SURGICAL HISTORY   06/03/2020    Cysto Stent Removal w/ Dr Jim    OTHER SURGICAL HISTORY  07/07/2021    Cysto/Stent Removal Dr. Jim    REMOVAL GALLBLADDER  1998    REMOVE STENT VIA TRANSURETH Right 11/13/2019    Cysto Stent Removal w/ Dr Jim    REPAIR ING HERNIA,5+Y/O,REDUCIBL       Social History     Socioeconomic History    Marital status: Life Partner   Tobacco Use    Smoking status: Never    Smokeless tobacco: Never   Vaping Use    Vaping Use: Never used   Substance and Sexual Activity    Alcohol use: No    Drug use: No    Sexual activity: Yes     Partners: Male   Other Topics Concern    Caffeine Concern No    Exercise No    Seat Belt Yes     History   Drug Use No     Available pre-op labs reviewed.     Lab Results   Component Value Date     01/11/2024    K 3.6 01/11/2024     01/11/2024    CO2 26.0 01/11/2024    BUN 14 01/11/2024    CREATSERUM 0.95 01/11/2024    GLU 99 01/11/2024    CA 9.3 01/11/2024            Airway      Mallampati: II  Mouth opening: >3 FB  TM distance: > 6 cm  Neck ROM: full Cardiovascular    Cardiovascular exam normal.         Dental             Pulmonary    Pulmonary exam normal.                 Other findings              ASA: 2   Plan: general  NPO status verified and patient meets guidelines.    Post-procedure pain management plan discussed with surgeon and patient.    Comment: Discussed risks including PONV, sore throat, dental damage, cardiac and respiratory complications. All questions answered.  Plan/risks discussed with: patient  Use of blood product(s) discussed with: patient              Present on Admission:  **None**

## 2024-01-22 NOTE — ANESTHESIA POSTPROCEDURE EVALUATION
[facility]    Nadine Calix Patient Status:  Hospital Outpatient Surgery   Age/Gender 48 year old female MRN [id number]   Location [facility] SURGERY Attending Bryant Garza MD   Hosp Day # 0 PCP Fan Hernandez MD       Anesthesia Post-op Note    CYSTOSCOPY, LEFT URETEROSCOPY, LASER LITHOTRIPSY, STENT PLACEMENT, RIGHT RETROGRADE PYELOGRAM    Procedure Summary       Date: 01/22/24 Room / Location:  MAIN OR 07 /  MAIN OR    Anesthesia Start: 1500 Anesthesia Stop: 1615    Procedure: CYSTOSCOPY, LEFT URETEROSCOPY, LASER LITHOTRIPSY, STENT PLACEMENT, RIGHT RETROGRADE PYELOGRAM (Bilateral: Ureter) Diagnosis:       Nephrolithiasis      (Nephrolithiasis [N20.0])    Surgeons: Bryant Garza MD Anesthesiologist: Felipa Post MD    Anesthesia Type: general ASA Status: 2            Anesthesia Type: general    Vitals Value Taken Time   /78 01/22/24 1616   Temp 97 01/22/24 1616   Pulse 63 01/22/24 1616   Resp 13 01/22/24 1616   SpO2 97 01/22/24 1616       Patient Location: PACU    Anesthesia Type: general    Airway Patency: patent    Postop Pain Control: adequate    Mental Status: preanesthetic baseline    Nausea/Vomiting: none    Cardiopulmonary/Hydration status: stable euvolemic    Complications: no apparent anesthesia related complications    Postop vital signs: stable    Dental Exam: Unchanged from Preop    Patient to be discharged from PACU when criteria met.

## 2024-01-25 ENCOUNTER — TELEPHONE (OUTPATIENT)
Dept: SURGERY | Facility: CLINIC | Age: 49
End: 2024-01-25

## 2024-01-25 NOTE — TELEPHONE ENCOUNTER
24 Hour Metabolic Urine Study Results    Volume: 1840 mL/day  Goal >2500  Sodium: 153 mmol/day  Goal <150  Calcium: 187 mg/day  Goal <250  Uric acid: 644 mg/day  Goal <750  Citrate: 226 mg/day  Goal >450  Oxalate: 13 mg/day  Goal <40  pH: 6.1  Goal 5.8 - 6.2 (>6 for UA stones, >7 for cystine stone)  Creatinine: 1477 mg/day  Normal 18-20 mg/kilogram/day (female)  Normal 20-22 mg/kilogram/day (male)  Cystinuria present? No    Serum calcium: 9.3    Stent removal planned for 1/29/24. Urine study shows low urine volume, low urine citrate. Can offer potassium citrate at that time given low citrate.     Recommendations:  -Increase water intake (approximately 40-60 ounces per day) for goal urine output greater than 2500 mL/day  -Dietary calcium intake should be approximately 8688-8076 mg/day  -Dietary sodium intake should be limited to under 2300 mg/day  -Limit animal protein intake, including red meat, chicken, pork, fish  -For hypocitraturia, will offer potassium citrate 15 mEq twice daily  -Repeat 24-hour urine study in 6 weeks to assess effect of above interventions

## 2024-01-26 ENCOUNTER — HOSPITAL ENCOUNTER (EMERGENCY)
Age: 49
Discharge: HOME | End: 2024-01-26
Attending: EMERGENCY MEDICINE
Payer: MEDICAID

## 2024-01-26 ENCOUNTER — APPOINTMENT (OUTPATIENT)
Dept: CT IMAGING | Age: 49
End: 2024-01-26
Attending: EMERGENCY MEDICINE
Payer: MEDICAID

## 2024-01-26 ENCOUNTER — TELEPHONE (OUTPATIENT)
Dept: SURGERY | Facility: CLINIC | Age: 49
End: 2024-01-26

## 2024-01-26 VITALS
SYSTOLIC BLOOD PRESSURE: 132 MMHG | TEMPERATURE: 98 F | HEART RATE: 67 BPM | HEIGHT: 59 IN | BODY MASS INDEX: 33.67 KG/M2 | RESPIRATION RATE: 16 BRPM | OXYGEN SATURATION: 97 % | DIASTOLIC BLOOD PRESSURE: 77 MMHG | WEIGHT: 167 LBS

## 2024-01-26 DIAGNOSIS — N20.0 NEPHROLITHIASIS: ICD-10-CM

## 2024-01-26 DIAGNOSIS — N39.0 URINARY TRACT INFECTION WITHOUT HEMATURIA, SITE UNSPECIFIED: Primary | ICD-10-CM

## 2024-01-26 LAB
ALBUMIN SERPL-MCNC: 3.9 G/DL (ref 3.4–5)
ALBUMIN/GLOB SERPL: 0.9 {RATIO} (ref 1–2)
ALP LIVER SERPL-CCNC: 115 U/L
ANION GAP SERPL CALC-SCNC: 8 MMOL/L (ref 0–18)
AST SERPL-CCNC: 12 U/L (ref 15–37)
B-HCG UR QL: NEGATIVE
BASOPHILS # BLD AUTO: 0.05 X10(3) UL (ref 0–0.2)
BASOPHILS NFR BLD AUTO: 0.4 %
BILIRUB SERPL-MCNC: 0.3 MG/DL (ref 0.1–2)
BILIRUB UR QL STRIP.AUTO: NEGATIVE
BUN BLD-MCNC: 13 MG/DL (ref 9–23)
CALCIUM BLD-MCNC: 9.2 MG/DL (ref 8.5–10.1)
CHLORIDE SERPL-SCNC: 103 MMOL/L (ref 98–112)
CO2 SERPL-SCNC: 25 MMOL/L (ref 21–32)
CREAT BLD-MCNC: 0.97 MG/DL
EGFRCR SERPLBLD CKD-EPI 2021: 73 ML/MIN/1.73M2 (ref 60–?)
EOSINOPHIL # BLD AUTO: 0.1 X10(3) UL (ref 0–0.7)
EOSINOPHIL NFR BLD AUTO: 0.9 %
ERYTHROCYTE [DISTWIDTH] IN BLOOD BY AUTOMATED COUNT: 12.6 %
GLOBULIN PLAS-MCNC: 4.3 G/DL (ref 2.8–4.4)
GLUCOSE BLD-MCNC: 110 MG/DL (ref 70–99)
GLUCOSE UR STRIP.AUTO-MCNC: NEGATIVE MG/DL
HCT VFR BLD AUTO: 37.6 %
HGB BLD-MCNC: 12.2 G/DL
IMM GRANULOCYTES # BLD AUTO: 0.03 X10(3) UL (ref 0–1)
IMM GRANULOCYTES NFR BLD: 0.3 %
KETONES UR STRIP.AUTO-MCNC: NEGATIVE MG/DL
LIPASE SERPL-CCNC: 33 U/L (ref 13–75)
LYMPHOCYTES # BLD AUTO: 1.63 X10(3) UL (ref 1–4)
LYMPHOCYTES NFR BLD AUTO: 14.6 %
MCH RBC QN AUTO: 26.7 PG (ref 26–34)
MCHC RBC AUTO-ENTMCNC: 32.4 G/DL (ref 31–37)
MCV RBC AUTO: 82.3 FL
MONOCYTES # BLD AUTO: 0.65 X10(3) UL (ref 0.1–1)
MONOCYTES NFR BLD AUTO: 5.8 %
NEUTROPHILS # BLD AUTO: 8.68 X10 (3) UL (ref 1.5–7.7)
NEUTROPHILS # BLD AUTO: 8.68 X10(3) UL (ref 1.5–7.7)
NEUTROPHILS NFR BLD AUTO: 78 %
NITRITE UR QL STRIP.AUTO: POSITIVE
OSMOLALITY SERPL CALC.SUM OF ELEC: 283 MOSM/KG (ref 275–295)
PH UR STRIP.AUTO: 6 [PH] (ref 5–8)
PLATELET # BLD AUTO: 411 10(3)UL (ref 150–450)
POTASSIUM SERPL-SCNC: 3.8 MMOL/L (ref 3.5–5.1)
PROT SERPL-MCNC: 8.2 G/DL (ref 6.4–8.2)
RBC # BLD AUTO: 4.57 X10(6)UL
RBC #/AREA URNS AUTO: >10 /HPF
SODIUM SERPL-SCNC: 136 MMOL/L (ref 136–145)
SP GR UR STRIP.AUTO: 1.02 (ref 1–1.03)
UROBILINOGEN UR STRIP.AUTO-MCNC: 0.2 MG/DL
WBC # BLD AUTO: 11.1 X10(3) UL (ref 4–11)

## 2024-01-26 PROCEDURE — 87077 CULTURE AEROBIC IDENTIFY: CPT | Performed by: EMERGENCY MEDICINE

## 2024-01-26 PROCEDURE — 80053 COMPREHEN METABOLIC PANEL: CPT | Performed by: EMERGENCY MEDICINE

## 2024-01-26 PROCEDURE — 81015 MICROSCOPIC EXAM OF URINE: CPT | Performed by: EMERGENCY MEDICINE

## 2024-01-26 PROCEDURE — 81025 URINE PREGNANCY TEST: CPT

## 2024-01-26 PROCEDURE — 74176 CT ABD & PELVIS W/O CONTRAST: CPT | Performed by: EMERGENCY MEDICINE

## 2024-01-26 PROCEDURE — 87186 SC STD MICRODIL/AGAR DIL: CPT | Performed by: EMERGENCY MEDICINE

## 2024-01-26 PROCEDURE — 87086 URINE CULTURE/COLONY COUNT: CPT | Performed by: EMERGENCY MEDICINE

## 2024-01-26 PROCEDURE — 96361 HYDRATE IV INFUSION ADD-ON: CPT

## 2024-01-26 PROCEDURE — 83690 ASSAY OF LIPASE: CPT | Performed by: EMERGENCY MEDICINE

## 2024-01-26 PROCEDURE — 81001 URINALYSIS AUTO W/SCOPE: CPT | Performed by: EMERGENCY MEDICINE

## 2024-01-26 PROCEDURE — 96365 THER/PROPH/DIAG IV INF INIT: CPT

## 2024-01-26 PROCEDURE — 99285 EMERGENCY DEPT VISIT HI MDM: CPT

## 2024-01-26 PROCEDURE — 96376 TX/PRO/DX INJ SAME DRUG ADON: CPT

## 2024-01-26 PROCEDURE — 96375 TX/PRO/DX INJ NEW DRUG ADDON: CPT

## 2024-01-26 PROCEDURE — 85025 COMPLETE CBC W/AUTO DIFF WBC: CPT | Performed by: EMERGENCY MEDICINE

## 2024-01-26 RX ORDER — DIPHENHYDRAMINE HYDROCHLORIDE 50 MG/ML
25 INJECTION INTRAMUSCULAR; INTRAVENOUS ONCE
Status: COMPLETED | OUTPATIENT
Start: 2024-01-26 | End: 2024-01-26

## 2024-01-26 RX ORDER — ONDANSETRON 4 MG/1
4 TABLET, ORALLY DISINTEGRATING ORAL EVERY 4 HOURS PRN
Qty: 10 TABLET | Refills: 0 | Status: SHIPPED | OUTPATIENT
Start: 2024-01-26 | End: 2024-02-02

## 2024-01-26 RX ORDER — KETOROLAC TROMETHAMINE 10 MG/1
10 TABLET, FILM COATED ORAL EVERY 8 HOURS PRN
Qty: 20 TABLET | Refills: 0 | Status: SHIPPED | OUTPATIENT
Start: 2024-01-26

## 2024-01-26 RX ORDER — OXYBUTYNIN CHLORIDE 5 MG/1
5 TABLET ORAL 3 TIMES DAILY
Qty: 15 TABLET | Refills: 0 | Status: SHIPPED | OUTPATIENT
Start: 2024-01-26 | End: 2024-01-31

## 2024-01-26 RX ORDER — ONDANSETRON 2 MG/ML
4 INJECTION INTRAMUSCULAR; INTRAVENOUS ONCE
Status: COMPLETED | OUTPATIENT
Start: 2024-01-26 | End: 2024-01-26

## 2024-01-26 RX ORDER — HYDROCODONE BITARTRATE AND ACETAMINOPHEN 5; 325 MG/1; MG/1
1 TABLET ORAL EVERY 6 HOURS PRN
Qty: 10 TABLET | Refills: 0 | Status: SHIPPED | OUTPATIENT
Start: 2024-01-26

## 2024-01-26 RX ORDER — CEPHALEXIN 500 MG/1
500 CAPSULE ORAL 2 TIMES DAILY
Qty: 14 CAPSULE | Refills: 0 | Status: SHIPPED | OUTPATIENT
Start: 2024-01-26 | End: 2024-02-02

## 2024-01-26 RX ORDER — SODIUM CHLORIDE 9 MG/ML
INJECTION, SOLUTION INTRAVENOUS CONTINUOUS
Status: DISCONTINUED | OUTPATIENT
Start: 2024-01-26 | End: 2024-01-26

## 2024-01-26 RX ORDER — HYDROMORPHONE HYDROCHLORIDE 1 MG/ML
1 INJECTION, SOLUTION INTRAMUSCULAR; INTRAVENOUS; SUBCUTANEOUS EVERY 30 MIN PRN
Status: DISCONTINUED | OUTPATIENT
Start: 2024-01-26 | End: 2024-01-26

## 2024-01-26 NOTE — TELEPHONE ENCOUNTER
S/p URS with MB on 1/22/24. Initially tolerated well but started having severe flank pain and dysuria a couple days ago. Was taking flomax, pyridium and norco without relief so came to ED. Asking if stent can be removed now.      Started having some diarrhea and bloating as well.    Discussed with pt and ED MD that stent will stay in until 1/29 removal with us. Sending oxybutynin 5mg tid prn, toradol, cephalexin and norco. Cr and WBC stable in ED. UA positive for nitrites but likely 2/2 pyridium use. Culture pending.

## 2024-01-26 NOTE — CONSULTS
[facility] [place]    Urology Consult Note    History of Present Illness:   Patient is a(n) 48 year old female s/p cysto URS with Dr. Garza on 1/22/24 who presented to ED for ureteral spasms.    Pt initially tolerated procedure and stent but started having severe pain and dysuria a couple days ago. Was taking flomax, pyridium and norco without relief so came to ED. Asking if stent can be removed now.     Started having some diarrhea and bloating as well.    Cr: 0.97, WBC: 11.1, UA red cloudy, large blood, 100 protein, positive nitrites, trace leuks. Nitrites likely 2/2 pyridium use.   HISTORY:  Past Medical History:   Diagnosis Date    Abdominal distention 01/2020    Longer than this but this is an estimate    Abdominal hernia 03/2017    I had hernia repair around by my belly button & abdominal    Abdominal pain 02/2020    Currently seeing obgyn, urologist & nephrologist    Anxiety state     resolved    Arrhythmia     fast heart rate due to anxiety according to patient, pvc    Back pain 01/2018    Mid to lower back pain & hip pain    Back problem     lower back    Bloating 01/2020    Longer than this but this is an estimate    Blood in urine 1994    Due to severe kidney stones    Blurred vision 06/2020    I always wore glasses or contacts since about 14 yrs old    Calculus of kidney     hydronephrosis/ several times kidney stones/ 13 th procedure for stones    Change in hair 01/2020    My hair is thinning more than the usual    Chest pain 01/2019    I did see a cardiologist about this    Chest pain on exertion 01/2019    I did see a cardiologists about this    COVID-19 12/2021    headache, back pain, shortness of breath, sore throat, runny nose, chills. Not hospitalized    COVID-19 11/21/2023    high fever, fatigue, not hospitalized    Depression     resolved    Diarrhea, unspecified 01/2020    I notice that certain things run right through me now    Disorder of liver     elevated AST/ALT     Disorder of thyroid     nodules only    Dizziness 01/2019    Easy bruising 01/2020    I find bruises on me and don’t really know how I got them    Essential hypertension     Fatigue 11/2019    I noticed that I’m alot more tired lately more than usual    Flatulence/gas pain/belching 01/2020    Longer than this but this is an estimate    Food intolerance 01/2020    I think I am lactose intolerant been that way for a few year    Frequent urination Since early age in my 20’s    All the time for years    Frequent UTI 1994    Due to kidney stones blockages    Gestational diabetes 04/2014    Heart palpitations 01/2019    Pvc’s and irregular heart rate    Hemorrhoids 11/2016 or 2018    After last pregnancy or a couple years after that    High blood pressure     triamterene    History of cardiac murmur 01/2000    Mother told me I was born with one & my dr as well    History of D&C 10/08/2020    History of depression 07/2004    When I had a miscarriage    History of mental disorder 07/2004    Family history of this on my mother’s side    Indigestion 01/2020    Longer than this but this is an estimate    Itch of skin 01/2020    Not severe but I have been itching for no apparent reason    Kidney stones     Leaking of urine 1994    After first pregnancy    Leg swelling 07/2020    My left leg slightly swelled up and my ankle area was hurtin    Loss of appetite 01/2020    I noticed this lately too    Migraines     Nausea 01/2020    Longer than this but this is an estimate    Night sweats 01/2020    I feel like I’m on fire at night.    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    Shortness of breath 01/2019    Seen cardiologist about this    Sleep disturbance 01/2020    For years sometimes I can’t lay on my left side    Stress  1994    Rough up bringing and also regular family stresses    Uncomfortable fullness after meals 2020    Longer than this but this is an estimate    Unspecified condition originating in the  period 2004    Visual impairment     glasses    Wears glasses 1989    Since I was about 14 yrs old    Weight gain 2016    After last son was born      Past Surgical History:   Procedure Laterality Date      ,2016    x2    CHOLECYSTECTOMY      COLONOSCOPY N/A 2021    Procedure: COLONOSCOPY, ESOPHAGOGASTRODUODENOSCOPY with biopsy;  Surgeon: Prashant Lilly DO;  Location:  ENDOSCOPY    COLPOSCOPY, CERVIX W/UPPER ADJACENT VAGINA; W/BIOPSY(S), CERVIX      CRYOCAUTERY OF CERVIX      CYSTO/URETERO W/UP STRICTURE Right 10/15/2019    Cysto Rt RPG, Rt URS w/ Laser Litho Dilation of Rtight Stricture Rt URS Stent Exchange w/ Dr Jim    CYSTO/URETERO, STONE REMOVE Right 2019    right ureter and kidney stones extraction, URS, stent placement    CYSTOSCOPY,URETEROSCOPY,LITHOTRIPSY Right 2019    Cysto, B/L RPG, URS, laser litho, stone ext, Rt stent Dr. Jim    HERNIA SURGERY  2000    L hernia repair    KIDNEY SURGERY Right     stent placement 2022    LITHOTRIPSY  ,  &     NEEDLE BIOPSY LEFT  2021    fibroadenoma    NEEDLE BIOPSY RIGHT  2021    fibroadenoma    OTHER Bilateral     nerve surgery to bilateral arms about 1 month apart    OTHER      percutaneous nephrolithotomy    OTHER SURGICAL HISTORY  2019    cysto stent removal - dr. jim     OTHER SURGICAL HISTORY      C;ysto Lt PCNL Stent Placement     OTHER SURGICAL HISTORY  2020    Cysto Stent Removal w/ Dr Jim    OTHER SURGICAL HISTORY  2021    Cysto/Stent Removal Dr. Jim    REMOVAL GALLBLADDER  1998    REMOVE STENT VIA TRANSURETH Right 2019    Cysto Stent Removal w/ Dr Jim    REPAIR ING HERNIA,5+Y/O,REDUCIBL        Family History   Problem  Relation Age of Onset    Heart Disorder Father         Stent    Diabetes Father     Hypertension Father     Diabetes Mother     High Cholesterol Mother     Hypertension Mother     Thyroid disease Mother         hypothroid    Other (Other) Mother         Part of thyroid removed    Diabetes Maternal Grandmother     Cancer Maternal Grandmother         ovarian    Ovarian Cancer Maternal Grandmother     Other (Other) Maternal Grandmother         Ovarian Cancer    Diabetes Maternal Grandfather     Diabetes Paternal Grandmother     Diabetes Paternal Grandfather     Hypertension Sister     Diabetes Sister     Other (Other) Sister         Liver problem    Other (Other) Daughter         Appendicitis    Other (Other) Son         Appendicitis      Social History:   Social History     Socioeconomic History    Marital status: Life Partner   Tobacco Use    Smoking status: Never    Smokeless tobacco: Never   Vaping Use    Vaping Use: Never used   Substance and Sexual Activity    Alcohol use: No    Drug use: No    Sexual activity: Yes     Partners: Male   Other Topics Concern    Caffeine Concern No    Exercise No    Seat Belt Yes        Allergies  Allergies   Allergen Reactions    Amlodipine SWELLING     Leg swelling    Metoprolol SHORTNESS OF BREATH    Toradol [Ketorolac Tromethamine] SHORTNESS OF BREATH    Tramadol SHORTNESS OF BREATH    Dilaudid [Hydromorphone] ITCHING and PAIN     Headache - per pt this is only with long term use - pt states she can tolerate this medication    Oxycodone PAIN     headache    Wellbutrin [Bupropion Hcl] PAIN and DIZZINESS     Headache    Valsartan OTHER (SEE COMMENTS)     Chest pain      Codeine Sulfate ITCHING     TABS    Hydrocodone ITCHING    Morphine ITCHING    Seasonal Runny nose       Review of Systems:   A 10-point review of systems was completed and is negative other than as noted above.    Physical Exam:   /77   Pulse 67   Temp 98 °F (36.7 °C)   Resp 16   Ht 4' 11\" (1.499 m)    Wt 167 lb (75.8 kg)   LMP 12/25/2023 (Exact Date)   SpO2 97%   BMI 33.73 kg/m²     GENERAL APPEARANCE: no acute distress  NEUROLOGIC: converses appropriately  HEAD: atraumatic, normocephalic  LUNGS: non-labored breathing  ABDOMEN: soft, nontender, non-distended  BACK: no CVA tenderness  PSYCH: appropriate affect and mood    Results:     Laboratory Data:  Lab Results   Component Value Date    WBC 11.1 (H) 01/26/2024    HGB 12.2 01/26/2024    .0 01/26/2024     Lab Results   Component Value Date     01/26/2024    K 3.8 01/26/2024     01/26/2024    CO2 25.0 01/26/2024    BUN 13 01/26/2024     (H) 01/26/2024    GFRAA 97 07/08/2022    AST 12 (L) 01/26/2024    ALT  01/26/2024      Comment:      Due to  backorder we are temporarily unable to offer hospital-based ALT testing at [facility].   If urgently needed, please order ALT test code [id number].   The new order will need a new venipuncture and will be sent to [facility] for testing.   The expected turnaround time will be within 24 hours.     TP 8.2 01/26/2024    ALB 3.9 01/26/2024    PHOS 3.4 11/17/2020    CA 9.2 01/26/2024    MG 2.1 10/20/2023       Urinalysis Results (last 3 years):  Recent Labs     04/01/21  1304 05/15/21  1007 06/10/21  1040 11/29/21  1827 12/21/21  1134 02/27/22  1246 06/07/22  0955 10/28/22  1201 02/11/23  1734 05/05/23  1106 08/10/23  1150 08/16/23  1809 09/17/23  0632 09/28/23  1021 10/27/23  1149 12/08/23  1053 01/26/24  0810   COLORUR Yellow Yellow Yellow Yellow Yellow Yellow Yellow Yellow Yellow  --  Light-Yellow Yellow Yellow Yellow Light-Yellow  --  Red*   CLARITY Clear Clear Hazy* Clear Clear Clear Clear Clear Clear  --  Clear Clear Clear Turbid* Clear  --  Cloudy*   SPECGRAVITY >=1.030 1.025 1.018 >=1.030 1.025 1.020 1.015 1.021 1.010 1.020 1.019 1.020 1.015 1.018 1.017 1.015 1.025   PHURINE 5.5 6.0 5.0 5.5 6.5 7.0 7.0 6.0 7.0  --  6.0 6.0 6.0 6.0 6.0 6.0 6.0   PROUR 30 mg/dL* 30 mg/dL* 30* 30 mg/dL*  >=300* 100 mg/dL* Trace* 100* Negative  --  70* Trace* 100 mg/dL* 70* 50*  --  100 mg/dL*   GLUUR Negative Negative Negative Negative Negative Negative Negative Negative Negative Negative Normal Negative Negative Normal Normal  --  Negative   KETUR Negative Negative Negative Negative Negative Negative Negative Negative Negative  --  Negative Negative Negative Negative Negative  --  Negative   BILUR Negative Negative Negative Negative  --  Negative Negative Negative Negative  --  Negative Negative Negative Negative Negative  --  Negative   BLOODURINE Moderate* Moderate* Moderate* Small* Large* Small* Large* Small* Large*  --  1+* Moderate* Large* 1+* 1+*  --  Large*   NITRITE Negative Negative Negative Negative Negative Negative Negative Negative Negative  --  Negative Negative Positive* 2+* Negative Negative Positive*   UROBILINOGEN 0.2 0.2 <2.0 0.2 2.0* 0.2 0.2 <2.0 0.2  --  Normal 0.2 0.2 Normal Normal  --  0.2   LEUUR Negative Negative Negative Negative Negative Negative Small* Negative Negative  --  Negative Negative Large* 500* Negative  --  Trace*   UASA  --   --   --   --   --   --   --  Negative  --   --   --   --   --   --   --   --   --    WBCUR 6-10* 1-5 6-10* 1-5 1-5 1-5 21-50* 1-5 1-5  --  1-5 1-5 >50* >50* 1-5  --  1-5   RBCUR 0-2 3-5* >10* 0-2 6-10* 0-2 3-5* 0-2 0-2  --  0-2 6-10* >10* 6-10* 0-2  --  >10*   BACUR 2+* None Seen None Seen Rare* 2+* 1+* 2+* None Seen None Seen  --  None Seen Rare* 1+* 2+* None Seen  --  None Seen       Urine Culture Results (last 3 years):  Lab Results   Component Value Date    URINECUL No Growth 2 Days 01/11/2024    URINECUL No Growth at 18-24 hrs. 10/27/2023    URINECUL >100,000 CFU/ML Klebsiella pneumoniae (A) 09/28/2023    URINECUL >100,000 CFU/ML Klebsiella pneumoniae (A) 09/17/2023    URINECUL No Growth at 18-24 hrs. 08/10/2023    URINECUL (A) 07/14/2023     10,000 - 50,000 CFU/ML Alpha hemolytic Streptococcus    URINECUL No Growth at 18-24 hrs. 10/28/2022     URINECUL No Growth at 18-24 hrs. 06/07/2022    URINECUL  06/10/2021     10-50,000 cfu/ml Multiple species present-probable contamination.    URINECUL >100,000 CFU/ML Mixed gram positive anjelica 04/01/2021    URINECUL No Growth 2 Days 04/22/2020    URINECUL No Growth at 18-24 hrs. 03/07/2020    URINECUL <10,000 CFU/ML Gram positive anjelica 02/27/2020    URINECUL No Growth 2 Days 12/16/2019    URINECUL 10,000 - 50,000 CFU/ML Gram positive anjelica 10/30/2019       Imaging  CT ABDOMEN+PELVIS KIDNEYSTONE 2D RNDR(NO IV,NO ORAL)(CPT=74176)    Result Date: 1/26/2024  PROCEDURE:  CT ABDOMEN+PELVIS KIDNEYSTONE 2D RNDR(NO IV,NO ORAL)(CPT=74176)  COMPARISON:  [facility], CT, CT ABDOMEN+PELVIS KIDNEYSTONE 2D RNDR(NO IV,NO ORAL)(CPT=74176), 9/17/2023, 9:32 AM.  INDICATIONS:  left flank pain had a stent placed 4 days ago  TECHNIQUE:  Unenhanced multislice CT scanning from above the kidneys to below the urinary bladder.  2D rendering are generated on the CT scanner workstation to localize potential stones in the cranio-caudal plane.  Dose reduction techniques were used. Dose information is transmitted to the ACR (American College of Radiology) NRDR (National Radiology Data Registry) which includes the Dose Index Registry.  PATIENT STATED HISTORY: (As transcribed by Technologist)  Patient has had nausea, left flank, LUQ and LLQ pain for 4 days, Hx of left ureteral stent placement 4 days ago.    FINDINGS:  Please note the exam is somewhat limited without intravenous or oral contrast.  KIDNEYS:  Again noted are numerous bilateral nonobstructing renal calculi present with the largest within the left lower pole measuring up to 6 mm.  Calcification noted throughout the renal papilla bilaterally.  Consider correlation for medullary nephrocalcinosis.  There is no evidence of hydronephrosis.  There has been placement of a left ureteral stent with the proximal pigtail within the pelvis and proximal ureter and the distal pigtail within the bladder.  BLADDER:  No mass, calculus or significant wall thickening. ADRENALS:  No mass or enlargement.  LIVER:  No enlargement, atrophy, abnormal density, or significant focal lesion.  BILIARY:  Postsurgical changes of cholecystectomy are noted. PANCREAS:  No lesion, fluid collection, ductal dilatation, or atrophy.  SPLEEN:  No enlargement or focal lesion.  AORTA/VASCULAR:  No aneurysm.  RETROPERITONEUM:  No mass or adenopathy.  BOWEL/MESENTERY:  No dilated loops of small bowel are seen.  Portions of the bowel are decompressed, limiting evaluation.  No free fluid is seen.  Lack of intravenous and oral contrast limits assessment of the bowel.  There is no evidence of obstruction. ABDOMINAL WALL:  Postsurgical changes of presumed hernia repair noted. BONES:  No bony lesion or fracture. PELVIC ORGANS:  Unremarkable CT appearance although please note that uterus and ovaries are not well assessed with noncontrast CT. LUNG BASES:  No visible pulmonary or pleural disease.  OTHER:  Negative.             CONCLUSION:  There has been placement of a left double-J ureteral stent with the proximal pigtail in the region of the left renal pelvis and proximal left ureter.  The distal pigtails within the urinary bladder.  No evidence of hydronephrosis.  Bilateral nonobstructing renal calculi are present with possible medullary nephrocalcinosis.    LOCATION:  [id number]   Dictated by (CST): Hollis Paris MD on 1/26/2024 at 10:46 AM     Finalized by (CST): Hollis Paris MD on 1/26/2024 at 10:50 AM       XR OR - N/C    Result Date: 1/22/2024  PROCEDURE:  XR OR - N/C  COMPARISON:  [facility], XR OR - N/C, 6/24/2022, 12:28 PM.  INDICATIONS:  Intraoperative fluoroscopy.  TECHNIQUE:  Intraoperative fluoroscopy.  FLUOROSCOPY IMAGES OBTAINED:  13 FLUOROSCOPY TIME:  35 seconds TECHNOLOGIST TIME:  1 hour RADIATION DOSE (AIR KERMA PRODUCT):  299.2 uGy/m2   FINDINGS:  Intraoperative fluoroscopic images demonstrate a right-sided retrograde pyelogram.  There is  dilatation of the right renal pelvis.  A left-sided retrograde pyelogram is also noted.            CONCLUSION:  Please see the procedure/operative report for further details.    LOCATION:  [id number]    Dictated by (CST): Stromberg, LeRoy, MD on 1/22/2024 at 10:22 PM     Finalized by (CST): Stromberg, LeRoy, MD on 1/22/2024 at 10:23 PM           Impression:   Recommendations:  Ureteral spasms  - discharge with oxybutynin 5mg TID prn and cephalexin for stent pain  - continue tamsulosin and pyridium   - sending toradol and norco for pain management  - avoid constipation, take narcotics sparingly   - push fluids  - plan for stent removal Monday in office     Thank you very much for this consult. Please call if there are any questions or concerns.     Leah Tello PA-C  Urology  [facility]  Phone: [phone]    Date: 1/26/2024  Time: 11:54 AM

## 2024-01-26 NOTE — ED INITIAL ASSESSMENT (HPI)
Pt had a stent placed on Monday at [facility] and started having worsening pain on Wednesday. Pt was told to come here to get evaluated.

## 2024-01-26 NOTE — ED PROVIDER NOTES
Patient Seen in: [facility] In [place]      History     Chief Complaint   Patient presents with    Postop/Procedure Problem    Abdomen/Flank Pain     Stated Complaint: left flank pain had a stent placed 4 days ago    Subjective:   HPI    Patient presents with left abdomen/flank pain.  The patient had surgery 4 days ago for kidney stones.  She had a stent placed on the left side.  I reviewed the notes.  She states that on Wednesday she started to feel very nauseated.  She did not have much of an appetite and the pain started to increase.  The pain is throughout the left abdomen and flank.  It seems to be getting progressively worse.  She had some chills last night but no fever.  She has had urinary frequency and some hematuria today.  She denies dysuria.  She was on antibiotics for 2 days after surgery.  She talked to on-call urology today who suggested she come in for evaluation.    Objective:   Past Medical History:   Diagnosis Date    Abdominal distention 01/2020    Longer than this but this is an estimate    Abdominal hernia 03/2017    I had hernia repair around by my belly button & abdominal    Abdominal pain 02/2020    Currently seeing obgyn, urologist & nephrologist    Anxiety state     resolved    Arrhythmia     fast heart rate due to anxiety according to patient, pvc    Back pain 01/2018    Mid to lower back pain & hip pain    Back problem     lower back    Bloating 01/2020    Longer than this but this is an estimate    Blood in urine 1994    Due to severe kidney stones    Blurred vision 06/2020    I always wore glasses or contacts since about 14 yrs old    Calculus of kidney     hydronephrosis/ several times kidney stones/ 13 th procedure for stones    Change in hair 01/2020    My hair is thinning more than the usual    Chest pain 01/2019    I did see a cardiologist about this    Chest pain on exertion 01/2019    I did see a cardiologists about this    COVID-19 12/2021    headache,  back pain, shortness of breath, sore throat, runny nose, chills. Not hospitalized    COVID-19 11/21/2023    high fever, fatigue, not hospitalized    Depression     resolved    Diarrhea, unspecified 01/2020    I notice that certain things run right through me now    Disorder of liver     elevated AST/ALT    Disorder of thyroid     nodules only    Dizziness 01/2019    Easy bruising 01/2020    I find bruises on me and don’t really know how I got them    Essential hypertension     Fatigue 11/2019    I noticed that I’m alot more tired lately more than usual    Flatulence/gas pain/belching 01/2020    Longer than this but this is an estimate    Food intolerance 01/2020    I think I am lactose intolerant been that way for a few year    Frequent urination Since early age in my 20’s    All the time for years    Frequent UTI 1994    Due to kidney stones blockages    Gestational diabetes 04/2014    Heart palpitations 01/2019    Pvc’s and irregular heart rate    Hemorrhoids 11/2016 or 2018    After last pregnancy or a couple years after that    High blood pressure     triamterene    History of cardiac murmur 01/2000    Mother told me I was born with one & my dr as well    History of D&C 10/08/2020    History of depression 07/2004    When I had a miscarriage    History of mental disorder 07/2004    Family history of this on my mother’s side    Indigestion 01/2020    Longer than this but this is an estimate    Itch of skin 01/2020    Not severe but I have been itching for no apparent reason    Kidney stones     Leaking of urine 1994    After first pregnancy    Leg swelling 07/2020    My left leg slightly swelled up and my ankle area was hurtin    Loss of appetite 01/2020    I noticed this lately too    Migraines     Nausea 01/2020    Longer than this but this is an estimate    Night sweats 01/2020    I feel like I’m on fire at night.    Ovarian cyst     Painful urination 1994    When passing stones    Post partum depression      PVC (premature ventricular contraction)     Rash 2020    Broke out with unknown rash all over legs and arms    Sexually transmitted disease 2012    HPV    Shortness of breath 2019    Seen cardiologist about this    Sleep disturbance 2020    For years sometimes I can’t lay on my left side    Stress     Rough up bringing and also regular family stresses    Uncomfortable fullness after meals 2020    Longer than this but this is an estimate    Unspecified condition originating in the  period 2004    Visual impairment     glasses    Wears glasses 1989    Since I was about 14 yrs old    Weight gain 2016    After last son was born              Past Surgical History:   Procedure Laterality Date      ,2016    x2    CHOLECYSTECTOMY      COLONOSCOPY N/A 2021    Procedure: COLONOSCOPY, ESOPHAGOGASTRODUODENOSCOPY with biopsy;  Surgeon: Prashant Lilly DO;  Location:  ENDOSCOPY    COLPOSCOPY, CERVIX W/UPPER ADJACENT VAGINA; W/BIOPSY(S), CERVIX      CRYOCAUTERY OF CERVIX      CYSTO/URETERO W/UP STRICTURE Right 10/15/2019    Cysto Rt RPG, Rt URS w/ Laser Litho Dilation of Rtight Stricture Rt URS Stent Exchange w/ Dr Jim    CYSTO/URETERO, STONE REMOVE Right 2019    right ureter and kidney stones extraction, URS, stent placement    CYSTOSCOPY,URETEROSCOPY,LITHOTRIPSY Right 2019    Cysto, B/L RPG, URS, laser litho, stone ext, Rt stent Dr. Jim    HERNIA SURGERY  2000    L hernia repair    KIDNEY SURGERY Right     stent placement 2022    LITHOTRIPSY  ,  &     NEEDLE BIOPSY LEFT  2021    fibroadenoma    NEEDLE BIOPSY RIGHT  2021    fibroadenoma    OTHER Bilateral     nerve surgery to bilateral arms about 1 month apart    OTHER      percutaneous nephrolithotomy    OTHER SURGICAL HISTORY  2019    cysto stent removal - dr. jim     OTHER SURGICAL HISTORY      C;ysto Lt PCNL Stent Placement     OTHER SURGICAL HISTORY   06/03/2020    Cysto Stent Removal w/ Dr Jim    OTHER SURGICAL HISTORY  07/07/2021    Cysto/Stent Removal Dr. Jim    REMOVAL GALLBLADDER  1998    REMOVE STENT VIA TRANSURETH Right 11/13/2019    Cysto Stent Removal w/ Dr Jim    REPAIR ING HERNIA,5+Y/O,REDUCIBL                  Social History     Socioeconomic History    Marital status: Life Partner   Tobacco Use    Smoking status: Never    Smokeless tobacco: Never   Vaping Use    Vaping Use: Never used   Substance and Sexual Activity    Alcohol use: No    Drug use: No    Sexual activity: Yes     Partners: Male   Other Topics Concern    Caffeine Concern No    Exercise No    Seat Belt Yes              Review of Systems    Positive for stated complaint: left flank pain had a stent placed 4 days ago  Other systems are as noted in HPI.  Constitutional and vital signs reviewed.      All other systems reviewed and negative except as noted above.    Physical Exam     ED Triage Vitals   BP 01/26/24 0809 138/72   Pulse 01/26/24 0807 97   Resp 01/26/24 0807 18   Temp 01/26/24 0807 98 °F (36.7 °C)   Temp src --    SpO2 01/26/24 0807 97 %   O2 Device 01/26/24 0807 None (Room air)       Current:/77   Pulse 67   Temp 98 °F (36.7 °C)   Resp 16   Ht 149.9 cm (4' 11\")   Wt 75.8 kg   LMP 12/25/2023 (Exact Date)   SpO2 97%   BMI 33.73 kg/m²         Physical Exam    General: Alert and oriented x3, appears uncomfortable.  Cardiovascular: Regular rate and rhythm, no murmurs.  Respiratory: Lungs clear to auscultation.  Abdomen: Soft, tenderness in left upper and lower quadrants, no rebound or guarding, normal active bowel sounds, + left CVA tenderness.  Extremities: No CCE.  Skin: Warm and dry.    ED Course     Labs Reviewed   URINALYSIS WITH CULTURE REFLEX - Abnormal; Notable for the following components:       Result Value    Urine Color Red (*)     Clarity Urine Cloudy (*)     Blood Urine Large (*)     Protein Urine 100 mg/dL (*)     Nitrite Urine Positive  (*)     Leukocyte Esterase Urine Trace (*)     All other components within normal limits   COMP METABOLIC PANEL (14) - Abnormal; Notable for the following components:    Glucose 110 (*)     AST 12 (*)     Alkaline Phosphatase 115 (*)     A/G Ratio 0.9 (*)     All other components within normal limits   UA MICROSCOPIC ONLY, URINE - Abnormal; Notable for the following components:    RBC Urine >10 (*)     Squamous Epi. Cells Few (*)     All other components within normal limits   CBC W/ DIFFERENTIAL - Abnormal; Notable for the following components:    WBC 11.1 (*)     Neutrophil Absolute Prelim 8.68 (*)     Neutrophil Absolute 8.68 (*)     All other components within normal limits   LIPASE - Normal   POCT PREGNANCY URINE - Normal   CBC WITH DIFFERENTIAL WITH PLATELET    Narrative:     The following orders were created for panel order CBC With Differential With Platelet.  Procedure                               Abnormality         Status                     ---------                               -----------         ------                     CBC W/ DIFFERENTIAL[[id number]]          Abnormal            Final result                 Please view results for these tests on the individual orders.   RAINBOW DRAW LAVENDER   RAINBOW DRAW LIGHT GREEN   URINE CULTURE, ROUTINE     CT ABDOMEN+PELVIS KIDNEYSTONE 2D RNDR(NO IV,NO ORAL)(CPT=74176)    Result Date: 1/26/2024  PROCEDURE:  CT ABDOMEN+PELVIS KIDNEYSTONE 2D RNDR(NO IV,NO ORAL)(CPT=74176)  COMPARISON:  [facility], CT, CT ABDOMEN+PELVIS KIDNEYSTONE 2D RNDR(NO IV,NO ORAL)(CPT=74176), 9/17/2023, 9:32 AM.  INDICATIONS:  left flank pain had a stent placed 4 days ago  TECHNIQUE:  Unenhanced multislice CT scanning from above the kidneys to below the urinary bladder.  2D rendering are generated on the CT scanner workstation to localize potential stones in the cranio-caudal plane.  Dose reduction techniques were used. Dose information is transmitted to the ACR (American College of  Radiology) NRDR (National Radiology Data Registry) which includes the Dose Index Registry.  PATIENT STATED HISTORY: (As transcribed by Technologist)  Patient has had nausea, left flank, LUQ and LLQ pain for 4 days, Hx of left ureteral stent placement 4 days ago.    FINDINGS:  Please note the exam is somewhat limited without intravenous or oral contrast.  KIDNEYS:  Again noted are numerous bilateral nonobstructing renal calculi present with the largest within the left lower pole measuring up to 6 mm.  Calcification noted throughout the renal papilla bilaterally.  Consider correlation for medullary nephrocalcinosis.  There is no evidence of hydronephrosis.  There has been placement of a left ureteral stent with the proximal pigtail within the pelvis and proximal ureter and the distal pigtail within the bladder. BLADDER:  No mass, calculus or significant wall thickening. ADRENALS:  No mass or enlargement.  LIVER:  No enlargement, atrophy, abnormal density, or significant focal lesion.  BILIARY:  Postsurgical changes of cholecystectomy are noted. PANCREAS:  No lesion, fluid collection, ductal dilatation, or atrophy.  SPLEEN:  No enlargement or focal lesion.  AORTA/VASCULAR:  No aneurysm.  RETROPERITONEUM:  No mass or adenopathy.  BOWEL/MESENTERY:  No dilated loops of small bowel are seen.  Portions of the bowel are decompressed, limiting evaluation.  No free fluid is seen.  Lack of intravenous and oral contrast limits assessment of the bowel.  There is no evidence of obstruction. ABDOMINAL WALL:  Postsurgical changes of presumed hernia repair noted. BONES:  No bony lesion or fracture. PELVIC ORGANS:  Unremarkable CT appearance although please note that uterus and ovaries are not well assessed with noncontrast CT. LUNG BASES:  No visible pulmonary or pleural disease.  OTHER:  Negative.             CONCLUSION:  There has been placement of a left double-J ureteral stent with the proximal pigtail in the region of the left  renal pelvis and proximal left ureter.  The distal pigtails within the urinary bladder.  No evidence of hydronephrosis.  Bilateral nonobstructing renal calculi are present with possible medullary nephrocalcinosis.    LOCATION:  [id number]   Dictated by (CST): Hollis Paris MD on 1/26/2024 at 10:46 AM     Finalized by (CST): Hollis Paris MD on 1/26/2024 at 10:50 AM       XR OR - N/C    Result Date: 1/22/2024  PROCEDURE:  XR OR - N/C  COMPARISON:  [facility] , XR, XR OR - N/C, 6/24/2022, 12:28 PM.  INDICATIONS:  Intraoperative fluoroscopy.  TECHNIQUE:  Intraoperative fluoroscopy.  FLUOROSCOPY IMAGES OBTAINED:  13 FLUOROSCOPY TIME:  35 seconds TECHNOLOGIST TIME:  1 hour RADIATION DOSE (AIR KERMA PRODUCT):  299.2 uGy/m2   FINDINGS:  Intraoperative fluoroscopic images demonstrate a right-sided retrograde pyelogram.  There is dilatation of the right renal pelvis.  A left-sided retrograde pyelogram is also noted.            CONCLUSION:  Please see the procedure/operative report for further details.    LOCATION:  [id number]    Dictated by (CST): Stromberg, LeRoy, MD on 1/22/2024 at 10:22 PM     Finalized by (CST): Stromberg, LeRoy, MD on 1/22/2024 at 10:23 PM            Medications   ondansetron (Zofran) 4 MG/2ML injection 4 mg (4 mg Intravenous Given 1/26/24 0820)   diphenhydrAMINE (Benadryl) 50 mg/mL  injection 25 mg (25 mg Intravenous Given 1/26/24 0850)   cefTRIAXone (Rocephin) 1 g in D5W 100 mL IVPB-ADD (0 g Intravenous Stopped 1/26/24 0942)     Patient was given Dilaudid with Benadryl and Zofran for pain.  She was given a dose of Rocephin additionally.  She did require repeat dosing of Dilaudid for persistent pain.       MDM      Patient presents with left abdomen/flank pain.  Differential diagnosis includes but is not limited to urinary tract infection, stent malfunction and ureterolithiasis.  The patient does have some evidence of infection on her UA but has taken Azo and does have the stent so that may affect the results.  She has  a near normal white blood cell count with no fever.  Her CT shows the stent is properly positioned and she does not have hydronephrosis.  I discussed her case with on-call urology who in turn called the patient directly.  They feel that it is too soon to remove the stent although the pain may be related to the stent itself.  They suggested oxybutynin and I will continue antibiotics as well.  The patient requested nausea medication which I will give her additionally.  She is scheduled to have the stent removed on Monday.  If she has problems in the meantime she will recontact urology.                           Medical Decision Making      Disposition and Plan     Clinical Impression:  1. Urinary tract infection without hematuria, site unspecified    2. Nephrolithiasis         Disposition:  Discharge  1/26/2024 12:28 pm    Follow-up:  Bryant Garza MD  [address]  [facility] [address]  [phone]    Call  As needed          Medications Prescribed:  Discharge Medication List as of 1/26/2024 12:29 PM        START taking these medications    Details   ondansetron 4 MG Oral Tablet Dispersible Take 1 tablet (4 mg total) by mouth every 4 (four) hours as needed for Nausea., Normal, Disp-10 tablet, R-0      cephalexin 500 MG Oral Cap Take 1 capsule (500 mg total) by mouth 2 (two) times daily for 7 days., Normal, Disp-14 capsule, R-0      oxybutynin 5 MG Oral Tab Take 1 tablet (5 mg total) by mouth 3 (three) times daily for 5 days., Normal, Disp-15 tablet, R-0

## 2024-01-28 RX ORDER — CIPROFLOXACIN 500 MG/1
500 TABLET, FILM COATED ORAL 2 TIMES DAILY
Qty: 14 TABLET | Refills: 0 | Status: SHIPPED | OUTPATIENT
Start: 2024-01-28 | End: 2024-02-04

## 2024-01-28 NOTE — ED NOTES
Patient notified of results and change in treatment. Patient also notified of ESBL and need for close follow up with MD. Patient verbalized understanding

## 2024-01-29 NOTE — TELEPHONE ENCOUNTER
Per pt the emergency room changed her antibiotic and she is day 2 of the 7 day cycle and asking if the stent can be removed today as schedule for 11 AM. Please advise

## 2024-02-01 ENCOUNTER — PROCEDURE (OUTPATIENT)
Dept: SURGERY | Facility: CLINIC | Age: 49
End: 2024-02-01
Payer: MEDICAID

## 2024-02-01 VITALS — SYSTOLIC BLOOD PRESSURE: 134 MMHG | DIASTOLIC BLOOD PRESSURE: 82 MMHG

## 2024-02-01 DIAGNOSIS — N20.0 NEPHROLITHIASIS: ICD-10-CM

## 2024-02-01 DIAGNOSIS — R82.991 HYPOCITRATURIA: ICD-10-CM

## 2024-02-01 DIAGNOSIS — Z96.0 RETAINED URETERAL STENT: Primary | ICD-10-CM

## 2024-02-01 DIAGNOSIS — N13.30 HYDRONEPHROSIS, UNSPECIFIED HYDRONEPHROSIS TYPE: ICD-10-CM

## 2024-02-01 DIAGNOSIS — R82.90 URINE FINDING: ICD-10-CM

## 2024-02-01 LAB
APPEARANCE: CLEAR
BILIRUBIN: NEGATIVE
CAOX MONOHYDRATE: 50 %
GLUCOSE (URINE DIPSTICK): NEGATIVE MG/DL
HYDROXYAPATITE: 50 %
KETONES (URINE DIPSTICK): NEGATIVE MG/DL
MULTISTIX LOT#: ABNORMAL NUMERIC
NITRITE, URINE: NEGATIVE
PH, URINE: 7 (ref 4.5–8)
PROTEIN (URINE DIPSTICK): 100 MG/DL
SPECIFIC GRAVITY: 1.03 (ref 1–1.03)
URINE-COLOR: YELLOW
UROBILINOGEN,SEMI-QN: 0.2 MG/DL (ref 0–1.9)
WEIGHT-STONE: 88 MG

## 2024-02-01 PROCEDURE — 81003 URINALYSIS AUTO W/O SCOPE: CPT | Performed by: PHYSICIAN ASSISTANT

## 2024-02-01 PROCEDURE — 99212 OFFICE O/P EST SF 10 MIN: CPT | Performed by: PHYSICIAN ASSISTANT

## 2024-02-01 RX ORDER — POTASSIUM CITRATE 15 MEQ/1
1 TABLET, EXTENDED RELEASE ORAL 2 TIMES DAILY WITH MEALS
Qty: 180 TABLET | Refills: 1 | Status: SHIPPED | OUTPATIENT
Start: 2024-02-01 | End: 2024-05-01

## 2024-02-01 NOTE — PROGRESS NOTES
Subjective:   Nadine Calix is a 48 year old female with hx of anxiety, GERD, HTN, OAB improved after PTNS, who presents today for left ureteral stent removal following ureteroscopy 1/22/24 with Dr. Bryant Garza.    Was seen at the ED 1/26/24 following surgery for left flank pain. She was discharged on Keflex but culture returned R and she was switched to Cipro which she is currently taking. She is overall feeling better but still having left sided pain and urinary complaints of urinary frequency and gross hematuria. No fevers.     She has been previously followed by multiple urologist at [facility].  Her most recent stone surgery was a right ureteroscopy, laser lithotripsy, stent placement, nephrostomy tube removal with Dr. Jim on 7/8/2022.  She has a tortuous proximal right ureter and during a prior ureteroscopy access was lost to the kidney so nephrostomy tube was placed.      When seen by Dr. Garza in December 2023 most recent CT scan showed bilateral nephrolithiasis with left stone burden greater than right. Mild right hydronephrosis. After discussion elected to pursue left ureteroscopy as noted above.     She is currently on hydrochlorothiazide and potassium citrate. 24 hour urine collection last month showed urine citrate of 226 despite 10mEq daily. Urine calcium was at goal at 187.      History/Other:    Chief Complaint Reviewed and Verified  Nursing Notes Reviewed and   Verified  Allergies Reviewed  Medications Reviewed         Current Outpatient Medications   Medication Sig Dispense Refill    ciprofloxacin 500 MG Oral Tab Take 1 tablet (500 mg total) by mouth 2 (two) times daily for 7 days. 14 tablet 0    ondansetron 4 MG Oral Tablet Dispersible Take 1 tablet (4 mg total) by mouth every 4 (four) hours as needed for Nausea. 10 tablet 0    cephalexin 500 MG Oral Cap Take 1 capsule (500 mg total) by mouth 2 (two) times daily for 7 days. 14 capsule 0    Ketorolac Tromethamine 10 MG Oral Tab Take  1 tablet (10 mg total) by mouth every 8 (eight) hours as needed for Pain (Use sparingly and with plenty of water). 20 tablet 0    HYDROcodone-acetaminophen (NORCO) 5-325 MG Oral Tab Take 1 tablet by mouth every 6 (six) hours as needed for Pain (For pain not controlled by tylenol or ibuprofen.). This contains tylenol - do not exceed 4 g of tylenol daily. 10 tablet 0    tamsulosin 0.4 MG Oral Cap Take 1 capsule (0.4 mg total) by mouth every evening for 14 days. 14 capsule 0    phenazopyridine (PYRIDIUM) 100 MG Oral Tab Take 1 tablet (100 mg total) by mouth 3 (three) times daily as needed for Pain. This will turn your urine orange. 10 tablet 0    oxyCODONE 5 MG Oral Tab Take 1 tablet (5 mg total) by mouth every 4 (four) hours as needed for Pain. 10 tablet 0    polyethylene glycol, PEG 3350, 17 g Oral Powd Pack Take 17 g by mouth daily as needed (Take to avoid constipation, especially if taking narcotic pain medications.). 12 each 1    ergocalciferol 1.25 MG (92815 UT) Oral Cap Take 1 capsule (50,000 Units total) by mouth once a week. 24 capsule 0    FIBER OR Take 1 tablet by mouth daily.      triamterene-hydroCHLOROthiazide 37.5-25 MG Oral Cap Take 1 capsule by mouth every morning. 90 capsule 1    potassium citrate 10 MEQ (1080 MG) Oral Tab CR Take 1 tablet (10 mEq total) by mouth daily.      albuterol (PROAIR HFA) 108 (90 Base) MCG/ACT Inhalation Aero Soln Inhale 2 puffs into the lungs every 4 (four) hours as needed for Wheezing. 1 each 1    Multiple Vitamin (ONE-DAILY MULTI VITAMINS) Oral Tab Take 1 tablet by mouth daily.         Review of Systems:  Pertinent items are noted in HPI.      Objective:   /82 (BP Location: Right arm, Patient Position: Sitting, Cuff Size: adult)   LMP 12/25/2023 (Exact Date)  Estimated body mass index is 33.73 kg/m² as calculated from the following:    Height as of 1/26/24: 4' 11\" (1.499 m).    Weight as of 1/26/24: 167 lb (75.8 kg).    PROCEDURE NOTE    PREOPERATIVE DIAGNOSIS:      Retained left ureteral stent     POSTOP DIAGNOSIS:   Same    PROCEDURE PERFORMED: Flexible Cystoscopy with LEFT Ureteral Stent Removal    After informed consent and urinalysis was obtained, patient was placed in the modified lithotomy position and all pressure points were padded. She was prepped and draped in the usual sterile fashion using Betadine.    A 16 Polish flexible scope was passed through the urethra, and the bladder was entered, the stent was identified and grasped with a grasper and gently removed.    The patient tolerated the procedure well, suffered no complications, was able to void spontaneously after completion of the procedure in the office, and left the office in good condition. The patient was given periprocedural antibiotics.      _________________________________      Laboratory Data:  Lab Results   Component Value Date    WBC 11.1 (H) 01/26/2024    HGB 12.2 01/26/2024    .0 01/26/2024     Lab Results   Component Value Date     01/26/2024    K 3.8 01/26/2024     01/26/2024    CO2 25.0 01/26/2024    BUN 13 01/26/2024     (H) 01/26/2024    GFRAA 97 07/08/2022    AST 12 (L) 01/26/2024    ALT  01/26/2024      Comment:      Due to  backorder we are temporarily unable to offer hospital-based ALT testing at [facility].   If urgently needed, please order ALT test code [id number].   The new order will need a new venipuncture and will be sent to [facility] for testing.   The expected turnaround time will be within 24 hours.     TP 8.2 01/26/2024    ALB 3.9 01/26/2024    PHOS 3.4 11/17/2020    CA 9.2 01/26/2024    MG 2.1 10/20/2023       Urinalysis Results (last three years):  Recent Labs     04/01/21  1304 05/15/21  1007 06/10/21  1040 11/29/21  1827 12/21/21  1134 02/27/22  1246 06/07/22  0955 10/28/22  1201 02/11/23  1734 05/05/23  1106 08/10/23  1150 08/16/23  1809 09/17/23  0632 09/28/23  1021 10/27/23  1149 12/08/23  1053 01/26/24  0810 02/01/24  1255   COLORUR  Yellow Yellow Yellow Yellow Yellow Yellow Yellow Yellow Yellow  --  Light-Yellow Yellow Yellow Yellow Light-Yellow  --  Red*  --    CLARITY Clear Clear Hazy* Clear Clear Clear Clear Clear Clear  --  Clear Clear Clear Turbid* Clear  --  Cloudy*  --    SPECGRAVITY >=1.030 1.025 1.018 >=1.030 1.025 1.020 1.015 1.021 1.010 1.020 1.019 1.020 1.015 1.018 1.017 1.015 1.025 1.030   PHURINE 5.5 6.0 5.0 5.5 6.5 7.0 7.0 6.0 7.0  --  6.0 6.0 6.0 6.0 6.0 6.0 6.0 7.0   PROUR 30 mg/dL* 30 mg/dL* 30* 30 mg/dL* >=300* 100 mg/dL* Trace* 100* Negative  --  70* Trace* 100 mg/dL* 70* 50*  --  100 mg/dL*  --    GLUUR Negative Negative Negative Negative Negative Negative Negative Negative Negative Negative Normal Negative Negative Normal Normal  --  Negative  --    KETUR Negative Negative Negative Negative Negative Negative Negative Negative Negative  --  Negative Negative Negative Negative Negative  --  Negative  --    BILUR Negative Negative Negative Negative  --  Negative Negative Negative Negative  --  Negative Negative Negative Negative Negative  --  Negative  --    BLOODURINE Moderate* Moderate* Moderate* Small* Large* Small* Large* Small* Large*  --  1+* Moderate* Large* 1+* 1+*  --  Large*  --    NITRITE Negative Negative Negative Negative Negative Negative Negative Negative Negative  --  Negative Negative Positive* 2+* Negative Negative Positive* Negative   UROBILINOGEN 0.2 0.2 <2.0 0.2 2.0* 0.2 0.2 <2.0 0.2  --  Normal 0.2 0.2 Normal Normal  --  0.2  --    LEUUR Negative Negative Negative Negative Negative Negative Small* Negative Negative  --  Negative Negative Large* 500* Negative  --  Trace*  --    UASA  --   --   --   --   --   --   --  Negative  --   --   --   --   --   --   --   --   --   --    WBCUR 6-10* 1-5 6-10* 1-5 1-5 1-5 21-50* 1-5 1-5  --  1-5 1-5 >50* >50* 1-5  --  1-5  --    RBCUR 0-2 3-5* >10* 0-2 6-10* 0-2 3-5* 0-2 0-2  --  0-2 6-10* >10* 6-10* 0-2  --  >10*  --    BACUR 2+* None Seen None Seen Rare* 2+* 1+*  2+* None Seen None Seen  --  None Seen Rare* 1+* 2+* None Seen  --  None Seen  --        Urine Culture Results (last three years):  Lab Results   Component Value Date    URINECUL (A) 01/26/2024     10,000 - 50,000 CFU/ML Klebsiella pneumoniae ESBL Pos    URINECUL No Growth 2 Days 01/11/2024    URINECUL No Growth at 18-24 hrs. 10/27/2023    URINECUL >100,000 CFU/ML Klebsiella pneumoniae (A) 09/28/2023    URINECUL >100,000 CFU/ML Klebsiella pneumoniae (A) 09/17/2023    URINECUL No Growth at 18-24 hrs. 08/10/2023    URINECUL (A) 07/14/2023     10,000 - 50,000 CFU/ML Alpha hemolytic Streptococcus    URINECUL No Growth at 18-24 hrs. 10/28/2022    URINECUL No Growth at 18-24 hrs. 06/07/2022    URINECUL  06/10/2021     10-50,000 cfu/ml Multiple species present-probable contamination.    URINECUL >100,000 CFU/ML Mixed gram positive anjelica 04/01/2021    URINECUL No Growth 2 Days 04/22/2020    URINECUL No Growth at 18-24 hrs. 03/07/2020    URINECUL <10,000 CFU/ML Gram positive anjelica 02/27/2020    URINECUL No Growth 2 Days 12/16/2019       Imaging  CT ABDOMEN+PELVIS KIDNEYSTONE 2D RNDR(NO IV,NO ORAL)(CPT=74176)    Result Date: 1/26/2024  PROCEDURE:  CT ABDOMEN+PELVIS KIDNEYSTONE 2D RNDR(NO IV,NO ORAL)(CPT=74176)  COMPARISON:  [facility], CT, CT ABDOMEN+PELVIS KIDNEYSTONE 2D RNDR(NO IV,NO ORAL)(CPT=74176), 9/17/2023, 9:32 AM.  INDICATIONS:  left flank pain had a stent placed 4 days ago  TECHNIQUE:  Unenhanced multislice CT scanning from above the kidneys to below the urinary bladder.  2D rendering are generated on the CT scanner workstation to localize potential stones in the cranio-caudal plane.  Dose reduction techniques were used. Dose information is transmitted to the ACR (American College of Radiology) NRDR (National Radiology Data Registry) which includes the Dose Index Registry.  PATIENT STATED HISTORY: (As transcribed by Technologist)  Patient has had nausea, left flank, LUQ and LLQ pain for 4 days, Hx of left ureteral  stent placement 4 days ago.    FINDINGS:  Please note the exam is somewhat limited without intravenous or oral contrast.  KIDNEYS:  Again noted are numerous bilateral nonobstructing renal calculi present with the largest within the left lower pole measuring up to 6 mm.  Calcification noted throughout the renal papilla bilaterally.  Consider correlation for medullary nephrocalcinosis.  There is no evidence of hydronephrosis.  There has been placement of a left ureteral stent with the proximal pigtail within the pelvis and proximal ureter and the distal pigtail within the bladder. BLADDER:  No mass, calculus or significant wall thickening. ADRENALS:  No mass or enlargement.  LIVER:  No enlargement, atrophy, abnormal density, or significant focal lesion.  BILIARY:  Postsurgical changes of cholecystectomy are noted. PANCREAS:  No lesion, fluid collection, ductal dilatation, or atrophy.  SPLEEN:  No enlargement or focal lesion.  AORTA/VASCULAR:  No aneurysm.  RETROPERITONEUM:  No mass or adenopathy.  BOWEL/MESENTERY:  No dilated loops of small bowel are seen.  Portions of the bowel are decompressed, limiting evaluation.  No free fluid is seen.  Lack of intravenous and oral contrast limits assessment of the bowel.  There is no evidence of obstruction. ABDOMINAL WALL:  Postsurgical changes of presumed hernia repair noted. BONES:  No bony lesion or fracture. PELVIC ORGANS:  Unremarkable CT appearance although please note that uterus and ovaries are not well assessed with noncontrast CT. LUNG BASES:  No visible pulmonary or pleural disease.  OTHER:  Negative.             CONCLUSION:  There has been placement of a left double-J ureteral stent with the proximal pigtail in the region of the left renal pelvis and proximal left ureter.  The distal pigtails within the urinary bladder.  No evidence of hydronephrosis.  Bilateral nonobstructing renal calculi are present with possible medullary nephrocalcinosis.    LOCATION:  [id number]    Dictated by (CST): Hollis Paris MD on 1/26/2024 at 10:46 AM     Finalized by (CST): Hollis Paris MD on 1/26/2024 at 10:50 AM       XR OR - N/C    Result Date: 1/22/2024  PROCEDURE:  XR OR - N/C  COMPARISON:  [facility] , XR, XR OR - N/C, 6/24/2022, 12:28 PM.  INDICATIONS:  Intraoperative fluoroscopy.  TECHNIQUE:  Intraoperative fluoroscopy.  FLUOROSCOPY IMAGES OBTAINED:  13 FLUOROSCOPY TIME:  35 seconds TECHNOLOGIST TIME:  1 hour RADIATION DOSE (AIR KERMA PRODUCT):  299.2 uGy/m2   FINDINGS:  Intraoperative fluoroscopic images demonstrate a right-sided retrograde pyelogram.  There is dilatation of the right renal pelvis.  A left-sided retrograde pyelogram is also noted.            CONCLUSION:  Please see the procedure/operative report for further details.    LOCATION:  [facility]    Dictated by (CST): Stromberg, LeRoy, MD on 1/22/2024 at 10:22 PM     Finalized by (CST): Stromberg, LeRoy, MD on 1/22/2024 at 10:23 PM         Assessment & Plan:   Retained ureteral stent  Stent removed today intact without difficulty, potential for spasm reviewed. Instructions provided.    Nephrolithiasis  Mild right hydronephrosis  Hypocitruria  UTI  Advised to continue antibiotic as prescribed.  Will increase K citrate to 15mEq BID and repeat 24 hour urine collection in 1-2 months to assess improvement.  Renal lasix scan as scheduled    Follow up with Dr. Garza to review results.    Future Appointments   Date Time Provider Department Center   2/22/2024 10:00 AM [facility] RN UROG [facility]   2/23/2024 10:00 AM [facility] NUC RM1 EH [facility]UCM[facility]   2/27/2024 11:00 AM Ema Randall MD EMG OB/GYN M [facility] [place]   3/5/2024 11:00 AM Bryant Garza MD [id number]   3/6/2024 10:30 AM Lara Jim DO UROG [facility]   9/18/2024 10:00 AM Howard Bear MD EMGENDO [facility] [place]     The above impression and plan were discussed in detail with the patient who verbalized understanding and all questions were answered.  A total of 15 minutes were spent on the visit  including chart review in preparation for the visit, time with the patient, placing orders and communication with other providers where appropriate.      Lorena Rock PA-C, 2/1/2024

## 2024-02-01 NOTE — PATIENT INSTRUCTIONS
Please make sure to call and schedule renal lasix scan if this has not been scheduled.    You had your stent removed today. Symptoms of urinary urgency, frequency, and blood in the urine should improve with time. Stay well hydrated. Avoid bladder irritants like coffee, soda, tea if ongoing symptoms. Some patients may experience ureteral spasms post removal of the stent. Use ibuprofen and heat application if this occurs.   If you develop any fevers or your pain is not relieve with oral pain medications please seek attention.     General Recommendations to Avoid Future Kidney Stones     1. Drink enough water to produce 2 liters of clear urine daily. You may need to use a container at first to measure how much you are actually producing and increase your intake accordingly. Try to start the day off with a large glass of water as we all wake up dehydrated in the morning.     2. Add lemon or lime juice to your water. These juices contain citrate which naturally inhibits stone formation. An easy way to do this is using sugar free lemonade mix (ie Crystal Light or True Lemon (if you prefer to avoid aspartame))     3. Avoid salty foods such as prepackaged and fast foods, and do not add salt to foods.     4. Limit intake of the following group of foods to one serving daily: spinach, nuts (especially almonds), tea (especially black tea), chocolate, and potatoes.     5. Limit intake of Vitamin C supplements to less than 1000 mg daily.     6. Limit intake of animal protein (beef, chicken, turkey, fish, pork) to one serving daily. A good rule for portion size is no more meat than will fit in the palm of your hand.     7. Maintain 1-2 servings of dairy products daily (1 serving = 1 glass of milk, 2 slices of cheese, 1 scoop of ice cream, or 1 cup of yogurt). Try to decrease cheese intake as it may increase kidney stone risk compared to other dairy products. Do not eliminate calcium from your diet as it is necessary for bone  health.     8. Eat a low fat diet and exercise at least 30 minutes 3 times per week to try and maintain your ideal body weight. Being overweight is a risk factor for kidney stones too!                Patient Instructions for  24 Hour Urine Kidney Stone Collection System     Supplies:   24-hour Urine Collection Kit for kidney stone analysis is required.  The kit may be picked up at the lab.     Collection:  Empty your bladder completely upon awakening in the morning and flush this urine. This void is not to be saved.     All urine passed during the rest of the day and night for the next 24 hours must be poured in the orange container. Urine can be collected in another clean container and carefully poured into the 24-hour collection container or you can urinate directly into the large orange container from the collection kit. This container must be stored in a refrigerator or in a cool location.     Exactly 24-hours after beginning the urine collection, empty your last voided specimen one last time into the container.     Immediately upon completion of your 24-hour collection, you should tighten lid of the orange container and shake vigorously for one minute. A good mix will assure accurate test results.     Note: High volume urine output  Patients who think they produce a large amount of urine may require more than one large orange collection container. Please ask the lab for two complete kits home.      Collect urine in the first container until it is ¾ full and then begin filling the second container.     Returning the container:   Drop off  the orange container and the laboratory orders at the [facility] lab facility. The container should be dropped off no later than 4 hours after completion of the urine collection if possible.     [facility] Hours (please note that hours are subject to change):  - Weekdays: 6 am - 8 pm  - Saturday: 6 am - 3 pm  - Closed on Sunday  - You may go to the ER to drop off your  specimen if the outpatient lab is closed.  - If you are using another lab for your collection please check their hours of operation.     Results:  Results take 10-14 days.  We will contact you when results are back.

## 2024-02-06 ENCOUNTER — TELEPHONE (OUTPATIENT)
Dept: UROLOGY | Facility: HOSPITAL | Age: 49
End: 2024-02-06

## 2024-02-06 NOTE — TELEPHONE ENCOUNTER
Incoming call from Nadine wanting to update office on her recent surgery and stent removal .  Pt stating that she has not been able to start  PFPT .  Pt has upcoming appointment with Dr Jim on March 6 @10:30 .  Pt inquiring if she should keep this appointment since she did not do PFPT.  Informed patient I will speak with Dr Jim and get back to her on Friday. Pt verbalized understanding.

## 2024-02-07 ENCOUNTER — TELEPHONE (OUTPATIENT)
Dept: SURGERY | Facility: CLINIC | Age: 49
End: 2024-02-07

## 2024-02-08 ENCOUNTER — PATIENT MESSAGE (OUTPATIENT)
Dept: INTERNAL MEDICINE CLINIC | Facility: CLINIC | Age: 49
End: 2024-02-08

## 2024-02-08 DIAGNOSIS — R82.90 ABNORMAL URINALYSIS: Primary | ICD-10-CM

## 2024-02-08 NOTE — TELEPHONE ENCOUNTER
Patient was forwarded to central scheduling to schedule a sooner date.  Referral is pending.     Thank you

## 2024-02-08 NOTE — TELEPHONE ENCOUNTER
From: Nadine Calix  To: Fan Hernandez  Sent: 2/8/2024 9:05 AM CST  Subject: Request for Retest    Good Morning Dr. Hernandez,    I was called Sunday morning by someone in the ER who said that they needed to change the antibiotic they sent me home with the previous day to something else because my urine came back with growth and a more serious infection. She said I was to take the Cipro 500mg for 7 days and after the 7 days I needed to contact my primary to be retested to see if the antibiotic was successful or not. Any assistance with this would be greatly appreciated.     Thank You,  Nadine

## 2024-02-09 NOTE — TELEPHONE ENCOUNTER
Addendum     Spoke with Nadine and informed her that she can follow up per Dr Jim once she completes PFPT.  Appointment for March 6 PFPT follow up will cancel.  Pt verbalized understanding.

## 2024-02-14 ENCOUNTER — LAB ENCOUNTER (OUTPATIENT)
Dept: LAB | Age: 49
End: 2024-02-14
Attending: FAMILY MEDICINE
Payer: MEDICAID

## 2024-02-14 DIAGNOSIS — R82.90 ABNORMAL URINALYSIS: ICD-10-CM

## 2024-02-14 PROCEDURE — 87086 URINE CULTURE/COLONY COUNT: CPT

## 2024-02-20 ENCOUNTER — LAB ENCOUNTER (OUTPATIENT)
Dept: LAB | Age: 49
End: 2024-02-20
Attending: PHYSICIAN ASSISTANT
Payer: MEDICAID

## 2024-02-20 DIAGNOSIS — N20.0 NEPHROLITHIASIS: ICD-10-CM

## 2024-02-20 PROCEDURE — 84105 ASSAY OF URINE PHOSPHORUS: CPT

## 2024-02-20 PROCEDURE — 83986 ASSAY PH BODY FLUID NOS: CPT

## 2024-02-20 PROCEDURE — 84300 ASSAY OF URINE SODIUM: CPT

## 2024-02-20 PROCEDURE — 82507 ASSAY OF CITRATE: CPT

## 2024-02-20 PROCEDURE — 84133 ASSAY OF URINE POTASSIUM: CPT

## 2024-02-20 PROCEDURE — 84560 ASSAY OF URINE/URIC ACID: CPT

## 2024-02-20 PROCEDURE — 82436 ASSAY OF URINE CHLORIDE: CPT

## 2024-02-20 PROCEDURE — 36415 COLL VENOUS BLD VENIPUNCTURE: CPT

## 2024-02-20 PROCEDURE — 82340 ASSAY OF CALCIUM IN URINE: CPT

## 2024-02-20 PROCEDURE — 83735 ASSAY OF MAGNESIUM: CPT

## 2024-02-20 PROCEDURE — 84392 ASSAY OF URINE SULFATE: CPT

## 2024-02-20 PROCEDURE — 83935 ASSAY OF URINE OSMOLALITY: CPT

## 2024-02-20 PROCEDURE — 83945 ASSAY OF OXALATE: CPT

## 2024-02-20 PROCEDURE — 82570 ASSAY OF URINE CREATININE: CPT

## 2024-02-23 ENCOUNTER — HOSPITAL ENCOUNTER (OUTPATIENT)
Dept: NUCLEAR MEDICINE | Facility: HOSPITAL | Age: 49
Discharge: HOME | End: 2024-02-23
Attending: SURGERY
Payer: MEDICAID

## 2024-02-23 DIAGNOSIS — N20.0 NEPHROLITHIASIS: ICD-10-CM

## 2024-02-23 PROCEDURE — 78708 K FLOW/FUNCT IMAGE W/DRUG: CPT | Performed by: SURGERY

## 2024-02-23 RX ORDER — FUROSEMIDE 10 MG/ML
40 INJECTION INTRAMUSCULAR; INTRAVENOUS ONCE
Status: COMPLETED | OUTPATIENT
Start: 2024-02-23 | End: 2024-02-23

## 2024-02-23 RX ORDER — FUROSEMIDE 10 MG/ML
INJECTION INTRAMUSCULAR; INTRAVENOUS
Status: COMPLETED
Start: 2024-02-23 | End: 2024-02-23

## 2024-02-23 RX ADMIN — FUROSEMIDE 40 MG: 10 INJECTION INTRAMUSCULAR; INTRAVENOUS at 10:03:00

## 2024-02-27 ENCOUNTER — OFFICE VISIT (OUTPATIENT)
Facility: CLINIC | Age: 49
End: 2024-02-27
Payer: MEDICAID

## 2024-02-27 VITALS
SYSTOLIC BLOOD PRESSURE: 140 MMHG | HEART RATE: 78 BPM | BODY MASS INDEX: 33.87 KG/M2 | DIASTOLIC BLOOD PRESSURE: 82 MMHG | WEIGHT: 168 LBS | HEIGHT: 59 IN

## 2024-02-27 DIAGNOSIS — Z80.41 FAMILY HISTORY OF OVARIAN CANCER: ICD-10-CM

## 2024-02-27 DIAGNOSIS — Z80.0 FAMILY HISTORY OF PANCREATIC CANCER: ICD-10-CM

## 2024-02-27 DIAGNOSIS — N93.0 POSTCOITAL BLEEDING: ICD-10-CM

## 2024-02-27 DIAGNOSIS — N93.9 ABNORMAL UTERINE BLEEDING (AUB): Primary | ICD-10-CM

## 2024-02-27 PROCEDURE — 99214 OFFICE O/P EST MOD 30 MIN: CPT | Performed by: OBSTETRICS & GYNECOLOGY

## 2024-02-27 RX ORDER — MEDROXYPROGESTERONE ACETATE 10 MG/1
TABLET ORAL
Qty: 30 TABLET | Refills: 5 | Status: SHIPPED | OUTPATIENT
Start: 2024-02-27

## 2024-02-27 NOTE — H&P
[facility]  Obstetrics and Gynecology   History & Physical  NEW    Chief complaint:   Chief Complaint   Patient presents with    New Patient     Previous doctors have told her she has markers for pre cancer and so she is \"freaking\" out because of that     Consult     Was given some pamphlets on birth control and fibroids, per patient, fibroids were never mentioned. Per patient, doctor she last gave her no indication she wanted to help. Family hx of ovarian cancer.     Menstrual Problem     Heavy bleeding, wants a second opinion     IUD     Discuss options, hx of Mirena, liked it the first time but the second time she had it, it was taken out early, doesn't remember why      Subjective:     HPI: Nadine Calix is a 48 year old  with Patient's last menstrual period was 2024 (exact date).   Here for 2nd opinion regarding AUB.     PMH Obesity, CHTN, GERD, anxiety, recurrent nephrolithiasis, apparently with ureteral scarring affecting kidney function. OAB slightly improved after PTNS - used to void 20-25 times per night now down to 15 times per night. Sees uro & urogyn. PFPT done.     Extensive history of AUB.   Pelvic US 2020 - showed endometrium was 8 mm, hemorrhagic cyst. Suspected adenomyosis noted  Hysteroscopy D&C - 10/2020 with benign pathology     3/15/23 Dr. Antoine visit for AUB.   Per note: Has appt with GI for blood in the stools   -Repeat pelvic US  -Consider   1. Observe  2. Slynd  3. Mirena IUD--I would suggest premedication with cytotec  4. Endometrial ablation: I would suggest that this is not a good option given dysmenorrhea and lack of contraception  5. Hysterectomy: prior abdominal surgeries make this a less attractive option. I would caution her as this should be her last option    23 Urogyn Dr. Jim incomplete bladder emptying     23 ED visit for AUB. Reported LMP 8/10/23 (6 days prior). Endometrium 18 mm. Physiologic simple cyst in the left ovary measures  3.1 x 2.2 x 3.0 cm.     8/18/23 EMB- Dr. Ramirez - disordered proliferative endometrium - recommended medical management options to regulate cycle.     7/8/22 - right ureteroscopy, laser lithotripsy, stent placement, nephrostomy tube removal with Dr. Jim     1/22/24 - Cystoscopy, LEFT ureteroscopy, laser lithotripsy, basket stone extraction, retrograde pyelogram, ureteral stent placement, Right retrograde pyelogram Dr. Bryant Garza     As of today, 2/27/2024 - Patient here for 2nd opinion regarding her periods.     On review of EMR has had negative endometrial sampling twice, most recently 8/18/23 with disordered proliferative endometrium.   Some periods are normal.     Irregular heavy periods with variable bleeding.   Pelvic US 8/2023 with thickened endometrium & simple left ovarian cyst.     Last heavy period was in 11/2023.   Some spotting after sex x 2-3 years   Hot flashes for the past 1-2 years. The AUB started around the time.     Last hormonal therapy - nothing for awhile   Last WWE - 8/9/22 Dr. Antoine?   Patient's last menstrual period was 02/07/2024 (exact date). Bled for 2 weeks.     Was told she has scarring of her ureters from chronic kidney stones since teens.   Bladder very sensitive    Urogyn - Dr. Lara Jim - OAB - PTNS - posterior tibial nerve stimulation also with cystocele, bladder pain & overactivity. Was voiding 25-35 times per night. PFPT done in the past.   Urologist Dr. Bryant Garza   Endocrinologist Dr. Howard Bear for thyroid nodules.   PCP: Fan Hernandez MD     Review of Systems   Cardiovascular:         Has not been monitoring BP at home.    Gastrointestinal:         Taking fiber per urogyn to help with constipation.    Genitourinary:  Positive for menstrual problem and pelvic pain. Negative for dyspareunia.        Ureters are scarred up from kidney stones. They are considering a major surgery for her.     Self catheterizing. Not emptying bladder well.     Urologist Dr. Garza.      The past 1 month sharp pains on waking, both sides of low pelvis. Lasts for a few minutes to about 5 min. Can come back.     Menses somewhat irregular & can be heavy or normal   Occasional spotting after sex - for 2-3 years     OAB - PTNS - posterior tibial nerve treatment, also with cystocele, bladder pain & overactivity. Was voiding 25-35 times per night.     PFPT done in the past.    Neurological:  Positive for headaches.        Vertigo sometimes - newer.   Headaches - will wake up with headaches in the morning sometimes.    Psychiatric/Behavioral:  Positive for sleep disturbance.         Emotional   Forgetful  Not sleeping great - some is urination.      GYN Hx:   Regular about 28-30 x 5-7 x normal flow x some pain that was bad as a teen -> cramping after babies.   Mirena twice, both prior to the pregnancies   Mirena IUD - probably 9-10 years ago.    Sexually active? 7 years of unprotected intercourse - has not any accidental pregnancies.   Dyspareunia? N  Postcoital bleeding? Y    Contraception: none.     WWE 22 Dr. Linnette Antoine     Cervical cancer screening:   History of colposcopy? Y  History of cryosurgery?   History of LEEP/conization? N  Pap 2022 was negative    Breast cancer screening:  Mammogram: 23 heterogeneously dense, benign. Biopsy clips noted.      Colon cancer screenin2021 EGD WITH BIOPSY. Colonoscopy internal hemorrhoids. No gross findings to explain left sided pain. Colonoscopy recall 10 years. Prashant Lilly DO    OB History:  OB History    Para Term  AB Living   10 7 3 4 3 7   SAB IAB Ectopic Multiple Live Births   2 0 0 0 7     OB History    Para Term  AB Living   10 7 3 4 3 7   SAB IAB Ectopic Multiple Live Births   2 0 0   7      # Outcome Date GA Lbr Saad/2nd Weight Sex Delivery Anes PTL Lv   10  /16 31w1d  7 lb 2.4 oz (3.243 kg) M CS-Unspec None  BECCA   9  15/14 36w1d  6 lb 14 oz (3.118 kg) M CS-Unspec EPI   BECCA      Complications: Gestational diabetes (HCC), Gestational diabetes mellitus (GDM) ([facility])   8 SAB  6w0d          7 AB 04 19w0d             Birth Comments: Spina Bifida   6 Term 02 40w0d  8 lb 15 oz (4.054 kg) M Vag-Spont EPI  BECCA      Complications:  labor,  labor (HCC)   5 Term 00 40w0d  7 lb 6 oz (3.345 kg) F Vag-Spont EPI  BECCA   4  98 36w0d  6 lb 6 oz (2.892 kg) M Vag-Spont None  BECCA   3  95 36w0d  7 lb (3.175 kg) M Vag-Spont None  BECCA   2 Term 94 40w0d  6 lb 12 oz (3.062 kg) F Vag-Spont None  BECCA      Complications: Preeclampsia, Pre-eclampsia ([facility])   1 SAB                Meds:  Current Outpatient Medications on File Prior to Visit   Medication Sig Dispense Refill    Potassium Citrate ER 15 MEQ (1620 MG) Oral Tab CR Take 1 tablet by mouth 2 (two) times daily with meals. 180 tablet 1    Ketorolac Tromethamine 10 MG Oral Tab Take 1 tablet (10 mg total) by mouth every 8 (eight) hours as needed for Pain (Use sparingly and with plenty of water). 20 tablet 0    phenazopyridine (PYRIDIUM) 100 MG Oral Tab Take 1 tablet (100 mg total) by mouth 3 (three) times daily as needed for Pain. This will turn your urine orange. 10 tablet 0    ergocalciferol 1.25 MG (67645 UT) Oral Cap Take 1 capsule (50,000 Units total) by mouth once a week. 24 capsule 0    FIBER OR Take 1 tablet by mouth daily.      triamterene-hydroCHLOROthiazide 37.5-25 MG Oral Cap Take 1 capsule by mouth every morning. 90 capsule 1    albuterol (PROAIR HFA) 108 (90 Base) MCG/ACT Inhalation Aero Soln Inhale 2 puffs into the lungs every 4 (four) hours as needed for Wheezing. 1 each 1    Multiple Vitamin (ONE-DAILY MULTI VITAMINS) Oral Tab Take 1 tablet by mouth daily.      HYDROcodone-acetaminophen (NORCO) 5-325 MG Oral Tab Take 1 tablet by mouth every 6 (six) hours as needed for Pain (For pain not controlled by tylenol or ibuprofen.). This contains tylenol - do not exceed 4 g of tylenol  daily. (Patient not taking: Reported on 2/27/2024) 10 tablet 0    oxyCODONE 5 MG Oral Tab Take 1 tablet (5 mg total) by mouth every 4 (four) hours as needed for Pain. (Patient not taking: Reported on 2/27/2024) 10 tablet 0    polyethylene glycol, PEG 3350, 17 g Oral Powd Pack Take 17 g by mouth daily as needed (Take to avoid constipation, especially if taking narcotic pain medications.). (Patient not taking: Reported on 2/27/2024) 12 each 1     No current facility-administered medications on file prior to visit.       All:  Allergies   Allergen Reactions    Amlodipine SWELLING     Leg swelling    Metoprolol SHORTNESS OF BREATH    Toradol [Ketorolac Tromethamine] SHORTNESS OF BREATH    Tramadol SHORTNESS OF BREATH    Dilaudid [Hydromorphone] ITCHING and PAIN     Headache - per pt this is only with long term use - pt states she can tolerate this medication    Oxycodone PAIN     headache    Wellbutrin [Bupropion Hcl] PAIN and DIZZINESS     Headache    Valsartan OTHER (SEE COMMENTS)     Chest pain      Codeine Sulfate ITCHING     TABS    Hydrocodone ITCHING    Morphine ITCHING    Seasonal Runny nose       PMH:  Past Medical History:   Diagnosis Date    Abdominal distention 01/2020    Longer than this but this is an estimate    Abdominal hernia 03/2017    I had hernia repair around by my belly button & abdominal    Abdominal pain 02/2020    Currently seeing obgyn, urologist & nephrologist    Anxiety state     resolved    Arrhythmia     fast heart rate due to anxiety according to patient, pvc    Back pain 01/2018    Mid to lower back pain & hip pain    Back problem     lower back    Bloating 01/2020    Longer than this but this is an estimate    Blood in urine 1994    Due to severe kidney stones    Blurred vision 06/2020    I always wore glasses or contacts since about 14 yrs old    Calculus of kidney     hydronephrosis/ several times kidney stones/ 13 th procedure for stones    Change in hair 01/2020    My hair is  thinning more than the usual    Chest pain 01/2019    I did see a cardiologist about this    Chest pain on exertion 01/2019    I did see a cardiologists about this    COVID-19 12/2021    headache, back pain, shortness of breath, sore throat, runny nose, chills. Not hospitalized    COVID-19 11/21/2023    high fever, fatigue, not hospitalized    Dense breasts 07/21/2023    heterogeneously dense breasts    Depression     resolved    Diarrhea, unspecified 01/2020    I notice that certain things run right through me now    Disorder of liver     elevated AST/ALT    Disorder of thyroid     nodules only    Dizziness 01/2019    Easy bruising 01/2020    I find bruises on me and don’t really know how I got them    ESBL E. coli carrier 01/26/2024    Essential hypertension     Fatigue 11/2019    I noticed that I’m alot more tired lately more than usual    Flatulence/gas pain/belching 01/2020    Longer than this but this is an estimate    Food intolerance 01/2020    I think I am lactose intolerant been that way for a few year    Frequent urination Since early age in my 20’s    All the time for years    Frequent UTI 1994    Due to kidney stones blockages    Gestational diabetes (HCC) 04/2014    Heart palpitations 01/2019    Pvc’s and irregular heart rate    Hemorrhoids 11/2016 or 2018    After last pregnancy or a couple years after that    High blood pressure     triamterene    History of cardiac murmur 01/2000    Mother told me I was born with one & my dr as well    History of D&C 10/08/2020    History of depression 07/2004    When I had a miscarriage    History of mental disorder 07/2004    Family history of this on my mother’s side    Indigestion 01/2020    Longer than this but this is an estimate    Itch of skin 01/2020    Not severe but I have been itching for no apparent reason    Kidney stones     Leaking of urine 1994    After first pregnancy    Leg swelling 07/2020    My left leg slightly swelled up and my ankle area was  latashatin    Loss of appetite 2020    I noticed this lately too    Menometrorrhagia 10/2020    Hysteroscopy 10/2020 path favors anovulatory breakdown bleeding.    Migraines     Nausea 2020    Longer than this but this is an estimate    Night sweats 2020    I feel like I’m on fire at night.    OAB (overactive bladder)     PTNS - posterior tibial nerve stimulation with urogynecologist Dr. Lara Jim    Ovarian cyst     Painful urination     When passing stones    Post partum depression     Postcoital bleeding 10/2020    Prior to hysteroscopy done 10/2020. Also having since at least  or     PVC (premature ventricular contraction)     Rash 2020    Broke out with unknown rash all over legs and arms    Sexually transmitted disease 2012    HPV    Shortness of breath 2019    Seen cardiologist about this    Sleep disturbance 2020    For years sometimes I can’t lay on my left side    Stress     Rough up bringing and also regular family stresses    Uncomfortable fullness after meals 2020    Longer than this but this is an estimate    Unspecified condition originating in the  period 2004    Visual impairment     glasses    Wears glasses 1989    Since I was about 14 yrs old    Weight gain 2016    After last son was born       PSH:  Past Surgical History:   Procedure Laterality Date      ,2016    x2    CHOLECYSTECTOMY      COLONOSCOPY N/A 2021    Procedure: COLONOSCOPY, ESOPHAGOGASTRODUODENOSCOPY with biopsy;  Surgeon: Prashant Lilly DO;  Location:  ENDOSCOPY    COLONOSCOPY  2021 EGD WITH BIOPSY. Colonoscopy internal hemorrhoids. No gross findings to explain left sided pain. Recall 10 years. Prashant Lilly DO    COLPOSCOPY, CERVIX W/UPPER ADJACENT VAGINA; W/BIOPSY(S), CERVIX      CRYOCAUTERY OF CERVIX  2012    CYSTO/URETERO W/UP STRICTURE Right 10/15/2019    Cysto Rt RPG, Rt URS w/ Laser Litho Dilation of Rtight Stricture  Rt URS Stent Exchange w/ Dr Snell    CYSTO/URETERO, STONE REMOVE Right 12/18/2019    right ureter and kidney stones extraction, URS, stent placement    CYSTOSCOPY,URETEROSCOPY,LITHOTRIPSY Right 08/23/2019    Cysto, B/L RPG, URS, laser litho, stone ext, Rt stent Dr. Snell    EGD  04/07/2021    for left sided pain    HC ENDOMETRIAL SAMPLING W OR WO ENDOCERV SAMPLING  08/18/2023    EMB- Dr. Ramirez - disordered proliferative endometrium - recommended medical management options to regulate cycle.    HERNIA SURGERY  01/2000    L hernia repair    HYSTEROSCOPY,WITH SAMPLING  10/08/2020    Hysteroscopy, D&C for intermittent menorrhagia, thickened endometrium, cervical stenosis. H/o menometrorrhagia & postcoital bleeding. Dr. Linnette Antoine. Path Endometrium with stromal collapse, superficial fibrin thrombi, and some polypoid tissue fragments with increased stromal fibrous component. Endometrial glands are proliferative. Overall pattern favors anovulatory breakdown bleeding.    KIDNEY SURGERY Right     stent placement 6/2022    LITHOTRIPSY  2001, 2007 & 11/11    NEEDLE BIOPSY LEFT  02/2021    fibroadenoma    NEEDLE BIOPSY RIGHT  02/2021    fibroadenoma    OTHER Bilateral 2012    nerve surgery to bilateral arms about 1 month apart    OTHER      percutaneous nephrolithotomy    OTHER SURGICAL HISTORY  12/27/2019    cysto stent removal - dr. snell     OTHER SURGICAL HISTORY  06/03/2020    Cysto Stent Removal w/ Dr Snell    OTHER SURGICAL HISTORY  07/07/2021    Cysto/Stent Removal Dr. Snell    OTHER SURGICAL HISTORY  07/08/2022    Right ureteroscopy, laser lithotripsy, stent placement, nephrostomy tube removal with Dr. Tariq    REMOVAL GALLBLADDER  1998    REMOVE STENT VIA TRANSURETH Right 11/13/2019    Cysto Stent Removal w/ Dr Snell    REPAIR ING HERNIA,5+Y/O,REDUCIBL         Social History:  Social History     Socioeconomic History    Marital status: Life Partner   Tobacco Use    Smoking status:  Never    Smokeless tobacco: Never   Vaping Use    Vaping Use: Never used   Substance and Sexual Activity    Alcohol use: No    Drug use: No    Sexual activity: Yes     Partners: Male   Other Topics Concern    Caffeine Concern No    Exercise No    Seat Belt Yes        Family History:  Family History   Problem Relation Age of Onset    Diabetes Mother     High Cholesterol Mother     Hypertension Mother     Thyroid disease Mother         hypothroid    Other (Other) Mother         Part of thyroid removed    Heart Disorder Father         Stent    Diabetes Father     Hypertension Father     Hypertension Sister     Diabetes Sister     Other (Other) Sister         Liver problem    Diabetes Maternal Grandmother     Ovarian Cancer Maternal Grandmother          at 54    Diabetes Maternal Grandfather     Diabetes Paternal Grandmother     Diabetes Paternal Grandfather     Other (Other) Daughter         Appendicitis    Other (Other) Son         Appendicitis    Pancreatic Cancer Maternal Uncle     Breast Cancer Neg     Colon Cancer Neg        Immunization History:  Immunization History   Administered Date(s) Administered    DTP 1995, 10/02/1995, 1995    FLULAVAL 6 months & older 0.5 ml Prefilled syringe (70743) 10/18/2018, 10/16/2019, 2020, 2021, 11/15/2022    FLUZONE 6 months and older PFS 0.5 ml (34766) 10/18/2018, 10/16/2019, 2020, 2021    HEP B, Ped/Adol 1995, 1995, 1996    Hib, Unspecified Formulation 1995, 10/02/1995, 1995    TDAP 2014, 10/04/2016       Depression Scale      PHQ-2 not done in last 12 months! Please administer and refresh!  Last [place] Suicide Screening on 2024 was No Risk.      Objective:     Vitals:    24 1053   BP: 140/82   Pulse: 78   Weight: 168 lb (76.2 kg)   Height: 59\"       Body mass index is 33.93 kg/m².    Physical Exam:  Physical Exam  Vitals and nursing note reviewed.   Constitutional:       Appearance: Normal  appearance.   HENT:      Head: Normocephalic and atraumatic.   Eyes:      Extraocular Movements: Extraocular movements intact.      Conjunctiva/sclera: Conjunctivae normal.   Cardiovascular:      Rate and Rhythm: Normal rate and regular rhythm.      Heart sounds: No murmur heard.  Pulmonary:      Effort: Pulmonary effort is normal.      Breath sounds: Normal breath sounds.      Comments: Breasts: deferred  Abdominal:      General: There is no distension.      Palpations: Abdomen is soft. There is no mass.      Tenderness: There is no abdominal tenderness. There is no guarding or rebound.      Hernia: No hernia is present.   Genitourinary:     Comments: VULVA:  normal appearing vulva with no masses, tenderness or lesions  URETHRA: unremarkable   PERINEUM: intact  VAGINA: mild cystocele  CERVIX: normal appearing cervix. Scant blood tinge in discharge  UTERUS: uterus is normal size, shape, consistency and nontender  ADNEXA: normal adnexa in size, nontender and no masses  PELVIC FLOOR: no hypertonicity, no tenderness       Neurological:      General: No focal deficit present.      Mental Status: She is alert.      Cranial Nerves: No cranial nerve deficit.   Psychiatric:         Mood and Affect: Mood normal.         Behavior: Behavior normal.         Thought Content: Thought content normal.         Judgment: Judgment normal.         Labs:  Lab Results   Component Value Date    WBC 11.1 (H) 01/26/2024    RBC 4.57 01/26/2024    HGB 12.2 01/26/2024    HCT 37.6 01/26/2024    MCV 82.3 01/26/2024    MCH 26.7 01/26/2024    MCHC 32.4 01/26/2024    RDW 12.6 01/26/2024    .0 01/26/2024    MPV 8.8 (L) 12/04/2012        Lab Results   Component Value Date     (H) 01/26/2024    BUN 13 01/26/2024    BUNCREA 11.0 06/30/2021    CREATSERUM 0.97 01/26/2024    ANIONGAP 8 01/26/2024    GFR 95 02/26/2017    GFRNAA 84 07/08/2022    GFRAA 97 07/08/2022    CA 9.2 01/26/2024    OSMOCALC 283 01/26/2024    ALKPHO 115 (H) 01/26/2024     AST 12 (L) 01/26/2024    ALT  01/26/2024      Comment:      Due to  backorder we are temporarily unable to offer hospital-based ALT testing at [facility].   If urgently needed, please order ALT test code [id number].   The new order will need a new venipuncture and will be sent to [facility] for testing.   The expected turnaround time will be within 24 hours.     BILT 0.3 01/26/2024    TP 8.2 01/26/2024    ALB 3.9 01/26/2024    GLOBULIN 4.3 01/26/2024     01/26/2024    K 3.8 01/26/2024     01/26/2024    CO2 25.0 01/26/2024       Lab Results   Component Value Date    CHOLEST 192 04/04/2023    TRIG 166 (H) 04/04/2023    HDL 32 (L) 04/04/2023     (H) 04/04/2023    VLDL 30 04/04/2023    NONHDLC 160 (H) 04/04/2023        Lab Results   Component Value Date    T4F 0.9 08/13/2021    TSH 0.912 10/20/2023        Lab Results   Component Value Date     10/20/2023    A1C 5.5 10/20/2023       Imaging:  NM RENAL WITH LASIX  (CPT=78708)    Result Date: 2/23/2024  CONCLUSION:  1. Normal excretion and washout after IV infusion of Lasix without evidence for obstruction.   LOCATION:  [facility]   Dictated by (CST): Priyanka Norris MD on 2/23/2024 at 12:47 PM     Finalized by (CST): Priyanka Norris MD on 2/23/2024 at 1:13 PM       CT ABDOMEN+PELVIS KIDNEYSTONE 2D RNDR(NO IV,NO ORAL)(CPT=74176)    Result Date: 1/26/2024  CONCLUSION:  There has been placement of a left double-J ureteral stent with the proximal pigtail in the region of the left renal pelvis and proximal left ureter.  The distal pigtails within the urinary bladder.  No evidence of hydronephrosis.  Bilateral nonobstructing renal calculi are present with possible medullary nephrocalcinosis.    LOCATION:  [id number]   Dictated by (CST): Hollis Paris MD on 1/26/2024 at 10:46 AM     Finalized by (CST): Hollis Paris MD on 1/26/2024 at 10:50 AM       XR OR - N/C    Result Date: 1/22/2024  CONCLUSION:  Please see the procedure/operative report for further details.     LOCATION:  [id number]    Dictated by (CST): Stromberg, LeRoy, MD on 1/22/2024 at 10:22 PM     Finalized by (CST): Stromberg, LeRoy, MD on 1/22/2024 at 10:23 PM         PROCEDURE:  US PELVIS W DOPPLER (YKZ=24332/[id number])     COMPARISON:  [facility], CT, CT ABDOMEN+PELVIS(CONTRAST ONLY)(CPT=74177), 5/05/2023, 12:11 PM.  [place], US, US PELVIS W EV (CPT=76856/05117), 3/28/2023, 2:00 PM.     INDICATIONS:  Large amounts of vaginal bleeding and clots per patient since 8/10     TECHNIQUE:  Transabdominal imaging was performed of the pelvis.   Arterial and venous Doppler of the ovaries was also performed.  PATIENT STATED HISTORY: (As transcribed by Technologist)  Patient stated heavy vaginal bleeding with large clots for six days, pelvic cramping.         FINDINGS:                UTERUS:  10.05 cm x 4.92 cm x 6.61 cm    Endometrium Thickness:  18 mm and is homogeneous.  The thickness of the endometrium would be considered abnormally thickened given that the LMP was 8/10/2023.  Possibility of endometrial hyperplasia or neoplasm cannot be completely excluded by imaging  alone and further evaluation with gynecology would be recommended.    The uterus appears normal in size, shape, and echogenicity.  RIGHT OVARY:  2.90 cm x 1.91 cm x 2.10 cm    The right ovary appears normal in size, shape, and echogenicity. No significant masses are identified.  LEFT OVARY:  4.31 cm x 3.31 cm x 3.92 cm    The left ovary appears normal in size, shape, and echogenicity. No significant masses are identified.  Physiologic simple cyst in the left ovary measures 3.1 x 2.2 x 3.0 cm.  CUL-DE-SAC:  Normal.  No fluid or mass.    OTHER:  Negative.       DOPPLER WAVE FORMS  FLOW:  There is normal arterial and venous Doppler wave forms in both ovaries.  The spectral analysis is within normal limits.  OTHER:  Negative.                   Impression   CONCLUSION:  There is thickening of the endometrium which would be considered abnormal given the LMP was  8/10/2023.  Further evaluation with gynecology recommended to evaluate for endometrial atypia, hyperplasia, or neoplasm.        LOCATION:  [facility]           Dictated by (CST): Tye Kelly MD on 2023 at 7:03 PM      Finalized by (CST): Tye Kelly MD on 2023 at 7:06 PM      Assessment:     Nadine Calix is a 48 year old  female here for menometrorrhagia, postcoital bleeding, sampling of endometrium in  &  both suggestive of anovulatory/proliferative pattern. Suspect AUB is due to anovulatory bleeding.      Diagnoses and all orders for this visit:    Abnormal uterine bleeding (AUB)  -     medroxyPROGESTERone Acetate (PROVERA) 10 MG Oral Tab; 1 tab PO nightly for 10 nights. Within 7 nights of finishing, a bleed should occur. Repeat monthly.    Postcoital bleeding    Family history of ovarian cancer  -     Genetic Counselor Referral - Ariel ([place])    Family history of pancreatic cancer  -     Genetic Counselor Referral - Ariel ([place] [place]         Plan:     AUB  -including intermittently heavy & prolonged periods, +Postcoital bleeding  -Hysteroscopy D&C - 10/2020 suggestive anovulatory breakdown bleeding  -23 ED visit for AUB. Reported LMP 8/10/23 (6 days prior). Endometrium 18 mm. Physiologic simple cyst in the left ovary measures 3.1 x 2.2 x 3.0 cm.   -23 EMB- Dr. Ramirez - disordered proliferative endometrium - recommended medical management options to regulate cycle.   -hot flashes for about 1.5-2 years. Feels AUB worsened around this time   -reviewed pattern is likely anovulatory bleeding  -may have element of perimenopause given hot flashes & age  -Rx Provera 10 mg x 10 nights to induce withdrawal bleed  -Recommend Mirena IUD insertion at tail end or after withdrawal bleed.     Pelvic floor dysfunction & OAB  -Will be returning to PFPT per Dr. Jim     Recurrent nephrolithiasis  -may end up needing significant surgery on her ureters to help her kidneys    Fhx  ovarian cancer in [facility] & pancreatic cancer   -genetic counseling encouraged & ordered - Veronika Gant.     Pap neg 8/9/22 - up to date.   Breast exam/[facility] 8/9/22 Dr. Linnette Antoine - overdue breast exam   Mammogram: 7/21/23 heterogeneously dense, benign. Biopsy clips noted.    Colonoscopy 4/7/21. Recall 10 years. Dholakia, Prashant, DO  Contraception - none. Reports unprotected intercourse x 7 years with no conception. Discussed still possible to conceive until menopause.     RTC - for Mirena IUD insertion & breast exam/well woman exam after Provera withdrawal bleed.     Ema Randall MD  [facility] - OBGYN    Note to patient and family:  The 21st Century Cures Act makes medical notes available to patients in the interest of transparency.  However, please be advised that this is a medical document.  It is intended as a peer to peer communication.  It is written in medical language and may contain abbreviations or verbiage that are technical and unfamiliar.  It may appear blunt or direct.  Medical documents are intended to carry relevant information, facts as evident, and the clinical opinion of the practitioner.

## 2024-02-27 NOTE — PATIENT INSTRUCTIONS
Provera first to empty the uterus  Then Mirena IUD insertion.     Genetic counselor  Veronika Gant   [address] [place] [address], [address]  [place] [address]  Ph: [phone]  F: [phone]    Magnesium supplementation  Different choices based on goals:    Oral route:  Magnesium glycinate 250-500 mg nightly - harder to find, more expensive, better absorbed, fewer GI side effects  Alternatives: magnesium chloride, magnesium sulfate.   Magnesium threonate 2000 mg nightly - NOT for use in pregnancy. Penetrates blood brain barrier best. Least GI effects, more expensive.     Do NOT recommend magnesium citrate - this is a laxative.   Do not recommend taking at same time as prenatal vitamin (calcium in prenatal vitamin competes with magnesium for absorption)    Non-oral route:   Epsom salt soaks (baths or foot baths - you can absorb magnesium through the skin)   Magnesium lotions/body sprays.

## 2024-02-29 ENCOUNTER — NURSE ONLY (OUTPATIENT)
Dept: UROLOGY | Facility: HOSPITAL | Age: 49
End: 2024-02-29
Attending: OBSTETRICS & GYNECOLOGY
Payer: MEDICAID

## 2024-02-29 DIAGNOSIS — N39.41 URGE INCONTINENCE: Primary | ICD-10-CM

## 2024-02-29 PROCEDURE — 64566 NEUROELTRD STIM POST TIBIAL: CPT

## 2024-02-29 NOTE — PROGRESS NOTES
[facility]  URGENT PC Therapy Note    Date:  2024    Patient Name:  Nadine Calix  Patient :  3/22/1975   Patient MRN:  [id number]  Patient Age:  48 year old      Diagnosis:  N39.41 Urge Incontinence    Procedure:  Left URGENT PC Therapy:  Posterior tibial nerve stimulation treatment  00164 - posterial tibial neuro stimulation, percutaneous needle electrode, single treatment, includes programming    PTNS Evaluation:  PTNS Session: Monthly Follow-Up  Patient reports feeling: Better (reports slightly better, pt reports starting Provera 3d ago- possible this is making sx's better)  Nocturia: 4+ (4-6x since starting Provera, before it was 10-12x)  Frequency: <1 hr  Patient wears pads: Yes  Pads per day: 3  Pads per night: 1  Physician:  Dr. Lara Jim    RN:  Nola COX CMA     Indications:  Urinary frequency, urge incontinence, with subjectively severe urge incontinence and inadequate to anti-cholinergic meds.  Informed consent was obtained with discussion of risk, benefits and goal, and limits of the procedure including but not limited to bleeding, infection, andnerve injury were discussed and the patient desired to proceed.      Description of Procedure:    The patient was properly identified and positioned in a semi reclined, comfortable seated position with their feet elevated in a recliner or exam room chair.    The insertion site for the needle electrode was identified on the lower inner aspect of the Left leg.  This position utilized was approximately 5 cm cephalad to the medial malleolus and one finger breath/2cm posterior to the tibia.    Preparation of the needle electrode insertion site was performed using an alcohol pad to clean skin of the above-noted needle insertion site.    The needle electrode/guide tube assembly was placed over the identified and cleaned insertion site.      Once the needle electrode had been placed into the skin, the guide tube was removed  and the needle was gently advanced maintaining a 60 degree angle.      A surface electrode was placed over the medial aspect of the calcaneus on the lower extremity utilized for stimulation.    Current adjustment was slowly increased while observing the patient's foot for response. Adjustments were made advancing the needle further in to obtain optimal sensation.    An optimal sensation in the foot was noted.Once an optimal response was noted, therapy mode was then initiated. The pt was given a bell to ring for the RN to be able to call for any needed adjustments.    Therapy continued without complication for 30 minutes.  No additional current adjustments were needed.    Once 30 minutes of therapy and completed stimulator was turned off and the needle, electrode clip and surface electrode were removed.      This completed the therapy.  The patient tolerated the procedure well.    Plan:  Follow up in one month for next PTNS treatment, sooner prn.

## 2024-03-05 ENCOUNTER — TELEPHONE (OUTPATIENT)
Dept: UROLOGY | Facility: HOSPITAL | Age: 49
End: 2024-03-05

## 2024-03-05 ENCOUNTER — OFFICE VISIT (OUTPATIENT)
Dept: SURGERY | Facility: CLINIC | Age: 49
End: 2024-03-05
Payer: MEDICAID

## 2024-03-05 DIAGNOSIS — N20.0 NEPHROLITHIASIS: ICD-10-CM

## 2024-03-05 DIAGNOSIS — R82.90 URINE FINDING: Primary | ICD-10-CM

## 2024-03-05 LAB
APPEARANCE: CLEAR
BILIRUBIN: NEGATIVE
GLUCOSE (URINE DIPSTICK): NEGATIVE MG/DL
KETONES (URINE DIPSTICK): NEGATIVE MG/DL
LEUKOCYTES: NEGATIVE
MULTISTIX LOT#: ABNORMAL NUMERIC
NITRITE, URINE: NEGATIVE
PH, URINE: 7 (ref 4.5–8)
PROTEIN (URINE DIPSTICK): 30 MG/DL
SPECIFIC GRAVITY: 1.02 (ref 1–1.03)
URINE-COLOR: YELLOW
UROBILINOGEN,SEMI-QN: 0.2 MG/DL (ref 0–1.9)

## 2024-03-05 PROCEDURE — 99215 OFFICE O/P EST HI 40 MIN: CPT | Performed by: SURGERY

## 2024-03-05 PROCEDURE — 81003 URINALYSIS AUTO W/O SCOPE: CPT | Performed by: SURGERY

## 2024-03-05 NOTE — PROGRESS NOTES
Urology Clinic Note    Primary Care Provider:  Fan Hernandez MD     Chief Complaint:   Nephrolithiasis, overactive bladder    HPI:   Nadine Calix is a 48 year old female with history of anxiety, GERD, hypertension, OAB improved after PTNS referred for recurrent nephrolithiasis.     She has been previously followed by multiple urologists at [facility].  Her most recent stone surgery was a right ureteroscopy, laser lithotripsy, stent placement, nephrostomy tube removal with Dr. Jim on 7/8/2022.  She has a tortuous proximal right ureter and during a prior ureteroscopy access was lost to the kidney so nephrostomy tube was placed.  She is currently on hydrochlorothiazide and potassium citrate.  She has not had a 24-hour urine study for a while.     She recently presented to the ED on 9/17/2023 for abdominal pain bilaterally.  I reviewed her CT scan and this showed bilateral nephrolithiasis left greater than right.  I brought her to the OR on 1/22/2024 for left ureteroscopy, laser lithotripsy, stone extraction, stent placement, right retrograde pyelogram (no distinct strictures, slow drainage from start to finish of case).  I removed at least 20 stones from the left kidney and she had extensive Jero plaques as well.  She also had anterior prolapse on exam.  Stent was removed in clinic on 2/1/2024.    CT scan 1/26/2024 shows left ureteral stent position, bilateral renal calcifications more likely consistent with medullary calcinosis/intraparenchymal stones.    24-hour urine study shows slightly low urine volume, slightly high urine sodium, slightly low urine citrate.  Her potassium citrate dose was increased to twice daily recently.  Will plan for repeat 24-hour urine study in a few weeks.    Lasix renal scan 2/23/2024 shows 63% function from the right kidney, 37% function from the left kidney, no signs of obstruction with adequate drainage.     She continues to have bothersome OAB every 30 minutes.  She is  getting PTNS with some improvement but it is still very bothersome to her.  I discussed other options of bladder Botox and InterStim briefly.    24 Hour Metabolic Urine Study Results    Volume: 1700 mL/day  Goal >2500  Sodium: 165 mmol/day  Goal <150  Calcium: 170 mg/day  Goal <250  Uric acid: 510 mg/day  Goal <750  Citrate: 349 mg/day  Goal >450  Oxalate: 23.8 mg/day  Goal <40  pH: 6.5  Goal 5.8 - 6.2 (>6 for UA stones, >7 for cystine stone)  Creatinine: 1411 mg/day  Normal 18-20 mg/kilogram/day (female)  Normal 20-22 mg/kilogram/day (male)  Cystinuria present? No    Last stone analysis: 50% COM, 50% calcium phosphate  Serum calcium: 9.2  PTH: 33.9 (normal)    History:     Past Medical History:   Diagnosis Date    Abdominal distention 01/2020    Longer than this but this is an estimate    Abdominal hernia 03/2017    I had hernia repair around by my belly button & abdominal    Abdominal pain 02/2020    Currently seeing obgyn, urologist & nephrologist    Anxiety state     resolved    Arrhythmia     fast heart rate due to anxiety according to patient, pvc    Back pain 01/2018    Mid to lower back pain & hip pain    Back problem     lower back    Bloating 01/2020    Longer than this but this is an estimate    Blood in urine 1994    Due to severe kidney stones    Blurred vision 06/2020    I always wore glasses or contacts since about 14 yrs old    Calculus of kidney     hydronephrosis/ several times kidney stones/ 13 th procedure for stones    Change in hair 01/2020    My hair is thinning more than the usual    Chest pain 01/2019    I did see a cardiologist about this    Chest pain on exertion 01/2019    I did see a cardiologists about this    COVID-19 12/2021    headache, back pain, shortness of breath, sore throat, runny nose, chills. Not hospitalized    COVID-19 11/21/2023    high fever, fatigue, not hospitalized    Dense breasts 07/21/2023    heterogeneously dense breasts    Depression     resolved    Diarrhea,  unspecified 01/2020    I notice that certain things run right through me now    Disorder of liver     elevated AST/ALT    Disorder of thyroid     nodules only    Dizziness 01/2019    Easy bruising 01/2020    I find bruises on me and don’t really know how I got them    ESBL E. coli carrier 01/26/2024    Essential hypertension     Fatigue 11/2019    I noticed that I’m alot more tired lately more than usual    Flatulence/gas pain/belching 01/2020    Longer than this but this is an estimate    Food intolerance 01/2020    I think I am lactose intolerant been that way for a few year    Frequent urination Since early age in my 20’s    All the time for years    Frequent UTI 1994    Due to kidney stones blockages    Gestational diabetes (HCC) 04/2014    Heart palpitations 01/2019    Pvc’s and irregular heart rate    Hemorrhoids 11/2016 or 2018    After last pregnancy or a couple years after that    High blood pressure     triamterene    History of cardiac murmur 01/2000    Mother told me I was born with one & my dr as well    History of D&C 10/08/2020    History of depression 07/2004    When I had a miscarriage    History of mental disorder 07/2004    Family history of this on my mother’s side    Indigestion 01/2020    Longer than this but this is an estimate    Itch of skin 01/2020    Not severe but I have been itching for no apparent reason    Kidney stones     Leaking of urine 1994    After first pregnancy    Leg swelling 07/2020    My left leg slightly swelled up and my ankle area was hurtin    Loss of appetite 01/2020    I noticed this lately too    Menometrorrhagia 10/2020    Hysteroscopy 10/2020 path favors anovulatory breakdown bleeding.    Migraines     Nausea 01/2020    Longer than this but this is an estimate    Night sweats 01/2020    I feel like I’m on fire at night.    OAB (overactive bladder)     PTNS - posterior tibial nerve stimulation with urogynecologist Dr. Lara Jim    Ovarian cyst     Painful  urination     When passing stones    Post partum depression     Postcoital bleeding 10/2020    Prior to hysteroscopy done 10/2020. Also having since at least  or     PVC (premature ventricular contraction)     Rash 2020    Broke out with unknown rash all over legs and arms    Sexually transmitted disease 2012    HPV    Shortness of breath 2019    Seen cardiologist about this    Sleep disturbance 2020    For years sometimes I can’t lay on my left side    Stress     Rough up bringing and also regular family stresses    Uncomfortable fullness after meals 2020    Longer than this but this is an estimate    Unspecified condition originating in the  period 2004    Visual impairment     glasses    Wears glasses 1989    Since I was about 14 yrs old    Weight gain 2016    After last son was born       Past Surgical History:   Procedure Laterality Date      ,2016    x2    CHOLECYSTECTOMY      COLONOSCOPY N/A 2021    Procedure: COLONOSCOPY, ESOPHAGOGASTRODUODENOSCOPY with biopsy;  Surgeon: Prashant Lilly DO;  Location:  ENDOSCOPY    COLONOSCOPY  2021 EGD WITH BIOPSY. Colonoscopy internal hemorrhoids. No gross findings to explain left sided pain. Recall 10 years. Prashant Lilly DO    COLPOSCOPY, CERVIX W/UPPER ADJACENT VAGINA; W/BIOPSY(S), CERVIX      CRYOCAUTERY OF CERVIX      CYSTO/URETERO W/UP STRICTURE Right 10/15/2019    Cysto Rt RPG, Rt URS w/ Laser Litho Dilation of Rtight Stricture Rt URS Stent Exchange w/ Dr Jim    CYSTO/URETERO, STONE REMOVE Right 2019    right ureter and kidney stones extraction, URS, stent placement    CYSTOSCOPY,URETEROSCOPY,LITHOTRIPSY Right 2019    Cysto, B/L RPG, URS, laser litho, stone ext, Rt stent Dr. Jim    EGD  2021    for left sided pain    HC ENDOMETRIAL SAMPLING W OR WO ENDOCERV SAMPLING  2023    EMB- Dr. Ramirez - disordered proliferative endometrium -  recommended medical management options to regulate cycle.    HERNIA SURGERY  2000    L hernia repair    HYSTEROSCOPY,WITH SAMPLING  10/08/2020    Hysteroscopy, D&C for intermittent menorrhagia, thickened endometrium, cervical stenosis. H/o menometrorrhagia & postcoital bleeding. Dr. Linnette Antoine. Path Endometrium with stromal collapse, superficial fibrin thrombi, and some polypoid tissue fragments with increased stromal fibrous component. Endometrial glands are proliferative. Overall pattern favors anovulatory breakdown bleeding.    KIDNEY SURGERY Right     stent placement 2022    LITHOTRIPSY  ,  &     NEEDLE BIOPSY LEFT  2021    fibroadenoma    NEEDLE BIOPSY RIGHT  2021    fibroadenoma    OTHER Bilateral     nerve surgery to bilateral arms about 1 month apart    OTHER      percutaneous nephrolithotomy    OTHER SURGICAL HISTORY  2019    cysto stent removal - dr. jim     OTHER SURGICAL HISTORY  2020    Cysto Stent Removal w/ Dr Jim    OTHER SURGICAL HISTORY  2021    Cysto/Stent Removal Dr. Jim    OTHER SURGICAL HISTORY  2022    Right ureteroscopy, laser lithotripsy, stent placement, nephrostomy tube removal with Dr. Tariq    REMOVAL GALLBLADDER  1998    REMOVE STENT VIA TRANSURETH Right 2019    Cysto Stent Removal w/ Dr Jim    REPAIR ING HERNIA,5+Y/O,REDUCIBL         Family History   Problem Relation Age of Onset    Diabetes Mother     High Cholesterol Mother     Hypertension Mother     Thyroid disease Mother         hypothroid    Other (Other) Mother         Part of thyroid removed    Heart Disorder Father         Stent    Diabetes Father     Hypertension Father     Hypertension Sister     Diabetes Sister     Other (Other) Sister         Liver problem    Diabetes Maternal Grandmother     Ovarian Cancer Maternal Grandmother          at 54    Diabetes Maternal Grandfather     Diabetes Paternal Grandmother     Diabetes Paternal  Grandfather     Other (Other) Daughter         Appendicitis    Other (Other) Son         Appendicitis    Pancreatic Cancer Maternal Uncle     Breast Cancer Neg     Colon Cancer Neg        Social History     Socioeconomic History    Marital status: Life Partner   Tobacco Use    Smoking status: Never    Smokeless tobacco: Never   Vaping Use    Vaping Use: Never used   Substance and Sexual Activity    Alcohol use: No    Drug use: No    Sexual activity: Yes     Partners: Male   Other Topics Concern    Caffeine Concern No    Exercise No    Seat Belt Yes       Medications (Active prior to today's visit):  Current Outpatient Medications   Medication Sig Dispense Refill    medroxyPROGESTERone Acetate (PROVERA) 10 MG Oral Tab 1 tab PO nightly for 10 nights. Within 7 nights of finishing, a bleed should occur. Repeat monthly. 30 tablet 5    Potassium Citrate ER 15 MEQ (1620 MG) Oral Tab CR Take 1 tablet by mouth 2 (two) times daily with meals. 180 tablet 1    HYDROcodone-acetaminophen (NORCO) 5-325 MG Oral Tab Take 1 tablet by mouth every 6 (six) hours as needed for Pain (For pain not controlled by tylenol or ibuprofen.). This contains tylenol - do not exceed 4 g of tylenol daily. 10 tablet 0    ergocalciferol 1.25 MG (17596 UT) Oral Cap Take 1 capsule (50,000 Units total) by mouth once a week. 24 capsule 0    FIBER OR Take 1 tablet by mouth daily.      triamterene-hydroCHLOROthiazide 37.5-25 MG Oral Cap Take 1 capsule by mouth every morning. 90 capsule 1    albuterol (PROAIR HFA) 108 (90 Base) MCG/ACT Inhalation Aero Soln Inhale 2 puffs into the lungs every 4 (four) hours as needed for Wheezing. 1 each 1    Multiple Vitamin (ONE-DAILY MULTI VITAMINS) Oral Tab Take 1 tablet by mouth daily.      oxyCODONE 5 MG Oral Tab Take 1 tablet (5 mg total) by mouth every 4 (four) hours as needed for Pain. (Patient not taking: Reported on 2/27/2024) 10 tablet 0    polyethylene glycol, PEG 3350, 17 g Oral Powd Pack Take 17 g by mouth daily  as needed (Take to avoid constipation, especially if taking narcotic pain medications.). (Patient not taking: Reported on 2/27/2024) 12 each 1       Allergies:  Allergies   Allergen Reactions    Amlodipine SWELLING     Leg swelling    Metoprolol SHORTNESS OF BREATH    Toradol [Ketorolac Tromethamine] SHORTNESS OF BREATH    Tramadol SHORTNESS OF BREATH    Dilaudid [Hydromorphone] ITCHING and PAIN     Headache - per pt this is only with long term use - pt states she can tolerate this medication    Oxycodone PAIN     headache    Wellbutrin [Bupropion Hcl] PAIN and DIZZINESS     Headache    Valsartan OTHER (SEE COMMENTS)     Chest pain      Codeine Sulfate ITCHING     TABS    Hydrocodone ITCHING    Morphine ITCHING    Seasonal Runny nose       Review of Systems:   A comprehensive 10-point review of systems was completed.  Pertinent positives and negatives are noted in the the HPI.    Physical Exam:   CONSTITUTIONAL: Well developed, well nourished, in no acute distress  NEUROLOGIC: Alert and oriented  HEAD: Normocephalic, atraumatic  EYES: Sclera non-icteric  ENT: Hearing intact, moist mucous membranes  NECK: No obvious goiter or masses  RESPIRATORY: Normal respiratory effort  SKIN: No evident rashes  ABDOMEN: Soft, non-tender, non-distended    Assessment & Plan:   Nadine Calix is a 48 year old female with history of anxiety, GERD, hypertension, OAB improved after PTNS referred for recurrent nephrolithiasis.     She has been previously followed by multiple urologists at [facility].  Her most recent stone surgery was a right ureteroscopy, laser lithotripsy, stent placement, nephrostomy tube removal with Dr. Jim on 7/8/2022.  She has a tortuous proximal right ureter and during a prior ureteroscopy access was lost to the kidney so nephrostomy tube was placed.  She is currently on hydrochlorothiazide and potassium citrate.  She has not had a 24-hour urine study for a while.     She recently presented to the ED on  9/17/2023 for abdominal pain bilaterally.  I reviewed her CT scan and this showed bilateral nephrolithiasis left greater than right.  I brought her to the OR on 1/22/2024 for left ureteroscopy, laser lithotripsy, stone extraction, stent placement, right retrograde pyelogram (no distinct strictures, slow drainage from start to finish of case).  I removed at least 20 stones from the left kidney and she had extensive Jero plaques as well.  She also had anterior prolapse on exam.  Stent was removed in clinic on 2/1/2024.    CT scan 1/26/2024 shows left ureteral stent position, bilateral renal calcifications more likely consistent with medullary calcinosis/intraparenchymal stones.    24-hour urine study shows slightly low urine volume, slightly high urine sodium, slightly low urine citrate.  Her potassium citrate dose was increased to twice daily recently.  Will plan for repeat 24-hour urine study in a few weeks.    Lasix renal scan 2/23/2024 shows 63% function from the right kidney, 37% function from the left kidney, no signs of obstruction with adequate drainage.     She continues to have bothersome OAB every 30 minutes.  She is getting PTNS with some improvement but it is still very bothersome to her.  I discussed other options of bladder Botox and InterStim briefly.    -Continue hydrochlorothiazide and potassium citrate 15 mEq twice daily  -Repeat 24-hour urine study given increased potassium citrate dose in 2 months  -Renal ultrasound in 1 year  -Will discuss if she is a good candidate for sacral neuromodulation (Interstim) with my partner Dr. Cummins given persistent severe OAB refractory to PTNS  -Increase water intake (approximately 40-60 ounces per day) for goal urine output greater than 2500 mL/day  -Dietary calcium intake should be approximately 1829-7649 mg/day  -Dietary sodium intake should be limited to under 2300 mg/day  -Limit animal protein intake, including red meat, chicken, pork, fish    In total,  40 minutes were spent on this patient encounter (including chart review, patient history, physical, and counseling, documentation, and communication).    Bryant Garza MD  Staff Urologist  [facility]  Office: [phone]

## 2024-03-06 ENCOUNTER — TELEPHONE (OUTPATIENT)
Dept: SURGERY | Facility: CLINIC | Age: 49
End: 2024-03-06

## 2024-03-06 NOTE — TELEPHONE ENCOUNTER
Spoke with patient, assisted in scheduling consult appointment.     Future Appointments   Date Time Provider Department Center   3/22/2024 10:00 AM Anne Cummins MD [facility]   3/28/2024 11:00 AM [facility] RN UROG EM [facility]   5/8/2024 10:00 AM Ema Randall MD [facility] [place]   6/6/2024 10:00 AM Bryant Garza MD [id number]   9/18/2024 10:00 AM Howard Bear MD [facility]

## 2024-03-20 ENCOUNTER — OFFICE VISIT (OUTPATIENT)
Dept: INTERNAL MEDICINE CLINIC | Facility: CLINIC | Age: 49
End: 2024-03-20
Payer: MEDICAID

## 2024-03-20 VITALS
BODY MASS INDEX: 34.44 KG/M2 | WEIGHT: 170.81 LBS | DIASTOLIC BLOOD PRESSURE: 88 MMHG | HEART RATE: 87 BPM | OXYGEN SATURATION: 98 % | HEIGHT: 59 IN | SYSTOLIC BLOOD PRESSURE: 130 MMHG | TEMPERATURE: 98 F

## 2024-03-20 DIAGNOSIS — K08.89 PAIN, DENTAL: Primary | ICD-10-CM

## 2024-03-20 PROCEDURE — 99213 OFFICE O/P EST LOW 20 MIN: CPT | Performed by: FAMILY MEDICINE

## 2024-03-20 RX ORDER — AMOXICILLIN AND CLAVULANATE POTASSIUM 875; 125 MG/1; MG/1
1 TABLET, FILM COATED ORAL 2 TIMES DAILY
Qty: 20 TABLET | Refills: 0 | Status: SHIPPED | OUTPATIENT
Start: 2024-03-20 | End: 2024-03-30

## 2024-03-20 RX ORDER — HYDROCODONE BITARTRATE AND ACETAMINOPHEN 5; 325 MG/1; MG/1
1 TABLET ORAL EVERY 6 HOURS PRN
Qty: 20 TABLET | Refills: 0 | Status: SHIPPED | OUTPATIENT
Start: 2024-03-20

## 2024-03-20 NOTE — PROGRESS NOTES
Nadine Calix is a 48 year old female.  HPI:   Here for possible dental infection     Had cracked teeth years ago and dental work yrs ago for it      Last week started to have tenderness in the R upper jaw     no fever     had it treated w antibiotics in the past    no URI    hurts to chew      using norco for it prn        Current Outpatient Medications   Medication Sig Dispense Refill    medroxyPROGESTERone Acetate (PROVERA) 10 MG Oral Tab 1 tab PO nightly for 10 nights. Within 7 nights of finishing, a bleed should occur. Repeat monthly. 30 tablet 5    Potassium Citrate ER 15 MEQ (1620 MG) Oral Tab CR Take 1 tablet by mouth 2 (two) times daily with meals. 180 tablet 1    ergocalciferol 1.25 MG (51557 UT) Oral Cap Take 1 capsule (50,000 Units total) by mouth once a week. 24 capsule 0    FIBER OR Take 1 tablet by mouth daily.      triamterene-hydroCHLOROthiazide 37.5-25 MG Oral Cap Take 1 capsule by mouth every morning. 90 capsule 1    albuterol (PROAIR HFA) 108 (90 Base) MCG/ACT Inhalation Aero Soln Inhale 2 puffs into the lungs every 4 (four) hours as needed for Wheezing. 1 each 1    Multiple Vitamin (ONE-DAILY MULTI VITAMINS) Oral Tab Take 1 tablet by mouth daily.        Past Medical History:   Diagnosis Date    Abdominal distention 01/2020    Longer than this but this is an estimate    Abdominal hernia 03/2017    I had hernia repair around by my belly button & abdominal    Abdominal pain 02/2020    Currently seeing obgyn, urologist & nephrologist    Anxiety state     resolved    Arrhythmia     fast heart rate due to anxiety according to patient, pvc    Back pain 01/2018    Mid to lower back pain & hip pain    Back problem     lower back    Bloating 01/2020    Longer than this but this is an estimate    Blood in urine 1994    Due to severe kidney stones    Blurred vision 06/2020    I always wore glasses or contacts since about 14 yrs old    Calculus of kidney     hydronephrosis/ several times kidney stones/ 13  th procedure for stones    Change in hair 01/2020    My hair is thinning more than the usual    Chest pain 01/2019    I did see a cardiologist about this    Chest pain on exertion 01/2019    I did see a cardiologists about this    COVID-19 12/2021    headache, back pain, shortness of breath, sore throat, runny nose, chills. Not hospitalized    COVID-19 11/21/2023    high fever, fatigue, not hospitalized    Dense breasts 07/21/2023    heterogeneously dense breasts    Depression     resolved    Diarrhea, unspecified 01/2020    I notice that certain things run right through me now    Disorder of liver     elevated AST/ALT    Disorder of thyroid     nodules only    Dizziness 01/2019    Easy bruising 01/2020    I find bruises on me and don’t really know how I got them    ESBL E. coli carrier 01/26/2024    Essential hypertension     Fatigue 11/2019    I noticed that I’m alot more tired lately more than usual    Flatulence/gas pain/belching 01/2020    Longer than this but this is an estimate    Food intolerance 01/2020    I think I am lactose intolerant been that way for a few year    Frequent urination Since early age in my 20’s    All the time for years    Frequent UTI 1994    Due to kidney stones blockages    Gestational diabetes (HCC) 04/2014    Heart palpitations 01/2019    Pvc’s and irregular heart rate    Hemorrhoids 11/2016 or 2018    After last pregnancy or a couple years after that    High blood pressure     triamterene    History of cardiac murmur 01/2000    Mother told me I was born with one & my dr as well    History of D&C 10/08/2020    History of depression 07/2004    When I had a miscarriage    History of mental disorder 07/2004    Family history of this on my mother’s side    Indigestion 01/2020    Longer than this but this is an estimate    Itch of skin 01/2020    Not severe but I have been itching for no apparent reason    Kidney stones     Leaking of urine 1994    After first pregnancy    Leg swelling  2020    My left leg slightly swelled up and my ankle area was hurtin    Loss of appetite 2020    I noticed this lately too    Menometrorrhagia 10/2020    Hysteroscopy 10/2020 path favors anovulatory breakdown bleeding.    Migraines     Nausea 2020    Longer than this but this is an estimate    Night sweats 2020    I feel like I’m on fire at night.    OAB (overactive bladder)     PTNS - posterior tibial nerve stimulation with urogynecologist Dr. Lara Jim    Ovarian cyst     Painful urination     When passing stones    Post partum depression     Postcoital bleeding 10/2020    Prior to hysteroscopy done 10/2020. Also having since at least  or     PVC (premature ventricular contraction)     Rash 2020    Broke out with unknown rash all over legs and arms    Sexually transmitted disease 2012    HPV    Shortness of breath 2019    Seen cardiologist about this    Sleep disturbance 2020    For years sometimes I can’t lay on my left side    Stress     Rough up bringing and also regular family stresses    Uncomfortable fullness after meals 2020    Longer than this but this is an estimate    Unspecified condition originating in the  period 2004    Visual impairment     glasses    Wears glasses 1989    Since I was about 14 yrs old    Weight gain 2016    After last son was born      Social History:  Social History     Socioeconomic History    Marital status: Life Partner   Tobacco Use    Smoking status: Never    Smokeless tobacco: Never   Vaping Use    Vaping Use: Never used   Substance and Sexual Activity    Alcohol use: No    Drug use: No    Sexual activity: Yes     Partners: Male   Other Topics Concern    Caffeine Concern No    Exercise No    Seat Belt Yes        REVIEW OF SYSTEMS:   GENERAL HEALTH: feels well otherwise       EXAM:   /88   Pulse 87   Temp 97.8 °F (36.6 °C) (Temporal)   Ht 4' 11\" (1.499 m)   Wt 170 lb 12.8 oz (77.5 kg)   LMP  02/07/2024 (Exact Date)   SpO2 98%   BMI 34.50 kg/m²   GENERAL: well developed, well nourished,in no apparent distress  SKIN: no rashes  NECK: supple,no adenopathy  TMs nl   Oral exam shows some swollen gums on the outer upper gums w slight redness      ASSESSMENT AND PLAN:    1. Pain, dental  Is trying to get into dentist     augmentin ordered     norco prn      The patient indicates understanding of these issues and agrees to the plan.  .

## 2024-03-22 ENCOUNTER — OFFICE VISIT (OUTPATIENT)
Dept: SURGERY | Facility: CLINIC | Age: 49
End: 2024-03-22
Payer: MEDICAID

## 2024-03-22 DIAGNOSIS — R39.15 URGENCY OF URINATION: Primary | ICD-10-CM

## 2024-03-22 PROCEDURE — 99214 OFFICE O/P EST MOD 30 MIN: CPT | Performed by: UROLOGY

## 2024-03-22 NOTE — PROGRESS NOTES
Anne Cummins MD  Department of Urology  [address]  [place] [address]    T: [phone]  F: [phone]    To: Fan Hernandez MD   [address]  [facility] [address]    Re: Nadine Calix   MRN: [id number]  : 3/22/1975    Dear Fan Hernandez MD,    Today I had the pleasure of seeing Nadine Calix in my clinic. As you know, Ms. Calix is a pleasant 49 year old year old female who I am seeing for discussion of OAB. Patient was last seen in this department on Visit date not found.    Briefly, She has been previously followed by multiple urologists at [facility].  Her most recent stone surgery was a right ureteroscopy, laser lithotripsy, stent placement, nephrostomy tube removal with Dr. Jim on 2022.  She has a tortuous proximal right ureter and during a prior ureteroscopy access was lost to the kidney so nephrostomy tube was placed.  She is currently on hydrochlorothiazide and potassium citrate.  She has not had a 24-hour urine study for a while.     She recently presented to the ED on 2023 for abdominal pain bilaterally. Dr. Garza reviewed her CT scan and this showed bilateral nephrolithiasis left greater than right.  He brought her to the OR on 2024 for left ureteroscopy, laser lithotripsy, stone extraction, stent placement, right retrograde pyelogram (no distinct strictures, slow drainage from start to finish of case).  I removed at least 20 stones from the left kidney and she had extensive Jero plaques as well.  She also had anterior prolapse on exam.  Stent was removed in clinic on 2024.     CT scan 2024 shows left ureteral stent position, bilateral renal calcifications more likely consistent with medullary calcinosis/intraparenchymal stones.     24-hour urine study shows slightly low urine volume, slightly high urine sodium, slightly low urine citrate.  Her potassium citrate dose was increased to twice daily recently.  Will plan for  repeat 24-hour urine study in a few weeks.     Lasix renal scan 2/23/2024 shows 63% function from the right kidney, 37% function from the left kidney, no signs of obstruction with adequate drainage.     She continues to have bothersome OAB every 30 minutes.  She is getting PTNS with some improvement but it is still very bothersome to her.  Dr. Garza discussed other options of bladder Botox and InterStim briefly.    Follows with Dr. Lara Jim.      PAST MEDICAL HISTORY:  Past Medical History:   Diagnosis Date    Abdominal distention 01/2020    Longer than this but this is an estimate    Abdominal hernia 03/2017    I had hernia repair around by my belly button & abdominal    Abdominal pain 02/2020    Currently seeing obgyn, urologist & nephrologist    Anxiety state     resolved    Arrhythmia     fast heart rate due to anxiety according to patient, pvc    Back pain 01/2018    Mid to lower back pain & hip pain    Back problem     lower back    Bloating 01/2020    Longer than this but this is an estimate    Blood in urine 1994    Due to severe kidney stones    Blurred vision 06/2020    I always wore glasses or contacts since about 14 yrs old    Calculus of kidney     hydronephrosis/ several times kidney stones/ 13 th procedure for stones    Change in hair 01/2020    My hair is thinning more than the usual    Chest pain 01/2019    I did see a cardiologist about this    Chest pain on exertion 01/2019    I did see a cardiologists about this    COVID-19 12/2021    headache, back pain, shortness of breath, sore throat, runny nose, chills. Not hospitalized    COVID-19 11/21/2023    high fever, fatigue, not hospitalized    Dense breasts 07/21/2023    heterogeneously dense breasts    Depression     resolved    Diarrhea, unspecified 01/2020    I notice that certain things run right through me now    Disorder of liver     elevated AST/ALT    Disorder of thyroid     nodules only    Dizziness 01/2019    Easy bruising 01/2020     I find bruises on me and don’t really know how I got them    ESBL E. coli carrier 01/26/2024    Essential hypertension     Fatigue 11/2019    I noticed that I’m alot more tired lately more than usual    Flatulence/gas pain/belching 01/2020    Longer than this but this is an estimate    Food intolerance 01/2020    I think I am lactose intolerant been that way for a few year    Frequent urination Since early age in my 20’s    All the time for years    Frequent UTI 1994    Due to kidney stones blockages    Gestational diabetes (HCC) 04/2014    Heart palpitations 01/2019    Pvc’s and irregular heart rate    Hemorrhoids 11/2016 or 2018    After last pregnancy or a couple years after that    High blood pressure     triamterene    History of cardiac murmur 01/2000    Mother told me I was born with one & my dr as well    History of D&C 10/08/2020    History of depression 07/2004    When I had a miscarriage    History of mental disorder 07/2004    Family history of this on my mother’s side    Indigestion 01/2020    Longer than this but this is an estimate    Itch of skin 01/2020    Not severe but I have been itching for no apparent reason    Kidney stones     Leaking of urine 1994    After first pregnancy    Leg swelling 07/2020    My left leg slightly swelled up and my ankle area was hurtin    Loss of appetite 01/2020    I noticed this lately too    Menometrorrhagia 10/2020    Hysteroscopy 10/2020 path favors anovulatory breakdown bleeding.    Migraines     Nausea 01/2020    Longer than this but this is an estimate    Night sweats 01/2020    I feel like I’m on fire at night.    OAB (overactive bladder)     PTNS - posterior tibial nerve stimulation with urogynecologist Dr. Lara Jim    Ovarian cyst     Painful urination 1994    When passing stones    Post partum depression     Postcoital bleeding 10/2020    Prior to hysteroscopy done 10/2020. Also having since at least 2022 or 2023    PVC (premature ventricular  contraction)     Rash 2020    Broke out with unknown rash all over legs and arms    Sexually transmitted disease 2012    HPV    Shortness of breath 2019    Seen cardiologist about this    Sleep disturbance 2020    For years sometimes I can’t lay on my left side    Stress     Rough up bringing and also regular family stresses    Uncomfortable fullness after meals 2020    Longer than this but this is an estimate    Unspecified condition originating in the  period 2004    Visual impairment     glasses    Wears glasses 1989    Since I was about 14 yrs old    Weight gain 2016    After last son was born        PAST SURGICAL HISTORY:  Past Surgical History:   Procedure Laterality Date      ,2016    x2    CHOLECYSTECTOMY      COLONOSCOPY N/A 2021    Procedure: COLONOSCOPY, ESOPHAGOGASTRODUODENOSCOPY with biopsy;  Surgeon: Prashant Lilly DO;  Location:  ENDOSCOPY    COLONOSCOPY  2021 EGD WITH BIOPSY. Colonoscopy internal hemorrhoids. No gross findings to explain left sided pain. Recall 10 years. Prashant Lilly DO    COLPOSCOPY, CERVIX W/UPPER ADJACENT VAGINA; W/BIOPSY(S), CERVIX      CRYOCAUTERY OF CERVIX      CYSTO/URETERO W/UP STRICTURE Right 10/15/2019    Cysto Rt RPG, Rt URS w/ Laser Litho Dilation of Rtight Stricture Rt URS Stent Exchange w/ Dr Jim    CYSTO/URETERO, STONE REMOVE Right 2019    right ureter and kidney stones extraction, URS, stent placement    CYSTOSCOPY,URETEROSCOPY,LITHOTRIPSY Right 2019    Cysto, B/L RPG, URS, laser litho, stone ext, Rt stent Dr. Jim    EGD  2021    for left sided pain    HC ENDOMETRIAL SAMPLING W OR WO ENDOCERV SAMPLING  2023    EMB- Dr. Ramirez - disordered proliferative endometrium - recommended medical management options to regulate cycle.    HERNIA SURGERY  2000    L hernia repair    HYSTEROSCOPY,WITH SAMPLING  10/08/2020    Hysteroscopy, D&C for  intermittent menorrhagia, thickened endometrium, cervical stenosis. H/o menometrorrhagia & postcoital bleeding. Dr. Linnette Antoine. Path Endometrium with stromal collapse, superficial fibrin thrombi, and some polypoid tissue fragments with increased stromal fibrous component. Endometrial glands are proliferative. Overall pattern favors anovulatory breakdown bleeding.    KIDNEY SURGERY Right     stent placement 6/2022    LITHOTRIPSY  2001, 2007 & 11/11    NEEDLE BIOPSY LEFT  02/2021    fibroadenoma    NEEDLE BIOPSY RIGHT  02/2021    fibroadenoma    OTHER Bilateral 2012    nerve surgery to bilateral arms about 1 month apart    OTHER      percutaneous nephrolithotomy    OTHER SURGICAL HISTORY  12/27/2019    cysto stent removal - dr. jim     OTHER SURGICAL HISTORY  06/03/2020    Cysto Stent Removal w/ Dr Jim    OTHER SURGICAL HISTORY  07/07/2021    Cysto/Stent Removal Dr. Jim    OTHER SURGICAL HISTORY  07/08/2022    Right ureteroscopy, laser lithotripsy, stent placement, nephrostomy tube removal with Dr. Tariq    REMOVAL GALLBLADDER  1998    REMOVE STENT VIA TRANSURETH Right 11/13/2019    Cysto Stent Removal w/ Dr Jim    REPAIR ING HERNIA,5+Y/O,REDUCIBL           ALLERGIES:  Allergies   Allergen Reactions    Amlodipine SWELLING     Leg swelling    Metoprolol SHORTNESS OF BREATH    Toradol [Ketorolac Tromethamine] SHORTNESS OF BREATH    Tramadol SHORTNESS OF BREATH    Dilaudid [Hydromorphone] ITCHING and PAIN     Headache - per pt this is only with long term use - pt states she can tolerate this medication    Oxycodone PAIN     headache    Wellbutrin [Bupropion Hcl] PAIN and DIZZINESS     Headache    Valsartan OTHER (SEE COMMENTS)     Chest pain      Codeine Sulfate ITCHING     TABS    Hydrocodone ITCHING    Morphine ITCHING    Seasonal Runny nose         MEDICATIONS:  Current Outpatient Medications   Medication Instructions    albuterol (PROAIR HFA) 108 (90 Base) MCG/ACT Inhalation Aero Soln  2 puffs, Inhalation, Every 4 hours PRN    amoxicillin clavulanate 875-125 MG Oral Tab 1 tablet, Oral, 2 times daily    ergocalciferol (VITAMIN D2) 50,000 Units, Oral, Weekly    FIBER OR 1 tablet, Oral, Daily    HYDROcodone-acetaminophen (NORCO) 5-325 MG Oral Tab 1 tablet, Oral, Every 6 hours PRN, This contains tylenol - do not exceed 4 g of tylenol daily.    medroxyPROGESTERone Acetate (PROVERA) 10 MG Oral Tab 1 tab PO nightly for 10 nights. Within 7 nights of finishing, a bleed should occur. Repeat monthly.    Multiple Vitamin (ONE-DAILY MULTI VITAMINS) Oral Tab 1 tablet, Oral, Daily    Potassium Citrate ER 15 MEQ (1620 MG) Oral Tab CR 1 tablet, Oral, 2 times daily with meals    triamterene-hydroCHLOROthiazide 37.5-25 MG Oral Cap 1 capsule, Oral, Every morning        FAMILY HISTORY:  Family History   Problem Relation Age of Onset    Diabetes Mother     High Cholesterol Mother     Hypertension Mother     Thyroid disease Mother         hypothroid    Other (Other) Mother         Part of thyroid removed    Heart Disorder Father         Stent    Diabetes Father     Hypertension Father     Hypertension Sister     Diabetes Sister     Other (Other) Sister         Liver problem    Diabetes Maternal Grandmother     Ovarian Cancer Maternal Grandmother          at 54    Diabetes Maternal Grandfather     Diabetes Paternal Grandmother     Diabetes Paternal Grandfather     Other (Other) Daughter         Appendicitis    Other (Other) Son         Appendicitis    Pancreatic Cancer Maternal Uncle     Breast Cancer Neg     Colon Cancer Neg         SOCIAL HISTORY:  Social History     Socioeconomic History    Marital status: Life Partner   Tobacco Use    Smoking status: Never    Smokeless tobacco: Never   Vaping Use    Vaping Use: Never used   Substance and Sexual Activity    Alcohol use: No    Drug use: No    Sexual activity: Yes     Partners: Male   Other Topics Concern    Caffeine Concern No    Exercise No    Seat Belt Yes           PHYSICAL EXAMINATION:  There were no vitals filed for this visit.  CONSTITUTIONAL: No apparent distress, cooperative and communicative  NEUROLOGIC: Alert and oriented   HEAD: Normocephalic, atraumatic   EYES: Sclera non-icteric   ENT: Hearing intact, moist mucous membranes   NECK: No obvious goiter or masses   RESPIRATORY: Normal respiratory effort, Nonlabored breathing on room air  SKIN: No evident rashes   ABDOMEN: Soft, nontender, nondistended, no rebound tenderness, no guarding, no masses      REVIEW OF SYSTEMS:    A comprehensive 10-point review of systems was completed.  Pertinent positives and negatives are noted in the the HPI.       LABORATORY DATA:  Ref Range & Units 3/5/24 10:47 AM   Glucose Urine  Negative mg/dL Negative   Bilirubin Urine  Negative Negative   Ketones, UA  Negative - Trace mg/dL Negative   Spec Gravity  1.005 - 1.030 1.025   Blood Urine  Negative Trace-intact Abnormal    PH Urine  5.0 - 8.0 7.0   Protein Urine  Negative - Trace mg/dL 30 Abnormal    Urobilinogen Urine  0.2 - 1.0 mg/dL 0.2   Nitrite Urine  Negative Negative   Leukocyte Esterase Urine  Negative Negative   APPEARANCE  Clear clear   Color Urine  Yellow yellow   Multistix Lot#  Numeric [id number]   Multistix Expiration Date  Date 10/31/24           IMAGING REVIEW:  Narrative   PROCEDURE:  NM RENAL WITH LASIX  (CPT=78708)     COMPARISON:  [facility], CT, CT ABDOMEN+PELVIS KIDNEYSTONE 2D RNDR(NO IV,NO ORAL)(CPT=74176), 1/26/2024, 10:26 AM.     INDICATIONS:  N20.0 Nephrolithiasis     TECHNIQUE:  A total of 6.0 mCi Tc99m Mag 3 was injected IV, and dynamic images of the kidneys and bladder were obtained in the posterior projection for 30 minutes.  At 15 minutes post-injection, 40 mg Lasix was injected IV at 15 minutes into the exam.    Quantitation was performed on a dedicated nuclear medicine workstation.  Slightly limited exam given patient's inability to complete test to the end.  Patient had to urinate with 4 minutes remaining.      FINDINGS:    ARTERIAL PHASE IMAGES:  Normal symmetric blood flow to both kidneys within 6.0 seconds of visualizing the aorta.     EXCRETION:  Increasing contrast with dilatation of the right collecting system. Normal excretion and washout after IV infusion of Lasix.     DIFFERENTIAL RENAL  FUNCTION:  37.21 % contribution to total renal function from left kidney, 62.79 % from right kidney.     T 1/2:  Right 1.72; Left 0.97                         Impression   CONCLUSION:    1. Normal excretion and washout after IV infusion of Lasix without evidence for obstruction.        LOCATION:  [facility]        Dictated by (CST): Priyanka Norris MD on 2/23/2024 at 12:47 PM      Finalized by (CST): Priyanka Norris MD on 2/23/2024 at 1:13 PM          OTHER RELEVANT DATA:   none     IMPRESSION: Irritative voiding symptoms, currently on PTNS with mild effect. She wanted to know what options were available for UUI. Discussed options for irritative voiding symptoms.    Discussed treatment options for urgency urinary incontinence including behavioral management (timed voiding, double voiding, avoiding bladder irritants, constipation management), antimuscarinics (side effects include dry eyes, dry mouth, constipation, mental fogginess), beta 3 agonist (the side effects include hypertension), bladder Botox, PTNS, sacral neuromodulation.    Behavioral management includes timed voiding (going to the bathroom on a schedule every 1-4 hours), double voiding (trying to pee twice), avoiding bladder irritants (coffee, tea, soda, pop, alcohol), compression stockings, elevation of feet, voiding prior to bedtime, avoiding fluids 2 to 4 hours before bedtime and constipation management.     For constipation management she can consider fruits (especially ones that start with \"P\"), vegetables, flaxseeds, hydration, fiber, MiraLAX, Colace or senna.  She can also use a squatty potty to help with efficiency of stooling.     I also suggested she try Estrace cream  every night for 1 week followed by every other night indefinitely for genitourinary syndrome of menopause.  This may help with her urgency, frequency and urinary incontinence and help prevent recurrent urinary tract infections.    She knows that behavioral management and Estrace cream can take 6 to 12 months to work.  It may not be a complete solution but should improve her symptoms.  If she has absolutely no improvement in her symptoms, we can proceed with the evaluation test including a cystoscopy, pelvic examination and possible urodynamic evaluation versus trialing medical therapy.    Patient understands antimuscarinics and beta 3 agonist take 6 to 8 weeks to start working.  Patient also understands the bladder Botox has the side effect of urinary retention in 10 to 12% of patients and does not start working for about 2 weeks post procedure.  Additionally it wears off with time and repeat treatments may be required every 3 to 12 months.  Patient understand that sacroneuromodulation is a surgical treatment, and that PTNS requires a significant amount of time commitment coming in every week for 12 weeks followed by monthly treatments if it is effective.       PLAN:  cystoscopy, UDS pelvic exam prior to proceeding with any advanced therapies if patient interested    Thank you for referring this very pleasant patient to my clinic. If you have any questions or concerns, please do not hesitate to contact me.    Sincerely,  Anne Cummins MD    30 minutes were spent on this patient at this visit obtaining a history, reviewing medical records, developing a treatment plan, counseling and discussing treatment strategy with patient, coordination of care and documentation.     The 21st Century Cures Act makes medical notes available to patients in the interest of transparency.  However, please be advised that this is a medical document.  It is intended as a peer to peer communication.  It is written in medical language  and may contain abbreviations or verbiage that are technical and unfamiliar.  It may appear blunt or direct.  Medical documents are intended to carry relevant information, facts as evident, and the clinical opinion of the practitioner.

## 2024-04-24 ENCOUNTER — TELEPHONE (OUTPATIENT)
Dept: SURGERY | Facility: CLINIC | Age: 49
End: 2024-04-24

## 2024-04-24 ENCOUNTER — TELEPHONE (OUTPATIENT)
Facility: CLINIC | Age: 49
End: 2024-04-24

## 2024-04-24 DIAGNOSIS — N93.9 ABNORMAL UTERINE BLEEDING (AUB): Primary | ICD-10-CM

## 2024-04-24 DIAGNOSIS — N93.0 POSTCOITAL BLEEDING: ICD-10-CM

## 2024-04-24 NOTE — TELEPHONE ENCOUNTER
Pt has appt with MM in May for Mirena insertion, would like to discuss with nurse about current BCP/bleeding, etc.   14-May-2020 17:30

## 2024-04-24 NOTE — TELEPHONE ENCOUNTER
Spoke with pt. Taking Provera x 10 days each month. This month bleeding started on day 6, 4/6/24. She is still bleeding, bright red. Bleeding is heavy at times with cramping. Has an IUD appointment 5/8/24. Pt concerned with the prolonged bleeding. An ultrasound was discussed at her lest office visit. Pt thinking with the prolonged bleeding it might be a good idea to get the ultrasound.     Also with the IUD, she was told by her urogyn & old OB/GYN that she has a lot of scar tissue and an IUD may be difficult. She would like to know if Dr. Randall think she should try the IUD or stick with the Provera.     Aware I will call back with Dr. Randall's recommendations on the ultrasound & IUD.  Hemorrhage precautions given. Verbalized understanding.

## 2024-04-24 NOTE — TELEPHONE ENCOUNTER
Per patient her OB/GYN wants her to start taking magnesium glycinate but is worried about kidney function, if that does not work what type of magnesium would be appropriate. Per patient asking if she is classified as having kidney disease. Please advise

## 2024-04-25 NOTE — TELEPHONE ENCOUNTER
Spoke with pt.    How many times has she taken the Provera?       2 months    Has she taken it nightly for 10 nights each month?       Took nightly x 10 days    Pt aware to schedule a pelvic ultrasound. She asked if she can continue the Provera or another pill instead of the IUD. Will call back with Dr. Randall's recommendations. Verbalized understanding.

## 2024-04-29 ENCOUNTER — TELEPHONE (OUTPATIENT)
Dept: UROLOGY | Facility: HOSPITAL | Age: 49
End: 2024-04-29

## 2024-04-29 RX ORDER — DROSPIRENONE 4 MG/1
1 TABLET, FILM COATED ORAL DAILY
Qty: 84 TABLET | Refills: 3 | Status: SHIPPED | OUTPATIENT
Start: 2024-04-29 | End: 2024-05-27

## 2024-04-29 NOTE — TELEPHONE ENCOUNTER
Recd incoming Fax from [employer]. For new order for Catheter supplies for [employer].  Spoke with Nadine and she confirmed she was in need of supplies .  Order signed and  scanned into [employer] then Faxed to supplier      Pt also would like to get Dr Jim opinion if she should continue with oral hormone replacement therapy or have IUD placed .  Pt is concerned about \"all the scarring\" that she has.   Informed patient that I will touch base with Dr Jim and return her call.

## 2024-04-30 NOTE — TELEPHONE ENCOUNTER
Spoke with pt. Discussed options of staying on the Provera or trying Slynd. Pt will try Slynd as long as it is affordable. She will call me back if she would like it sent to [facility].    Pt also asking if she should have hormone testing done to check her levels. Will call pt back with recommendations. Verbalized understanding.

## 2024-05-02 NOTE — TELEPHONE ENCOUNTER
Pt is calling back to find out if she can keep he appt for Wed May 8th and give feedback regarding the medication. Pls call the pt to discuss

## 2024-05-02 NOTE — TELEPHONE ENCOUNTER
Spoke with pt. She has an appointment 5/8 for a Mirena insert. She also picked up Slynd at her pharmacy. Pt would like to have hormones checked prior to starting Slynd. Aware I will route to Dr. Randall and call with recommendations.     Also started bleeding today. Wearing a panty liner and a lot of red blood. Advised to monitor bleeding and go the the ER if soaking a pad an hour, dizziness, lightheadedness, or SOB. Verbalized understanding.

## 2024-05-06 ENCOUNTER — TELEPHONE (OUTPATIENT)
Dept: SURGERY | Facility: CLINIC | Age: 49
End: 2024-05-06

## 2024-05-06 ENCOUNTER — PATIENT MESSAGE (OUTPATIENT)
Dept: SURGERY | Facility: CLINIC | Age: 49
End: 2024-05-06

## 2024-05-06 NOTE — TELEPHONE ENCOUNTER
This encounter is now closed.     US kidney/bladder authorized     Referral Notes  Number of Notes: 4    Procedure Information     07654 CPT®-ECHO ABD RETROPERITNL (Procedure Code)          Action code:  NA-No Action Required            Service from:  5/7/2024            Service to:  6/6/2024       Type Date User Summary Attachment   Prior Authorization Confirmed/Denied/NA 05/06/2024  9:59 AM Jose Daniel Smith Status: Prior Auth Not Required -   Note:  Status: Prior Auth Not Required  Status Check Method:   Contact Name, Number:   Call Reference #:  CPT Codes that do Not Require   Auth Status: Prior Auth Not Required

## 2024-05-07 ENCOUNTER — LAB ENCOUNTER (OUTPATIENT)
Dept: LAB | Age: 49
End: 2024-05-07
Attending: SURGERY
Payer: MEDICAID

## 2024-05-07 DIAGNOSIS — N93.9 ABNORMAL UTERINE BLEEDING (AUB): ICD-10-CM

## 2024-05-07 DIAGNOSIS — E55.9 VITAMIN D DEFICIENCY: ICD-10-CM

## 2024-05-07 LAB
ESTRADIOL SERPL-MCNC: 13.1 PG/ML
FSH SERPL-ACNC: 13.9 MIU/ML
VIT D+METAB SERPL-MCNC: 56.5 NG/ML (ref 30–100)

## 2024-05-07 PROCEDURE — 82670 ASSAY OF TOTAL ESTRADIOL: CPT

## 2024-05-07 PROCEDURE — 83001 ASSAY OF GONADOTROPIN (FSH): CPT

## 2024-05-07 PROCEDURE — 82306 VITAMIN D 25 HYDROXY: CPT

## 2024-05-07 PROCEDURE — 36415 COLL VENOUS BLD VENIPUNCTURE: CPT

## 2024-05-07 NOTE — TELEPHONE ENCOUNTER
Spoke with pt. Had labs drawn this morning. Advised pt to keep appointment tomorrow with Dr. Randall to discuss possible Mirena insertion VS. Continuing Slynd. Verbalized understanding.

## 2024-05-07 NOTE — H&P
[facility]  Obstetrics and Gynecology   History & Physical  NEW    Chief complaint:   Chief Complaint   Patient presents with    Wellness Visit     Overdue WWE/breast exam    Vaginal Bleeding     Discuss AUB. Considering Mirena IUD insertion today.      Subjective:     HPI: Nadine Calix is a 49 year old  with Patient's last menstrual period was 2024.  Here for discussing IUD Mirena insertion & AUB. Overdue WWE. Patient ok to do WWE/breast exam today.     Chaperone declines     PMH Extensive h/o AUB. Migraine with aura, Obesity, CHTN, GERD, anxiety, recurrent nephrolithiasis, apparently with ureteral scarring affecting kidney function. OAB slightly improved after PTNS - used to void 20-25 times per night now down to 15 times per night. Sees uro & urogyn. PFPT done.     Extensive history of AUB.   Pelvic US 2020 - showed endometrium was 8 mm, hemorrhagic cyst. Suspected adenomyosis noted  Hysteroscopy D&C - 10/2020 with benign pathology     3/15/23 Dr. Antoine visit for AUB.   Per note: Has appt with GI for blood in the stools   -Repeat pelvic US  -Consider   1. Observe  2. Slynd  3. Mirena IUD--I would suggest premedication with cytotec  4. Endometrial ablation: I would suggest that this is not a good option given dysmenorrhea and lack of contraception  5. Hysterectomy: prior abdominal surgeries make this a less attractive option. I would caution her as this should be her last option    23 Urogyn Dr. Jim incomplete bladder emptying     23 ED visit for AUB. Reported LMP 8/10/23 (6 days prior). Endometrium 18 mm. Physiologic simple cyst in the left ovary measures 3.1 x 2.2 x 3.0 cm.     23 EMB- Dr. Ramirez - disordered proliferative endometrium - recommended medical management options to regulate cycle.     22 - right ureteroscopy, laser lithotripsy, stent placement, nephrostomy tube removal with Dr. Jim     24 - Cystoscopy, LEFT ureteroscopy, laser  lithotripsy, basket stone extraction, retrograde pyelogram, ureteral stent placement, Right retrograde pyelogram Dr. Bryant Garza       2/27/2024 - initial visit with me for 2nd opinion regarding her periods (menometrorrhagia & also c/o postcoital spotting x 2-3 years)    On review of EMR has had negative endometrial sampling twice, most recently 8/18/23 with disordered proliferative endometrium.   Irregular heavy periods with variable bleeding. Some periods are normal.   Pelvic US 8/2023 with thickened endometrium & simple left ovarian cyst.     +Spotting after sex x 2-3 years   Hot flashes for the past 1-2 years. The AUB started around that time.   Last hormonal therapy - nothing for awhile   Last WWE - 8/9/22 Dr. Antoine?   LMP 2/7/24 - lasted 2 weeks     Was told she has scarring of her ureters from chronic kidney stones since teens.   Bladder very sensitive    Reviewed her pattern is likely anovulatory/perimenopause related given age & also vasomotor symptoms starting around the same time.   Discussed possibility of endometrial polyp vs unstable/dyssynchronized lining causing postcoital bleeding.   Provera advised to induce withdrawal bleed  Mirena IUD recommended.   Return to [facility] per Dr. Jim recommended   Genetic counseling ordered due to Fhx ovarian cancer in [facility] [place] [place] & pancreatic cancer   Advised overdue for WWE/breast exam.     4/24/24 Patient contacted our office to report having prolonged bleeding despite cyclic Provera. Possibility of interference with her own ovulations.   Pelvic US ordered to r/o endometrial polyp  4/29/24 Rx Slynd to start to see if could over-ride     Patient also c/o wanting labs to \"check her hormones\"  Advised she is perimenopausal and checking labs isn't going to change her treatment at this time.     As of today, 5/8/24   Since last visit  Still having bleeding issues.   Provera - taken in March for the 1st time. At first was like a normal period. Then after 2nd round of the  Provera in 4/2024 was heavier. That bleed lasted the whole month. Was heavier for 2 weeks & then slowing a little. Blood would only stop for a few hours.   Toward end of April stopped  Bleeding back 5/1/24  Now currently very light bleeding. Mild cramping only    Patient's last menstrual period was 03/22/2024.   Has not started the Slynd yet.     Pelvic US - not done. Needs prior authorization - scheduled for a handful of days from now.   Thinking about hysterectomy - just very tired of bleeding.     3/22/24 Saw new urologist Anne Cummins - c/o irritative voiding symptoms/UUI on PTNS with mild effect. Various options were reviewed. Estrace vaginal cream & behavioral changes advised. Per note: cystoscopy, pelvic examination and possible urodynamic evaluation versus trialing medical therapy    Component      Latest Ref Rng 5/7/2024   FSH      No established range for female sex mIU/mL 13.9    Estradiol      No established range for female sex pg/mL 13.1    VITAMIN D, 25-OH, TOTAL      30.0 - 100.0 ng/mL 56.5    Magnesium glycinate started - has not had any migraines. Thinking about switching neurologists. Hard to communicate with their office.     Urogyn - Dr. Lara Jim - OAB - PTNS - posterior tibial nerve stimulation also with cystocele, bladder pain & overactivity. Was voiding 25-35 times per night. PFPT done in the past.   Urologist Dr. Bryant Garza -> Anne Cummins  Endocrinologist Dr. Howard Bear for thyroid nodules.   PCP: Fan Hernandez MD     Review of Systems   Cardiovascular:         Has not been monitoring BP at home.    Gastrointestinal:         Taking fiber per urogyn to help with constipation.    Genitourinary:  Positive for menstrual problem and vaginal bleeding. Negative for dyspareunia and pelvic pain.        Ureters are scarred up from kidney stones. They are considering a major surgery for her.   Self catheterizing. Not emptying bladder well.   Urologist Dr. Garza. Has urogyn Dr. Bermudez  Hernán as well     OAB - PTNS - posterior tibial nerve treatment, also with cystocele, bladder pain & overactivity. Was voiding 25-35 times per night.     Menses somewhat irregular & can be heavy or normal   Occasional spotting after sex - for 2-3 years     PFPT done in the past. Plans to return per Dr. Jim    +Bilateral inguinal pain for years. Right side over the past 1 year or so has felt bulgy at times. H/o bilateral inguinal & umbilical hernia repairs in the past. Does not have a general surgeon currently.    Skin:         Breasts - always lumpy for years. No new changes noted   Neurological:  Positive for headaches.        Vertigo sometimes - newer.   Headaches - will wake up with headaches in the morning sometimes.   Started magnesium glycinate  No recent migraines   Psychiatric/Behavioral:  Positive for sleep disturbance.         Emotional   Forgetful at times  Not sleeping great - some is due to urination.      GYN Hx:   Regular about 28-30 x 5-7 x normal flow x some pain that was bad as a teen -> cramping after babies.   Mirena twice, both prior to the pregnancies   Mirena IUD - probably 9-10 years ago.    Sexually active? 7 years of unprotected intercourse - has not any accidental pregnancies.   Dyspareunia? N  Postcoital bleeding? Y    Contraception: none.     WWE 22 Dr. Linnette Antoine     Cervical cancer screening:   History of colposcopy? Y  History of cryosurgery? 2012  History of LEEP/conization? N  Pap 2022 was negative    Breast cancer screening:  Mammogram: 23 heterogeneously dense, benign. Biopsy clips noted.      Colon cancer screenin2021 EGD WITH BIOPSY. Colonoscopy internal hemorrhoids. No gross findings to explain left sided pain. Colonoscopy recall 10 years. Prashant Lilly DO    OB History:  OB History    Para Term  AB Living   10 7 3 4 3 7   SAB IAB Ectopic Multiple Live Births   2 0 0 0 7     OB History    Para Term  AB Living    10 7 3 4 3 7   SAB IAB Ectopic Multiple Live Births   2 0 0   7      # Outcome Date GA Lbr Saad/2nd Weight Sex Type Anes PTL Lv   10  16 31w1d  7 lb 2.4 oz (3.243 kg) M CS-Unspec None  BECCA   9  06/15/14 36w1d  6 lb 14 oz (3.118 kg) M CS-Unspec EPI  BECCA      Complications: Gestational diabetes (HCC), Gestational diabetes mellitus (GDM) ([facility])   8 SAB  6w0d          7 AB 04 19w0d             Birth Comments: Spina Bifida   6 Term 02 40w0d  8 lb 15 oz (4.054 kg) M Vag-Spont EPI  BECCA      Complications:  labor,  labor (HCC)   5 Term 00 40w0d  7 lb 6 oz (3.345 kg) F Vag-Spont EPI  BECCA   4  98 36w0d  6 lb 6 oz (2.892 kg) M Vag-Spont None  BECCA   3  95 36w0d  7 lb (3.175 kg) M Vag-Spont None  BECCA   2 Term 94 40w0d  6 lb 12 oz (3.062 kg) F Vag-Spont None  BECCA      Complications: Preeclampsia, Pre-eclampsia (HCC)   1 SAB                Meds:  Current Outpatient Medications on File Prior to Visit   Medication Sig Dispense Refill    Magnesium 100 MG Oral Tab Take 2.4 tablets (240 mg total) by mouth.      HYDROcodone-acetaminophen (NORCO) 5-325 MG Oral Tab Take 1 tablet by mouth every 6 (six) hours as needed for Pain (For pain not controlled by tylenol or ibuprofen.). This contains tylenol - do not exceed 4 g of tylenol daily. 20 tablet 0    FIBER OR Take 1 tablet by mouth daily.      triamterene-hydroCHLOROthiazide 37.5-25 MG Oral Cap Take 1 capsule by mouth every morning. 90 capsule 1    albuterol (PROAIR HFA) 108 (90 Base) MCG/ACT Inhalation Aero Soln Inhale 2 puffs into the lungs every 4 (four) hours as needed for Wheezing. 1 each 1    Multiple Vitamin (ONE-DAILY MULTI VITAMINS) Oral Tab Take 1 tablet by mouth daily.      Drospirenone (SLYND) 4 MG Oral Tab Take 1 tablet by mouth daily for 28 days. (Patient not taking: Reported on 2024) 84 tablet 3     No current facility-administered medications on file prior to visit.        All:  Allergies   Allergen Reactions    Amlodipine SWELLING     Leg swelling    Metoprolol SHORTNESS OF BREATH    Toradol [Ketorolac Tromethamine] SHORTNESS OF BREATH    Tramadol SHORTNESS OF BREATH    Dilaudid [Hydromorphone] ITCHING and PAIN     Headache - per pt this is only with long term use - pt states she can tolerate this medication    Oxycodone PAIN     headache    Wellbutrin [Bupropion Hcl] PAIN and DIZZINESS     Headache    Valsartan OTHER (SEE COMMENTS)     Chest pain      Codeine Sulfate ITCHING     TABS    Hydrocodone ITCHING    Morphine ITCHING    Seasonal Runny nose       PMH:  Past Medical History:    Abdominal distention    Longer than this but this is an estimate    Abdominal hernia    I had hernia repair around by my belly button & abdominal    Abdominal pain    Currently seeing obgyn, urologist & nephrologist    Anxiety state    resolved    Arrhythmia    fast heart rate due to anxiety according to patient, pvc    Back pain    Mid to lower back pain & hip pain    Back problem    lower back    Bloating    Longer than this but this is an estimate    Blood in urine    Due to severe kidney stones    Blurred vision    I always wore glasses or contacts since about 14 yrs old    Calculus of kidney    hydronephrosis/ several times kidney stones/ 13 th procedure for stones    Change in hair    My hair is thinning more than the usual    Chest pain    I did see a cardiologist about this    Chest pain on exertion    I did see a cardiologists about this    COVID-19    headache, back pain, shortness of breath, sore throat, runny nose, chills. Not hospitalized    COVID-19    high fever, fatigue, not hospitalized    Dense breasts    heterogeneously dense breasts    Depression    resolved    Diarrhea, unspecified    I notice that certain things run right through me now    Disorder of liver    elevated AST/ALT    Disorder of thyroid    nodules only    Dizziness    Easy bruising    I find bruises on me and  don’t really know how I got them    ESBL E. 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 one but I never really checke    Heart palpitations    Pvc’s and irregular heart rate    Hemorrhoids    After last pregnancy or a couple years after that    High blood pressure    triamterene    History of cardiac murmur    Mother told me I was born with one & my dr as well    History of D&C    History of depression    When I had a miscarriage    History of mental disorder    Family history of this on my mother’s side    Indigestion    Longer than this but this is an estimate    Itch of skin    Not severe but I have been itching for no apparent reason    Kidney stones    Leaking of urine    After first pregnancy    Leg swelling    My left leg slightly swelled up and my ankle area was hurtin    Loss of appetite    I noticed this lately too    Menometrorrhagia    Hysteroscopy 10/2020 path favors anovulatory breakdown bleeding.    Migraines    Nausea    Longer than this but this is an estimate    Night sweats    I feel like I’m on fire at night.    OAB (overactive bladder)    PTNS - posterior tibial nerve stimulation with urogynecologist Dr. Lara Jim    Ovarian cyst    Painful urination    When passing stones    Post partum depression    Postcoital bleeding    Prior to hysteroscopy done 10/2020. Also having since at least 2022 or 2023    PVC (premature ventricular contraction)    Rash    Broke out with unknown rash all over legs and arms    Sexually transmitted disease    HPV    Shortness of breath    Seen cardiologist about  this    Sleep disturbance    For years sometimes I can’t lay on my left side    Stress    Rough up bringing and also regular family stresses    Uncomfortable fullness after meals    Longer than this but this is an estimate    Unspecified condition originating in the  period    Urinary incontinence    Constantly going can’t hold it    Visual impairment    glasses    Wears glasses    Since I was about 14 yrs old    Weight gain    After last son was born       PSH:  Past Surgical History:   Procedure Laterality Date    Abdominal surgery      2 hernia repairs right & left side      ,2016    x2    Cholecystectomy      Colonoscopy N/A 2021    Procedure: COLONOSCOPY, ESOPHAGOGASTRODUODENOSCOPY with biopsy;  Surgeon: Prashant Lilly DO;  Location:  ENDOSCOPY    Colonoscopy  2021 EGD WITH BIOPSY. Colonoscopy internal hemorrhoids. No gross findings to explain left sided pain. Recall 10 years. Prashant Lilly DO    Colposcopy, cervix w/upper adjacent vagina; w/biopsy(s), cervix      Cryocautery of cervix      Cysto/uretero w/up stricture Right 10/15/2019    Cysto Rt RPG, Rt URS w/ Laser Litho Dilation of Rtight Stricture Rt URS Stent Exchange w/ Dr Jim    Cysto/uretero, stone remove Right 2019    right ureter and kidney stones extraction, URS, stent placement    Cystoscopy,ureteroscopy,lithotripsy Right 2019    Cysto, B/L RPG, URS, laser litho, stone ext, Rt stent Dr. Jim    Egd  2021    for left sided pain    Hc endometrial sampling w or wo endocerv sampling  2023    EMB- Dr. Ramirez - disordered proliferative endometrium - recommended medical management options to regulate cycle.    Hernia surgery  2000    L hernia repair    Hysteroscopy,with sampling  10/08/2020    Hysteroscopy, D&C for intermittent menorrhagia, thickened endometrium, cervical stenosis. H/o menometrorrhagia & postcoital bleeding. Dr. Linnette Antoine. Path  Endometrium with stromal collapse, superficial fibrin thrombi, and some polypoid tissue fragments with increased stromal fibrous component. Endometrial glands are proliferative. Overall pattern favors anovulatory breakdown bleeding.    Kidney surgery Right     stent placement 6/2022    Laparoscopy,pelvic,biopsy      Kidney stones & examine    Lithotripsy  2001, 2007 & 11/11    Needle biopsy left  02/2021    fibroadenoma    Needle biopsy right  02/2021    fibroadenoma    Other Bilateral 2012    nerve surgery to bilateral arms about 1 month apart    Other      percutaneous nephrolithotomy    Other surgical history  12/27/2019    cysto stent removal - dr. jim     Other surgical history  06/03/2020    Cysto Stent Removal w/ Dr Jim    Other surgical history  07/07/2021    Cysto/Stent Removal Dr. Jim    Other surgical history  07/08/2022    Right ureteroscopy, laser lithotripsy, stent placement, nephrostomy tube removal with Dr. Tariq    Removal gallbladder  1998    Remove stent via transureth Right 11/13/2019    Cysto Stent Removal w/ Dr Jim    Repair ing hernia,7+y/o,reducibl         Social History:  Social History     Socioeconomic History    Marital status: Life Partner   Tobacco Use    Smoking status: Never    Smokeless tobacco: Never   Vaping Use    Vaping status: Never Used   Substance and Sexual Activity    Alcohol use: No    Drug use: No    Sexual activity: Yes     Partners: Male   Other Topics Concern    Caffeine Concern No    Exercise No    Seat Belt Yes        Family History:  Family History   Problem Relation Age of Onset    Diabetes Mother     High Cholesterol Mother     Hypertension Mother     Thyroid disease Mother         hypothroid    Other (Other) Mother         Part of thyroid removed    Heart Disorder Father         3 stents in heart    Diabetes Father     Hypertension Father     Hypertension Sister     Other (ovarian problem) Sister         work up in progress    Diabetes  Sister     Other (Other) Sister         Liver problem    Diabetes Maternal Grandmother     Ovarian Cancer Maternal Grandmother          at 54    Cancer Maternal Grandmother         Ovarian Cancer    Diabetes Maternal Grandfather     Diabetes Paternal Grandmother     Diabetes Paternal Grandfather     Other (Other) Daughter         Appendicitis    Other (Other) Son         Appendicitis    Pancreatic Cancer Maternal Uncle     Breast Cancer Neg     Colon Cancer Neg        Immunization History:  Immunization History   Administered Date(s) Administered    DTP 1995, 10/02/1995, 1995    FLULAVAL 6 months & older 0.5 ml Prefilled syringe (15775) 10/18/2018, 10/16/2019, 2020, 2021, 11/15/2022    FLUZONE 6 months and older PFS 0.5 ml (10369) 10/18/2018, 10/16/2019, 2020, 2021    HEP B, Ped/Adol 1995, 1995, 1996    Hib, Unspecified Formulation 1995, 10/02/1995, 1995    TDAP 2014, 10/04/2016       Depression Scale      PHQ-2 not done in last 12 months! Please administer and refresh!        Objective:     Vitals:    24 0928   BP: 120/80   Pulse: 81   Weight: 168 lb 9.6 oz (76.5 kg)   Height: 59\"         Body mass index is 34.05 kg/m².    Physical Exam:  Physical Exam  Vitals and nursing note reviewed.   Constitutional:       Appearance: Normal appearance.   HENT:      Head: Normocephalic and atraumatic.   Eyes:      Extraocular Movements: Extraocular movements intact.      Conjunctiva/sclera: Conjunctivae normal.   Cardiovascular:      Rate and Rhythm: Normal rate and regular rhythm.      Heart sounds: No murmur heard.  Pulmonary:      Effort: Pulmonary effort is normal.      Breath sounds: Normal breath sounds.      Comments: Breasts: very dense with multiple lumps bilaterally. Right breast lumps at 12 o'clock through upper right outer quadrant asymmetric from left. No axillary LAD  Abdominal:      General: There is no distension.      Palpations:  Abdomen is soft. There is no mass.      Tenderness: There is no abdominal tenderness. There is no guarding or rebound.      Hernia: No hernia is present.      Comments: +scar from umbilical hernia repair. +scars bilateral groins. Right groin does seem slightly pritchett but I can't say I can identify an actual defect on my exam   Genitourinary:     Comments: VULVA:  normal appearing vulva with no masses, tenderness or lesions  URETHRA: +hypermobility with Valsalva but no leak  PERINEUM: intact    VAGINA: stage II cystocele, stage I uterovaginal prolapse  CERVIX: normal appearing cervix. Scant blood tinge in discharge  UTERUS: minimally enlarged, anteverted, non tender  ADNEXA: normal adnexa in size, nontender and no masses  PELVIC FLOOR: mild hypertonicity, mild obturator tenderness       Neurological:      General: No focal deficit present.      Mental Status: She is alert.   Psychiatric:         Mood and Affect: Mood normal.         Behavior: Behavior normal.         Thought Content: Thought content normal.         Judgment: Judgment normal.         Labs:  Lab Results   Component Value Date    WBC 11.1 (H) 01/26/2024    RBC 4.57 01/26/2024    HGB 12.2 01/26/2024    HCT 37.6 01/26/2024    MCV 82.3 01/26/2024    MCH 26.7 01/26/2024    MCHC 32.4 01/26/2024    RDW 12.6 01/26/2024    .0 01/26/2024    MPV 8.8 (L) 12/04/2012        Lab Results   Component Value Date     (H) 01/26/2024    BUN 13 01/26/2024    BUNCREA 11.0 06/30/2021    CREATSERUM 0.97 01/26/2024    ANIONGAP 8 01/26/2024    GFR 95 02/26/2017    GFRNAA 84 07/08/2022    GFRAA 97 07/08/2022    CA 9.2 01/26/2024    OSMOCALC 283 01/26/2024    ALKPHO 115 (H) 01/26/2024    AST 12 (L) 01/26/2024    ALT  01/26/2024      Comment:      Due to  backorder we are temporarily unable to offer hospital-based ALT testing at [facility].   If urgently needed, please order ALT test code [id number].   The new order will need a new venipuncture and will be  sent to [facility] for testing.   The expected turnaround time will be within 24 hours.     BILT 0.3 01/26/2024    TP 8.2 01/26/2024    ALB 3.9 01/26/2024    GLOBULIN 4.3 01/26/2024     01/26/2024    K 3.8 01/26/2024     01/26/2024    CO2 25.0 01/26/2024       Lab Results   Component Value Date    CHOLEST 192 04/04/2023    TRIG 166 (H) 04/04/2023    HDL 32 (L) 04/04/2023     (H) 04/04/2023    VLDL 30 04/04/2023    NONHDLC 160 (H) 04/04/2023        Lab Results   Component Value Date    T4F 0.9 08/13/2021    TSH 0.912 10/20/2023        Lab Results   Component Value Date     10/20/2023    A1C 5.5 10/20/2023     Component      Latest Ref Rng 5/7/2024   FSH      No established range for female sex mIU/mL 13.9    Estradiol      No established range for female sex pg/mL 13.1    VITAMIN D, 25-OH, TOTAL      30.0 - 100.0 ng/mL 56.5        Imaging:  NM RENAL WITH LASIX  (CPT=78708)    Result Date: 2/23/2024  CONCLUSION:  1. Normal excretion and washout after IV infusion of Lasix without evidence for obstruction.   LOCATION:  [facility]   Dictated by (CST): Priyanka Norris MD on 2/23/2024 at 12:47 PM     Finalized by (CST): Priyanka Norris MD on 2/23/2024 at 1:13 PM         PROCEDURE:  US PELVIS W DOPPLER (DOH=79858/[id number])     COMPARISON:  [place], CT, CT ABDOMEN+PELVIS(CONTRAST ONLY)(CPT=74177), 5/05/2023, 12:11 PM.  [facility], US PELVIS W EV (CPT=76856/62171), 3/28/2023, 2:00 PM.     INDICATIONS:  Large amounts of vaginal bleeding and clots per patient since 8/10     TECHNIQUE:  Transabdominal imaging was performed of the pelvis.   Arterial and venous Doppler of the ovaries was also performed.  PATIENT STATED HISTORY: (As transcribed by Technologist)  Patient stated heavy vaginal bleeding with large clots for six days, pelvic cramping.         FINDINGS:                UTERUS:  10.05 cm x 4.92 cm x 6.61 cm    Endometrium Thickness:  18 mm and is homogeneous.  The thickness of the endometrium would  be considered abnormally thickened given that the LMP was 8/10/2023.  Possibility of endometrial hyperplasia or neoplasm cannot be completely excluded by imaging  alone and further evaluation with gynecology would be recommended.    The uterus appears normal in size, shape, and echogenicity.  RIGHT OVARY:  2.90 cm x 1.91 cm x 2.10 cm    The right ovary appears normal in size, shape, and echogenicity. No significant masses are identified.  LEFT OVARY:  4.31 cm x 3.31 cm x 3.92 cm    The left ovary appears normal in size, shape, and echogenicity. No significant masses are identified.  Physiologic simple cyst in the left ovary measures 3.1 x 2.2 x 3.0 cm.  CUL-DE-SAC:  Normal.  No fluid or mass.    OTHER:  Negative.       DOPPLER WAVE FORMS  FLOW:  There is normal arterial and venous Doppler wave forms in both ovaries.  The spectral analysis is within normal limits.  OTHER:  Negative.                   Impression   CONCLUSION:  There is thickening of the endometrium which would be considered abnormal given the LMP was 8/10/2023.  Further evaluation with gynecology recommended to evaluate for endometrial atypia, hyperplasia, or neoplasm.        LOCATION:  [facility]           Dictated by (CST): Tye Kelly MD on 2023 at 7:03 PM      Finalized by (CST): Tye Kelly MD on 2023 at 7:06 PM      Assessment:     Nadine Calix is a 49 year old  female with menometrorrhagia, postcoital bleeding, sampling of endometrium in  &  both suggestive of anovulatory/proliferative pattern. She presented as a 2nd opinion regarding AUB. Suspect AUB is due to anovulatory bleeding but her recent lack of responding typically to cyclic Provera is much more suspicious for possible polyp or fibroid within endometrial cavity. Recommend holding off on Mirena IUD insertion today & waiting for pelvic US. Patient in agreement. WWE/breast exam done. Discussed AUB plan & she is having some bilateral groin pain & right sided  bulging.     Diagnoses and all orders for this visit:    Encounter for well woman exam with abnormal findings  -     Urine Pregnancy Test    Screening for cervical cancer  -     ThinPrep PAP Smear; Future  -     Hpv Dna  High Risk , Thin Prep Collect; Future    Mass overlapping multiple quadrants of right breast  -     PRAVEEN LIZ 2D+3D DIAGNOSTIC PRAVEEN  BILAT (CPT=77066/31247); Future    Menometrorrhagia    Postcoital bleeding    Inguinal pain of both sides  -     Refer to General Surgery    Inguinal bulge  -     Refer to General Surgery    History of repair of inguinal hernia  -     Refer to General Surgery           Plan:     AUB  -including intermittently heavy & prolonged periods, +Postcoital bleeding  -Hysteroscopy D&C - 10/2020 suggestive anovulatory breakdown bleeding  -8/16/23 ED visit for AUB. Reported LMP 8/10/23 (6 days prior). Endometrium 18 mm. Physiologic simple cyst in the left ovary measures 3.1 x 2.2 x 3.0 cm.   -8/18/23 EMB- Dr. Ramirez - disordered proliferative endometrium - recommended medical management options to regulate cycle.   -hot flashes for about 1.5-2 years. Feels AUB worsened around this time   -reviewed pattern is likely anovulatory bleeding (perimenopause likely)   -labs reviewed - early perimenopausal level FSH  -took Provera 10 mg x 10 nights a few times - bleeding pattern not as expected, concerning for structural cause.   -Rx Slynd 4/29 - has not started yet. Encouraged to start. Declines high dose NE or Provera at this time.   -may also have endometrial polyp -> pelvic US ordered - will be doing soon.   -recommend hold off on Mirena IUD insertion today. Suspect it may expel. Would advise probably doing this in the OR    Pelvic floor dysfunction & OAB  -Will be returning to [facility] per Dr. Jim     Recurrent nephrolithiasis  -may end up needing significant surgery on her ureters to help her kidneys    Fhx ovarian cancer in [facility] & pancreatic cancer   -genetic counseling encouraged &  ordered - Veronika Gant. Done at previous.     Pap neg 8/9/22 - up to date. Per guidelines.      Dense breasts  -Breast exam dense, asymmetric, slightly more prominent lumps in right breast 5/8/2024   -diagnostic mammogram ordered     Colonoscopy 4/7/21. Recall 10 years. Tre Lillya, DO    Contraception - none. Reports unprotected intercourse x 7 years with no conception. Discussed still possible to conceive until menopause.     RTC pending pelvic US results. Suspect will need hysteroscopy, D&C, possible polypectomy/myomectomy, Mirena IUD insertion in OR    Ema Randall MD  [facility] - OBGYN    Note to patient and family:  The 21st Century Cures Act makes medical notes available to patients in the interest of transparency.  However, please be advised that this is a medical document.  It is intended as a peer to peer communication.  It is written in medical language and may contain abbreviations or verbiage that are technical and unfamiliar.  It may appear blunt or direct.  Medical documents are intended to carry relevant information, facts as evident, and the clinical opinion of the practitioner.

## 2024-05-08 ENCOUNTER — OFFICE VISIT (OUTPATIENT)
Facility: CLINIC | Age: 49
End: 2024-05-08
Payer: MEDICAID

## 2024-05-08 VITALS
SYSTOLIC BLOOD PRESSURE: 120 MMHG | HEART RATE: 81 BPM | BODY MASS INDEX: 34 KG/M2 | HEIGHT: 59 IN | DIASTOLIC BLOOD PRESSURE: 80 MMHG | WEIGHT: 168.63 LBS

## 2024-05-08 DIAGNOSIS — Z87.19 HISTORY OF REPAIR OF INGUINAL HERNIA: ICD-10-CM

## 2024-05-08 DIAGNOSIS — N92.1 MENOMETRORRHAGIA: ICD-10-CM

## 2024-05-08 DIAGNOSIS — R10.32 INGUINAL PAIN OF BOTH SIDES: ICD-10-CM

## 2024-05-08 DIAGNOSIS — N93.0 POSTCOITAL BLEEDING: ICD-10-CM

## 2024-05-08 DIAGNOSIS — R19.09 INGUINAL BULGE: ICD-10-CM

## 2024-05-08 DIAGNOSIS — Z98.890 HISTORY OF REPAIR OF INGUINAL HERNIA: ICD-10-CM

## 2024-05-08 DIAGNOSIS — N63.15 MASS OVERLAPPING MULTIPLE QUADRANTS OF RIGHT BREAST: ICD-10-CM

## 2024-05-08 DIAGNOSIS — Z01.411 ENCOUNTER FOR WELL WOMAN EXAM WITH ABNORMAL FINDINGS: Primary | ICD-10-CM

## 2024-05-08 DIAGNOSIS — R10.31 INGUINAL PAIN OF BOTH SIDES: ICD-10-CM

## 2024-05-08 DIAGNOSIS — Z12.4 SCREENING FOR CERVICAL CANCER: ICD-10-CM

## 2024-05-08 LAB
CONTROL LINE PRESENT WITH A CLEAR BACKGROUND (YES/NO): YES YES/NO
KIT LOT #: NORMAL NUMERIC
PREGNANCY TEST, URINE: NEGATIVE

## 2024-05-08 PROCEDURE — 88175 CYTOPATH C/V AUTO FLUID REDO: CPT | Performed by: OBSTETRICS & GYNECOLOGY

## 2024-05-08 PROCEDURE — 81025 URINE PREGNANCY TEST: CPT | Performed by: OBSTETRICS & GYNECOLOGY

## 2024-05-08 PROCEDURE — 99459 PELVIC EXAMINATION: CPT | Performed by: OBSTETRICS & GYNECOLOGY

## 2024-05-08 PROCEDURE — 87624 HPV HI-RISK TYP POOLED RSLT: CPT | Performed by: OBSTETRICS & GYNECOLOGY

## 2024-05-08 PROCEDURE — 99396 PREV VISIT EST AGE 40-64: CPT | Performed by: OBSTETRICS & GYNECOLOGY

## 2024-05-08 PROCEDURE — 99214 OFFICE O/P EST MOD 30 MIN: CPT | Performed by: OBSTETRICS & GYNECOLOGY

## 2024-05-09 LAB — HPV I/H RISK 1 DNA SPEC QL NAA+PROBE: NEGATIVE

## 2024-05-13 LAB
.: NORMAL
.: NORMAL

## 2024-05-15 ENCOUNTER — OFFICE VISIT (OUTPATIENT)
Dept: UROLOGY | Facility: HOSPITAL | Age: 49
End: 2024-05-15
Attending: OBSTETRICS & GYNECOLOGY

## 2024-05-15 ENCOUNTER — HOSPITAL ENCOUNTER (OUTPATIENT)
Dept: ULTRASOUND IMAGING | Age: 49
Discharge: HOME | End: 2024-05-15
Attending: SURGERY

## 2024-05-15 ENCOUNTER — HOSPITAL ENCOUNTER (OUTPATIENT)
Dept: ULTRASOUND IMAGING | Age: 49
Discharge: HOME | End: 2024-05-15
Attending: OBSTETRICS & GYNECOLOGY

## 2024-05-15 VITALS — BODY MASS INDEX: 33.87 KG/M2 | TEMPERATURE: 98 F | HEIGHT: 59 IN | WEIGHT: 168 LBS

## 2024-05-15 DIAGNOSIS — N81.84 PELVIC MUSCLE WASTING: ICD-10-CM

## 2024-05-15 DIAGNOSIS — N39.0 RECURRENT UTI: ICD-10-CM

## 2024-05-15 DIAGNOSIS — N20.0 NEPHROLITHIASIS: ICD-10-CM

## 2024-05-15 DIAGNOSIS — N39.41 URGE INCONTINENCE: ICD-10-CM

## 2024-05-15 DIAGNOSIS — N81.6 RECTOCELE: ICD-10-CM

## 2024-05-15 DIAGNOSIS — N93.9 ABNORMAL UTERINE BLEEDING (AUB): ICD-10-CM

## 2024-05-15 DIAGNOSIS — N81.11 CYSTOCELE, MIDLINE: ICD-10-CM

## 2024-05-15 DIAGNOSIS — R33.9 INCOMPLETE BLADDER EMPTYING: ICD-10-CM

## 2024-05-15 DIAGNOSIS — N93.0 POSTCOITAL BLEEDING: ICD-10-CM

## 2024-05-15 DIAGNOSIS — N32.81 DETRUSOR INSTABILITY: Primary | ICD-10-CM

## 2024-05-15 PROCEDURE — 87086 URINE CULTURE/COLONY COUNT: CPT | Performed by: OBSTETRICS & GYNECOLOGY

## 2024-05-15 PROCEDURE — 99212 OFFICE O/P EST SF 10 MIN: CPT

## 2024-05-15 PROCEDURE — 76770 US EXAM ABDO BACK WALL COMP: CPT | Performed by: SURGERY

## 2024-05-15 PROCEDURE — 76830 TRANSVAGINAL US NON-OB: CPT | Performed by: OBSTETRICS & GYNECOLOGY

## 2024-05-15 PROCEDURE — 51701 INSERT BLADDER CATHETER: CPT | Performed by: OBSTETRICS & GYNECOLOGY

## 2024-05-15 PROCEDURE — 76856 US EXAM PELVIC COMPLETE: CPT | Performed by: OBSTETRICS & GYNECOLOGY

## 2024-05-15 NOTE — PROGRESS NOTES
Patient presents to follow up DO/UUI, incomplete bladder emptying    She is currently using PTNS, last in Feb    She reports 85% improvement     Sees urology for kidney stones    Oxybutynin, constipation  Myrbetriq, not helpful  Gemtesa not covered well    Bowels reg  Denies recent UTI  1/26 10-50k klebsiella    H/o bactrim 10/23-1/24 suppression    CISC daily, reports PVR ~ 100cc, not measuring void    Considering sacral neuromodulation    Denies s/sx of UTI currently    Temp 98.2 °F (36.8 °C) (Oral)   Ht 59\"   Wt 168 lb (76.2 kg)   LMP 03/22/2024   BMI 33.93 kg/m²     GEN: NAD  CV: RRR  Pulm: nl effort  Abd: soft    PELVIC EXAM:  Ext. Gen: no atrophy, no lesions  Urethra: no atrophy, nontender  Bladder:+fullness, nontender  Vagina: no atrophy  Cervix: no bleeding, no lesions, nontender  Uterus: +mobile  Adnexa:no masses, nontender  Perineum: nontender  Anus: wnl  Rectum: defer     PELVIS FLOOR NEUROMUSCULAR FUNCTION:  Strength:  1 and Unable to hold greater than 3 sec  Perineal Sensation:  Normal        PELVIC SUPPORT:  [place]:  1  Ant:  2  Post:  2  CST:  negative  UVJ: +hypermobile     Voided in BR 75cc, sterile technique used to empty bladder, specimen sent (10cc)  Verbal consent obtained prior to cath      Impression/Plan:    ICD-10-CM    1. Detrusor instability  N32.81       2. Incomplete bladder emptying  R33.9 Urine Culture, Routine     Straight Cath PVR      3. Urge incontinence  N39.41 Urine Culture, Routine      4. Pelvic muscle wasting  N81.84       5. Recurrent UTI  N39.0 Urine Culture, Routine      6. Cystocele, midline  N81.11       7. Rectocele  N81.6           Discussed dietary and behavioral modifications for mgmt of urinary symptoms  Discussed weight management and benefits of weight loss on urinary symptoms  Reviewed AUA/SUFU guidelines on mgmt of non-neurogenic OAB  Discussed pharmacologic and nonpharmacologic mgmt options of urinary symptoms - reviewed risks, benefits, alternatives, and  goals of treatment  Discussed specific risks related to OAB meds including, but not limited to dry mouth, constipation, blurry vision, cognitive changes, and BP elevation.  Resume PTNS maintenance  Bladder diet/drill    Discussed management of pelvic organ prolapse including but not limited to behavioral modifications, conservative options, and surgical management.   Discussed pessary management including benefits and risks. Discussed importance of keeping regularly scheduled pessary checks in prevention of complications related to pessary use.   Daily pelvic exercises    Discussed management of recurrent urinary tract infections. Discussed use of pharmacologic treatment options for suppression therapy. Risks vs benefits reviewed with patient   Follow urinary sx  Follow ucx, tx if +  Call with s/sx of UTI      All questions answered  She understands and agrees to plan    Return for PTNS.    Lara Jim, DO, FACOG, FACS    The 21st Century Cures Act makes medical notes like these available to patients in the interest of transparency. However, be advised this is a medical document. It is intended as peer to peer communication. It is written in medical language and may contain abbreviations or verbiage that are unfamiliar. It may appear blunt or direct. Medical documents are intended to carry relevant information, facts as evident, and the clinical opinion of the practitioner.

## 2024-05-16 ENCOUNTER — TELEPHONE (OUTPATIENT)
Dept: SURGERY | Facility: CLINIC | Age: 49
End: 2024-05-16

## 2024-05-16 DIAGNOSIS — N20.0 KIDNEY STONE: Primary | ICD-10-CM

## 2024-05-16 NOTE — TELEPHONE ENCOUNTER
This encounter is now closed.     RN called and relayed PA-'s message to complete CT ASAP  Encouraged to call Central Scheduling now to set up appt  She agreed to plans.

## 2024-05-16 NOTE — TELEPHONE ENCOUNTER
Patient states they feel they are passing a stone. Patient is unsure if it is the right or left kidney. Patient states the left is more inflamed than the right however they are both bothering her. Patient states to feel bloated, nauseous, is experiencing headaches and is sensitive to touch. Per patient they had ultrasound done but was unable to press down much due to pain. Patient states pain is now consistent as before it was more less often. Patient has been feeling this x5days

## 2024-05-17 ENCOUNTER — HOSPITAL ENCOUNTER (OUTPATIENT)
Dept: CT IMAGING | Facility: HOSPITAL | Age: 49
Discharge: HOME | End: 2024-05-17

## 2024-05-17 DIAGNOSIS — N20.0 KIDNEY STONE: ICD-10-CM

## 2024-05-17 PROCEDURE — 74176 CT ABD & PELVIS W/O CONTRAST: CPT

## 2024-05-20 ENCOUNTER — TELEPHONE (OUTPATIENT)
Facility: CLINIC | Age: 49
End: 2024-05-20

## 2024-05-20 NOTE — PROGRESS NOTES
Started OCP Slynd yet? Not yet.   Her maternal grandmother had thickened endometrium and was dx with ovarian cancer, also had other cancer but unsure of what type.      Discussed providers message regarding results and recommendation below:     Pelvic US images & report reviewed. Endometrium is thick. Left ovary cyst 3.7 cm is simple. Smaller simple cyst in right ovary. Likely reflecting ovulatory dysfunction.     Would advise hysteroscopy, D&C, possible polypectomy/myomectomy, Mirena IUD insertion in OR for menometrorrhagia, postcoital bleeding     Referral's contact can be seen in her eFans mack under referral.     Patient verbalized understanding, agreed to and intend to comply with plan of care.

## 2024-05-20 NOTE — TELEPHONE ENCOUNTER
Patient called to speak to a nurse with questions on 's suggestions on procedures relay further family history.

## 2024-05-20 NOTE — TELEPHONE ENCOUNTER
Pt aware no dates for surgery will be available until  August.Will call pt if any thing becomes available.Pt verbalized understanding.

## 2024-05-21 ENCOUNTER — LAB ENCOUNTER (OUTPATIENT)
Dept: LAB | Age: 49
End: 2024-05-21
Attending: SURGERY

## 2024-05-21 DIAGNOSIS — N93.0 POSTCOITAL BLEEDING: ICD-10-CM

## 2024-05-21 DIAGNOSIS — N92.1 MENOMETRORRHAGIA: Primary | ICD-10-CM

## 2024-05-21 DIAGNOSIS — N20.0 NEPHROLITHIASIS: ICD-10-CM

## 2024-05-21 PROCEDURE — 84392 ASSAY OF URINE SULFATE: CPT

## 2024-05-21 PROCEDURE — 82436 ASSAY OF URINE CHLORIDE: CPT

## 2024-05-21 PROCEDURE — 83735 ASSAY OF MAGNESIUM: CPT

## 2024-05-21 PROCEDURE — 84105 ASSAY OF URINE PHOSPHORUS: CPT

## 2024-05-21 PROCEDURE — 84560 ASSAY OF URINE/URIC ACID: CPT

## 2024-05-21 PROCEDURE — 82340 ASSAY OF CALCIUM IN URINE: CPT

## 2024-05-21 PROCEDURE — 84300 ASSAY OF URINE SODIUM: CPT

## 2024-05-21 PROCEDURE — 84133 ASSAY OF URINE POTASSIUM: CPT

## 2024-05-21 PROCEDURE — 82507 ASSAY OF CITRATE: CPT

## 2024-05-21 PROCEDURE — 83986 ASSAY PH BODY FLUID NOS: CPT

## 2024-05-21 PROCEDURE — 83945 ASSAY OF OXALATE: CPT

## 2024-05-23 ENCOUNTER — TELEPHONE (OUTPATIENT)
Dept: SURGERY | Facility: CLINIC | Age: 49
End: 2024-05-23

## 2024-05-23 NOTE — TELEPHONE ENCOUNTER
CT reviewed and no obstructing ureteral stones. Non-obstructing small stones in each kidney but a lot are likely Jero's plaques. Having mild bilateral flank discomfort, I think it is likely unrelated to kidneys.    Adrenal adenoma testing at next visit.

## 2024-05-29 LAB
AMMONIA, URINE: 27 MMOL/24 HR
CHLORIDE URINE: 162 MMOL/24 HR
CITRIC ACID(CITRATE): 195 MG/24 HR
CREATININE, URINE: 1275 MG/24 HR
CREATININE/KG BODY WEIGHT, URINE: 17 MG/24 HR/KG
CREATININE/KG BODY WEIGHT, URINE: 17 MG/24 HR/KG
LC CALCIUM OXALATE SATURATION, URINE: 3.59
LC CALCIUM PHOSPHATE SATURATION, URINE: 1.35
LC CALCIUM, URINE: 177 MG/24 HR
LC CALCIUM/CREATININE RATIO, URINE: 139 MG/G CREAT
LC CALCIUM/KG BODY WEIGHT, URINE: 2.4 MG/24 HR/KG
MAGNESIUM, URINE: 108 MG/24 HR
OXALATES, URINE: 27 MG/24 HR
PH, 24 HR URINE: 6.66
PHOSPHORUS, URINE: 784 MG/24 HR
POTASSIUM, URINE: 53 MMOL/24 HR
PROTEIN CATABOLIC RATE, URINE: 1 G/KG/24 HR
PROTEIN CATABOLIC RATE, URINE: 1 G/KG/24 HR
SODIUM, URINE: 153 MMOL/24 HR
SULFATE, URINE: 23 MEQ/24 HR
UREA NITROGEN, URINE: 9.21 G/24 HR
UREA NITROGEN, URINE: 9.21 G/24 HR
URIC ACID SATURATION, URINE: 0.14
URIC ACID SATURATION, URINE: 0.14
URIC ACID, URINE: 629 MG/24 HR
URINE VOLUME (PRESERVATIVE): 2300 ML/24 HR

## 2024-06-03 ENCOUNTER — TELEPHONE (OUTPATIENT)
Dept: SURGERY | Facility: CLINIC | Age: 49
End: 2024-06-03

## 2024-06-03 NOTE — TELEPHONE ENCOUNTER
24 Hour Metabolic Urine Study Results  5/21/24    Volume: 2300 mL/day  Goal >2500  Sodium: 153 mmol/day  Goal <150  Calcium: 177 mg/day  Goal <250  Uric acid: 629 mg/day  Goal <750  Citrate: 195 mg/day  Goal >450  Oxalate: 27 mg/day  Goal <40  pH: 6.65  Goal 5.8 - 6.2 (>6 for UA stones, >7 for cystine stone)  Creatinine: 1275 mg/day  Normal 18-20 mg/kilogram/day (female)  Normal 20-22 mg/kilogram/day (male)  Cystinuria present? No    Last stone analysis: 50% COM, 50% calcium phosphate  Serum calcium: 9.2    Apt 6/6/24 to review.   [id number]

## 2024-06-06 ENCOUNTER — OFFICE VISIT (OUTPATIENT)
Dept: SURGERY | Facility: CLINIC | Age: 49
End: 2024-06-06
Payer: MEDICAID

## 2024-06-06 DIAGNOSIS — N32.81 OAB (OVERACTIVE BLADDER): ICD-10-CM

## 2024-06-06 DIAGNOSIS — N20.0 KIDNEY STONE: Primary | ICD-10-CM

## 2024-06-06 LAB
APPEARANCE: CLEAR
BILIRUBIN: NEGATIVE
GLUCOSE (URINE DIPSTICK): NEGATIVE MG/DL
KETONES (URINE DIPSTICK): NEGATIVE MG/DL
LEUKOCYTES: NEGATIVE
MULTISTIX LOT#: ABNORMAL NUMERIC
NITRITE, URINE: NEGATIVE
PH, URINE: 5.5 (ref 4.5–8)
PROTEIN (URINE DIPSTICK): 100 MG/DL
SPECIFIC GRAVITY: >=1.03 (ref 1–1.03)
URINE-COLOR: YELLOW
UROBILINOGEN,SEMI-QN: 0.2 MG/DL (ref 0–1.9)

## 2024-06-06 PROCEDURE — 99214 OFFICE O/P EST MOD 30 MIN: CPT | Performed by: SURGERY

## 2024-06-06 PROCEDURE — 81003 URINALYSIS AUTO W/O SCOPE: CPT | Performed by: SURGERY

## 2024-06-06 RX ORDER — DEXAMETHASONE 1 MG
1 TABLET ORAL ONCE
Qty: 1 TABLET | Refills: 0 | Status: SHIPPED | OUTPATIENT
Start: 2024-06-06 | End: 2024-06-06

## 2024-06-06 RX ORDER — POTASSIUM CITRATE 15 MEQ/1
1 TABLET, EXTENDED RELEASE ORAL 2 TIMES DAILY
Qty: 180 TABLET | Refills: 6 | Status: SHIPPED | OUTPATIENT
Start: 2024-06-06

## 2024-06-06 NOTE — PROGRESS NOTES
Urology Clinic Note    Primary Care Provider:  Fan Hernandez MD     Chief Complaint:   Nephrolithiasis, overactive bladder     HPI:   Nadine Calix is a 49 year old female with history of anxiety, GERD, hypertension, OAB improved after PTNS referred for recurrent nephrolithiasis.      She has been previously followed by multiple urologists at [facility] u[facility].  Her most recent stone surgery was a right ureteroscopy, laser lithotripsy, stent placement, nephrostomy tube removal with Dr. Jim on 7/8/2022.  She has a tortuous proximal right ureter and during a prior ureteroscopy access was lost to the kidney so nephrostomy tube was placed.  She is currently on hydrochlorothiazide and potassium citrate.  She has not had a 24-hour urine study for a while.     She recently presented to the ED on 9/17/2023 for abdominal pain bilaterally.  I reviewed her CT scan and this showed bilateral nephrolithiasis left greater than right.  I brought her to the OR on 1/22/2024 for left ureteroscopy, laser lithotripsy, stone extraction, stent placement, right retrograde pyelogram (no distinct strictures, slow drainage from start to finish of case).  I removed at least 20 stones from the left kidney and she had extensive Jero plaques as well.  She also had anterior prolapse on exam.  Stent was removed in clinic on 2/1/2024.     CT scan 1/26/2024 shows left ureteral stent position, bilateral renal calcifications more likely consistent with medullary calcinosis/intraparenchymal stones.     24-hour urine study shows slightly low urine volume, slightly high urine sodium, slightly low urine citrate.  Her potassium citrate dose was increased to twice daily recently.  She remains on hydrochlorothiazide as well.  Repeat 24-hour urine study on 5/21/2024 shows excellent urine volume, normal urinary calcium, lower urine citrate than prior, pH 6.65.     Lasix renal scan 2/23/2024 shows 63% function from the right kidney, 37% function from the  left kidney, no signs of obstruction with adequate drainage.     She continues to have bothersome OAB every 30 minutes.  She is getting PTNS  with some improvement but it is still very bothersome to her.  I discussed other options of bladder Botox and InterStim briefly. She saw Dr. Cummins for eval of possible Interstim who recommended UDS and cysto prior.    CT 5/17/24 showed no obstructing ureteral stones. Non-obstructing small stones in each kidney but a lot are likely Jero's plaques. Having mild bilateral flank discomfort, I think it is likely unrelated to kidneys. 11mm right adrenal adenoma.    She is having intermittent nausea, bilateral flank pain, vaginal bleeding.  She is planned to undergo biopsy with gynecology.  She has been taking potassium citrate once daily.    24 Hour Metabolic Urine Study Results  5/21/24     Volume: 2300 mL/day  Goal >2500  Sodium: 153 mmol/day  Goal <150  Calcium: 177 mg/day  Goal <250  Uric acid: 629 mg/day  Goal <750  Citrate: 195 mg/day  Goal >450  Oxalate: 27 mg/day  Goal <40  pH: 6.65  Goal 5.8 - 6.2 (>6 for UA stones, >7 for cystine stone)  Creatinine: 1275 mg/day  Normal 18-20 mg/kilogram/day (female)  Normal 20-22 mg/kilogram/day (male)  Cystinuria present? No     Last stone analysis: 50% COM, 50% calcium phosphate  Serum calcium: 9.2  PTH: 33.9 (normal)     24 Hour Metabolic Urine Study Results (3/2024)     Volume: 1700 mL/day  Goal >2500  Sodium: 165 mmol/day  Goal <150  Calcium: 170 mg/day  Goal <250  Uric acid: 510 mg/day  Goal <750  Citrate: 349 mg/day  Goal >450  Oxalate: 23.8 mg/day  Goal <40  pH: 6.5  Goal 5.8 - 6.2 (>6 for UA stones, >7 for cystine stone)  Creatinine: 1411 mg/day  Normal 18-20 mg/kilogram/day (female)  Normal 20-22 mg/kilogram/day (male)  Cystinuria present? No      Urinalysis: Large blood, otherwise negative    History:     Past Medical History:    Abdominal distention    Longer than this but this is an estimate    Abdominal hernia    I had  hernia repair around by my belly button & abdominal    Abdominal pain    Currently seeing obgyn, urologist & nephrologist    Anxiety state    resolved    Arrhythmia    fast heart rate due to anxiety according to patient, pvc    Back pain    Mid to lower back pain & hip pain    Back problem    lower back    Bloating    Longer than this but this is an estimate    Blood in urine    Due to severe kidney stones    Blurred vision    I always wore glasses or contacts since about 14 yrs old    Calculus of kidney    hydronephrosis/ several times kidney stones/ 13 th procedure for stones    Change in hair    My hair is thinning more than the usual    Chest pain    I did see a cardiologist about this    Chest pain on exertion    I did see a cardiologists about this    COVID-19    headache, back pain, shortness of breath, sore throat, runny nose, chills. Not hospitalized    COVID-19    high fever, fatigue, not hospitalized    Dense breasts    heterogeneously dense breasts    Depression    resolved    Diarrhea, unspecified    I notice that certain things run right through me now    Disorder of liver    elevated AST/ALT    Disorder of thyroid    nodules only    Dizziness    Easy bruising    I find bruises on me and don’t really know how I got them    ESBL E. 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 one but I never really checke    Heart palpitations    Pvc’s and irregular heart rate    Hemorrhoids    After last pregnancy or a couple years after that    High blood pressure    triamterene    History of cardiac murmur    Mother told  me I was born with one & my dr as well    History of D&C    History of depression    When I had a miscarriage    History of mental disorder    Family history of this on my mother’s side    Indigestion    Longer than this but this is an estimate    Itch of skin    Not severe but I have been itching for no apparent reason    Kidney stones    Leaking of urine    After first pregnancy    Leg swelling    My left leg slightly swelled up and my ankle area was hurtin    Loss of appetite    I noticed this lately too    Menometrorrhagia    Hysteroscopy 10/2020 path favors anovulatory breakdown bleeding.    Migraines    Nausea    Longer than this but this is an estimate    Night sweats    I feel like I’m on fire at night.    OAB (overactive bladder)    PTNS - posterior tibial nerve stimulation with urogynecologist Dr. Lara Jim    Ovarian cyst    Painful urination    When passing stones    Post partum depression    Postcoital bleeding    Prior to hysteroscopy done 10/2020. Also having since at least  or     PVC (premature ventricular contraction)    Rash    Broke out with unknown rash all over legs and arms    Sexually transmitted disease    HPV    Shortness of breath    Seen cardiologist about this    Sleep disturbance    For years sometimes I can’t lay on my left side    Stress    Rough up bringing and also regular family stresses    Uncomfortable fullness after meals    Longer than this but this is an estimate    Unspecified condition originating in the  period    Urinary incontinence    Constantly going can’t hold it    Visual impairment    glasses    Wears glasses    Since I was about 14 yrs old    Weight gain    After last son was born       Past Surgical History:   Procedure Laterality Date    Abdominal surgery      2 hernia repairs right & left side      ,2016    x2    Cholecystectomy      Colonoscopy N/A 2021    Procedure: COLONOSCOPY, ESOPHAGOGASTRODUODENOSCOPY with biopsy;   Surgeon: Prashant Lilly DO;  Location:  ENDOSCOPY    Colonoscopy  04/07/2021 4/7/2021 EGD WITH BIOPSY. Colonoscopy internal hemorrhoids. No gross findings to explain left sided pain. Recall 10 years. Prashant Lilly DO    Colposcopy, cervix w/upper adjacent vagina; w/biopsy(s), cervix      Cryocautery of cervix  2012    Cysto/uretero w/up stricture Right 10/15/2019    Cysto Rt RPG, Rt URS w/ Laser Litho Dilation of Rtight Stricture Rt URS Stent Exchange w/ Dr Snell    Cysto/uretero, stone remove Right 12/18/2019    right ureter and kidney stones extraction, URS, stent placement    Cystoscopy,ureteroscopy,lithotripsy Right 08/23/2019    Cysto, B/L RPG, URS, laser litho, stone ext, Rt stent Dr. Snell    Egd  04/07/2021    for left sided pain    Hc endometrial sampling w or wo endocerv sampling  08/18/2023    EMB- Dr. Ramirez - disordered proliferative endometrium - recommended medical management options to regulate cycle.    Hernia surgery  01/2000    L hernia repair    Hysteroscopy,with sampling  10/08/2020    Hysteroscopy, D&C for intermittent menorrhagia, thickened endometrium, cervical stenosis. H/o menometrorrhagia & postcoital bleeding. Dr. Linnette Antoine. Path Endometrium with stromal collapse, superficial fibrin thrombi, and some polypoid tissue fragments with increased stromal fibrous component. Endometrial glands are proliferative. Overall pattern favors anovulatory breakdown bleeding.    Kidney surgery Right     stent placement 6/2022    Laparoscopy,pelvic,biopsy      Kidney stones & examine    Lithotripsy  2001, 2007 & 11/11    Needle biopsy left  02/2021    fibroadenoma    Needle biopsy right  02/2021    fibroadenoma    Other Bilateral 2012    nerve surgery to bilateral arms about 1 month apart    Other      percutaneous nephrolithotomy    Other surgical history  12/27/2019    cysto stent removal - dr. snell     Other surgical history  06/03/2020    Cysto Stent Removal w/   Hernán    Other surgical history  2021    Cysto/Stent Removal Dr. Jim    Other surgical history  2022    Right ureteroscopy, laser lithotripsy, stent placement, nephrostomy tube removal with Dr. Tariq    Removal gallbladder  1998    Remove stent via transureth Right 2019    Cysto Stent Removal w/ Dr Jim    Repair ing hernia,5+y/o,reducibl         Family History   Problem Relation Age of Onset    Diabetes Mother     High Cholesterol Mother     Hypertension Mother     Thyroid disease Mother         hypothroid    Other (Other) Mother         Part of thyroid removed    Heart Disorder Father         3 stents in heart    Diabetes Father     Hypertension Father     Hypertension Sister     Other (ovarian problem) Sister         work up in progress    Diabetes Sister     Other (Other) Sister         Liver problem    Diabetes Maternal Grandmother     Ovarian Cancer Maternal Grandmother          at 54    Diabetes Maternal Grandfather     Diabetes Paternal Grandmother     Diabetes Paternal Grandfather     Other (Other) Daughter         Appendicitis    Other (Other) Son         Appendicitis    Pancreatic Cancer Maternal Uncle     Breast Cancer Neg     Colon Cancer Neg        Social History     Socioeconomic History    Marital status: Life Partner   Tobacco Use    Smoking status: Never    Smokeless tobacco: Never   Vaping Use    Vaping status: Never Used   Substance and Sexual Activity    Alcohol use: No    Drug use: No    Sexual activity: Yes     Partners: Male   Other Topics Concern    Caffeine Concern No    Exercise No    Seat Belt Yes       Medications (Active prior to today's visit):  Current Outpatient Medications   Medication Sig Dispense Refill    FIBER OR Take 1 tablet by mouth daily.      triamterene-hydroCHLOROthiazide 37.5-25 MG Oral Cap Take 1 capsule by mouth every morning. (Patient not taking: Reported on 5/15/2024) 90 capsule 1    albuterol (PROAIR HFA) 108 (90 Base) MCG/ACT  Inhalation Aero Soln Inhale 2 puffs into the lungs every 4 (four) hours as needed for Wheezing. 1 each 1    Multiple Vitamin (ONE-DAILY MULTI VITAMINS) Oral Tab Take 1 tablet by mouth daily.         Allergies:  Allergies   Allergen Reactions    Amlodipine SWELLING     Leg swelling    Metoprolol SHORTNESS OF BREATH    Toradol [Ketorolac Tromethamine] SHORTNESS OF BREATH    Tramadol SHORTNESS OF BREATH    Dilaudid [Hydromorphone] ITCHING and PAIN     Headache - per pt this is only with long term use - pt states she can tolerate this medication    Oxycodone PAIN     headache    Wellbutrin [Bupropion Hcl] PAIN and DIZZINESS     Headache    Valsartan OTHER (SEE COMMENTS)     Chest pain      Codeine Sulfate ITCHING     TABS    Hydrocodone ITCHING    Morphine ITCHING    Seasonal Runny nose       Review of Systems:   A comprehensive 10-point review of systems was completed.  Pertinent positives and negatives are noted in the the HPI.    Physical Exam:   CONSTITUTIONAL: Well developed, well nourished, in no acute distress  NEUROLOGIC: Alert and oriented  HEAD: Normocephalic, atraumatic  EYES: Sclera non-icteric  ENT: Hearing intact, moist mucous membranes  NECK: No obvious goiter or masses  RESPIRATORY: Normal respiratory effort  SKIN: No evident rashes  ABDOMEN: Soft, non-tender, non-distended    Assessment & Plan:   Nadine Calix is a 49 year old female with history of anxiety, GERD, hypertension, OAB improved after PTNS referred for recurrent nephrolithiasis.      She has been previously followed by multiple urologists at [facility] urolog.  Her most recent stone surgery was a right ureteroscopy, laser lithotripsy, stent placement, nephrostomy tube removal with Dr. Jim on 7/8/2022.  She has a tortuous proximal right ureter and during a prior ureteroscopy access was lost to the kidney so nephrostomy tube was placed.  She is currently on hydrochlorothiazide and potassium citrate.  She has not had a 24-hour urine study  for a while.     She recently presented to the ED on 9/17/2023 for abdominal pain bilaterally.  I reviewed her CT scan and this showed bilateral nephrolithiasis left greater than right.  I brought her to the OR on 1/22/2024 for left ureteroscopy, laser lithotripsy, stone extraction, stent placement, right retrograde pyelogram (no distinct strictures, slow drainage from start to finish of case).  I removed at least 20 stones from the left kidney and she had extensive Jero plaques as well.  She also had anterior prolapse on exam.  Stent was removed in clinic on 2/1/2024.     CT scan 1/26/2024 shows left ureteral stent position, bilateral renal calcifications more likely consistent with medullary calcinosis/intraparenchymal stones.     24-hour urine study shows slightly low urine volume, slightly high urine sodium, slightly low urine citrate.  Her potassium citrate dose was increased to twice daily recently.  She remains on hydrochlorothiazide as well.  Repeat 24-hour urine study on 5/21/2024 shows excellent urine volume, normal urinary calcium, lower urine citrate than prior, pH 6.65.     Lasix renal scan 2/23/2024 shows 63% function from the right kidney, 37% function from the left kidney, no signs of obstruction with adequate drainage.     She continues to have bothersome OAB every 30 minutes.  She is getting PTNS  with some improvement but it is still very bothersome to her.  I discussed other options of bladder Botox and InterStim briefly. She saw Dr. Cummins for eval of possible Interstim who recommended UDS and cysto prior.    CT 5/17/24 showed no obstructing ureteral stones. Non-obstructing small stones in each kidney but a lot are likely Jero's plaques. Having mild bilateral flank discomfort, I think it is likely unrelated to kidneys. 11mm right adrenal adenoma.    She is having intermittent nausea, bilateral flank pain, vaginal bleeding.  She is planned to undergo biopsy with gynecology.  She has been  taking potassium citrate once daily.    -Increase potassium citrate to twice daily  -Good fluid intake, limit salt in diet  -Normal dietary calcium  -Continue hydrochlorothiazide  -Adrenal adenoma workup: Dexamethasone suppression test, metanephrines, aldosterone/renin  -Continue PTNS with urogynecology    In total, 30 minutes were spent on this patient encounter (including chart review, patient history, physical, and counseling, documentation, and communication).    Bryant Garza MD  Staff Urologist  [facility]  Office: [phone]

## 2024-06-07 ENCOUNTER — TELEPHONE (OUTPATIENT)
Dept: SURGERY | Facility: CLINIC | Age: 49
End: 2024-06-07

## 2024-06-07 NOTE — TELEPHONE ENCOUNTER
Pt called.  Appointment yesterday 6-6-24.  Ordered three tests. Question.  How many hours after taking the medication should the pt have the test done.  Morning.    Please call

## 2024-06-07 NOTE — TELEPHONE ENCOUNTER
This encounter is now closed.     RN called patient. Reviewed orders/ instruction. Instructions given. Patient agreed to plans.     Medication Detail    Medication Quantity Refills Start End   dexamethasone 1 MG Oral Tab () 1 tablet 0 2024   Sig:   Take 1 tablet (1 mg total) by mouth one time for 1 dose. Take at 11 PM night before lab tests.     Route:   Oral

## 2024-06-10 ENCOUNTER — LAB ENCOUNTER (OUTPATIENT)
Dept: LAB | Age: 49
End: 2024-06-10
Attending: FAMILY MEDICINE
Payer: MEDICAID

## 2024-06-10 ENCOUNTER — NURSE ONLY (OUTPATIENT)
Dept: UROLOGY | Facility: HOSPITAL | Age: 49
End: 2024-06-10
Attending: OBSTETRICS & GYNECOLOGY
Payer: MEDICAID

## 2024-06-10 VITALS — BODY MASS INDEX: 33.87 KG/M2 | HEIGHT: 59 IN | WEIGHT: 168 LBS

## 2024-06-10 DIAGNOSIS — N39.41 URGE INCONTINENCE: Primary | ICD-10-CM

## 2024-06-10 DIAGNOSIS — N20.0 KIDNEY STONE: ICD-10-CM

## 2024-06-10 LAB — CORTIS SERPL-MCNC: 0.9 UG/DL

## 2024-06-10 PROCEDURE — 84244 ASSAY OF RENIN: CPT

## 2024-06-10 PROCEDURE — 82533 TOTAL CORTISOL: CPT

## 2024-06-10 PROCEDURE — 36415 COLL VENOUS BLD VENIPUNCTURE: CPT

## 2024-06-10 PROCEDURE — 83835 ASSAY OF METANEPHRINES: CPT

## 2024-06-10 PROCEDURE — 82088 ASSAY OF ALDOSTERONE: CPT

## 2024-06-10 PROCEDURE — 64566 NEUROELTRD STIM POST TIBIAL: CPT

## 2024-06-10 NOTE — PROGRESS NOTES
[facility]  URGENT PC Therapy Note    Date:  6/10/2024    Patient Name:  Nadine Calix  Patient :  3/22/1975   Patient MRN:  [id number]  Patient Age:  49 year old      Diagnosis:  N39.41 Urge Incontinence    Procedure:  Right URGENT PC Therapy:  Posterior tibial nerve stimulation treatment  97912 - posterial tibial neuro stimulation, percutaneous needle electrode, single treatment, includes programming    PTNS Evaluation:  PTNS Session: Monthly Follow-Up  Patient reports feeling: Same  Nocturia: 4+ (3-5x)  Frequency: <1 hr  Patient wears pads: Yes  Pads per day: 4 (2-4 pantyliners)  Pads per night: 2  Physician:  Dr. Lara Jim    RN:  Nola COX CMA     Indications:  Urinary frequency, urge incontinence, with subjectively severe urge incontinence and inadequate to anti-cholinergic meds.  Informed consent was obtained with discussion of risk, benefits and goal, and limits of the procedure including but not limited to bleeding, infection, andnerve injury were discussed and the patient desired to proceed.      Description of Procedure:    The patient was properly identified and positioned in a semi reclined, comfortable seated position with their feet elevated in a recliner or exam room chair.    The insertion site for the needle electrode was identified on the lower inner aspect of the Right leg.  This position utilized was approximately 5 cm cephalad to the medial malleolus and one finger breath/2cm posterior to the tibia.    Preparation of the needle electrode insertion site was performed using an alcohol pad to clean skin of the above-noted needle insertion site.    The needle electrode/guide tube assembly was placed over the identified and cleaned insertion site.      Once the needle electrode had been placed into the skin, the guide tube was removed and the needle was gently advanced maintaining a 60 degree angle.      A surface electrode was placed over the medial aspect  of the calcaneus on the lower extremity utilized for stimulation.    Current adjustment was slowly increased while observing the patient's foot for response. Adjustments were made advancing the needle further in to obtain optimal sensation.    An optimal sensation in the foot was noted.Once an optimal response was noted, therapy mode was then initiated. The pt was given a bell to ring for the RN to be able to call for any needed adjustments.    Therapy continued without complication for 30 minutes.  No additional current adjustments were needed.    Once 30 minutes of therapy and completed stimulator was turned off and the needle, electrode clip and surface electrode were removed.      This completed the therapy.  The patient tolerated the procedure well.    Plan:  Return for monthly PTNS, sooner prn.

## 2024-06-13 LAB
ALDOST/RENIN RATIO: 48.9
ALDOSTERONE: 9.2 NG/DL
RENIN ACTIVITY: 0.19 NG/ML/HR

## 2024-06-14 NOTE — PROGRESS NOTES
Labs 6/10/2024 show aldosterone/renin ratio elevated at 48.9, cortisol 0.9 after dexamethasone suppression.  Plasma metanephrines pending.    Appointment 6/28/2024 to review.

## 2024-06-18 ENCOUNTER — TELEPHONE (OUTPATIENT)
Facility: CLINIC | Age: 49
End: 2024-06-18

## 2024-06-18 ENCOUNTER — LABORATORY ENCOUNTER (OUTPATIENT)
Dept: LAB | Age: 49
End: 2024-06-18
Attending: OBSTETRICS & GYNECOLOGY

## 2024-06-18 ENCOUNTER — EKG ENCOUNTER (OUTPATIENT)
Dept: LAB | Age: 49
End: 2024-06-18
Attending: OBSTETRICS & GYNECOLOGY

## 2024-06-18 DIAGNOSIS — N92.1 MENOMETRORRHAGIA: Primary | ICD-10-CM

## 2024-06-18 DIAGNOSIS — N92.1 MENOMETRORRHAGIA: ICD-10-CM

## 2024-06-18 DIAGNOSIS — N93.0 POSTCOITAL BLEEDING: ICD-10-CM

## 2024-06-18 LAB
ANION GAP SERPL CALC-SCNC: 7 MMOL/L (ref 0–18)
ATRIAL RATE: 69 BPM
BUN BLD-MCNC: 13 MG/DL (ref 9–23)
CALCIUM BLD-MCNC: 8.6 MG/DL (ref 8.5–10.1)
CHLORIDE SERPL-SCNC: 111 MMOL/L (ref 98–112)
CO2 SERPL-SCNC: 22 MMOL/L (ref 21–32)
CREAT BLD-MCNC: 0.77 MG/DL
EGFRCR SERPLBLD CKD-EPI 2021: 96 ML/MIN/1.73M2 (ref 60–?)
ERYTHROCYTE [DISTWIDTH] IN BLOOD BY AUTOMATED COUNT: 13.3 %
FASTING STATUS PATIENT QL REPORTED: YES
GLUCOSE BLD-MCNC: 112 MG/DL (ref 70–99)
HCT VFR BLD AUTO: 38.1 %
HGB BLD-MCNC: 12 G/DL
MCH RBC QN AUTO: 27.3 PG (ref 26–34)
MCHC RBC AUTO-ENTMCNC: 31.5 G/DL (ref 31–37)
MCV RBC AUTO: 86.6 FL
OSMOLALITY SERPL CALC.SUM OF ELEC: 291 MOSM/KG (ref 275–295)
P AXIS: 32 DEGREES
P-R INTERVAL: 128 MS
PLATELET # BLD AUTO: 355 10(3)UL (ref 150–450)
POTASSIUM SERPL-SCNC: 3.8 MMOL/L (ref 3.5–5.1)
Q-T INTERVAL: 398 MS
QRS DURATION: 72 MS
QTC CALCULATION (BEZET): 426 MS
R AXIS: 30 DEGREES
RBC # BLD AUTO: 4.4 X10(6)UL
SODIUM SERPL-SCNC: 140 MMOL/L (ref 136–145)
T AXIS: 6 DEGREES
VENTRICULAR RATE: 69 BPM
WBC # BLD AUTO: 6.5 X10(3) UL (ref 4–11)

## 2024-06-18 PROCEDURE — 93010 ELECTROCARDIOGRAM REPORT: CPT | Performed by: INTERNAL MEDICINE

## 2024-06-18 PROCEDURE — 36415 COLL VENOUS BLD VENIPUNCTURE: CPT

## 2024-06-18 PROCEDURE — 80048 BASIC METABOLIC PNL TOTAL CA: CPT

## 2024-06-18 PROCEDURE — 93005 ELECTROCARDIOGRAM TRACING: CPT

## 2024-06-18 PROCEDURE — 85027 COMPLETE CBC AUTOMATED: CPT

## 2024-06-22 PROBLEM — T81.89XA SUTURE GRANULOMA: Status: RESOLVED | Noted: 2021-09-14 | Resolved: 2024-06-22

## 2024-06-22 PROBLEM — R73.01 ELEVATED FASTING GLUCOSE: Status: ACTIVE | Noted: 2024-06-18

## 2024-06-26 ENCOUNTER — HOSPITAL ENCOUNTER (OUTPATIENT)
Dept: MAMMOGRAPHY | Facility: HOSPITAL | Age: 49
Discharge: HOME | End: 2024-06-26
Attending: OBSTETRICS & GYNECOLOGY

## 2024-06-26 DIAGNOSIS — N63.15 MASS OVERLAPPING MULTIPLE QUADRANTS OF RIGHT BREAST: ICD-10-CM

## 2024-06-26 PROCEDURE — 76641 ULTRASOUND BREAST COMPLETE: CPT | Performed by: OBSTETRICS & GYNECOLOGY

## 2024-06-26 PROCEDURE — 77062 BREAST TOMOSYNTHESIS BI: CPT | Performed by: OBSTETRICS & GYNECOLOGY

## 2024-06-26 PROCEDURE — 77066 DX MAMMO INCL CAD BI: CPT | Performed by: OBSTETRICS & GYNECOLOGY

## 2024-06-26 NOTE — IMAGING NOTE
This [facility] RN assisted Dr. Navarro with recommendation for a right breast 1 site ultrasound guided biopsy for mass.  Procedure reviewed and all questions answered. Emotional and educational support given. On the day of the biopsy, pt instructed to take Tylenol 1000mg PO, eat a light meal & bring or wear a sports bra.  Post biopsy care also reviewed with pt to include NO lifting more than 5lbs, no exercising or housework (limit upper body movement) for 24-48 hrs post biopsy. Patient denies blood thinners, bleeding disorders, liver disease, and chemo. Pt verbalized understanding.  Our breast center schedulers will be calling to schedule an appt that is convenient for pt.

## 2024-06-28 ENCOUNTER — TELEPHONE (OUTPATIENT)
Dept: SURGERY | Facility: CLINIC | Age: 49
End: 2024-06-28

## 2024-06-28 ENCOUNTER — VIRTUAL PHONE E/M (OUTPATIENT)
Dept: SURGERY | Facility: CLINIC | Age: 49
End: 2024-06-28

## 2024-06-28 DIAGNOSIS — N20.0 NEPHROLITHIASIS: ICD-10-CM

## 2024-06-28 DIAGNOSIS — D35.00 ADRENAL ADENOMA, UNSPECIFIED LATERALITY: Primary | ICD-10-CM

## 2024-06-28 PROCEDURE — 99213 OFFICE O/P EST LOW 20 MIN: CPT | Performed by: SURGERY

## 2024-06-28 RX ORDER — DEXAMETHASONE 1 MG
1 TABLET ORAL ONCE
Qty: 1 TABLET | Refills: 0 | Status: SHIPPED | OUTPATIENT
Start: 2024-06-28 | End: 2024-07-01 | Stop reason: ALTCHOICE

## 2024-06-28 NOTE — PROGRESS NOTES
Urology Clinic Note  Telemedicine Visit  Audio Only  The patient consented to performing this visit virtually.     Primary Care Provider:  Fan Hernandez MD     Chief Complaint:   Nephrolithiasis, overactive bladder     HPI:   Nadine Calix is a 49 year old female with history of anxiety, GERD, hypertension, OAB improved after PTNS referred for recurrent nephrolithiasis.      She has been previously followed by multiple urologists at [facility].  Her most recent stone surgery was a right ureteroscopy, laser lithotripsy, stent placement, nephrostomy tube removal with Dr. Jim on 7/8/2022.  She has a tortuous proximal right ureter and during a prior ureteroscopy access was lost to the kidney so nephrostomy tube was placed.  She is currently on hydrochlorothiazide and potassium citrate.  She has not had a 24-hour urine study for a while.     She recently presented to the ED on 9/17/2023 for abdominal pain bilaterally.  I reviewed her CT scan and this showed bilateral nephrolithiasis left greater than right.  I brought her to the OR on 1/22/2024 for left ureteroscopy, laser lithotripsy, stone extraction, stent placement, right retrograde pyelogram (no distinct strictures, slow drainage from start to finish of case).  I removed at least 20 stones from the left kidney and she had extensive Jero plaques as well.  She also had anterior prolapse on exam.  Stent was removed in clinic on 2/1/2024.     CT scan 1/26/2024 shows left ureteral stent in position, bilateral renal calcifications more likely consistent with medullary calcinosis/intraparenchymal stones.     24-hour urine study shows slightly low urine volume, slightly high urine sodium, slightly low urine citrate.  Her potassium citrate dose was increased to twice daily recently.  She remains on hydrochlorothiazide as well.  Repeat 24-hour urine study on 5/21/2024 shows excellent urine volume, normal urinary calcium, lower urine citrate than prior, pH  6.65.     Lasix renal scan 2/23/2024 shows 63% function from the right kidney, 37% function from the left kidney, no signs of obstruction with adequate drainage.     She continues to have bothersome OAB every 30 minutes.  She is getting PTNS  with some improvement but it is still very bothersome to her.  I discussed other options of bladder Botox and InterStim briefly. She saw Dr. Cummins for eval of possible Interstim who recommended UDS and cysto prior.     CT 5/17/24 showed no obstructing ureteral stones. Non-obstructing small stones in each kidney but a lot are likely Jero's plaques. Having mild bilateral flank discomfort, I think it is likely unrelated to kidneys. 11mm right adrenal adenoma.     She is having intermittent nausea, bilateral flank pain, vaginal bleeding.  She is planned to undergo biopsy with gynecology.  She has been taking potassium citrate once daily.    Labs 6/10/2024 show aldosterone/renin ratio elevated at 48.9, cortisol 0.9 after dexamethasone suppression. Plasma metanephrines are normal.     24 Hour Metabolic Urine Study Results  5/21/24     Volume: 2300 mL/day  Goal >2500  Sodium: 153 mmol/day  Goal <150  Calcium: 177 mg/day  Goal <250  Uric acid: 629 mg/day  Goal <750  Citrate: 195 mg/day  Goal >450  Oxalate: 27 mg/day  Goal <40  pH: 6.65  Goal 5.8 - 6.2 (>6 for UA stones, >7 for cystine stone)  Creatinine: 1275 mg/day  Normal 18-20 mg/kilogram/day (female)  Normal 20-22 mg/kilogram/day (male)  Cystinuria present? No     Last stone analysis: 50% COM, 50% calcium phosphate  Serum calcium: 9.2  PTH: 33.9 (normal)      24 Hour Metabolic Urine Study Results (3/2024)     Volume: 1700 mL/day  Goal >2500  Sodium: 165 mmol/day  Goal <150  Calcium: 170 mg/day  Goal <250  Uric acid: 510 mg/day  Goal <750  Citrate: 349 mg/day  Goal >450  Oxalate: 23.8 mg/day  Goal <40  pH: 6.5  Goal 5.8 - 6.2 (>6 for UA stones, >7 for cystine stone)  Creatinine: 1411 mg/day  Normal 18-20 mg/kilogram/day  (female)  Normal 20-22 mg/kilogram/day (male)  Cystinuria present? No      History:     Past Medical History:    Abdominal distention    Longer than this but this is an estimate    Abdominal hernia    I had hernia repair around by my belly button & abdominal    Abdominal pain    Currently seeing obgyn, urologist & nephrologist    Back pain    Mid to lower back pain & hip pain    Benign essential HTN    Bloating    Longer than this but this is an estimate    Blood in urine    Due to severe kidney stones    Blurred vision    I always wore glasses or contacts since about 14 yrs old    Calculus of kidney    hydronephrosis/ several times kidney stones/ 13 th procedure for stones    Cervicalgia    Log Date: 05/04/2012         Change in hair    My hair is thinning more than the usual    Chest pain    I did see a cardiologist about this    Chest pain on exertion    I did see a cardiologists about this    COVID-19    headache, back pain, shortness of breath, sore throat, runny nose, chills. Not hospitalized    COVID-19    high fever, fatigue, not hospitalized    Dense breasts    heterogeneously dense breasts    Depression    Diarrhea, unspecified    I notice that certain things run right through me now    Dizziness    Dyspepsia    Easy bruising    I find bruises on me and don’t really know how I got them    Elevated fasting glucose    Enthesopathy of the wrist and carpus    Log Date: 05/31/2011         ESBL E. 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 but I never really checke    Heart palpitations     Pvc’s and irregular heart rate    Hemorrhoids    After last pregnancy or a couple years after that    History of cardiac murmur    Mother told me I was born with one & my dr as well    History of D&C    History of depression    When I had a miscarriage    Indigestion    Longer than this but this is an estimate    Itch of skin    Not severe but I have been itching for no apparent reason    Leaking of urine    After first pregnancy    Leg swelling    My left leg slightly swelled up and my ankle area was hurtin    Lesion of ulnar nerve    Log Date: 2013         Loss of appetite    I noticed this lately too    Menometrorrhagia    Hysteroscopy 10/2020 path favors anovulatory breakdown bleeding.    Menometrorrhagia    Migraine with aura    Multiple thyroid nodules    Myofascial pain    Nausea    Longer than this but this is an estimate    Night sweats    I feel like I’m on fire at night.    OAB (overactive bladder)    PTNS - posterior tibial nerve stimulation with urogynecologist Dr. Lara Jim    Painful urination    When passing stones    Postcoital bleeding    Prior to hysteroscopy done 10/2020. Also having since at least  or     Postcoital bleeding    PVC's (premature ventricular contractions)    Rash    Broke out with unknown rash all over legs and arms    Sexually transmitted disease    HPV    Shortness of breath    Seen cardiologist about this    Sleep disturbance    For years sometimes I can’t lay on my left side    Stress    Rough up bringing and also regular family stresses    Uncomfortable fullness after meals    Longer than this but this is an estimate    Unspecified condition originating in the  period    Ureteral stone with hydronephrosis    Ureteral stone with hydronephrosis    Ureteral stricture, right    Urinary incontinence    Constantly going can’t hold it    Ventral hernia without obstruction or gangrene    Visual impairment    glasses    Vitamin D deficiency    Wears  glasses    Since I was about 14 yrs old    Weight gain    After last son was born       Past Surgical History:   Procedure Laterality Date    Abdominal surgery      2 hernia repairs right & left side      ,2016    x2    Cholecystectomy      Colonoscopy N/A 2021    Procedure: COLONOSCOPY, ESOPHAGOGASTRODUODENOSCOPY with biopsy;  Surgeon: Prashant Lilly DO;  Location:  ENDOSCOPY    Colonoscopy  2021 EGD WITH BIOPSY. Colonoscopy internal hemorrhoids. No gross findings to explain left sided pain. Recall 10 years. Prashant Lilly DO    Colposcopy, cervix w/upper adjacent vagina; w/biopsy(s), cervix      Cryocautery of cervix      Cysto/uretero w/up stricture Right 10/15/2019    Cysto Rt RPG, Rt URS w/ Laser Litho Dilation of Rtight Stricture Rt URS Stent Exchange w/ Dr Jim    Cysto/uretero, stone remove Right 2019    right ureter and kidney stones extraction, URS, stent placement    Cystoscopy,ureteroscopy,lithotripsy Right 2019    Cysto, B/L RPG, URS, laser litho, stone ext, Rt stent Dr. Jim    Egd  2021    for left sided pain    Hc endometrial sampling w or wo endocerv sampling  2023    EMB- Dr. Ramirez - disordered proliferative endometrium - recommended medical management options to regulate cycle.    Hernia surgery  2000    L hernia repair    Hysteroscopy,with sampling  10/08/2020    Hysteroscopy, D&C for intermittent menorrhagia, thickened endometrium, cervical stenosis. H/o menometrorrhagia & postcoital bleeding. Dr. Linnette Antoine. Path Endometrium with stromal collapse, superficial fibrin thrombi, and some polypoid tissue fragments with increased stromal fibrous component. Endometrial glands are proliferative. Overall pattern favors anovulatory breakdown bleeding.    Kidney surgery Right     stent placement 2022    Laparoscopy,pelvic,biopsy      Kidney stones & examine    Lithotripsy  ,  &     Needle biopsy left   2021    fibroadenoma    Needle biopsy right  2021    fibroadenoma    Other Bilateral     nerve surgery to bilateral arms about 1 month apart    Other      percutaneous nephrolithotomy    Other surgical history  2019    cysto stent removal - dr. jim     Other surgical history  2020    Cysto Stent Removal w/ Dr Jim    Other surgical history  2021    Cysto/Stent Removal Dr. Jim    Other surgical history  2022    Right ureteroscopy, laser lithotripsy, stent placement, nephrostomy tube removal with Dr. Tariq    Removal gallbladder  1998    Remove stent via transureth Right 2019    Cysto Stent Removal w/ Dr Jim    Repair ing hernia,5+y/o,reducibl         Family History   Problem Relation Age of Onset    Diabetes Mother     High Cholesterol Mother     Hypertension Mother     Thyroid disease Mother         hypothroid    Other (Other) Mother         Part of thyroid removed    Heart Disorder Father         3 stents in heart    Diabetes Father     Hypertension Father     Hypertension Sister     Other (ovarian problem) Sister         work up in progress    Diabetes Sister     Other (Other) Sister         Liver problem    Diabetes Maternal Grandmother     Ovarian Cancer Maternal Grandmother          at 54    Diabetes Maternal Grandfather     Diabetes Paternal Grandmother     Diabetes Paternal Grandfather     Other (Other) Daughter         Appendicitis    Other (Other) Son         Appendicitis    Pancreatic Cancer Maternal Uncle     Breast Cancer Neg     Colon Cancer Neg        Social History     Socioeconomic History    Marital status: Life Partner   Tobacco Use    Smoking status: Never    Smokeless tobacco: Never   Vaping Use    Vaping status: Never Used   Substance and Sexual Activity    Alcohol use: No    Drug use: No    Sexual activity: Yes     Partners: Male   Other Topics Concern    Caffeine Concern No    Exercise No    Seat Belt Yes       Medications  (Active prior to today's visit):  Current Outpatient Medications   Medication Sig Dispense Refill    Potassium Citrate ER 15 MEQ (1620 MG) Oral Tab CR Take 1 tablet by mouth in the morning and 1 tablet before bedtime. 180 tablet 6    FIBER OR Take 1 tablet by mouth in the morning and 1 tablet before bedtime.      triamterene-hydroCHLOROthiazide 37.5-25 MG Oral Cap Take 1 capsule by mouth every morning. 90 capsule 1    albuterol (PROAIR HFA) 108 (90 Base) MCG/ACT Inhalation Aero Soln Inhale 2 puffs into the lungs every 4 (four) hours as needed for Wheezing. 1 each 1    Multiple Vitamin (ONE-DAILY MULTI VITAMINS) Oral Tab Take 1 tablet by mouth daily.         Allergies:  Allergies   Allergen Reactions    Amlodipine SWELLING     Leg swelling    Metoprolol SHORTNESS OF BREATH    Toradol [Ketorolac Tromethamine] SHORTNESS OF BREATH    Tramadol SHORTNESS OF BREATH    Dilaudid [Hydromorphone] ITCHING and PAIN     Headache - per pt this is only with long term use - pt states she can tolerate this medication    Oxycodone PAIN     headache    Wellbutrin [Bupropion Hcl] PAIN and DIZZINESS     Headache    Valsartan OTHER (SEE COMMENTS)     Chest pain      Codeine Sulfate ITCHING     TABS    Hydrocodone ITCHING    Morphine ITCHING    Seasonal Runny nose       Review of Systems:   A comprehensive 10-point review of systems was completed.  Pertinent positives and negatives are noted in the the HPI.    Physical Exam:   No physical exam performed during this telephone encounter.    Assessment & Plan:   Nadine Calix is a 49 year old female with history of anxiety, GERD, hypertension, OAB improved after PTNS referred for recurrent nephrolithiasis.      She has been previously followed by multiple urologists at [facility].  Her most recent stone surgery was a right ureteroscopy, laser lithotripsy, stent placement, nephrostomy tube removal with Dr. Jim on 7/8/2022.  She has a tortuous proximal right ureter and during a prior  ureteroscopy access was lost to the kidney so nephrostomy tube was placed.  She is currently on hydrochlorothiazide and potassium citrate.  She has not had a 24-hour urine study for a while.     She recently presented to the ED on 9/17/2023 for abdominal pain bilaterally.  I reviewed her CT scan and this showed bilateral nephrolithiasis left greater than right.  I brought her to the OR on 1/22/2024 for left ureteroscopy, laser lithotripsy, stone extraction, stent placement, right retrograde pyelogram (no distinct strictures, slow drainage from start to finish of case).  I removed at least 20 stones from the left kidney and she had extensive Jero plaques as well.  She also had anterior prolapse on exam.  Stent was removed in clinic on 2/1/2024.     CT scan 1/26/2024 shows left ureteral stent in position, bilateral renal calcifications more likely consistent with medullary calcinosis/intraparenchymal stones.     24-hour urine study shows slightly low urine volume, slightly high urine sodium, slightly low urine citrate.  Her potassium citrate dose was increased to twice daily recently.  She remains on hydrochlorothiazide as well.  Repeat 24-hour urine study on 5/21/2024 shows excellent urine volume, normal urinary calcium, lower urine citrate than prior, pH 6.65.     Lasix renal scan 2/23/2024 shows 63% function from the right kidney, 37% function from the left kidney, no signs of obstruction with adequate drainage.     She continues to have bothersome OAB every 30 minutes.  She is getting PTNS  with some improvement but it is still very bothersome to her.  I discussed other options of bladder Botox and InterStim briefly. She saw Dr. Cummins for eval of possible Interstim who recommended UDS and cysto prior.     CT 5/17/24 showed no obstructing ureteral stones. Non-obstructing small stones in each kidney but a lot are likely Jero's plaques. Having mild bilateral flank discomfort, I think it is likely unrelated  to kidneys. 11mm right adrenal adenoma.     She is having intermittent nausea, bilateral flank pain, vaginal bleeding.  She is planned to undergo biopsy with gynecology.  She has been taking potassium citrate once daily.    Labs 6/10/2024 show aldosterone/renin ratio elevated at 48.9, cortisol 0.9 after dexamethasone suppression. Plasma metanephrines are normal.    -Decrease potassium citrate down to once per day given GI side effects with twice daily  -Good fluid intake, limit salt in diet  -Normal dietary calcium  -Continue hydrochlorothiazide  -Adrenal adenoma workup repeat in 1 year: Dexamethasone suppression test, metanephrines, aldosterone/renin  -Repeat CT scan in 1 year to assess adrenal adenoma and nephrolithiasis burden  -Continue PTNS with urogynecology    In total, 5 minutes were spent on this patient encounter for medical discussion.    Bryant Garza MD  Staff Urologist  [facility]  Office: [phone]

## 2024-06-28 NOTE — TELEPHONE ENCOUNTER
----- Message from Bryant Garza sent at 6/28/2024  8:31 AM CDT -----  Can you have her see me back in clinic in 1 year with labs and CT scan prior?    Thanks,  MB

## 2024-07-01 DIAGNOSIS — N92.1 MENOMETRORRHAGIA: Primary | ICD-10-CM

## 2024-07-01 DIAGNOSIS — N93.0 POSTCOITAL BLEEDING: ICD-10-CM

## 2024-07-03 ENCOUNTER — TELEPHONE (OUTPATIENT)
Dept: MAMMOGRAPHY | Facility: HOSPITAL | Age: 49
End: 2024-07-03

## 2024-07-03 ENCOUNTER — HOSPITAL ENCOUNTER (OUTPATIENT)
Dept: MAMMOGRAPHY | Facility: HOSPITAL | Age: 49
Discharge: HOME | End: 2024-07-03
Attending: OBSTETRICS & GYNECOLOGY
Payer: MEDICAID

## 2024-07-03 DIAGNOSIS — N63.10 MASS OF BREAST, RIGHT: ICD-10-CM

## 2024-07-03 DIAGNOSIS — R92.8 OTHER ABNORMAL AND INCONCLUSIVE FINDINGS ON DIAGNOSTIC IMAGING OF BREAST: ICD-10-CM

## 2024-07-03 PROCEDURE — 19083 BX BREAST 1ST LESION US IMAG: CPT | Performed by: OBSTETRICS & GYNECOLOGY

## 2024-07-03 PROCEDURE — 88305 TISSUE EXAM BY PATHOLOGIST: CPT | Performed by: OBSTETRICS & GYNECOLOGY

## 2024-07-03 PROCEDURE — 77065 DX MAMMO INCL CAD UNI: CPT | Performed by: OBSTETRICS & GYNECOLOGY

## 2024-07-03 NOTE — TELEPHONE ENCOUNTER
Patient called post right breast ultrasound biopsy from earlier today. Patient states that biopsy site started to bleed and she is holding firm pressure on it. Patient states that the breast feels soft in that area. Patient instructed to continue to hold pressure until bleeding stops and to go to ER if unable to stop the bleeding. Patient to call if any new developments or worsening. Patient verbalized understanding and has no further questions at this time.

## 2024-07-05 NOTE — IMAGING NOTE
1412: Spoke with Nadine Calix  post ultrasound guided right breast biopsy.  Introduced myself as breast nurse coordinator at [facility] and informed Ms. Calix  of the purpose of my call.  Name and date of birth verified by patient.    Reinforced post biopsy care and instruction.  Nadine Calix denies any issues with biopsy site- bleeding, drainage, redness, tenderness.     Pathology results and recommendations discussed as follows benign findings  Final Diagnosis:   Right breast mass 12:00, ultrasound-guided 12-gauge needle core biopsies:  -Portions of fibroadenoma in needle core biopsies.  -Occasional tiny microcalcifications.  -Background of breast tissue with hypocellular stromal fibrosis.  -No evidence of atypia or malignancy in the submitted material.   See EMR for complete pathology report    Concordance verified by Dr. Maldonado.  Recommendation- six month follow up breast imaging.    Nadine Calix verbalized understanding and agreement to the above.

## 2024-07-10 NOTE — H&P
[facility]  Obstetrics and Gynecology   History & Physical    Chief complaint:   Menometrorrhagia, postcoital spotting     Subjective:     HPI: Nadine Calix is a 49 year old  with Patient's last menstrual period was 2024 (exact date).  AUB - Menometrorrhagia, postcoital spotting     PMH Menometrorrhagia, Migraine with aura, Obesity, CHTN, GERD, anxiety, recurrent nephrolithiasis, apparently with ureteral scarring affecting kidney function. Detrusor instability, incomplete bladder emptying, urge incontinence, pelvic muscle wasting, recurrent UTI, cystocele, rectocele, OAB slightly improved after PTNS - used to void 20-25 times per night now down to 15 times per night. Sees uro & urogyn. PFPT done.     Extensive history of AUB. Has seen multiple gynecologists.     Initially presented to me 24 for 2nd opinion regarding menometrorrhagia & postcoital spotting x 2-3 years. Variable amounts of bleeding period to period since around , around time of hot flashes starting. Pelvic US 2023 with thickened endometrium & simple left ovarian cyst. Endometrial biopsy 23 disordered proliferative     Reviewed her pattern is likely anovulatory/perimenopause related given age & also vasomotor symptoms starting around the same time.   Discussed possibility of endometrial polyp vs unstable/dyssynchronized lining causing postcoital bleeding.   Provera advised to induce withdrawal bleed  Mirena IUD recommended.   Return to PFPT per Dr. Jim recommended   Genetic counseling ordered due to Fhx ovarian cancer in [facility] & pancreatic cancer     24 Patient contacted our office to report having prolonged bleeding despite cyclic Provera. Possibility of interference with her own ovulations. Rx Slynd. Pelvic US ordered to r/o endometrial polyp    24 FSH 13.9, estradiol 13    5/15/24 Pelvic US - Endometrium is thick. Left ovary cyst 3.7 cm is simple. Smaller simple cyst in right ovary. Likely  reflecting ovulatory dysfunction. Had not started Slynd yet.     Recommend hysteroscopy, D&C, possible polypectomy/myomectomy, Mirena IUD insertion in OR for menometrorrhagia, postcoital bleeding.     Patient's last menstrual period was 06/18/2024 (exact date).     PCP: Fan Hernandez MD   Patient Care Team:  Fan Hernandez MD as PCP - General (Family Practice)  Eryn Martin, PT (Physical Therapy)  Meryl Lambert MD (NEPHROLOGY)  Prashant Lilly DO (GASTROENTEROLOGY)  Eddie Marcus MD (Cardiac Electrophysiology)  Laurel Layton, OT as Occupational Therapist (Occupational Therapist)  Faye Jerez, PT as Physical Therapist (Physical Therapy)  Luly Jacome, PT as Physical Therapist  Lara Jim DO (Urogynecology)  Anne Cummins MD (UROLOGY)  Bryant Garza MD (UROLOGY)  Howard Bear MD (ENDOCRINOLOGY)     Review of Systems   Cardiovascular:         Has not been monitoring BP at home.    Gastrointestinal:         Taking fiber per urogyn to help with constipation.    Genitourinary:  Positive for menstrual problem and vaginal bleeding. Negative for dyspareunia and pelvic pain.        Ureters are scarred up from kidney stones. They are considering a major surgery for her.   Self catheterizing. Not emptying bladder well.   Urologist Dr. Garza. Has urogyn Dr. Lara Jim as well     OAB - PTNS - posterior tibial nerve treatment, also with cystocele, bladder pain & overactivity. Was voiding 25-35 times per night.     Menses somewhat irregular & can be heavy or normal   Occasional spotting after sex - for 2-3 years     PFPT done in the past. Plans to return per Dr. Jim    +Bilateral inguinal pain for years. Right side over the past 1 year or so has felt bulgy at times. H/o bilateral inguinal & umbilical hernia repairs in the past. Does not have a general surgeon currently.    Skin:         Breasts - always lumpy for years. No new changes noted   Neurological:  Positive for headaches.         Vertigo sometimes - newer.   Headaches - will wake up with headaches in the morning sometimes.   Started magnesium glycinate  No recent migraines   Psychiatric/Behavioral:  Positive for sleep disturbance.         Emotional   Forgetful at times  Not sleeping great - some is due to urination.      GYN Hx:   Regular about 28-30 x 5-7 x normal flow x some pain that was bad as a teen -> cramping after babies.   Mirena twice, both prior to the pregnancies   Mirena IUD - probably 9-10 years ago.    Pelvic US 2020 - showed endometrium was 8 mm, hemorrhagic cyst. Suspected adenomyosis noted  Hysteroscopy D&C - 10/2020 with benign pathology     23 ED visit for AUB. Reported LMP 8/10/23 (6 days prior). Endometrium 18 mm. Physiologic simple cyst in the left ovary measures 3.1 x 2.2 x 3.0 cm.     23 EMB- Dr. Ramirez - disordered proliferative endometrium - recommended medical management options to regulate cycle.     Sexually active? 7 years of unprotected intercourse - has not any accidental pregnancies.   Dyspareunia? N  Postcoital bleeding? Y    Contraception: none.     WWE 24 -Ema Randall MD     Cervical cancer screening:   History of colposcopy? Y  History of cryosurgery? 2012  History of LEEP/conization? N  Pap 2022 was negative  24 Pap & HPV negative     Breast cancer screening:  Mammogram: 23 heterogeneously dense, benign. Biopsy clips noted.    7/3/24 - ultrasound biopsy of a a hypoechoic masslike area 12 o'clock position right breast measuring approximately 1.2 cm.    Colon cancer screenin2021 EGD WITH BIOPSY. Colonoscopy internal hemorrhoids. No gross findings to explain left sided pain. Colonoscopy recall 10 years. Prashant Lilly DO    OB History:  OB History    Para Term  AB Living   10 7 3 4 3 7   SAB IAB Ectopic Multiple Live Births   2 0 0 0 7     OB History    Para Term  AB Living   10 7 3 4 3 7   SAB IAB Ectopic Multiple Live Births   2 0 0    7      # Outcome Date GA Lbr Saad/2nd Weight Sex Type Anes PTL Lv   10  16 31w1d  7 lb 2.4 oz (3.243 kg) M CS-Unspec None  BECCA   9  06/15/14 36w1d  6 lb 14 oz (3.118 kg) M CS-Unspec EPI  BECCA      Complications: Gestational diabetes (HCC), Gestational diabetes mellitus (GDM) (HCC)   8 SAB  6w0d          7 AB 04 19w0d             Birth Comments: Spina Bifida   6 Term 02 40w0d  8 lb 15 oz (4.054 kg) M Vag-Spont EPI  BECCA      Complications:  labor,  labor (HCC)   5 Term 00 40w0d  7 lb 6 oz (3.345 kg) F Vag-Spont EPI  BECCA   4  98 36w0d  6 lb 6 oz (2.892 kg) M Vag-Spont None  BECCA   3  95 36w0d  7 lb (3.175 kg) M Vag-Spont None  BECCA   2 Term 94 40w0d  6 lb 12 oz (3.062 kg) F Vag-Spont None  BECCA      Complications: Preeclampsia, Pre-eclampsia (HCC)   1 SAB                Meds:  No current facility-administered medications on file prior to encounter.     Current Outpatient Medications on File Prior to Encounter   Medication Sig Dispense Refill    Potassium Citrate ER 15 MEQ (1620 MG) Oral Tab CR Take 1 tablet by mouth in the morning and 1 tablet before bedtime. (Patient taking differently: Take 1 tablet by mouth daily.) 180 tablet 6    FIBER OR Take 1 tablet by mouth in the morning and 1 tablet before bedtime.      triamterene-hydroCHLOROthiazide 37.5-25 MG Oral Cap Take 1 capsule by mouth every morning. 90 capsule 1    albuterol (PROAIR HFA) 108 (90 Base) MCG/ACT Inhalation Aero Soln Inhale 2 puffs into the lungs every 4 (four) hours as needed for Wheezing. 1 each 1    Multiple Vitamin (ONE-DAILY MULTI VITAMINS) Oral Tab Take 1 tablet by mouth daily.         All:  Allergies   Allergen Reactions    Amlodipine SWELLING     Leg swelling    Metoprolol SHORTNESS OF BREATH    Toradol [Ketorolac Tromethamine] SHORTNESS OF BREATH    Tramadol SHORTNESS OF BREATH    Dilaudid [Hydromorphone] ITCHING and PAIN     Headache - per pt this is only with  long term use - pt states she can tolerate this medication    Oxycodone PAIN     headache    Wellbutrin [Bupropion Hcl] PAIN and DIZZINESS     Headache    Valsartan OTHER (SEE COMMENTS)     Chest pain      Codeine Sulfate ITCHING     TABS    Hydrocodone ITCHING    Morphine ITCHING    Seasonal Runny nose       PMH:  Past Medical History:    Abdominal distention    Longer than this but this is an estimate    Abdominal hernia    I had hernia repair around by my belly button & abdominal    Abdominal pain    Currently seeing obgyn, urologist & nephrologist    Arrhythmia    Back pain    Mid to lower back pain & hip pain    Benign essential HTN    Bloating    Longer than this but this is an estimate    Blood in urine    Due to severe kidney stones    Blurred vision    I always wore glasses or contacts since about 14 yrs old    Calculus of kidney    hydronephrosis/ several times kidney stones/ 13 th procedure for stones    Cervicalgia    Log Date: 05/04/2012         Change in hair    My hair is thinning more than the usual    Chest pain    I did see a cardiologist about this    Chest pain on exertion    I did see a cardiologists about this    COVID-19    headache, back pain, shortness of breath, sore throat, runny nose, chills. Not hospitalized    COVID-19    high fever, fatigue, not hospitalized    Dense breasts    heterogeneously dense breasts    Depression    Diarrhea, unspecified    I notice that certain things run right through me now    Disorder of liver    fatty liver    Dizziness    Dyspepsia    Easy bruising    I find bruises on me and don’t really know how I got them    Elevated fasting glucose    Enthesopathy of the wrist and carpus    Log Date: 05/31/2011         ESBL E. coli carrier    Excessive bleeding in premenopausal period    Fatigue    I noticed that I’m alot more tired lately more than usual    Flatulence/gas pain/belching    Longer than this but this is an estimate    Food intolerance    I think I am  lactose intolerant been that way for a few year    Frequent urination    All the time for years    Frequent UTI    Due to kidney stones blockages    Gestational diabetes (HCC)    Heart murmur    My mother said I was born with one but I never really checke    Heart palpitations    Pvc’s and irregular heart rate    Hemorrhoids    After last pregnancy or a couple years after that    High blood pressure    History of cardiac murmur    Mother told me I was born with one & my dr as well    History of D&C    History of depression    When I had a miscarriage    Indigestion    Longer than this but this is an estimate    Itch of skin    Not severe but I have been itching for no apparent reason    Leaking of urine    After first pregnancy    Leg swelling    My left leg slightly swelled up and my ankle area was hurtin    Lesion of ulnar nerve    Log Date: 2013         Loss of appetite    I noticed this lately too    Menometrorrhagia    Hysteroscopy 10/2020 path favors anovulatory breakdown bleeding.    Menometrorrhagia    Migraine with aura    Migraines    Multiple thyroid nodules    Myofascial pain    Nausea    Longer than this but this is an estimate    Night sweats    I feel like I’m on fire at night.    OAB (overactive bladder)    PTNS - posterior tibial nerve stimulation with urogynecologist Dr. Lara Jim    Painful urination    When passing stones    Postcoital bleeding    Prior to hysteroscopy done 10/2020. Also having since at least  or     Postcoital bleeding    Prediabetes    PVC's (premature ventricular contractions)    Rash    Broke out with unknown rash all over legs and arms    Sexually transmitted disease    HPV    Sleep disturbance    For years sometimes I can’t lay on my left side    Stress    Rough up bringing and also regular family stresses    Uncomfortable fullness after meals    Longer than this but this is an estimate    Unspecified condition originating in the  period     Ureteral stone with hydronephrosis    Ureteral stricture, right    Urinary incontinence    Constantly going can’t hold it    Ventral hernia without obstruction or gangrene    Visual impairment    glasses    Vitamin D deficiency    Wears glasses    Since I was about 14 yrs old    Weight gain    After last son was born       PSH:  Past Surgical History:   Procedure Laterality Date    Abdominal surgery      2 hernia repairs right & left side      ,2016    x2    Colonoscopy N/A 2021    Procedure: COLONOSCOPY, ESOPHAGOGASTRODUODENOSCOPY with biopsy;  Surgeon: Prashant Lilly DO;  Location:  ENDOSCOPY    Colonoscopy  2021 EGD WITH BIOPSY. Colonoscopy internal hemorrhoids. No gross findings to explain left sided pain. Recall 10 years. Prashant Lilly DO    Cryocautery of cervix      Cysto/uretero w/lithotripsy Left 2024    Cystoscopy, LEFT ureteroscopy, laser lithotripsy, basket stone extraction, retrograde pyelogram, ureteral stent placement, Right retrograde pyelogram Dr. Bryant Garza    Cysto/uretero w/up stricture Right 10/15/2019    Cysto Rt RPG, Rt URS w/ Laser Litho Dilation of Rtight Stricture Rt URS Stent Exchange w/ Dr Jim    Cysto/uretero, stone remove Right 2019    right ureter and kidney stones extraction, URS, stent placement    Cystoscopy,ureteroscopy,lithotripsy Right 2019    Cysto, B/L RPG, URS, laser litho, stone ext, Rt stent Dr. Jim    Egd  2021    for left sided pain    Hc endometrial sampling w or wo endocerv sampling  2023    EMB- Dr. Ramirez - disordered proliferative endometrium - recommended medical management options to regulate cycle.    Hernia surgery  2000    L hernia repair    Hysteroscopy,with sampling  10/08/2020    Hysteroscopy, D&C for intermittent menorrhagia, thickened endometrium, cervical stenosis. H/o menometrorrhagia & postcoital bleeding. Dr. Linnette Antoine. Path Endometrium with stromal  collapse, superficial fibrin thrombi, and some polypoid tissue fragments with increased stromal fibrous component. Endometrial glands are proliferative. Overall pattern favors anovulatory breakdown bleeding.    Kidney surgery Right     stent placement 6/2022    Laparoscopy,pelvic,biopsy      Kidney stones & examine    Lithotripsy  2001, 2007 & 11/11    Needle biopsy left  02/2021    fibroadenoma    Needle biopsy right  02/2021    fibroadenoma    Other Bilateral 2012    nerve surgery to bilateral arms about 1 month apart    Other      percutaneous nephrolithotomy    Other surgical history  12/27/2019    cysto stent removal - dr. jim     Other surgical history  06/03/2020    Cysto Stent Removal w/ Dr Jim    Other surgical history  07/07/2021    Cysto/Stent Removal Dr. Jim    Other surgical history  07/08/2022    Right ureteroscopy, laser lithotripsy, stent placement, nephrostomy tube removal with Dr. Tariq    Removal gallbladder  1998    Remove stent via transureth Right 11/13/2019    Cysto Stent Removal w/ Dr Jim    Repair ing hernia,7+y/o,reducibl      Us breast biopsy 1 site right (cpt=19083) Right 07/03/2024    12:00 right breast. Fibroadenoma. Tiny microcalcifications. Breast tissue with hypocellular stromal fibrosis. No atypia or malignancy       Social History:  Social History     Socioeconomic History    Marital status: Life Partner   Tobacco Use    Smoking status: Never    Smokeless tobacco: Never   Vaping Use    Vaping status: Never Used   Substance and Sexual Activity    Alcohol use: No    Drug use: No    Sexual activity: Yes     Partners: Male   Other Topics Concern    Caffeine Concern No    Exercise No    Seat Belt Yes        Family History:  Family History   Problem Relation Age of Onset    Diabetes Mother     High Cholesterol Mother     Hypertension Mother     Thyroid disease Mother         hypothroid    Other (Other) Mother         Part of thyroid removed    Heart Disorder  Father         3 stents in heart    Diabetes Father     Hypertension Father     Hypertension Sister     Other (ovarian problem) Sister         work up in progress    Diabetes Sister     Other (Other) Sister         Liver problem    Diabetes Maternal Grandmother     Ovarian Cancer Maternal Grandmother          at 54    Diabetes Maternal Grandfather     Diabetes Paternal Grandmother     Diabetes Paternal Grandfather     Other (Other) Daughter         Appendicitis    Other (Other) Son         Appendicitis    Pancreatic Cancer Maternal Uncle     Breast Cancer Neg     Colon Cancer Neg        Immunization History:  Immunization History   Administered Date(s) Administered    DTP 1995, 10/02/1995, 1995    FLULAVAL 6 months & older 0.5 ml Prefilled syringe (59736) 10/18/2018, 10/16/2019, 2020, 2021, 11/15/2022    FLUZONE 6 months and older PFS 0.5 ml ([id number]) 10/18/2018, 10/16/2019, 2020, 2021    HEP B, Ped/Adol 1995, 1995, 1996    Hib, Unspecified Formulation 1995, 10/02/1995, 1995    TDAP 2014, 10/04/2016       Depression Scale      PHQ-2 not done in last 12 months! Please administer and refresh!        Objective:     Vitals:    24 1214   Weight: 168 lb (76.2 kg)   Height: 59\"         Body mass index is 33.93 kg/m².    Physical Exam: as of 24 [facility]  Physical Exam  Vitals and nursing note reviewed.   Constitutional:       Appearance: Normal appearance.   HENT:      Head: Normocephalic and atraumatic.   Eyes:      Extraocular Movements: Extraocular movements intact.      Conjunctiva/sclera: Conjunctivae normal.   Cardiovascular:      Rate and Rhythm: Normal rate and regular rhythm.      Heart sounds: No murmur heard.  Pulmonary:      Effort: Pulmonary effort is normal.      Breath sounds: Normal breath sounds.      Comments: Breasts: very dense with multiple lumps bilaterally. Right breast lumps at 12 o'clock through upper right outer quadrant  asymmetric from left. No axillary LAD  Abdominal:      General: There is no distension.      Palpations: Abdomen is soft. There is no mass.      Tenderness: There is no abdominal tenderness. There is no guarding or rebound.      Hernia: No hernia is present.      Comments: +scar from umbilical hernia repair. +scars bilateral groins. Right groin does seem slightly pritchett but I can't say I can identify an actual defect on my exam   Genitourinary:     Comments: VULVA:  normal appearing vulva with no masses, tenderness or lesions  URETHRA: +hypermobility with Valsalva but no leak  PERINEUM: intact    VAGINA: stage II cystocele, stage I uterovaginal prolapse  CERVIX: normal appearing cervix. Scant blood tinge in discharge  UTERUS: minimally enlarged, anteverted, non tender  ADNEXA: normal adnexa in size, nontender and no masses  PELVIC FLOOR: mild hypertonicity, mild obturator tenderness       Neurological:      General: No focal deficit present.      Mental Status: She is alert.   Psychiatric:         Mood and Affect: Mood normal.         Behavior: Behavior normal.         Thought Content: Thought content normal.         Judgment: Judgment normal.         Labs:  Lab Results   Component Value Date    WBC 6.5 06/18/2024    RBC 4.40 06/18/2024    HGB 12.0 06/18/2024    HCT 38.1 06/18/2024    MCV 86.6 06/18/2024    MCH 27.3 06/18/2024    MCHC 31.5 06/18/2024    RDW 13.3 06/18/2024    .0 06/18/2024    MPV 8.8 (L) 12/04/2012        Lab Results   Component Value Date     (H) 06/18/2024    BUN 13 06/18/2024    BUNCREA 11.0 06/30/2021    CREATSERUM 0.77 06/18/2024    ANIONGAP 7 06/18/2024    GFR 95 02/26/2017    GFRNAA 84 07/08/2022    GFRAA 97 07/08/2022    CA 8.6 06/18/2024    OSMOCALC 291 06/18/2024    ALKPHO 115 (H) 01/26/2024    AST 12 (L) 01/26/2024    ALT  01/26/2024      Comment:      Due to  backorder we are temporarily unable to offer hospital-based ALT testing at [facility].   If urgently  needed, please order ALT test code [id number].   The new order will need a new venipuncture and will be sent to [facility] for testing.   The expected turnaround time will be within 24 hours.     BILT 0.3 01/26/2024    TP 8.2 01/26/2024    ALB 3.9 01/26/2024    GLOBULIN 4.3 01/26/2024     06/18/2024    K 3.8 06/18/2024     06/18/2024    CO2 22.0 06/18/2024       Lab Results   Component Value Date    CHOLEST 192 04/04/2023    TRIG 166 (H) 04/04/2023    HDL 32 (L) 04/04/2023     (H) 04/04/2023    VLDL 30 04/04/2023    NONHDLC 160 (H) 04/04/2023        Lab Results   Component Value Date    T4F 0.9 08/13/2021    TSH 0.912 10/20/2023        Lab Results   Component Value Date     10/20/2023    A1C 5.5 10/20/2023     Component      Latest Ref Rng 5/7/2024   FSH      No established range for female sex mIU/mL 13.9    Estradiol      No established range for female sex pg/mL 13.1    VITAMIN D, 25-OH, TOTAL      30.0 - 100.0 ng/mL 56.5        Imaging:  US BREAST BIOPSY 1 SITE RIGHT (CPT=19083)    Result Date: 7/3/2024  CONCLUSION:  1. Successful ultrasound biopsy of a a hypoechoic masslike area 12 o'clock position right breast measuring approximately 1.2 cm. 2. Post procedure mammogram showed the clip to be deployed appropriately.    LOCATION:  [facility]        Dictated by (CST): Savanna Maldonado MD on 7/03/2024 at 10:25 AM     Finalized by (CST): Savanna Maldonado MD on 7/03/2024 at 10:26 AM       PRAVEEN POST PROCEDURE IMAGE RIGHT (CPT=77065)    Result Date: 7/3/2024  CONCLUSION:  1. Successful ultrasound biopsy of a a hypoechoic masslike area 12 o'clock position right breast measuring approximately 1.2 cm. 2. Post procedure mammogram showed the clip to be deployed appropriately.    LOCATION:  [facility]        Dictated by (CST): Savanna Maldonado MD on 7/03/2024 at 10:25 AM     Finalized by (CST): Savanna Maldonado MD on 7/03/2024 at 10:26 AM        BREAST BILAT COMP([facility] SAME DAY US)(CPT=76641-50)    Result Date:  6/26/2024  CONCLUSION:  **PLEASE SEE REPORT FOR DIAGNOSTIC MAMMOGRAM**   LOCATION:  [facility]     Dictated by (CST): Carmina Navarro MD on 6/26/2024 at 10:18 AM     Finalized by (CST): Carmina Navarro MD on 6/26/2024 at 10:18 AM       PRAVEEN LIZ 2D+3D DIAGNOSTIC PRAVEEN  BILAT (CPT=77066/47510)    Result Date: 6/26/2024  CONCLUSION:  There is an ill-defined area of hypoechogenicity at the site of the physician detected palpable lump located in the RIGHT breast at the 12 o'clock position 3 cm from nipple.  Ultrasound-guided biopsy is recommended for definitive pathologic diagnosis.  There is an incidental probable small fibroadenoma seen on ultrasound in the RIGHT breast at the 0300 hours retroareolar position.  Pending benign biopsy results, a follow-up right breast ultrasound in 6 months would be recommended.  No mammographic or sonographic findings suspicious for malignancy in the left breast.  No mammographic or sonographic findings to explain the etiology of the patient's left breast pain.  Clinical follow-up and management recommended.  Atherosclerotic vascular calcifications are present in this 49-year-old woman.  Correlate clinically for cardiovascular risk factors.  The findings and recommendations were discussed by myself with the patient at the end of the exam.   BI-RADS CATEGORY:  DIAGNOSTIC CATEGORY 4b-SUSPICIOUS:   RECOMMENDATIONS:  ULTRASOUND-GUIDED BIOPSY: RIGHT BREAST   Your patient's answers to the health and family history questions collected during this mammogram indicate a potentially increased risk for breast cancer. It is recommended that this patient be evaluated in our Cancer Risk Assessment Clinic to determine eligibility for additional breast cancer screening, risk reduction strategies, and/or genetic testing. Providers are encouraged to contact our breast navigators at [phone] with any questions or for guidance regarding this recommendation.  A letter explaining the results in lay terms has  been sent to the patient.  This exam was evaluated with a computer-aided device.  This patient's information has been entered into a reminder system with a target due date for the next mammogram.   LOCATION:  [facility]   Dictated by ([facility]): Carmina Navarro MD on 6/26/2024 at 9:25 AM     Finalized by (CST): Carmina Navarro MD on 6/26/2024 at 10:17 AM       CT ABDOMEN+PELVIS KIDNEYSTONE 2D RNDR(NO IV,NO ORAL)(CPT=74176)    Result Date: 5/17/2024  CONCLUSION:   1.  Multiple tiny bilateral kidney stones without evidence of hydronephrosis.  2. Fatty infiltration of the liver.  3. Right adrenal adenoma.    LOCATION:  [facility]   Dictated by ([facility]): Tye Kelly MD on 5/17/2024 at 10:25 AM     Finalized by ([facility]): Tye Kelly MD on 5/17/2024 at 10:29 AM       US KIDNEY/BLADDER (CPT=76770)    Result Date: 5/15/2024  CONCLUSION:  1. There is mild bilateral hydronephrosis with a similar appearance compared to the previous sonogram. 2. There are bilateral renal calculi noted measuring up to 7 mm in each kidney. 3. If there is any clinical concern for obstructing ureteral calculi then follow-up CT is recommended. 4.    LOCATION:  [facility]     Dictated by ([facility]): Seamus Varela MD on 5/15/2024 at 4:47 PM     Finalized by ([facility]): Seamus Varela MD on 5/15/2024 at 4:52 PM       US PELVIS W EV (CPT=76856/77611)    Result Date: 5/15/2024  CONCLUSION:  1. The endometrium measures 15 mm in thickness.  For a premenopausal patient this can be seen as a normal thickness in the secretory phase.  However, given the history of abnormal uterine bleeding, clinical correlation is necessary and the further management should be based clinically. 2. There is a simple appearing cyst seen in the left ovary measuring 3.4 x 2.9 x 3.7 cm.  As per O-rads classification, simple cysts between 3 and 5 cm in size do not need to be followed in premenopausal patients unless there is clinical indication otherwise.   LOCATION:  [facility]   Dictated by ([facility]):  Seamus Varela MD on 5/15/2024 at 4:27 PM     Finalized by ([facility]): Seamus Varela MD on 5/15/2024 at 4:35 PM         PROCEDURE:  US PELVIS W DOPPLER (QET=40998/02347)     COMPARISON:  [place], CT, CT ABDOMEN+PELVIS(CONTRAST ONLY)(CPT=74177), 5/05/2023, 12:11 PM.  [place], US, US PELVIS W EV (CPT=76856/02022), 3/28/2023, 2:00 PM.     INDICATIONS:  Large amounts of vaginal bleeding and clots per patient since 8/10     TECHNIQUE:  Transabdominal imaging was performed of the pelvis.   Arterial and venous Doppler of the ovaries was also performed.  PATIENT STATED HISTORY: (As transcribed by Technologist)  Patient stated heavy vaginal bleeding with large clots for six days, pelvic cramping.         FINDINGS:                UTERUS:  10.05 cm x 4.92 cm x 6.61 cm    Endometrium Thickness:  18 mm and is homogeneous.  The thickness of the endometrium would be considered abnormally thickened given that the LMP was 8/10/2023.  Possibility of endometrial hyperplasia or neoplasm cannot be completely excluded by imaging  alone and further evaluation with gynecology would be recommended.    The uterus appears normal in size, shape, and echogenicity.  RIGHT OVARY:  2.90 cm x 1.91 cm x 2.10 cm    The right ovary appears normal in size, shape, and echogenicity. No significant masses are identified.  LEFT OVARY:  4.31 cm x 3.31 cm x 3.92 cm    The left ovary appears normal in size, shape, and echogenicity. No significant masses are identified.  Physiologic simple cyst in the left ovary measures 3.1 x 2.2 x 3.0 cm.  CUL-DE-SAC:  Normal.  No fluid or mass.    OTHER:  Negative.       DOPPLER WAVE FORMS  FLOW:  There is normal arterial and venous Doppler wave forms in both ovaries.  The spectral analysis is within normal limits.  OTHER:  Negative.                   Impression   CONCLUSION:  There is thickening of the endometrium which would be considered abnormal given the LMP was 8/10/2023.  Further evaluation with  gynecology recommended to evaluate for endometrial atypia, hyperplasia, or neoplasm.        LOCATION:  [facility]           Dictated by ([facility]): Tye Kelly MD on 2023 at 7:03 PM      Finalized by ([facility]): Tye Kelly MD on 2023 at 7:06 PM      Assessment:     Nadine Calix is a 49 year old  female with menometrorrhagia, postcoital bleeding, sampling of endometrium in  &  both suggestive of anovulatory/proliferative pattern. She presented as a 2nd opinion regarding AUB. Suspect AUB is due to anovulatory bleeding but her recent lack of responding typically to cyclic Provera is much more suspicious for possible polyp or fibroid within endometrial cavity. Recommend hysteroscopy, D&C, possible polypectomy/myomectomy, Mirena IUD insertion in OR for menometrorrhagia, postcoital bleeding.     There are no diagnoses linked to this encounter.         Plan:     AUB  -including intermittently heavy & prolonged periods, +Postcoital bleeding  -Hysteroscopy D&C - 10/2020 suggestive anovulatory breakdown bleeding  -23 ED visit for AUB. Reported LMP 8/10/23 (6 days prior). Endometrium 18 mm. Physiologic simple cyst in the left ovary measures 3.1 x 2.2 x 3.0 cm.   -23 EMB- Dr. Ramirez - disordered proliferative endometrium - recommended medical management options to regulate cycle.   -hot flashes for about 1.5-2 years. Feels AUB worsened around this time   -reviewed pattern is likely anovulatory bleeding (perimenopause likely)   -labs reviewed - early perimenopausal level FSH  -took Provera 10 mg x 10 nights a few times - bleeding pattern not as expected, concerning for structural cause.   -Rx Slynd  - has not started yet. Encouraged to start. Declines high dose NE or Provera at this time.   -Recommend hysteroscopy, D&C, possible polypectomy/myomectomy, Mirena IUD insertion in OR for menometrorrhagia, postcoital bleeding. Risks, benefits discussed     Pelvic floor dysfunction & OAB  -Will be  returning to PT per Dr. Jim     Recurrent nephrolithiasis  -may end up needing significant surgery on her ureters to help her kidneys    Fhx ovarian cancer in [facility] & pancreatic cancer   -genetic counseling encouraged & ordered at previous    Pap & HPV neg 5/8/24      Dense breasts, h/o breast biopsy   -Breast exam dense, asymmetric, slightly more prominent lumps in right breast 5/8/2024   -7/3/24 - ultrasound biopsy of a a hypoechoic masslike area 12 o'clock position right breast measuring approximately 1.2 cm. Fibroadenoma/benign  -plan diagnostic mammo 6 months 12/30/24 approx     Colonoscopy 4/7/21. Recall 10 years. Dholakia, Prashant, DO    Contraception - none. Reports unprotected intercourse x 7 years with no conception. Discussed still possible to conceive until menopause.     To OR  [facility]WE due after 5/8/25    Ema Randall MD  [facility] - OBGYN    Note to patient and family:  The 21st Century Cures Act makes medical notes available to patients in the interest of transparency.  However, please be advised that this is a medical document.  It is intended as a peer to peer communication.  It is written in medical language and may contain abbreviations or verbiage that are technical and unfamiliar.  It may appear blunt or direct.  Medical documents are intended to carry relevant information, facts as evident, and the clinical opinion of the practitioner.

## 2024-07-10 NOTE — DISCHARGE INSTRUCTIONS
Nothing in the vagina for 2 weeks.  No strenuous activity/exercise for 48 hours (it can increase bleeding from the uterus.)   Please still take frequent walks. Stairs are fine.   No tub baths for 2 weeks.   May shower.    Please call office if:  -fever 100.4 or higher    Please proceed to the Emergency Department at [facility] for any of the following:   -vaginal bleeding soaking greater than 1 pad per hour  -severe pelvic pain  -shortness of breath  -chest pain  -leg pain or swelling     You received a dose of Norco at 9:08 AM   You may take next dose of Tylenol (acetaminophen) after 3:08 PM

## 2024-07-11 ENCOUNTER — HOSPITAL ENCOUNTER (OUTPATIENT)
Facility: HOSPITAL | Age: 49
Setting detail: HOSPITAL OUTPATIENT SURGERY
Discharge: HOME | End: 2024-07-11
Attending: OBSTETRICS & GYNECOLOGY | Admitting: OBSTETRICS & GYNECOLOGY
Payer: MEDICAID

## 2024-07-11 ENCOUNTER — ANESTHESIA (OUTPATIENT)
Dept: SURGERY | Facility: HOSPITAL | Age: 49
End: 2024-07-11
Payer: MEDICAID

## 2024-07-11 ENCOUNTER — ANESTHESIA EVENT (OUTPATIENT)
Dept: SURGERY | Facility: HOSPITAL | Age: 49
End: 2024-07-11
Payer: MEDICAID

## 2024-07-11 VITALS
SYSTOLIC BLOOD PRESSURE: 121 MMHG | TEMPERATURE: 98 F | BODY MASS INDEX: 33.8 KG/M2 | RESPIRATION RATE: 16 BRPM | HEIGHT: 59 IN | DIASTOLIC BLOOD PRESSURE: 80 MMHG | HEART RATE: 57 BPM | OXYGEN SATURATION: 97 % | WEIGHT: 167.63 LBS

## 2024-07-11 DIAGNOSIS — N93.0 POSTCOITAL BLEEDING: ICD-10-CM

## 2024-07-11 DIAGNOSIS — N92.1 MENOMETRORRHAGIA: ICD-10-CM

## 2024-07-11 PROBLEM — Z98.890 STATUS POST HYSTEROSCOPY: Status: ACTIVE | Noted: 2024-07-11

## 2024-07-11 PROBLEM — Z98.890 STATUS POST D&C: Status: ACTIVE | Noted: 2024-07-11

## 2024-07-11 PROBLEM — Z30.430 ENCOUNTER FOR INSERTION OF MIRENA IUD: Status: ACTIVE | Noted: 2024-07-11

## 2024-07-11 LAB — B-HCG UR QL: NEGATIVE

## 2024-07-11 PROCEDURE — 58558 HYSTEROSCOPY BIOPSY: CPT | Performed by: OBSTETRICS & GYNECOLOGY

## 2024-07-11 PROCEDURE — 0UH97HZ INSERTION OF CONTRACEPTIVE DEVICE INTO UTERUS, VIA NATURAL OR ARTIFICIAL OPENING: ICD-10-PCS | Performed by: OBSTETRICS & GYNECOLOGY

## 2024-07-11 PROCEDURE — 0UDB8ZX EXTRACTION OF ENDOMETRIUM, VIA NATURAL OR ARTIFICIAL OPENING ENDOSCOPIC, DIAGNOSTIC: ICD-10-PCS | Performed by: OBSTETRICS & GYNECOLOGY

## 2024-07-11 PROCEDURE — 58300 INSERT INTRAUTERINE DEVICE: CPT | Performed by: OBSTETRICS & GYNECOLOGY

## 2024-07-11 DEVICE — MIRENA IUD: Type: IMPLANTABLE DEVICE | Site: UTERUS | Status: FUNCTIONAL

## 2024-07-11 RX ORDER — HYDROCODONE BITARTRATE AND ACETAMINOPHEN 5; 325 MG/1; MG/1
1 TABLET ORAL ONCE AS NEEDED
Status: COMPLETED | OUTPATIENT
Start: 2024-07-11 | End: 2024-07-11

## 2024-07-11 RX ORDER — HYDROMORPHONE HYDROCHLORIDE 1 MG/ML
0.2 INJECTION, SOLUTION INTRAMUSCULAR; INTRAVENOUS; SUBCUTANEOUS EVERY 5 MIN PRN
Status: DISCONTINUED | OUTPATIENT
Start: 2024-07-11 | End: 2024-07-11

## 2024-07-11 RX ORDER — MAGNESIUM GLYCINATE 100 MG
240 CAPSULE ORAL DAILY
COMMUNITY

## 2024-07-11 RX ORDER — HYDROMORPHONE HYDROCHLORIDE 1 MG/ML
0.4 INJECTION, SOLUTION INTRAMUSCULAR; INTRAVENOUS; SUBCUTANEOUS EVERY 5 MIN PRN
Status: DISCONTINUED | OUTPATIENT
Start: 2024-07-11 | End: 2024-07-11

## 2024-07-11 RX ORDER — NICOTINE POLACRILEX 4 MG
15 LOZENGE BUCCAL
Status: DISCONTINUED | OUTPATIENT
Start: 2024-07-11 | End: 2024-07-11

## 2024-07-11 RX ORDER — SODIUM CHLORIDE, SODIUM LACTATE, POTASSIUM CHLORIDE, CALCIUM CHLORIDE 600; 310; 30; 20 MG/100ML; MG/100ML; MG/100ML; MG/100ML
INJECTION, SOLUTION INTRAVENOUS CONTINUOUS
Status: DISCONTINUED | OUTPATIENT
Start: 2024-07-11 | End: 2024-07-11

## 2024-07-11 RX ORDER — ONDANSETRON 2 MG/ML
4 INJECTION INTRAMUSCULAR; INTRAVENOUS EVERY 6 HOURS PRN
Status: DISCONTINUED | OUTPATIENT
Start: 2024-07-11 | End: 2024-07-11

## 2024-07-11 RX ORDER — LIDOCAINE HYDROCHLORIDE AND EPINEPHRINE 10; 10 MG/ML; UG/ML
INJECTION, SOLUTION INFILTRATION; PERINEURAL AS NEEDED
Status: DISCONTINUED | OUTPATIENT
Start: 2024-07-11 | End: 2024-07-11 | Stop reason: HOSPADM

## 2024-07-11 RX ORDER — ACETAMINOPHEN 500 MG
1000 TABLET ORAL ONCE AS NEEDED
Status: COMPLETED | OUTPATIENT
Start: 2024-07-11 | End: 2024-07-11

## 2024-07-11 RX ORDER — ACETAMINOPHEN 500 MG
1000 TABLET ORAL ONCE
Status: DISCONTINUED | OUTPATIENT
Start: 2024-07-11 | End: 2024-07-11 | Stop reason: HOSPADM

## 2024-07-11 RX ORDER — ONDANSETRON 2 MG/ML
INJECTION INTRAMUSCULAR; INTRAVENOUS AS NEEDED
Status: DISCONTINUED | OUTPATIENT
Start: 2024-07-11 | End: 2024-07-11 | Stop reason: SURG

## 2024-07-11 RX ORDER — DEXTROSE MONOHYDRATE 25 G/50ML
50 INJECTION, SOLUTION INTRAVENOUS
Status: DISCONTINUED | OUTPATIENT
Start: 2024-07-11 | End: 2024-07-11

## 2024-07-11 RX ORDER — HYDROMORPHONE HYDROCHLORIDE 1 MG/ML
0.6 INJECTION, SOLUTION INTRAMUSCULAR; INTRAVENOUS; SUBCUTANEOUS EVERY 5 MIN PRN
Status: DISCONTINUED | OUTPATIENT
Start: 2024-07-11 | End: 2024-07-11

## 2024-07-11 RX ORDER — NICOTINE POLACRILEX 4 MG
30 LOZENGE BUCCAL
Status: DISCONTINUED | OUTPATIENT
Start: 2024-07-11 | End: 2024-07-11

## 2024-07-11 RX ORDER — SCOLOPAMINE TRANSDERMAL SYSTEM 1 MG/1
1 PATCH, EXTENDED RELEASE TRANSDERMAL ONCE
Status: DISCONTINUED | OUTPATIENT
Start: 2024-07-11 | End: 2024-07-11 | Stop reason: HOSPADM

## 2024-07-11 RX ORDER — HYDROCODONE BITARTRATE AND ACETAMINOPHEN 5; 325 MG/1; MG/1
2 TABLET ORAL ONCE AS NEEDED
Status: COMPLETED | OUTPATIENT
Start: 2024-07-11 | End: 2024-07-11

## 2024-07-11 RX ORDER — PROCHLORPERAZINE EDISYLATE 5 MG/ML
5 INJECTION INTRAMUSCULAR; INTRAVENOUS EVERY 8 HOURS PRN
Status: DISCONTINUED | OUTPATIENT
Start: 2024-07-11 | End: 2024-07-11

## 2024-07-11 RX ORDER — NALOXONE HYDROCHLORIDE 0.4 MG/ML
0.08 INJECTION, SOLUTION INTRAMUSCULAR; INTRAVENOUS; SUBCUTANEOUS AS NEEDED
Status: DISCONTINUED | OUTPATIENT
Start: 2024-07-11 | End: 2024-07-11

## 2024-07-11 RX ORDER — DEXAMETHASONE SODIUM PHOSPHATE 4 MG/ML
VIAL (ML) INJECTION AS NEEDED
Status: DISCONTINUED | OUTPATIENT
Start: 2024-07-11 | End: 2024-07-11 | Stop reason: SURG

## 2024-07-11 RX ADMIN — SODIUM CHLORIDE, SODIUM LACTATE, POTASSIUM CHLORIDE, CALCIUM CHLORIDE: 600; 310; 30; 20 INJECTION, SOLUTION INTRAVENOUS at 08:26:00

## 2024-07-11 RX ADMIN — DEXAMETHASONE SODIUM PHOSPHATE 4 MG: 4 MG/ML VIAL (ML) INJECTION at 07:55:00

## 2024-07-11 RX ADMIN — ONDANSETRON 4 MG: 2 INJECTION INTRAMUSCULAR; INTRAVENOUS at 07:55:00

## 2024-07-11 NOTE — ANESTHESIA PREPROCEDURE EVALUATION
PRE-OP EVALUATION    Patient Name: Nadine Claix    Admit Diagnosis: Menometrorrhagia [N92.1]  Postcoital bleeding [N93.0]    Pre-op Diagnosis: Menometrorrhagia [N92.1]  Postcoital bleeding [N93.0]    Truclear  Hysteroscopy, Dilation and Curettage, possible Polypectomy-Myomectomy, insertion of an intrauterine device    Anesthesia Procedure: Truclear  Hysteroscopy, Dilation and Curettage, possible Polypectomy-Myomectomy, insertion of an intrauterine device (Perineum)    Surgeons and Role:     * Ema Randall MD - Primary    Pre-op vitals reviewed.  Temp: 97.8 °F (36.6 °C)  Pulse: 66  Resp: 16  BP: 127/85  SpO2: 98 %  Body mass index is 33.85 kg/m².    Current medications reviewed.  Hospital Medications:   acetaminophen (Tylenol Extra Strength) tab 1,000 mg  1,000 mg Oral Once    scopolamine (Transderm-Scop) 1 MG/3DAYS patch 1 patch  1 patch Transdermal Once    lactated ringers infusion   Intravenous Continuous       Outpatient Medications:     Medications Prior to Admission   Medication Sig Dispense Refill Last Dose    Magnesium Glycinate 100 MG Oral Cap Take 240 mg by mouth daily.   6/27/2024    Potassium Citrate ER 15 MEQ (1620 MG) Oral Tab CR Take 1 tablet by mouth in the morning and 1 tablet before bedtime. (Patient taking differently: Take 1 tablet by mouth daily.) 180 tablet 6 6/27/2024    FIBER OR Take 1 tablet by mouth in the morning and 1 tablet before bedtime.   6/27/2024    triamterene-hydroCHLOROthiazide 37.5-25 MG Oral Cap Take 1 capsule by mouth every morning. 90 capsule 1 6/27/2024    Multiple Vitamin (ONE-DAILY MULTI VITAMINS) Oral Tab Take 1 tablet by mouth daily.   6/27/2024    albuterol (PROAIR HFA) 108 (90 Base) MCG/ACT Inhalation Aero Soln Inhale 2 puffs into the lungs every 4 (four) hours as needed for Wheezing. 1 each 1 More than a month       Allergies: Amlodipine, Metoprolol, Toradol [ketorolac tromethamine], Tramadol, Dilaudid [hydromorphone], Oxycodone, Wellbutrin [bupropion hcl],  Valsartan, Codeine sulfate, Hydrocodone, Morphine, and Seasonal      Anesthesia Evaluation    Patient summary reviewed.    Anesthetic Complications  (-) history of anesthetic complications         GI/Hepatic/Renal    Negative GI/hepatic/renal ROS.                             Cardiovascular    Negative cardiovascular ROS.  ECG reviewed.  Exercise tolerance: good     MET: >4    (+) obesity  (+) hypertension                  (+) dysrhythmias and PVC                  Endo/Other    Negative endo/other ROS.  (-) diabetes                            Pulmonary    Negative pulmonary ROS.  (+) asthma                     Neuro/Psych    Negative neuro/psych ROS.  (+) depression                                Past Surgical History:   Procedure Laterality Date    Abdominal surgery      2 hernia repairs right & left side      ,2016    x2    Colonoscopy N/A 2021    Procedure: COLONOSCOPY, ESOPHAGOGASTRODUODENOSCOPY with biopsy;  Surgeon: Prashant Lilly DO;  Location:  ENDOSCOPY    Colonoscopy  2021 EGD WITH BIOPSY. Colonoscopy internal hemorrhoids. No gross findings to explain left sided pain. Recall 10 years. Prashant Lilly DO    Cryocautery of cervix      Cysto/uretero w/lithotripsy Left 2024    Cystoscopy, LEFT ureteroscopy, laser lithotripsy, basket stone extraction, retrograde pyelogram, ureteral stent placement, Right retrograde pyelogram Dr. Bryant Garza    Cysto/uretero w/up stricture Right 10/15/2019    Cysto Rt RPG, Rt URS w/ Laser Litho Dilation of Rtight Stricture Rt URS Stent Exchange w/ Dr Jim    Cysto/uretero, stone remove Right 2019    right ureter and kidney stones extraction, URS, stent placement    Cystoscopy,ureteroscopy,lithotripsy Right 2019    Cysto, B/L RPG, URS, laser litho, stone ext, Rt stent Dr. Jim    Egd  2021    for left sided pain    Hc endometrial sampling w or wo endocerv sampling  2023    EMB- Dr. Ramirez -  disordered proliferative endometrium - recommended medical management options to regulate cycle.    Hernia surgery  01/2000    L hernia repair    Hysteroscopy,with sampling  10/08/2020    Hysteroscopy, D&C for intermittent menorrhagia, thickened endometrium, cervical stenosis. H/o menometrorrhagia & postcoital bleeding. Dr. Linnette Antoine. Path Endometrium with stromal collapse, superficial fibrin thrombi, and some polypoid tissue fragments with increased stromal fibrous component. Endometrial glands are proliferative. Overall pattern favors anovulatory breakdown bleeding.    Kidney surgery Right     stent placement 6/2022    Laparoscopy,pelvic,biopsy      Kidney stones & examine    Lithotripsy  2001, 2007 & 11/11    Needle biopsy left  02/2021    fibroadenoma    Needle biopsy right  02/2021    fibroadenoma    Other Bilateral 2012    nerve surgery to bilateral arms about 1 month apart    Other      percutaneous nephrolithotomy    Other surgical history  12/27/2019    cysto stent removal - dr. jim     Other surgical history  06/03/2020    Cysto Stent Removal w/ Dr Jim    Other surgical history  07/07/2021    Cysto/Stent Removal Dr. Jim    Other surgical history  07/08/2022    Right ureteroscopy, laser lithotripsy, stent placement, nephrostomy tube removal with Dr. Tariq    Removal gallbladder  1998    Remove stent via transureth Right 11/13/2019    Cysto Stent Removal w/ Dr Jim    Repair ing hernia,5+y/o,reducibl      Us breast biopsy 1 site right (cpt=19083) Right 07/03/2024    12:00 right breast. Fibroadenoma. Tiny microcalcifications. Breast tissue with hypocellular stromal fibrosis. No atypia or malignancy     Social History     Socioeconomic History    Marital status: Life Partner   Tobacco Use    Smoking status: Never    Smokeless tobacco: Never   Vaping Use    Vaping status: Never Used   Substance and Sexual Activity    Alcohol use: No    Drug use: No    Sexual activity: Yes      Partners: Male   Other Topics Concern    Caffeine Concern No    Exercise No    Seat Belt Yes     History   Drug Use No     Available pre-op labs reviewed.  Lab Results   Component Value Date    WBC 6.5 06/18/2024    RBC 4.40 06/18/2024    HGB 12.0 06/18/2024    HCT 38.1 06/18/2024    MCV 86.6 06/18/2024    MCH 27.3 06/18/2024    MCHC 31.5 06/18/2024    RDW 13.3 06/18/2024    .0 06/18/2024     Lab Results   Component Value Date     06/18/2024    K 3.8 06/18/2024     06/18/2024    CO2 22.0 06/18/2024    BUN 13 06/18/2024    CREATSERUM 0.77 06/18/2024     (H) 06/18/2024    CA 8.6 06/18/2024            Airway      Mallampati: II  Mouth opening: >3 FB  TM distance: 4 - 6 cm  Neck ROM: full Cardiovascular    Cardiovascular exam normal.  Rhythm: regular  Rate: normal  (-) murmur   Dental    Dentition appears grossly intact         Pulmonary    Pulmonary exam normal.  Breath sounds clear to auscultation bilaterally.               Other findings              ASA: 2   Plan: MAC  NPO status verified and patient meets guidelines.  Patient has not taken beta blockers in last 24 hours.  Post-procedure pain management plan discussed with surgeon and patient.      Plan/risks discussed with: patient                Present on Admission:   Menometrorrhagia   Postcoital bleeding

## 2024-07-11 NOTE — INTERVAL H&P NOTE
Pre-op Diagnosis: Menometrorrhagia [N92.1]  Postcoital bleeding [N93.0]    The above referenced H&P was reviewed by Ema Randall MD on 7/11/2024, the patient was examined and no significant changes have occurred in the patient's condition since the H&P was performed.  I discussed with the patient and/or legal representative the potential benefits, risks and side effects of this procedure; the likelihood of the patient achieving goals; and potential problems that might occur during recuperation.  I discussed reasonable alternatives to the procedure, including risks, benefits and side effects related to the alternatives and risks related to not receiving this procedure.  We will proceed with procedure as planned.

## 2024-07-11 NOTE — ANESTHESIA POSTPROCEDURE EVALUATION
[facility]    Nadine Calix Patient Status:  Hospital Outpatient Surgery   Age/Gender 49 year old female MRN [id number]   Location [facility] SURGERY Attending Ema Randall MD   [facility] Day # 0 PCP Fan Hernandez MD       Anesthesia Post-op Note    Truclear  Hysteroscopy, Dilation and Curettage, possible Polypectomy-Myomectomy, insertion of an intrauterine device    Procedure Summary       Date: 07/11/24 Room / Location:  MAIN OR  /  MAIN OR    Anesthesia Start: 0734 Anesthesia Stop:     Procedure: Truclear  Hysteroscopy, Dilation and Curettage, possible Polypectomy-Myomectomy, insertion of an intrauterine device (Perineum) Diagnosis:       Menometrorrhagia      Postcoital bleeding      (Menometrorrhagia [N92.1]Postcoital bleeding [N93.0])    Surgeons: Ema Randall MD Anesthesiologist: Jai Cervantes DO    Anesthesia Type: MAC ASA Status: 2            Anesthesia Type: MAC    Vitals Value Taken Time   /62 07/11/24 0826   Temp 97.5 °F (36.4 °C) 07/11/24 0826   Pulse 76 07/11/24 0826   Resp 16 07/11/24 0826   SpO2 95 % 07/11/24 0826       Patient Location: Same Day Surgery    Anesthesia Type: MAC    Airway Patency: patent    Postop Pain Control: adequate    Mental Status: mildly sedated but able to meaningfully participate in the post-anesthesia evaluation    Nausea/Vomiting: none    Cardiopulmonary/Hydration status: stable euvolemic    Complications: no apparent anesthesia related complications    Postop vital signs: stable    Dental Exam: Unchanged from Preop    Patient to be discharged home when criteria met.

## 2024-07-11 NOTE — OPERATIVE REPORT
Operative Report:     Nadine Calix  : 3/22/1975     Date of procedure: 24    INDICATIONS:   49 year old  perimenopausal female with menometrorrhagia and postcoital bleeding for the past 2-3 years. She had endometrial sampling in the past that was benign. Pelvic ultrasounds showed thickened endometrium despite taking cyclic Provera. Recommended hysteroscopy, D&C, possible polypectomy/myomectomy, Mirena IUD insertion in operating room. Risks, benefits, alternatives discussed. Her last period was about one month ago and was normal in flow, lasted about 5 days.     PRE-OP DIAGNOSIS:   Menometrorrhagia  Postcoital bleeding  Thickened endometrium on pelvic ultrasound    POST-OP DIAGNOSIS:   Menometrorrhagia  Postcoital bleeding  Thin endometrium  Status post insertion of Mirena intrauterine device     PROCEDURE(S):   Hysteroscopy, Dilation and curettage of uterus and endocervix using Truclear hysteroscopic morcellator, insertion of Mirena levonorgestrel intrauterine device     ANESTHESIA: MAC with paracervical block     SURGEON(S): Ema Randall MD     ESTIMATED BLOOD LOSS: 2 mL           DRAINS: None   URINE: 20 mL light yellow            UTERINE DISTENSION MEDIUM: Normal Saline 0.9%  DEFICIT: 75 mL     SPECIMENS: Endometrial curettings             COMPLICATIONS:  None apparent     FINDINGS: Normal external genitalia, no lesions. Mild cystocele. Cervix with slightly stenotic endocervical canal. Anteverted uterus. Uterus sounded to 8 cm. Endometrial cavity normal in shape. Endometrium thin. Bilateral tubal ostia seen.     PROCEDURE: This procedure was fully reviewed with the patient and written informed consent was obtained after discussing risks, benefits, indication and alternatives. All questions were answered.     The patient was taken to operating room. SCDs were placed. MAC was initiated. She was placed in dorsal lithotomy position. Exam under anesthesia performed. She was prepped and draped  in normal sterile fashion. The bladder was drained. A sterile bivalved speculum was placed in the vagina. A paracervical block was administered. A single tooth tenaculum was used to grasp the anterior lip of the cervix. The cervix was gently dilated with hegar dilators to 6 mm. Uterus was sounded to 8 cm.     The hysteroscopic system was calibrated. The hysteroscope was gently advanced into the endometrial cavity. The uterine cavity was visualized and the previous findings noted. The TruClear Mini hysteroscopic blade was then advanced through the hysteroscope under direct visualization applied to sample the tissue of the endometrium along all the walls of the uterus and endocervix. The tissue was sent to pathology.     The hysteroscope was then removed from the uterus. A Mirena IUD was then deployed into the uterus without difficulty. The IUD strings were trimmed to 3-4 cm. The single tooth tenaculum was removed and good hemostasis was noted. Sponge, lap, needle, and instrument counts correct by two counts. The patient was taken to recovery room in stable condition.     DISPOSITION:  To recovery room in stable condition        CONDITION: Stable      Ema Randall MD   [facility] - OBGYN

## 2024-07-18 ENCOUNTER — NURSE ONLY (OUTPATIENT)
Dept: UROLOGY | Facility: HOSPITAL | Age: 49
End: 2024-07-18
Attending: OBSTETRICS & GYNECOLOGY
Payer: MEDICAID

## 2024-07-18 VITALS — RESPIRATION RATE: 18 BRPM | WEIGHT: 167 LBS | HEIGHT: 59 IN | BODY MASS INDEX: 33.67 KG/M2

## 2024-07-18 DIAGNOSIS — N39.41 URGE INCONTINENCE: Primary | ICD-10-CM

## 2024-07-18 PROCEDURE — 64566 NEUROELTRD STIM POST TIBIAL: CPT

## 2024-07-18 NOTE — PROGRESS NOTES
[facility]  URGENT PC Therapy Note    Date:  2024    Patient Name:  Nadine Calix  Patient :  3/22/1975   Patient MRN:  [id number]  Patient Age:  49 year old      Diagnosis:  N39.41 Urge Incontinence    Procedure:  Left URGENT PC Therapy:  Posterior tibial nerve stimulation treatment  77838 - posterial tibial neuro stimulation, percutaneous needle electrode, single treatment, includes programming    PTNS Evaluation:  PTNS Session: Monthly Follow-Up  Patient reports feeling: Same  Nocturia: 4+ (3-5x)  Frequency: <1 hr  Patient wears pads: Yes  Pads per day: 4 (Reports 2-4 pantyliners throughout daytime)  Pads per night: 2  CISC PRN  Physician:  Dr. Lara Jim    RN:  Delfina KENT RN     Indications:  Urinary frequency, urge incontinence, with subjectively severe urge incontinence and inadequate to anti-cholinergic meds.  Informed consent was obtained with discussion of risk, benefits and goal, and limits of the procedure including but not limited to bleeding, infection, andnerve injury were discussed and the patient desired to proceed.      Description of Procedure:    The patient was properly identified and positioned in a semi reclined, comfortable seated position with their feet elevated in a recliner or exam room chair.    The insertion site for the needle electrode was identified on the lower inner aspect of the Left leg.  This position utilized was approximately 5 cm cephalad to the medial malleolus and one finger breath/2cm posterior to the tibia.    Preparation of the needle electrode insertion site was performed using an alcohol pad to clean skin of the above-noted needle insertion site.    The needle electrode/guide tube assembly was placed over the identified and cleaned insertion site.      Once the needle electrode had been placed into the skin, the guide tube was removed and the needle was gently advanced maintaining a 60 degree angle.      A surface electrode  was placed over the medial aspect of the calcaneus on the lower extremity utilized for stimulation.    Current adjustment was slowly increased while observing the patient's foot for response. Adjustments were made advancing the needle further in to obtain optimal sensation.    An optimal sensation in the foot was noted.Once an optimal response was noted, therapy mode was then initiated. The pt was given a bell to ring for the RN to be able to call for any needed adjustments.    Therapy continued without complication for 30 minutes.  No additional current adjustments were needed.    Once 30 minutes of therapy and completed stimulator was turned off and the needle, electrode clip and surface electrode were removed.      This completed the therapy.  The patient tolerated the procedure well.    Plan: Return for monthly PTNS, 8/12/24 @ 10am, sooner prn.

## 2024-07-31 ENCOUNTER — TELEPHONE (OUTPATIENT)
Dept: HEMATOLOGY/ONCOLOGY | Facility: HOSPITAL | Age: 49
End: 2024-07-31

## 2024-07-31 NOTE — TELEPHONE ENCOUNTER
Patient phoned requesting information regarding the breast cancer risk assessment program suggested by her ordering physician. Patient was given information about the breast cancer risk assessment program and stated a team member will contact her to schedule a consultation with Dr. Friend and the genetic counselor to review over family history and talk about breast cancer risk reduction strategies. Patient is contacting her insurance to check for coverage for genetics appointment and will contact navigators to schedule. Patient was instructed to call with any further questions or concerns.

## 2024-08-01 ENCOUNTER — OFFICE VISIT (OUTPATIENT)
Facility: CLINIC | Age: 49
End: 2024-08-01
Payer: MEDICAID

## 2024-08-01 VITALS
HEIGHT: 59 IN | SYSTOLIC BLOOD PRESSURE: 120 MMHG | BODY MASS INDEX: 34.84 KG/M2 | HEART RATE: 72 BPM | WEIGHT: 172.81 LBS | DIASTOLIC BLOOD PRESSURE: 72 MMHG

## 2024-08-01 DIAGNOSIS — M62.89 PELVIC FLOOR DYSFUNCTION: ICD-10-CM

## 2024-08-01 DIAGNOSIS — N73.0 ACUTE PID (PELVIC INFLAMMATORY DISEASE): Primary | ICD-10-CM

## 2024-08-01 DIAGNOSIS — R35.0 URINARY FREQUENCY: ICD-10-CM

## 2024-08-01 DIAGNOSIS — Z98.890 STATUS POST HYSTEROSCOPY: ICD-10-CM

## 2024-08-01 DIAGNOSIS — N92.1 MENOMETRORRHAGIA: ICD-10-CM

## 2024-08-01 DIAGNOSIS — Z98.890 STATUS POST D&C: ICD-10-CM

## 2024-08-01 DIAGNOSIS — Z30.431 ENCOUNTER FOR ROUTINE CHECKING OF INTRAUTERINE CONTRACEPTIVE DEVICE (IUD): ICD-10-CM

## 2024-08-01 DIAGNOSIS — Z48.89 POSTOPERATIVE VISIT: ICD-10-CM

## 2024-08-01 LAB
BILIRUB UR QL STRIP.AUTO: NEGATIVE
COLOR UR AUTO: YELLOW
GLUCOSE UR STRIP.AUTO-MCNC: NORMAL MG/DL
KETONES UR STRIP.AUTO-MCNC: NEGATIVE MG/DL
LEUKOCYTE ESTERASE UR QL STRIP.AUTO: NEGATIVE
NITRITE UR QL STRIP.AUTO: NEGATIVE
PH UR STRIP.AUTO: 7.5 [PH] (ref 5–8)
PROT UR STRIP.AUTO-MCNC: 50 MG/DL
SP GR UR STRIP.AUTO: 1.02 (ref 1–1.03)
UROBILINOGEN UR STRIP.AUTO-MCNC: NORMAL MG/DL
YEAST UR QL: PRESENT /HPF

## 2024-08-01 PROCEDURE — 87086 URINE CULTURE/COLONY COUNT: CPT | Performed by: OBSTETRICS & GYNECOLOGY

## 2024-08-01 PROCEDURE — 99459 PELVIC EXAMINATION: CPT | Performed by: OBSTETRICS & GYNECOLOGY

## 2024-08-01 PROCEDURE — 99214 OFFICE O/P EST MOD 30 MIN: CPT | Performed by: OBSTETRICS & GYNECOLOGY

## 2024-08-01 PROCEDURE — 81514 NFCT DS BV&VAGINITIS DNA ALG: CPT | Performed by: OBSTETRICS & GYNECOLOGY

## 2024-08-01 PROCEDURE — 81001 URINALYSIS AUTO W/SCOPE: CPT | Performed by: OBSTETRICS & GYNECOLOGY

## 2024-08-01 PROCEDURE — 87591 N.GONORRHOEAE DNA AMP PROB: CPT | Performed by: OBSTETRICS & GYNECOLOGY

## 2024-08-01 PROCEDURE — 87491 CHLMYD TRACH DNA AMP PROBE: CPT | Performed by: OBSTETRICS & GYNECOLOGY

## 2024-08-01 PROCEDURE — 96372 THER/PROPH/DIAG INJ SC/IM: CPT | Performed by: OBSTETRICS & GYNECOLOGY

## 2024-08-01 RX ORDER — DOXYCYCLINE HYCLATE 100 MG
100 TABLET ORAL 2 TIMES DAILY
Qty: 28 TABLET | Refills: 0 | Status: SHIPPED | OUTPATIENT
Start: 2024-08-01 | End: 2024-08-15

## 2024-08-01 RX ORDER — METRONIDAZOLE 500 MG/1
500 TABLET ORAL 2 TIMES DAILY
Qty: 28 TABLET | Refills: 0 | Status: SHIPPED | OUTPATIENT
Start: 2024-08-01 | End: 2024-08-15

## 2024-08-01 RX ORDER — CEFTRIAXONE 500 MG/1
500 INJECTION, POWDER, FOR SOLUTION INTRAMUSCULAR; INTRAVENOUS ONCE
Status: COMPLETED | OUTPATIENT
Start: 2024-08-01 | End: 2024-08-01

## 2024-08-01 RX ORDER — LEVONORGESTREL 52 MG/1
1 INTRAUTERINE DEVICE INTRAUTERINE ONCE
COMMUNITY

## 2024-08-01 RX ADMIN — CEFTRIAXONE 500 MG: 500 INJECTION, POWDER, FOR SOLUTION INTRAMUSCULAR; INTRAVENOUS at 15:04:00

## 2024-08-01 NOTE — PROGRESS NOTES
[facility]  Obstetrics and Gynecology   History & Physical    Chief complaint:   Chief Complaint   Patient presents with    Follow - Up     Truclear  Hysteroscopy, Dilation and Curettage, possible Polypectomy-Myomectomy, insertion of an intrauterine device      Subjective:     HPI: Nadine Calix is a 49 year old  with Patient's last menstrual period was 2024 (exact date).  Here for follow up procedure for AUB & Mirena IUD insertion 24 - 3 weeks ago.   Partner here with her  Chaperone declined     PMH Menometrorrhagia, Migraine with aura, Obesity, CHTN, GERD, anxiety, recurrent nephrolithiasis, apparently with ureteral scarring affecting kidney function. Detrusor instability, incomplete bladder emptying, urge incontinence, pelvic muscle wasting, recurrent UTI, cystocele, rectocele, OAB slightly improved after PTNS - used to void 20-25 times per night now down to 15 times per night. Sees uro & urogyn. PFPT done.     24 hysteroscopy, D&C, Mirena IUD insertion in OR for menometrorrhagia, postcoital bleeding.   FINDINGS: Normal external genitalia, no lesions. Mild cystocele. Cervix with slightly stenotic endocervical canal. Anteverted uterus. Uterus sounded to 8 cm. Endometrial cavity normal in shape. Endometrium thin. Bilateral tubal ostia seen.     Path benign with:     Weakly proliferative endometrium with stromal breakdown/condensation  -patient due for period soon when this sample was taken  -consistent with perimenopause & can be seen with ovulatory dysfunction     Fragments of endocervical glands with inflammation and reactive changes  -inflammation of the cervix and endocervical glands are very common findings  -this may or may not indicate a low grade infection  -we have a few choices      (1) Observe for any further spotting after intercourse etc (would need to give it a few months or so given recent Mirena IUD insertion on 24)      (2) Test for gonorrhea, chlamydia, and  other possible cervical/vaginal infections      (3) Treat with an empiric course of doxycycline for cervicitis.  -given history of needing multiple antibiotics for urinary tract infections, would try to avoid taking any antibiotics unless strictly needed (I would advise option 1 or 2 or both)      As of today, 8/1/24   Thinks has bladder infection  Very frequent urination. Amount can be normal or small amounts.   No blood but with wiping slight pink - maybe vaginal source?   No period came (was due to have one just prior to the procedure)   Mild cramping after the IUD insertion.   Last few days - Cramping was more on the left side of pelvis, but now whole pelvis feels crampy,    Yesterday morning had a sharp pain in the lower pelvis.   The discharge can sometimes be cloudy - mainly clear like water  Feels wet a lot of the time.   Now she is thinking she may want to have a hysterectomy.   She is worried about ovarian cancer because of family history    Patient's last menstrual period was 06/18/2024 (exact date).     PCP: Fan Hernandez MD   Patient Care Team:  Fan Hernandez MD as PCP - General (Family Practice)  Eryn Martin, PT (Physical Therapy)  Meryl Lambert MD (NEPHROLOGY)  Prashant Lilly DO (GASTROENTEROLOGY)  Eddie Marcus MD (Cardiac Electrophysiology)  Laurel Layton, OT as Occupational Therapist (Occupational Therapist)  Faye Jerez, PT as Physical Therapist (Physical Therapy)  Luly Jacome, PT as Physical Therapist  Lara Jim DO (Urogynecology)  Anne Cummins MD (UROLOGY)  Bryant Garza MD (UROLOGY)  Howard Bear MD (ENDOCRINOLOGY)  Brii Scruggs, RN as Registered Nurse (Registered Nurse)     Review of Systems   Cardiovascular:         Has not been monitoring BP at home.    Gastrointestinal:         Taking fiber per urogyn to help with constipation.    Genitourinary:  Positive for menstrual problem and vaginal bleeding. Negative for dyspareunia and pelvic pain.        Ureters  are scarred up from kidney stones. They are considering a major surgery for her.   Self catheterizing. Not emptying bladder well.   Urologist Dr. Garza. Has urogyn Dr. Lara Jim as well     OAB - PTNS - posterior tibial nerve treatment, also with cystocele, bladder pain & overactivity. Was voiding 25-35 times per night.     Menses somewhat irregular & can be heavy or normal   Occasional spotting after sex - for 2-3 years     PFPT done in the past. Plans to return per Dr. Jim    +Bilateral inguinal pain for years. Right side over the past 1 year or so has felt bulgy at times. H/o bilateral inguinal & umbilical hernia repairs in the past. Does not have a general surgeon currently.    Skin:         Breasts - always lumpy for years. No new changes noted   Neurological:  Positive for headaches.        Vertigo sometimes - newer.   Headaches - will wake up with headaches in the morning sometimes.   Started magnesium glycinate  No recent migraines   Psychiatric/Behavioral:  Positive for sleep disturbance.         Emotional   Forgetful at times  Not sleeping great - some is due to urination.      GYN Hx:   Regular about 28-30 x 5-7 x normal flow x some pain that was bad as a teen -> cramping after babies.   Mirena twice, both prior to the pregnancies   Mirena IUD - probably 9-10 years ago.    Pelvic US 9/2020 - showed endometrium was 8 mm, hemorrhagic cyst. Suspected adenomyosis noted  Hysteroscopy D&C - 10/2020 with benign pathology     8/16/23 ED visit for AUB. Reported LMP 8/10/23 (6 days prior). Endometrium 18 mm. Physiologic simple cyst in the left ovary measures 3.1 x 2.2 x 3.0 cm.     8/18/23 EMB- Dr. Ramirez - disordered proliferative endometrium - recommended medical management options to regulate cycle.     Extensive history of AUB. Has seen multiple gynecologists.     Initially presented to me 2/27/24 for 2nd opinion regarding menometrorrhagia & postcoital spotting x 2-3 years. Variable amounts of  bleeding period to period since around 2022, around time of hot flashes starting. Pelvic US 8/2023 with thickened endometrium & simple left ovarian cyst. Endometrial biopsy 8/18/23 disordered proliferative     Reviewed her pattern is likely anovulatory/perimenopause related given age & also vasomotor symptoms starting around the same time.   Discussed possibility of endometrial polyp vs unstable/dyssynchronized lining causing postcoital bleeding.   Provera advised to induce withdrawal bleed  Mirena IUD recommended.   Return to [facility] per Dr. Jim recommended   Genetic counseling [place] [facility] [place]     4/24/24 Patient contacted our office to report having prolonged bleeding despite cyclic Provera. Possibility of interference with her own ovulations. Rx Slynd. Pelvic US ordered to r/o endometrial polyp    5/7/24 FSH 13.9, estradiol 13    5/15/24 Pelvic US - Endometrium is thick. Left ovary cyst 3.7 cm is simple. Smaller simple cyst in right ovary. Likely reflecting ovulatory dysfunction. Had not started Slynd yet.     7/11/24 hysteroscopy, D&C, Mirena IUD insertion in OR for menometrorrhagia, postcoital bleeding.   FINDINGS: Normal external genitalia, no lesions. Mild cystocele. Cervix with slightly stenotic endocervical canal. Anteverted uterus. Uterus sounded to 8 cm. Endometrial cavity normal in shape. Endometrium thin. Bilateral tubal ostia seen.     Path benign with: Weakly proliferative endometrium with stromal breakdown/condensation, fragments of endocervical glands with inflammation and reactive changes    Sexually active? 7 years of unprotected intercourse - has not any accidental pregnancies.   Dyspareunia? N  Postcoital bleeding? Y    Contraception: Mirena IUD insertion 7/11/24 in OR    [facility]WE 5/8/24 -Ema Randall MD     Cervical cancer screening:   History of colposcopy? Y  History of cryosurgery? 2012  History of LEEP/conization? N  Pap 8/9/2022 was  negative  24 Pap & HPV negative     Breast cancer screening:  Mammogram: 23 heterogeneously dense, benign. Biopsy clips noted.    7/3/24 - ultrasound biopsy of a a hypoechoic masslike area 12 o'clock position right breast measuring approximately 1.2 cm.    Colon cancer screenin2021 EGD WITH BIOPSY. Colonoscopy internal hemorrhoids. No gross findings to explain left sided pain. Colonoscopy recall 10 years. Prashant Lilly DO    OB History:  OB History    Para Term  AB Living   10 7 3 4 3 7   SAB IAB Ectopic Multiple Live Births   2 0 0 0 7     OB History    Para Term  AB Living   10 7 3 4 3 7   SAB IAB Ectopic Multiple Live Births   2 0 0   7      # Outcome Date GA Lbr Saad/2nd Weight Sex Type Anes PTL Lv   10  16 31w1d  7 lb 2.4 oz (3.243 kg) M CS-Unspec None  BECCA   9  06/15/14 36w1d  6 lb 14 oz (3.118 kg) M CS-Unspec EPI  BECCA      Complications: Gestational diabetes (HCC), Gestational diabetes mellitus (GDM) (HCC)   8 2013 6w0d          7 AB 04 19w0d             Birth Comments: Spina Bifida   6 Term 02 40w0d  8 lb 15 oz (4.054 kg) M Vag-Spont EPI  BECCA      Complications:  labor,  labor (HCC)   5 Term 00 40w0d  7 lb 6 oz (3.345 kg) F Vag-Spont EPI  BECCA   4  98 36w0d  6 lb 6 oz (2.892 kg) M Vag-Spont None  BECCA   3  95 36w0d  7 lb (3.175 kg) M Vag-Spont None  BECCA   2 Term 94 40w0d  6 lb 12 oz (3.062 kg) F Vag-Spont None  BECCA      Complications: Preeclampsia, Pre-eclampsia (HCC)   1 SAB                Meds:  Current Outpatient Medications on File Prior to Visit   Medication Sig Dispense Refill    Levonorgestrel (MIRENA, 52 MG,) 20 MCG/DAY Intrauterine IUD 20 mcg (1 each total) by Intrauterine route one time.      Magnesium Glycinate 100 MG Oral Cap Take 240 mg by mouth daily.      Potassium Citrate ER 15 MEQ (1620 MG) Oral Tab CR Take 1 tablet by mouth in the morning and 1 tablet before  bedtime. (Patient taking differently: Take 1 tablet by mouth daily.) 180 tablet 6    FIBER OR Take 1 tablet by mouth in the morning and 1 tablet before bedtime.      triamterene-hydroCHLOROthiazide 37.5-25 MG Oral Cap Take 1 capsule by mouth every morning. 90 capsule 1    albuterol (PROAIR HFA) 108 (90 Base) MCG/ACT Inhalation Aero Soln Inhale 2 puffs into the lungs every 4 (four) hours as needed for Wheezing. 1 each 1    Multiple Vitamin (ONE-DAILY MULTI VITAMINS) Oral Tab Take 1 tablet by mouth daily.       No current facility-administered medications on file prior to visit.       All:  Allergies   Allergen Reactions    Amlodipine SWELLING     Leg swelling    Metoprolol SHORTNESS OF BREATH    Toradol [Ketorolac Tromethamine] SHORTNESS OF BREATH    Tramadol SHORTNESS OF BREATH    Dilaudid [Hydromorphone] ITCHING and PAIN     Headache - per pt this is only with long term use - pt states she can tolerate this medication    Oxycodone PAIN     headache    Wellbutrin [Bupropion Hcl] PAIN and DIZZINESS     Headache    Valsartan OTHER (SEE COMMENTS)     Chest pain      Codeine Sulfate ITCHING     TABS    Hydrocodone ITCHING    Morphine ITCHING    Seasonal Runny nose       PMH:  Past Medical History:    Abdominal distention    Longer than this but this is an estimate    Abdominal hernia    I had hernia repair around by my belly button & abdominal    Abdominal pain    Currently seeing obgyn, urologist & nephrologist    Arrhythmia    Back pain    Mid to lower back pain & hip pain    Benign essential HTN    Bloating    Longer than this but this is an estimate    Blood in urine    Due to severe kidney stones    Blurred vision    I always wore glasses or contacts since about 14 yrs old    Calculus of kidney    hydronephrosis/ several times kidney stones/ 13 th procedure for stones    Cervicalgia    Log Date: 05/04/2012         Change in hair    My hair is thinning more than the usual    Chest pain    I did see a cardiologist  about this    Chest pain on exertion    I did see a cardiologists about this    COVID-19    headache, back pain, shortness of breath, sore throat, runny nose, chills. Not hospitalized    COVID-19    high fever, fatigue, not hospitalized    Dense breasts    heterogeneously dense breasts    Depression    Diarrhea, unspecified    I notice that certain things run right through me now    Disorder of liver    fatty liver    Dizziness    Dyspepsia    Easy bruising    I find bruises on me and don’t really know how I got them    Elevated fasting glucose    Enthesopathy of the wrist and carpus    Log Date: 05/31/2011         ESBL E. 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 but I never really checke    Heart palpitations    Pvc’s and irregular heart rate    Hemorrhoids    After last pregnancy or a couple years after that    High blood pressure    History of cardiac murmur    Mother told me I was born with one & my dr as well    History of D&C    History of depression    When I had a miscarriage    Indigestion    Longer than this but this is an estimate    Itch of skin    Not severe but I have been itching for no apparent reason    Leaking of urine    After first pregnancy    Leg swelling    My left leg slightly swelled up and my ankle area was hurtin    Lesion of ulnar nerve    Log Date: 03/08/2013         Loss of appetite    I noticed this lately too    Menometrorrhagia    Hysteroscopy 10/2020 path favors anovulatory breakdown bleeding.    Menometrorrhagia    Migraine with aura    Migraines    Multiple  thyroid nodules    Myofascial pain    Nausea    Longer than this but this is an estimate    Night sweats    I feel like I’m on fire at night.    OAB (overactive bladder)    PTNS - posterior tibial nerve stimulation with urogynecologist Dr. Lara Jim    Painful urination    When passing stones    Postcoital bleeding    Prior to hysteroscopy done 10/2020. Also having since at least  or     Postcoital bleeding    Prediabetes    PVC's (premature ventricular contractions)    Rash    Broke out with unknown rash all over legs and arms    Sexually transmitted disease    HPV    Sleep disturbance    For years sometimes I can’t lay on my left side    Stress    Rough up bringing and also regular family stresses    Uncomfortable fullness after meals    Longer than this but this is an estimate    Unspecified condition originating in the  period    Ureteral stone with hydronephrosis    Ureteral stricture, right    Urinary incontinence    Constantly going can’t hold it    Ventral hernia without obstruction or gangrene    Visual impairment    glasses    Vitamin D deficiency    Wears glasses    Since I was about 14 yrs old    Weight gain    After last son was born       PSH:  Past Surgical History:   Procedure Laterality Date    Abdominal surgery      2 hernia repairs right & left side      ,2016    x2    Colonoscopy N/A 2021    Procedure: COLONOSCOPY, ESOPHAGOGASTRODUODENOSCOPY with biopsy;  Surgeon: Prashant Lilly DO;  Location:  ENDOSCOPY    Colonoscopy  2021 EGD WITH BIOPSY. Colonoscopy internal hemorrhoids. No gross findings to explain left sided pain. Recall 10 years. Prashant Lilly DO    Cryocautery of cervix      Cysto/uretero w/lithotripsy Left 2024    Cystoscopy, LEFT ureteroscopy, laser lithotripsy, basket stone extraction, retrograde pyelogram, ureteral stent placement, Right retrograde pyelogram Dr. Bryant Garza    Cysto/uretero w/up stricture  Right 10/15/2019    Cysto Rt RPG, Rt URS w/ Laser Litho Dilation of Rtight Stricture Rt URS Stent Exchange w/ Dr Jim    Cysto/uretero, stone remove Right 12/18/2019    right ureter and kidney stones extraction, URS, stent placement    Cystoscopy,ureteroscopy,lithotripsy Right 08/23/2019    Cysto, B/L RPG, URS, laser litho, stone ext, Rt stent Dr. Jim    Egd  04/07/2021    for left sided pain    Hc endometrial sampling w or wo endocerv sampling  08/18/2023    EMB- Dr. Ramirez - disordered proliferative endometrium - recommended medical management options to regulate cycle.    Hernia surgery  01/2000    L hernia repair    Hysteroscopy,with sampling  10/08/2020    Hysteroscopy, D&C for intermittent menorrhagia, thickened endometrium, cervical stenosis. H/o menometrorrhagia & postcoital bleeding. Dr. Linnette Antoine. Path Endometrium with stromal collapse, superficial fibrin thrombi, and some polypoid tissue fragments with increased stromal fibrous component. Endometrial glands are proliferative. Overall pattern favors anovulatory breakdown bleeding.    Hysteroscopy,with sampling  07/11/2024    Hysteroscopy, D&C, insertion Mirena IUD. Done for menometrorrhagia & postcoital bleeding. Dr. Ema Randall. Path benign -Fragments of weakly proliferative endometrium with stromal breakdown/condensation. -Fragments of endocervical glands with inflammation and reactive changes.    Insert intrauterine device  07/11/2024    Mirena IUD insertion. 8 cm. Anteverted/mid position. Dr. Ema Randall    Kidney surgery Right     stent placement 6/2022    Laparoscopy,pelvic,biopsy      Kidney stones & examine    Lithotripsy  2001, 2007 & 11/11    Needle biopsy left  02/2021    fibroadenoma    Needle biopsy right  02/2021    fibroadenoma    Other Bilateral 2012    nerve surgery to bilateral arms about 1 month apart    Other      percutaneous nephrolithotomy    Other surgical history  12/27/2019    cysto stent removal -   channing     Other surgical history  2020    Cysto Stent Removal w/ Dr Jim    Other surgical history  2021    Cysto/Stent Removal Dr. Jim    Other surgical history  2022    Right ureteroscopy, laser lithotripsy, stent placement, nephrostomy tube removal with Dr. Tariq    Removal gallbladder  1998    Remove stent via transureth Right 2019    Cysto Stent Removal w/ Dr Jim    Repair ing hernia,7+y/o,reducibl      Us breast biopsy 1 site right (cpt=19083) Right 2024    12:00 right breast. Fibroadenoma. Tiny microcalcifications. Breast tissue with hypocellular stromal fibrosis. No atypia or malignancy. Recommend follow-up mammogram and right breast ultrasound in 6 months.       Social History:  Social History     Socioeconomic History    Marital status: Life Partner   Tobacco Use    Smoking status: Never    Smokeless tobacco: Never   Vaping Use    Vaping status: Never Used   Substance and Sexual Activity    Alcohol use: No    Drug use: No    Sexual activity: Not Currently     Partners: Male     Birth control/protection: Mirena, I.U.D.     Comment: Mirena IUD 24   Other Topics Concern    Caffeine Concern No    Exercise No    Seat Belt Yes        Family History:  Family History   Problem Relation Age of Onset    Diabetes Mother     High Cholesterol Mother     Hypertension Mother     Thyroid disease Mother         hypothroid    Other (Other) Mother         Part of thyroid removed    Heart Disorder Father         3 stents in heart    Diabetes Father     Hypertension Father     Hypertension Sister     Other (ovarian problem) Sister         work up in progress    Diabetes Sister     Other (Other) Sister         Liver problem    Diabetes Maternal Grandmother     Ovarian Cancer Maternal Grandmother          at 54    Diabetes Maternal Grandfather     Diabetes Paternal Grandmother     Diabetes Paternal Grandfather     Other (Other) Daughter         Appendicitis    Other  (Other) Son         Appendicitis    Pancreatic Cancer Maternal Uncle     Breast Cancer Neg     Colon Cancer Neg        Immunization History:  Immunization History   Administered Date(s) Administered    DTP 08/01/1995, 10/02/1995, 11/30/1995    FLULAVAL 6 months & older 0.5 ml Prefilled syringe (65137) 10/18/2018, 10/16/2019, 11/05/2020, 11/16/2021, 11/15/2022    FLUZONE 6 months and older PFS 0.5 ml ([id number]) 10/18/2018, 10/16/2019, 11/05/2020, 11/16/2021    HEP B, Ped/Adol 05/13/1995, 06/22/1995, 02/20/1996    Hib, Unspecified Formulation 08/01/1995, 10/02/1995, 11/30/1995    TDAP 06/16/2014, 10/04/2016       Depression Scale      PHQ-2 not done in last 12 months! Please administer and refresh!        Objective:     Vitals:    08/01/24 1337   BP: 120/72   Pulse: 72   Weight: 172 lb 12.8 oz (78.4 kg)   Height: 59\"           Body mass index is 34.9 kg/m².    Physical Exam: as of 5/8/24 [facility]  Physical Exam  Vitals and nursing note reviewed.   Constitutional:       Appearance: Normal appearance.   HENT:      Head: Normocephalic and atraumatic.   Eyes:      Extraocular Movements: Extraocular movements intact.      Conjunctiva/sclera: Conjunctivae normal.   Cardiovascular:      Rate and Rhythm: Normal rate and regular rhythm.      Heart sounds: No murmur heard.  Pulmonary:      Effort: Pulmonary effort is normal.      Breath sounds: Normal breath sounds.   Abdominal:      General: There is no distension.      Palpations: Abdomen is soft. There is no mass.      Tenderness: There is no abdominal tenderness. There is no guarding or rebound.      Hernia: No hernia is present.      Comments: +scar from umbilical hernia repair. +scars bilateral groins.    Genitourinary:     Comments: +Odor of urine  VULVA:  normal appearing vulva with no masses, tenderness or lesions  URETHRA: unremarkable   PERINEUM: intact    VAGINA: stage II cystocele, stage I uterovaginal prolapse  CERVIX: discharge white to slightly yellow, slightly mucoid &  cloudy. IUD strings appropriate.    UTERUS: minimally enlarged, anteverted, +CMT  ADNEXA: no masses, +tender bilaterally L>R  PELVIC FLOOR: moderate hypertonicity, moderate obturator tenderness L>R      Neurological:      General: No focal deficit present.      Mental Status: She is alert.   Psychiatric:         Mood and Affect: Mood normal.         Behavior: Behavior normal.         Thought Content: Thought content normal.         Judgment: Judgment normal.         Labs:  Lab Results   Component Value Date    WBC 6.5 06/18/2024    RBC 4.40 06/18/2024    HGB 12.0 06/18/2024    HCT 38.1 06/18/2024    MCV 86.6 06/18/2024    MCH 27.3 06/18/2024    MCHC 31.5 06/18/2024    RDW 13.3 06/18/2024    .0 06/18/2024    MPV 8.8 (L) 12/04/2012        Lab Results   Component Value Date     (H) 06/18/2024    BUN 13 06/18/2024    BUNCREA 11.0 06/30/2021    CREATSERUM 0.77 06/18/2024    ANIONGAP 7 06/18/2024    GFR 95 02/26/2017    GFRNAA 84 07/08/2022    GFRAA 97 07/08/2022    CA 8.6 06/18/2024    OSMOCALC 291 06/18/2024    ALKPHO 115 (H) 01/26/2024    AST 12 (L) 01/26/2024    ALT  01/26/2024      Comment:      Due to  backorder we are temporarily unable to offer hospital-based ALT testing at [facility].   If urgently needed, please order ALT test code [id number].   The new order will need a new venipuncture and will be sent to [facility] for testing.   The expected turnaround time will be within 24 hours.     BILT 0.3 01/26/2024    TP 8.2 01/26/2024    ALB 3.9 01/26/2024    GLOBULIN 4.3 01/26/2024     06/18/2024    K 3.8 06/18/2024     06/18/2024    CO2 22.0 06/18/2024       Lab Results   Component Value Date    CHOLEST 192 04/04/2023    TRIG 166 (H) 04/04/2023    HDL 32 (L) 04/04/2023     (H) 04/04/2023    VLDL 30 04/04/2023    NONHDLC 160 (H) 04/04/2023        Lab Results   Component Value Date    T4F 0.9 08/13/2021    TSH 0.912 10/20/2023        Lab Results   Component Value Date      10/20/2023    A1C 5.5 10/20/2023     Component      Latest Ref Rng 5/7/2024   FSH      No established range for female sex mIU/mL 13.9    Estradiol      No established range for female sex pg/mL 13.1    VITAMIN D, 25-OH, TOTAL      30.0 - 100.0 ng/mL 56.5        Imaging:  US BREAST BIOPSY 1 SITE RIGHT (CPT=19083)    Addendum Date: 7/15/2024    ADDENDUM:  Pathology demonstrates portions of a fibroadenoma in needle core biopsies.  Occasional tiny microcalcifications.  Background of breast tissue with hypo cellular stromal fibrosis.  No evidence of atypia or malignancy.  Imaging is concordant.  Recommend follow-up mammogram and right breast ultrasound in 6 months.  Patient reports a family history of ovarian cancer and may benefit from evaluation in our high risk cancer assessment clinic to determine eligibility for additional breast cancer screening and risk reduction strategies.  Dictated by ([facility]): Savanna Maldonado MD on 7/15/2024 at 8:37 AM     Finalized by ([facility]): Savanna Maldonado MD on 7/15/2024 at 8:45 AM             Result Date: 7/15/2024  CONCLUSION:  1. Successful ultrasound biopsy of a a hypoechoic masslike area 12 o'clock position right breast measuring approximately 1.2 cm. 2. Post procedure mammogram showed the clip to be deployed appropriately.    LOCATION:  [facility]        Dictated by ([facility]): Savanna Maldonado MD on 7/03/2024 at 10:25 AM     Finalized by ([facility]): Savanna Maldonado MD on 7/03/2024 at 10:26 AM       PRAVEEN POST PROCEDURE IMAGE RIGHT (CPT=77065)    Addendum Date: 7/15/2024    ADDENDUM:  Pathology demonstrates portions of a fibroadenoma in needle core biopsies.  Occasional tiny microcalcifications.  Background of breast tissue with hypo cellular stromal fibrosis.  No evidence of atypia or malignancy.  Imaging is concordant.  Recommend follow-up mammogram and right breast ultrasound in 6 months.  Patient reports a family history of ovarian cancer and may benefit from evaluation in our high risk cancer  assessment clinic to determine eligibility for additional breast cancer screening and risk reduction strategies.  Dictated by ([facility]): Savanna Maldonado MD on 7/15/2024 at 8:37 AM     Finalized by ([facility]): Savanna Maldonado MD on 7/15/2024 at 8:45 AM             Result Date: 7/15/2024  CONCLUSION:  1. Successful ultrasound biopsy of a a hypoechoic masslike area 12 o'clock position right breast measuring approximately 1.2 cm. 2. Post procedure mammogram showed the clip to be deployed appropriately.    LOCATION:  [facility]        Dictated by ([facility]): Savanna Maldonado MD on 7/03/2024 at 10:25 AM     Finalized by ([facility]): Savanna Maldonado MD on 7/03/2024 at 10:26 AM       US BREAST BILAT COMP([facility] SAME DAY US)(CPT=76641-50)    Result Date: 6/26/2024  CONCLUSION:  **PLEASE SEE REPORT FOR DIAGNOSTIC MAMMOGRAM**   LOCATION:  [facility]     Dictated by ([facility]): Carmina Navarro MD on 6/26/2024 at 10:18 AM     Finalized by ([facility]): Carmina Navarro MD on 6/26/2024 at 10:18 AM       [facility] LIZ 2D+3D DIAGNOSTIC PRAVEEN  BILAT (CPT=77066/66553)    Result Date: 6/26/2024  CONCLUSION:  There is an ill-defined area of hypoechogenicity at the site of the physician detected palpable lump located in the RIGHT breast at the 12 o'clock position 3 cm from nipple.  Ultrasound-guided biopsy is recommended for definitive pathologic diagnosis.  There is an incidental probable small fibroadenoma seen on ultrasound in the RIGHT breast at the 0300 hours retroareolar position.  Pending benign biopsy results, a follow-up right breast ultrasound in 6 months would be recommended.  No mammographic or sonographic findings suspicious for malignancy in the left breast.  No mammographic or sonographic findings to explain the etiology of the patient's left breast pain.  Clinical follow-up and management recommended.  Atherosclerotic vascular calcifications are present in this 49-year-old woman.  Correlate clinically for cardiovascular risk factors.  The findings and recommendations were  discussed by myself with the patient at the end of the exam.   BI-RADS CATEGORY:  DIAGNOSTIC CATEGORY 4b-SUSPICIOUS:   RECOMMENDATIONS:  ULTRASOUND-GUIDED BIOPSY: RIGHT BREAST   Your patient's answers to the health and family history questions collected during this mammogram indicate a potentially increased risk for breast cancer. It is recommended that this patient be evaluated in our Cancer Risk Assessment Clinic to determine eligibility for additional breast cancer screening, risk reduction strategies, and/or genetic testing. Providers are encouraged to contact our breast navigators at [phone] with any questions or for guidance regarding this recommendation.  A letter explaining the results in lay terms has been sent to the patient.  This exam was evaluated with a computer-aided device.  This patient's information has been entered into a reminder system with a target due date for the next mammogram.   LOCATION:  [facility]   Dictated by ([facility]): Carmina Navarro MD on 6/26/2024 at 9:25 AM     Finalized by ([facility]): Carmina Navarro MD on 6/26/2024 at 10:17 AM       CT ABDOMEN+PELVIS KIDNEYSTONE 2D RNDR(NO IV,NO ORAL)(CPT=74176)    Result Date: 5/17/2024  CONCLUSION:   1.  Multiple tiny bilateral kidney stones without evidence of hydronephrosis.  2. Fatty infiltration of the liver.  3. Right adrenal adenoma.    LOCATION:  [facility]   Dictated by ([facility]): Tye Kelly MD on 5/17/2024 at 10:25 AM     Finalized by ([facility]): Tye Kelly MD on 5/17/2024 at 10:29 AM       US KIDNEY/BLADDER (CPT=76770)    Result Date: 5/15/2024  CONCLUSION:  1. There is mild bilateral hydronephrosis with a similar appearance compared to the previous sonogram. 2. There are bilateral renal calculi noted measuring up to 7 mm in each kidney. 3. If there is any clinical concern for obstructing ureteral calculi then follow-up CT is recommended. 4.    LOCATION:  [facility]     Dictated by ([facility]): Seamus Varela MD on 5/15/2024 at 4:47 PM     Finalized by  ([facility]): Seamus Varela MD on 5/15/2024 at 4:52 PM       US PELVIS W EV (CPT=76856/29578)    Result Date: 5/15/2024  CONCLUSION:  1. The endometrium measures 15 mm in thickness.  For a premenopausal patient this can be seen as a normal thickness in the secretory phase.  However, given the history of abnormal uterine bleeding, clinical correlation is necessary and the further management should be based clinically. 2. There is a simple appearing cyst seen in the left ovary measuring 3.4 x 2.9 x 3.7 cm.  As per O-rads classification, simple cysts between 3 and 5 cm in size do not need to be followed in premenopausal patients unless there is clinical indication otherwise.   LOCATION:  [facility]   Dictated by ([facility]): Seamus Varela MD on 5/15/2024 at 4:27 PM     Finalized by ([facility]): Seamus Varela MD on 5/15/2024 at 4:35 PM         PROCEDURE:  US PELVIS W DOPPLER (QOX=41500/04187)     COMPARISON:  [place], CT, CT ABDOMEN+PELVIS(CONTRAST ONLY)(CPT=74177), 5/05/2023, 12:11 PM.  [facility], US PELVIS W EV (CPT=76856/12593), 3/28/2023, 2:00 PM.     INDICATIONS:  Large amounts of vaginal bleeding and clots per patient since 8/10     TECHNIQUE:  Transabdominal imaging was performed of the pelvis.   Arterial and venous Doppler of the ovaries was also performed.  PATIENT STATED HISTORY: (As transcribed by Technologist)  Patient stated heavy vaginal bleeding with large clots for six days, pelvic cramping.         FINDINGS:                UTERUS:  10.05 cm x 4.92 cm x 6.61 cm    Endometrium Thickness:  18 mm and is homogeneous.  The thickness of the endometrium would be considered abnormally thickened given that the LMP was 8/10/2023.  Possibility of endometrial hyperplasia or neoplasm cannot be completely excluded by imaging  alone and further evaluation with gynecology would be recommended.    The uterus appears normal in size, shape, and echogenicity.  RIGHT OVARY:  2.90 cm x 1.91 cm x 2.10 cm    The right  ovary appears normal in size, shape, and echogenicity. No significant masses are identified.  LEFT OVARY:  4.31 cm x 3.31 cm x 3.92 cm    The left ovary appears normal in size, shape, and echogenicity. No significant masses are identified.  Physiologic simple cyst in the left ovary measures 3.1 x 2.2 x 3.0 cm.  CUL-DE-SAC:  Normal.  No fluid or mass.    OTHER:  Negative.       DOPPLER WAVE FORMS  FLOW:  There is normal arterial and venous Doppler wave forms in both ovaries.  The spectral analysis is within normal limits.  OTHER:  Negative.                   Impression   CONCLUSION:  There is thickening of the endometrium which would be considered abnormal given the LMP was 8/10/2023.  Further evaluation with gynecology recommended to evaluate for endometrial atypia, hyperplasia, or neoplasm.        LOCATION:  [place]           Dictated by ([facility]): Tye Kelly MD on 2023 at 7:03 PM      Finalized by ([facility]): Tye Kelly MD on 2023 at 7:06 PM      Assessment:     Nadine Calix is a 49 year old  female with menometrorrhagia, postcoital bleeding, sampling of endometrium in  &  both suggestive of anovulatory/proliferative pattern. She presented as a 2nd opinion regarding AUB. Suspect AUB is due to anovulatory bleeding but her recent lack of responding typically to cyclic Provera is much more suspicious for possible polyp or fibroid within endometrial cavity.     Here for postop visit & IUD check 3 weeks s/p hysteroscopy, D&C, Mirena IUD insertion in OR with benign path. Has some recent increased pelvic pain, will treat for possible acute PID.     Diagnoses and all orders for this visit:    Acute PID (pelvic inflammatory disease)  -     cefTRIAXone (Rocephin) injection 500 mg  -     Cancel: Urinalysis, Routine; Future  -     Cancel: Urine Culture, Routine; Future  -     Doxycycline Hyclate 100 MG Oral Tab; Take 1 tablet (100 mg total) by mouth 2 (two) times daily for 14 days.  -      metRONIDAZOLE 500 MG Oral Tab; Take 1 tablet (500 mg total) by mouth in the morning and 1 tablet (500 mg total) before bedtime. Do all this for 14 days.  -     Vaginitis Vaginosis PCR Panel; Future  -     Chlamydia/Gc Amplification; Future  -     Urinalysis, Routine; Future  -     Urine Culture, Routine; Future    Postoperative visit    Encounter for routine checking of intrauterine contraceptive device (IUD)    Menometrorrhagia    Status post hysteroscopy    Status post D&C    Urinary frequency  -     Urinalysis, Routine; Future  -     Urine Culture, Routine; Future    Pelvic floor dysfunction             Plan:     Acute PID?  -Pelvic pain increased over past few days  +CMT and bilateral adnexal tenderness, some discharge white to slightly yellow, slightly mucoid & cloudy  -BD molecular swab & GC/CT collected 8/1/24   -IM ceftriaxone 500 mg today, 8/1/2024 in clinic  -Start PO doxy & flagyl x 14 days each  -no alcohol & caution with sun while on these discussed     Urinary frequency (h/o stones, complex urology history).   -UA & Ucx 8/1/2024 with copy to patient's urogynecologist Dr. Lara Jim    Recurrent nephrolithiasis  -may end up needing significant surgery on her ureters to help her kidneys    AUB  -including intermittently heavy & prolonged periods, +Postcoital bleeding  -Hysteroscopy D&C - 10/2020 suggestive anovulatory breakdown bleeding  -8/16/23 ED visit for AUB. Reported LMP 8/10/23 (6 days prior). Endometrium 18 mm. Physiologic simple cyst in the left ovary measures 3.1 x 2.2 x 3.0 cm.   -8/18/23 EMB- Dr. Ramirez - disordered proliferative endometrium - recommended medical management options to regulate cycle.   -hot flashes for about 1.5-2 years. Feels AUB worsened around this time   -reviewed pattern is likely anovulatory bleeding (perimenopause likely)   -labs reviewed - early perimenopausal level FSH  -took Provera 10 mg x 10 nights a few times - bleeding pattern not as expected, concerning  for structural cause.   -Rx Slynd 4/29 - has not started yet. Encouraged to start. Declines high dose NE or Provera at this time.   -7/11/24 HSC, D&C, Mirena IUD insertion. Path benign.   -8/1/2024 IUD strings appropriate. No period yet since 7/11/24 procedure     Pelvic floor dysfunction & OAB  -Will be returning to PFPT per Dr. Jim     [facility]h[place] ovarian cancer in [facility] [place] & pancreatic cancer   -genetic counseling encouraged & ordered at previous - plans to go   -reviewed potential morbidity on CV & bone health with premature menopause if she should undergo BSO    Pap & HPV neg 5/8/24      Dense breasts, h/o breast biopsy   -Breast exam dense, asymmetric, slightly more prominent lumps in right breast 5/8/2024   -7/3/24 - ultrasound biopsy of a a hypoechoic masslike area 12 o'clock position right breast measuring approximately 1.2 cm. Fibroadenoma/benign  -plan diagnostic mammo 6 months 12/30/24 approx     Colonoscopy 4/7/21. Recall 10 years. Prashant Lilly, DO    Contraception  -Mirena IUD insertion 7/11/24 in OR  -8/1/2024 IUD strings seen - about 1 cm.     RTC WWE due after 5/8/25. See how she is doing after the treatment for PID    Ema Randall MD  [facility] - OBGYN    Note to patient and family:  The 21st Century Cures Act makes medical notes available to patients in the interest of transparency.  However, please be advised that this is a medical document.  It is intended as a peer to peer communication.  It is written in medical language and may contain abbreviations or verbiage that are technical and unfamiliar.  It may appear blunt or direct.  Medical documents are intended to carry relevant information, facts as evident, and the clinical opinion of the practitioner.

## 2024-08-02 ENCOUNTER — PATIENT MESSAGE (OUTPATIENT)
Facility: CLINIC | Age: 49
End: 2024-08-02

## 2024-08-02 PROBLEM — M62.89 PELVIC FLOOR DYSFUNCTION: Status: ACTIVE | Noted: 2024-08-02

## 2024-08-02 PROBLEM — R35.0 URINARY FREQUENCY: Status: ACTIVE | Noted: 2024-08-02

## 2024-08-02 PROBLEM — N73.0 ACUTE PID (PELVIC INFLAMMATORY DISEASE): Status: ACTIVE | Noted: 2024-08-02

## 2024-08-02 LAB
BV BACTERIA DNA VAG QL NAA+PROBE: NEGATIVE
C GLABRATA DNA VAG QL NAA+PROBE: NEGATIVE
C KRUSEI DNA VAG QL NAA+PROBE: NEGATIVE
C TRACH DNA SPEC QL NAA+PROBE: NEGATIVE
CANDIDA DNA VAG QL NAA+PROBE: NEGATIVE
N GONORRHOEA DNA SPEC QL NAA+PROBE: NEGATIVE
T VAGINALIS DNA VAG QL NAA+PROBE: NEGATIVE

## 2024-08-05 NOTE — TELEPHONE ENCOUNTER
Patient called in after receiving th previous message. Patient states she experience flu-like symptoms Saturday night and woke up on Sunday to some spotting and cramping that has persisted into today. Patient rated her cramps a 10/10 and would like to know if this is normal.

## 2024-08-05 NOTE — TELEPHONE ENCOUNTER
From: Nadine Calix  To: Ema Randall  Sent: 8/2/2024 6:44 PM CDT  Subject: Hi Dr. Randall,    Yeast in my urine? I’ve never heard that one before, nor have I ever had a yeast infection ever in my life. Is it something I’m doing or not doing?   Also, am I to take this new antibiotic you are prescribing to me along with the other 2 antibiotics you prescribed me at my visit yesterday too? So then for the next 2 weeks I will be actively taking 3 antibiotics all at once.  Is that right?

## 2024-08-06 NOTE — TELEPHONE ENCOUNTER
Reason Spotting and severe cramping   Last seen  08/01/2024 Follow up, 7/11/24 hysteroscopy D&C with INTRAUTERINE DEVICE placement   History  Acute pelvic inflammatory disease, IUD   Onset 8/3/2024   Assessment Patient states she started antibiotics and diflucan on Saturday 8/3/2024. She states she started to feel flu-like symptoms, achy, chills, could not warm up. Temperature was 98.6. Patient states she woke up on Sunday feeling better. Patient states she started spotting and having severe cramping that started in lower back and wraps around to the front. Worse on left side. She has constant pain but ranges from mild to severe 9/10 pain. Patient states she definitely has more spotting after a bowel movement. On Monday, patient states she passed something that looked like a mucus plug. Patient has taken tylenol for the pain but doesn't always work.    Currently: Patient is experiencing 4/10 pain and spotting. No longer feeling flu-like symptoms.   Plan  Spoke with Dr. Randall, she recommends patient go the the ED to get checked out.    Follow up Appointment  NA   Patient verbalized understanding, agreed to and intend to comply with plan of care.

## 2024-08-12 ENCOUNTER — TELEPHONE (OUTPATIENT)
Facility: CLINIC | Age: 49
End: 2024-08-12

## 2024-08-12 NOTE — TELEPHONE ENCOUNTER
Patient was put on antibiotics  Aug 2.She was given 2 at that time and the one more was added.  She has had insomnia for a week, itchy and felt like something was crawling all over and very dizzy.  Spoke to the pharmacist the pharmacist advised her to stop the medication and contact her Dr.  She did that and all symptoms have gone away

## 2024-08-12 NOTE — TELEPHONE ENCOUNTER
Spoke with patient. Patient advised she stopped taking doxycycline, metronidazole and diflucan on 8/10 per her pharmacist as she was experiencing adverse effects.     8/3/2024 - Patient states she was experiencing insomnia with flu like symptoms.  8/5/2024 - Patient states she was experiencing insomnia, itchiness, restlessness in legs and arms  8/7/2024 - Patient was experiencing insomnia and dizziness.     Since stopping the medication on 8/10, patient states the above symptoms have gone away.     Patient called on 8/2/2024 with spotting and 9/10 pain. Patient was advised to go to ED and did. However, it was going to be a 4 hour wait and she was unable to wait that long as she did not have a .    Currently, patient is no longer spotting with 6-7/10 lower back and abdominal pain. She is taking tylenol and ibuprofen to manage pain. Patient states she is unable to go to the ED until her kids are in school as she will not have a .     Routed to Dr. Randall to review.

## 2024-08-13 NOTE — TELEPHONE ENCOUNTER
Discussed Dr. Randall's recommendation to go to pelvic floor PT. Per pt she needs an order, pended, will call pt once approved.   Patient verbalized understanding, agreed to and intend to comply with plan of care.

## 2024-08-14 NOTE — TELEPHONE ENCOUNTER
Spoke with patient. Aware she should schedule PHYSICAL THERAPY under Dr. Silva's order that was placed 10/2023. Verbalized understanding.

## 2024-08-19 ENCOUNTER — TELEPHONE (OUTPATIENT)
Dept: HEMATOLOGY/ONCOLOGY | Facility: HOSPITAL | Age: 49
End: 2024-08-19

## 2024-08-19 ENCOUNTER — NURSE ONLY (OUTPATIENT)
Dept: UROLOGY | Facility: HOSPITAL | Age: 49
End: 2024-08-19
Attending: OBSTETRICS & GYNECOLOGY
Payer: MEDICAID

## 2024-08-19 VITALS — HEIGHT: 59 IN | WEIGHT: 172 LBS | BODY MASS INDEX: 34.68 KG/M2

## 2024-08-19 DIAGNOSIS — N39.41 URGE INCONTINENCE: Primary | ICD-10-CM

## 2024-08-19 PROCEDURE — 64566 NEUROELTRD STIM POST TIBIAL: CPT

## 2024-08-19 NOTE — CONSULTS
Breast Surgery New Patient Consultation - [facility]    Nadine Calix is a 49 year old patient, referred by Dr. Randall, who presents for evaluation of breast cancer risk.    History of Present Illness:   Ms. Nadine Calix is a 49 year old woman with a past history significant for kidney stones and thyroid nodules who presents for evaluation of breast cancer risk. She denies any palpable masses, skin changes, or axillary adenopathy. She does have breast pain that is intermittent. She has had provoked, clear, bilateral nipple discharge. She has noticed this only when pressing on her breasts in the shower, never spontaneous. She denies red or brown discharge. She does have significant past history for breast diseases or breast biopsy. She has had 2 benign biopsies in the past.  She does not have family history of breast cancer.  Her maternal grandmother had ovarian cancer in her 50s and passed away at age 54.  She does not have a history of genetic testing but is meeting with genetics in October.    She recently felt a mass at the 12 o'clock position, 3 cm from the nipple of the right breast.  She then underwent diagnostic imaging on 6/26/2024. She had a mass at the 8 o'clock position 2:00, 2 cm from the nipple that was previously biopsied and at this time likely benign.  At the 3 o'clock position retroareolar, there is a likely benign mass, probably fibroadenoma. 6 month follow up ultrasound was recommended for this lesion.  She underwent biopsy on 7/3/2024 of the self palpated right breast 12 o'clock position, 3 cm from the nipple, mass and a dumbbell clip was placed.  Pathology showed portions of a fibroadenoma evidence of atypia or malignancy.   There have been no suspicious imaging findings in the left breast.  The recent imaging shows a benign simple cyst measuring 0.5 x 0.2 x 0.5 cm at the 12:00 position, 3 cm from the nipple. She has density C breast tissue.            Past Medical History:     Abdominal distention    Longer than this but this is an estimate    Abdominal hernia    I had hernia repair around by my belly button & abdominal    Abdominal pain    Currently seeing obgyn, urologist & nephrologist    Arrhythmia    Back pain    Mid to lower back pain & hip pain    Benign essential HTN    Bloating    Longer than this but this is an estimate    Blood in urine    Due to severe kidney stones    Blurred vision    I always wore glasses or contacts since about 14 yrs old    Calculus of kidney    hydronephrosis/ several times kidney stones/ 13 th procedure for stones    Cervicalgia    Log Date: 05/04/2012         Change in hair    My hair is thinning more than the usual    Chest pain    I did see a cardiologist about this    Chest pain on exertion    I did see a cardiologists about this    COVID-19    headache, back pain, shortness of breath, sore throat, runny nose, chills. Not hospitalized    COVID-19    high fever, fatigue, not hospitalized    Dense breasts    heterogeneously dense breasts    Depression    Diarrhea, unspecified    I notice that certain things run right through me now    Disorder of liver    fatty liver    Dizziness    Dyspepsia    Easy bruising    I find bruises on me and don’t really know how I got them    Elevated fasting glucose    Enthesopathy of the wrist and carpus    Log Date: 05/31/2011         ESBL E. 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 but I never really checke    Heart palpitations    Pvc’s and irregular heart rate    Hemorrhoids    After last  pregnancy or a couple years after that    High blood pressure    History of cardiac murmur    Mother told me I was born with one & my dr as well    History of D&C    History of depression    When I had a miscarriage    Indigestion    Longer than this but this is an estimate    Itch of skin    Not severe but I have been itching for no apparent reason    Leaking of urine    After first pregnancy    Leg swelling    My left leg slightly swelled up and my ankle area was hurtin    Lesion of ulnar nerve    Log Date: 2013         Loss of appetite    I noticed this lately too    Menometrorrhagia    Hysteroscopy 10/2020 path favors anovulatory breakdown bleeding.    Menometrorrhagia    Migraine with aura    Migraines    Multiple thyroid nodules    Myofascial pain    Nausea    Longer than this but this is an estimate    Night sweats    I feel like I’m on fire at night.    OAB (overactive bladder)    PTNS - posterior tibial nerve stimulation with urogynecologist Dr. Lara Jim    Painful urination    When passing stones    Postcoital bleeding    Prior to hysteroscopy done 10/2020. Also having since at least  or     Postcoital bleeding    Prediabetes    PVC's (premature ventricular contractions)    Rash    Broke out with unknown rash all over legs and arms    Sexually transmitted disease    HPV    Sleep disturbance    For years sometimes I can’t lay on my left side    Stress    Rough up bringing and also regular family stresses    Uncomfortable fullness after meals    Longer than this but this is an estimate    Unspecified condition originating in the  period    Ureteral stone with hydronephrosis    Ureteral stricture, right    Urinary incontinence    Constantly going can’t hold it    Ventral hernia without obstruction or gangrene    Visual impairment    glasses    Vitamin D deficiency    Wears glasses    Since I was about 14 yrs old    Weight gain    After last son was born       Past Surgical  History:   Procedure Laterality Date    Abdominal surgery      2 hernia repairs right & left side      ,2016    x2    Colonoscopy N/A 2021    Procedure: COLONOSCOPY, ESOPHAGOGASTRODUODENOSCOPY with biopsy;  Surgeon: Prashant Lilly DO;  Location:  ENDOSCOPY    Colonoscopy  2021 EGD WITH BIOPSY. Colonoscopy internal hemorrhoids. No gross findings to explain left sided pain. Recall 10 years. Prashant Lilly DO    Cryocautery of cervix      Cysto/uretero w/lithotripsy Left 2024    Cystoscopy, LEFT ureteroscopy, laser lithotripsy, basket stone extraction, retrograde pyelogram, ureteral stent placement, Right retrograde pyelogram Dr. Bryant Garza    Cysto/uretero w/up stricture Right 10/15/2019    Cysto Rt RPG, Rt URS w/ Laser Litho Dilation of Rtight Stricture Rt URS Stent Exchange w/ Dr Jim    Cysto/uretero, stone remove Right 2019    right ureter and kidney stones extraction, URS, stent placement    Cystoscopy,ureteroscopy,lithotripsy Right 2019    Cysto, B/L RPG, URS, laser litho, stone ext, Rt stent Dr. Jim    Egd  2021    for left sided pain    Hc endometrial sampling w or wo endocerv sampling  2023    EMB- Dr. Ramirez - disordered proliferative endometrium - recommended medical management options to regulate cycle.    Hernia surgery  2000    L hernia repair    Hysteroscopy,with sampling  10/08/2020    Hysteroscopy, D&C for intermittent menorrhagia, thickened endometrium, cervical stenosis. H/o menometrorrhagia & postcoital bleeding. Dr. Linnette Antoine. Path Endometrium with stromal collapse, superficial fibrin thrombi, and some polypoid tissue fragments with increased stromal fibrous component. Endometrial glands are proliferative. Overall pattern favors anovulatory breakdown bleeding.    Hysteroscopy,with sampling  2024    Hysteroscopy, D&C, insertion Mirena IUD. Done for menometrorrhagia & postcoital bleeding.   Ema Randall. Path benign -Fragments of weakly proliferative endometrium with stromal breakdown/condensation. -Fragments of endocervical glands with inflammation and reactive changes.    Insert intrauterine device  2024    Mirena IUD insertion. 8 cm. Anteverted/mid position. Dr. Ema Randall    Kidney surgery Right     stent placement 2022    Laparoscopy,pelvic,biopsy      Kidney stones & examine    Lithotripsy  ,  &     Needle biopsy left  2021    fibroadenoma    Needle biopsy right  2021    fibroadenoma    Other Bilateral 2012    nerve surgery to bilateral arms about 1 month apart    Other      percutaneous nephrolithotomy    Other surgical history  2019    cysto stent removal - dr. jim     Other surgical history  2020    Cysto Stent Removal w/ Dr Jim    Other surgical history  2021    Cysto/Stent Removal Dr. Jim    Other surgical history  2022    Right ureteroscopy, laser lithotripsy, stent placement, nephrostomy tube removal with Dr. Tariq    Removal gallbladder  1998    Remove stent via transureth Right 2019    Cysto Stent Removal w/ Dr Jim    Repair ing hernia,5+y/o,reducibl      Us breast biopsy 1 site right (cpt=19083) Right 2024    12:00 right breast. Fibroadenoma. Tiny microcalcifications. Breast tissue with hypocellular stromal fibrosis. No atypia or malignancy. Recommend follow-up mammogram and right breast ultrasound in 6 months.       Gynecological History:  Menarche at age 14 and LMP 26  Pt is a   Pt was 19 years old at time of first pregnancy.    She has cumulative breastfeeding history of 3 months.  Age of Menopause: n/a  Type: n/a  She has history of hormone replacement therapy for 2-3 months, last April/May 2024   She has history of oral contraceptive use for 5-6 years, last .   She denies infertility treatment to achieve pregnancy.    Medications:     Levonorgestrel (MIRENA, 52 MG,) 20  MCG/DAY Intrauterine IUD 20 mcg (1 each total) by Intrauterine route one time.      Magnesium Glycinate 100 MG Oral Cap Take 240 mg by mouth daily.      Potassium Citrate ER 15 MEQ (1620 MG) Oral Tab CR Take 1 tablet by mouth in the morning and 1 tablet before bedtime. (Patient taking differently: Take 1 tablet by mouth daily.) 180 tablet 6    FIBER OR Take 1 tablet by mouth in the morning and 1 tablet before bedtime.      triamterene-hydroCHLOROthiazide 37.5-25 MG Oral Cap Take 1 capsule by mouth every morning. 90 capsule 1    albuterol (PROAIR HFA) 108 (90 Base) MCG/ACT Inhalation Aero Soln Inhale 2 puffs into the lungs every 4 (four) hours as needed for Wheezing. 1 each 1    Multiple Vitamin (ONE-DAILY MULTI VITAMINS) Oral Tab Take 1 tablet by mouth daily.         Allergies:    Allergies   Allergen Reactions    Amlodipine SWELLING     Leg swelling    Metoprolol SHORTNESS OF BREATH    Toradol [Ketorolac Tromethamine] SHORTNESS OF BREATH    Tramadol SHORTNESS OF BREATH    Dilaudid [Hydromorphone] ITCHING and PAIN     Headache - per pt this is only with long term use - pt states she can tolerate this medication    Oxycodone PAIN     headache    Wellbutrin [Bupropion Hcl] PAIN and DIZZINESS     Headache    Valsartan OTHER (SEE COMMENTS)     Chest pain      Codeine Sulfate ITCHING     TABS    Hydrocodone ITCHING    Morphine ITCHING    Seasonal Runny nose       Family History:   Family History   Problem Relation Age of Onset    Diabetes Mother     High Cholesterol Mother     Hypertension Mother     Thyroid disease Mother         hypothroid    Other (Other) Mother         Part of thyroid removed    Heart Disorder Father         3 stents in heart    Diabetes Father     Hypertension Father     Hypertension Sister     Other (ovarian problem) Sister         work up in progress    Diabetes Sister     Other (Other) Sister         Liver problem    Diabetes Maternal Grandmother     Ovarian Cancer Maternal Grandmother           at 54    Diabetes Maternal Grandfather     Diabetes Paternal Grandmother     Diabetes Paternal Grandfather     Other (Other) Daughter         Appendicitis    Other (Other) Son         Appendicitis    Pancreatic Cancer Maternal Uncle     Breast Cancer Neg     Colon Cancer Neg        She is not of Ashkenazi Islam ancestry.    Social History:  History   Alcohol Use No       History   Smoking Status    Never   Smokeless Tobacco    Never       Review of Systems:    Review of Systems   Constitutional:  Negative for activity change, appetite change, chills, fatigue and unexpected weight change.   HENT:  Negative for ear pain, hearing loss, nosebleeds, sore throat, trouble swallowing and voice change.    Eyes:  Negative for pain and visual disturbance.   Respiratory:  Negative for cough, chest tightness and shortness of breath.    Cardiovascular:  Negative for chest pain, palpitations and leg swelling.   Gastrointestinal:  Negative for nausea, vomiting, abdominal pain, diarrhea, constipation and blood in stool.   Endocrine: Negative for cold intolerance and heat intolerance.   Genitourinary:  Negative for dysuria, hematuria and difficulty urinating.   Musculoskeletal:  Negative for back pain, joint swelling, joint pain and neck pain.   Skin:  Negative for color change, rash and wound.   Allergic/Immunologic: Negative for environmental allergies.   Neurological:  Negative for tremors, syncope, facial asymmetry, speech difficulty and weakness.   Hematological:  Negative for adenopathy. Does not bruise/bleed easily.   Psychiatric/Behavioral:  Negative for hallucinations, behavioral problems, confusion, agitation and depressed mood.       Otherwise as per HPI.    Physical Exam:    /88   Pulse 62   Temp 97.5 °F (36.4 °C) (Tympanic)   Resp 16   Wt 79.7 kg (175 lb 9.6 oz)   LMP 2024 (Exact Date)   SpO2 98%   BMI 35.47 kg/m²     Physical Exam  Vitals reviewed.   Constitutional:       Appearance: Normal  appearance.   HENT:      Head: Normocephalic and atraumatic.   Eyes:      Extraocular Movements: Extraocular movements intact.      Pupils: Pupils are equal, round, and reactive to light.   Cardiovascular:      Rate and Rhythm: Normal rate.   Pulmonary:      Effort: Pulmonary effort is normal.   Chest:   Breasts:     Right: Normal. No mass, nipple discharge, skin change or tenderness.      Left: Normal. No mass, nipple discharge, skin change or tenderness.   Abdominal:      General: Abdomen is flat.      Palpations: Abdomen is soft.   Musculoskeletal:         General: Normal range of motion.      Cervical back: Normal range of motion and neck supple.   Lymphadenopathy:      Upper Body:      Right upper body: No supraclavicular or axillary adenopathy.      Left upper body: No supraclavicular or axillary adenopathy.   Skin:     General: Skin is warm and dry.   Neurological:      General: No focal deficit present.      Mental Status: She is alert and oriented to person, place, and time.   Psychiatric:         Mood and Affect: Mood normal.            Labs/imaging: reviewed in EPIC    Impression:   Ms. Nadine Calix is a 49 year old woman presents for high risk of breast cancer    Discussion and Plan:  I had a discussion with the Patient and Spouse regarding her breast exam. On exam today I found no evidence of disease. I personally reviewed her recent imaging and we discussed this.    We calculated her Tyrer Cuzick score, which revealed an estimated lifetime breast cancer risk of 12% and a 10-year breast cancer risk of 2.5%. (Results will be scanned into the chart.)    Genetic counseling: will be performed 10/2, patient is interested in genetic testing as well    Further imaging:   R breast diagnostic mammogram in January 2025 to follow up probably benign findings (ordered by obgyn)    Further screening:   Mammogram: Next screening mammogram would be Jun 2025 but will base this off of results of imaging in Jan 2025  (not ordered)  Screening ultrasound: US for dense breast tissue around 12/2024 (ordered)    Return to clinic:   1 year for breast exam or PRN    She was given ample opportunity for questions and those questions were answered to her satisfaction. She has been encouraged to contact the office with any questions or concerns. This encounter lasted a total of 30 minutes, more than 50% of which was dedicated to the discussion of management options.     Katty Friend MD  Breast Surgical Oncology      CC: Dr. Prakash Hernandez

## 2024-08-19 NOTE — CONSULTS
Breast Surgery New Patient Consultation    Nadine Calix is a 49 year old patient, referred by Dr. Vargas*Sam, who presents for evaluation of ***.    History of Present Illness:   Ms. Nadine Calix is a 49 year old woman with a past history significant for *** who presents for evaluation of ***. She {DENIES ANY / HAS:3186} {BREAST CANCER SYMPTOMS:3187}. She {DOES / DOES NOT:3189} have breast pain. She {DOES / DOES NOT:3189} have significant past history for breast diseases or breast biopsy. She {DOES / DOES NOT:3189} have family history of breast cancer. She does ***not have a history of genetic testing.          Past Medical History:    Abdominal distention    Longer than this but this is an estimate    Abdominal hernia    I had hernia repair around by my belly button & abdominal    Abdominal pain    Currently seeing obgyn, urologist & nephrologist    Arrhythmia    Back pain    Mid to lower back pain & hip pain    Benign essential HTN    Bloating    Longer than this but this is an estimate    Blood in urine    Due to severe kidney stones    Blurred vision    I always wore glasses or contacts since about 14 yrs old    Calculus of kidney    hydronephrosis/ several times kidney stones/ 13 th procedure for stones    Cervicalgia    Log Date: 05/04/2012         Change in hair    My hair is thinning more than the usual    Chest pain    I did see a cardiologist about this    Chest pain on exertion    I did see a cardiologists about this    COVID-19    headache, back pain, shortness of breath, sore throat, runny nose, chills. 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 but I never really checke    Heart palpitations    Pvc’s and irregular heart rate    Hemorrhoids    After last pregnancy or a couple years after that    High blood pressure    History of cardiac murmur    Mother told me I was born with one & my dr as well    History of D&C    History of depression    When I had a miscarriage    Indigestion    Longer than this but this is an estimate    Itch of skin    Not severe but I have been itching for no apparent reason    Leaking of urine    After first pregnancy    Leg swelling    My left leg slightly swelled up and my ankle area was hurtin    Lesion of ulnar nerve    Log Date: 03/08/2013         Loss of appetite    I noticed this lately too    Menometrorrhagia    Hysteroscopy 10/2020 path favors anovulatory breakdown bleeding.    Menometrorrhagia    Migraine with aura    Migraines    Multiple thyroid nodules    Myofascial pain    Nausea    Longer than this but this is an estimate    Night sweats    I feel like I’m on fire at night.    OAB (overactive bladder)    PTNS - posterior tibial nerve stimulation with urogynecologist Dr. Lara Jim    Painful urination    When passing stones    Postcoital bleeding    Prior to hysteroscopy done 10/2020. Also having since at least 2022 or 2023    Postcoital bleeding    Prediabetes    PVC's (premature ventricular contractions)    Rash    Broke out with unknown rash all over legs and arms    Sexually transmitted  disease    HPV    Sleep disturbance    For years sometimes I can’t lay on my left side    Stress    Rough up bringing and also regular family stresses    Uncomfortable fullness after meals    Longer than this but this is an estimate    Unspecified condition originating in the  period    Ureteral stone with hydronephrosis    Ureteral stricture, right    Urinary incontinence    Constantly going can’t hold it    Ventral hernia without obstruction or gangrene    Visual impairment    glasses    Vitamin D deficiency    Wears glasses    Since I was about 14 yrs old    Weight gain    After last son was born       Past Surgical History:   Procedure Laterality Date    Abdominal surgery      2 hernia repairs right & left side      ,2016    x2    Colonoscopy N/A 2021    Procedure: COLONOSCOPY, ESOPHAGOGASTRODUODENOSCOPY with biopsy;  Surgeon: Prashant Lilly DO;  Location:  ENDOSCOPY    Colonoscopy  2021 EGD WITH BIOPSY. Colonoscopy internal hemorrhoids. No gross findings to explain left sided pain. Recall 10 years. Prashant Lilly DO    Cryocautery of cervix      Cysto/uretero w/lithotripsy Left 2024    Cystoscopy, LEFT ureteroscopy, laser lithotripsy, basket stone extraction, retrograde pyelogram, ureteral stent placement, Right retrograde pyelogram Dr. Bryant Garza    Cysto/uretero w/up stricture Right 10/15/2019    Cysto Rt RPG, Rt URS w/ Laser Litho Dilation of Rtight Stricture Rt URS Stent Exchange w/ Dr Jim    Cysto/uretero, stone remove Right 2019    right ureter and kidney stones extraction, URS, stent placement    Cystoscopy,ureteroscopy,lithotripsy Right 2019    Cysto, B/L RPG, URS, laser litho, stone ext, Rt stent Dr. Jim    Egd  2021    for left sided pain    Hc endometrial sampling w or wo endocerv sampling  2023    EMB- Dr. Ramirez - disordered proliferative endometrium - recommended medical management options to  regulate cycle.    Hernia surgery  01/2000    L hernia repair    Hysteroscopy,with sampling  10/08/2020    Hysteroscopy, D&C for intermittent menorrhagia, thickened endometrium, cervical stenosis. H/o menometrorrhagia & postcoital bleeding. Dr. Linnette Antoine. Path Endometrium with stromal collapse, superficial fibrin thrombi, and some polypoid tissue fragments with increased stromal fibrous component. Endometrial glands are proliferative. Overall pattern favors anovulatory breakdown bleeding.    Hysteroscopy,with sampling  07/11/2024    Hysteroscopy, D&C, insertion Mirena IUD. Done for menometrorrhagia & postcoital bleeding. Dr. Ema Randall. Path benign -Fragments of weakly proliferative endometrium with stromal breakdown/condensation. -Fragments of endocervical glands with inflammation and reactive changes.    Insert intrauterine device  07/11/2024    Mirena IUD insertion. 8 cm. Anteverted/mid position. Dr. Ema Randall    Kidney surgery Right     stent placement 6/2022    Laparoscopy,pelvic,biopsy      Kidney stones & examine    Lithotripsy  2001, 2007 & 11/11    Needle biopsy left  02/2021    fibroadenoma    Needle biopsy right  02/2021    fibroadenoma    Other Bilateral 2012    nerve surgery to bilateral arms about 1 month apart    Other      percutaneous nephrolithotomy    Other surgical history  12/27/2019    cysto stent removal - dr. snell     Other surgical history  06/03/2020    Cysto Stent Removal w/ Dr Snell    Other surgical history  07/07/2021    Cysto/Stent Removal Dr. Snell    Other surgical history  07/08/2022    Right ureteroscopy, laser lithotripsy, stent placement, nephrostomy tube removal with Dr. Tariq    Removal gallbladder  1998    Remove stent via transureth Right 11/13/2019    Cysto Stent Removal w/ Dr Snell    Repair ing hernia,7+y/o,reducibl      Us breast biopsy 1 site right (cpt=19083) Right 07/03/2024    12:00 right breast. Fibroadenoma. Tiny  microcalcifications. Breast tissue with hypocellular stromal fibrosis. No atypia or malignancy. Recommend follow-up mammogram and right breast ultrasound in 6 months.       Gynecological History:  Menarche at age *** and LMP ***  Pt is a G***P***  Pt was *** years old at time of first pregnancy.    She {DENIES ANY / HAS:3186} cumulative breastfeeding history of *** months, last ***.  Age of Menopause: ***  Type: ***  She {DENIES ANY / HAS:3186} history of hormone replacement therapy for *** years, last *** years ago.  She {DENIES ANY / HAS:3186} history of oral contraceptive use for *** years, last ***.   She {DENIES / HAS RECIEVED:3188} infertility treatment to achieve pregnancy.    Medications:    No outpatient medications have been marked as taking for the 8/21/24 encounter (Appointment) with Katty Friend MD.       Allergies:    Allergies   Allergen Reactions    Amlodipine SWELLING     Leg swelling    Metoprolol SHORTNESS OF BREATH    Toradol [Ketorolac Tromethamine] SHORTNESS OF BREATH    Tramadol SHORTNESS OF BREATH    Dilaudid [Hydromorphone] ITCHING and PAIN     Headache - per pt this is only with long term use - pt states she can tolerate this medication    Oxycodone PAIN     headache    Wellbutrin [Bupropion Hcl] PAIN and DIZZINESS     Headache    Valsartan OTHER (SEE COMMENTS)     Chest pain      Codeine Sulfate ITCHING     TABS    Hydrocodone ITCHING    Morphine ITCHING    Seasonal Runny nose       Family History:   Family History   Problem Relation Age of Onset    Diabetes Mother     High Cholesterol Mother     Hypertension Mother     Thyroid disease Mother         hypothroid    Other (Other) Mother         Part of thyroid removed    Heart Disorder Father         3 stents in heart    Diabetes Father     Hypertension Father     Hypertension Sister     Other (ovarian problem) Sister         work up in progress    Diabetes Sister     Other (Other) Sister         Liver problem    Diabetes Maternal  Grandmother     Ovarian Cancer Maternal Grandmother          at 54    Diabetes Maternal Grandfather     Diabetes Paternal Grandmother     Diabetes Paternal Grandfather     Other (Other) Daughter         Appendicitis    Other (Other) Son         Appendicitis    Pancreatic Cancer Maternal Uncle     Breast Cancer Neg     Colon Cancer Neg        She {IS:3281} of Ashkenazi Taoist ancestry.    Social History:  History   Alcohol Use No       History   Smoking Status    Never   Smokeless Tobacco    Never       Review of Systems:    Review of Systems   Otherwise as per HPI.    Physical Exam:    LMP 2024 (Exact Date)     Physical Exam     Bra size: *** (***)    Labs/imaging: reviewed in EPIC    Impression:   Ms. Nadine Calix is a 49 year old woman that presents for ***    Discussion and Plan:  I had a discussion with the {LEARNER:3777} regarding her breast exam. On exam today I found no evidence of disease ***. I personally reviewed her recent *** and we discussed this.    ***    Further imaging:   Mammogram: ***  Ultrasound: ***  MRI: ***     Return to clinic: ***    She was given ample opportunity for questions and those questions were answered to her satisfaction. She has been encouraged to contact the office with any questions or concerns. This encounter lasted a total of *** minutes, more than 50% of which was dedicated to the discussion of management options.     Katty Friend MD  Breast Surgical Oncology    CC: ***

## 2024-08-19 NOTE — PROGRESS NOTES
Pt mentions that she was recently tx'd for suspected PID and yeast in urine  She was tx'd with Rocephin 500mg inj in office. She was also rx'd Doxycyline 100mg, Metronidazole 500mg, and Fluconazole 150mg  She only took 9d of Doxy and Metronidazole and 3 doses of Fluconazole, per pt had severe vertigo

## 2024-08-19 NOTE — TELEPHONE ENCOUNTER
Patient called to schedule genetic testing. States she talked to her insurance company and the testing is covered if billed under \"[facility]\" but a prior authorization may need to be submitted at time of visit.. Patient requested a [facility] appointment. Scheduled on 10/2 and confirmed date, time and location with patient.

## 2024-08-19 NOTE — PROGRESS NOTES
[facility]  URGENT PC Therapy Note    Date:  2024    Patient Name:  Nadine Calix  Patient :  3/22/1975   Patient MRN:  [id number]  Patient Age:  49 year old      Diagnosis:  N39.41 Urge Incontinence    Procedure:  Right URGENT PC Therapy:  Posterior tibial nerve stimulation treatment  29819 - posterial tibial neuro stimulation, percutaneous needle electrode, single treatment, includes programming    PTNS Evaluation:  PTNS Session: Monthly Follow-Up  Patient reports feeling: Better (nocturia is better)  Nocturia: 4+ (4-6x)  Frequency: <1 hr  Patient wears pads: Yes  Pads per day: 2 (pantyliners)  Pads per night: 2  Physician:  Dr. Lara Jim    RN:  Nola COX CMA     Indications:  Urinary frequency, urge incontinence, with subjectively severe urge incontinence and inadequate to anti-cholinergic meds.  Informed consent was obtained with discussion of risk, benefits and goal, and limits of the procedure including but not limited to bleeding, infection, andnerve injury were discussed and the patient desired to proceed.      Description of Procedure:    The patient was properly identified and positioned in a semi reclined, comfortable seated position with their feet elevated in a recliner or exam room chair.    The insertion site for the needle electrode was identified on the lower inner aspect of the Right leg.  This position utilized was approximately 5 cm cephalad to the medial malleolus and one finger breath/2cm posterior to the tibia.    Preparation of the needle electrode insertion site was performed using an alcohol pad to clean skin of the above-noted needle insertion site.    The needle electrode/guide tube assembly was placed over the identified and cleaned insertion site.      Once the needle electrode had been placed into the skin, the guide tube was removed and the needle was gently advanced maintaining a 60 degree angle.      A surface electrode was placed over  the medial aspect of the calcaneus on the lower extremity utilized for stimulation.    Current adjustment was slowly increased while observing the patient's foot for response. Adjustments were made advancing the needle further in to obtain optimal sensation.    An optimal sensation in the foot was noted.Once an optimal response was noted, therapy mode was then initiated. The pt was given a bell to ring for the RN to be able to call for any needed adjustments.    Therapy continued without complication for 30 minutes.  No additional current adjustments were needed.    Once 30 minutes of therapy and completed stimulator was turned off and the needle, electrode clip and surface electrode were removed.      This completed the therapy.  The patient tolerated the procedure well.    Plan:  Return for monthly PTNS, sooner prn.

## 2024-08-21 ENCOUNTER — OFFICE VISIT (OUTPATIENT)
Facility: CLINIC | Age: 49
End: 2024-08-21
Payer: MEDICAID

## 2024-08-21 VITALS
RESPIRATION RATE: 16 BRPM | SYSTOLIC BLOOD PRESSURE: 148 MMHG | DIASTOLIC BLOOD PRESSURE: 88 MMHG | OXYGEN SATURATION: 98 % | BODY MASS INDEX: 35 KG/M2 | WEIGHT: 175.63 LBS | TEMPERATURE: 98 F | HEART RATE: 62 BPM

## 2024-08-21 DIAGNOSIS — R92.333 HETEROGENEOUSLY DENSE TISSUE OF BOTH BREASTS ON MAMMOGRAPHY: ICD-10-CM

## 2024-08-21 DIAGNOSIS — R92.8 ABNORMAL MAMMOGRAM: Primary | ICD-10-CM

## 2024-08-21 DIAGNOSIS — Z12.31 ENCOUNTER FOR SCREENING MAMMOGRAM FOR MALIGNANT NEOPLASM OF BREAST: ICD-10-CM

## 2024-08-21 PROCEDURE — 99203 OFFICE O/P NEW LOW 30 MIN: CPT | Performed by: SURGERY

## 2024-08-21 NOTE — PATIENT INSTRUCTIONS
R breast diagnostic mammogram in January 2025 to follow up probably benign findings (ordered by obgyn) - please contact this office when this is complete    Screening ultrasound: US for dense breast tissue around Dec 2024 (ordered)

## 2024-09-04 ENCOUNTER — OFFICE VISIT (OUTPATIENT)
Dept: INTERNAL MEDICINE CLINIC | Facility: CLINIC | Age: 49
End: 2024-09-04
Payer: MEDICAID

## 2024-09-04 ENCOUNTER — LAB ENCOUNTER (OUTPATIENT)
Dept: LAB | Age: 49
End: 2024-09-04
Attending: FAMILY MEDICINE
Payer: MEDICAID

## 2024-09-04 VITALS
SYSTOLIC BLOOD PRESSURE: 138 MMHG | OXYGEN SATURATION: 98 % | WEIGHT: 172.38 LBS | TEMPERATURE: 98 F | HEIGHT: 59 IN | DIASTOLIC BLOOD PRESSURE: 82 MMHG | BODY MASS INDEX: 34.75 KG/M2 | HEART RATE: 75 BPM

## 2024-09-04 DIAGNOSIS — R73.9 HYPERGLYCEMIA: ICD-10-CM

## 2024-09-04 DIAGNOSIS — F43.9 STRESS: ICD-10-CM

## 2024-09-04 DIAGNOSIS — M54.40 CHRONIC BILATERAL LOW BACK PAIN WITH SCIATICA, SCIATICA LATERALITY UNSPECIFIED: ICD-10-CM

## 2024-09-04 DIAGNOSIS — I10 BENIGN ESSENTIAL HTN: Primary | ICD-10-CM

## 2024-09-04 DIAGNOSIS — G89.29 CHRONIC BILATERAL LOW BACK PAIN WITH SCIATICA, SCIATICA LATERALITY UNSPECIFIED: ICD-10-CM

## 2024-09-04 LAB
ANION GAP SERPL CALC-SCNC: 15 MMOL/L (ref 0–18)
BUN BLD-MCNC: 11 MG/DL (ref 9–23)
CALCIUM BLD-MCNC: 9.9 MG/DL (ref 8.7–10.4)
CHLORIDE SERPL-SCNC: 105 MMOL/L (ref 98–112)
CO2 SERPL-SCNC: 17 MMOL/L (ref 21–32)
CREAT BLD-MCNC: 0.74 MG/DL
EGFRCR SERPLBLD CKD-EPI 2021: 100 ML/MIN/1.73M2 (ref 60–?)
EST. AVERAGE GLUCOSE BLD GHB EST-MCNC: 126 MG/DL (ref 68–126)
FASTING STATUS PATIENT QL REPORTED: NO
GLUCOSE BLD-MCNC: 88 MG/DL (ref 70–99)
HBA1C MFR BLD: 6 % (ref ?–5.7)
OSMOLALITY SERPL CALC.SUM OF ELEC: 283 MOSM/KG (ref 275–295)
POTASSIUM SERPL-SCNC: 3.6 MMOL/L (ref 3.5–5.1)
SODIUM SERPL-SCNC: 137 MMOL/L (ref 136–145)

## 2024-09-04 PROCEDURE — 83036 HEMOGLOBIN GLYCOSYLATED A1C: CPT

## 2024-09-04 PROCEDURE — 36415 COLL VENOUS BLD VENIPUNCTURE: CPT

## 2024-09-04 PROCEDURE — 80048 BASIC METABOLIC PNL TOTAL CA: CPT

## 2024-09-04 PROCEDURE — 99214 OFFICE O/P EST MOD 30 MIN: CPT | Performed by: FAMILY MEDICINE

## 2024-09-04 RX ORDER — NEBIVOLOL 2.5 MG/1
2.5 TABLET ORAL DAILY
Qty: 30 TABLET | Refills: 0 | Status: SHIPPED | OUTPATIENT
Start: 2024-09-04

## 2024-09-04 NOTE — PROGRESS NOTES
Nadine Calix is a 49 year old female.  HPI:   Here to go over a few things     Had appt for D&C f/u    told prediabetic and told has a murmur    Here to check it   BP goes up and down    Has a lot of stress w family issues     if gets up fast gets flushed sensation going up to the face and has to sit down    Off the dyazide and K pills for the last 2 mo     Still w LBP  R more lately though L has been an issue before    Can go to the R post thigh at times            Current Outpatient Medications   Medication Sig Dispense Refill    nebivolol 2.5 MG Oral Tab Take 1 tablet (2.5 mg total) by mouth daily. 30 tablet 0    Levonorgestrel (MIRENA, 52 MG,) 20 MCG/DAY Intrauterine IUD 20 mcg (1 each total) by Intrauterine route one time.      Magnesium Glycinate 100 MG Oral Cap Take 240 mg by mouth daily.      Potassium Citrate ER 15 MEQ (1620 MG) Oral Tab CR Take 1 tablet by mouth in the morning and 1 tablet before bedtime. (Patient taking differently: Take 1 tablet by mouth daily.) 180 tablet 6    FIBER OR Take 1 tablet by mouth in the morning and 1 tablet before bedtime.      triamterene-hydroCHLOROthiazide 37.5-25 MG Oral Cap Take 1 capsule by mouth every morning. 90 capsule 1    albuterol (PROAIR HFA) 108 (90 Base) MCG/ACT Inhalation Aero Soln Inhale 2 puffs into the lungs every 4 (four) hours as needed for Wheezing. 1 each 1    Multiple Vitamin (ONE-DAILY MULTI VITAMINS) Oral Tab Take 1 tablet by mouth daily.        Past Medical History:    Abdominal distention    Longer than this but this is an estimate    Abdominal hernia    I had hernia repair around by my belly button & abdominal    Abdominal pain    Currently seeing obgyn, urologist & nephrologist    Arrhythmia    Back pain    Mid to lower back pain & hip pain    Benign essential HTN    Bloating    Longer than this but this is an estimate    Blood in urine    Due to severe kidney stones    Blurred vision    I always wore glasses or contacts since about 14 yrs  old    Calculus of kidney    hydronephrosis/ several times kidney stones/ 13 th procedure for stones    Cervicalgia    Log Date: 05/04/2012         Change in hair    My hair is thinning more than the usual    Chest pain    I did see a cardiologist about this    Chest pain on exertion    I did see a cardiologists about this    COVID-19    headache, back pain, shortness of breath, sore throat, runny nose, chills. Not hospitalized    COVID-19    high fever, fatigue, not hospitalized    Dense breasts    heterogeneously dense breasts    Depression    Diarrhea, unspecified    I notice that certain things run right through me now    Disorder of liver    fatty liver    Dizziness    Dyspepsia    Easy bruising    I find bruises on me and don’t really know how I got them    Elevated fasting glucose    Enthesopathy of the wrist and carpus    Log Date: 05/31/2011         ESBL E. 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 but I never really checke    Heart palpitations    Pvc’s and irregular heart rate    Hemorrhoids    After last pregnancy or a couple years after that    High blood pressure    History of cardiac murmur    Mother told me I was born with one & my dr as well    History of D&C    History of depression    When I had a miscarriage    Indigestion    Longer than this but this is an estimate    Itch of skin    Not severe but I have been itching for no apparent reason    Leaking of urine    After first pregnancy    Leg swelling    My left leg slightly swelled up and my ankle area was hurtin    Lesion  of ulnar nerve    Log Date: 2013         Loss of appetite    I noticed this lately too    Menometrorrhagia    Hysteroscopy 10/2020 path favors anovulatory breakdown bleeding.    Menometrorrhagia    Migraine with aura    Migraines    Multiple thyroid nodules    Myofascial pain    Nausea    Longer than this but this is an estimate    Night sweats    I feel like I’m on fire at night.    OAB (overactive bladder)    PTNS - posterior tibial nerve stimulation with urogynecologist Dr. Lara Jim    Painful urination    When passing stones    Postcoital bleeding    Prior to hysteroscopy done 10/2020. Also having since at least  or     Postcoital bleeding    Prediabetes    PVC's (premature ventricular contractions)    Rash    Broke out with unknown rash all over legs and arms    Sexually transmitted disease    HPV    Sleep disturbance    For years sometimes I can’t lay on my left side    Stress    Rough up bringing and also regular family stresses    Uncomfortable fullness after meals    Longer than this but this is an estimate    Unspecified condition originating in the  period    Ureteral stone with hydronephrosis    Ureteral stricture, right    Urinary incontinence    Constantly going can’t hold it    Ventral hernia without obstruction or gangrene    Visual impairment    glasses    Vitamin D deficiency    Wears glasses    Since I was about 14 yrs old    Weight gain    After last son was born      Social History:  Social History     Socioeconomic History    Marital status: Life Partner   Tobacco Use    Smoking status: Never    Smokeless tobacco: Never   Vaping Use    Vaping status: Never Used   Substance and Sexual Activity    Alcohol use: No    Drug use: No    Sexual activity: Not Currently     Partners: Male     Birth control/protection: Mirena, I.U.D.     Comment: Mirena IUD 24   Other Topics Concern    Caffeine Concern No    Exercise No    Seat Belt Yes        REVIEW OF SYSTEMS:    GENERAL HEALTH: feels well otherwise     EXAM:   /82   Pulse 75   Temp 97.8 °F (36.6 °C) (Temporal)   Ht 4' 11\" (1.499 m)   Wt 172 lb 6.4 oz (78.2 kg)   LMP 06/18/2024 (Exact Date)   SpO2 98%   BMI 34.82 kg/m²   GENERAL: well developed, well nourished,in no apparent distress  SKIN: no rashes  NECK: supple,no adenopathy  LUNGS: clear to auscultation  CARDIO: RRR without murmur  EXTREMITIES: no edema    ASSESSMENT AND PLAN:   1. Benign essential HTN  I do feel her BP is going up, hence her s/s     has not taken the dyazide and K in 2 mo     States not and difference in how she is     Will try low dose bystolic   Her allergies state metoprolol as allergy but she states it made her feel like shoe could not take a deep breath    darnell 1 mo    call if problems       - nebivolol 2.5 MG Oral Tab; Take 1 tablet (2.5 mg total) by mouth daily.  Dispense: 30 tablet; Refill: 0    2. Hyperglycemia  Check labs    is fasting       - Hemoglobin A1C; Future  - Basic Metabolic Panel (8); Future    3. Stress  Had been on effexor yrs ago    perhaps the bystolic may help as well        4. Chronic bilateral low back pain with sciatica, sciatica laterality unspecified  PT ordered to see if helps     - Physical Therapy Referral - [facility]     The patient indicates understanding of these issues and agrees to the plan.  .

## 2024-09-23 ENCOUNTER — NURSE ONLY (OUTPATIENT)
Dept: UROLOGY | Facility: HOSPITAL | Age: 49
End: 2024-09-23
Attending: OBSTETRICS & GYNECOLOGY
Payer: MEDICAID

## 2024-09-23 VITALS — HEIGHT: 59 IN | BODY MASS INDEX: 34.68 KG/M2 | WEIGHT: 172 LBS

## 2024-09-23 DIAGNOSIS — N39.41 URGE INCONTINENCE: Primary | ICD-10-CM

## 2024-09-23 PROCEDURE — 64566 NEUROELTRD STIM POST TIBIAL: CPT

## 2024-09-23 NOTE — PROGRESS NOTES
[facility]  URGENT PC Therapy Note    Date:  2024    Patient Name:  Nadine Calix  Patient :  3/22/1975   Patient MRN:  [id number]  Patient Age:  49 year old      Diagnosis:  N39.41 Urge Incontinence    Procedure:  Left URGENT PC Therapy:  Posterior tibial nerve stimulation treatment  80061 - posterial tibial neuro stimulation, percutaneous needle electrode, single treatment, includes programming    PTNS Evaluation:  PTNS Session: Monthly Follow-Up  Patient reports feeling: Better (has not been drinking as much)  Nocturia: 4+ (4-6x)  Frequency: <1 hr  Patient wears pads: Yes (pantyliners)  Pads per day: 2  Pads per night: 2  Physician:  Dr. Lara Jim    RN:  Nola COX CMA     Indications:  Urinary frequency, urge incontinence, with subjectively severe urge incontinence and inadequate to anti-cholinergic meds.  Informed consent was obtained with discussion of risk, benefits and goal, and limits of the procedure including but not limited to bleeding, infection, andnerve injury were discussed and the patient desired to proceed.      Description of Procedure:    The patient was properly identified and positioned in a semi reclined, comfortable seated position with their feet elevated in a recliner or exam room chair.    The insertion site for the needle electrode was identified on the lower inner aspect of the Left leg.  This position utilized was approximately 5 cm cephalad to the medial malleolus and one finger breath/2cm posterior to the tibia.    Preparation of the needle electrode insertion site was performed using an alcohol pad to clean skin of the above-noted needle insertion site.    The needle electrode/guide tube assembly was placed over the identified and cleaned insertion site.      Once the needle electrode had been placed into the skin, the guide tube was removed and the needle was gently advanced maintaining a 60 degree angle.      A surface electrode was  placed over the medial aspect of the calcaneus on the lower extremity utilized for stimulation.    Current adjustment was slowly increased while observing the patient's foot for response. Adjustments were made advancing the needle further in to obtain optimal sensation.    An optimal sensation in the foot was noted.Once an optimal response was noted, therapy mode was then initiated. The pt was given a bell to ring for the RN to be able to call for any needed adjustments.    Therapy continued without complication for 30 minutes.  No additional current adjustments were needed.    Once 30 minutes of therapy and completed stimulator was turned off and the needle, electrode clip and surface electrode were removed.      This completed the therapy.  The patient tolerated the procedure well.    Plan:  Return for monthly PTNS, sooner prn.

## 2024-10-02 ENCOUNTER — GENETICS ENCOUNTER (OUTPATIENT)
Dept: GENETICS | Age: 49
End: 2024-10-02
Attending: GENETIC COUNSELOR, MS
Payer: MEDICAID

## 2024-10-02 ENCOUNTER — NURSE ONLY (OUTPATIENT)
Dept: HEMATOLOGY/ONCOLOGY | Age: 49
End: 2024-10-02
Attending: GENETIC COUNSELOR, MS
Payer: MEDICAID

## 2024-10-02 DIAGNOSIS — Z80.41 FH: OVARIAN CANCER: Primary | ICD-10-CM

## 2024-10-02 PROCEDURE — 36415 COLL VENOUS BLD VENIPUNCTURE: CPT

## 2024-10-02 PROCEDURE — 96040 HC GENETIC COUNSELING EA 30 MIN: CPT | Performed by: GENETIC COUNSELOR, MS

## 2024-10-02 NOTE — PROGRESS NOTES
Referring Provider:  Ema Randall MD    Additional Provider(s):  MD Fan Crain MD        Reason for Referral:  Nadine Calix was referred for genetic counseling because of a family history of cancer. Ms. Calix is a 49 year-old woman of [place] and [place] descent who has no personal history of cancer. Ms. Calix's uterus and ovaries are intact. Ms. Calix's last screening mammogram was on 24. She had a right breast biopsy on 24 that showed a fibroadenoma. Ms. Calix's 23 colonosocpy was negative for polyps; a repeat screening was recommended in 10 years. Ms. Calix also reports a history of numerous renal stones, hypercalcemia, and thyroid nodules.     Social History:  Ms. Calix was seen today by herself. Ms. Calix lives in [place].     Family History:   A three generation pedigree was obtained.      Ms. Calix has two daughters, ages 23 and 30, and four sons, ages seven to 29.     Ms. Calix has two younger brothers and two younger sisters. Ms. Calix had a third sister who  at six months from SIDS.       Ms. Calix's mother is 63 years-old and has not been diagnosed with cancer but she has had a partial thyroidectomy and is being worked-up now for breast lumps and abdominal bloating. Ms. Calix's mother had four brothers and one sister, none of whom had cancer. Ms. Calix's maternal grandmother  from ovarian cancer at age 54. Ms. Calix's maternal grandfather  at age 90 and may have had skin cancer. Ms. Calix's grandfather's siblings had cancer including a brother who had pancreatic cancer.     Ms. Calix's father is 63 years-old and has not had cancer. Ms. Calix's father has four brothers and two maternal half-sisters. Ms. Calix reports that her some of her paternal uncles and aunts have had cancer but that she has no details about who or what type of cancer because they will not share this information. Ms. Calix has no information about her paternal grandparents.      Please see the pedigree  for additional family history information.     Counseling:   The following information was discussed with Ms. Calix.    Genetics of Cancer:  The majority of cancers are sporadic whereas approximately 10-30% of cases of cancer cases are attributed to familial factors such as unidentified low penetrance genes in the family or shared environmental factors.  Approximately 5-10% of cancers are related to a hereditary cancer syndrome.  Signs of a hereditary cancer syndrome include some rare cancers, common cancers occurring at unusually young ages, multiple primary cancers in the some individual, or the same type of cancer or related cancers (e.g., breast and ovarian, colorectal and endometrial) in three or more individuals in the same lineage.      Risk Assessment:   Ms. Calix meets NCCN Guidelines testing criteria for ovarian cancer susceptibility genes based on her maternal grandmother's history of ovarian cancer. I recommend that testing be performed as part of a multigene panel.     Genetic Testing (Panel):  The pros, cons, and limitations of genetic testing were discussed including the potential implications of test results on clinical management.     If a pathogenic variant is not identified (negative result), it is still possible that Ms. Calix has a pathogenic variant in one of these genes that was not detected by the genetic test, or that the family is dealing with a hereditary cancer syndrome involving a different gene. It is also possible that Ms. Calix's relatives have a pathogenic variant in one of these genes that Ms. Calix did not inherit. In this scenario, options for cancer screening/management should be determined according to personal and family histories and should be discussed with a physician.      A variant of uncertain significance is a DNA change that may or may not alter the function of the gene; therefore, it is usually not possible to determine if the gene variant is responsible for an individual's  increased cancer risk.     If Ms. Calix is found to carry a pathogenic variant in a cancer predisposition gene, she is at significantly increased risk for various cancers. The magnitude of these risks, and the cancers for which she is at increased risk would depend on the gene involved. Medical recommendations for individuals with BRCA1/2 pathogenic variants were reviewed as an example. It was also explained that for some of the genes for which testing is available, the associated cancer risks have yet to be determined and medical management recommendations may not yet be available for individuals with pathogenic variants in these genes. If she were to test positive for a pathogenic variant, her children and siblings would each have a 50% chance of carrying the same variant. At-risk adults (>18) would have the option of pursuing targeted genetic testing to clarify their cancer risks. Genetic test results have implications for the entire biological family. Thus, it is recommended that she share her genetic test results with her biological family members so that they may have their risk assessed.     Genetic Information Non-Discrimination Act:  The legal protections of the Genetic Information Nondiscrimination Act (BRISEYDA) for health insurance and employment were discussed.  BRISEYDA does not provide protection for life insurance, disability or long-term care insurance.    Summary and Plan:  Ms. Calix was referred for genetic counseling because of a family history of cancer. Her reported maternal family history is somewhat suspicious for a hereditary cancer syndrome. Genetic testing on Ms. Calix for ovarian cancer susceptibility genes is indicated.      At the conclusion of the counseling session Ms. Calix decided to proceed with genetic testing. Written consent was obtained. Blood and paperwork were sent to [facility] for their Breast/Gyn Guidelines panel with reflex to [facility] Multi-Cancers panel. I anticipate that Ms. Calix's  results will be available within 2-3 weeks and will call her with the results.  Results will also be communicated to Dr. Randall, Dr. Hernandez, and Dr. Friend.    Approximately 40 minutes was spent in consultation with Ms. Calix.

## 2024-10-17 ENCOUNTER — NURSE ONLY (OUTPATIENT)
Dept: UROLOGY | Facility: HOSPITAL | Age: 49
End: 2024-10-17
Payer: MEDICAID

## 2024-10-17 VITALS — HEIGHT: 59 IN | RESPIRATION RATE: 16 BRPM | WEIGHT: 172 LBS | BODY MASS INDEX: 34.68 KG/M2

## 2024-10-17 DIAGNOSIS — N39.41 URGE INCONTINENCE: Primary | ICD-10-CM

## 2024-10-17 PROCEDURE — 64566 NEUROELTRD STIM POST TIBIAL: CPT

## 2024-10-17 NOTE — PROGRESS NOTES
[facility]  URGENT PC Therapy Note    Date:  10/17/2024    Patient Name:  Nadine Calix  Patient :  3/22/1975   Patient MRN:  [id number]  Patient Age:  49 year old      Diagnosis:  N39.41 Urge Incontinence    Procedure:  Right URGENT PC Therapy:  Posterior tibial nerve stimulation treatment  58903 - posterial tibial neuro stimulation, percutaneous needle electrode, single treatment, includes programming    PTNS Evaluation:  PTNS Session: Monthly Follow-Up  Patient reports feeling: Same  Nocturia: 4+ (4-6x)  Frequency: <1 hr  Patient wears pads: Yes (pantyliners)  Pads per day: 2  Pads per night: 2  Physician:  Dr. Lara Jim    RN:  Nola COX CMA     Indications:  Urinary frequency, urge incontinence, with subjectively severe urge incontinence and inadequate to anti-cholinergic meds.  Informed consent was obtained with discussion of risk, benefits and goal, and limits of the procedure including but not limited to bleeding, infection, andnerve injury were discussed and the patient desired to proceed.      Description of Procedure:    The patient was properly identified and positioned in a semi reclined, comfortable seated position with their feet elevated in a recliner or exam room chair.    The insertion site for the needle electrode was identified on the lower inner aspect of the Right leg.  This position utilized was approximately 5 cm cephalad to the medial malleolus and one finger breath/2cm posterior to the tibia.    Preparation of the needle electrode insertion site was performed using an alcohol pad to clean skin of the above-noted needle insertion site.    The needle electrode/guide tube assembly was placed over the identified and cleaned insertion site.      Once the needle electrode had been placed into the skin, the guide tube was removed and the needle was gently advanced maintaining a 60 degree angle.      A surface electrode was placed over the medial aspect of  the calcaneus on the lower extremity utilized for stimulation.    Current adjustment was slowly increased while observing the patient's foot for response. Adjustments were made advancing the needle further in to obtain optimal sensation.    An optimal sensation in the foot was noted.Once an optimal response was noted, therapy mode was then initiated. The pt was given a bell to ring for the RN to be able to call for any needed adjustments.    Therapy continued without complication for 30 minutes.  No additional current adjustments were needed.    Once 30 minutes of therapy and completed stimulator was turned off and the needle, electrode clip and surface electrode were removed.      This completed the therapy.  The patient tolerated the procedure well.    Plan:  Return for monthly PTNS, sooner prn.

## 2024-10-21 ENCOUNTER — GENETICS ENCOUNTER (OUTPATIENT)
Dept: HEMATOLOGY/ONCOLOGY | Facility: HOSPITAL | Age: 49
End: 2024-10-21

## 2024-10-21 NOTE — PROGRESS NOTES
Referring Provider:                    Ema Randall MD     Additional Provider(s):              MD Fan Crain MD             Reason for Referral:  Nadine Calix had genetic testing performed on 10/02/24 because of a family history of ovarian cancer.     Genetic Testing Result:  NEGATIVE - No known pathogenic variants were found in 70 genes including: AIP, ALK, APC*, HEIDE*, AXIN2, BAP1, BARD1, BLM, BMPR1A, BRCA1, BRCA2, BRIP1, CDC73, CDH1, CDK4, CDKN1B, CDKN2A (p14ARF), CDKN2A (h10VNQ5b), CHEK2, CTNNA1, DICER1*, EGFR, EPCAM*, FH*, FLCN, GREM1*, HOXB13, KIT, LZTR1, MAX*, MBD4, MEN1*, MET*, MITF, MLH1*, MSH2*, MSH3*, MSH6*, MUTYH, NF1*, NF2, NTHL1, PALB2, PDGFRA, PMS2*, POLD1*, POLE, POT1, MNRKU9R, PTCH1, PTEN*, RAD51C, RAD51D, RB1*, RET, SDHA*, SDHAF2, SDHB, SDHC*, SDHD, SMAD4, SMARCA4, SMARCB1, SMARCE1, STK11, SUFU, GVUF914, TP53, TSC1*, TSC2, VHL.  Please refer to the report from [facility] ([id number]) for additional testing information.  These results were discussed with Ms. Calix by phone on 10/18/24.      Summary and Plan:  These results indicate that it is unlikely that Ms. Calix has a pathogenic variant in any of the genes listed above. The limitations of the testing include the chance that a pathogenic variant in a gene other than those included in this analysis might be the cause of cancer in Ms. Calix's relatives. I encourage Ms. Calix to contact me on an annual basis to see if there have been any updates in genetic testing that would apply to her or to inform me if there are any changes to the family history.    In the meantime, Ms. Calix and her relatives should speak with their physicians to discuss recommended medical management according to their personal and family history.    Cc:  Nadine Calix

## 2024-10-29 ENCOUNTER — OFFICE VISIT (OUTPATIENT)
Dept: PHYSICAL THERAPY | Age: 49
End: 2024-10-29
Attending: FAMILY MEDICINE
Payer: MEDICAID

## 2024-10-29 DIAGNOSIS — G89.29 CHRONIC BILATERAL LOW BACK PAIN WITH SCIATICA, SCIATICA LATERALITY UNSPECIFIED: Primary | ICD-10-CM

## 2024-10-29 DIAGNOSIS — M54.40 CHRONIC BILATERAL LOW BACK PAIN WITH SCIATICA, SCIATICA LATERALITY UNSPECIFIED: Primary | ICD-10-CM

## 2024-10-29 PROCEDURE — 97110 THERAPEUTIC EXERCISES: CPT

## 2024-10-29 PROCEDURE — 97162 PT EVAL MOD COMPLEX 30 MIN: CPT

## 2024-10-29 NOTE — PROGRESS NOTES
SPINE EVALUATION:   Referring Physician: Dr. Hernandez  Diagnosis: Lumbar Stenosis     Date of Service: 10/28/2024     PATIENT SUMMARY   Nadine Calix is a 49 year old y/o female who presents to therapy today with complaints of central low back pain with radicular symptoms into the calf originating in early January 2024.  She states that she had taken to a strengthening program to assist with losing weight to ultimately assist with weight loss.  She states that she has increased symptoms with motion and is alleviated with rest. She indicates that she is consulting a pelvic  to assist with incontinence issues.  She states that she is currently not exercising.  Her diet is fluctuating because of trials to assist with her kidney concerns.  She does not get restorative sleep and her stress level is elevated.     The patient's symptoms are increased with sustained stance, bending, and lifting and lessened with rest.    Pain: current 2/10, best 2/10, worst 9/10  Current functional limitations include increased pain with axial extension and twisting.   Nadine describes prior level of function as unlimited. Pt goals include to have less pain and increased motion through the lumbar spine and hips.  Past medical history was reviewed with Nadine. Significant findings include  has a past medical history of Abdominal distention (01/2020), Abdominal hernia (03/2017), Abdominal pain (02/2020), Arrhythmia, Back pain (01/2018), Benign essential HTN, Bloating (01/2020), Blood in urine (1994), Blurred vision (06/2020), Calculus of kidney, Cervicalgia (06/20/2013), Change in hair (01/2020), Chest pain (01/2019), Chest pain on exertion (01/2019), COVID-19 (12/2021), COVID-19 (11/21/2023), Dense breasts (07/21/2023), Depression (06/29/2012), Diarrhea, unspecified (01/2020), Disorder of liver, Dizziness (01/2019), Dyspepsia (06/29/2012), Easy bruising (01/2020), Elevated fasting glucose (06/18/2024), Enthesopathy of the wrist  and carpus (2013), ESBL E. coli carrier (2024), Excessive bleeding in premenopausal period (03/15/2023), Fatigue (2019), Flatulence/gas pain/belching (2020), Food intolerance (2020), Frequent urination (Since early age in my 20’s), Frequent UTI (), Gestational diabetes (HCC) (2014), Heart murmur, Heart palpitations (2019), Hemorrhoids (2016 or ), High blood pressure, History of cardiac murmur (2000), History of D&C (10/08/2020), History of depression (2004), Indigestion (2020), Itch of skin (2020), Leaking of urine (), Leg swelling (2020), Lesion of ulnar nerve (2013), Loss of appetite (2020), Menometrorrhagia (10/2020), Menometrorrhagia (2024), Migraine with aura (2012), Migraines, Multiple thyroid nodules (2023), Myofascial pain (2021), Nausea (2020), Night sweats (2020), OAB (overactive bladder), Painful urination (), Postcoital bleeding (10/2020), Postcoital bleeding (2024), Prediabetes, PVC's (premature ventricular contractions) (2020), Rash (2020), Sexually transmitted disease (), Sleep disturbance (2020), Stress (), Uncomfortable fullness after meals (2020), Unspecified condition originating in the  period (2004), Ureteral stone with hydronephrosis (2019), Ureteral stricture, right (2019), Urinary incontinence, Ventral hernia without obstruction or gangrene (2017), Visual impairment, Vitamin D deficiency, Wears glasses (1989), and Weight gain (2016).    Patient denies bowel/bladder changes, saddle paraesthesia, diplopia, dysarthria, dysphasia, and dizziness.      ASSESSMENT  The patient presents with the signs and symptoms of the Rehab Diagnosis of lumbar stenosis supported by the clinical findings of decreased lumbar active motion, decreased hip mobility, and poor core stability.    Nadine would benefit from skilled Physical Therapy to  address the above impairments to increase functional mobility and to lessen pain with ADLs.      Precautions:  None  OBJECTIVE:   Observation/Posture: The patient presents with a structural assessment of an increase in lumbar lordosis and anterior pelvic tilt.    Gait: Decreased hip extension bilaterally  Neuro Screen: The patient was assessed for MMT, sensation, and reflexes per all myotomes and dermatomes and presents with symmetrical findings    Lumbar AROM:   Flexion: 75% normal motion   Extension: 25% normal motion  Sidebending: R 50% normal motion; L 50% normal motion  Rotation: R 50% normal motion; L 50% normal motion   *denotes pain when testing    Accessory motion: The patient presents with decreased motion at the segments L4L5 and bilateral hips for IR and FLXN    Palpation: Increased tone at the lumbar paraspinals.      Strength:   UE/Scapular LE   Mid trap: R 3/5; L 3/5  Low trap: R 3/5; L 3/5   Glut Medius: R 3/5; L 3/5  Glut Minimus: R 3/5; L 3/5       Flexibility: The patient presents with moderate restrictions of the iliopsoas and hamstrings.     Special tests: Patient presents positive for posterior shear test at the hip.      Today’s Treatment and Response:  Patient education provided on 10/28/2024 regarding the examination findings, stage of condition, and subjective/tissue reactivity.   Patient was instructed in and issued a HEP for core stabilizaion and hip mobility.  The patient demonstrated safe technique with exercise completion and vocalized understanding of informational material provided.    Charges: PT Eval Moderate Complexity, TherEx 1      Total Timed Treatment: 40 min     Total Treatment Time: 40 min     In agreement with VINAY Disability Index score and clinical rationale, this evaluation involved Moderate Complexity decision making due to 3+ personal factors/comorbidities.    PLAN OF CARE:    Goals (12 visits):    1. Improve upon VINAY assessment > 11% from INE to DC.  2. Patient will be  aware of postural limitations and be able to correct them independently.    3. Patient will have an increase in spinal mobility to >75% for all planes of motion in order to return to more normalized function.    4. Patient will have an increase in core strength to assist with returning to sustained posturing and exercise  5. Patient will demonstrate an increase in MLT of the bilateral hips in order to return to more normalized function.      Frequency / Duration: Patient will be seen for 2 x/week or a total of 12 visits over a 90 day period. Treatment will include: Manual Therapy; Therapeutic Exercises; Neuromuscular Re-education; Therapeutic Activity; Electrical Stim; Mechanical Traction; Pt education; Home exercise program instruction.      Education or treatment limitation: None  Rehab Potential:good    LEFS Score  LEFS Score: (Patient-Rptd) 27.5 % (10/21/2024 12:21 PM)      Patient/Family/Caregiver was advised of these findings, precautions, and treatment options and has agreed to actively participate in planning and for this course of care.    Thank you for your referral. Please co-sign or sign and return this letter via fax as soon as possible to [phone]. If you have any questions, please contact me at Dept: [phone]    Sincerely,  Electronically signed by therapist: Hung Jacome, PT, DPT, FAAOMPT    Physician's certification required: Yes  I certify the need for these services furnished under this plan of treatment and while under my care.    X___________________________________________________ Date____________________    Certification From: 10/28/2024  To:1/26/2025

## 2024-11-01 ENCOUNTER — OFFICE VISIT (OUTPATIENT)
Dept: PHYSICAL THERAPY | Age: 49
End: 2024-11-01
Attending: FAMILY MEDICINE
Payer: COMMERCIAL

## 2024-11-01 PROCEDURE — 97110 THERAPEUTIC EXERCISES: CPT

## 2024-11-01 PROCEDURE — 97140 MANUAL THERAPY 1/> REGIONS: CPT

## 2024-11-01 NOTE — PROGRESS NOTES
Dx: Lumbar Stenosis - Left sided Radic        Authorized # of Visits:  6         Next MD visit: none scheduled  Fall Risk: standard         Precautions: n/a           Goal Number: 8    Subjective: States that there was some relief with the HEP.  States near the end, she noted an increase in symptoms on the opposite side.      Objective: Treatment initiated per treatment log.      Date:11/1/2024 TX#: 2/6 Date:               TX#: 3/   Date:               TX#: 4/ Date:               TX#: 5/ Date:               TX#: 6/ Date:               TX#: 7/ Date:               TX#: 8/   VAS 0/10 VAS /10 VAS /10 VAS /10 VAS /10 VAS /10 VAS /10   TherEx (25 Min)         Elliptical L5 x 5 min         TrA setting in HL         TrA Ball Squeezes x 20         TrA rhythmic stabilizaton for rotation x 10         TrA HL Marching/BKFO (HEP)         SL Clamshell red x 20 (HEP)         Cable Column single arm row 36# x 20         Cable Column side stepping gait 24# x10                           Manual PT (15 Min)         Lumbar Left side bending manipulation for foraminal opening.                               Assessment: The patient responded well to today's intervention.  Was able to replicate HEP issued during initial exam.        Goals (12 visits):    1. Improve upon VINAY assessment > 11% from INE to DC.  2. Patient will be aware of postural limitations and be able to correct them independently.    3. Patient will have an increase in spinal mobility to >75% for all planes of motion in order to return to more normalized function.    4. Patient will have an increase in core strength to assist with returning to sustained posturing and exercise  5. Patient will demonstrate an increase in MLT of the bilateral hips in order to return to more normalized function.      Plan: Assess response to today's treatment and progress as tolerated.      Charges: TherEx 2 (25 min); Manual PT 1 (15 min)      Total Timed Treatment: 40 min  Total Treatment  Time: 40 min

## 2024-11-04 ENCOUNTER — OFFICE VISIT (OUTPATIENT)
Dept: PHYSICAL THERAPY | Age: 49
End: 2024-11-04
Attending: FAMILY MEDICINE
Payer: COMMERCIAL

## 2024-11-04 PROCEDURE — 97140 MANUAL THERAPY 1/> REGIONS: CPT

## 2024-11-04 PROCEDURE — 97110 THERAPEUTIC EXERCISES: CPT

## 2024-11-04 NOTE — PROGRESS NOTES
Dx: Lumbar Stenosis - Left sided Radic        Authorized # of Visits:  6         Next MD visit: none scheduled  Fall Risk: standard         Precautions: n/a           Goal Number: 8    Subjective: States that she was busy around the house yesterday and noted an increase in low back pain, into the right calf.  States that the foraminal opening exercises were beneficial.      Objective: Treatment progressed per treatment log.      Date:11/1/2024 TX#: 2/6 Date:11/4/2024  TX#: 3/   Date:               TX#: 4/ Date:               TX#: 5/ Date:               TX#: 6/ Date:               TX#: 7/ Date:               TX#: 8/   VAS 0/10 VAS 4/10 VAS /10 VAS /10 VAS /10 VAS /10 VAS /10   TherEx (25 Min) TherEx (25 Min)        Elliptical L5 x 5 min Elliptical L5 x 5 min        TrA setting in HL TrA rhythmic stabilizaton for rotation x 10        TrA Ball Squeezes x 20 HL Bridge with band red x 20        TrA rhythmic stabilizaton for rotation x 10 SL Clamshells red x 20        TrA HL Marching/BKFO (HEP) Hip Hinge with PVC Pipe x 10        SL Clamshell red x 20 (HEP) TRX Posterior Squat x 20        Cable Column single arm row 36# x 20 Cable Column single arm row 36# x 20        Cable Column side stepping gait 24# x10 Cable Column side stepping gait 24# x10         ProStretch 30 sec x 3                 Manual PT (15 Min) Manual PT (15 Min)        Lumbar Left side bending manipulation for foraminal opening.   Lumbar Left side bending manipulation for foraminal opening.           Lumbar PA manipulation          Rhythmic Stabilization to bilateral hips in prone for pelvic stability          Assessment: The patient responded well to today's intervention.  Improved tolerance for core stability.  Pain appears to be SIJ in nature.        Goals (12 visits):    1. Improve upon VINAY assessment > 11% from INE to DC.  2. Patient will be aware of postural limitations and be able to correct them independently.    3. Patient will have an increase  in spinal mobility to >75% for all planes of motion in order to return to more normalized function.    4. Patient will have an increase in core strength to assist with returning to sustained posturing and exercise  5. Patient will demonstrate an increase in MLT of the bilateral hips in order to return to more normalized function.      Plan: Assess response to today's treatment and progress as tolerated.      Charges: TherEx 2 (25 min); Manual PT 1 (15 min)      Total Timed Treatment: 40 min  Total Treatment Time: 40 min

## 2024-11-06 ENCOUNTER — OFFICE VISIT (OUTPATIENT)
Dept: PHYSICAL THERAPY | Age: 49
End: 2024-11-06
Attending: FAMILY MEDICINE
Payer: COMMERCIAL

## 2024-11-06 PROCEDURE — 97140 MANUAL THERAPY 1/> REGIONS: CPT

## 2024-11-06 PROCEDURE — 97110 THERAPEUTIC EXERCISES: CPT

## 2024-11-06 NOTE — PROGRESS NOTES
Dx: Lumbar Stenosis - Left sided Radic        Authorized # of Visits:  6         Next MD visit: none scheduled  Fall Risk: standard         Precautions: n/a           Goal Number: 8    Subjective: States that there are increased symptoms into the right leg.      Objective: Treatment progressed per treatment log.      Date:11/1/2024 TX#: 2/6 Date:11/4/2024  TX#: 3/   Date:               TX#: 4/ Date:               TX#: 5/ Date:               TX#: 6/ Date:               TX#: 7/ Date:               TX#: 8/   VAS 0/10 VAS 4/10 VAS /10 VAS /10 VAS /10 VAS /10 VAS /10   TherEx (25 Min) TherEx (25 Min) TherEx (25 Min)       Elliptical L5 x 5 min Elliptical L5 x 5 min Elliptical L5 x 3 min - pain was too high to proceed in the right leg.       TrA setting in HL TrA rhythmic stabilizaton for rotation x 10 TrA rhythmic stabilizaton for rotation x 10       TrA Ball Squeezes x 20 HL Bridge with band red x 20 SL Clamshells red x 20       TrA rhythmic stabilizaton for rotation x 10 SL Clamshells red x 20 TrA Tabletop Marches x 20       TrA HL Marching/BKFO (HEP) Hip Hinge with PVC Pipe x 10 TRX Posterior Squat x 20       SL Clamshell red x 20 (HEP) TRX Posterior Squat x 20 Cable Column single arm row 36# x 20       Cable Column single arm row 36# x 20 Cable Column single arm row 36# x 20 Cable Column side stepping gait 24# x10       Cable Column side stepping gait 24# x10 Cable Column side stepping gait 24# x10         ProStretch 30 sec x 3                 Manual PT (15 Min) Manual PT (15 Min) Manual PT (15 Min)       Lumbar Left side bending manipulation for foraminal opening.   Lumbar Left side bending manipulation for foraminal opening.   Lumbar Left side bending manipulation for foraminal opening.          Lumbar PA manipulation  Rhythmic Stabilization to bilateral hips in prone for pelvic stability        Rhythmic Stabilization to bilateral hips in prone for pelvic stability          Assessment: The patient responded well  to today's intervention.  Improved tolerance for core stability.  Pain continues to appear to be SIJ in nature.        Goals (12 visits):    1. Improve upon VINAY assessment > 11% from INE to DC.  2. Patient will be aware of postural limitations and be able to correct them independently.    3. Patient will have an increase in spinal mobility to >75% for all planes of motion in order to return to more normalized function.    4. Patient will have an increase in core strength to assist with returning to sustained posturing and exercise  5. Patient will demonstrate an increase in MLT of the bilateral hips in order to return to more normalized function.      Plan: Assess response to today's treatment and progress as tolerated.      Charges: TherEx 2 (25 min); Manual PT 1 (15 min)      Total Timed Treatment: 40 min  Total Treatment Time: 40 min

## 2024-11-11 ENCOUNTER — NURSE ONLY (OUTPATIENT)
Dept: UROLOGY | Facility: HOSPITAL | Age: 49
End: 2024-11-11
Attending: OBSTETRICS & GYNECOLOGY
Payer: MEDICAID

## 2024-11-11 ENCOUNTER — OFFICE VISIT (OUTPATIENT)
Dept: PHYSICAL THERAPY | Age: 49
End: 2024-11-11
Attending: FAMILY MEDICINE
Payer: COMMERCIAL

## 2024-11-11 VITALS — HEIGHT: 59 IN | BODY MASS INDEX: 34.68 KG/M2 | WEIGHT: 172 LBS

## 2024-11-11 DIAGNOSIS — N39.41 URGE INCONTINENCE: Primary | ICD-10-CM

## 2024-11-11 PROCEDURE — 97140 MANUAL THERAPY 1/> REGIONS: CPT

## 2024-11-11 PROCEDURE — 64566 NEUROELTRD STIM POST TIBIAL: CPT

## 2024-11-11 PROCEDURE — 97110 THERAPEUTIC EXERCISES: CPT

## 2024-11-11 NOTE — PROGRESS NOTES
Dx: Lumbar Stenosis - Left sided Radic        Authorized # of Visits:  6         Next MD visit: none scheduled  Fall Risk: standard         Precautions: n/a           Goal Number: 8    Subjective: States that there are increased symptoms into the right leg.      Objective: Treatment progressed per treatment log.      Date:11/1/2024 TX#: 2/6 Date:11/4/2024  TX#: 3/   Date:11/11/2024  TX#: 4/ Date:               TX#: 5/ Date:               TX#: 6/ Date:               TX#: 7/ Date:               TX#: 8/   VAS 0/10 VAS 4/10 VAS /10 VAS /10 VAS /10 VAS /10 VAS /10   TherEx (25 Min) TherEx (25 Min) TherEx (25 Min)       Elliptical L5 x 5 min Elliptical L5 x 5 min SL Clamshells red x 20       TrA setting in HL TrA rhythmic stabilizaton for rotation x 10 TrA rhythmic stabilizaton for rotation x 10       TrA Ball Squeezes x 20 HL Bridge with band red x 20 TrA Tabletop Marches x 20       TrA rhythmic stabilizaton for rotation x 10 SL Clamshells red x 20 Obturator nerve glide x 10 (to HEP)       TrA HL Marching/BKFO (HEP) Hip Hinge with PVC Pipe x 10        SL Clamshell red x 20 (HEP) TRX Posterior Squat x 20        Cable Column single arm row 36# x 20 Cable Column single arm row 36# x 20        Cable Column side stepping gait 24# x10 Cable Column side stepping gait 24# x10         ProStretch 30 sec x 3                 Manual PT (15 Min) Manual PT (15 Min) Manual PT (15 Min)       Lumbar Left side bending manipulation for foraminal opening.   Lumbar Left side bending manipulation for foraminal opening.   Lumbar Left side bending manipulation for foraminal opening.          Lumbar PA manipulation  Rhythmic Stabilization to bilateral hips in prone for pelvic stability        Rhythmic Stabilization to bilateral hips in prone for pelvic stability Obturator nerve x 10         Assessment: The patient responded well to today's intervention.  Improved tolerance for core stability.  Increased emphasis on the obturator nerve to HEP  today.        Goals (12 visits):    1. Improve upon VINAY assessment > 11% from INE to DC.  2. Patient will be aware of postural limitations and be able to correct them independently.    3. Patient will have an increase in spinal mobility to >75% for all planes of motion in order to return to more normalized function.    4. Patient will have an increase in core strength to assist with returning to sustained posturing and exercise  5. Patient will demonstrate an increase in MLT of the bilateral hips in order to return to more normalized function.      Plan: Assess response to today's treatment and progress as tolerated.      Charges: TherEx 2 (25 min); Manual PT 1 (15 min)      Total Timed Treatment: 40 min  Total Treatment Time: 40 min

## 2024-11-11 NOTE — PROGRESS NOTES
[facility]  URGENT PC Therapy Note    Date:  2024    Patient Name:  Nadine Calix  Patient :  3/22/1975   Patient MRN:  [id number]  Patient Age:  49 year old      Diagnosis:  N39.41 Urge Incontinence    Procedure:  Left URGENT PC Therapy:  Posterior tibial nerve stimulation treatment  65008 - posterial tibial neuro stimulation, percutaneous needle electrode, single treatment, includes programming    PTNS Evaluation:  PTNS Session: Monthly Follow-Up  Patient reports feeling: Better (slightly better)  Nocturia: 4  Frequency: <1 hr  Patient wears pads: Yes (pantyliners)  Pads per day: 3 (2-3)     Physician:  Dr. Lara Jim    RN:  Nola COX CMA     Indications:  Urinary frequency, urge incontinence, with subjectively severe urge incontinence and inadequate to anti-cholinergic meds.  Informed consent was obtained with discussion of risk, benefits and goal, and limits of the procedure including but not limited to bleeding, infection, andnerve injury were discussed and the patient desired to proceed.      Description of Procedure:    The patient was properly identified and positioned in a semi reclined, comfortable seated position with their feet elevated in a recliner or exam room chair.    The insertion site for the needle electrode was identified on the lower inner aspect of the Left leg.  This position utilized was approximately 5 cm cephalad to the medial malleolus and one finger breath/2cm posterior to the tibia.    Preparation of the needle electrode insertion site was performed using an alcohol pad to clean skin of the above-noted needle insertion site.    The needle electrode/guide tube assembly was placed over the identified and cleaned insertion site.      Once the needle electrode had been placed into the skin, the guide tube was removed and the needle was gently advanced maintaining a 60 degree angle.      A surface electrode was placed over the medial aspect of  the calcaneus on the lower extremity utilized for stimulation.    Current adjustment was slowly increased while observing the patient's foot for response. Adjustments were made advancing the needle further in to obtain optimal sensation.    An optimal sensation in the foot was noted.Once an optimal response was noted, therapy mode was then initiated. The pt was given a bell to ring for the RN to be able to call for any needed adjustments.    Therapy continued without complication for 30 minutes.  No additional current adjustments were needed.    Once 30 minutes of therapy and completed stimulator was turned off and the needle, electrode clip and surface electrode were removed.      This completed the therapy.  The patient tolerated the procedure well.    Plan:  Return for monthly PTNS, sooner prn.

## 2024-11-15 ENCOUNTER — OFFICE VISIT (OUTPATIENT)
Dept: PHYSICAL THERAPY | Age: 49
End: 2024-11-15
Attending: FAMILY MEDICINE
Payer: COMMERCIAL

## 2024-11-15 ENCOUNTER — APPOINTMENT (OUTPATIENT)
Dept: PHYSICAL THERAPY | Age: 49
End: 2024-11-15
Attending: FAMILY MEDICINE
Payer: COMMERCIAL

## 2024-11-15 PROCEDURE — 97140 MANUAL THERAPY 1/> REGIONS: CPT

## 2024-11-15 PROCEDURE — 97110 THERAPEUTIC EXERCISES: CPT

## 2024-11-15 PROCEDURE — 97112 NEUROMUSCULAR REEDUCATION: CPT

## 2024-11-15 NOTE — PROGRESS NOTES
Dx: Lumbar Stenosis - Left sided Radic        Authorized # of Visits:  6         Next MD visit: none scheduled  Fall Risk: standard         Precautions: n/a           Goal Number: 8    Subjective: States that the obturator nerve glides have impacted her symptoms, but have not resolved them to this point.  No appointment with primary set up to this point.  States that she feels the therapy has been helping.      Objective: Treatment progressed per treatment log.  All lumbar motion provoke symptoms.      Date:11/1/2024 TX#: 2/6 Date:11/4/2024  TX#: 3/   Date:11/11/2024  TX#: 4/ Date:11/15/2024  TX#: 5/ Date:  TX#: 6/ Date:               TX#: 7/ Date:               TX#: 8/   VAS 0/10 VAS 4/10 VAS /10 VAS 2-3/10 VAS /10 VAS /10 VAS /10   TherEx (25 Min) TherEx (25 Min) TherEx (25 Min) TherEx (25 Min)      Elliptical L5 x 5 min Elliptical L5 x 5 min SL Clamshells red x 20 SL Clamshells red x 20      TrA setting in HL TrA rhythmic stabilizaton for rotation x 10 TrA rhythmic stabilizaton for rotation x 10 TrA rhythmic stabilizaton for rotation x 10      TrA Ball Squeezes x 20 HL Bridge with band red x 20 TrA Tabletop Marches x 20 TrA Tabletop Marches x 20      TrA rhythmic stabilizaton for rotation x 10 SL Clamshells red x 20 Obturator nerve glide x 10 (to HEP) Shuttle L6 x 20 bilateral press; L4 single press x 20      TrA HL Marching/BKFO (HEP) Hip Hinge with PVC Pipe x 10  Hip flexor/Adductor stretch 10 sec x 5      SL Clamshell red x 20 (HEP) TRX Posterior Squat x 20  Cable column unilateral rows 36# x 20      Cable Column single arm row 36# x 20 Cable Column single arm row 36# x 20        Cable Column side stepping gait 24# x10 Cable Column side stepping gait 24# x10         ProStretch 30 sec x 3                 Manual PT (15 Min) Manual PT (15 Min) Manual PT (15 Min) Manual PT (10 Min)      Lumbar Left side bending manipulation for foraminal opening.   Lumbar Left side bending manipulation for foraminal opening.    Lumbar Left side bending manipulation for foraminal opening.   Lumbar Left side bending manipulation for foraminal opening.           Neuro Kory (10 min)         Obturator nerve x 10       Lumbar PA manipulation  Rhythmic Stabilization to bilateral hips in prone for pelvic stability Rhythmic Stabilization to bilateral hips in prone for pelvic stability       Rhythmic Stabilization to bilateral hips in prone for pelvic stability Obturator nerve x 10         Assessment: The patient responded well to today's intervention.  Improved tolerance for core stability.  Increased emphasis on core stability with today's treatment/       Goals (12 visits):    1. Improve upon VINAY assessment > 11% from INE to DC.  2. Patient will be aware of postural limitations and be able to correct them independently.    3. Patient will have an increase in spinal mobility to >75% for all planes of motion in order to return to more normalized function.    4. Patient will have an increase in core strength to assist with returning to sustained posturing and exercise  5. Patient will demonstrate an increase in MLT of the bilateral hips in order to return to more normalized function.      Plan: Await insurance authorization for further PT.      Charges: TherEx 2 (25 min); Manual PT 1 (15 min)      Total Timed Treatment: 40 min  Total Treatment Time: 40 min

## 2024-12-16 ENCOUNTER — OFFICE VISIT (OUTPATIENT)
Dept: FAMILY MEDICINE CLINIC | Facility: CLINIC | Age: 49
End: 2024-12-16
Payer: MEDICAID

## 2024-12-16 VITALS
DIASTOLIC BLOOD PRESSURE: 86 MMHG | SYSTOLIC BLOOD PRESSURE: 144 MMHG | RESPIRATION RATE: 16 BRPM | HEART RATE: 77 BPM | BODY MASS INDEX: 37 KG/M2 | OXYGEN SATURATION: 97 % | WEIGHT: 181.19 LBS | TEMPERATURE: 98 F

## 2024-12-16 DIAGNOSIS — B34.9 VIRAL SYNDROME: Primary | ICD-10-CM

## 2024-12-16 DIAGNOSIS — J02.9 SORE THROAT: ICD-10-CM

## 2024-12-16 LAB
CONTROL LINE PRESENT WITH A CLEAR BACKGROUND (YES/NO): YES YES/NO
KIT LOT #: NORMAL NUMERIC
OPERATOR ID: NORMAL
RAPID SARS-COV-2 BY PCR: NOT DETECTED
STREP GRP A CUL-SCR: NEGATIVE

## 2024-12-16 PROCEDURE — U0002 COVID-19 LAB TEST NON-CDC: HCPCS | Performed by: NURSE PRACTITIONER

## 2024-12-16 PROCEDURE — 99213 OFFICE O/P EST LOW 20 MIN: CPT | Performed by: NURSE PRACTITIONER

## 2024-12-16 PROCEDURE — 87880 STREP A ASSAY W/OPTIC: CPT | Performed by: NURSE PRACTITIONER

## 2024-12-16 NOTE — PROGRESS NOTES
CHIEF COMPLAINT:     Chief Complaint   Patient presents with    Sore Throat     S/s for 1 day.  OTC meds taken.  Water eyes with drainage, pressure at ears.         HPI:   Nadine Calix is a 49 year old female presents to clinic with symptoms of sore throat, headache, ear pressure.  Patient has had for 1 days, woke up with symptoms yesterday morning.  Symptoms have worsening since onset.  Patient reports following associated symptoms: mild body aches.  No chills, fevers.  No cough/congestion. +sinus pressure, but does not feel drainage. Has remote history of strep. No one is sick at home right now.  Treating symptoms with: otc meds.     Current Outpatient Medications   Medication Sig Dispense Refill    nebivolol 2.5 MG Oral Tab Take 1 tablet (2.5 mg total) by mouth daily. 30 tablet 0    Levonorgestrel (MIRENA, 52 MG,) 20 MCG/DAY Intrauterine IUD 20 mcg (1 each total) by Intrauterine route one time.      Magnesium Glycinate 100 MG Oral Cap Take 240 mg by mouth daily.      Potassium Citrate ER 15 MEQ (1620 MG) Oral Tab CR Take 1 tablet by mouth in the morning and 1 tablet before bedtime. (Patient taking differently: Take 1 tablet by mouth daily.) 180 tablet 6    FIBER OR Take 1 tablet by mouth in the morning and 1 tablet before bedtime.      triamterene-hydroCHLOROthiazide 37.5-25 MG Oral Cap Take 1 capsule by mouth every morning. 90 capsule 1    albuterol (PROAIR HFA) 108 (90 Base) MCG/ACT Inhalation Aero Soln Inhale 2 puffs into the lungs every 4 (four) hours as needed for Wheezing. 1 each 1    Multiple Vitamin (ONE-DAILY MULTI VITAMINS) Oral Tab Take 1 tablet by mouth daily.        Past Medical History:    Abdominal distention    Longer than this but this is an estimate    Abdominal hernia    I had hernia repair around by my belly button & abdominal    Abdominal pain    Currently seeing obgyn, urologist & nephrologist    Arrhythmia    Back pain    Mid to lower back pain & hip pain    Benign essential HTN     Bloating    Longer than this but this is an estimate    Blood in urine    Due to severe kidney stones    Blurred vision    I always wore glasses or contacts since about 14 yrs old    Calculus of kidney    hydronephrosis/ several times kidney stones/ 13 th procedure for stones    Cervicalgia    Log Date: 05/04/2012         Change in hair    My hair is thinning more than the usual    Chest pain    I did see a cardiologist about this    Chest pain on exertion    I did see a cardiologists about this    COVID-19    headache, back pain, shortness of breath, sore throat, runny nose, chills. Not hospitalized    COVID-19    high fever, fatigue, not hospitalized    Dense breasts    heterogeneously dense breasts    Depression    Diarrhea, unspecified    I notice that certain things run right through me now    Disorder of liver    fatty liver    Dizziness    Dyspepsia    Easy bruising    I find bruises on me and don’t really know how I got them    Elevated fasting glucose    Enthesopathy of the wrist and carpus    Log Date: 05/31/2011         ESBL E. 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 but I never really checke    Heart palpitations    Pvc’s and irregular heart rate    Hemorrhoids    After last pregnancy or a couple years after that    High blood pressure    History of cardiac murmur    Mother told me I was born with one & my dr as well    History of D&C    History of depression    When I had a miscarriage    Indigestion    Longer than this but this is an estimate    Itch of skin    Not severe but I  have been itching for no apparent reason    Leaking of urine    After first pregnancy    Leg swelling    My left leg slightly swelled up and my ankle area was hurtin    Lesion of ulnar nerve    Log Date: 2013         Loss of appetite    I noticed this lately too    Menometrorrhagia    Hysteroscopy 10/2020 path favors anovulatory breakdown bleeding.    Menometrorrhagia    Migraine with aura    Migraines    Multiple thyroid nodules    Myofascial pain    Nausea    Longer than this but this is an estimate    Night sweats    I feel like I’m on fire at night.    OAB (overactive bladder)    PTNS - posterior tibial nerve stimulation with urogynecologist Dr. Lara Jim    Painful urination    When passing stones    Postcoital bleeding    Prior to hysteroscopy done 10/2020. Also having since at least  or     Postcoital bleeding    Prediabetes    PVC's (premature ventricular contractions)    Rash    Broke out with unknown rash all over legs and arms    Sexually transmitted disease    HPV    Sleep disturbance    For years sometimes I can’t lay on my left side    Stress    Rough up bringing and also regular family stresses    Uncomfortable fullness after meals    Longer than this but this is an estimate    Unspecified condition originating in the  period    Ureteral stone with hydronephrosis    Ureteral stricture, right    Urinary incontinence    Constantly going can’t hold it    Ventral hernia without obstruction or gangrene    Visual impairment    glasses    Vitamin D deficiency    Wears glasses    Since I was about 14 yrs old    Weight gain    After last son was born      Social History:  Social History     Socioeconomic History    Marital status: Life Partner   Tobacco Use    Smoking status: Never    Smokeless tobacco: Never   Vaping Use    Vaping status: Never Used   Substance and Sexual Activity    Alcohol use: No    Drug use: No    Sexual activity: Not Currently     Partners: Male     Birth  control/protection: Mirena, I.U.D.     Comment: Mirena IUD 7/11/24   Other Topics Concern    Caffeine Concern No    Exercise No    Seat Belt Yes        REVIEW OF SYSTEMS:   GENERAL HEALTH: feels well otherwise  SKIN: denies any unusual skin lesions or rashes  HEENT: denies ear pain, See HPI  RESPIRATORY: denies shortness of breath, wheezing, or cough  CARDIOVASCULAR: denies chest pain, palpitations   GI: denies abdominal pain, constipation and diarrhea  NEURO: denies dizziness or lightheadedness    EXAM:   /86   Pulse 77   Temp 98.3 °F (36.8 °C) (Oral)   Resp 16   Wt 181 lb 3.2 oz (82.2 kg)   LMP 11/18/2024 (Exact Date)   SpO2 97%   BMI 36.60 kg/m²   GENERAL: well developed, well nourished,in no apparent distress  SKIN: no rashes,no suspicious lesions  HEAD: atraumatic, normocephalic  EYES: conjunctiva clear, EOM intact  EARS: TM's clear, non-injected, no bulging, retraction, or fluid  NOSE: nostrils patent, no exudates, nasal mucosa pink and noninflamed  THROAT: oral mucosa pink, moist. Posterior pharynx mildly erythematous  no exudates. Tonsils 1/4.  NECK: supple, non-tender  LUNGS: clear to auscultation bilaterally, no wheezes or rhonchi. No crackles/rales, good air movement throughout. Breathing is non labored.  CARDIO: RRR without murmur  EXTREMITIES: no cyanosis, clubbing or edema  LYMPH: Positive anterior cervical and submandibular lymphadenopathy.  No posterior cervical or occipital lymphadenopathy.    Recent Results (from the past 24 hours)   Rapid Strep    Collection Time: 12/16/24  9:04 AM   Result Value Ref Range    Strep Grp A Screen Negative Negative    Control Line Present with a clear background (yes/no) Yes Yes/No    Kit Lot # [id number] Numeric    Kit Expiration Date 11/4/2025 Date       ASSESSMENT AND PLAN:   Nadine Calix is a 49 year old female who presents with   ASSESSMENT:   Encounter Diagnoses   Name Primary?    Sore throat     Viral syndrome Yes       PLAN: Negative rapid strep  and Covid.  Tylenol or Motrin per package instructions for pain. Discussed viral vs bacterial etiology of URIs, including pharyngitis, laryngitis, bronchitis and sinus congestion/pain. Patient was informed that antibiotics are not effective for treating viral ailments and can result in antibiotic resistence. Reviewed symptom relief measures with patient. Patient is  amenable to symptom relief measures.  Follow up with PCP if no improvement in 2-3 days.   Comfort care as described in Patient Instructions. If inability to swallow, tolerate secretions, or any difficulty breathing, seek emergent care.    Meds & Refills for this Visit:  Requested Prescriptions      No prescriptions requested or ordered in this encounter       Risks, benefits, and side effects of medication explained and discussed.    There are no Patient Instructions on file for this visit.    The patient indicates understanding of these issues and agrees to the plan.  The patient is asked to return if sx's persist or worsen.    Increase fluids, Motrin/Tylenol prn, rest.  Patient is to follow up if fever greater than or equal to 100.4 persists for 72 hours.

## 2024-12-17 ENCOUNTER — APPOINTMENT (OUTPATIENT)
Dept: PHYSICAL THERAPY | Age: 49
End: 2024-12-17
Attending: FAMILY MEDICINE
Payer: MEDICAID

## 2024-12-30 ENCOUNTER — APPOINTMENT (OUTPATIENT)
Dept: PHYSICAL THERAPY | Age: 49
End: 2024-12-30
Attending: FAMILY MEDICINE
Payer: MEDICAID

## 2025-01-28 ENCOUNTER — HOSPITAL ENCOUNTER (OUTPATIENT)
Dept: MAMMOGRAPHY | Age: 50
Discharge: HOME | End: 2025-01-28
Attending: OBSTETRICS & GYNECOLOGY
Payer: COMMERCIAL

## 2025-01-28 ENCOUNTER — HOSPITAL ENCOUNTER (OUTPATIENT)
Dept: ULTRASOUND IMAGING | Age: 50
Discharge: HOME | End: 2025-01-28
Attending: OBSTETRICS & GYNECOLOGY
Payer: COMMERCIAL

## 2025-01-28 DIAGNOSIS — Z98.890 HISTORY OF RIGHT BREAST BIOPSY: ICD-10-CM

## 2025-01-28 DIAGNOSIS — R92.333 HETEROGENEOUSLY DENSE TISSUE OF BOTH BREASTS ON MAMMOGRAPHY: ICD-10-CM

## 2025-01-28 PROCEDURE — 77065 DX MAMMO INCL CAD UNI: CPT | Performed by: OBSTETRICS & GYNECOLOGY

## 2025-01-28 PROCEDURE — 76641 ULTRASOUND BREAST COMPLETE: CPT | Performed by: SURGERY

## 2025-01-28 PROCEDURE — 76642 ULTRASOUND BREAST LIMITED: CPT | Performed by: OBSTETRICS & GYNECOLOGY

## 2025-01-28 PROCEDURE — 77061 BREAST TOMOSYNTHESIS UNI: CPT | Performed by: OBSTETRICS & GYNECOLOGY

## 2025-01-29 NOTE — PROGRESS NOTES
Patient aware of \"Stable nodule. Six month follow up right breast ultrasound recommended per radilogist.\" Patient verbalized understanding, agreed to and intend to comply with plan of care.

## 2025-01-30 NOTE — TELEPHONE ENCOUNTER
Called and spoke to patient at    Telephone Information:   Mobile [phone]    to schedule procedure.  Pt stated that she is scheduled to see Natalie Sanabria on 2/28/25 and will discuss at appt.  Pt stated that she did receive reminder letter and will call back if she decide.  Patient to return call to Radha [phone].   Ok to order

## 2025-02-03 ENCOUNTER — NURSE TRIAGE (OUTPATIENT)
Dept: INTERNAL MEDICINE CLINIC | Facility: CLINIC | Age: 50
End: 2025-02-03

## 2025-02-03 NOTE — TELEPHONE ENCOUNTER
I would have her go to IC today. She may have a clot in that arm and they can do a doppler to check.

## 2025-02-03 NOTE — TELEPHONE ENCOUNTER
Action Requested: Summary for Provider     []  Critical Lab, Recommendations Needed  [x] Need Additional Advice  []   FYI    []   Need Orders  [] Need Medications Sent to Pharmacy  []  Other     SUMMARY: Pt has appt scheduled for tomorrow, but does pt need to be seen today or sent to IC? Please advise, thanks!    S/w pt. Last wed/thurs pt noticed left arm pain. Started in elbow, but progressed to whole arm and left shoulder. No recent injury to arm. Does have a hx of ulnar nerve pain.   Pt states she noticed that arm gets swollen intermittently too. Feels pressure in the fingers.   Pt also has weakness in arm, yesterday she couldn;t open a water bottle.     NO current CP/SOB. No facial drooping. Pt states about a week before she first noticed the arm pain; she had 2 days of Intermittent Chest pain and shortness of breath. Not excruciating so didn't think anything of it. Pain subsided and has not experienced that since then.     Today she says pain is a 7 out of 10. Pt has full rom. Pain in whole arm and shoulder/shoulder blade.     Reason for call: Other (Wants c/b, swelling + pain in left arm and fingers)  Onset:1 week                       Reason for Disposition   Weakness (i.e., loss of strength) in hand or fingers  (Exception: Not truly weak; hand feels weak because of pain.)    Protocols used: Arm Pain-A-OH

## 2025-02-03 NOTE — TELEPHONE ENCOUNTER
Pt says she has pain and swelling in her left arm going to her fingers. This has been worsening and persisting for 4-5 days now. No nurse avail.    Pt was scheduled w/ SM but would still like to discuss sxs w/ nurse in case she needs to go to [facility] instead.    Future Appointments   Date Time Provider Department Center   2/4/2025 10:45 AM Kinjal Richter APRN [facility] 8 [facility] [place]   3/24/2025  1:45 PM Jess Michele DO [id number] [facility] [place]   6/27/2025 11:30 AM Bryant Garza MD [id number] [facility] [place] [facility]

## 2025-03-08 ENCOUNTER — OFFICE VISIT (OUTPATIENT)
Dept: INTERNAL MEDICINE CLINIC | Facility: CLINIC | Age: 50
End: 2025-03-08
Payer: MEDICAID

## 2025-03-08 ENCOUNTER — LAB ENCOUNTER (OUTPATIENT)
Dept: LAB | Age: 50
End: 2025-03-08
Attending: FAMILY MEDICINE
Payer: MEDICAID

## 2025-03-08 VITALS
DIASTOLIC BLOOD PRESSURE: 88 MMHG | BODY MASS INDEX: 36.53 KG/M2 | HEART RATE: 73 BPM | HEIGHT: 59 IN | WEIGHT: 181.19 LBS | TEMPERATURE: 97 F | OXYGEN SATURATION: 98 % | SYSTOLIC BLOOD PRESSURE: 134 MMHG

## 2025-03-08 DIAGNOSIS — K21.9 GASTROESOPHAGEAL REFLUX DISEASE WITHOUT ESOPHAGITIS: ICD-10-CM

## 2025-03-08 DIAGNOSIS — R06.83 SNORING: ICD-10-CM

## 2025-03-08 DIAGNOSIS — R53.83 FATIGUE, UNSPECIFIED TYPE: ICD-10-CM

## 2025-03-08 DIAGNOSIS — Z00.00 LABORATORY EXAMINATION ORDERED AS PART OF A ROUTINE GENERAL MEDICAL EXAMINATION: ICD-10-CM

## 2025-03-08 DIAGNOSIS — R07.89 CHEST PRESSURE: Primary | ICD-10-CM

## 2025-03-08 LAB
ALBUMIN SERPL-MCNC: 4.7 G/DL (ref 3.2–4.8)
ALBUMIN/GLOB SERPL: 1.6 {RATIO} (ref 1–2)
ALP LIVER SERPL-CCNC: 89 U/L
ALT SERPL-CCNC: 61 U/L
ANION GAP SERPL CALC-SCNC: 9 MMOL/L (ref 0–18)
AST SERPL-CCNC: 38 U/L (ref ?–34)
ATRIAL RATE: 64 BPM
BASOPHILS # BLD AUTO: 0.02 X10(3) UL (ref 0–0.2)
BASOPHILS NFR BLD AUTO: 0.2 %
BILIRUB SERPL-MCNC: 0.5 MG/DL (ref 0.3–1.2)
BUN BLD-MCNC: 10 MG/DL (ref 9–23)
CALCIUM BLD-MCNC: 9.5 MG/DL (ref 8.7–10.6)
CHLORIDE SERPL-SCNC: 107 MMOL/L (ref 98–112)
CHOLEST SERPL-MCNC: 246 MG/DL (ref ?–200)
CO2 SERPL-SCNC: 25 MMOL/L (ref 21–32)
CREAT BLD-MCNC: 0.81 MG/DL
EGFRCR SERPLBLD CKD-EPI 2021: 90 ML/MIN/1.73M2 (ref 60–?)
EOSINOPHIL # BLD AUTO: 0.26 X10(3) UL (ref 0–0.7)
EOSINOPHIL NFR BLD AUTO: 3.2 %
ERYTHROCYTE [DISTWIDTH] IN BLOOD BY AUTOMATED COUNT: 13.3 %
EST. AVERAGE GLUCOSE BLD GHB EST-MCNC: 123 MG/DL (ref 68–126)
FASTING PATIENT LIPID ANSWER: YES
FASTING STATUS PATIENT QL REPORTED: YES
FOLATE SERPL-MCNC: 11.7 NG/ML (ref 5.4–?)
GLOBULIN PLAS-MCNC: 2.9 G/DL (ref 2–3.5)
GLUCOSE BLD-MCNC: 100 MG/DL (ref 70–99)
HBA1C MFR BLD: 5.9 % (ref ?–5.7)
HCT VFR BLD AUTO: 43.8 %
HDLC SERPL-MCNC: 32 MG/DL (ref 40–59)
HGB BLD-MCNC: 14.5 G/DL
IMM GRANULOCYTES # BLD AUTO: 0.04 X10(3) UL (ref 0–1)
IMM GRANULOCYTES NFR BLD: 0.5 %
LDLC SERPL CALC-MCNC: 170 MG/DL (ref ?–100)
LYMPHOCYTES # BLD AUTO: 1.54 X10(3) UL (ref 1–4)
LYMPHOCYTES NFR BLD AUTO: 19.1 %
MCH RBC QN AUTO: 29.3 PG (ref 26–34)
MCHC RBC AUTO-ENTMCNC: 33.1 G/DL (ref 31–37)
MCV RBC AUTO: 88.5 FL
MONOCYTES # BLD AUTO: 0.5 X10(3) UL (ref 0.1–1)
MONOCYTES NFR BLD AUTO: 6.2 %
NEUTROPHILS # BLD AUTO: 5.72 X10 (3) UL (ref 1.5–7.7)
NEUTROPHILS # BLD AUTO: 5.72 X10(3) UL (ref 1.5–7.7)
NEUTROPHILS NFR BLD AUTO: 70.8 %
NONHDLC SERPL-MCNC: 214 MG/DL (ref ?–130)
OSMOLALITY SERPL CALC.SUM OF ELEC: 291 MOSM/KG (ref 275–295)
P AXIS: 48 DEGREES
P-R INTERVAL: 130 MS
PLATELET # BLD AUTO: 376 10(3)UL (ref 150–450)
POTASSIUM SERPL-SCNC: 3.9 MMOL/L (ref 3.5–5.1)
PROT SERPL-MCNC: 7.6 G/DL (ref 5.7–8.2)
Q-T INTERVAL: 408 MS
QRS DURATION: 78 MS
QTC CALCULATION (BEZET): 420 MS
R AXIS: 52 DEGREES
RBC # BLD AUTO: 4.95 X10(6)UL
SODIUM SERPL-SCNC: 141 MMOL/L (ref 136–145)
T AXIS: 15 DEGREES
T4 FREE SERPL-MCNC: 1.2 NG/DL (ref 0.8–1.7)
TRIGL SERPL-MCNC: 231 MG/DL (ref 30–149)
TSI SER-ACNC: 1.57 UIU/ML (ref 0.55–4.78)
VENTRICULAR RATE: 64 BPM
VIT B12 SERPL-MCNC: 713 PG/ML (ref 211–911)
VIT D+METAB SERPL-MCNC: 21.4 NG/ML (ref 30–100)
VLDLC SERPL CALC-MCNC: 47 MG/DL (ref 0–30)
WBC # BLD AUTO: 8.1 X10(3) UL (ref 4–11)

## 2025-03-08 PROCEDURE — 84439 ASSAY OF FREE THYROXINE: CPT

## 2025-03-08 PROCEDURE — 82306 VITAMIN D 25 HYDROXY: CPT

## 2025-03-08 PROCEDURE — 36415 COLL VENOUS BLD VENIPUNCTURE: CPT

## 2025-03-08 PROCEDURE — 80053 COMPREHEN METABOLIC PANEL: CPT

## 2025-03-08 PROCEDURE — 82607 VITAMIN B-12: CPT

## 2025-03-08 PROCEDURE — 82746 ASSAY OF FOLIC ACID SERUM: CPT

## 2025-03-08 PROCEDURE — 80061 LIPID PANEL: CPT

## 2025-03-08 PROCEDURE — 85025 COMPLETE CBC W/AUTO DIFF WBC: CPT

## 2025-03-08 PROCEDURE — 84443 ASSAY THYROID STIM HORMONE: CPT

## 2025-03-08 PROCEDURE — 83036 HEMOGLOBIN GLYCOSYLATED A1C: CPT

## 2025-03-08 NOTE — PROGRESS NOTES
Assumed care of pt, report received. Pt asleep on [facility]. Respirations visible. Designee monitoring from [facility].     CHIEF COMPLAINT:     Chief Complaint   Patient presents with    Fatigue     PT is here c/o feeling very sluggish and tired. Getting winded and feeling full not sure if acid related.     Reflux       HPI:   Nadine Calix is a 49 year old female .  The patient complains of fatigue. The patient has had for months. Patient describes sx’s of extreme fatigue. Pt feels tired all day long and can't stay awake. The patient feels that sleep is appropriate.  Patient does snore, but denies excessive menstruation. Admit that the level of exercise has been minimal of late. Pt wonders if it has to do with her elevated BP. BP has been up 130-150/. Dr. Hernandez prescribed Bystolic in September last year but never took it because she thought she could control it with her diet. Pt feels she needs to be on a medication now.  Patient has also been having chest pressure and reflux symptoms. Pt has a lot of belching and burping. Pt can eat something and feels full right away.  Patient has tried Tums and pepcid with little relief.    Current Outpatient Medications   Medication Sig Dispense Refill    nebivolol 2.5 MG Oral Tab Take 1 tablet (2.5 mg total) by mouth daily. 30 tablet 0    Levonorgestrel (MIRENA, 52 MG,) 20 MCG/DAY Intrauterine IUD 20 mcg (1 each total) by Intrauterine route one time.      Magnesium Glycinate 100 MG Oral Cap Take 240 mg by mouth daily.      Potassium Citrate ER 15 MEQ (1620 MG) Oral Tab CR Take 1 tablet by mouth in the morning and 1 tablet before bedtime. (Patient taking differently: Take 1 tablet by mouth daily.) 180 tablet 6    FIBER OR Take 1 tablet by mouth in the morning and 1 tablet before bedtime.      triamterene-hydroCHLOROthiazide 37.5-25 MG Oral Cap Take 1 capsule by mouth every morning. 90 capsule 1    albuterol (PROAIR HFA) 108 (90 Base) MCG/ACT Inhalation Aero Soln Inhale 2 puffs into the lungs every 4 (four) hours as needed for Wheezing. 1 each 1    Multiple Vitamin (ONE-DAILY MULTI VITAMINS)  Oral Tab Take 1 tablet by mouth daily.        Past Medical History:    Abdominal distention    Longer than this but this is an estimate    Abdominal hernia    I had hernia repair around by my belly button & abdominal    Abdominal pain    Currently seeing obgyn, urologist & nephrologist    Arrhythmia    Back pain    Mid to lower back pain & hip pain    Benign essential HTN    Bloating    Longer than this but this is an estimate    Blood in urine    Due to severe kidney stones    Blurred vision    I always wore glasses or contacts since about 14 yrs old    Calculus of kidney    hydronephrosis/ several times kidney stones/ 13 th procedure for stones    Cervicalgia    Log Date: 05/04/2012         Change in hair    My hair is thinning more than the usual    Chest pain    I did see a cardiologist about this    Chest pain on exertion    I did see a cardiologists about this    COVID-19    headache, back pain, shortness of breath, sore throat, runny nose, chills. Not hospitalized    COVID-19    high fever, fatigue, not hospitalized    Dense breasts    heterogeneously dense breasts    Depression    Diarrhea, unspecified    I notice that certain things run right through me now    Disorder of liver    fatty liver    Dizziness    Dyspepsia    Easy bruising    I find bruises on me and don’t really know how I got them    Elevated fasting glucose    Enthesopathy of the wrist and carpus    Log Date: 05/31/2011         ESBL E. 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 but I never really checke    Heart  palpitations    Pvc’s and irregular heart rate    Hemorrhoids    After last pregnancy or a couple years after that    High blood pressure    History of cardiac murmur    Mother told me I was born with one & my dr as well    History of D&C    History of depression    When I had a miscarriage    Indigestion    Longer than this but this is an estimate    Itch of skin    Not severe but I have been itching for no apparent reason    Leaking of urine    After first pregnancy    Leg swelling    My left leg slightly swelled up and my ankle area was hurtin    Lesion of ulnar nerve    Log Date: 2013         Loss of appetite    I noticed this lately too    Menometrorrhagia    Hysteroscopy 10/2020 path favors anovulatory breakdown bleeding.    Menometrorrhagia    Migraine with aura    Migraines    Multiple thyroid nodules    Myofascial pain    Nausea    Longer than this but this is an estimate    Night sweats    I feel like I’m on fire at night.    OAB (overactive bladder)    PTNS - posterior tibial nerve stimulation with urogynecologist Dr. Lara Jim    Painful urination    When passing stones    Postcoital bleeding    Prior to hysteroscopy done 10/2020. Also having since at least  or     Postcoital bleeding    Prediabetes    PVC's (premature ventricular contractions)    Rash    Broke out with unknown rash all over legs and arms    Sexually transmitted disease    HPV    Sleep disturbance    For years sometimes I can’t lay on my left side    Stress    Rough up bringing and also regular family stresses    Uncomfortable fullness after meals    Longer than this but this is an estimate    Unspecified condition originating in the  period    Ureteral stone with hydronephrosis    Ureteral stricture, right    Urinary incontinence    Constantly going can’t hold it    Ventral hernia without obstruction or gangrene    Visual impairment    glasses    Vitamin D deficiency    Wears glasses    Since I was about 14  yrs old    Weight gain    After last son was born      Social History:  Social History     Socioeconomic History    Marital status: Life Partner   Tobacco Use    Smoking status: Never    Smokeless tobacco: Never   Vaping Use    Vaping status: Never Used   Substance and Sexual Activity    Alcohol use: No    Drug use: No    Sexual activity: Not Currently     Partners: Male     Birth control/protection: Mirena, I.U.D.     Comment: Mirena IUD 7/11/24   Other Topics Concern    Caffeine Concern No    Exercise No    Seat Belt Yes        REVIEW OF SYSTEMS:   GENERAL: Denies fever, chills,weight change, decreased appetite  SKIN: Denies rashes, skin wounds or ulcers.  EYES: Denies blurred vision or double vision  HENT: Denies congestion, rhinorrhea, sore throat or ear pain  CHEST: Denies chest pain, or palpitations,see HPI  LUNGS: Denies shortness of breath, cough, or wheezing,+snoring  GI: Denies abdominal pain, N/V/C/D.   MUSCULOSKELETAL: no arthralgia or swollen joints  LYMPH:  Denies lymphadenopathy  NEURO: Denies headaches or lightheadedness      EXAM:   /88 (BP Location: Left arm, Patient Position: Sitting, Cuff Size: adult)   Pulse 73   Temp 97.4 °F (36.3 °C) (Temporal)   Ht 4' 11\" (1.499 m)   Wt 181 lb 3.2 oz (82.2 kg)   LMP 03/01/2025 (Exact Date)   SpO2 98%   BMI 36.60 kg/m²   GENERAL: well developed, well nourished,in no apparent distress  SKIN: no rashes,no suspicious lesions  HEAD: atraumatic, normocephalic  EYES: conjunctiva clear, EOM intact, PERRLA  EARS: TM's clear, no bulging, no retraction  NOSE: nostrils patent, no exudates, nasal mucosa pink and noninflamed  THROAT: oral mucosa pink, moist. No visible dental caries. Posterior pharynx is no erythematous. no exudates.  NECK: supple, non-tender  LUNGS: clear to auscultation bilaterally, no wheezes or rhonchi. Breathing is non labored.  CARDIO: RRR without murmur  -EKG shows Normal sinus rhythm with rate of 64, [place] and QRS intervals within normal  limits, QRS complexes lead III and no T-wave abnormalities.  no ischemic changes.  - Unchanged from previous tracings.  GI: No visible scars, or masses. BS's present x4. No palpable masses or hepatosplenomegaly.  + tenderness in the epigastric area.  EXTREMITIES: no cyanosis, clubbing or edema  LYMPH:  no lymphadenopathy.      Physical Exam    ASSESSMENT AND PLAN:     Encounter Diagnoses   Name Primary?    Chest pressure Yes    Fatigue, unspecified type     Laboratory examination ordered as part of a routine general medical examination     Snoring        Orders Placed This Encounter   Procedures    TSH and Free T4    CBC With Differential With Platelet    Comp Metabolic Panel (14)    Vitamin B12    Folic Acid Serum (Folate)    Vitamin D    Lipid Panel    Hemoglobin A1C       Meds & Refills for this Visit:  Requested Prescriptions      No prescriptions requested or ordered in this encounter       Imaging & Consults:  ELECTROCARDIOGRAM, COMPLETE  OP REFERRAL TO DIAGNOSTIC SLEEP STUDY  CARD ECHO 2D DOPPLER (CPT=93306)    PLAN:  1. Chest pressure  - If worsens to go to the ER to be assessed  - EKG with interpretation and Report -IN OFFICE [[id number]]- WNL  - Diagnostic Sleep Study-split night PAP implemented if criteria met  - CARD ECHO 2D DOPPLER (CPT=93306); Future    2. Fatigue, unspecified type  Check labs  - CBC With Differential With Platelet; Future  - Vitamin B12; Future  - Folic Acid Serum (Folate); Future  - Vitamin D; Future  - Hemoglobin A1C; Future  - Diagnostic Sleep Study-split night PAP implemented if criteria met  - General sleep study; Future    3. Laboratory examination ordered as part of a routine general medical examination  Check labs, schedule Px in the next 1-3 months.  - TSH and Free T4; Future  - CBC With Differential With Platelet; Future  - Comp Metabolic Panel (14); Future  - Lipid Panel; Future  - Hemoglobin A1C; Future    4. Snoring  - Diagnostic Sleep Study-split night PAP implemented if  criteria met    5. Gastroesophageal reflux disease without esophagitis  Lose wgt to improve symptoms. Also patient should avoid eating or drinking two to three hours before bed. The following should be avoided: caffeine containing drinks/foods, tobacco, mints, fried foods or anyother foods noted by the patient to cause heartburn sx's. Pt should elevate the head of the bed four to six inches to help alleviate sx's. Pt thinks she has medication at home but not sure what. Pt will call with what she at home besides pepcid.    The patient indicates understanding of these issues and agrees to the plan.  The patient is asked to call with any concerns

## 2025-03-10 ENCOUNTER — TELEPHONE (OUTPATIENT)
Facility: CLINIC | Age: 50
End: 2025-03-10

## 2025-03-10 NOTE — TELEPHONE ENCOUNTER
Patient stated the mammogram department told her she needs an order for a bilateral diagnostic mammogram since she is overdue for one.   Please advise

## 2025-03-10 NOTE — TELEPHONE ENCOUNTER
Spoke with patient. Patient requesting her annual diagnostic bilateral mammogram order. Advised Dr. Randall will order this at your next [employer]. Scheduled patient for her next [employer].     Based on previous mammogram, patient needs follow up 6 month US. Advised patient to proceed with scheduling this. Understanding verbalized.

## 2025-03-12 ENCOUNTER — TELEPHONE (OUTPATIENT)
Dept: SURGERY | Facility: CLINIC | Age: 50
End: 2025-03-12

## 2025-03-12 NOTE — TELEPHONE ENCOUNTER
This encounter is now closed.     RN initiated prior auth. RN received an approval of potassium Citrate ER 15 meQ. Effective 1/1/2025 and ends on 3/12/2026

## 2025-03-12 NOTE — TELEPHONE ENCOUNTER
Per Martina calling from [employer] medicaid department asking for further clarification of patient's prescription of potassium citrate. Per states that \"dosage information is contradicting.\" Please call back.     [phone], option 4

## 2025-03-13 ENCOUNTER — TELEPHONE (OUTPATIENT)
Dept: INTERNAL MEDICINE CLINIC | Facility: CLINIC | Age: 50
End: 2025-03-13

## 2025-03-13 DIAGNOSIS — E78.5 HYPERLIPIDEMIA, UNSPECIFIED HYPERLIPIDEMIA TYPE: ICD-10-CM

## 2025-03-13 DIAGNOSIS — R74.01 ELEVATED AST (SGOT): ICD-10-CM

## 2025-03-13 DIAGNOSIS — E55.9 VITAMIN D DEFICIENCY: Primary | ICD-10-CM

## 2025-03-13 DIAGNOSIS — R74.01 ELEVATED ALT MEASUREMENT: ICD-10-CM

## 2025-03-13 RX ORDER — ERGOCALCIFEROL 1.25 MG/1
50000 CAPSULE, LIQUID FILLED ORAL WEEKLY
Qty: 24 CAPSULE | Refills: 0 | Status: SHIPPED | OUTPATIENT
Start: 2025-03-13 | End: 2025-03-13

## 2025-03-13 RX ORDER — ERGOCALCIFEROL 1.25 MG/1
50000 CAPSULE, LIQUID FILLED ORAL WEEKLY
Qty: 24 CAPSULE | Refills: 0 | Status: SHIPPED | OUTPATIENT
Start: 2025-03-13 | End: 2025-08-28

## 2025-03-13 NOTE — TELEPHONE ENCOUNTER
SM: patient feeling weak with double dose of carvedilol and having a hard time with any sort of exercise. Hold off until patient gets used to medication?     Patient wants to exercise but feels dizziness/weakness with new dosing of carvedilol.    Spoke with patient regarding results and recommendations per FIDEL Worthington. Patient requested results/recommendations to be sent via [facility]. Orders placed per written order.    FYI - Patient called sleep study office a few days ago and received no response. Patient will try and call sleep study number again to schedule sleep study.

## 2025-03-13 NOTE — TELEPHONE ENCOUNTER
Pt is asking for a nurse to please call to go over her labs with her and to discuss SM recommendations

## 2025-03-13 NOTE — TELEPHONE ENCOUNTER
Pt is wanting to know if her script for 'ergocalciferol' can be resent to pharm since they have not received it.    ergocalciferol 1.25 MG (80000 UT) Oral Cap 24 capsule     [facility] Replaced by [facility] ([facility]), [place] - [address] [phone], [phone]

## 2025-03-14 ENCOUNTER — HOSPITAL ENCOUNTER (OUTPATIENT)
Dept: CV DIAGNOSTICS | Age: 50
Discharge: HOME | End: 2025-03-14
Attending: FAMILY MEDICINE
Payer: MEDICAID

## 2025-03-14 DIAGNOSIS — R07.89 CHEST PRESSURE: ICD-10-CM

## 2025-03-14 PROCEDURE — 93306 TTE W/DOPPLER COMPLETE: CPT | Performed by: FAMILY MEDICINE

## 2025-03-14 PROCEDURE — 76376 3D RENDER W/INTRP POSTPROCES: CPT | Performed by: FAMILY MEDICINE

## 2025-03-21 ENCOUNTER — OFFICE VISIT (OUTPATIENT)
Dept: SURGERY | Facility: CLINIC | Age: 50
End: 2025-03-21
Payer: MEDICAID

## 2025-03-21 DIAGNOSIS — N20.0 NEPHROLITHIASIS: ICD-10-CM

## 2025-03-21 DIAGNOSIS — R80.9 PROTEINURIA, UNSPECIFIED TYPE: ICD-10-CM

## 2025-03-21 DIAGNOSIS — R82.90 URINE FINDING: ICD-10-CM

## 2025-03-21 DIAGNOSIS — R10.9 FLANK PAIN: Primary | ICD-10-CM

## 2025-03-21 DIAGNOSIS — D35.00 ADRENAL ADENOMA, UNSPECIFIED LATERALITY: ICD-10-CM

## 2025-03-21 DIAGNOSIS — N32.81 OAB (OVERACTIVE BLADDER): ICD-10-CM

## 2025-03-21 LAB
APPEARANCE: CLEAR
BILIRUBIN: NEGATIVE
GLUCOSE (URINE DIPSTICK): NEGATIVE MG/DL
KETONES (URINE DIPSTICK): NEGATIVE MG/DL
LEUKOCYTES: NEGATIVE
MULTISTIX LOT#: ABNORMAL NUMERIC
NITRITE, URINE: NEGATIVE
PH, URINE: 6 (ref 4.5–8)
PROTEIN (URINE DIPSTICK): >=300 MG/DL
SPECIFIC GRAVITY: 1.02 (ref 1–1.03)
URINE-COLOR: YELLOW
UROBILINOGEN,SEMI-QN: 0.2 MG/DL (ref 0–1.9)

## 2025-03-21 PROCEDURE — 99214 OFFICE O/P EST MOD 30 MIN: CPT | Performed by: PHYSICIAN ASSISTANT

## 2025-03-21 PROCEDURE — 81003 URINALYSIS AUTO W/O SCOPE: CPT | Performed by: PHYSICIAN ASSISTANT

## 2025-03-21 NOTE — PROGRESS NOTES
Subjective:   Nadine Calix is a 49 year old female with hx of anxiety, GERD, HTN, OAB improved after PTNS, who presents for follow up stones.    Patient last seen in June 2024 by Dr. Garza for follow up stones. Her K citrate was decreased to once daily due to GI side effects with BID dosage and advised to continue hydrochlorothiazide. Plan for repeat CT scan June 2025 for follow up stones and adrenal adenoma.     She is here today because she has developed increased flank pain and bloating sensation. No significant urinary complaints. She notes she is no longer taking K citrate because of recent labs (?). She has been referred to nephrology.    UA today >300 protein, moderate blood.      History/Other:    Chief Complaint Reviewed and Verified  Nursing Notes Reviewed and   Verified  Allergies Reviewed  Medications Reviewed         Current Outpatient Medications   Medication Sig Dispense Refill    ergocalciferol 1.25 MG (15455 UT) Oral Cap Take 1 capsule (50,000 Units total) by mouth once a week. 24 capsule 0    carvedilol 6.25 MG Oral Tab Take 1 tablet (6.25 mg total) by mouth 2 (two) times daily with meals. 60 tablet 0    nebivolol 2.5 MG Oral Tab Take 1 tablet (2.5 mg total) by mouth daily. 30 tablet 0    Levonorgestrel (MIRENA, 52 MG,) 20 MCG/DAY Intrauterine IUD 20 mcg (1 each total) by Intrauterine route one time.      Magnesium Glycinate 100 MG Oral Cap Take 240 mg by mouth daily.      Potassium Citrate ER 15 MEQ (1620 MG) Oral Tab CR Take 1 tablet by mouth in the morning and 1 tablet before bedtime. (Patient taking differently: Take 1 tablet by mouth daily.) 180 tablet 6    FIBER OR Take 1 tablet by mouth in the morning and 1 tablet before bedtime.      triamterene-hydroCHLOROthiazide 37.5-25 MG Oral Cap Take 1 capsule by mouth every morning. 90 capsule 1    albuterol (PROAIR HFA) 108 (90 Base) MCG/ACT Inhalation Aero Soln Inhale 2 puffs into the lungs every 4 (four) hours as needed for Wheezing. 1  each 1    Multiple Vitamin (ONE-DAILY MULTI VITAMINS) Oral Tab Take 1 tablet by mouth daily.         Review of Systems:  Pertinent items are noted in HPI.      Objective:   LMP 03/01/2025 (Exact Date)  Estimated body mass index is 36.6 kg/m² as calculated from the following:    Height as of 3/8/25: 4' 11\" (1.499 m).    Weight as of 3/8/25: 181 lb 3.2 oz (82.2 kg).    No distress  Non labored respirations    Laboratory Data:  Lab Results   Component Value Date    WBC 8.1 03/08/2025    HGB 14.5 03/08/2025    .0 03/08/2025     Lab Results   Component Value Date     03/08/2025    K 3.9 03/08/2025     03/08/2025    CO2 25.0 03/08/2025    BUN 10 03/08/2025     (H) 03/08/2025    GFRAA 97 07/08/2022    AST 38 (H) 03/08/2025    ALT 61 (H) 03/08/2025    TP 7.6 03/08/2025    ALB 4.7 03/08/2025    PHOS 3.4 11/17/2020    CA 9.5 03/08/2025    MG 2.1 10/20/2023       Urinalysis Results (last three years):  Recent Labs     06/07/22  0955 10/28/22  1201 02/11/23  1734 05/05/23  1106 08/10/23  1150 08/16/23  1809 09/17/23  0632 09/28/23  1021 10/27/23  1149 12/08/23  1053 01/26/24  0810 02/01/24  1255 03/05/24  1047 06/06/24  0923 08/01/24  1518 03/21/25  1142   COLORUR Yellow Yellow Yellow  --  Light-Yellow Yellow Yellow Yellow Light-Yellow  --  Red*  --   --   --  Yellow  --    CLARITY Clear Clear Clear  --  Clear Clear Clear Turbid* Clear  --  Cloudy*  --   --   --  Turbid*  --    SPECGRAVITY 1.015 1.021 1.010 1.020 1.019 1.020 1.015 1.018 1.017 1.015 1.025 1.030 1.025 >=1.030 1.022 1.020   PHURINE 7.0 6.0 7.0  --  6.0 6.0 6.0 6.0 6.0 6.0 6.0 7.0 7.0 5.5 7.5 6.0   PROUR Trace* 100* Negative  --  70* Trace* 100 mg/dL* 70* 50*  --  100 mg/dL*  --   --   --  50*  --    GLUUR Negative Negative Negative Negative Normal Negative Negative Normal Normal  --  Negative  --   --   --  Normal  --    KETUR Negative Negative Negative  --  Negative Negative Negative Negative Negative  --  Negative  --   --   --   Negative  --    BILUR Negative Negative Negative  --  Negative Negative Negative Negative Negative  --  Negative  --   --   --  Negative  --    BLOODURINE Large* Small* Large*  --  1+* Moderate* Large* 1+* 1+*  --  Large*  --   --   --  1+*  --    NITRITE Negative Negative Negative  --  Negative Negative Positive* 2+* Negative Negative Positive* Negative Negative Negative Negative Negative   UROBILINOGEN 0.2 <2.0 0.2  --  Normal 0.2 0.2 Normal Normal  --  0.2  --   --   --  Normal  --    LEUUR Small* Negative Negative  --  Negative Negative Large* 500* Negative  --  Trace*  --   --   --  Negative  --    UASA  --  Negative  --   --   --   --   --   --   --   --   --   --   --   --   --   --    WBCUR 21-50* 1-5 1-5  --  1-5 1-5 >50* >50* 1-5  --  1-5  --   --   --  1-5  --    RBCUR 3-5* 0-2 0-2  --  0-2 6-10* >10* 6-10* 0-2  --  >10*  --   --   --  3-5*  --    BACUR 2+* None Seen None Seen  --  None Seen Rare* 1+* 2+* None Seen  --  None Seen  --   --   --  Rare*  --        Urine Culture Results (last three years):  Lab Results   Component Value Date    URINECUL No Growth at 18-24 hrs. 08/01/2024    URINECUL No Growth 2 Days 05/15/2024    URINECUL No Growth at 18-24 hrs. 02/14/2024    URINECUL (A) 01/26/2024     10,000 - 50,000 CFU/ML Klebsiella pneumoniae ESBL Pos    URINECUL No Growth 2 Days 01/11/2024    URINECUL No Growth at 18-24 hrs. 10/27/2023    URINECUL >100,000 CFU/ML Klebsiella pneumoniae (A) 09/28/2023    URINECUL >100,000 CFU/ML Klebsiella pneumoniae (A) 09/17/2023    URINECUL No Growth at 18-24 hrs. 08/10/2023    URINECUL (A) 07/14/2023     10,000 - 50,000 CFU/ML Alpha hemolytic Streptococcus    URINECUL No Growth at 18-24 hrs. 10/28/2022    URINECUL No Growth at 18-24 hrs. 06/07/2022    URINECUL  06/10/2021     10-50,000 cfu/ml Multiple species present-probable contamination.    URINECUL >100,000 CFU/ML Mixed gram positive anjelica 04/01/2021    URINECUL No Growth 2 Days 04/22/2020       Imaging  CARD ECHO  2D DOPPLER (CPT=93306)    Result Date: 3/14/2025  Transthoracic Echocardiogram Name:Nadine Calix Date: 2025 :  1975 Ht:  (59in)  BP: 160 / 104 MRN:  [id number]    Age:  49years    Wt:  (181lb) HR: 64bpm Loc:  [facility]       Gndr: F          BSA: 1.77m^2 Sonographer: Dominga VERDUZCO Ordering:    Kinjal Richter Referring:   Kinjal Richter ---------------------------------------------------------------------------- History/Indications:  Chest pressure. ---------------------------------------------------------------------------- Procedure information:  A transthoracic complete 2D study was performed. Additional evaluation included M-mode, complete spectral Doppler, and color Doppler.  Patient status:  Outpatient.  Location:  [facility].    The previous study was not available, so comparison was made to the report of 2023.    This was a routine study. Transthoracic echocardiography for ventricular function evaluation and assessment of valvular function. Image quality was adequate. ECG rhythm:   Normal sinus  Study completion:  There were no complications. ---------------------------------------------------------------------------- Conclusions: 1. Left ventricle: The cavity size was normal. Wall thickness was normal.    Systolic function was normal. The estimated ejection fraction was 55-60%,    by visual assessment. Wall motion is normal; there are no regional wall    motion abnormalities. Left ventricular diastolic function parameters were    normal. 2. Pulmonary arteries: Systolic pressure was within the normal range,    estimated to be 26mm Hg. 3. Left atrium: The left atrial volume was normal. Impressions:  No previous study from [facility] was available for comparison. * ---------------------------------------------------------------------------- * Findings: Left ventricle:  The cavity size was normal. Wall thickness was normal. Systolic function was normal. The estimated ejection  fraction was 55-60%, by visual assessment. Wall motion is normal; there are no regional wall motion abnormalities. Left ventricular diastolic function parameters were normal. Ventricular septum:   Thickness was normal. Left atrium:  The left atrial volume was normal. Right ventricle:  The cavity size was normal. Systolic function was normal. Right atrium:  The atrium was normal in size. Mitral valve:  The valve was structurally normal. Leaflet separation was normal.  Doppler:  Transvalvular velocity was within the normal range. There was no evidence for stenosis. There was trivial regurgitation. Aortic valve:  The valve was structurally normal. The valve was trileaflet. Cusp separation was normal.  Doppler:  Transvalvular velocity was within the normal range. There was no evidence for stenosis. There was no significant regurgitation. Tricuspid valve:  The valve is structurally normal. Leaflet separation was normal.  Doppler:  Transvalvular velocity was within the normal range. There was no evidence for stenosis. There was trivial regurgitation. Pulmonic valve:   The valve is structurally normal. Cusp separation was normal.  Doppler:  Transvalvular velocity was within the normal range. There was no evidence for stenosis. There was trivial regurgitation. Pericardium:   There was no pericardial effusion. Aorta: Aortic root: The aortic root was normal. Ascending aorta: The ascending aorta was normal. Pulmonary arteries: Systolic pressure was within the normal range, estimated to be 26mm Hg. Systemic veins:  Central venous respirophasic diameter changes are in the normal range (>50%). ---------------------------------------------------------------------------- Measurements  Left ventricle                  Value        Ref  IVS thickness, ED, PLAX         0.7   cm     0.6 - 0.9  LV ID, ED, PLAX                 4.4   cm     3.8 - 5.2  LV ID, ES, PLAX                 3.0   cm     2.2 - 3.5  LV PW thickness, ED, PLAX        0.8   cm     0.6 - 0.9  IVS/LV PW ratio, ED, PLAX       0.83         ---------  LV PW/LV ID ratio, ED, PLAX     0.18         ---------  LV ejection fraction            58    %      54 - 74  LV e', lateral                  13.3  cm/sec >=10.0  LV E/e', lateral                5            <=13  LV e', medial                   9.1   cm/sec >=7.0  LV E/e', medial                 7            ---------  LV e', average                  11.2  cm/sec ---------  LV E/e', average                6            <=14  Aortic root                     Value        Ref  Aortic root ID, ED              2.8   cm     2.5 - 4.0  Ascending aorta                 Value        Ref  Ascending aorta ID, A-P, ED     3.5   cm     1.9 - 3.5  Left atrium                     Value        Ref  LA ID, A-P, ES                  3.6   cm     2.7 - 3.8  LA volume, S                    36    ml     22 - 52  LA volume/bsa, S                21    ml/m^2 16 - 34  LA volume, ES, 1-p A4C          34    ml     22 - 52  LA volume, ES, 1-p A2C          39    ml     22 - 52  LA volume, ES, A/L              38    ml     ---------  LA volume/bsa, ES, A/L          22    ml/m^2 16 - 34  LA/aortic root ratio            1.29         ---------  Mitral valve                    Value        Ref  Mitral E-wave peak velocity     0.67  m/sec  ---------  Mitral A-wave peak velocity     0.68  m/sec  ---------  Mitral E/A ratio, peak          1            ---------  Pulmonary artery                Value        Ref  PA pressure, S, DP              26    mm Hg  ---------  Tricuspid valve                 Value        Ref  Tricuspid regurg peak velocity  2.39  m/sec  <=2.8  Tricuspid peak RV-RA gradient   23    mm Hg  ---------  Systemic veins                  Value        Ref  Estimated CVP                   3     mm Hg  ---------  Right ventricle                 Value        Ref  TAPSE, 2D                       3.22  cm     >=1.70  RV pressure, S, DP              26    mm Hg   --------- Legend: (L)  and  (H)  tatyana values outside specified reference range. ---------------------------------------------------------------------------- Prepared and electronically signed by Meseret Sim 03/14/2025 12:38       Assessment & Plan:   Nephrolithiasis  Flank pain  Recommend proceeding with CT  If fevers, increased pain, or vomiting to seek attention    Proteinuria  Follow up with nephrology as referred by PCP    Adrenal adenoma  CT as noted above  Repeat metabolic evaluation in June prior to OV with MB    OAB  Monitor  S/p PTNS    Future Appointments   Date Time Provider Department Center   3/24/2025  1:45 PM Jess Michele DO [id number] [facility]   4/1/2025  3:00 PM Greyson Orellana MD [id number] [address]   5/12/2025  9:30 AM Ema Randall MD [facility]   5/16/2025  9:15 PM [facility]   6/27/2025 11:30 AM Tatyana Garza MD [id number] [address]       Lorena Rock PA-C, 3/21/2025

## 2025-03-24 ENCOUNTER — TELEPHONE (OUTPATIENT)
Dept: INTERNAL MEDICINE CLINIC | Facility: CLINIC | Age: 50
End: 2025-03-24

## 2025-03-24 ENCOUNTER — PATIENT MESSAGE (OUTPATIENT)
Dept: INTERNAL MEDICINE CLINIC | Facility: CLINIC | Age: 50
End: 2025-03-24

## 2025-03-24 ENCOUNTER — OFFICE VISIT (OUTPATIENT)
Facility: CLINIC | Age: 50
End: 2025-03-24
Payer: MEDICAID

## 2025-03-24 VITALS
DIASTOLIC BLOOD PRESSURE: 88 MMHG | WEIGHT: 179.63 LBS | HEIGHT: 59 IN | OXYGEN SATURATION: 99 % | BODY MASS INDEX: 36.21 KG/M2 | HEART RATE: 89 BPM | RESPIRATION RATE: 18 BRPM | SYSTOLIC BLOOD PRESSURE: 122 MMHG

## 2025-03-24 DIAGNOSIS — E04.2 MULTIPLE THYROID NODULES: Primary | ICD-10-CM

## 2025-03-24 DIAGNOSIS — Z13.0 SCREENING FOR IRON DEFICIENCY ANEMIA: ICD-10-CM

## 2025-03-24 DIAGNOSIS — N20.0 RECURRENT NEPHROLITHIASIS: ICD-10-CM

## 2025-03-24 PROCEDURE — 99215 OFFICE O/P EST HI 40 MIN: CPT | Performed by: STUDENT IN AN ORGANIZED HEALTH CARE EDUCATION/TRAINING PROGRAM

## 2025-03-24 NOTE — TELEPHONE ENCOUNTER
Was started on Carvedilol by SM, pt reporting 2 days ago having dizzy spells, low heart rates of 47-49 and it waking her up(watch notifying her too) and waking up with headache. Last night felt as if 2 fingers were pressing into her chest and having some SOB. Anyone able to triage?

## 2025-03-24 NOTE — PATIENT INSTRUCTIONS
Let's repeat your thyroid ultrasound and we'll see if anything has changed or grown.   When you go for your labs in a few weeks, I'm going to check some calcium and parathyroid levels just to make sure there's no overactivity there than can be contributing to the stones.    General follow up information:  Please let us know if you require any refills at least 1 week prior to your medication running out. If you do run out of medication, please call our office ASAP to request refills (do not wait until your follow up).  Please call us if you experience any problems with insurance coverage of medication, lab work, or imaging.   Lab results and imaging will typically be reviewed at follow up appointments, or within 3-5 business days of ALL results being in if you do not have an appointment scheduled in the near future. Our office will contact you for any abnormal results requiring more urgent follow up or action.   The on-call pager is for urgent matters only. If you are a type 1 diabetic and run out of insulin after business hours 8AM-4PM, you may call the on-call pager for a refill to a 24 hour pharmacy. If you have adrenal insufficiency and run out of steroids, you may call the on-call pager for a refill to a 24 hour pharmacy. All other refill requests should be requested during business hours.    Return Visit   [] Dr. Ryne MADSEN

## 2025-03-24 NOTE — PROGRESS NOTES
Endocrinology Clinic Note    Name: Nadine Calix    Date: 3/24/2025      HISTORY OF PRESENT ILLNESS   Nadine Calix is a 50 year old female  who presents for consultation for thyroid nodules    Initial HPI consult in Dec 2023  Chief Complaint   Patient presents with    Follow - Up     Pt here for f/u thyroid issues, pt is not taking any medication for thyroid, pt wants to talk about her thyroid nodules, pt was seeing Dr. Bear.     Previously with [facility] Dr Martinez,  8/2021  Reviewed US showing MNG, subcentimeter with recommendation of periodic US assessment  TFTs normal range    +FH of thyroid disease (mother had partial thyroidectomy, unclear if malignancy), 2 sisters also have thyroid issues    10/2023 - thyroid US shows 4 thyroid nodules , stable in size, all subcentimeter   10/2023 - TSH 0.91    Had seen ENT Dr Garcia ([place]) as well who also recommended surveillance    Interval history 3/24/25:  -Noting globus sensation/sticking sensation with swallowing in the L side of the throat, last thyroid US 2023  -Noted to have adrenal adenoma incidentally on imaging for recurrent nephrolithiasis, 11mm adenoma within the R adrenal gland. Urology performed DST and metanephrines which were negative, ARR slightly positive. No recorded hypokalemia or resistant HTN. Urology planning to repeat yearly CT/hormone testing.   -No previous history of hyperparathyroidism, PTH levels last normal in 2022, ENT previously initiated partial workup for hyperpara in the context of recurrent stones. Pt report mother and grandfather both had stones. She has had elevated urine calcium in the past.     REVIEW OF SYSTEMS  Ten point review of systems has been performed and is otherwise negative and/or non-contributory, except as described above.    Medications:     Current Outpatient Medications:     ergocalciferol 1.25 MG (99321 UT) Oral Cap, Take 1 capsule (50,000 Units total) by mouth once a week., Disp: 24 capsule, Rfl: 0     carvedilol 6.25 MG Oral Tab, Take 1 tablet (6.25 mg total) by mouth 2 (two) times daily with meals., Disp: 60 tablet, Rfl: 0    Levonorgestrel (MIRENA, 52 MG,) 20 MCG/DAY Intrauterine IUD, 20 mcg (1 each total) by Intrauterine route one time., Disp: , Rfl:     albuterol (PROAIR HFA) 108 (90 Base) MCG/ACT Inhalation Aero Soln, Inhale 2 puffs into the lungs every 4 (four) hours as needed for Wheezing., Disp: 1 each, Rfl: 1     Allergies:   Allergies   Allergen Reactions    Amlodipine SWELLING     Leg swelling    Metoprolol SHORTNESS OF BREATH    Toradol [Ketorolac Tromethamine] SHORTNESS OF BREATH    Tramadol SHORTNESS OF BREATH    Dilaudid [Hydromorphone] ITCHING and PAIN     Headache - per pt this is only with long term use - pt states she can tolerate this medication    Oxycodone PAIN     headache    Wellbutrin [Bupropion Hcl] PAIN and DIZZINESS     Headache    Valsartan OTHER (SEE COMMENTS)     Chest pain      Codeine Sulfate ITCHING     TABS    Hydrocodone ITCHING    Morphine ITCHING    Seasonal Runny nose       Social History:   Social History     Socioeconomic History    Marital status: Life Partner   Tobacco Use    Smoking status: Never    Smokeless tobacco: Never   Vaping Use    Vaping status: Never Used   Substance and Sexual Activity    Alcohol use: No    Drug use: No    Sexual activity: Not Currently     Partners: Male     Birth control/protection: Mirena, I.U.D.     Comment: Mirena IUD 24   Other Topics Concern    Caffeine Concern No    Exercise No    Seat Belt Yes       Medical History:   Reviewed      Surgical history:   Past Surgical History:   Procedure Laterality Date    Abdominal surgery      2 hernia repairs right & left side      ,2016    x2    Colonoscopy N/A 2021    Procedure: COLONOSCOPY, ESOPHAGOGASTRODUODENOSCOPY with biopsy;  Surgeon: Prashant Lilly DO;  Location:  ENDOSCOPY    Colonoscopy  2021 EGD WITH BIOPSY. Colonoscopy internal hemorrhoids.  No gross findings to explain left sided pain. Recall 10 years. Prashant Lilly,     Cryocautery of cervix  2012    Cysto/uretero w/lithotripsy Left 01/22/2024    Cystoscopy, LEFT ureteroscopy, laser lithotripsy, basket stone extraction, retrograde pyelogram, ureteral stent placement, Right retrograde pyelogram Dr. Bryant Garza    Cysto/uretero w/up stricture Right 10/15/2019    Cysto Rt RPG, Rt URS w/ Laser Litho Dilation of Rtight Stricture Rt URS Stent Exchange w/ Dr Jim    Cysto/uretero, stone remove Right 12/18/2019    right ureter and kidney stones extraction, URS, stent placement    Cystoscopy,ureteroscopy,lithotripsy Right 08/23/2019    Cysto, B/L RPG, URS, laser litho, stone ext, Rt stent Dr. Jim    Egd  04/07/2021    for left sided pain    Hc endometrial sampling w or wo endocerv sampling  08/18/2023    EMB- Dr. Ramirez - disordered proliferative endometrium - recommended medical management options to regulate cycle.    Hernia surgery  01/2000    L hernia repair    Hysteroscopy,with sampling  10/08/2020    Hysteroscopy, D&C for intermittent menorrhagia, thickened endometrium, cervical stenosis. H/o menometrorrhagia & postcoital bleeding. Dr. Linnette Antoine. Path Endometrium with stromal collapse, superficial fibrin thrombi, and some polypoid tissue fragments with increased stromal fibrous component. Endometrial glands are proliferative. Overall pattern favors anovulatory breakdown bleeding.    Hysteroscopy,with sampling  07/11/2024    Hysteroscopy, D&C, insertion Mirena IUD. Done for menometrorrhagia & postcoital bleeding. Dr. Ema Randall. Path benign -Fragments of weakly proliferative endometrium with stromal breakdown/condensation. -Fragments of endocervical glands with inflammation and reactive changes.    Insert intrauterine device  07/11/2024    Mirena IUD insertion. 8 cm. Anteverted/mid position. Dr. Ema Randall    Kidney surgery Right     stent placement 6/2022     Laparoscopy,pelvic,biopsy      Kidney stones & examine    Lithotripsy  2001, 2007 & 11/11    Needle biopsy left  02/2021    fibroadenoma    Needle biopsy right  02/2021    fibroadenoma    Needle biopsy right Right 07/03/2024    fibroadenoma-benign    Other Bilateral 2012    nerve surgery to bilateral arms about 1 month apart    Other      percutaneous nephrolithotomy    Other surgical history  12/27/2019    cysto stent removal - dr. jim     Other surgical history  06/03/2020    Cysto Stent Removal w/ Dr Jim    Other surgical history  07/07/2021    Cysto/Stent Removal Dr. Jim    Other surgical history  07/08/2022    Right ureteroscopy, laser lithotripsy, stent placement, nephrostomy tube removal with Dr. Tariq    Removal gallbladder  1998    Remove stent via transureth Right 11/13/2019    Cysto Stent Removal w/ Dr Jim    Repair ing hernia,5+y/o,reducibl      Us breast biopsy 1 site right (cpt=19083) Right 07/03/2024    12:00 right breast. Fibroadenoma. Tiny microcalcifications. Breast tissue with hypocellular stromal fibrosis. No atypia or malignancy. Recommend follow-up mammogram and right breast ultrasound in 6 months.       PHYSICAL EXAMINATION:  /88   Pulse 89   Resp 18   Ht 4' 11\" (1.499 m)   Wt 179 lb 9.6 oz (81.5 kg)   LMP 03/01/2025 (Exact Date)   SpO2 99%   BMI 36.27 kg/m²     CONSTITUTIONAL:  awake, alert, cooperative, no apparent distress, and appears stated age  PSYCH: normal affect  EYES:  (-) proptosis, (-) stare  ENT:  Normocephalic, atraumatic  NECK:  thyroid not enlarged and no palpable nodules  LUNGS: breathing comfortably  CARDIOVASCULAR:  regular rate       Labs:  Reviewed    Imaging:  Reviewed    ASSESSMENT/PLAN:    #Multiple thyroid nodules  -Nodules have been imaged several times and have not met FNA criteria, previously seen by ENT at [facility]  -Now reporting worsening globus sensation/some dysphagia  -Repeat thyroid US now and refer to ENT/IR if  appropriate pending results    #R adrenal adenoma  -Incidentally identified in 2024 by urology  -Metanephrines, DST normal  -ARR mildly elevated, pt with no history of hypokalemia and on beta blockade monotherapy for HTN, low suspicion for true hyperaldo  -Urology following and planning to repeat CT/hormone testing in one year    #Recurrent nephrolithiasis  -Pt with strong family history of nephrolithiasis and history of elevated urine calcium  -Previous parathyroid levels have been normal but several years ago, ENT had started parathyroid workup  -Repeat parathyroid labs and assess for obvious adenomas on US  -If any evidence of hyperpara, will pursue  -Low suspicion for MEN with normal metanephrines    All questions answered in detail.     The above plan was discussed in detail with the patient who verbalized understanding and agreement.      A total of 40 minutes was spent today on obtaining history, reviewing outside records, reviewing pertinent labs/imaging, reviewing relevant pathophysiology with patient, evaluating patient, providing multiple treatment options, communicating with patient's other providers as appropriate, and completing documentation and orders.      Jess Michele DO  [facility]  3/24/2025     Note to patient: The 21 Century Cures Act makes medical notes like these available to patients in the interest of transparency. However, be advised this is a medical document. It is intended as peer to peer communication. It is written in medical language and may contain abbreviations or verbiage that are unfamiliar. It may appear blunt or direct. Medical documents are intended to carry relevant information, facts as evident, and the clinical opinion of the practitioner.      In reviewing this note, please be advised that Dragon Voice Recognition software used to dictate the note may have made errors in recognizing some of the words or phrases.

## 2025-03-24 NOTE — TELEPHONE ENCOUNTER
SM: Patient on carvedilol since3/10 and has bradycardia and some dizzy spells over past two days. Any recs? Please advise, TY    Patient had dizzy spell on Saturday and low HR on Sunday and today [phone] HR was 47-49bpm. Feeling of fingers pushing on chest yesterday, this sensation is no longer present. Woke up with headache, and watch alarm notifying of low heart rate. Patient is asymptomatic at this time. Patient questioning if she needs a change to medication.    Patient scheduled for follow-up:    Future Appointments   Date Time Provider Department Center      [id number] [facility] [place]      PF [facility]      [id number] [facility] [address]   5/8/2025 10:00 AM Kinjal Richter APRN [facility] [place]   5/12/2025  9:30 AM Ema Randall MD [facility] [place]   5/16/2025  9:15 PM SCHEDULE BY DATE [facility]   6/27/2025 11:30 AM Bryant Garza MD [id number] [facility] [address]

## 2025-03-27 ENCOUNTER — HOSPITAL ENCOUNTER (OUTPATIENT)
Dept: CT IMAGING | Age: 50
Discharge: HOME | End: 2025-03-27
Attending: PHYSICIAN ASSISTANT
Payer: MEDICAID

## 2025-03-27 DIAGNOSIS — D35.00 ADRENAL ADENOMA, UNSPECIFIED LATERALITY: ICD-10-CM

## 2025-03-27 DIAGNOSIS — N20.0 NEPHROLITHIASIS: ICD-10-CM

## 2025-03-27 PROCEDURE — 74178 CT ABD&PLV WO CNTR FLWD CNTR: CPT | Performed by: PHYSICIAN ASSISTANT

## 2025-03-27 RX ORDER — IOPAMIDOL 755 MG/ML
80 INJECTION, SOLUTION INTRAVASCULAR
Status: COMPLETED | OUTPATIENT
Start: 2025-03-27 | End: 2025-03-27

## 2025-03-27 RX ADMIN — IOPAMIDOL 80 ML: 755 INJECTION, SOLUTION INTRAVASCULAR at 19:31:00

## 2025-03-28 ENCOUNTER — TELEPHONE (OUTPATIENT)
Dept: INTERNAL MEDICINE CLINIC | Facility: CLINIC | Age: 50
End: 2025-03-28

## 2025-03-28 DIAGNOSIS — I10 BENIGN ESSENTIAL HTN: Primary | ICD-10-CM

## 2025-03-28 RX ORDER — NEBIVOLOL 2.5 MG/1
2.5 TABLET ORAL DAILY
Qty: 30 TABLET | Refills: 0 | Status: SHIPPED | OUTPATIENT
Start: 2025-03-28

## 2025-03-28 NOTE — TELEPHONE ENCOUNTER
Pt is wondering if Bystolic will be sent to pharmacy any time soon?    [id number]     Also reference patient msg from 3/24.

## 2025-03-28 NOTE — TELEPHONE ENCOUNTER
This medication was sent to her pharmacy today. [facility] may need a PA done for it.

## 2025-04-11 ENCOUNTER — HOSPITAL ENCOUNTER (OUTPATIENT)
Dept: ULTRASOUND IMAGING | Age: 50
Discharge: HOME | End: 2025-04-11
Attending: STUDENT IN AN ORGANIZED HEALTH CARE EDUCATION/TRAINING PROGRAM
Payer: MEDICAID

## 2025-04-11 ENCOUNTER — LAB ENCOUNTER (OUTPATIENT)
Dept: LAB | Age: 50
End: 2025-04-11
Attending: STUDENT IN AN ORGANIZED HEALTH CARE EDUCATION/TRAINING PROGRAM
Payer: MEDICAID

## 2025-04-11 DIAGNOSIS — E55.9 VITAMIN D DEFICIENCY: ICD-10-CM

## 2025-04-11 DIAGNOSIS — E04.2 MULTIPLE THYROID NODULES: ICD-10-CM

## 2025-04-11 DIAGNOSIS — N20.0 RECURRENT NEPHROLITHIASIS: ICD-10-CM

## 2025-04-11 DIAGNOSIS — R74.01 ELEVATED ALT MEASUREMENT: ICD-10-CM

## 2025-04-11 DIAGNOSIS — R74.01 ELEVATED AST (SGOT): ICD-10-CM

## 2025-04-11 DIAGNOSIS — Z13.0 SCREENING FOR IRON DEFICIENCY ANEMIA: ICD-10-CM

## 2025-04-11 DIAGNOSIS — E78.5 HYPERLIPIDEMIA, UNSPECIFIED HYPERLIPIDEMIA TYPE: ICD-10-CM

## 2025-04-11 LAB
ALBUMIN SERPL-MCNC: 4.7 G/DL (ref 3.2–4.8)
ALBUMIN/GLOB SERPL: 1.6 {RATIO} (ref 1–2)
ALP LIVER SERPL-CCNC: 88 U/L (ref 39–100)
ALT SERPL-CCNC: 54 U/L (ref 10–49)
ANION GAP SERPL CALC-SCNC: 6 MMOL/L (ref 0–18)
AST SERPL-CCNC: 31 U/L (ref ?–34)
BILIRUB SERPL-MCNC: 0.3 MG/DL (ref 0.3–1.2)
BUN BLD-MCNC: 8 MG/DL (ref 9–23)
CALCIUM BLD-MCNC: 10 MG/DL (ref 8.7–10.6)
CHLORIDE SERPL-SCNC: 104 MMOL/L (ref 98–112)
CHOLEST SERPL-MCNC: 181 MG/DL (ref ?–200)
CO2 SERPL-SCNC: 26 MMOL/L (ref 21–32)
CREAT BLD-MCNC: 0.83 MG/DL (ref 0.55–1.02)
DEPRECATED HBV CORE AB SER IA-ACNC: 25 NG/ML (ref 50–306)
EGFRCR SERPLBLD CKD-EPI 2021: 87 ML/MIN/1.73M2 (ref 60–?)
FASTING PATIENT LIPID ANSWER: YES
FASTING STATUS PATIENT QL REPORTED: YES
GLOBULIN PLAS-MCNC: 2.9 G/DL (ref 2–3.5)
GLUCOSE BLD-MCNC: 103 MG/DL (ref 70–99)
HDLC SERPL-MCNC: 36 MG/DL (ref 40–59)
IRON SATN MFR SERPL: 16 % (ref 15–50)
IRON SERPL-MCNC: 66 UG/DL (ref 50–170)
LDLC SERPL CALC-MCNC: 115 MG/DL (ref ?–100)
MAGNESIUM SERPL-MCNC: 1.9 MG/DL (ref 1.6–2.6)
NONHDLC SERPL-MCNC: 145 MG/DL (ref ?–130)
OSMOLALITY SERPL CALC.SUM OF ELEC: 281 MOSM/KG (ref 275–295)
PHOSPHATE SERPL-MCNC: 3.7 MG/DL (ref 2.4–5.1)
POTASSIUM SERPL-SCNC: 4 MMOL/L (ref 3.5–5.1)
PROT SERPL-MCNC: 7.6 G/DL (ref 5.7–8.2)
PTH-INTACT SERPL-MCNC: 41.6 PG/ML (ref 18.5–88)
SODIUM SERPL-SCNC: 136 MMOL/L (ref 136–145)
TOTAL IRON BINDING CAPACITY: 404 UG/DL (ref 250–425)
TRANSFERRIN SERPL-MCNC: 328 MG/DL (ref 250–380)
TRIGL SERPL-MCNC: 168 MG/DL (ref 30–149)
VIT D+METAB SERPL-MCNC: 39.4 NG/ML (ref 30–100)
VIT D+METAB SERPL-MCNC: 44.8 NG/ML (ref 30–100)
VLDLC SERPL CALC-MCNC: 29 MG/DL (ref 0–30)

## 2025-04-11 PROCEDURE — 82330 ASSAY OF CALCIUM: CPT

## 2025-04-11 PROCEDURE — 82306 VITAMIN D 25 HYDROXY: CPT

## 2025-04-11 PROCEDURE — 80053 COMPREHEN METABOLIC PANEL: CPT

## 2025-04-11 PROCEDURE — 80061 LIPID PANEL: CPT

## 2025-04-11 PROCEDURE — 83735 ASSAY OF MAGNESIUM: CPT

## 2025-04-11 PROCEDURE — 84100 ASSAY OF PHOSPHORUS: CPT

## 2025-04-11 PROCEDURE — 83970 ASSAY OF PARATHORMONE: CPT

## 2025-04-11 PROCEDURE — 76536 US EXAM OF HEAD AND NECK: CPT | Performed by: STUDENT IN AN ORGANIZED HEALTH CARE EDUCATION/TRAINING PROGRAM

## 2025-04-11 PROCEDURE — 83540 ASSAY OF IRON: CPT

## 2025-04-11 PROCEDURE — 83550 IRON BINDING TEST: CPT

## 2025-04-11 PROCEDURE — 82728 ASSAY OF FERRITIN: CPT

## 2025-04-11 PROCEDURE — 36415 COLL VENOUS BLD VENIPUNCTURE: CPT

## 2025-04-12 ENCOUNTER — PATIENT MESSAGE (OUTPATIENT)
Dept: INTERNAL MEDICINE CLINIC | Facility: CLINIC | Age: 50
End: 2025-04-12

## 2025-04-14 LAB — LC CALCIUM, IONIZED: 5.2 MG/DL

## 2025-04-18 ENCOUNTER — OFFICE VISIT (OUTPATIENT)
Dept: NEPHROLOGY | Facility: CLINIC | Age: 50
End: 2025-04-18
Payer: MEDICAID

## 2025-04-18 VITALS — DIASTOLIC BLOOD PRESSURE: 78 MMHG | SYSTOLIC BLOOD PRESSURE: 132 MMHG | WEIGHT: 181.25 LBS | BODY MASS INDEX: 37 KG/M2

## 2025-04-18 DIAGNOSIS — N20.0 KIDNEY STONES: Primary | ICD-10-CM

## 2025-04-18 PROCEDURE — 99205 OFFICE O/P NEW HI 60 MIN: CPT | Performed by: INTERNAL MEDICINE

## 2025-04-18 NOTE — PROGRESS NOTES
Nephrology Consult Note      REASON FOR CONSULT:  kidney stones          HPI:   Nadine Calix is a 50 year old female with   Chief Complaint   Patient presents with    Stones    Hypertension     Fan Hernandez MD    49 y/o female with hx of kidney stones- since teens- has been seeing urology for years- has had multiple lithotripsies/uretral stents/nephrostomy tubes, etc presents for initial evaluation.  Was following w/ Dr. Jim, now switched to Dr. Garza   She also has hx of pre-DM (A1c 5.9), vit D deficiency  Reports stress incontinence (has 7 kids)  + family hx of kidney stones (maternal side); 2 daughters have kidney stones as well (oldest in her early 30s)    Does not drink enough water because she feels she can't do anything when she tries to drink the recommended  amount   Has prior hx of frequent UTIs    She was on K-cit- now off due to GI side effects??; she was on thiazide, but not taking currently - urology notes reviewed     Prior stone analysis show primarily Ca-phos stones;   Most recent urologic surgery was in Jan 2024- w/ Dr. Kayla Sanders renal scan 2/23/2024 shows 63% function from the right kidney, 37% function from the left kidney, no signs of obstruction with adequate drainage.     24 Hour Metabolic Urine Study Results  5/21/24     Volume: 2300 mL/day  Goal >2500  Sodium: 153 mmol/day  Goal <150  Calcium: 177 mg/day  Goal <250  Uric acid: 629 mg/day  Goal <750  Citrate: 195 mg/day  Goal >450  Oxalate: 27 mg/day  Goal <40  pH: 6.65  Goal 5.8 - 6.2 (>6 for UA stones, >7 for cystine stone)  Creatinine: 1275 mg/day  Normal 18-20 mg/kilogram/day (female)  Normal 20-22 mg/kilogram/day (male)  Cystinuria present? No     Last stone analysis: 50% COM, 50% calcium phosphate  Serum calcium: 9.2  PTH: 33.9 (normal)     ROS:    See above; 12 systems reviewed and otherwise unremarkable     PMH:  Past Medical History[1]      PSH:  Past Surgical History[2]      Medications (Active prior to today's  visit):  Current Medications[3]      Allergies:  Allergies[4]    Social History:  Social Hx on file[5]       Family History:  Family History[6]         PHYSICAL EXAM:   /78   Wt 181 lb 4 oz (82.2 kg)   LMP 03/01/2025 (Exact Date)   BMI 36.61 kg/m²    Wt Readings from Last 6 Encounters:   04/18/25 181 lb 4 oz (82.2 kg)   03/24/25 179 lb 9.6 oz (81.5 kg)   03/08/25 181 lb 3.2 oz (82.2 kg)   12/16/24 181 lb 3.2 oz (82.2 kg)   11/11/24 172 lb (78 kg)   10/17/24 172 lb (78 kg)     General: Alert and oriented in no apparent distress.  HEENT: No scleral icterus, MMM  Neck: Supple, no JOHN or thyromegaly  Cardiac: Regular rate and rhythm, S1, S2 normal, no murmur or rub  Lungs: Clear without wheezes, rales, rhonchi.    Abdomen: Soft, non-tender. + bowel sounds, no palpable organomegaly  Extremities: Without clubbing, cyanosis or edema.  Neurologic:  normal affect, cranial nerves grossly intact, moving all extremities  Skin: Warm and dry, no rashes    Labs reviewed Cr 0.83; lytes normal (4/11/25)  Vit D 39.4  PTH 41.6  Ca 10  Phos 3.7  Uric acid (in past normal)    Prior imaing/labs/notes reviewed      ASSESSMENT/PLAN:   Nadine was seen today for stones and hypertension.    Diagnoses and all orders for this visit:    Kidney stones  -     Supersaturation Profile Urine [E]; Future  -     Uric Acid; Future  -     Urinalysis, Routine [E]; Future  -     PTH, Intact [E]; Future  -     Vitamin D [E]; Future  -     Basic Metabolic Panel (8) [E]; Future      Kidney stones- longstanding hx ; fairly significant family hx as well  Patient is following closely w/ urology- currently w/ Dr. Garza (previously was seeing [facility]ists). Has had multiple procedures in past as well.  Most recent CT shows multiple bilateral ureteral calculi (? Jero plaques)  She alejo snot have any flank pain currently; Reports urination is normal  She does have OAB - follows w/ urogyne- s/p PTNS  Was on thiazide/k-citrate, but currently off for  unclear reasons.    - will check above w/u; has not had the urine stone risk profile in about a year.   - advised water intake- however, pt states she cannot do it consistently due to her OAB  - advised low salt diet  - will plan to follow up after above completed  - will reach out to urology regarding coordination of care  Pre-DM  HTN_ on acei; controlled  Hx of adrenal adenoma- f/u planned w/ urology  OAB- as above      Gresyon Orellana MD  4/18/2025  10:35 AM         [1]   Past Medical History:   Abdominal distention    Longer than this but this is an estimate    Abdominal hernia    I had hernia repair around by my belly button & abdominal    Abdominal pain    Currently seeing obgyn, urologist & nephrologist    Arrhythmia    Back pain    Mid to lower back pain & hip pain    Benign essential HTN    Bloating    Longer than this but this is an estimate    Blood in urine    Due to severe kidney stones    Blurred vision    I always wore glasses or contacts since about 14 yrs old    Calculus of kidney    hydronephrosis/ several times kidney stones/ 13 th procedure for stones    Cervicalgia    Log Date: 05/04/2012         Change in hair    My hair is thinning more than the usual    Chest pain    I did see a cardiologist about this    Chest pain on exertion    I did see a cardiologists about this    COVID-19    headache, back pain, shortness of breath, sore throat, runny nose, chills. Not hospitalized    COVID-19    high fever, fatigue, not hospitalized    Dense breasts    heterogeneously dense breasts    Depression    Diarrhea, unspecified    I notice that certain things run right through me now    Disorder of liver    fatty liver    Dizziness    Dyspepsia    Easy bruising    I find bruises on me and don’t really know how I got them    Elevated fasting glucose    Enthesopathy of the wrist and carpus    Log Date: 05/31/2011         ESBL E. coli carrier    Excessive bleeding in premenopausal period    Fatigue    I noticed  that I’m alot more tired lately more than usual    Flatulence/gas pain/belching    Longer than this but this is an estimate    Food intolerance    I think I am lactose intolerant been that way for a few year    Frequent urination    All the time for years    Frequent UTI    Due to kidney stones blockages    Gestational diabetes (HCC)    Heart murmur    My mother said I was born with one but I never really checke    Heart palpitations    Pvc’s and irregular heart rate    Hemorrhoids    After last pregnancy or a couple years after that    High blood pressure    History of cardiac murmur    Mother told me I was born with one & my dr as well    History of D&C    History of depression    When I had a miscarriage    Indigestion    Longer than this but this is an estimate    Itch of skin    Not severe but I have been itching for no apparent reason    Leaking of urine    After first pregnancy    Leg swelling    My left leg slightly swelled up and my ankle area was hurtin    Lesion of ulnar nerve    Log Date: 03/08/2013         Loss of appetite    I noticed this lately too    Menometrorrhagia    Hysteroscopy 10/2020 path favors anovulatory breakdown bleeding.    Menometrorrhagia    Migraine with aura    Migraines    Multiple thyroid nodules    Myofascial pain    Nausea    Longer than this but this is an estimate    Night sweats    I feel like I’m on fire at night.    OAB (overactive bladder)    PTNS - posterior tibial nerve stimulation with urogynecologist Dr. Lara Jim    Painful urination    When passing stones    Postcoital bleeding    Prior to hysteroscopy done 10/2020. Also having since at least 2022 or 2023    Postcoital bleeding    Prediabetes    PVC's (premature ventricular contractions)    Rash    Broke out with unknown rash all over legs and arms    Sexually transmitted disease    HPV    Sleep disturbance    For years sometimes I can’t lay on my left side    Stress    Rough up bringing and also regular family  stresses    Uncomfortable fullness after meals    Longer than this but this is an estimate    Unspecified condition originating in the  period    Ureteral stone with hydronephrosis    Ureteral stricture, right    Urinary incontinence    Constantly going can’t hold it    Ventral hernia without obstruction or gangrene    Visual impairment    glasses    Vitamin D deficiency    Wears glasses    Since I was about 14 yrs old    Weight gain    After last son was born   [2]   Past Surgical History:  Procedure Laterality Date    Abdominal surgery      2 hernia repairs right & left side      ,2016    x2    Colonoscopy N/A 2021    Procedure: COLONOSCOPY, ESOPHAGOGASTRODUODENOSCOPY with biopsy;  Surgeon: Prashant Lilly DO;  Location:  ENDOSCOPY    Colonoscopy  2021 EGD WITH BIOPSY. Colonoscopy internal hemorrhoids. No gross findings to explain left sided pain. Recall 10 years. Prashant Lilly DO    Cryocautery of cervix      Cysto/uretero w/lithotripsy Left 2024    Cystoscopy, LEFT ureteroscopy, laser lithotripsy, basket stone extraction, retrograde pyelogram, ureteral stent placement, Right retrograde pyelogram Dr. Bryant Garza    Cysto/uretero w/up stricture Right 10/15/2019    Cysto Rt RPG, Rt URS w/ Laser Litho Dilation of Rtight Stricture Rt URS Stent Exchange w/ Dr Jim    Cysto/uretero, stone remove Right 2019    right ureter and kidney stones extraction, URS, stent placement    Cystoscopy,ureteroscopy,lithotripsy Right 2019    Cysto, B/L RPG, URS, laser litho, stone ext, Rt stent Dr. Jim    Egd  2021    for left sided pain    Hc endometrial sampling w or wo endocerv sampling  2023    EMB- Dr. Ramirez - disordered proliferative endometrium - recommended medical management options to regulate cycle.    Hernia surgery  2000    L hernia repair    Hysteroscopy,with sampling  10/08/2020    Hysteroscopy, D&C for intermittent  menorrhagia, thickened endometrium, cervical stenosis. H/o menometrorrhagia & postcoital bleeding. Dr. Linnette Antoine. Path Endometrium with stromal collapse, superficial fibrin thrombi, and some polypoid tissue fragments with increased stromal fibrous component. Endometrial glands are proliferative. Overall pattern favors anovulatory breakdown bleeding.    Hysteroscopy,with sampling  07/11/2024    Hysteroscopy, D&C, insertion Mirena IUD. Done for menometrorrhagia & postcoital bleeding. Dr. Ema Randall. Path benign -Fragments of weakly proliferative endometrium with stromal breakdown/condensation. -Fragments of endocervical glands with inflammation and reactive changes.    Insert intrauterine device  07/11/2024    Mirena IUD insertion. 8 cm. Anteverted/mid position. Dr. Ema Randall    Kidney surgery Right     stent placement 6/2022    Laparoscopy,pelvic,biopsy      Kidney stones & examine    Lithotripsy  2001, 2007 & 11/11    Needle biopsy left  02/2021    fibroadenoma    Needle biopsy right  02/2021    fibroadenoma    Needle biopsy right Right 07/03/2024    fibroadenoma-benign    Other Bilateral 2012    nerve surgery to bilateral arms about 1 month apart    Other      percutaneous nephrolithotomy    Other surgical history  12/27/2019    cysto stent removal - dr. snell     Other surgical history  06/03/2020    Cysto Stent Removal w/ Dr Snell    Other surgical history  07/07/2021    Cysto/Stent Removal Dr. Snell    Other surgical history  07/08/2022    Right ureteroscopy, laser lithotripsy, stent placement, nephrostomy tube removal with Dr. Tariq    Removal gallbladder  1998    Remove stent via transureth Right 11/13/2019    Cysto Stent Removal w/ Dr Snell    Repair ing hernia,7+y/o,reducibl      Us breast biopsy 1 site right (cpt=19083) Right 07/03/2024    12:00 right breast. Fibroadenoma. Tiny microcalcifications. Breast tissue with hypocellular stromal fibrosis. No atypia or malignancy.  Recommend follow-up mammogram and right breast ultrasound in 6 months.   [3]   Current Outpatient Medications   Medication Sig Dispense Refill    lisinopril 10 MG Oral Tab Take 1 tablet (10 mg total) by mouth daily. 30 tablet 0    ergocalciferol 1.25 MG (05877 UT) Oral Cap Take 1 capsule (50,000 Units total) by mouth once a week. 24 capsule 0    Levonorgestrel (MIRENA, 52 MG,) 20 MCG/DAY Intrauterine IUD 20 mcg (1 each total) by Intrauterine route one time.      albuterol (PROAIR HFA) 108 (90 Base) MCG/ACT Inhalation Aero Soln Inhale 2 puffs into the lungs every 4 (four) hours as needed for Wheezing. 1 each 1   [4]   Allergies  Allergen Reactions    Amlodipine SWELLING     Leg swelling    Metoprolol SHORTNESS OF BREATH    Toradol [Ketorolac Tromethamine] SHORTNESS OF BREATH    Tramadol SHORTNESS OF BREATH    Dilaudid [Hydromorphone] ITCHING and PAIN     Headache - per pt this is only with long term use - pt states she can tolerate this medication    Oxycodone PAIN     headache    Wellbutrin [Bupropion Hcl] PAIN and DIZZINESS     Headache    Valsartan OTHER (SEE COMMENTS)     Chest pain      Codeine Sulfate ITCHING     TABS    Hydrocodone ITCHING    Morphine ITCHING    Seasonal Runny nose   [5]   Social History  Socioeconomic History    Marital status: Life Partner   Tobacco Use    Smoking status: Never    Smokeless tobacco: Never   Vaping Use    Vaping status: Never Used   Substance and Sexual Activity    Alcohol use: No    Drug use: No    Sexual activity: Not Currently     Partners: Male     Birth control/protection: Mirena, I.U.D.     Comment: Mirena IUD 7/11/24   Other Topics Concern    Caffeine Concern No    Exercise No    Seat Belt Yes   [6]   Family History  Problem Relation Age of Onset    Diabetes Mother     High Cholesterol Mother     Hypertension Mother     Thyroid disease Mother         hypothroid    Other (Other) Mother         Part of thyroid removed    Heart Disorder Father         3 stents in heart     Diabetes Father     Hypertension Father     Hypertension Sister     Other (ovarian problem) Sister         work up in progress    Diabetes Sister     Other (Other) Sister         Liver problem    Diabetes Maternal Grandmother     Ovarian Cancer Maternal Grandmother          at 54    Diabetes Maternal Grandfather     Diabetes Paternal Grandmother     Diabetes Paternal Grandfather     Other (Other) Daughter         Appendicitis    Other (Other) Son         Appendicitis    Pancreatic Cancer Maternal Uncle     Breast Cancer Neg     Colon Cancer Neg

## 2025-04-21 ENCOUNTER — TELEPHONE (OUTPATIENT)
Dept: INTERNAL MEDICINE CLINIC | Facility: CLINIC | Age: 50
End: 2025-04-21

## 2025-04-21 NOTE — TELEPHONE ENCOUNTER
Made patient aware. She will call with an update by end of week.     Please confirm if symptoms resolved upon call back.

## 2025-04-21 NOTE — TELEPHONE ENCOUNTER
Patient started New medication Lisinopril. Patient states she is having some side effects.   Has been having indigestion and Itching all over.         lisinopril 10 MG Oral Tab 30 tablet

## 2025-04-21 NOTE — TELEPHONE ENCOUNTER
Patient has noted generalized pruritus and indigestion after starting lisinopril. Patient asking PCP recommendation. Currently on lisinopril 10mg.     LOV - 3/8  Seen for chest pressure.    Alternate medication?

## 2025-04-23 ENCOUNTER — OFFICE VISIT (OUTPATIENT)
Dept: SURGERY | Facility: CLINIC | Age: 50
End: 2025-04-23
Payer: MEDICAID

## 2025-04-23 DIAGNOSIS — N20.0 KIDNEY STONE: Primary | ICD-10-CM

## 2025-04-23 PROCEDURE — 99213 OFFICE O/P EST LOW 20 MIN: CPT | Performed by: PHYSICIAN ASSISTANT

## 2025-04-23 PROCEDURE — 81003 URINALYSIS AUTO W/O SCOPE: CPT | Performed by: PHYSICIAN ASSISTANT

## 2025-04-23 NOTE — PROGRESS NOTES
Subjective:   Nadine Calix is a 50 year old female wit hx of anxiety, GERD, HTN, OAB, who presents for follow up stones.    Patient was seen last month for bilateral flank pain and CT scan was advised to assess stone burden, rule out ureteral stone.    She had CT scan completed 3/27/25 that showed bilateral stones, largest on the right 9mm and largest on the left 8mm. Unfortunately images are not available from prior CT scan for comparison at this time.    Bilateral flank pain, unable to sleep at night, no a/a factors  Left seems worse but both are causing pain  Bowels regular    Saw nephrology, doing another 24 hour urine collection  Not currently taking K citrate, had been on previously    Voids frequently but has incomplete emptying, followed by urogyne for that, LUTS. Had been catheterizing for period of time but is no longer    History/Other:    Chief Complaint Reviewed and Verified  Nursing Notes Reviewed and   Verified  Allergies Reviewed  Medications Reviewed         Current Medications[1]    Review of Systems:  Pertinent items are noted in HPI.      Objective:   LMP 03/01/2025 (Exact Date)  Estimated body mass index is 36.61 kg/m² as calculated from the following:    Height as of 3/24/25: 4' 11\" (1.499 m).    Weight as of 4/18/25: 181 lb 4 oz (82.2 kg).    No distress  Non labored respirations  +/- CVAT bilaterally L > R, tender to light touch    Laboratory Data:  Lab Results   Component Value Date    WBC 8.1 03/08/2025    HGB 14.5 03/08/2025    .0 03/08/2025     Lab Results   Component Value Date     04/11/2025    K 4.0 04/11/2025     04/11/2025    CO2 26.0 04/11/2025    BUN 8 (L) 04/11/2025     (H) 04/11/2025    GFRAA 97 07/08/2022    AST 31 04/11/2025    ALT 54 (H) 04/11/2025    TP 7.6 04/11/2025    ALB 4.7 04/11/2025    PHOS 3.7 04/11/2025    CA 10.0 04/11/2025    MG 1.9 04/11/2025       Urinalysis Results (last three years):  Recent Labs     06/07/22  0940  10/28/22  1201 02/11/23  1734 05/05/23  1106 08/10/23  1150 08/16/23  1809 09/17/23  0632 09/28/23  1021 10/27/23  1149 12/08/23  1053 01/26/24  0810 02/01/24  1255 03/05/24  1047 06/06/24  0923 08/01/24  1518 03/21/25  1142 04/23/25  1004   COLORUR Yellow Yellow Yellow  --  Light-Yellow Yellow Yellow Yellow Light-Yellow  --  Red*  --   --   --  Yellow  --   --    CLARITY Clear Clear Clear  --  Clear Clear Clear Turbid* Clear  --  Cloudy*  --   --   --  Turbid*  --   --    SPECGRAVITY 1.015 1.021 1.010 1.020 1.019 1.020 1.015 1.018 1.017 1.015 1.025 1.030 1.025 >=1.030 1.022 1.020 1.020   PHURINE 7.0 6.0 7.0  --  6.0 6.0 6.0 6.0 6.0 6.0 6.0 7.0 7.0 5.5 7.5 6.0 6.0   PROUR Trace* 100* Negative  --  70* Trace* 100 mg/dL* 70* 50*  --  100 mg/dL*  --   --   --  50*  --   --    GLUUR Negative Negative Negative Negative Normal Negative Negative Normal Normal  --  Negative  --   --   --  Normal  --   --    KETUR Negative Negative Negative  --  Negative Negative Negative Negative Negative  --  Negative  --   --   --  Negative  --   --    BILUR Negative Negative Negative  --  Negative Negative Negative Negative Negative  --  Negative  --   --   --  Negative  --   --    BLOODURINE Large* Small* Large*  --  1+* Moderate* Large* 1+* 1+*  --  Large*  --   --   --  1+*  --   --    NITRITE Negative Negative Negative  --  Negative Negative Positive* 2+* Negative Negative Positive* Negative Negative Negative Negative Negative Negative   UROBILINOGEN 0.2 <2.0 0.2  --  Normal 0.2 0.2 Normal Normal  --  0.2  --   --   --  Normal  --   --    LEUUR Small* Negative Negative  --  Negative Negative Large* 500* Negative  --  Trace*  --   --   --  Negative  --   --    UASA  --  Negative  --   --   --   --   --   --   --   --   --   --   --   --   --   --   --    WBCUR 21-50* 1-5 1-5  --  1-5 1-5 >50* >50* 1-5  --  1-5  --   --   --  1-5  --   --    RBCUR 3-5* 0-2 0-2  --  0-2 6-10* >10* 6-10* 0-2  --  >10*  --   --   --  3-5*  --   --     BACUR 2+* None Seen None Seen  --  None Seen Rare* 1+* 2+* None Seen  --  None Seen  --   --   --  Rare*  --   --        Urine Culture Results (last three years):  Lab Results   Component Value Date    URINECUL  03/21/2025     10-50,000 cfu/ml Multiple species present-probable contamination.    URINECUL No Growth at 18-24 hrs. 08/01/2024    URINECUL No Growth 2 Days 05/15/2024    URINECUL No Growth at 18-24 hrs. 02/14/2024    URINECUL (A) 01/26/2024     10,000 - 50,000 CFU/ML Klebsiella pneumoniae ESBL Pos    URINECUL No Growth 2 Days 01/11/2024    URINECUL No Growth at 18-24 hrs. 10/27/2023    URINECUL >100,000 CFU/ML Klebsiella pneumoniae (A) 09/28/2023    URINECUL >100,000 CFU/ML Klebsiella pneumoniae (A) 09/17/2023    URINECUL No Growth at 18-24 hrs. 08/10/2023    URINECUL (A) 07/14/2023     10,000 - 50,000 CFU/ML Alpha hemolytic Streptococcus    URINECUL No Growth at 18-24 hrs. 10/28/2022    URINECUL No Growth at 18-24 hrs. 06/07/2022    URINECUL  06/10/2021     10-50,000 cfu/ml Multiple species present-probable contamination.    URINECUL >100,000 CFU/ML Mixed gram positive anjelica 04/01/2021       Imaging  US THYROID W/PARA-THYRIOD (CPT=76536)  Result Date: 4/11/2025  PROCEDURE:  US THYROID W/PARA-THYRIOD (CPT=76536)  COMPARISON:  [facility], US THYROID (CPT=76536), 10/30/2023, 10:38 AM.  INDICATIONS:  E04.2 Multiple thyroid nodules  TECHNIQUE:  High-resolution ultrasound of the thyroid gland was performed.  PATIENT STATED HISTORY: (As transcribed by Technologist)  Patient states check up of thryoid gland and parathyroid.  Patient states concern due to elevated calcium levels and hx of kidney stones.     FINDINGS:   MEASUREMENTS: RIGHT LOBE:  5.2 x 1.6 x 2.1 cm LEFT LOBE:  4.8 x 1.2 x 1.7 cm ISTHMUS:  0.3 cm  NODULES:  1. Right thyroid lobe lower pole there is a 0.6 x 0.5 x 0.6 cm nodule.  Composition is solid.  Echogenicity is hypoechoic.  Shape is wider than tall.  Margins are smooth.  There are no  echogenic foci.  This is a TR 4 nodule and is not significantly changed. 2. Right thyroid lobe lower pole there is a 0.8 x 0.4 x 0.7 cm nodule.  Composition is solid.  Echogenicity is hypoechoic.  Shape is wider than tall.  Margins are smooth.  There are no echogenic foci.  This is a TR 4 nodule and is unchanged. 3. Nodule in the isthmus measures 0.2 x 0.1 x 0.2 cm.  This is too small for accurate characterization but is probably a benign TR 1 cyst. 4. Left thyroid lobe lower pole there is a 0.7 x 0.4 x 0.7 cm nodule.  Composition is solid.  Echogenicity is hypoechoic.  Shape is wider than tall.  Margins are smooth.  There are no echogenic foci.  This is a TR 4 nodule. 5. Left thyroid lobe mid gland there is a 0.6 x 0.5 x 0.5 cm nodule.  Composition is solid.  Echogenicity is hyperechoic.  Shape is wider than tall.  Margins are smooth.  There are no echogenic foci.  This is a TR 3 nodule.  This nodule was not clearly present on the prior study.  OTHER:  There is no enlarged parathyroid gland detected on this study.            CONCLUSION:  1. Multiple thyroid nodules are described above with TI-RADS classifications given.  Based on size and imaging features no additional follow-up or biopsy can be recommended at this time. 2. No specific enlarged parathyroid gland was detected.    ACR TI-RADS recommendations: TR5 - FNA if greater than or equal to 1 cm, follow-up if 0.5-0.9 cm every year for 5 years. TR4 - FNA if greater than or equal to 1.5 cm, follow-up if 1-1.4 cm in 1, 2, 3 and 5 years. TR3 - FNA if greater than or equal to 2.5 cm, follow-up if 1.5-2.4 cm in 1, 3 and 5 years TR1 and TR2 - no FNA or follow-up  Please note ACR TI-RADS recommendations are based on imaging features and size of the nodule only.  In certain clinical circumstances additional or alternative evaluation may be indicated.   LOCATION:  [facility]        Dictated by (CST): Carlos Rothman MD on 4/11/2025 at 1:48 PM     Finalized by (CST): Lorna  MD Carlos on 4/11/2025 at 1:52 PM       CT ABDOMEN+PELVIS(ALL W+WO)(CPT=74178)  Result Date: 3/28/2025  PROCEDURE:  CT ABDOMEN+PELVIS (ALL W+WO) (CPT=74178)  COMPARISON:  [facility] , CT, CT ABDOMEN+PELVIS KIDNEYSTONE 2D RNDR(NO IV,NO ORAL)(CPT=74176), 5/17/2024, 9:48 AM.  INDICATIONS:  N20.0 Nephrolithiasis D35.00 Adrenal adenoma, unspecified laterality  TECHNIQUE:  Noncontrast scanning through the abdomen and pelvis was performed from the dome of the diaphragm to the pubic symphysis followed by IV contrast-enhanced multislice CT scanning through the abdomen and pelvis using nonionic contrast.  Post contrast coronal MPR imaging was performed.  Dose reduction techniques were used. Dose information is transmitted to the ACR (American College of Radiology) NRDR (National Radiology Data Registry) which includes the Dose Index Registry.  PATIENT STATED HISTORY:(As transcribed by Technologist)  adrenal adenoma, kidney stones   CONTRAST USED:  80cc of Isovue 370  FINDINGS:  LIVER:  Diffuse hepatic steatosis is noted.  No focal hepatic lesions are identified BILIARY:  Gallbladder is surgically absent PANCREAS:  No lesion, fluid collection, ductal dilatation, or atrophy.  SPLEEN:  No enlargement or focal lesion.  KIDNEYS:  Extensive bilateral nephrolithiasis is noted.  An index calculus in the midpole of the right kidney measures up to 0.9 cm (image 46).  An index calculus in the lower pole of the left kidney measures up to 0.8 cm (image 52).  No ureteral calculi  noted.  There is no hydronephrosis. ADRENALS:  No mass or enlargement.  AORTA/VASCULAR:  No aneurysm or dissection.  RETROPERITONEUM:  No mass or adenopathy.  BOWEL/MESENTERY:  No visible mass, obstruction, or bowel wall thickening.  ABDOMINAL WALL:  No mass or hernia.  URINARY BLADDER:  No visible focal wall thickening, lesion, or calculus.  PELVIC NODES:  No adenopathy.  PELVIC ORGANS:  No visible mass.  IUD is in place.  Pelvic organs appropriate for patient age.   BONES:  No bony lesion or fracture.  LUNG BASES:  No visible pulmonary or pleural disease.  OTHER:  Negative.             CONCLUSION:  1. Multiple nonobstructing bilateral ureteral calculi noted.   LOCATION:  [facility]   Dictated by (CST): Rian Hutchison MD on 3/28/2025 at 8:58 AM     Finalized by (CST): Rian Hutchison MD on 3/28/2025 at 9:05 AM         Assessment & Plan:   Nephrolithiasis  Bilateral flank pain    Will review May 2024 CT scan images to compare (images would not load today)  Patient interested in left ureteroscopy, will discuss with MB given prior noted Jero Plaques intraoperatively.       Lorena Rock PA-C         [1]   Current Outpatient Medications   Medication Sig Dispense Refill    ergocalciferol 1.25 MG (19135 UT) Oral Cap Take 1 capsule (50,000 Units total) by mouth once a week. 24 capsule 0    Levonorgestrel (MIRENA, 52 MG,) 20 MCG/DAY Intrauterine IUD 20 mcg (1 each total) by Intrauterine route one time.      albuterol (PROAIR HFA) 108 (90 Base) MCG/ACT Inhalation Aero Soln Inhale 2 puffs into the lungs every 4 (four) hours as needed for Wheezing. 1 each 1

## 2025-04-25 ENCOUNTER — TELEPHONE (OUTPATIENT)
Facility: CLINIC | Age: 50
End: 2025-04-25

## 2025-04-25 ENCOUNTER — PATIENT MESSAGE (OUTPATIENT)
Facility: CLINIC | Age: 50
End: 2025-04-25

## 2025-04-25 DIAGNOSIS — E61.1 IRON DEFICIENCY: Primary | ICD-10-CM

## 2025-04-25 NOTE — TELEPHONE ENCOUNTER
To clarify - she should take ONE tablet of ferrous sulfate up to 3 times a week (M/W/F), not two tabs three times per week (that may cause constipation). Would have her take it for at least two months before we repeat iron levels. Magnesium levels were normal so I don't think she actively requires a supplement but have no problem with her taking that if she would like to.     I will send the patient a MyChart regarding thyroid US.     With regard to remaining symptoms, would agree it would be reasonable to talk to PCP (though iron deficiency can cause severe fatigue and hair loss) and consider seeing ENT for swallowing complaints.

## 2025-04-25 NOTE — TELEPHONE ENCOUNTER
Patient telephoned in asking how many magnesium 240 mg she should take daily. Regarding her iron she wants to know if she should repeat lab work at a certain time. Patient also asking about her US of her thyroid increasing.

## 2025-04-26 ENCOUNTER — LAB ENCOUNTER (OUTPATIENT)
Dept: LAB | Age: 50
End: 2025-04-26
Attending: INTERNAL MEDICINE
Payer: MEDICAID

## 2025-04-26 DIAGNOSIS — N20.0 KIDNEY STONES: ICD-10-CM

## 2025-04-26 LAB
ANION GAP SERPL CALC-SCNC: 11 MMOL/L (ref 0–18)
BILIRUB UR QL STRIP.AUTO: NEGATIVE
BUN BLD-MCNC: 12 MG/DL (ref 9–23)
CALCIUM BLD-MCNC: 9.5 MG/DL (ref 8.7–10.6)
CHLORIDE SERPL-SCNC: 107 MMOL/L (ref 98–112)
CLARITY UR REFRACT.AUTO: CLEAR
CO2 SERPL-SCNC: 22 MMOL/L (ref 21–32)
CREAT BLD-MCNC: 0.87 MG/DL (ref 0.55–1.02)
EGFRCR SERPLBLD CKD-EPI 2021: 82 ML/MIN/1.73M2 (ref 60–?)
FASTING STATUS PATIENT QL REPORTED: YES
GLUCOSE BLD-MCNC: 94 MG/DL (ref 70–99)
GLUCOSE UR STRIP.AUTO-MCNC: NORMAL MG/DL
KETONES UR STRIP.AUTO-MCNC: NEGATIVE MG/DL
LEUKOCYTE ESTERASE UR QL STRIP.AUTO: NEGATIVE
NITRITE UR QL STRIP.AUTO: NEGATIVE
OSMOLALITY SERPL CALC.SUM OF ELEC: 290 MOSM/KG (ref 275–295)
PH UR STRIP.AUTO: 6 [PH] (ref 5–8)
POTASSIUM SERPL-SCNC: 4 MMOL/L (ref 3.5–5.1)
PROT UR STRIP.AUTO-MCNC: 100 MG/DL
PTH-INTACT SERPL-MCNC: 69.4 PG/ML (ref 18.5–88)
SODIUM SERPL-SCNC: 140 MMOL/L (ref 136–145)
SP GR UR STRIP.AUTO: 1.02 (ref 1–1.03)
URATE SERPL-MCNC: 5.1 MG/DL (ref 3.1–7.8)
UROBILINOGEN UR STRIP.AUTO-MCNC: NORMAL MG/DL
VIT D+METAB SERPL-MCNC: 40.1 NG/ML (ref 30–100)

## 2025-04-26 PROCEDURE — 81001 URINALYSIS AUTO W/SCOPE: CPT

## 2025-04-26 PROCEDURE — 82340 ASSAY OF CALCIUM IN URINE: CPT

## 2025-04-26 PROCEDURE — 83970 ASSAY OF PARATHORMONE: CPT

## 2025-04-26 PROCEDURE — 84300 ASSAY OF URINE SODIUM: CPT

## 2025-04-26 PROCEDURE — 84560 ASSAY OF URINE/URIC ACID: CPT

## 2025-04-26 PROCEDURE — 82436 ASSAY OF URINE CHLORIDE: CPT

## 2025-04-26 PROCEDURE — 82507 ASSAY OF CITRATE: CPT

## 2025-04-26 PROCEDURE — 84105 ASSAY OF URINE PHOSPHORUS: CPT

## 2025-04-26 PROCEDURE — 83735 ASSAY OF MAGNESIUM: CPT

## 2025-04-26 PROCEDURE — 83986 ASSAY PH BODY FLUID NOS: CPT

## 2025-04-26 PROCEDURE — 83945 ASSAY OF OXALATE: CPT

## 2025-04-26 PROCEDURE — 36415 COLL VENOUS BLD VENIPUNCTURE: CPT

## 2025-04-26 PROCEDURE — 84550 ASSAY OF BLOOD/URIC ACID: CPT

## 2025-04-26 PROCEDURE — 84392 ASSAY OF URINE SULFATE: CPT

## 2025-04-26 PROCEDURE — 80048 BASIC METABOLIC PNL TOTAL CA: CPT

## 2025-04-26 PROCEDURE — 84133 ASSAY OF URINE POTASSIUM: CPT

## 2025-04-26 PROCEDURE — 81050 URINALYSIS VOLUME MEASURE: CPT

## 2025-04-26 PROCEDURE — 82306 VITAMIN D 25 HYDROXY: CPT

## 2025-04-28 ENCOUNTER — TELEPHONE (OUTPATIENT)
Dept: SURGERY | Facility: CLINIC | Age: 50
End: 2025-04-28

## 2025-04-28 NOTE — TELEPHONE ENCOUNTER
*Two surgery tickets, left followed by right, a few weeks apart.  Needs urine culture 10-14 days prior to first surgery.     Urology Surgery Request  Surgeon: Biebel  Location (if known): [facility]  Procedure: cystoscopy, LEFT ureteroscopy, laser lithotripsy, stone extraction, left retrograde pyelogram, left ureteral stent placement   Anesthesia: General   Time Frame: per patient  Time required: 75 minutes  Diagnosis: renal stones    Antibiotics: per hospital protocol unless checked below   ___ Levaquin 500 mg IV   ___ Gemcitabine 2g/mL NS bladder instillation to be given in OR    Urology Surgery Request  Surgeon: Biebel  Location (if known): [facility]  Procedure: cystoscopy, right ureteroscopy, laser lithotripsy, stone extraction, right retrograde pyelogram, right ureteral stent placement, left ureteral stent removal   Anesthesia: General   Time Frame: 2 weeks after LEFT ureteroscopy.  Time required: 90 minutes  Diagnosis: renal stones    Antibiotics: per hospital protocol unless checked below   ___ Levaquin 500 mg IV   ___ Gemcitabine 2g/mL NS bladder instillation to be given in OR    Estimated Post Op/Follow Up Appt: ~ 1 week  for stent removal. Please schedule appointment at time of surgery scheduling.

## 2025-04-29 ENCOUNTER — TELEPHONE (OUTPATIENT)
Dept: SURGERY | Facility: CLINIC | Age: 50
End: 2025-04-29

## 2025-04-29 DIAGNOSIS — N20.0 RENAL STONES: Primary | ICD-10-CM

## 2025-04-29 RX ORDER — DEXAMETHASONE 1 MG
TABLET ORAL
Qty: 1 TABLET | Refills: 1 | Status: SHIPPED | OUTPATIENT
Start: 2025-04-29

## 2025-04-29 NOTE — TELEPHONE ENCOUNTER
RN called patient in response to her inquiry about labs/ orders. Need to complete labs and CT prior to her follow up appt in June.   She was also inquiring if she should take Dexamethasone before lab test. RN pended medication for approval.   She agreed to plans.

## 2025-04-29 NOTE — TELEPHONE ENCOUNTER
NURSES PLEASE FOLLOW UP WITH PATIENT TEST REQUEST WITH . PLEASE REFER BACK TO [employer] MESSAGE.        Pt contacted and surgery has been scheduled. Please refer back to encounter 4/28 under surgery scheduling. THANKS

## 2025-04-29 NOTE — TELEPHONE ENCOUNTER
Per patient also stating she was given 3 tests to take by Dr. Garza sometime in June of least year- patient stating she has an appointment with Dr. Garza on 6/27 and would like to know when she is to have these tests done prior to that appointment.

## 2025-04-29 NOTE — TELEPHONE ENCOUNTER
This encounter is now closed.     RN updated via [employer]. Dexamethason 1mg the night before lab draw at 8AM. Follow up in June.

## 2025-04-30 NOTE — TELEPHONE ENCOUNTER
Telephoned patient verbalized read ultrasound report. No further questions.     Closing encounter.

## 2025-04-30 NOTE — TELEPHONE ENCOUNTER
Telephoned patient understood verbally will take iron supplement three times a week. Patient will follow up with ENT and primary care.    Closing encounter.

## 2025-05-02 ENCOUNTER — OFFICE VISIT (OUTPATIENT)
Dept: NEPHROLOGY | Facility: CLINIC | Age: 50
End: 2025-05-02
Payer: MEDICAID

## 2025-05-02 VITALS — WEIGHT: 181.13 LBS | BODY MASS INDEX: 37 KG/M2 | DIASTOLIC BLOOD PRESSURE: 84 MMHG | SYSTOLIC BLOOD PRESSURE: 152 MMHG

## 2025-05-02 DIAGNOSIS — N20.0 KIDNEY STONE: Primary | ICD-10-CM

## 2025-05-02 PROCEDURE — 99214 OFFICE O/P EST MOD 30 MIN: CPT | Performed by: INTERNAL MEDICINE

## 2025-05-02 RX ORDER — POTASSIUM CITRATE 1080 MG/1
20 TABLET, EXTENDED RELEASE ORAL 2 TIMES DAILY
Qty: 120 TABLET | Refills: 3 | Status: SHIPPED | OUTPATIENT
Start: 2025-05-02

## 2025-05-02 RX ORDER — HYDROCHLOROTHIAZIDE 25 MG/1
25 TABLET ORAL DAILY
Qty: 30 TABLET | Refills: 3 | Status: SHIPPED | OUTPATIENT
Start: 2025-05-02

## 2025-05-02 NOTE — PROGRESS NOTES
Nephrology Consult Note      REASON FOR CONSULT:  kidney stones          HPI:   Nadine Calix is a 50 year old female with   No chief complaint on file.    Fan Hernandez MD    51 y/o female with hx of kidney stones- since teens- has been seeing urology for years- has had multiple lithotripsies/uretral stents/nephrostomy tubes, etc presents for initial evaluation.  Was following w/ Dr. Jim, now switched to Dr. Garza   She also has hx of pre-DM (A1c 5.9), vit D deficiency  Reports stress incontinence (has 7 kids)  + family hx of kidney stones (maternal side); 2 daughters have kidney stones as well (oldest in her early 30s)    Does not drink enough water because she feels she can't do anything when she tries to drink the recommended  amount   Has prior hx of frequent UTIs      Prior stone analysis show primarily Ca stones;   Most recent urologic surgery was in Jan 2024- w/ Dr. Kayla Sanders renal scan 2/23/2024 shows 63% function from the right kidney, 37% function from the left kidney, no signs of obstruction with adequate drainage.     Since last visit; pt denies any acute changes  Labs reviwed; Cr normal  UA w/ high SG; pH6  24 hr urine stone risk pending      24 Hour Metabolic Urine Study Results  5/21/24     Volume: 2300 mL/day  Goal >2500  Sodium: 153 mmol/day  Goal <150  Calcium: 177 mg/day  Goal <250  Uric acid: 629 mg/day  Goal <750  Citrate: 195 mg/day  Goal >450  Oxalate: 27 mg/day  Goal <40  pH: 6.65  Goal 5.8 - 6.2 (>6 for UA stones, >7 for cystine stone)  Creatinine: 1275 mg/day  Normal 18-20 mg/kilogram/day (female)  Normal 20-22 mg/kilogram/day (male)  Cystinuria present? No     Last stone analysis: 50% COM, 50% calcium phosphate  Serum calcium: 9.2  PTH: 33.9 (normal)     ROS:    See above; 12 systems reviewed and otherwise unremarkable     PMH:  Past Medical History[1]      PSH:  Past Surgical History[2]      Medications (Active prior to today's visit):  Current  Medications[3]      Allergies:  Allergies[4]    Social History:  Social Hx on file[5]       Family History:  Family History[6]         PHYSICAL EXAM:   LMP 03/01/2025 (Exact Date)    Wt Readings from Last 6 Encounters:   04/18/25 181 lb 4 oz (82.2 kg)   03/24/25 179 lb 9.6 oz (81.5 kg)   03/08/25 181 lb 3.2 oz (82.2 kg)   12/16/24 181 lb 3.2 oz (82.2 kg)   11/11/24 172 lb (78 kg)   10/17/24 172 lb (78 kg)     General: Alert and oriented in no apparent distress.  HEENT: No scleral icterus, MMM  Neck: Supple, no JOHN or thyromegaly  Cardiac: Regular rate and rhythm, S1, S2 normal, no murmur or rub  Lungs: Clear without wheezes, rales, rhonchi.    Abdomen: Soft, non-tender. + bowel sounds, no palpable organomegaly  Extremities: Without clubbing, cyanosis or edema.  Neurologic:  normal affect, cranial nerves grossly intact, moving all extremities  Skin: Warm and dry, no rashes    Labs reviewed      Prior imaing/labs/notes reviewed      ASSESSMENT/PLAN:   There are no diagnoses linked to this encounter.    Kidney stones- longstanding hx ; fairly significant family hx as well  Patient is following closely w/ urology- currently w/ Dr. Garza (previously was seeing [facility] urologists). Has had multiple procedures in past as well.  Most recent CT shows multiple bilateral ureteral calculi (? Jero plaques)  She alejo snot have any flank pain currently; Reports urination is normal  She does have OAB - follows w/ urogyne- s/p PTNS  Was on thiazide/k-citrate, but currently off for unclear reasons.    - resume k-cit 20 bid + hydrochlorothiazide 25 daily  - advised water intake- however, pt states she cannot do it consistently due to her OAB  - advised low salt diet  - await 24 hr urine (states her total volume was ~ 1600ml) stone risk study  Pre-DM; follows w/ endocrine  HTN- ; stopped ace due to severe pruritus/LH; monitor now w/ addition of hydrochlorothiazide; she has multiple drug allg - BB/ARB/ACEi/CCB  Hx of adrenal adenoma-  f/u planned w/ urology  OAB- as above    F/u 3 mos    Greyson Orellana MD  5/2/2025           [1]   Past Medical History:   Abdominal distention    Longer than this but this is an estimate    Abdominal hernia    I had hernia repair around by my belly button & abdominal    Abdominal pain    Currently seeing obgyn, urologist & nephrologist    Arrhythmia    Back pain    Mid to lower back pain & hip pain    Benign essential HTN    Bloating    Longer than this but this is an estimate    Blood in urine    Due to severe kidney stones    Blurred vision    I always wore glasses or contacts since about 14 yrs old    Calculus of kidney    hydronephrosis/ several times kidney stones/ 13 th procedure for stones    Cervicalgia    Log Date: 05/04/2012         Change in hair    My hair is thinning more than the usual    Chest pain    I did see a cardiologist about this    Chest pain on exertion    I did see a cardiologists about this    COVID-19    headache, back pain, shortness of breath, sore throat, runny nose, chills. Not hospitalized    COVID-19    high fever, fatigue, not hospitalized    Dense breasts    heterogeneously dense breasts    Depression    Diarrhea, unspecified    I notice that certain things run right through me now    Disorder of liver    fatty liver    Dizziness    Dyspepsia    Easy bruising    I find bruises on me and don’t really know how I got them    Elevated fasting glucose    Enthesopathy of the wrist and carpus    Log Date: 05/31/2011         ESBL E. coli carrier    Excessive bleeding in premenopausal period    Fatigue    I noticed that I’m alot more tired lately more than usual    Flatulence/gas pain/belching    Longer than this but this is an estimate    Food intolerance    I think I am lactose intolerant been that way for a few year    Frequent urination    All the time for years    Frequent UTI    Due to kidney stones blockages    Gestational diabetes (HCC)    Heart murmur    My mother said I was born  with one but I never really checke    Heart palpitations    Pvc’s and irregular heart rate    Hemorrhoids    After last pregnancy or a couple years after that    High blood pressure    History of cardiac murmur    Mother told me I was born with one & my dr as well    History of D&C    History of depression    When I had a miscarriage    Indigestion    Longer than this but this is an estimate    Itch of skin    Not severe but I have been itching for no apparent reason    Leaking of urine    After first pregnancy    Leg swelling    My left leg slightly swelled up and my ankle area was hurtin    Lesion of ulnar nerve    Log Date: 2013         Loss of appetite    I noticed this lately too    Menometrorrhagia    Hysteroscopy 10/2020 path favors anovulatory breakdown bleeding.    Menometrorrhagia    Migraine with aura    Migraines    Multiple thyroid nodules    Myofascial pain    Nausea    Longer than this but this is an estimate    Night sweats    I feel like I’m on fire at night.    OAB (overactive bladder)    PTNS - posterior tibial nerve stimulation with urogynecologist Dr. Lara Jim    Painful urination    When passing stones    Postcoital bleeding    Prior to hysteroscopy done 10/2020. Also having since at least  or     Postcoital bleeding    Prediabetes    PVC's (premature ventricular contractions)    Rash    Broke out with unknown rash all over legs and arms    Sexually transmitted disease    HPV    Sleep disturbance    For years sometimes I can’t lay on my left side    Stress    Rough up bringing and also regular family stresses    Uncomfortable fullness after meals    Longer than this but this is an estimate    Unspecified condition originating in the  period    Ureteral stone with hydronephrosis    Ureteral stricture, right    Urinary incontinence    Constantly going can’t hold it    Ventral hernia without obstruction or gangrene    Visual impairment    glasses    Vitamin D  deficiency    Wears glasses    Since I was about 14 yrs old    Weight gain    After last son was born   [2]   Past Surgical History:  Procedure Laterality Date    Abdominal surgery      2 hernia repairs right & left side      ,2016    x2    Colonoscopy N/A 2021    Procedure: COLONOSCOPY, ESOPHAGOGASTRODUODENOSCOPY with biopsy;  Surgeon: Prashant Lilly DO;  Location:  ENDOSCOPY    Colonoscopy  2021 EGD WITH BIOPSY. Colonoscopy internal hemorrhoids. No gross findings to explain left sided pain. Recall 10 years. Prashant Lilly DO    Cryocautery of cervix      Cysto/uretero w/lithotripsy Left 2024    Cystoscopy, LEFT ureteroscopy, laser lithotripsy, basket stone extraction, retrograde pyelogram, ureteral stent placement, Right retrograde pyelogram Dr. Bryant Garza    Cysto/uretero w/up stricture Right 10/15/2019    Cysto Rt RPG, Rt URS w/ Laser Litho Dilation of Rtight Stricture Rt URS Stent Exchange w/ Dr Jim    Cysto/uretero, stone remove Right 2019    right ureter and kidney stones extraction, URS, stent placement    Cystoscopy,ureteroscopy,lithotripsy Right 2019    Cysto, B/L RPG, URS, laser litho, stone ext, Rt stent Dr. Jim    Egd  2021    for left sided pain    Hc endometrial sampling w or wo endocerv sampling  2023    EMB- Dr. Ramirez - disordered proliferative endometrium - recommended medical management options to regulate cycle.    Hernia surgery  2000    L hernia repair    Hysteroscopy,with sampling  10/08/2020    Hysteroscopy, D&C for intermittent menorrhagia, thickened endometrium, cervical stenosis. H/o menometrorrhagia & postcoital bleeding. Dr. Linnette Antoine. Path Endometrium with stromal collapse, superficial fibrin thrombi, and some polypoid tissue fragments with increased stromal fibrous component. Endometrial glands are proliferative. Overall pattern favors anovulatory breakdown bleeding.     Hysteroscopy,with sampling  07/11/2024    Hysteroscopy, D&C, insertion Mirena IUD. Done for menometrorrhagia & postcoital bleeding. Dr. Ema Randall. Path benign -Fragments of weakly proliferative endometrium with stromal breakdown/condensation. -Fragments of endocervical glands with inflammation and reactive changes.    Insert intrauterine device  07/11/2024    Mirena IUD insertion. 8 cm. Anteverted/mid position. Dr. Ema Randall    Kidney surgery Right     stent placement 6/2022    Laparoscopy,pelvic,biopsy      Kidney stones & examine    Lithotripsy  2001, 2007 & 11/11    Needle biopsy left  02/2021    fibroadenoma    Needle biopsy right  02/2021    fibroadenoma    Needle biopsy right Right 07/03/2024    fibroadenoma-benign    Other Bilateral 2012    nerve surgery to bilateral arms about 1 month apart    Other      percutaneous nephrolithotomy    Other surgical history  12/27/2019    cysto stent removal - dr. jim     Other surgical history  06/03/2020    Cysto Stent Removal w/ Dr Jim    Other surgical history  07/07/2021    Cysto/Stent Removal Dr. Jim    Other surgical history  07/08/2022    Right ureteroscopy, laser lithotripsy, stent placement, nephrostomy tube removal with Dr. Tariq    Removal gallbladder  1998    Remove stent via transureth Right 11/13/2019    Cysto Stent Removal w/ Dr Jim    Repair ing hernia,7+y/o,reducibl      Us breast biopsy 1 site right (cpt=19083) Right 07/03/2024    12:00 right breast. Fibroadenoma. Tiny microcalcifications. Breast tissue with hypocellular stromal fibrosis. No atypia or malignancy. Recommend follow-up mammogram and right breast ultrasound in 6 months.   [3]   Current Outpatient Medications   Medication Sig Dispense Refill    dexamethasone 1 MG Oral Tab Take 1 tablet (1 mg total) by mouth one time for 1 dose. Take at 11 PM night before lab tests. Lab draw at 8 AM the next morning. 1 tablet 1    ergocalciferol 1.25 MG (80213 UT) Oral Cap  Take 1 capsule (50,000 Units total) by mouth once a week. 24 capsule 0    Levonorgestrel (MIRENA, 52 MG,) 20 MCG/DAY Intrauterine IUD 20 mcg (1 each total) by Intrauterine route one time.      albuterol (PROAIR HFA) 108 (90 Base) MCG/ACT Inhalation Aero Soln Inhale 2 puffs into the lungs every 4 (four) hours as needed for Wheezing. 1 each 1   [4]   Allergies  Allergen Reactions    Amlodipine SWELLING     Leg swelling    Metoprolol SHORTNESS OF BREATH    Toradol [Ketorolac Tromethamine] SHORTNESS OF BREATH    Tramadol SHORTNESS OF BREATH    Dilaudid [Hydromorphone] ITCHING and PAIN     Headache - per pt this is only with long term use - pt states she can tolerate this medication    Oxycodone PAIN     headache    Wellbutrin [Bupropion Hcl] PAIN and DIZZINESS     Headache    Valsartan OTHER (SEE COMMENTS)     Chest pain      Codeine Sulfate ITCHING     TABS    Hydrocodone ITCHING    Lisinopril ITCHING    Morphine ITCHING    Seasonal Runny nose   [5]   Social History  Socioeconomic History    Marital status: Life Partner   Tobacco Use    Smoking status: Never    Smokeless tobacco: Never   Vaping Use    Vaping status: Never Used   Substance and Sexual Activity    Alcohol use: No    Drug use: No    Sexual activity: Not Currently     Partners: Male     Birth control/protection: Mirena, I.U.D.     Comment: Mirena IUD 7/11/24   Other Topics Concern    Caffeine Concern No    Exercise No    Seat Belt Yes   [6]   Family History  Problem Relation Age of Onset    Diabetes Mother     High Cholesterol Mother     Hypertension Mother     Thyroid disease Mother         hypothroid    Other (Other) Mother         Part of thyroid removed    Heart Disorder Father         3 stents in heart    Diabetes Father     Hypertension Father     Hypertension Sister     Other (ovarian problem) Sister         work up in progress    Diabetes Sister     Other (Other) Sister         Liver problem    Diabetes Maternal Grandmother     Ovarian Cancer  Maternal Grandmother          at 54    Diabetes Maternal Grandfather     Diabetes Paternal Grandmother     Diabetes Paternal Grandfather     Other (Other) Daughter         Appendicitis    Other (Other) Son         Appendicitis    Pancreatic Cancer Maternal Uncle     Breast Cancer Neg     Colon Cancer Neg

## 2025-05-06 ENCOUNTER — TELEPHONE (OUTPATIENT)
Dept: INTERNAL MEDICINE CLINIC | Facility: CLINIC | Age: 50
End: 2025-05-06

## 2025-05-06 LAB
AMMONIA, URINE: 49 MMOL/24 HR
CHLORIDE URINE: 211 MMOL/24 HR
CITRIC ACID(CITRATE): 261 MG/24 HR
CREATININE, URINE: 1550 MG/24 HR
CREATININE/KG BODY WEIGHT, URINE: 19.2 MG/24 HR/KG
CREATININE/KG BODY WEIGHT, URINE: 19.2 MG/24 HR/KG
LC CALCIUM OXALATE SATURATION, URINE: 3.93
LC CALCIUM PHOSPHATE SATURATION, URINE: 0.56
LC CALCIUM, URINE: 233 MG/24 HR
LC CALCIUM/CREATININE RATIO, URINE: 150 MG/G CREAT
LC CALCIUM/KG BODY WEIGHT, URINE: 2.9 MG/24 HR/KG
MAGNESIUM, URINE: 81 MG/24 HR
OXALATES, URINE: 18 MG/24 HR
PH, 24 HR URINE: 5.43
PHOSPHORUS, URINE: 1286 MG/24 HR
POTASSIUM, URINE: 59 MMOL/24 HR
PROTEIN CATABOLIC RATE, URINE: 1.1 G/KG/24 HR
PROTEIN CATABOLIC RATE, URINE: 1.1 G/KG/24 HR
SODIUM, URINE: 195 MMOL/24 HR
SULFATE, URINE: 45 MEQ/24 HR
UREA NITROGEN, URINE: 11.27 G/24 HR
UREA NITROGEN, URINE: 11.27 G/24 HR
URIC ACID SATURATION, URINE: 2.2
URIC ACID SATURATION, URINE: 2.2
URIC ACID, URINE: 708 MG/24 HR
URINE VOLUME (PRESERVATIVE): 1600 ML/24 HR

## 2025-05-06 NOTE — TELEPHONE ENCOUNTER
Pt has an appt on 5/8 to f/u with Carvedilol however since rx is discontinued pt would like to know if you still want to see her     Please advise

## 2025-05-13 ENCOUNTER — OFFICE VISIT (OUTPATIENT)
Facility: CLINIC | Age: 50
End: 2025-05-13
Payer: MEDICAID

## 2025-05-13 VITALS
WEIGHT: 182.19 LBS | HEART RATE: 90 BPM | DIASTOLIC BLOOD PRESSURE: 80 MMHG | HEIGHT: 59 IN | BODY MASS INDEX: 36.73 KG/M2 | SYSTOLIC BLOOD PRESSURE: 138 MMHG

## 2025-05-13 DIAGNOSIS — R92.30 DENSE BREASTS: ICD-10-CM

## 2025-05-13 DIAGNOSIS — M62.89 PELVIC FLOOR DYSFUNCTION: ICD-10-CM

## 2025-05-13 DIAGNOSIS — Z97.5 BREAKTHROUGH BLEEDING WITH IUD: ICD-10-CM

## 2025-05-13 DIAGNOSIS — N92.1 BREAKTHROUGH BLEEDING WITH IUD: ICD-10-CM

## 2025-05-13 DIAGNOSIS — Z87.42 HISTORY OF ACUTE PID: ICD-10-CM

## 2025-05-13 DIAGNOSIS — Z01.411 ENCOUNTER FOR WELL WOMAN EXAM WITH ABNORMAL FINDINGS: Primary | ICD-10-CM

## 2025-05-13 DIAGNOSIS — Z80.41 FAMILY HISTORY OF OVARIAN CANCER: ICD-10-CM

## 2025-05-13 DIAGNOSIS — Z98.890 HISTORY OF RIGHT BREAST BIOPSY: ICD-10-CM

## 2025-05-13 DIAGNOSIS — R10.32 CHRONIC LLQ PAIN: ICD-10-CM

## 2025-05-13 DIAGNOSIS — R22.31 AXILLARY MASS, RIGHT: ICD-10-CM

## 2025-05-13 DIAGNOSIS — N93.0 POSTCOITAL BLEEDING: ICD-10-CM

## 2025-05-13 DIAGNOSIS — Z12.4 SCREENING FOR CERVICAL CANCER: ICD-10-CM

## 2025-05-13 DIAGNOSIS — G89.29 CHRONIC LLQ PAIN: ICD-10-CM

## 2025-05-13 DIAGNOSIS — T83.32XA INTRAUTERINE CONTRACEPTIVE DEVICE THREADS LOST, INITIAL ENCOUNTER: ICD-10-CM

## 2025-05-13 PROBLEM — R73.03 PREDIABETES: Status: ACTIVE | Noted: 2025-03-08

## 2025-05-13 PROCEDURE — 99214 OFFICE O/P EST MOD 30 MIN: CPT | Performed by: OBSTETRICS & GYNECOLOGY

## 2025-05-13 PROCEDURE — 87624 HPV HI-RISK TYP POOLED RSLT: CPT | Performed by: OBSTETRICS & GYNECOLOGY

## 2025-05-13 PROCEDURE — 99396 PREV VISIT EST AGE 40-64: CPT | Performed by: OBSTETRICS & GYNECOLOGY

## 2025-05-13 PROCEDURE — 99459 PELVIC EXAMINATION: CPT | Performed by: OBSTETRICS & GYNECOLOGY

## 2025-05-13 PROCEDURE — 87625 HPV TYPES 16 & 18 ONLY: CPT | Performed by: OBSTETRICS & GYNECOLOGY

## 2025-05-13 NOTE — H&P
[facility]  Obstetrics and Gynecology   History & Physical  Est     Chief complaint:   Chief Complaint   Patient presents with    Wellness Visit     Last pap 24 neg       Subjective:     HPI: Nadine Calix is a 50 year old  with Patient's last menstrual period was 2025 (exact date).  Here for WWE. Still having some issues with pain & bleeding.   Partner here   Chaperone declined     PMH Menometrorrhagia, Migraine with aura, Obesity, CHTN, Prediabetes, thyroid nodules, GERD, chronic low back pain, anxiety, recurrent nephrolithiasis, apparently with ureteral scarring affecting kidney function. Detrusor instability, incomplete bladder emptying, urge incontinence, pelvic muscle wasting, recurrent UTI, cystocele, rectocele, OAB slightly improved after PTNS - used to void 20-25 times per night now down to 15 times per night. Sees uro & urogyn. PFPT done.   ~~~~~~~~~~~~~~~~~~~~~~~  24 Hysteroscopy, D&C, Mirena IUD insertion in OR for menometrorrhagia, postcoital bleeding. Weakly proliferative endometrium.Uterus 8 cm.      Fragments of endocervical glands with inflammation and reactive changes  -inflammation of the cervix and endocervical glands are very common findings  -this may or may not indicate a low grade infection  -we have a few choices      (1) Observe for any further spotting after intercourse etc (would need to give it a few months or so given recent Mirena IUD insertion on 24)      (2) Test for gonorrhea, chlamydia, and other possible cervical/vaginal infections      (3) Treat with an empiric course of doxycycline for cervicitis.  -given history of needing multiple antibiotics for urinary tract infections, would try to avoid taking any antibiotics unless strictly needed (I would advise option 1 or 2 or both)  ~~~~~~~~~~~~~~~~~~~~~~~~~~~~~~~~  24 Follow up procedure for AUB & Mirena IUD insertion 24 - 3 weeks ago.   Thinks has bladder infection  Very frequent  urination. Amount can be normal or small amounts.   No blood but with wiping slight pink - maybe vaginal source?   No period came (was due to have one just prior to the procedure)   Mild cramping after the IUD insertion.   Last few days - Cramping was more on the left side of pelvis, but now whole pelvis feels crampy,    Yesterday morning had a sharp pain in the lower pelvis.   The discharge can sometimes be cloudy - mainly clear like water  Feels wet a lot of the time.   Now she is thinking she may want to have a hysterectomy.   She is worried about ovarian cancer because of family history  Recommend treatment for possible PID.   IM ceftriaxone & Rx doxy & Flagyl for 2 weeks.   UA with some protein. GC/CT, Vaginitis swab, Ucx neg.   ~~~~~~~~~~~~~~~~~~~~~  5/13/2025 [employer] - issues with pain & bleeding   Since last visit the pain resolved. She took the antibiotics. Did help.    Partner here with her  Will get bleed for 3-4 days & then sometimes 2 weeks after that will see some blood.   Sometimes just once a month will get her bleed   Sometimes will get low back pain & hard to sleep & then the period comes.   Mirena IUD - just spotting this month.   LLQ -there since prior to the Mirena IUD   Does get red bleeding with intercourse. Occasionally painful with sex   Some sharp stabbing & poking pains in the abdomen for the past 1 month    Has a lot of blood in urine.   Has an adrenal adenoma. Going to have some bloodwork for her  Itching a lot recently - all over.   Noticing more fatigue & sleepiness. Has sleep study planned for Friday     7/3/24 right breast biopsy fibroadenoma    8/21/24 Breast surgeon Dr. Katty Friend - consult for abnormal mammogram with dense breasts. Tyrer Cuzick score, which revealed an estimated lifetime breast cancer risk of 12% and a 10-year breast cancer risk of 2.5%   R breast diagnostic mammogram in January 2025 to follow up probably benign findings (ordered by obgyn)  Mammogram: Next screening  mammogram would be Jun 2025 but will base this off of results of imaging in Jan 2025 (not ordered)  Screening ultrasound: US for dense breast tissue around 12/2024 (ordered)    Genetic counseling 10/21/24 & testing for Fhx ovarian cancer - patient NEGATIVE for 70 genes.     Posterior tibial nerve stimulation (PTNS) treatment for urge incontinence per Urogyn Dr. Lara Jim    Is scheduled for 2 procedures with urologist Dr. Bryant Garza:  6/11/25 CYSTOSCOPY LEFT URETEROSCOPY, LASER LITHOTRIPSY, STONE EXTRACTION, LEFT RETROGRADE PYELOGRAM,  LEFT URETERAL STENT PLACEMENT   6/25/25 CYSTOSCOPY RIGHT URETEROSCOPY, LASER LITHOTRIPSY, STONE EXTRACTION, RIGHT RETROGRADE PYELOGRAM, RIGHT URETERAL STENT PLACEMENT, LEFT URETERAL STENT REMOVAL     Patient's last menstrual period was 05/12/2025 (exact date).     PCP: Fan Hernandez MD   Patient Care Team:  Fan Hernandez MD as PCP - General (Family Practice)  Eryn Martin, FAITH (Physical Therapy)  Meryl Lambert MD (NEPHROLOGY)  Prashant Lilly DO (GASTROENTEROLOGY)  Eddie Marcus MD (Cardiac Electrophysiology)  Laurle Layton OT as Occupational Therapist (Occupational Therapist)  Faye Jerez PT as Physical Therapist (Physical Therapy)  Luly Jacome PT as Physical Therapist  Lara Jim DO (Urogynecology)  Anne Cummins MD (UROLOGY)  Bryant Garza MD (UROLOGY)  Howard Bear MD (ENDOCRINOLOGY)  Brii Scruggs, RN as Registered Nurse (Registered Nurse)  Hung Jacome PT as Physical Therapist  Jess Michele DO (ENDOCRINOLOGY)     Review of Systems   Constitutional:  Positive for fatigue.        5/13/2025 Noticing more fatigue & sleepiness. Has sleep study planned for Friday    Cardiovascular:         Has not been monitoring BP at home.    Gastrointestinal:         Taking fiber per urogyn to help with constipation.    Endocrine:        Prediabetes 3/8/25   Genitourinary:  Positive for frequency and hematuria. Negative for dyspareunia, dysuria, menstrual  problem, pelvic pain, vaginal bleeding and vaginal discharge.        2024 Ureters are scarred up from kidney stones. They are considering a major surgery for her. Self catheterizing. Not emptying bladder well. Urologist Dr. Garza. Has urogyn Dr. Lara Jim as well     OAB - PTNS - posterior tibial nerve treatment, also with cystocele, bladder pain & overactivity. Was voiding 25-35 times per night.     PFPT done in the past. Plans to return per Dr. Jim    Posterior tibial nerve stimulation (PTNS) treatment for urge incontinence per Urogyn Dr. Lara Jim. Stopped PTNS because she plateau'd  ~~~~~~~~~~~~~~~~~~~~~~~~  2024 - Menses somewhat irregular & can be heavy or normal. Occasional spotting after sex - for 2-3 years. No dyspareunia      Contraception: Mirena IUD insertion 7/11/24 in OR    5/13/2025   Since last visit the pain resolved. She took the antibiotics. Did help.    Partner here with her  Mirena IUD in place. Will get bleed for 3-4 days & then sometimes 2 weeks after that will see some blood.   Sometimes just once a month will get her bleed   Sometimes will get low back pain & hard to sleep & then the period comes.   Mirena IUD - just spotting this month.   LLQ discomfort constant, there since prior to the Mirena IUD   Does get red bleeding with intercourse. Occasionally painful with sex   Some sharp stabbing & poking pains in the abdomen for the past 1 month    Has a lot of blood in urine.   Has an adrenal adenoma. Going to have some bloodwork for her  Itching a lot recently - all over.   ~~~~~~~~~~~~~~~  +Bilateral inguinal pain for years. H/o bilateral inguinal & umbilical hernia repairs in the past. Does not have a general surgeon currently.    Skin:         Breasts - always lumpy for years. No new changes noted   Neurological:  Positive for headaches.        Vertigo sometimes - newer.   Headaches - will wake up with headaches in the morning sometimes.   Started magnesium glycinate  No  recent migraines   Psychiatric/Behavioral:  Positive for sleep disturbance.         Emotional   Forgetful at times  Not sleeping great - some is due to urination.     5/13/2025 Noticing more fatigue & sleepiness. Has sleep study planned for Friday      GYN Hx:   Regular about 28-30 x 5-7 x normal flow x some pain that was bad as a teen -> cramping after babies.   Mirena IUD twice, both prior to the pregnancies   Pelvic US 9/2020 - showed endometrium was 8 mm, hemorrhagic cyst. Suspected adenomyosis noted  Hysteroscopy D&C - 10/2020 with benign pathology   8/16/23 ED visit for AUB. Reported LMP 8/10/23 (6 days prior). Endometrium 18 mm. Physiologic simple cyst in the left ovary measures 3.1 x 2.2 x 3.0 cm.   8/18/23 EMB- Dr. Ramirez - disordered proliferative endometrium - recommended medical management options to regulate cycle.     2/27/24 Initially presented to me 2/27/24 for 2nd opinion regarding menometrorrhagia & postcoital spotting x 2-3 years. Extensive history of AUB. Has seen multiple gynecologists. Variable amounts of bleeding period to period since around 2022, around time of hot flashes starting. Pelvic US 8/2023 with thickened endometrium & simple left ovarian cyst. Endometrial biopsy 8/18/23 disordered proliferative.   Reviewed her pattern is likely anovulatory/perimenopause related given age & also vasomotor symptoms starting around the same time.   Discussed possibility of endometrial polyp vs unstable/dyssynchronized lining causing postcoital bleeding.   Provera advised to induce withdrawal bleed  Mirena IUD recommended.   Return to [facility] per Dr. Jim recommended   Genetic counseling ordered due to Fhx ovarian cancer in [place] [facility] [place] & pancreatic cancer     4/24/24 Patient contacted our office to report having prolonged bleeding despite cyclic Provera. Possibility of interference with her own ovulations. Rx Slynd. Pelvic US ordered to r/o endometrial polyp    5/7/24 FSH 13.9, estradiol 13    5/15/24 Pelvic  US - Endometrium is thick. Left ovary cyst 3.7 cm is simple. Smaller simple cyst in right ovary. Likely reflecting ovulatory dysfunction. Had not started Slynd yet.     24 Hysteroscopy, D&C, Mirena IUD insertion in OR for menometrorrhagia, postcoital bleeding. Sounded 8 cm. Benign path weakly proliferative endometrium, fragments of endocervical glands with inflammation and reactive changes    Contraception: Mirena IUD insertion 24 in OR    WWE 24 -Ema Randall MD     Cervical cancer screening:   History of colposcopy? Y  History of cryosurgery?   History of LEEP/conization? N  Pap 2022 was negative  24 Pap & HPV negative     Breast cancer screening:  Mammogram: 23 heterogeneously dense, benign. Biopsy clips noted.    7/3/24 - ultrasound biopsy of a a hypoechoic masslike area 12 o'clock position right breast measuring approximately 1.2 cm.    Colon cancer screenin2021 EGD WITH BIOPSY. Colonoscopy internal hemorrhoids. No gross findings to explain left sided pain. Colonoscopy recall 10 years. Prashant Lilly DO    OB History:    OB History    Para Term  AB Living   10 7 3 4 3 7   SAB IAB Ectopic Multiple Live Births   2 0 0  7      # Outcome Date GA Lbr Saad/2nd Weight Sex Type Anes PTL Lv   10  16 31w1d  7 lb 2.4 oz (3.243 kg) M CS-Unspec None  BECCA   9  06/15/14 36w1d  6 lb 14 oz (3.118 kg) M CS-Unspec EPI  BECCA      Complications: Gestational diabetes (HCC), Gestational diabetes mellitus (GDM) (HCC)   8 SAB  6w0d          7 AB 04 19w0d             Birth Comments: Spina Bifida   6 Term 02 40w0d  8 lb 15 oz (4.054 kg) M Vag-Spont EPI  BECCA      Complications:  labor,  labor (HCC)   5 Term 00 40w0d  7 lb 6 oz (3.345 kg) F Vag-Spont EPI  BECCA   4  98 36w0d  6 lb 6 oz (2.892 kg) M Vag-Spont None  BECCA   3  95 36w0d  7 lb (3.175 kg) M Vag-Spont None  BECCA   2 Term 94 40w0d  6 lb 12  oz (3.062 kg) F Vag-Spont None  BECCA      Complications: Preeclampsia, Pre-eclampsia (HCC)   1 SAB                Meds:  Current Outpatient Medications on File Prior to Visit   Medication Sig Dispense Refill    Magnesium Glycinate 120 MG Oral Cap       Ferrous Sulfate (IRON OR) Take by mouth.      potassium citrate (UROCIT-K 10) 10 MEQ (1080 MG) Oral Tab CR Take 2 tablets (20 mEq total) by mouth in the morning and 2 tablets (20 mEq total) before bedtime. 120 tablet 3    hydroCHLOROthiazide 25 MG Oral Tab Take 1 tablet (25 mg total) by mouth daily. 30 tablet 3    dexamethasone 1 MG Oral Tab Take 1 tablet (1 mg total) by mouth one time for 1 dose. Take at 11 PM night before lab tests. Lab draw at 8 AM the next morning. (Patient not taking: Reported on 5/13/2025) 1 tablet 1    ergocalciferol 1.25 MG (49390 UT) Oral Cap Take 1 capsule (50,000 Units total) by mouth once a week. 24 capsule 0    Levonorgestrel (MIRENA, 52 MG,) 20 MCG/DAY Intrauterine IUD 20 mcg (1 each total) by Intrauterine route one time.      albuterol (PROAIR HFA) 108 (90 Base) MCG/ACT Inhalation Aero Soln Inhale 2 puffs into the lungs every 4 (four) hours as needed for Wheezing. 1 each 1     No current facility-administered medications on file prior to visit.       All:  Allergies   Allergen Reactions    Amlodipine SWELLING     Leg swelling    Metoprolol SHORTNESS OF BREATH    Toradol [Ketorolac Tromethamine] SHORTNESS OF BREATH    Tramadol SHORTNESS OF BREATH    Dilaudid [Hydromorphone] ITCHING and PAIN     Headache - per pt this is only with long term use - pt states she can tolerate this medication    Lisinopril ITCHING and DIZZINESS    Oxycodone PAIN     headache    Wellbutrin [Bupropion Hcl] PAIN and DIZZINESS     Headache    Valsartan OTHER (SEE COMMENTS)     Chest pain      Codeine Sulfate ITCHING     TABS    Hydrocodone ITCHING    Lisinopril ITCHING    Morphine ITCHING    Seasonal Runny nose       PMH:  Past Medical History:   Diagnosis Date     Abdominal distention 01/2020    Longer than this but this is an estimate    Abdominal hernia 03/2017    I had hernia repair around by my belly button & abdominal    Abdominal pain 02/2020    Currently seeing obgyn, urologist & nephrologist    Allergic rhinitis 01/2011    A long time ago    Arrhythmia     Back pain 01/2018    Mid to lower back pain & hip pain    Benign essential HTN     Bloating 01/2020    Longer than this but this is an estimate    Blood in urine 1994    Due to severe kidney stones    Blurred vision 06/2020    I always wore glasses or contacts since about 14 yrs old    Calculus of kidney     hydronephrosis/ several times kidney stones/ 13 th procedure for stones    Cervicalgia 06/20/2013    Log Date: 05/04/2012         Change in hair 01/2020    My hair is thinning more than the usual    Chest pain 01/2019    I did see a cardiologist about this    Chest pain on exertion 01/2019    I did see a cardiologists about this    COVID-19 12/2021    headache, back pain, shortness of breath, sore throat, runny nose, chills. Not hospitalized    COVID-19 11/21/2023    high fever, fatigue, not hospitalized    Dense breasts 07/21/2023    heterogeneously dense breasts    Depression 06/29/2012    I lost my son    Diarrhea, unspecified 01/2020    I notice that certain things run right through me now    Disorder of liver     fatty liver    Dizziness 01/2019    Dysmenorrhea     Ever since I started Menstrating at 14 yrs old    Dyspareunia     Sometimes    Dyspepsia 06/29/2012    Easy bruising 01/2020    I find bruises on me and don’t really know how I got them    Elevated fasting glucose 06/18/2024    Enthesopathy of the wrist and carpus 06/20/2013    Log Date: 05/31/2011         ESBL E. coli carrier 01/26/2024    Excessive bleeding in premenopausal period 03/15/2023    Falls 9/7/2022    Tripped/fell into muskrat hole    Fatigue 11/2019    I noticed that I’m alot more tired lately more than usual    Flatulence/gas  pain/belching 01/2020    Longer than this but this is an estimate    Food intolerance 01/2020    I think I am lactose intolerant been that way for a few year    Frequent urination Since early age in my 20’s    All the time for years    Frequent UTI 1994    Due to kidney stones blockages    Gestational diabetes (HCC) 04/2014    Heart murmur     My mother said I was born with one but I never really checke    Heart palpitations 01/2019    Pvc’s and irregular heart rate    Hemorrhoids 11/2016 or 2018    After last pregnancy or a couple years after that    High blood pressure     History of cardiac murmur 01/2000    Mother told me I was born with one & my dr as well    History of D&C 10/08/2020    History of depression 07/2004    When I had a miscarriage    Indigestion 01/2020    Longer than this but this is an estimate    Itch of skin 01/2020    Not severe but I have been itching for no apparent reason    Leaking of urine 1994    After first pregnancy    Leg swelling 07/2020    My left leg slightly swelled up and my ankle area was hurtin    Lesion of ulnar nerve 06/20/2013    Log Date: 03/08/2013         Loss of appetite 01/2020    I noticed this lately too    Menometrorrhagia 10/2020    Hysteroscopy 10/2020 path favors anovulatory breakdown bleeding.    Menometrorrhagia 05/08/2024    Migraine with aura 06/29/2012    Migraines 1/2000    But ever since my 2 blood patches in 2002 No more migraines    Multiple thyroid nodules 12/06/2023    Myofascial pain 08/17/2021    Nausea 01/2020    Longer than this but this is an estimate    Nausea & vomiting 01/2019    Night sweats 01/2020    I feel like I’m on fire at night.    OAB (overactive bladder)     PTNS - posterior tibial nerve stimulation with urogynecologist Dr. Lara Jim    Painful urination 1994    When passing stones    Postcoital bleeding 10/2020    Prior to hysteroscopy done 10/2020. Also having since at least 2022 or 2023    Postcoital bleeding 02/27/2024     Prediabetes     Prediabetes 2025    3/8/25 A1c 5.9%    PVC's (premature ventricular contractions) 2020    Rash 2020    Broke out with unknown rash all over legs and arms    Sexually transmitted disease 2012    HPV    Shortness of breath 2019    Seen cardiologist about this    Sleep disturbance 2020    For years sometimes I can’t lay on my left side    Stress 1994    Rough up bringing and also regular family stresses    Uncomfortable fullness after meals 2020    Longer than this but this is an estimate    Unspecified condition originating in the  period 2004    Ureteral stone with hydronephrosis 2019    Ureteral stricture, right 2019    Urinary incontinence     Constantly going can’t hold it    Ventral hernia without obstruction or gangrene 2017    Visual impairment     glasses    Vitamin D deficiency     Wears glasses 1989    Since I was about 14 yrs old    Weight gain 2016    After last son was born        PSH:  Past Surgical History:   Procedure Laterality Date    Abdominal surgery      2 hernia repairs right & left side      ,2016    x2    Colonoscopy N/A 2021    Procedure: COLONOSCOPY, ESOPHAGOGASTRODUODENOSCOPY with biopsy;  Surgeon: Prashant Lilly DO;  Location:  ENDOSCOPY    Colonoscopy  2021 EGD WITH BIOPSY. Colonoscopy internal hemorrhoids. No gross findings to explain left sided pain. Recall 10 years. Prashant Lilly DO    Cryocautery of cervix      For abnormal cells    Cysto/uretero w/lithotripsy Left 2024    Cystoscopy, LEFT ureteroscopy, laser lithotripsy, basket stone extraction, retrograde pyelogram, ureteral stent placement, Right retrograde pyelogram Dr. Bryant Garza    Cysto/uretero w/up stricture Right 10/15/2019    Cysto Rt RPG, Rt URS w/ Laser Litho Dilation of Rtight Stricture Rt URS Stent Exchange w/ Dr Jim    Cysto/uretero, stone remove Right 2019    right ureter  and kidney stones extraction, URS, stent placement    Cystoscopy,ureteroscopy,lithotripsy Right 08/23/2019    Cysto, B/L RPG, URS, laser litho, stone ext, Rt stent Dr. Snell    D & c  10/2021 I believe    Lining of Uterus was way too thick, did biospy as well    Egd  04/07/2021    for left sided pain    Hc endometrial sampling w or wo endocerv sampling  08/18/2023    EMB- Dr. Ramirez - disordered proliferative endometrium - recommended medical management options to regulate cycle.    Hernia surgery  01/2000    L hernia repair    Hysteroscopy,with sampling  10/08/2020    Hysteroscopy, D&C for intermittent menorrhagia, thickened endometrium, cervical stenosis. H/o menometrorrhagia & postcoital bleeding. Dr. Linnette Antoine. Path Endometrium with stromal collapse, superficial fibrin thrombi, and some polypoid tissue fragments with increased stromal fibrous component. Endometrial glands are proliferative. Overall pattern favors anovulatory breakdown bleeding.    Hysteroscopy,with sampling  07/11/2024    Hysteroscopy, D&C, insertion Mirena IUD. Done for menometrorrhagia & postcoital bleeding. Dr. Ema Randall. Path benign -Fragments of weakly proliferative endometrium with stromal breakdown/condensation. -Fragments of endocervical glands with inflammation and reactive changes.    Insert intrauterine device  07/11/2024    Mirena IUD insertion. 8 cm. Anteverted/mid position. Dr. Ema Randall    Laparoscopy,pelvic,biopsy      Kidney stones & examine    Lithotripsy  2001, 2007 & 11/11    Needle biopsy left  02/2021    fibroadenoma    Needle biopsy right  02/2021    fibroadenoma    Needle biopsy right Right 07/03/2024    fibroadenoma-benign    Other Bilateral 2012    nerve surgery to bilateral arms about 1 month apart    Other      percutaneous nephrolithotomy    Other surgical history  12/27/2019    cysto stent removal - dr. snell     Other surgical history  06/03/2020    Cysto Stent Removal w/ Dr Snell     Other surgical history  2021    Cysto/Stent Removal Dr. Jim    Other surgical history  2022    Right ureteroscopy, laser lithotripsy, stent placement, nephrostomy tube removal with Dr. Tariq    Removal gallbladder  1998    Remove stent via transureth Right 2019    Cysto Stent Removal w/ Dr Jim    Repair ing hernia,7+y/o,reducibl  2017    Hernia in stomach and previous ones in abdomen    Us breast biopsy 1 site right (cpt=[id number]) Right 2024    12:00 right breast. Fibroadenoma. Tiny microcalcifications. Breast tissue with hypocellular stromal fibrosis. No atypia or malignancy. Recommend follow-up mammogram and right breast ultrasound in 6 months.       Social History:  Social History     Socioeconomic History    Marital status: Life Partner   Tobacco Use    Smoking status: Never    Smokeless tobacco: Never   Vaping Use    Vaping status: Never Used   Substance and Sexual Activity    Alcohol use: No    Drug use: No    Sexual activity: Yes     Partners: Male     Birth control/protection: Mirena, I.U.D.     Comment: Mirena IUD 24   Other Topics Concern    Caffeine Concern No    Exercise No    Seat Belt Yes        Family History:  Family History   Problem Relation Age of Onset    Diabetes Mother     High Cholesterol Mother     Hypertension Mother     Thyroid disease Mother         hypothroid    Other (Other) Mother         Part of thyroid removed    Depression Mother     Heart Disorder Father         3 stents in heart    Diabetes Father     Hypertension Father     Heart Disease Father     Heart Surgery Father         3 stents put in    Hypertension Sister     Other (ovarian problem) Sister         work up in progress    Diabetes Sister     Other (Other) Sister         Liver problem    Diabetes Maternal Grandmother     Ovarian Cancer Maternal Grandmother         My grandma  from it at age 54    Cancer Maternal Grandmother         Ovarian Cancer    Depression Maternal  Grandmother     Diabetes Maternal Grandfather     Dementia Maternal Grandfather     Diabetes Paternal Grandmother     Diabetes Paternal Grandfather     Dementia Paternal Grandfather         He was 90    Other (Other) Daughter         Appendicitis    Other (Other) Son         Appendicitis    Pancreatic Cancer Maternal Uncle     Hypertension Sister     Depression Sister     Diabetes Sister     Breast Cancer Sister     Colon Cancer Neg        Immunization History:  Immunization History   Administered Date(s) Administered    DTP 08/01/1995, 10/02/1995, 11/30/1995    FLULAVAL 6 months & older 0.5 ml Prefilled syringe (88593) 10/18/2018, 10/16/2019, 11/05/2020, 11/16/2021, 11/15/2022    FLUZONE 6 months and older PFS 0.5 ml ([id number]) 10/18/2018, 10/16/2019, 11/05/2020, 11/16/2021    HEP B, Ped/Adol 05/13/1995, 06/22/1995, 02/20/1996    Hib, Unspecified Formulation 08/01/1995, 10/02/1995, 11/30/1995    TDAP 06/16/2014, 10/04/2016       Depression Scale      PHQ-2 SCORE: 0  , done 3/8/2025   Last [place] Suicide Screening on 5/13/2025 was No Risk.      Objective:     Vitals:    05/13/25 0922   BP: 138/80   Pulse: 90   Weight: 182 lb 3.2 oz (82.6 kg)   Height: 59\"             Body mass index is 36.8 kg/m².    Physical Exam  Vitals and nursing note reviewed.   Constitutional:       General: She is not in acute distress.     Appearance: She is obese. She is not ill-appearing, toxic-appearing or diaphoretic.   HENT:      Head: Normocephalic and atraumatic.   Eyes:      Extraocular Movements: Extraocular movements intact.      Conjunctiva/sclera: Conjunctivae normal.   Cardiovascular:      Rate and Rhythm: Normal rate and regular rhythm.      Heart sounds: No murmur heard.  Pulmonary:      Effort: Pulmonary effort is normal.      Breath sounds: Normal breath sounds.      Comments: Right breast dense, +2-3 cm minimally tender somewhat firm fullness in anterior axilla asymmetric from left side.     Left breast   Very dense. No  axillary LAD  Abdominal:      General: There is no distension.      Palpations: Abdomen is soft. There is no mass.      Tenderness: There is no abdominal tenderness. There is no guarding or rebound.      Hernia: No hernia is present.      Comments: Obese. +scar from umbilical hernia repair. +scars bilateral groins.    Genitourinary:     Comments: VULVA:  normal appearing vulva with no masses, tenderness or lesions  URETHRA: unremarkable   PERINEUM: intact    VAGINA: stage II cystocele, stage I uterovaginal prolapse  CERVIX: stenotic. IUD strings not seen   UTERUS: minimally enlarged, anteverted, not CMT  ADNEXA: no masses, nontender  PELVIC FLOOR: moderate hypertonicity, moderate obturator tenderness L>R      Musculoskeletal:      Right lower leg: No edema.      Left lower leg: No edema.   Neurological:      General: No focal deficit present.      Mental Status: She is alert.   Psychiatric:         Mood and Affect: Mood normal.         Behavior: Behavior normal.         Thought Content: Thought content normal.         Judgment: Judgment normal.         Labs:  Lab Results   Component Value Date    WBC 8.1 03/08/2025    RBC 4.95 03/08/2025    HGB 14.5 03/08/2025    HCT 43.8 03/08/2025    MCV 88.5 03/08/2025    MCH 29.3 03/08/2025    MCHC 33.1 03/08/2025    RDW 13.3 03/08/2025    .0 03/08/2025    MPV 8.8 (L) 12/04/2012        Lab Results   Component Value Date    GLU 94 04/26/2025    BUN 12 04/26/2025    BUNCREA 11.0 06/30/2021    CREATSERUM 0.87 04/26/2025    ANIONGAP 11 04/26/2025    GFR 95 02/26/2017    GFRNAA 84 07/08/2022    GFRAA 97 07/08/2022    CA 9.5 04/26/2025    OSMOCALC 290 04/26/2025    ALKPHO 88 04/11/2025    AST 31 04/11/2025    ALT 54 (H) 04/11/2025    BILT 0.3 04/11/2025    TP 7.6 04/11/2025    ALB 4.7 04/11/2025    GLOBULIN 2.9 04/11/2025     04/26/2025    K 4.0 04/26/2025     04/26/2025    CO2 22.0 04/26/2025       Lab Results   Component Value Date    CHOLEST 181 04/11/2025     TRIG 168 (H) 04/11/2025    HDL 36 (L) 04/11/2025     (H) 04/11/2025    VLDL 29 04/11/2025    NONHDLC 145 (H) 04/11/2025        Lab Results   Component Value Date    T4F 1.2 03/08/2025    TSH 1.572 03/08/2025        Lab Results   Component Value Date     03/08/2025    A1C 5.9 (H) 03/08/2025     Component      Latest Ref Rng 5/7/2024   FSH      No established range for female sex mIU/mL 13.9    Estradiol      No established range for female sex pg/mL 13.1    VITAMIN D, 25-OH, TOTAL      30.0 - 100.0 ng/mL 56.5        Imaging:  US THYROID W/PARA-THYRIOD (CPT=76536)  Result Date: 4/11/2025  CONCLUSION:  1. Multiple thyroid nodules are described above with TI-RADS classifications given.  Based on size and imaging features no additional follow-up or biopsy can be recommended at this time. 2. No specific enlarged parathyroid gland was detected.    ACR TI-RADS recommendations: TR5 - FNA if greater than or equal to 1 cm, follow-up if 0.5-0.9 cm every year for 5 years. TR4 - FNA if greater than or equal to 1.5 cm, follow-up if 1-1.4 cm in 1, 2, 3 and 5 years. TR3 - FNA if greater than or equal to 2.5 cm, follow-up if 1.5-2.4 cm in 1, 3 and 5 years TR1 and TR2 - no FNA or follow-up  Please note ACR TI-RADS recommendations are based on imaging features and size of the nodule only.  In certain clinical circumstances additional or alternative evaluation may be indicated.   LOCATION:  [place]        Dictated by (CST): Carlos Rothman MD on 4/11/2025 at 1:48 PM     Finalized by (CST): Carlos Rothman MD on 4/11/2025 at 1:52 PM       CT ABDOMEN+PELVIS(ALL W+WO)(CPT=74178)  Result Date: 3/28/2025  CONCLUSION:  1. Multiple nonobstructing bilateral ureteral calculi noted.   LOCATION:  [place]   Dictated by (CST): Rian Hutchison MD on 3/28/2025 at 8:58 AM     Finalized by (CST): Rian Hutchison MD on 3/28/2025 at 9:05 AM          PROCEDURE:  US PELVIS W DOPPLER (EKA=14939/[id number])     COMPARISON:  [facility], CT  ABDOMEN+PELVIS(CONTRAST ONLY)(CPT=74177), 5/05/2023, 12:11 PM.  [facility], US PELVIS W EV (CPT=76856/61798), 3/28/2023, 2:00 PM.     INDICATIONS:  Large amounts of vaginal bleeding and clots per patient since 8/10     TECHNIQUE:  Transabdominal imaging was performed of the pelvis.   Arterial and venous Doppler of the ovaries was also performed.  PATIENT STATED HISTORY: (As transcribed by Technologist)  Patient stated heavy vaginal bleeding with large clots for six days, pelvic cramping.         FINDINGS:                UTERUS:  10.05 cm x 4.92 cm x 6.61 cm    Endometrium Thickness:  18 mm and is homogeneous.  The thickness of the endometrium would be considered abnormally thickened given that the LMP was 8/10/2023.  Possibility of endometrial hyperplasia or neoplasm cannot be completely excluded by imaging  alone and further evaluation with gynecology would be recommended.    The uterus appears normal in size, shape, and echogenicity.  RIGHT OVARY:  2.90 cm x 1.91 cm x 2.10 cm    The right ovary appears normal in size, shape, and echogenicity. No significant masses are identified.  LEFT OVARY:  4.31 cm x 3.31 cm x 3.92 cm    The left ovary appears normal in size, shape, and echogenicity. No significant masses are identified.  Physiologic simple cyst in the left ovary measures 3.1 x 2.2 x 3.0 cm.  CUL-DE-SAC:  Normal.  No fluid or mass.    OTHER:  Negative.       DOPPLER WAVE FORMS  FLOW:  There is normal arterial and venous Doppler wave forms in both ovaries.  The spectral analysis is within normal limits.  OTHER:  Negative.                   Impression   CONCLUSION:  There is thickening of the endometrium which would be considered abnormal given the LMP was 8/10/2023.  Further evaluation with gynecology recommended to evaluate for endometrial atypia, hyperplasia, or neoplasm.        LOCATION:  [place]           Dictated by (CST): Tye Kelly MD on 8/16/2023 at 7:03 PM      Finalized by (CST): Singer  MD Tye on 2023 at 7:06 PM      Assessment:     Nadine Calix is a 50 year old  female PMH menometrorrhagia, postcoital bleeding, sampling of endometrium in  &  both suggestive of anovulatory/proliferative pattern. She presented as a 2nd opinion regarding AUB. Suspect AUB is due to anovulatory bleeding but her recent lack of responding typically to cyclic Provera is much more suspicious for possible polyp or fibroid within endometrial cavity. On 24 Underwent hysteroscopy, D&C, Mirena IUD insertion in OR with benign path. Postop with some recent increased pelvic pain & discharged, advised treatment for possible acute PID.     Here for WWE. Felt her pelvic pain improved after antibiotics for PID. Irregular bleeding & cramping on Mirena IUD though generally pretty light. Sometimes postcoital bleeding. Incidental right anterior axillary lump noted on exam. IUD strings not seen today, 2025     Diagnoses and all orders for this visit:    Encounter for well woman exam with abnormal findings    Screening for cervical cancer  -     ThinPrep PAP Smear; Future  -     Hpv Dna  High Risk , Thin Prep Collect; Future    Axillary mass, right  -     PRAVEEN LIZ 2D+3D DIAGNOSTIC PRAVEEN  BILAT (CPT=77066/25673); Future  -     US AXILLARY RIGHT (RLU=29102); Future    Dense breasts    Intrauterine contraceptive device threads lost, initial encounter  -     US PELVIS (TRANSABDOMINAL PELVIS)  (CPT=76856); Future  -     US PELVIS (TRANSVAGINAL ONLY) (CPT=76830); Future  -     US PELVIS W EV (CPT=76856/79877); Future    History of right breast biopsy  -     PRAVEEN LIZ 2D+3D DIAGNOSTIC PRAVEEN  BILAT (CPT=77066/46819); Future  -     US AXILLARY RIGHT (OHC=88732); Future    Breakthrough bleeding with IUD    Postcoital bleeding  -     -II; Future  -     US PELVIS (TRANSABDOMINAL PELVIS)  (CPT=76856); Future  -     US PELVIS (TRANSVAGINAL ONLY) (CPT=76830); Future  -     US PELVIS W EV (CPT=76856/27253);  Future    History of acute PID    Chronic LLQ pain  -     US PELVIS (TRANSABDOMINAL PELVIS)  (CPT=76856); Future  -     US PELVIS (TRANSVAGINAL ONLY) (CPT=76830); Future  -     US PELVIS W EV (CPT=76856/35738); Future    Family history of ovarian cancer  -     -II; Future  -     US PELVIS (TRANSABDOMINAL PELVIS)  (CPT=76856); Future  -     US PELVIS (TRANSVAGINAL ONLY) (CPT=76830); Future  -     US PELVIS W EV (CPT=76856/05304); Future    Pelvic floor dysfunction          Plan:     H/o Acute PID  -Suspected 8/1/24 - Pelvic pain increased over past few days +CMT and bilateral adnexal tenderness, some discharge white to slightly yellow, slightly mucoid & cloudy  -BD molecular swab & GC/CT negative. IM ceftriaxone 500 mg administered. Rx PO doxy & flagyl x 14 days each  -5/13/2025 says the pain resolved after the antibiotics    Urinary frequency (h/o stones, complex urology history).   -UA & Ucx 8/1/2024 with copy to patient's urogynecologist Dr. Lara Jim    Recurrent nephrolithiasis  -has procedures planned for 6/2025 with urologist     AUB  -including intermittently heavy & prolonged periods, +Postcoital bleeding  -Hysteroscopy D&C - 10/2020 suggestive anovulatory breakdown bleeding  -8/16/23 ED visit for AUB. Reported LMP 8/10/23 (6 days prior). Endometrium 18 mm. Physiologic simple cyst in the left ovary measures 3.1 x 2.2 x 3.0 cm.   -8/18/23 EMB- Dr. Ramirez - disordered proliferative endometrium - recommended medical management options to regulate cycle.   -hot flashes for about 1.5-2 years. Feels AUB worsened around this time   -reviewed pattern is likely anovulatory bleeding (perimenopause likely)   -labs reviewed - early perimenopausal level FSH  -took Provera 10 mg x 10 nights a few times - bleeding pattern not as expected, concerning for structural cause.   -Rx Slynd 4/29 - has not started yet. Encouraged to start. Declines high dose NE or Provera at this time.   -7/11/24 HSC, D&C, Mirena IUD  insertion. Path benign.   -8/1/2024 IUD strings appropriate. No period yet since 7/11/24 procedure. Suspected acute PID. Treatment advised  -5/13/2025 Overall bleeding is less on Mirena IUD but the bleeding & cramping are somewhat irregular. IUD strings not seen     Pelvic floor dysfunction & OAB  -Will be returning to PFPT per Dr. Jim     Fhx ovarian cancer in [facility] & pancreatic cancer   -Genetic counseling 10/21/24 & testing for Fhx ovarian cancer - patient NEGATIVE for 70 genes.   -discussed bilateral salpingectomy to reduce risk of ovarian cancer   -discussed limitations of \"screening\" for ovarian cancer with yearly TVUS & CA-125. She is interested    Pap & HPV neg 5/8/24   -up to date per guidelines. Pt prefers pap is done today, 5/13/2025      Dense breasts, h/o breast biopsy   -6/26/25 Diagnostic Mammo Bilateral -> right breast biopsy recommended & done.   -7/3/24 - ultrasound biopsy of a a hypoechoic masslike area 12 o'clock position right breast measuring approximately 1.2 cm. Fibroadenoma/benign - advised diagnostic mammo 6 months 12/30/24 approx   -8/21/24 Breast surgeon Consult Dr. Katty Friend. Tyrer Cuzick score, which revealed an estimated lifetime breast cancer risk of 12% and a 10-year breast cancer risk of 2.5%   -1/28/25 Diagnostic mammo right - stable. US right breast in 6 months advised -> ordered per Dr. Friend   -5/13/2025 breast exam - lump 2-3 cm in size right axilla.   -diagnostic mammogram bilateral breasts & US axilla right orrdered.     Colonoscopy 4/7/21. Recall 10 years. Prashant Lilly, DO    Contraception  -Mirena IUD insertion 7/11/24 in OR  -8/1/2024 IUD strings seen - about 1 cm.   -5/13/2025 IUD strings NOT seen  5/13/2025. Urine preg test NEG 5/13/2025. Will check pelvic US    RTC WWE due after 5/13/26    Ema Randall MD  [facility] - OBGYN    Note to patient and family:  The 21st Century Cures Act makes medical notes available to patients in the interest of transparency.   However, please be advised that this is a medical document.  It is intended as a peer to peer communication.  It is written in medical language and may contain abbreviations or verbiage that are technical and unfamiliar.  It may appear blunt or direct.  Medical documents are intended to carry relevant information, facts as evident, and the clinical opinion of the practitioner.

## 2025-05-15 ENCOUNTER — TELEPHONE (OUTPATIENT)
Dept: INTERNAL MEDICINE CLINIC | Facility: CLINIC | Age: 50
End: 2025-05-15

## 2025-05-15 DIAGNOSIS — I10 UNCONTROLLED HYPERTENSION: ICD-10-CM

## 2025-05-15 DIAGNOSIS — E66.812 CLASS 2 SEVERE OBESITY DUE TO EXCESS CALORIES WITH SERIOUS COMORBIDITY AND BODY MASS INDEX (BMI) OF 36.0 TO 36.9 IN ADULT (HCC): ICD-10-CM

## 2025-05-15 DIAGNOSIS — R06.83 SNORING: Primary | ICD-10-CM

## 2025-05-15 DIAGNOSIS — E66.01 CLASS 2 SEVERE OBESITY DUE TO EXCESS CALORIES WITH SERIOUS COMORBIDITY AND BODY MASS INDEX (BMI) OF 36.0 TO 36.9 IN ADULT (HCC): ICD-10-CM

## 2025-05-15 PROBLEM — Z98.890 STATUS POST HYSTEROSCOPY: Status: RESOLVED | Noted: 2024-07-11 | Resolved: 2025-05-15

## 2025-05-15 PROBLEM — Z98.890 STATUS POST D&C: Status: RESOLVED | Noted: 2024-07-11 | Resolved: 2025-05-15

## 2025-05-15 PROBLEM — N63.15 MASS OVERLAPPING MULTIPLE QUADRANTS OF RIGHT BREAST: Status: RESOLVED | Noted: 2024-05-08 | Resolved: 2025-05-15

## 2025-05-15 PROBLEM — R87.810 CERVICAL HIGH RISK HUMAN PAPILLOMAVIRUS (HPV) DNA TEST POSITIVE: Status: ACTIVE | Noted: 2025-05-13

## 2025-05-15 LAB
HPV E6+E7 MRNA CVX QL NAA+PROBE: POSITIVE
HPV16 DNA CVX QL PROBE+SIG AMP: NEGATIVE
HPV18 DNA CVX QL PROBE+SIG AMP: NEGATIVE

## 2025-05-16 ENCOUNTER — LAB ENCOUNTER (OUTPATIENT)
Dept: LAB | Age: 50
End: 2025-05-16
Attending: OBSTETRICS & GYNECOLOGY
Payer: MEDICAID

## 2025-05-16 DIAGNOSIS — Z80.41 FAMILY HISTORY OF OVARIAN CANCER: ICD-10-CM

## 2025-05-16 DIAGNOSIS — N93.0 POSTCOITAL BLEEDING: ICD-10-CM

## 2025-05-16 LAB — CANCER AG125 SERPL-ACNC: 8 U/ML (ref ?–30.2)

## 2025-05-16 PROCEDURE — 86304 IMMUNOASSAY TUMOR CA 125: CPT

## 2025-05-16 PROCEDURE — 36415 COLL VENOUS BLD VENIPUNCTURE: CPT

## 2025-05-19 NOTE — TELEPHONE ENCOUNTER
MCM sent to patient to inform that In-Home sleep study has been placed and information to schedule was given

## 2025-05-29 ENCOUNTER — HOSPITAL ENCOUNTER (OUTPATIENT)
Dept: ULTRASOUND IMAGING | Age: 50
Discharge: HOME | End: 2025-05-29
Attending: OBSTETRICS & GYNECOLOGY
Payer: MEDICAID

## 2025-05-29 DIAGNOSIS — G89.29 CHRONIC LLQ PAIN: ICD-10-CM

## 2025-05-29 DIAGNOSIS — N93.0 POSTCOITAL BLEEDING: ICD-10-CM

## 2025-05-29 DIAGNOSIS — R10.32 CHRONIC LLQ PAIN: ICD-10-CM

## 2025-05-29 DIAGNOSIS — T83.32XA INTRAUTERINE CONTRACEPTIVE DEVICE THREADS LOST, INITIAL ENCOUNTER: ICD-10-CM

## 2025-05-29 DIAGNOSIS — Z80.41 FAMILY HISTORY OF OVARIAN CANCER: ICD-10-CM

## 2025-05-29 PROCEDURE — 76830 TRANSVAGINAL US NON-OB: CPT | Performed by: OBSTETRICS & GYNECOLOGY

## 2025-05-29 PROCEDURE — 76856 US EXAM PELVIC COMPLETE: CPT | Performed by: OBSTETRICS & GYNECOLOGY

## 2025-05-29 RX ORDER — CALCIUM POLYCARBOPHIL 625 MG
TABLET ORAL
COMMUNITY

## 2025-05-30 ENCOUNTER — LAB ENCOUNTER (OUTPATIENT)
Dept: LAB | Age: 50
End: 2025-05-30
Attending: SURGERY
Payer: MEDICAID

## 2025-05-30 ENCOUNTER — EKG ENCOUNTER (OUTPATIENT)
Dept: LAB | Age: 50
End: 2025-05-30
Attending: SURGERY
Payer: MEDICAID

## 2025-05-30 DIAGNOSIS — N20.0 RENAL STONES: ICD-10-CM

## 2025-05-30 DIAGNOSIS — R39.89 URINARY PROBLEM: ICD-10-CM

## 2025-05-30 DIAGNOSIS — N20.0 KIDNEY STONE: ICD-10-CM

## 2025-05-30 LAB
ANION GAP SERPL CALC-SCNC: 13 MMOL/L (ref 0–18)
ATRIAL RATE: 60 BPM
BILIRUB UR QL STRIP.AUTO: NEGATIVE
BUN BLD-MCNC: 12 MG/DL (ref 9–23)
CALCIUM BLD-MCNC: 9.5 MG/DL (ref 8.7–10.6)
CHLORIDE SERPL-SCNC: 103 MMOL/L (ref 98–112)
CLARITY UR REFRACT.AUTO: CLEAR
CO2 SERPL-SCNC: 24 MMOL/L (ref 21–32)
COLOR UR AUTO: YELLOW
CREAT BLD-MCNC: 0.86 MG/DL (ref 0.55–1.02)
EGFRCR SERPLBLD CKD-EPI 2021: 83 ML/MIN/1.73M2 (ref 60–?)
GLUCOSE BLD-MCNC: 121 MG/DL (ref 70–99)
GLUCOSE UR STRIP.AUTO-MCNC: NORMAL MG/DL
KETONES UR STRIP.AUTO-MCNC: NEGATIVE MG/DL
LEUKOCYTE ESTERASE UR QL STRIP.AUTO: NEGATIVE
NITRITE UR QL STRIP.AUTO: NEGATIVE
OSMOLALITY SERPL CALC.SUM OF ELEC: 291 MOSM/KG (ref 275–295)
P AXIS: 36 DEGREES
P-R INTERVAL: 126 MS
PH UR STRIP.AUTO: 6.5 [PH] (ref 5–8)
POTASSIUM SERPL-SCNC: 3.5 MMOL/L (ref 3.5–5.1)
PROT UR STRIP.AUTO-MCNC: 100 MG/DL
Q-T INTERVAL: 418 MS
QRS DURATION: 84 MS
QTC CALCULATION (BEZET): 418 MS
R AXIS: 37 DEGREES
RBC #/AREA URNS AUTO: >10 /HPF
SODIUM SERPL-SCNC: 140 MMOL/L (ref 136–145)
SP GR UR STRIP.AUTO: 1.02 (ref 1–1.03)
T AXIS: 16 DEGREES
UROBILINOGEN UR STRIP.AUTO-MCNC: NORMAL MG/DL
VENTRICULAR RATE: 60 BPM

## 2025-05-30 PROCEDURE — 87086 URINE CULTURE/COLONY COUNT: CPT

## 2025-05-30 PROCEDURE — 93005 ELECTROCARDIOGRAM TRACING: CPT

## 2025-05-30 PROCEDURE — 93010 ELECTROCARDIOGRAM REPORT: CPT | Performed by: INTERNAL MEDICINE

## 2025-05-30 PROCEDURE — 36415 COLL VENOUS BLD VENIPUNCTURE: CPT

## 2025-05-30 PROCEDURE — 80048 BASIC METABOLIC PNL TOTAL CA: CPT

## 2025-05-30 PROCEDURE — 81001 URINALYSIS AUTO W/SCOPE: CPT

## 2025-06-03 ENCOUNTER — HOSPITAL ENCOUNTER (OUTPATIENT)
Dept: MAMMOGRAPHY | Facility: HOSPITAL | Age: 50
Discharge: HOME | End: 2025-06-03
Attending: OBSTETRICS & GYNECOLOGY
Payer: MEDICAID

## 2025-06-03 DIAGNOSIS — R22.31 AXILLARY MASS, RIGHT: ICD-10-CM

## 2025-06-03 DIAGNOSIS — Z98.890 HISTORY OF RIGHT BREAST BIOPSY: ICD-10-CM

## 2025-06-03 PROCEDURE — 77066 DX MAMMO INCL CAD BI: CPT | Performed by: OBSTETRICS & GYNECOLOGY

## 2025-06-03 PROCEDURE — 77062 BREAST TOMOSYNTHESIS BI: CPT | Performed by: OBSTETRICS & GYNECOLOGY

## 2025-06-03 PROCEDURE — 76642 ULTRASOUND BREAST LIMITED: CPT | Performed by: OBSTETRICS & GYNECOLOGY

## 2025-06-06 ENCOUNTER — TELEPHONE (OUTPATIENT)
Facility: CLINIC | Age: 50
End: 2025-06-06

## 2025-06-06 NOTE — TELEPHONE ENCOUNTER
Patient request for RX Slynd(pended)  Wanted to clarify that her LMP was 5/12/25 and   last spotting after intercourse few weeks ago, bleeding better. Still having bad cramps, last spotting last month when she was seen in office.     Routed to Dr. Randall for approval.    Aware insurance may not cover Slynd, can do PA or use goodrx, savings card.    Patient verbalized understanding, agreed to and intend to comply with plan of care.

## 2025-06-08 RX ORDER — DROSPIRENONE 4 MG/1
1 TABLET, FILM COATED ORAL DAILY
Qty: 84 TABLET | Refills: 3 | Status: SHIPPED | OUTPATIENT
Start: 2025-06-08 | End: 2026-06-08

## 2025-06-10 ENCOUNTER — ANESTHESIA EVENT (OUTPATIENT)
Dept: SURGERY | Facility: HOSPITAL | Age: 50
End: 2025-06-10
Payer: MEDICAID

## 2025-06-10 NOTE — H&P
UROLOGY HISTORY & PHYSICAL      Nadine Calix Patient Status:  Hospital Outpatient Surgery    3/22/1975 MRN [id number]   [facility] SURGERY Attending Bryant Garza MD   Hosp Day # 0 PCP Fan Hernandez MD       Chief Complaint:   Nephrolithiasis    HPI & Assessment:   Nadine Calix is a 50 year old female with hx of anxiety, GERD, HTN, OAB, who presents for follow up stones.     Patient was seen last month for bilateral flank pain.     She had CT scan completed 3/27/25 that showed many bilateral stones, largest on the right 9mm and largest on the left 8mm.  Some calcifications may be intraparenchymal.    Urine culture negative on 25.    Plan:   - OR for cystoscopy, LEFT ureteroscopy, laser lithotripsy, BILATERAL stent placement  - RIGHT ureteroscopy 25  - Informed consent obtained - risks and benefits explained, all questions answered       History:   Past Medical History[1]    Past Surgical History[2]    Family History[3]    Short Social Hx on File[4]    Medications (Active prior to today's visit):  Current Medications[5]    Allergies:  Allergies[6]    Review of Systems:   A comprehensive 10-point review of systems was completed.  Pertinent positives and negatives are noted in the the HPI.    Physical Exam:   Vital Signs:  Height 4' 11\" (1.499 m), weight 182 lb (82.6 kg), last menstrual period 2025, not currently breastfeeding.     CONSTITUTIONAL: Well developed, well nourished, in no acute distress  NEUROLOGIC: Alert and oriented  HEAD: Normocephalic, atraumatic  EYES: Sclera non-icteric  ENT: Hearing intact, moist mucous membranes  NECK: No obvious goiter or masses  RESPIRATORY: Normal respiratory effort  SKIN: No evident rashes  ABDOMEN: Soft, non-tender, non-distended    Laboratory Data:  Lab Results   Component Value Date    WBC 8.1 2025    HGB 14.5 2025    .0 2025     Lab Results   Component Value Date     2025    K 3.5 2025    CL  103 05/30/2025    CO2 24.0 05/30/2025    BUN 12 05/30/2025     (H) 05/30/2025    GFRAA 97 07/08/2022    AST 31 04/11/2025    ALT 54 (H) 04/11/2025    TP 7.6 04/11/2025    ALB 4.7 04/11/2025    PHOS 3.7 04/11/2025    CA 9.5 05/30/2025    MG 1.9 04/11/2025       Urinalysis Results (last three years):  Recent Labs     10/28/22  1201 02/11/23  1734 05/05/23  1106 08/10/23  1150 08/16/23  1809 09/17/23  0632 09/28/23  1021 10/27/23  1149 12/08/23  1053 01/26/24  0810 02/01/24  1255 03/05/24  1047 06/06/24  0923 08/01/24  1518 03/21/25  1142 04/23/25  1004 04/26/25  0844 05/30/25  0857   COLORUR Yellow Yellow  --  Light-Yellow Yellow Yellow Yellow Light-Yellow  --  Red*  --   --   --  Yellow  --   --  Light-Yellow Yellow   CLARITY Clear Clear  --  Clear Clear Clear Turbid* Clear  --  Cloudy*  --   --   --  Turbid*  --   --  Clear Clear   SPECGRAVITY 1.021 1.010 1.020 1.019 1.020 1.015 1.018 1.017 1.015 1.025 1.030 1.025 >=1.030 1.022 1.020 1.020 1.021 1.021   PHURINE 6.0 7.0  --  6.0 6.0 6.0 6.0 6.0 6.0 6.0 7.0 7.0 5.5 7.5 6.0 6.0 6.0 6.5   PROUR 100* Negative  --  70* Trace* 100 mg/dL* 70* 50*  --  100 mg/dL*  --   --   --  50*  --   --  100* 100*   GLUUR Negative Negative Negative Normal Negative Negative Normal Normal  --  Negative  --   --   --  Normal  --   --  Normal Normal   KETUR Negative Negative  --  Negative Negative Negative Negative Negative  --  Negative  --   --   --  Negative  --   --  Negative Negative   BILUR Negative Negative  --  Negative Negative Negative Negative Negative  --  Negative  --   --   --  Negative  --   --  Negative Negative   BLOODURINE Small* Large*  --  1+* Moderate* Large* 1+* 1+*  --  Large*  --   --   --  1+*  --   --  1+* 2+*   NITRITE Negative Negative  --  Negative Negative Positive* 2+* Negative Negative Positive* Negative Negative Negative Negative Negative Negative Negative Negative   UROBILINOGEN <2.0 0.2  --  Normal 0.2 0.2 Normal Normal  --  0.2  --   --   --  Normal   --   --  Normal Normal   LEUUR Negative Negative  --  Negative Negative Large* 500* Negative  --  Trace*  --   --   --  Negative  --   --  Negative Negative   UASA Negative  --   --   --   --   --   --   --   --   --   --   --   --   --   --   --   --   --    WBCUR 1-5 1-5  --  1-5 1-5 >50* >50* 1-5  --  1-5  --   --   --  1-5  --   --  1-5 6-10*   RBCUR 0-2 0-2  --  0-2 6-10* >10* 6-10* 0-2  --  >10*  --   --   --  3-5*  --   --  3-5* >10*   BACUR None Seen None Seen  --  None Seen Rare* 1+* 2+* None Seen  --  None Seen  --   --   --  Rare*  --   --  Rare* Rare*       Urine Culture Results (last three years):  Lab Results   Component Value Date    URINECUL  05/30/2025     <10,000 cfu/ml Multiple species present- probable contamination.    URINECUL  03/21/2025     10-50,000 cfu/ml Multiple species present-probable contamination.    URINECUL No Growth at 18-24 hrs. 08/01/2024    URINECUL No Growth 2 Days 05/15/2024    URINECUL No Growth at 18-24 hrs. 02/14/2024    URINECUL (A) 01/26/2024     10,000 - 50,000 CFU/ML Klebsiella pneumoniae ESBL Pos    URINECUL No Growth 2 Days 01/11/2024    URINECUL No Growth at 18-24 hrs. 10/27/2023    URINECUL >100,000 CFU/ML Klebsiella pneumoniae (A) 09/28/2023    URINECUL >100,000 CFU/ML Klebsiella pneumoniae (A) 09/17/2023    URINECUL No Growth at 18-24 hrs. 08/10/2023    URINECUL (A) 07/14/2023     10,000 - 50,000 CFU/ML Alpha hemolytic Streptococcus    URINECUL No Growth at 18-24 hrs. 10/28/2022    URINECUL No Growth at 18-24 hrs. 06/07/2022    URINECUL  06/10/2021     10-50,000 cfu/ml Multiple species present-probable contamination.       PSA:  No results found for: \"PSA\", \"PERCENTPSA\", \"PSAS\", \"PSAULTRA\"     Imaging (last three days):  No results found.       I have personally reviewed all relevant medical records, labs, and imaging.     Bryant Garza MD  Staff Urologist  [facility]  Office: [phone]             [1]   Past Medical History:   Abdominal  distention    Longer than this but this is an estimate    Abdominal hernia    I had hernia repair around by my belly button & abdominal    Abdominal pain    Currently seeing obgyn, urologist & nephrologist    Allergic rhinitis    A long time ago    Arrhythmia    PVC'S, irregular heartbeat    Back pain    Mid to lower back pain & hip pain    Benign essential HTN    Bloating    Longer than this but this is an estimate    Blood in urine    Due to severe kidney stones    Blurred vision    I always wore glasses or contacts since about 14 yrs old    Calculus of kidney    hydronephrosis/ several times kidney stones/ 13 th procedure for stones    Cervicalgia    Log Date: 05/04/2012         Change in hair    My hair is thinning more than the usual    Chest pain    I did see a cardiologist about this    Chest pain on exertion    I did see a cardiologists about this    COVID-19    headache, back pain, shortness of breath, sore throat, runny nose, chills. Not hospitalized    COVID-19    high fever, fatigue, not hospitalized    Dense breasts    heterogeneously dense breasts    Depression    I lost my son    Diarrhea, unspecified    I notice that certain things run right through me now    Disorder of liver    fatty liver    Disorder of thyroid    Dizziness    Dysmenorrhea    Ever since I started Menstrating at 14 yrs old    Dyspareunia    Sometimes    Dyspepsia    Easy bruising    I find bruises on me and don’t really know how I got them    Elevated fasting glucose    Enthesopathy of the wrist and carpus    Log Date: 05/31/2011         ESBL E. coli carrier    Excessive bleeding in premenopausal period    Falls    Tripped/fell into muskrat hole    Fatigue    I noticed that I’m alot more tired lately more than usual    Flatulence/gas pain/belching    Longer than this but this is an estimate    Food intolerance    I think I am lactose intolerant been that way for a few year    Frequent urination    All the time for years    Frequent  UTI    Due to kidney stones blockages    Gestational diabetes (HCC)    Heart murmur    My mother said I was born with one but I never really checke    Heart palpitations    Pvc’s and irregular heart rate    Hemorrhoids    After last pregnancy or a couple years after that    High blood pressure    History of cardiac murmur    Mother told me I was born with one & my dr as well    History of D&C    History of depression    When I had a miscarriage    History of stomach ulcers    Indigestion    Longer than this but this is an estimate    Itch of skin    Not severe but I have been itching for no apparent reason    IUD threads lost    5/13/25 C/o continued irregular bleeding. Mirena IUD strings NOT seen at time of exam. 5/29/25 Pelvic US - IUD in good position. Uterus somewhat bulky & heterogeneous. Pre-ovulatory follicles bilateral ovaries.       Leaking of urine    After first pregnancy    Leg swelling    My left leg slightly swelled up and my ankle area was hurtin    Lesion of ulnar nerve    Log Date: 03/08/2013         Loss of appetite    I noticed this lately too    Menometrorrhagia    Hysteroscopy 10/2020 path favors anovulatory breakdown bleeding.    Menometrorrhagia    Migraine with aura    Migraines    But ever since my 2 blood patches in 2002 No more migraines    Multiple thyroid nodules    Myofascial pain    Nausea    Longer than this but this is an estimate    Nausea & vomiting    Night sweats    I feel like I’m on fire at night.    OAB (overactive bladder)    PTNS - posterior tibial nerve stimulation with urogynecologist Dr. Lara Jim    Painful urination    When passing stones    Postcoital bleeding    Prior to hysteroscopy done 10/2020. Also having since at least 2022 or 2023    Postcoital bleeding    Prediabetes    Prediabetes    3/8/25 A1c 5.9%    PVC's (premature ventricular contractions)    Rash    Broke out with unknown rash all over legs and arms    Sexually transmitted disease    HPV     Shortness of breath    Seen cardiologist about this    Sleep disturbance    For years sometimes I can’t lay on my left side    Stress    Rough up bringing and also regular family stresses    Uncomfortable fullness after meals    Longer than this but this is an estimate    Unspecified condition originating in the  period    Ureteral stone with hydronephrosis    Ureteral stricture, right    Urinary incontinence    Constantly going can’t hold it    Ventral hernia without obstruction or gangrene    Visual impairment    glasses    Vitamin D deficiency    Wears glasses    Since I was about 14 yrs old    Weight gain    After last son was born   [2]   Past Surgical History:  Procedure Laterality Date    Abdominal surgery      2 hernia repairs right & left side      ,2016    x2    Colonoscopy N/A 2021    Procedure: COLONOSCOPY, ESOPHAGOGASTRODUODENOSCOPY with biopsy;  Surgeon: Prashant Lilly DO;  Location:  ENDOSCOPY    Colonoscopy  2021 EGD WITH BIOPSY. Colonoscopy internal hemorrhoids. No gross findings to explain left sided pain. Recall 10 years. Prashant Lilly DO    Cryocautery of cervix      For abnormal cells    Cysto/uretero w/lithotripsy Left 2024    Cystoscopy, LEFT ureteroscopy, laser lithotripsy, basket stone extraction, retrograde pyelogram, ureteral stent placement, Right retrograde pyelogram Dr. Bryant Garza    Cysto/uretero w/up stricture Right 10/15/2019    Cysto Rt RPG, Rt URS w/ Laser Litho Dilation of Rtight Stricture Rt URS Stent Exchange w/ Dr Jim    Cysto/uretero, stone remove Right 2019    right ureter and kidney stones extraction, URS, stent placement    Cystoscopy,ureteroscopy,lithotripsy Right 2019    Cysto, B/L RPG, URS, laser litho, stone ext, Rt stent Dr. Jim    D & c  10/2021 I believe    Lining of Uterus was way too thick, did biospy as well    Egd  2021    for left sided pain    Hc endometrial sampling  w or wo endocerv sampling  08/18/2023    EMB- Dr. Ramirez - disordered proliferative endometrium - recommended medical management options to regulate cycle.    Hernia surgery  01/2000    L hernia repair    Hysteroscopy,with sampling  10/08/2020    Hysteroscopy, D&C for intermittent menorrhagia, thickened endometrium, cervical stenosis. H/o menometrorrhagia & postcoital bleeding. Dr. Linnette Antoine. Path Endometrium with stromal collapse, superficial fibrin thrombi, and some polypoid tissue fragments with increased stromal fibrous component. Endometrial glands are proliferative. Overall pattern favors anovulatory breakdown bleeding.    Hysteroscopy,with sampling  07/11/2024    Hysteroscopy, D&C, insertion Mirena IUD. Done for menometrorrhagia & postcoital bleeding. Dr. Ema Randall. Path benign -Fragments of weakly proliferative endometrium with stromal breakdown/condensation. -Fragments of endocervical glands with inflammation and reactive changes.    Insert intrauterine device  07/11/2024    Mirena IUD insertion. 8 cm. Anteverted/mid position. Dr. Ema Randall    Laparoscopy,pelvic,biopsy      Kidney stones & examine    Lithotripsy  2001, 2007 & 11/11    Needle biopsy left  02/2021    fibroadenoma    Needle biopsy right  02/2021    fibroadenoma    Needle biopsy right Right 07/03/2024    fibroadenoma-benign    Other Bilateral 2012    nerve surgery to bilateral arms about 1 month apart    Other      percutaneous nephrolithotomy    Other surgical history  12/27/2019    cysto stent removal - dr. snell     Other surgical history  06/03/2020    Cysto Stent Removal w/ Dr Snell    Other surgical history  07/07/2021    Cysto/Stent Removal Dr. Snell    Other surgical history  07/08/2022    Right ureteroscopy, laser lithotripsy, stent placement, nephrostomy tube removal with Dr. Tariq    Removal gallbladder  1998    Remove stent via transureth Right 11/13/2019    Cysto Stent Removal w/ Dr Snell     Repair ing hernia,7+y/o,reducibl  2017    Hernia in stomach and previous ones in abdomen    Us breast biopsy 1 site right (cpt=[id number]) Right 2024    12:00 right breast. Fibroadenoma. Tiny microcalcifications. Breast tissue with hypocellular stromal fibrosis. No atypia or malignancy. Recommend follow-up mammogram and right breast ultrasound in 6 months.   [3]   Family History  Problem Relation Age of Onset    Diabetes Mother     High Cholesterol Mother     Hypertension Mother     Thyroid disease Mother         hypothroid    Other (Other) Mother         Part of thyroid removed    Depression Mother     Heart Disorder Father         3 stents in heart    Diabetes Father     Hypertension Father     Heart Disease Father     Heart Surgery Father         3 stents put in    Hypertension Sister     Other (ovarian problem) Sister         work up in progress    Diabetes Sister     Other (Other) Sister         Liver problem    Hypertension Sister     Depression Sister     Diabetes Sister     Other (Other) Daughter         Appendicitis    Other (Other) Son         Appendicitis    Diabetes Maternal Grandmother     Ovarian Cancer Maternal Grandmother         My grandma  from it at age 54    Cancer Maternal Grandmother         Ovarian Cancer    Depression Maternal Grandmother     Diabetes Maternal Grandfather     Dementia Maternal Grandfather     Diabetes Paternal Grandmother     Diabetes Paternal Grandfather     Dementia Paternal Grandfather         He was 90    Pancreatic Cancer Maternal Uncle     Colon Cancer Neg    [4]   Social History  Socioeconomic History    Marital status: Life Partner   Tobacco Use    Smoking status: Never    Smokeless tobacco: Never   Vaping Use    Vaping status: Never Used   Substance and Sexual Activity    Alcohol use: No    Drug use: No    Sexual activity: Yes     Partners: Male     Birth control/protection: Mirena, I.U.D.     Comment: Mirena IUD 24   Other Topics Concern    Caffeine  Concern No    Exercise No    Seat Belt Yes   [5]   Current Outpatient Medications   Medication Sig Dispense Refill    Calcium Polycarbophil (FIBER) 625 MG Oral Tab Take by mouth.      Multiple Vitamin (MULTIVITAMIN TAB/CAP) Take 1 tablet by mouth daily.      Magnesium Glycinate 120 MG Oral Cap Take 240 mg by mouth daily.      Ferrous Sulfate (IRON OR) Take by mouth. 3X WEEKLY      potassium citrate (UROCIT-K 10) 10 MEQ (1080 MG) Oral Tab CR Take 2 tablets (20 mEq total) by mouth in the morning and 2 tablets (20 mEq total) before bedtime. (Patient taking differently: Take 1 tablet (10 mEq total) by mouth in the morning and 1 tablet (10 mEq total) before bedtime.) 120 tablet 3    hydroCHLOROthiazide 25 MG Oral Tab Take 1 tablet (25 mg total) by mouth daily. 30 tablet 3    ergocalciferol 1.25 MG (58242 UT) Oral Cap Take 1 capsule (50,000 Units total) by mouth once a week. 24 capsule 0    Levonorgestrel (MIRENA, 52 MG,) 20 MCG/DAY Intrauterine IUD 20 mcg (1 each total) by Intrauterine route one time.      albuterol (PROAIR HFA) 108 (90 Base) MCG/ACT Inhalation Aero Soln Inhale 2 puffs into the lungs every 4 (four) hours as needed for Wheezing. 1 each 1    Drospirenone (SLYND) 4 MG Oral Tab Take 1 tablet by mouth daily. 84 tablet 3    dexamethasone 1 MG Oral Tab Take 1 tablet (1 mg total) by mouth one time for 1 dose. Take at 11 PM night before lab tests. Lab draw at 8 AM the next morning. (Patient not taking: Reported on 5/13/2025) 1 tablet 1   [6]   Allergies  Allergen Reactions    Amlodipine SWELLING     Leg swelling    Metoprolol SHORTNESS OF BREATH    Toradol [Ketorolac Tromethamine] SHORTNESS OF BREATH    Tramadol SHORTNESS OF BREATH    Dilaudid [Hydromorphone] ITCHING and PAIN     Headache - per pt this is only with long term use - pt states she can tolerate this medication    Lisinopril ITCHING and DIZZINESS    Oxycodone PAIN     headache    Wellbutrin [Bupropion Hcl] PAIN and DIZZINESS     Headache    Valsartan  OTHER (SEE COMMENTS)     Chest pain      Codeine Sulfate ITCHING     TABS    Hydrocodone ITCHING    Lisinopril ITCHING    Morphine ITCHING    Seasonal Runny nose

## 2025-06-11 ENCOUNTER — APPOINTMENT (OUTPATIENT)
Dept: GENERAL RADIOLOGY | Facility: HOSPITAL | Age: 50
End: 2025-06-11
Attending: SURGERY
Payer: MEDICAID

## 2025-06-11 ENCOUNTER — HOSPITAL ENCOUNTER (OUTPATIENT)
Facility: HOSPITAL | Age: 50
Setting detail: HOSPITAL OUTPATIENT SURGERY
Discharge: HOME | End: 2025-06-11
Attending: SURGERY | Admitting: SURGERY
Payer: MEDICAID

## 2025-06-11 ENCOUNTER — ANESTHESIA (OUTPATIENT)
Dept: SURGERY | Facility: HOSPITAL | Age: 50
End: 2025-06-11
Payer: MEDICAID

## 2025-06-11 VITALS
DIASTOLIC BLOOD PRESSURE: 88 MMHG | TEMPERATURE: 98 F | OXYGEN SATURATION: 97 % | BODY MASS INDEX: 36.49 KG/M2 | WEIGHT: 181 LBS | RESPIRATION RATE: 12 BRPM | HEART RATE: 63 BPM | HEIGHT: 59 IN | SYSTOLIC BLOOD PRESSURE: 133 MMHG

## 2025-06-11 DIAGNOSIS — N20.0 RENAL STONES: Primary | ICD-10-CM

## 2025-06-11 LAB — B-HCG UR QL: NEGATIVE

## 2025-06-11 PROCEDURE — 52352 CYSTOURETERO W/STONE REMOVE: CPT | Performed by: SURGERY

## 2025-06-11 PROCEDURE — 52332 CYSTOSCOPY AND TREATMENT: CPT | Performed by: SURGERY

## 2025-06-11 PROCEDURE — 74420 UROGRAPHY RTRGR +-KUB: CPT | Performed by: SURGERY

## 2025-06-11 DEVICE — URETERAL STENT
Type: IMPLANTABLE DEVICE | Site: URETER | Status: FUNCTIONAL
Brand: PERCUFLEX™

## 2025-06-11 DEVICE — URETERAL STENT
Type: IMPLANTABLE DEVICE | Site: URETER | Status: FUNCTIONAL
Brand: CONTOUR™

## 2025-06-11 RX ORDER — ACETAMINOPHEN 10 MG/ML
INJECTION, SOLUTION INTRAVENOUS AS NEEDED
Status: DISCONTINUED | OUTPATIENT
Start: 2025-06-11 | End: 2025-06-11 | Stop reason: SURG

## 2025-06-11 RX ORDER — OXYBUTYNIN CHLORIDE 5 MG/1
5 TABLET ORAL 3 TIMES DAILY PRN
Qty: 15 TABLET | Refills: 0 | Status: SHIPPED | OUTPATIENT
Start: 2025-06-11 | End: 2025-06-15

## 2025-06-11 RX ORDER — HYDROMORPHONE HYDROCHLORIDE 1 MG/ML
0.6 INJECTION, SOLUTION INTRAMUSCULAR; INTRAVENOUS; SUBCUTANEOUS EVERY 5 MIN PRN
Status: DISCONTINUED | OUTPATIENT
Start: 2025-06-11 | End: 2025-06-11

## 2025-06-11 RX ORDER — HYDROMORPHONE HYDROCHLORIDE 1 MG/ML
0.4 INJECTION, SOLUTION INTRAMUSCULAR; INTRAVENOUS; SUBCUTANEOUS EVERY 5 MIN PRN
Status: DISCONTINUED | OUTPATIENT
Start: 2025-06-11 | End: 2025-06-11

## 2025-06-11 RX ORDER — HYDROMORPHONE HYDROCHLORIDE 1 MG/ML
0.2 INJECTION, SOLUTION INTRAMUSCULAR; INTRAVENOUS; SUBCUTANEOUS EVERY 5 MIN PRN
Status: DISCONTINUED | OUTPATIENT
Start: 2025-06-11 | End: 2025-06-11

## 2025-06-11 RX ORDER — NALOXONE HYDROCHLORIDE 0.4 MG/ML
0.08 INJECTION, SOLUTION INTRAMUSCULAR; INTRAVENOUS; SUBCUTANEOUS AS NEEDED
Status: DISCONTINUED | OUTPATIENT
Start: 2025-06-11 | End: 2025-06-11

## 2025-06-11 RX ORDER — ROCURONIUM BROMIDE 10 MG/ML
INJECTION, SOLUTION INTRAVENOUS AS NEEDED
Status: DISCONTINUED | OUTPATIENT
Start: 2025-06-11 | End: 2025-06-11 | Stop reason: SURG

## 2025-06-11 RX ORDER — MIDAZOLAM HYDROCHLORIDE 1 MG/ML
INJECTION INTRAMUSCULAR; INTRAVENOUS AS NEEDED
Status: DISCONTINUED | OUTPATIENT
Start: 2025-06-11 | End: 2025-06-11 | Stop reason: SURG

## 2025-06-11 RX ORDER — NICOTINE POLACRILEX 4 MG
30 LOZENGE BUCCAL
Status: DISCONTINUED | OUTPATIENT
Start: 2025-06-11 | End: 2025-06-11

## 2025-06-11 RX ORDER — ONDANSETRON 2 MG/ML
4 INJECTION INTRAMUSCULAR; INTRAVENOUS EVERY 6 HOURS PRN
Status: DISCONTINUED | OUTPATIENT
Start: 2025-06-11 | End: 2025-06-11

## 2025-06-11 RX ORDER — DIPHENHYDRAMINE HYDROCHLORIDE 50 MG/ML
12.5 INJECTION, SOLUTION INTRAMUSCULAR; INTRAVENOUS AS NEEDED
Status: DISCONTINUED | OUTPATIENT
Start: 2025-06-11 | End: 2025-06-11

## 2025-06-11 RX ORDER — PHENAZOPYRIDINE HYDROCHLORIDE 100 MG/1
100 TABLET, FILM COATED ORAL 3 TIMES DAILY PRN
Qty: 10 TABLET | Refills: 0 | Status: SHIPPED | OUTPATIENT
Start: 2025-06-11 | End: 2025-06-15

## 2025-06-11 RX ORDER — DEXTROSE MONOHYDRATE 25 G/50ML
50 INJECTION, SOLUTION INTRAVENOUS
Status: DISCONTINUED | OUTPATIENT
Start: 2025-06-11 | End: 2025-06-11

## 2025-06-11 RX ORDER — MEPERIDINE HYDROCHLORIDE 25 MG/ML
12.5 INJECTION INTRAMUSCULAR; INTRAVENOUS; SUBCUTANEOUS AS NEEDED
Status: DISCONTINUED | OUTPATIENT
Start: 2025-06-11 | End: 2025-06-11

## 2025-06-11 RX ORDER — SULFAMETHOXAZOLE AND TRIMETHOPRIM 800; 160 MG/1; MG/1
1 TABLET ORAL 2 TIMES DAILY
Qty: 4 TABLET | Refills: 0 | Status: SHIPPED | OUTPATIENT
Start: 2025-06-11 | End: 2025-06-13

## 2025-06-11 RX ORDER — SODIUM CHLORIDE, SODIUM LACTATE, POTASSIUM CHLORIDE, CALCIUM CHLORIDE 600; 310; 30; 20 MG/100ML; MG/100ML; MG/100ML; MG/100ML
INJECTION, SOLUTION INTRAVENOUS CONTINUOUS
Status: DISCONTINUED | OUTPATIENT
Start: 2025-06-11 | End: 2025-06-11

## 2025-06-11 RX ORDER — ACETAMINOPHEN 500 MG
1000 TABLET ORAL ONCE
Status: DISCONTINUED | OUTPATIENT
Start: 2025-06-11 | End: 2025-06-11 | Stop reason: HOSPADM

## 2025-06-11 RX ORDER — PROCHLORPERAZINE EDISYLATE 5 MG/ML
5 INJECTION INTRAMUSCULAR; INTRAVENOUS EVERY 8 HOURS PRN
Status: DISCONTINUED | OUTPATIENT
Start: 2025-06-11 | End: 2025-06-11

## 2025-06-11 RX ORDER — TAMSULOSIN HYDROCHLORIDE 0.4 MG/1
0.4 CAPSULE ORAL EVERY EVENING
Qty: 14 CAPSULE | Refills: 0 | Status: SHIPPED | OUTPATIENT
Start: 2025-06-11 | End: 2025-06-25

## 2025-06-11 RX ORDER — ONDANSETRON 2 MG/ML
INJECTION INTRAMUSCULAR; INTRAVENOUS AS NEEDED
Status: DISCONTINUED | OUTPATIENT
Start: 2025-06-11 | End: 2025-06-11 | Stop reason: SURG

## 2025-06-11 RX ORDER — SCOPOLAMINE 1 MG/3D
1 PATCH, EXTENDED RELEASE TRANSDERMAL ONCE
Status: DISCONTINUED | OUTPATIENT
Start: 2025-06-11 | End: 2025-06-11 | Stop reason: HOSPADM

## 2025-06-11 RX ORDER — DEXAMETHASONE SODIUM PHOSPHATE 4 MG/ML
VIAL (ML) INJECTION AS NEEDED
Status: DISCONTINUED | OUTPATIENT
Start: 2025-06-11 | End: 2025-06-11 | Stop reason: SURG

## 2025-06-11 RX ORDER — OXYCODONE HYDROCHLORIDE 5 MG/1
5 TABLET ORAL EVERY 4 HOURS PRN
Status: DISCONTINUED | OUTPATIENT
Start: 2025-06-11 | End: 2025-06-11

## 2025-06-11 RX ORDER — OXYCODONE HYDROCHLORIDE 5 MG/1
10 TABLET ORAL EVERY 4 HOURS PRN
Status: DISCONTINUED | OUTPATIENT
Start: 2025-06-11 | End: 2025-06-11

## 2025-06-11 RX ORDER — IOPAMIDOL 612 MG/ML
INJECTION, SOLUTION INTRAVASCULAR AS NEEDED
Status: DISCONTINUED | OUTPATIENT
Start: 2025-06-11 | End: 2025-06-11 | Stop reason: HOSPADM

## 2025-06-11 RX ORDER — NICOTINE POLACRILEX 4 MG
15 LOZENGE BUCCAL
Status: DISCONTINUED | OUTPATIENT
Start: 2025-06-11 | End: 2025-06-11

## 2025-06-11 RX ORDER — OXYCODONE HYDROCHLORIDE 5 MG/1
15 TABLET ORAL EVERY 4 HOURS PRN
Status: DISCONTINUED | OUTPATIENT
Start: 2025-06-11 | End: 2025-06-11

## 2025-06-11 RX ADMIN — ACETAMINOPHEN 1000 MG: 10 INJECTION, SOLUTION INTRAVENOUS at 10:20:00

## 2025-06-11 RX ADMIN — SODIUM CHLORIDE, SODIUM LACTATE, POTASSIUM CHLORIDE, CALCIUM CHLORIDE: 600; 310; 30; 20 INJECTION, SOLUTION INTRAVENOUS at 09:21:00

## 2025-06-11 RX ADMIN — DEXAMETHASONE SODIUM PHOSPHATE 8 MG: 4 MG/ML VIAL (ML) INJECTION at 09:58:00

## 2025-06-11 RX ADMIN — ROCURONIUM BROMIDE 50 MG: 10 INJECTION, SOLUTION INTRAVENOUS at 09:27:00

## 2025-06-11 RX ADMIN — ONDANSETRON 4 MG: 2 INJECTION INTRAMUSCULAR; INTRAVENOUS at 09:58:00

## 2025-06-11 RX ADMIN — MIDAZOLAM HYDROCHLORIDE 2 MG: 1 INJECTION INTRAMUSCULAR; INTRAVENOUS at 09:24:00

## 2025-06-11 RX ADMIN — SODIUM CHLORIDE, SODIUM LACTATE, POTASSIUM CHLORIDE, CALCIUM CHLORIDE: 600; 310; 30; 20 INJECTION, SOLUTION INTRAVENOUS at 10:22:00

## 2025-06-11 NOTE — ANESTHESIA POSTPROCEDURE EVALUATION
[facility]    Nadine Calix Patient Status:  Hospital Outpatient Surgery   Age/Gender 50 year old female MRN [id number]   Location [facility] SURGERY Attending Bryant Garza MD   Hosp Day # 0 PCP Fan Hernandez MD       Anesthesia Post-op Note    CYSTOSCOPY, LEFT URETEROSCOPY WITH LEFT URETERAL STENT PLACEMENT, LEFT STONE EXTRACTION, RIGHT RETROGRADE PYELOGRAM,  RIGHT URETERAL STENT PLACEMENT    Procedure Summary       Date: 06/11/25 Room / Location:  MAIN OR 07 /  MAIN OR    Anesthesia Start: 0921 Anesthesia Stop:     Procedure: CYSTOSCOPY, LEFT URETEROSCOPY WITH LEFT URETERAL STENT PLACEMENT, LEFT STONE EXTRACTION, RIGHT RETROGRADE PYELOGRAM,  RIGHT URETERAL STENT PLACEMENT (Bilateral: Ureter) Diagnosis:       Renal stones      (Renal stones [N20.0])    Surgeons: Bryant Garza MD Anesthesiologist: Wild Catalan MD    Anesthesia Type: general ASA Status: 2            Anesthesia Type: general    Vitals Value Taken Time   /90 06/11/25 10:34   Temp 97.6 06/11/25 10:34   Pulse 72 06/11/25 10:34   Resp 16 06/11/25 10:34   SpO2 90 06/11/25 10:33   Vitals shown include unfiled device data.        Patient Location: PACU    Anesthesia Type: general    Airway Patency: patent and extubated    Postop Pain Control: adequate    Mental Status: mildly sedated but able to meaningfully participate in the post-anesthesia evaluation    Nausea/Vomiting: none    Cardiopulmonary/Hydration status: stable euvolemic    Complications: no apparent anesthesia related complications    Postop vital signs: stable    Dental Exam: Unchanged from Preop    Patient to be discharged from PACU when criteria met.

## 2025-06-11 NOTE — ANESTHESIA PROCEDURE NOTES
Airway  Date/Time: 6/11/2025 9:28 AM  Reason: elective    Airway not difficult    General Information and Staff   Patient location during procedure: OR  Anesthesiologist: Wild Catalan MD  Performed: anesthesiologist   Performed by: Wild Catalan MD  Authorized by: Wild Catalan MD        Indications and Patient Condition  Indications for airway management: anesthesia  Sedation level: minimal      Preoxygenated: yesPatient position: sniffing    Mask difficulty assessment: 1 - vent by mask    Final Airway Details    Final airway type: endotracheal airway    Successful airway: ETT  Cuffed: yes   Successful intubation technique: direct laryngoscopy  Facilitating devices/methods: intubating stylet  Endotracheal tube insertion site: oral  Blade: Miller  Blade size: #3  ETT size (mm): 7.0    Cormack-Lehane Classification: grade I - full view of glottis  Placement verified by: capnometry   Measured from: lips  ETT to lips (cm): 21  Number of attempts at approach: 1  Number of other approaches attempted: 0    Additional Comments  Easy mask ventilation-paramedic stuent Artemio attempt x 1 without success - ETT x 1 by me,cuff up. BBS=. +ETCO2

## 2025-06-11 NOTE — OPERATIVE REPORT
Urology Operative Note    Attending Surgeon: Bryant Garza MD    Assistant Surgeon: None    Patient Name: Nadine Calix    Date of Surgery: 6/11/2025    Preoperative Diagnosis: Bilateral nephrolithiasis    Postoperative Diagnosis: Same    Procedure Performed:   Cystoscopy, right retrograde pyelogram and ureteral stent placement  Left ureteroscopy, basket stone extraction, retrograde pyelogram, ureteral stent placement    Indication:  Patient is a 50 year old female who presented with bilateral renal stone. The patient was counseled on options, risks, and benefits and elected to undergo the above procedure. We discussed risks including, but not limited to, bleeding, infection, damage to surrounding structures, need for repeat procedure(s). The patient understood these risks and wished to proceed with surgery.    Findings:  Cystoscopy - normal urethra without strictures, normal bladder. ~10 stones basket extracted from left kidney.  Extensive Jero's plaques in each calyx.    Procedure:  The patient was taken to the operating room and a timeout was performed confirming the correct patient and procedure. The patient was repped and draped in lithotomy position after undergoing general anesthesia.  Pre-operative prophylactic antibiotics were given in the form of cefazolin.    Inserted a wire into the right ureter and kidney.  Reinserted the open-ended catheter and perform retrograde pyelogram.  This showed no significant hydronephrosis.  I then inserted a 4.8 [place] Luc[place] by 26 cm double-J ureteral stent over the wire.  I formed the proximal coil under fluoroscopy and the distal coil under direct visualization.    The left ureter was cannulated with a Sensor wire, and the wire was passed into the renal pelvis which I confirmed using fluoroscopy.     A 12/14 [place] Lucian ureteral access sheath was inserted over the first wire. It was advanced without resistance to the mid lobe ureter. The inner cannula was removed and a second  wire was inserted through the sheath and into the kidney, which was confirmed fluoroscopically.  The access sheath was removed and the second safety wire was secured to the drape.  The access sheath was then reinserted over the first working wire up to the proximal ureter. The wire and inner cannula of the access sheath were removed, leaving the safety wire in place alongside the access sheath.     The flexible ureteroscope was advanced into the sheath and up into the renal pelvis.  Multiple stones were seen throughout the all calyces as well as many Jero's plaques.  I used a ZeroTip basket to extract all of the stone fragments. A retrograde pyelogram was performed through the scope and showed no extravasation. The ureter was inspected while slowly removing the scope and access sheath, and it appeared healthy without stone fragments seen.    A 6-Swedish x 24 cm JJ ureteral stent was inserted over the remaining wire. The proximal coil was formed in the kidney under fluoroscopic guidance. The distal coil was formed within the bladder using the pusher and fluoroscopy. The stent string was removed prior to stent placement.    The bladder was drained and the cystoscope was removed. The patient was awoken from anesthesia and transferred to PACU in stable condition. The patient tolerated the procedure well. All instrument/supply counts were correct at the end of the case.    Specimens:   Stone fragments for chemical analysis    Estimated Blood Loss:  1 mL    Tubes/Drains:  6-Swedish x 24 cm JJ left ureteral stent  4.8 Swedish by 26 cm double-J right ureteral stent    Complications:   None immediate    Condition from OR:  Stable    Plan:   Follow-up in a few weeks as scheduled for right ureteroscopy, left stent removal.      Bryant Garza MD  Staff Urologist  [facility]  Office: [phone]

## 2025-06-11 NOTE — SPIRITUAL CARE NOTE
Spiritual Care Visit Note    Patient Name: Nadine Calix Date of Spiritual Care Visit: 25   : 3/22/1975 Primary Dx: <principal problem not specified>       Referred By: Referral From: Other (Comment)    Spiritual Care Taxonomy:    Intended Effects: Promote a sense of peace    Methods: Offer support    Interventions: Explain  role, Active listening, Ask guided questions    Visit Type/Summary:     - Spiritual Care: PATIENT expressed appreciation for  visit.    Spiritual Care support can be requested via an Epic consult. For urgent/immediate needs, please contact the On Call  at: Edward: ext [phone]

## 2025-06-11 NOTE — ANESTHESIA PREPROCEDURE EVALUATION
PRE-OP EVALUATION    Patient Name: Nadine Calix    Admit Diagnosis: Renal stones [N20.0]    Pre-op Diagnosis: Renal stones [N20.0]    CYSTOSCOPY LEFT URETEROSCOPY, LASER LITHOTRIPSY, STONE EXTRACTION, LEFT RETROGRADE PYELOGRAM,  LEFT URETERAL STENT PLACEMENT    Anesthesia Procedure: CYSTOSCOPY LEFT URETEROSCOPY, LASER LITHOTRIPSY, STONE EXTRACTION, LEFT RETROGRADE PYELOGRAM,  LEFT URETERAL STENT PLACEMENT (Left)    Surgeons and Role:     * Bryant Garza MD - Primary    Pre-op vitals reviewed.  Temp: 97.7 °F (36.5 °C)  Pulse: 55  Resp: 16  BP: 134/83  SpO2: 99 %  Body mass index is 36.56 kg/m².    Current medications reviewed.  Hospital Medications:  Current Medications[1]    Outpatient Medications:   Prescriptions Prior to Admission[2]    Allergies: Amlodipine, Metoprolol, Toradol [ketorolac tromethamine], Tramadol, Dilaudid [hydromorphone], Lisinopril, Oxycodone, Wellbutrin [bupropion hcl], Valsartan, Codeine sulfate, Hydrocodone, Lisinopril, Morphine, and Seasonal      Anesthesia Evaluation    Patient summary reviewed.    Anesthetic Complications  (-) history of anesthetic complications         GI/Hepatic/Renal                                 Cardiovascular      ECG reviewed.  Exercise tolerance: good     MET: >4      (+) hypertension                                     Endo/Other                                  Pulmonary                           Neuro/Psych      (+) depression                            Past Surgical History[3]  Social Hx on file[4]  History   Drug Use No     Available pre-op labs reviewed.     Lab Results   Component Value Date     05/30/2025    K 3.5 05/30/2025     05/30/2025    CO2 24.0 05/30/2025    BUN 12 05/30/2025    CREATSERUM 0.86 05/30/2025     (H) 05/30/2025    CA 9.5 05/30/2025            Airway      Mallampati: II  Mouth opening: 3 FB  TM distance: 4 - 6 cm  Neck ROM: full Cardiovascular    Cardiovascular exam normal.         Dental    Dentition appears  grossly intact         Pulmonary    Pulmonary exam normal.                 Other findings  Upper central incisors chipped-adjacent upper teeth also chipped      ASA: 2   Plan: general  NPO status verified and patient meets guidelines.  Patient has not taken beta blockers in last 24 hours.      Comment: GA with ETT. Risks discussed including sore throat, hoarse voice,dental trauma,nausea/vomiting  Plan/risks discussed with: patient and spouse            Present on Admission:  **None**               [1]  • [Transfer Hold] acetaminophen (Tylenol Extra Strength) tab 1,000 mg  1,000 mg Oral Once   • [Transfer Hold] scopolamine (Transderm-Scop) 1 MG/3DAYS patch 1 patch  1 patch Transdermal Once   • lactated ringers infusion   Intravenous Continuous   • ceFAZolin (Ancef) 2g in 10mL IV syringe premix  2 g Intravenous Once   [2]  Medications Prior to Admission   Medication Sig Dispense Refill Last Dose/Taking   • Calcium Polycarbophil (FIBER) 625 MG Oral Tab Take by mouth.   Past Week   • Multiple Vitamin (MULTIVITAMIN TAB/CAP) Take 1 tablet by mouth daily.   Past Week   • Magnesium Glycinate 120 MG Oral Cap Take 240 mg by mouth daily.   Past Week   • Ferrous Sulfate (IRON OR) Take by mouth. 3X WEEKLY   Past Week   • potassium citrate (UROCIT-K 10) 10 MEQ (1080 MG) Oral Tab CR Take 2 tablets (20 mEq total) by mouth in the morning and 2 tablets (20 mEq total) before bedtime. (Patient taking differently: Take 1 tablet (10 mEq total) by mouth in the morning and 1 tablet (10 mEq total) before bedtime.) 120 tablet 3 Past Week   • hydroCHLOROthiazide 25 MG Oral Tab Take 1 tablet (25 mg total) by mouth daily. 30 tablet 3 Past Week   • ergocalciferol 1.25 MG (39525 UT) Oral Cap Take 1 capsule (50,000 Units total) by mouth once a week. 24 capsule 0 Past Week   • Levonorgestrel (MIRENA, 52 MG,) 20 MCG/DAY Intrauterine IUD 20 mcg (1 each total) by Intrauterine route one time.   6/11/2025 Morning   • Drospirenone (SLYND) 4 MG Oral Tab  Take 1 tablet by mouth daily. 84 tablet 3    • dexamethasone 1 MG Oral Tab Take 1 tablet (1 mg total) by mouth one time for 1 dose. Take at 11 PM night before lab tests. Lab draw at 8 AM the next morning. (Patient not taking: Reported on 2025) 1 tablet 1    • albuterol (PROAIR HFA) 108 (90 Base) MCG/ACT Inhalation Aero Soln Inhale 2 puffs into the lungs every 4 (four) hours as needed for Wheezing. 1 each 1 More than a month   [3]  Past Surgical History:  Procedure Laterality Date   • Abdominal surgery      2 hernia repairs right & left side   •   ,2016    x2   • Colonoscopy N/A 2021    Procedure: COLONOSCOPY, ESOPHAGOGASTRODUODENOSCOPY with biopsy;  Surgeon: Prashant Lilly DO;  Location:  ENDOSCOPY   • Colonoscopy  2021 EGD WITH BIOPSY. Colonoscopy internal hemorrhoids. No gross findings to explain left sided pain. Recall 10 years. Prashant Lilly DO   • Cryocautery of cervix      For abnormal cells   • Cysto/uretero w/lithotripsy Left 2024    Cystoscopy, LEFT ureteroscopy, laser lithotripsy, basket stone extraction, retrograde pyelogram, ureteral stent placement, Right retrograde pyelogram Dr. Bryant Garza   • Cysto/uretero w/up stricture Right 10/15/2019    Cysto Rt RPG, Rt URS w/ Laser Litho Dilation of Rtight Stricture Rt URS Stent Exchange w/ Dr Jim   • Cysto/uretero, stone remove Right 2019    right ureter and kidney stones extraction, URS, stent placement   • Cystoscopy,ureteroscopy,lithotripsy Right 2019    Cysto, B/L RPG, URS, laser litho, stone ext, Rt stent Dr. Jim   • D & c  10/2021 I believe    Lining of Uterus was way too thick, did biospy as well   • Egd  2021    for left sided pain   • Hc endometrial sampling w or wo endocerv sampling  2023    EMB- Dr. Ramirez - disordered proliferative endometrium - recommended medical management options to regulate cycle.   • Hernia surgery  2000    L hernia repair   •  Hysteroscopy,with sampling  10/08/2020    Hysteroscopy, D&C for intermittent menorrhagia, thickened endometrium, cervical stenosis. H/o menometrorrhagia & postcoital bleeding. Dr. Linnette Antoine. Path Endometrium with stromal collapse, superficial fibrin thrombi, and some polypoid tissue fragments with increased stromal fibrous component. Endometrial glands are proliferative. Overall pattern favors anovulatory breakdown bleeding.   • Hysteroscopy,with sampling  07/11/2024    Hysteroscopy, D&C, insertion Mirena IUD. Done for menometrorrhagia & postcoital bleeding. Dr. Ema Randall. Path benign -Fragments of weakly proliferative endometrium with stromal breakdown/condensation. -Fragments of endocervical glands with inflammation and reactive changes.   • Insert intrauterine device  07/11/2024    Mirena IUD insertion. 8 cm. Anteverted/mid position. Dr. Ema Randall   • Laparoscopy,pelvic,biopsy      Kidney stones & examine   • Lithotripsy  2001, 2007 & 11/11   • Needle biopsy left  02/2021    fibroadenoma   • Needle biopsy right  02/2021    fibroadenoma   • Needle biopsy right Right 07/03/2024    fibroadenoma-benign   • Other Bilateral 2012    nerve surgery to bilateral arms about 1 month apart   • Other      percutaneous nephrolithotomy   • Other surgical history  12/27/2019    cysto stent removal - dr. snell    • Other surgical history  06/03/2020    Cysto Stent Removal w/ Dr Snell   • Other surgical history  07/07/2021    Cysto/Stent Removal Dr. Snell   • Other surgical history  07/08/2022    Right ureteroscopy, laser lithotripsy, stent placement, nephrostomy tube removal with Dr. Tariq   • Removal gallbladder  1998   • Remove stent via transureth Right 11/13/2019    Cysto Stent Removal w/ Dr Snell   • Repair ing hernia,5+y/o,reducibl  03/2017    Hernia in stomach and previous ones in abdomen   • Us breast biopsy 1 site right (cpt=19083) Right 07/03/2024    12:00 right breast. Fibroadenoma.  Tiny microcalcifications. Breast tissue with hypocellular stromal fibrosis. No atypia or malignancy. Recommend follow-up mammogram and right breast ultrasound in 6 months.   [4]  Social History  Socioeconomic History   • Marital status: Life Partner   Tobacco Use   • Smoking status: Never   • Smokeless tobacco: Never   Vaping Use   • Vaping status: Never Used   Substance and Sexual Activity   • Alcohol use: No   • Drug use: No   • Sexual activity: Yes     Partners: Male     Birth control/protection: Mirena, I.U.D.     Comment: Mirena IUD 7/11/24   Other Topics Concern   • Caffeine Concern No   • Exercise No   • Seat Belt Yes

## 2025-06-11 NOTE — DISCHARGE INSTRUCTIONS
You had cystoscopy, ureteroscopy, and stent in the operating room today.    Instructions:    - No heavy lifting or strenuous activity for 1 day. You may resume regular activity tomorrow.  With increased activity you may notice more blood in the urine from stent movement inside the bladder.    -Next surgery as planned on 6/25/25.    - Your follow-up appointment is listed below. Please contact us at [phone] if you need to change your appointment.    Your appointments       Date & Time Appointment Department (Julian)              Jul 03, 2025 10:30 AM CDT Procedure with Bryant Garza MD [facility] ([place], [place]) ([place] [address])        Aug 12, 2025 2:00 PM CDT Exam - Established with Greyson Orellana MD [facility] ([address])              [facility]  [address]  [address] [facility] [address]  [facility] [address] [phone] [facility] [address]  [address] [facility] [address]  [facility] [id number] [phone]           - You have a stent (small plastic tube) inside your kidney and ureter to allow the swelling from surgery to resolve. This is only temporary and must be removed, so you should not forget about it. We will remove your stent via a brief cystoscopy in clinic when you return. If you have to miss this appointment for some reason please make sure you re-schedule it.     - With a ureteral stent in place you may have some discomfort on your side/flank. You can feel pain worsen when you urinate, so try to avoid holding urine and void every 2-3 hours. You also may notice increased blood in the urine as you increase your activity. If this happens increase your hydration and take it easy for a day. Warm baths/hot packs can help with the discomfort as well as your prescribed medications and Advil/Motrin (if you are able to take  these).     - You may experience mild pain after the procedure for a few days.  If the pain becomes intolerable please contact our office or go to the nearest Emergency Room or Urgent Care. You should take over the counter ibuprofen (AKA motrin, advil) for mild pain (provided you do not have a medical condition such as stomach ulcers or kidney disease which prohibits you from taking these). You may alternate this with tylenol as well. If pain is still not relieved by tylenol and/or ibuprofen, you may take narcotic pain medication if prescribed (typically oxycodone or tramadol). If you are taking narcotic pain medication this can make you constipated, so you should take over the counter stool softeners or miralax if prescribed.    - Warm packs over the lower abdomen or hot baths often help with discomfort after cystoscopy.    - If you take blood thinners (such as aspirin or plavix) please hold these medications until 3 days after surgery.     - You may experience burning and frequency of urination over the next few days. This will improve after a few days if you stay well hydrated. If you were prescribed phenazopyridine (Pyridium) this may relieve urinary discomfort but you can only take this for 3 days. Pyridium will make your urine orange.     - You are likely to see some blood in your urine (pink or light red urine) that should clear up within a few days. Staying well hydrated should help this clear up. If you notice the urine stays dark red or there are multiple large blood clots despite good hydration, please call the urology clinic ([phone]).     - Try to abstain from alcohol, coffee, tea, artificial sweeteners, and spicy food for the next 48 hours as these can irritate the bladder.     - If you develop fevers / chills, difficulty urinating, or abdominal pain that does not improve with pain medications, please call the office.     - Drink 1.5 to 2 liters of fluid today (water is preferable). If you are on  a fluid restriction due to other medical reasons then you need to adhere to your fluid restriction recommendations.    Bryant Garza MD  Staff Urologist  [facility]  Office: [phone]

## 2025-06-12 ENCOUNTER — ANESTHESIA EVENT (OUTPATIENT)
Dept: SURGERY | Facility: HOSPITAL | Age: 50
End: 2025-06-12
Payer: MEDICAID

## 2025-06-12 ENCOUNTER — TELEPHONE (OUTPATIENT)
Dept: SURGERY | Facility: CLINIC | Age: 50
End: 2025-06-12

## 2025-06-12 NOTE — TELEPHONE ENCOUNTER
Per patient had surgery 6/11 and states ibuprofen and acetaminophen is not helping with the pain. Please advise

## 2025-06-15 ENCOUNTER — TELEPHONE (OUTPATIENT)
Dept: SURGERY | Facility: CLINIC | Age: 50
End: 2025-06-15

## 2025-06-15 RX ORDER — PHENAZOPYRIDINE HYDROCHLORIDE 100 MG/1
100 TABLET, FILM COATED ORAL 3 TIMES DAILY PRN
Qty: 10 TABLET | Refills: 0 | Status: SHIPPED | OUTPATIENT
Start: 2025-06-15

## 2025-06-15 RX ORDER — OXYBUTYNIN CHLORIDE 5 MG/1
5 TABLET ORAL 3 TIMES DAILY PRN
Qty: 15 TABLET | Refills: 0 | Status: SHIPPED | OUTPATIENT
Start: 2025-06-15

## 2025-06-15 NOTE — TELEPHONE ENCOUNTER
Received call from patient; has ongoing hematuria and pain. No systemic signs of illness.   Hematuria mild; emptying bladder well. Pain slightly improved with oxybutynin and pyridium; will refill. Discussed tylenol/ibuprofen around the clock. Avoid constipation. Warm bath; compress.   She will try all of this. She has allergies to narcotics.         Fracisco Bowman MD  Staff Urologist  [facility]  Office: [phone]

## 2025-06-16 ENCOUNTER — TELEPHONE (OUTPATIENT)
Dept: SURGERY | Facility: CLINIC | Age: 50
End: 2025-06-16

## 2025-06-16 NOTE — TELEPHONE ENCOUNTER
Per patient states tylenol/ibuprofen are not helping with pain from stent, states she has also maintained herself well hydrated. Patient also is aware 6/27 appointment was canceled, patient asking if she still has to do adrenal workup ordered on 6/2024. Please advise thank you.

## 2025-06-16 NOTE — TELEPHONE ENCOUNTER
Per patient asking to speak to a nurse to go over preparation and labs that need be started tonight. Please call

## 2025-06-18 ENCOUNTER — HOSPITAL ENCOUNTER (EMERGENCY)
Age: 50
Discharge: HOME | End: 2025-06-18
Attending: EMERGENCY MEDICINE
Payer: MEDICAID

## 2025-06-18 ENCOUNTER — APPOINTMENT (OUTPATIENT)
Dept: CT IMAGING | Age: 50
End: 2025-06-18
Attending: EMERGENCY MEDICINE
Payer: MEDICAID

## 2025-06-18 VITALS
HEIGHT: 59 IN | DIASTOLIC BLOOD PRESSURE: 92 MMHG | OXYGEN SATURATION: 96 % | BODY MASS INDEX: 36.29 KG/M2 | HEART RATE: 79 BPM | SYSTOLIC BLOOD PRESSURE: 147 MMHG | RESPIRATION RATE: 16 BRPM | WEIGHT: 180 LBS | TEMPERATURE: 98 F

## 2025-06-18 DIAGNOSIS — R10.9 FLANK PAIN: ICD-10-CM

## 2025-06-18 DIAGNOSIS — Z96.0 URETERAL STENT PRESENT: Primary | ICD-10-CM

## 2025-06-18 LAB
ALBUMIN SERPL-MCNC: 4.5 G/DL (ref 3.2–4.8)
ALBUMIN/GLOB SERPL: 1.6 {RATIO} (ref 1–2)
ALP LIVER SERPL-CCNC: 80 U/L (ref 39–100)
ALT SERPL-CCNC: 43 U/L (ref 10–49)
ANION GAP SERPL CALC-SCNC: 8 MMOL/L (ref 0–18)
AST SERPL-CCNC: 24 U/L (ref ?–34)
BASOPHILS # BLD AUTO: 0.03 X10(3) UL (ref 0–0.2)
BASOPHILS NFR BLD AUTO: 0.3 %
BILIRUB SERPL-MCNC: 0.4 MG/DL (ref 0.3–1.2)
BILIRUB UR QL STRIP.AUTO: NEGATIVE
BUN BLD-MCNC: 13 MG/DL (ref 9–23)
CALCIUM BLD-MCNC: 9.1 MG/DL (ref 8.7–10.6)
CHLORIDE SERPL-SCNC: 106 MMOL/L (ref 98–112)
CO2 SERPL-SCNC: 24 MMOL/L (ref 21–32)
COLOR UR AUTO: YELLOW
CREAT BLD-MCNC: 0.82 MG/DL (ref 0.55–1.02)
EGFRCR SERPLBLD CKD-EPI 2021: 88 ML/MIN/1.73M2 (ref 60–?)
EOSINOPHIL # BLD AUTO: 0.33 X10(3) UL (ref 0–0.7)
EOSINOPHIL NFR BLD AUTO: 3.3 %
ERYTHROCYTE [DISTWIDTH] IN BLOOD BY AUTOMATED COUNT: 13.2 %
GLOBULIN PLAS-MCNC: 2.8 G/DL (ref 2–3.5)
GLUCOSE BLD-MCNC: 100 MG/DL (ref 70–99)
GLUCOSE UR STRIP.AUTO-MCNC: 100 MG/DL
HCT VFR BLD AUTO: 43.7 % (ref 35–48)
HGB BLD-MCNC: 14.9 G/DL (ref 12–16)
IMM GRANULOCYTES # BLD AUTO: 0.04 X10(3) UL (ref 0–1)
IMM GRANULOCYTES NFR BLD: 0.4 %
LYMPHOCYTES # BLD AUTO: 1.93 X10(3) UL (ref 1–4)
LYMPHOCYTES NFR BLD AUTO: 19.2 %
MCH RBC QN AUTO: 30.5 PG (ref 26–34)
MCHC RBC AUTO-ENTMCNC: 34.1 G/DL (ref 31–37)
MCV RBC AUTO: 89.4 FL (ref 80–100)
MONOCYTES # BLD AUTO: 0.64 X10(3) UL (ref 0.1–1)
MONOCYTES NFR BLD AUTO: 6.4 %
NEUTROPHILS # BLD AUTO: 7.06 X10 (3) UL (ref 1.5–7.7)
NEUTROPHILS # BLD AUTO: 7.06 X10(3) UL (ref 1.5–7.7)
NEUTROPHILS NFR BLD AUTO: 70.4 %
NITRITE UR QL STRIP.AUTO: POSITIVE
OSMOLALITY SERPL CALC.SUM OF ELEC: 286 MOSM/KG (ref 275–295)
PH UR STRIP.AUTO: 5.5 [PH] (ref 5–8)
PLATELET # BLD AUTO: 385 10(3)UL (ref 150–450)
POTASSIUM SERPL-SCNC: 3.7 MMOL/L (ref 3.5–5.1)
PROT SERPL-MCNC: 7.3 G/DL (ref 5.7–8.2)
PROT UR STRIP.AUTO-MCNC: >=300 MG/DL
RBC # BLD AUTO: 4.89 X10(6)UL (ref 3.8–5.3)
RBC #/AREA URNS AUTO: >10 /HPF
SODIUM SERPL-SCNC: 138 MMOL/L (ref 136–145)
SP GR UR STRIP.AUTO: 1.02 (ref 1–1.03)
UROBILINOGEN UR STRIP.AUTO-MCNC: 1 MG/DL
WBC # BLD AUTO: 10 X10(3) UL (ref 4–11)

## 2025-06-18 PROCEDURE — 96374 THER/PROPH/DIAG INJ IV PUSH: CPT

## 2025-06-18 PROCEDURE — 80053 COMPREHEN METABOLIC PANEL: CPT | Performed by: EMERGENCY MEDICINE

## 2025-06-18 PROCEDURE — 99284 EMERGENCY DEPT VISIT MOD MDM: CPT

## 2025-06-18 PROCEDURE — 96375 TX/PRO/DX INJ NEW DRUG ADDON: CPT

## 2025-06-18 PROCEDURE — 85025 COMPLETE CBC W/AUTO DIFF WBC: CPT | Performed by: EMERGENCY MEDICINE

## 2025-06-18 PROCEDURE — 87086 URINE CULTURE/COLONY COUNT: CPT | Performed by: EMERGENCY MEDICINE

## 2025-06-18 PROCEDURE — 96376 TX/PRO/DX INJ SAME DRUG ADON: CPT

## 2025-06-18 PROCEDURE — 74177 CT ABD & PELVIS W/CONTRAST: CPT | Performed by: EMERGENCY MEDICINE

## 2025-06-18 PROCEDURE — 81001 URINALYSIS AUTO W/SCOPE: CPT | Performed by: EMERGENCY MEDICINE

## 2025-06-18 PROCEDURE — 81015 MICROSCOPIC EXAM OF URINE: CPT | Performed by: EMERGENCY MEDICINE

## 2025-06-18 RX ORDER — DIPHENHYDRAMINE HYDROCHLORIDE 50 MG/ML
25 INJECTION, SOLUTION INTRAMUSCULAR; INTRAVENOUS ONCE
Status: COMPLETED | OUTPATIENT
Start: 2025-06-18 | End: 2025-06-18

## 2025-06-18 RX ORDER — HYDROCODONE BITARTRATE AND ACETAMINOPHEN 5; 325 MG/1; MG/1
1-2 TABLET ORAL EVERY 4 HOURS PRN
Qty: 12 TABLET | Refills: 0 | Status: SHIPPED | OUTPATIENT
Start: 2025-06-18 | End: 2025-06-25

## 2025-06-18 RX ORDER — HYDROMORPHONE HYDROCHLORIDE 1 MG/ML
0.5 INJECTION, SOLUTION INTRAMUSCULAR; INTRAVENOUS; SUBCUTANEOUS ONCE
Refills: 0 | Status: COMPLETED | OUTPATIENT
Start: 2025-06-18 | End: 2025-06-18

## 2025-06-19 LAB
CAOX DIHYDRATE: 30 %
CAOX MONOHYDRATE: 60 %
HYDROXYAPATITE: 10 %
WEIGHT-STONE: 42 MG

## 2025-06-19 NOTE — ED PROVIDER NOTES
Patient Seen in: [facility] In [place]        History  Chief Complaint   Patient presents with    Postop/Procedure Problem     Stated Complaint: Kidney stents placed approx. 1 week PTA, \"bulge\" reported in abdomen    Subjective:   HPI            50-year-old female presents reporting pain to the left flank and abdomen.  Says she feels like there is a bulge to the area.  She had a stent placed a week ago for kidney stones.  She reports she has had stones in the past and she does not typically tolerate them very well.  Reports she was told to take Tylenol and ibuprofen at home and it is not helping with the pain.  No fevers.      Objective:     No pertinent past medical history.            No pertinent past surgical history.              No pertinent social history.                              Physical Exam    ED Triage Vitals [06/18/25 2002]   BP (!) 156/100   Pulse 65   Resp 18   Temp 97.9 °F (36.6 °C)   Temp src Oral   SpO2 97 %   O2 Device None (Room air)       Current Vitals:   No data recorded          Physical Exam  General:  Vitals as listed.  No acute distress   Lungs: good air exchange and clear   Heart: regular rate rhythm and no murmur   Abdomen: tenderness on palpation to the left upper quadrant and left flank region.  No palpable masses to the area no abdominal masses.  No peritoneal signs   Extremities: no edema, normal peripheral pulses   Neuro: Alert oriented and nonfocal   Skin: no rashes or nodules         ED Course  Labs Reviewed   COMP METABOLIC PANEL (14) - Abnormal; Notable for the following components:       Result Value    Glucose 100 (*)     All other components within normal limits   URINALYSIS WITH CULTURE REFLEX - Abnormal; Notable for the following components:    Clarity Urine Hazy (*)     Glucose Urine 100 (*)     Ketones Urine Trace (*)     Blood Urine Large (*)     Protein Urine >=300 (*)     Urobilinogen Urine 1.0 (*)     Nitrite Urine Positive (*)     Leukocyte  Esterase Urine Trace (*)     All other components within normal limits   UA MICROSCOPIC ONLY, URINE - Abnormal; Notable for the following components:    WBC Urine 6-10 (*)     RBC Urine >10 (*)     Squamous Epi. Cells Few (*)     All other components within normal limits   CBC WITH DIFFERENTIAL WITH PLATELET   URINE CULTURE, ROUTINE          CT ABDOMEN+PELVIS(CONTRAST ONLY)(CPT=74177)  Result Date: 6/18/2025  PROCEDURE:  CT ABDOMEN+PELVIS (CONTRAST ONLY) (CPT=74177)  COMPARISON:  [facility], CT, CT ABDOMEN+PELVIS(ALL W+WO)(CPT=74178), 3/27/2025, 6:36 PM.  INDICATIONS:  Kidney stents placed approx. 1 week PTA, bulge reported in abdomen  TECHNIQUE:  CT scanning was performed from the dome of the diaphragm to the pubic symphysis with non-ionic intravenous contrast material. Post contrast coronal MPR imaging was performed.  Dose reduction techniques were used. Dose information is transmitted to the ACR (American College of Radiology) NRDR (National Radiology Data Registry) which includes the Dose Index Registry.  PATIENT STATED HISTORY:(As transcribed by Technologist)  Patient had kidney stents placed around one week ago. Patient stated she has a bulge, and pain on her left abdomen.   CONTRAST USED:  90cc of Isovue 370  FINDINGS:  LIVER:  Diffuse hepatic steatosis is noted.  No focal hepatic lesions BILIARY:  No visible dilatation or calcification.  Status post cholecystectomy PANCREAS:  No lesion, fluid collection, ductal dilatation, or atrophy.  SPLEEN:  No enlargement or focal lesion.  KIDNEYS:  Bilateral ureteral stents have been placed.  Nonobstructing bilateral renal calculi noted.  A calculus in the midpole of the right kidney measures up to 0.9 cm (image 38).  An index cluster of calcifications in the lower pole of the right kidney measures up to 1.0 cm (image 44).  And nonobstructing calculus in the lower pole of the left kidney measures up to 0.6 cm (image 44)..  ADRENALS:  No mass or enlargement.   AORTA/VASCULAR:  No aneurysm or dissection.  RETROPERITONEUM:  No mass or adenopathy.  BOWEL/MESENTERY:  No visible mass, obstruction, or bowel wall thickening.  ABDOMINAL WALL:  No mass or hernia.  URINARY BLADDER:  No visible focal wall thickening, lesion, or calculus.  PELVIC NODES:  No adenopathy.  PELVIC ORGANS:  No visible mass.  Pelvic organs appropriate for patient age.  BONES:  No bony lesion or fracture.  LUNG BASES:  Trace bilateral pleural effusions OTHER:  Negative.             CONCLUSION:  1. Trace bilateral pleural effusions. 2. Bilateral ureteral stents are noted in place.  Nonobstructing bilateral renal calculi present.   LOCATION:  [facility]   Dictated by ([facility]): Rian Hutchison MD on 6/18/2025 at 11:42 PM     Finalized by (CST): Rian Hutchison MD on 6/18/2025 at 11:44 PM       XR OR - N/C  Result Date: 6/11/2025  PROCEDURE:  XR OR - N/C  COMPARISON:  [facility], XR OR - N/C, 1/22/2024, 4:20 PM.  INDICATIONS:  Cystogram, retrograde pyelogram   FLUOROSCOPY IMAGES OBTAINED:  6 FLUOROSCOPY TIME:  16 seconds TECHNOLOGIST TIME:  1 hour 20 minutes RADIATION DOSE (AIR KERMA PRODUCT):  141.4uGy/m2   FINDINGS:             CONCLUSION:  Multiple fluoroscopic images document retrograde cannulation and opacification of bilateral collecting systems with subsequent stent placement.  Please refer to dedicated procedure report for full detail.   LOCATION:  [facility]    Dictated by ([facility]): Ulises Mckenzie MD on 6/11/2025 at 2:45 PM     Finalized by (CST): Ulises Mckenzie MD on 6/11/2025 at 2:46 PM       US BREAST RIGHT LIMITED ([facility] SAME DAY US)(CPT=76642)  Result Date: 6/3/2025  PROCEDURE:  US BREAST RIGHT LIMITED ([facility] SAME DAY US)(CPT=76642)  COMPARISON:  [facility] , MG, [facility] LIZ 2D+3D DIAGNOSTIC [facility]  BILAT (BXN=[id number]), 6/03/2025, 1:04 PM.  TECHNIQUE:  Breast ultrasound was performed, evaluating specific areas of concern.              CONCLUSION:  **PLEASE SEE REPORT FOR DIAGNOSTIC MAMMOGRAM**   LOCATION:  [facility]             Dictated by (CST): Stromberg, LeRoy, MD on 6/03/2025 at 2:16 PM     Finalized by (CST): Stromberg, LeRoy, MD on 6/03/2025 at 2:16 PM   [address] [address] [phone]    [facility] LIZ 2D+3D DIAGNOSTIC [facility]  BILAT (NDJ=18014/12253)  Result Date: 6/3/2025  PROCEDURE:  [facility] 2D+3D DIAGNOSTIC [facility]  BILAT (CPT=77066/37651)  COMPARISON:  [facility] , US, US BREAST RIGHT LIMITED ([facility] SAME DAY US)(CPT=76642), 6/03/2025, 1:54 PM.  [facility], [facility] 2D+3D DIAGNOSTIC [facility]  BILAT (SLC=84050/06301), 7/19/2021, 11:07 AM.  [facility], [facility] 2D+3D DIAGNOSTIC [facility]  BILAT (VGY=24347/86173), 6/26/2024, 9:03 AM.  [facility], [facility] LIZ 2D+3D DIAGNOSTIC [facility] RIGHT (ITG=34412/11764), 1/28/2025, 9:22 AM.  INDICATIONS:  Z98.890 History of right breast biopsy R22.31 Axillary mass, right  VIEWS OBTAINED:  Diagnostic views of both breasts were obtained. Right focal breast ultrasound was performed. Standard 2D and additional multiplane thin sections of the breast were obtained for the purpose of Tomosynthesis evaluation.  The images were reconstructed and reviewed on the dedicated Tomosynthesis workstation.  BREAST COMPOSITION:  Category C - The breasts are heterogeneously dense, which may obscure small masses.  FINDINGS:   Mammographic findings: No suspicious mass or microcalcification is seen. No architectural distortion is evident.  Scattered benign appearing calcification.  Stable bilateral breast parenchymal pattern.  Focal right breast ultrasound:  Benign appearing right axillary lymph nodes are noted.  Comparison imaging of the left axilla demonstrates similar appearing benign-appearing lymph nodes.  No suspicious sonographic finding is identified.             CONCLUSION:  Benign findings.  BI-RADS CATEGORY:  DIAGNOSTIC CATEGORY 2 - BENIGN FINDING:   RECOMMENDATIONS:  ROUTINE MAMMOGRAM AND CLINICAL EVALUATION IN 12 MONTHS.      A letter explaining the results in lay terms has been sent to the  patient.  This exam was evaluated with a computer-aided device.  This patient's information has been entered into a reminder system with a target due date for the next mammogram.   LOCATION:  [facility]   Dictated by (CST): Stromberg, LeRoy, MD on 6/03/2025 at 1:33 PM     Finalized by (CST): Stromberg, LeRoy, MD on 6/03/2025 at 2:15 PM   [address] [address] [phone]    US PELVIS W EV (CPT=76856/41061)  Result Date: 5/29/2025  PROCEDURE:  US PELVIS W EV (CPT=76856,84389)  COMPARISON:  None.  INDICATIONS:  G89.29 Chronic LLQ pain R10.32 Chronic LLQ pain N93.0 Postcoital bleeding Z80.41 Family history of ovarian cancer T83.32XA Intrauterine contraceptive device th*  TECHNIQUE:  Pelvic ultrasound using transabdominal and endovaginal technique.  Transvaginal ultrasound was used to better evaluate adnexal and endometrial detail.  PATIENT STATED HISTORY: (As transcribed by Technologist)  Patient presents for an evaluation of her pelvis, IUD threads lost.    FINDINGS:            UTERUS:  10.7 x 4.8 x 6.6 cm   Endometrium Thickness:  0.3 cm   The uterus appears normal in size, shape, and echogenicity.  IUD is within the endometrium in satisfactory position. RIGHT OVARY:  4.5 x 2.5 x 2.9 cm   There is a 1.8 x 1.4 x 1.2 cm cyst. LEFT OVARY:  3.50 cm x 2.65 cm x 3.02 cm   1.6 x 1.2 x 1.4 cm cyst CUL-DE-SAC:  No free fluid in the cul de sac             CONCLUSION:  IUD is in the endometrium in satisfactory position.  Bilateral ovarian cysts.  No sonographic evidence for ovarian torsion.   LOCATION:  [facility]   Dictated by (CST): Savanna Maldonado MD on 5/29/2025 at 9:19 PM     Finalized by (CST): Savanna Maldonado MD on 5/29/2025 at 9:21 PM                         MDM     50-year-old female presents reporting pain to the left abdomen and flank.  Had a ureteral stent placed a week ago.    Differential includes but is not limited to UTI, pyelonephritis, stent pain, a life/function threat.    CBC,  CMP, urinalysis, CT abdomen and pelvis ordered for further evaluation.  Dilaudid 0.5 mg    My independent interpretation of send is my colleague awaiting results of CT scan.  Anticipate discharge home with urology follow-up.  Prescription for Norco for pain management sent to patient's pharmacy.  Discussed this plan with patient who is agreeable            Medical Decision Making      Disposition and Plan     Clinical Impression:  1. Ureteral stent present    2. Flank pain         Disposition:  Discharge  6/18/2025 11:50 pm    Follow-up:  Bryant Garza MD  [address]  [facility] [id number] [phone]    Call            Medications Prescribed:  Discharge Medication List as of 6/18/2025 11:53 PM        START taking these medications    Details   HYDROcodone-acetaminophen 5-325 MG Oral Tab Take 1-2 tablets by mouth every 4 (four) hours as needed for Pain., Normal, Disp-12 tablet, R-0                   Supplementary Documentation:

## 2025-06-19 NOTE — ED INITIAL ASSESSMENT (HPI)
Bilateral ureteral stent placement 6/11 for hx of kidney stones. Pt states she's felt pain to bilateral rib/flank area since the procedure, with worsening pain and \"bulging\" noted to left flank. Denies fever at home. Endorses constant burning sensation to L-flank.

## 2025-06-20 ENCOUNTER — LAB ENCOUNTER (OUTPATIENT)
Dept: LAB | Age: 50
End: 2025-06-20
Attending: SURGERY
Payer: MEDICAID

## 2025-06-20 DIAGNOSIS — D35.00 ADRENAL ADENOMA, UNSPECIFIED LATERALITY: ICD-10-CM

## 2025-06-20 LAB — CORTIS SERPL-MCNC: 0.8 UG/DL

## 2025-06-20 PROCEDURE — 84244 ASSAY OF RENIN: CPT

## 2025-06-20 PROCEDURE — 82533 TOTAL CORTISOL: CPT

## 2025-06-20 PROCEDURE — 83835 ASSAY OF METANEPHRINES: CPT

## 2025-06-20 PROCEDURE — 36415 COLL VENOUS BLD VENIPUNCTURE: CPT

## 2025-06-20 PROCEDURE — 82088 ASSAY OF ALDOSTERONE: CPT

## 2025-06-23 NOTE — PROGRESS NOTES
Stone analysis: 60% COM, 30% COD, 10% calcium phos    Future Appointments  7/3/2025   10:30 AM   Bryant Garza MD           [id number]           [facility]  8/12/2025  2:00 PM    Greyson Orellana MD          [id number]        [facility] [address]

## 2025-06-23 NOTE — PROGRESS NOTES
Normal cortisol after dexamethasone suppression.    Future Appointments  7/3/2025   10:30 AM   Bryant Garza MD           [id number]           [facility]  8/12/2025  2:00 PM    Greyson Orellana MD          [id number]        [facility] [address]

## 2025-06-24 LAB
ALDOST/RENIN RATIO: 22.1
ALDOSTERONE: 19.1 NG/DL
RENIN ACTIVITY: 0.86 NG/ML/HR

## 2025-06-24 NOTE — H&P
UROLOGY HISTORY & PHYSICAL      Nadine Calix Patient Status:  Hospital Outpatient Surgery    3/22/1975 MRN [id number]   [facility], located within [facility] SURGERY Attending Bryant Garza MD   Hosp Day # 0 PCP Fan Hernandez MD       Chief Complaint:   Nephrolithiasis     HPI & Assessment:   Nadine Calix is a 50 year old female with hx of anxiety, GERD, HTN, OAB, who presents for follow up stones.     Patient was seen last month for bilateral flank pain.     She had CT scan completed 3/27/25 that showed many bilateral stones, largest on the right 9mm and largest on the left 8mm.  Some calcifications may be intraparenchymal.    OR 25 for left URS/LL/stent and right stent. ~10 stones extracted from left kidney, extensive Jero's plaques. Plan for R URS/LL today.    Stone analysis: 60% COM, 30% COD, 10% calcium phos    Urine culture negative on 25.    Plan:   - OR for cystoscopy, LEFT ureteral stent removal, RIGHT ureteroscopy, laser lithotripsy, stent exchange   - Informed consent obtained - risks and benefits explained, all questions answered       History:   Past Medical History[1]    Past Surgical History[2]    Family History[3]    Short Social Hx on File[4]    Medications (Active prior to today's visit):  Current Medications[5]    Allergies:  Allergies[6]    Review of Systems:   A comprehensive 10-point review of systems was completed.  Pertinent positives and negatives are noted in the the HPI.    Physical Exam:   Vital Signs:  Height 4' 11\" (1.499 m), weight 182 lb (82.6 kg), last menstrual period 2025, not currently breastfeeding.     CONSTITUTIONAL: Well developed, well nourished, in no acute distress  NEUROLOGIC: Alert and oriented  HEAD: Normocephalic, atraumatic  EYES: Sclera non-icteric  ENT: Hearing intact, moist mucous membranes  NECK: No obvious goiter or masses  RESPIRATORY: Normal respiratory effort  SKIN: No evident rashes  ABDOMEN: Soft, non-tender, non-distended    Laboratory  Data:  Lab Results   Component Value Date    WBC 10.0 06/18/2025    HGB 14.9 06/18/2025    .0 06/18/2025     Lab Results   Component Value Date     06/18/2025    K 3.7 06/18/2025     06/18/2025    CO2 24.0 06/18/2025    BUN 13 06/18/2025     (H) 06/18/2025    GFRAA 97 07/08/2022    AST 24 06/18/2025    ALT 43 06/18/2025    TP 7.3 06/18/2025    ALB 4.5 06/18/2025    PHOS 3.7 04/11/2025    CA 9.1 06/18/2025    MG 1.9 04/11/2025       Urinalysis Results (last three years):  Recent Labs     10/28/22  1201 02/11/23  1734 05/05/23  1106 08/10/23  1150 08/16/23  1809 09/17/23  0632 09/28/23  1021 10/27/23  1149 12/08/23  1053 01/26/24  0810 02/01/24  1255 03/05/24  1047 06/06/24  0923 08/01/24  1518 03/21/25  1142 04/23/25  1004 04/26/25  0844 05/30/25  0857 06/18/25 2025   COLORUR Yellow Yellow  --  Light-Yellow Yellow Yellow Yellow Light-Yellow  --  Red*  --   --   --  Yellow  --   --  Light-Yellow Yellow Yellow   CLARITY Clear Clear  --  Clear Clear Clear Turbid* Clear  --  Cloudy*  --   --   --  Turbid*  --   --  Clear Clear Hazy*   SPECGRAVITY 1.021 1.010 1.020 1.019 1.020 1.015 1.018 1.017 1.015 1.025 1.030 1.025 >=1.030 1.022 1.020 1.020 1.021 1.021 1.020   PHURINE 6.0 7.0  --  6.0 6.0 6.0 6.0 6.0 6.0 6.0 7.0 7.0 5.5 7.5 6.0 6.0 6.0 6.5 5.5   PROUR 100* Negative  --  70* Trace* 100 mg/dL* 70* 50*  --  100 mg/dL*  --   --   --  50*  --   --  100* 100* >=300*   GLUUR Negative Negative Negative Normal Negative Negative Normal Normal  --  Negative  --   --   --  Normal  --   --  Normal Normal 100*   KETUR Negative Negative  --  Negative Negative Negative Negative Negative  --  Negative  --   --   --  Negative  --   --  Negative Negative Trace*   BILUR Negative Negative  --  Negative Negative Negative Negative Negative  --  Negative  --   --   --  Negative  --   --  Negative Negative Negative   BLOODURINE Small* Large*  --  1+* Moderate* Large* 1+* 1+*  --  Large*  --   --   --  1+*  --   --   1+* 2+* Large*   NITRITE Negative Negative  --  Negative Negative Positive* 2+* Negative Negative Positive* Negative Negative Negative Negative Negative Negative Negative Negative Positive*   UROBILINOGEN <2.0 0.2  --  Normal 0.2 0.2 Normal Normal  --  0.2  --   --   --  Normal  --   --  Normal Normal 1.0*   LEUUR Negative Negative  --  Negative Negative Large* 500* Negative  --  Trace*  --   --   --  Negative  --   --  Negative Negative Trace*   UASA Negative  --   --   --   --   --   --   --   --   --   --   --   --   --   --   --   --   --   --    WBCUR 1-5 1-5  --  1-5 1-5 >50* >50* 1-5  --  1-5  --   --   --  1-5  --   --  1-5 6-10* 6-10*   RBCUR 0-2 0-2  --  0-2 6-10* >10* 6-10* 0-2  --  >10*  --   --   --  3-5*  --   --  3-5* >10* >10*   BACUR None Seen None Seen  --  None Seen Rare* 1+* 2+* None Seen  --  None Seen  --   --   --  Rare*  --   --  Rare* Rare* None Seen       Urine Culture Results (last three years):  Lab Results   Component Value Date    URINECUL No Growth at 18-24 hrs. 06/18/2025    URINECUL  05/30/2025     <10,000 cfu/ml Multiple species present- probable contamination.    URINECUL  03/21/2025     10-50,000 cfu/ml Multiple species present-probable contamination.    URINECUL No Growth at 18-24 hrs. 08/01/2024    URINECUL No Growth 2 Days 05/15/2024    URINECUL No Growth at 18-24 hrs. 02/14/2024    URINECUL (A) 01/26/2024     10,000 - 50,000 CFU/ML Klebsiella pneumoniae ESBL Pos    URINECUL No Growth 2 Days 01/11/2024    URINECUL No Growth at 18-24 hrs. 10/27/2023    URINECUL >100,000 CFU/ML Klebsiella pneumoniae (A) 09/28/2023    URINECUL >100,000 CFU/ML Klebsiella pneumoniae (A) 09/17/2023    URINECUL No Growth at 18-24 hrs. 08/10/2023    URINECUL (A) 07/14/2023     10,000 - 50,000 CFU/ML Alpha hemolytic Streptococcus    URINECUL No Growth at 18-24 hrs. 10/28/2022    URINECUL No Growth at 18-24 hrs. 06/07/2022       PSA:  No results found for: \"PSA\", \"PERCENTPSA\", \"PSAS\", \"PSAULTRA\"      Imaging (last three days):  No results found.       I have personally reviewed all relevant medical records, labs, and imaging.     Bryant Garza MD  Staff Urologist  [facility]  Office: [phone]             [1]   Past Medical History:   Abdominal distention    Longer than this but this is an estimate    Abdominal hernia    I had hernia repair around by my belly button & abdominal    Abdominal pain    Currently seeing obgyn, urologist & nephrologist    Allergic rhinitis    A long time ago    Arrhythmia    PVC'S, irregular heartbeat    Back pain    Mid to lower back pain & hip pain    Benign essential HTN    Bloating    Longer than this but this is an estimate    Blood in urine    Due to severe kidney stones    Blurred vision    I always wore glasses or contacts since about 14 yrs old    Calculus of kidney    hydronephrosis/ several times kidney stones/ 13 th procedure for stones    Cervicalgia    Log Date: 05/04/2012         Change in hair    My hair is thinning more than the usual    Chest pain    I did see a cardiologist about this    Chest pain on exertion    I did see a cardiologists about this    COVID-19    headache, back pain, shortness of breath, sore throat, runny nose, chills. Not hospitalized    COVID-19    high fever, fatigue, not hospitalized    Dense breasts    heterogeneously dense breasts    Depression    I lost my son    Diarrhea, unspecified    I notice that certain things run right through me now    Disorder of liver    fatty liver    Disorder of thyroid    Dizziness    Dysmenorrhea    Ever since I started Menstrating at 14 yrs old    Dyspareunia    Sometimes    Dyspepsia    Easy bruising    I find bruises on me and don’t really know how I got them    Elevated fasting glucose    Enthesopathy of the wrist and carpus    Log Date: 05/31/2011         ESBL E. coli carrier    Excessive bleeding in premenopausal period    Falls    Tripped/fell into muskrat hole    Fatigue    I  noticed that I’m alot more tired lately more than usual    Flatulence/gas pain/belching    Longer than this but this is an estimate    Food intolerance    I think I am lactose intolerant been that way for a few year    Frequent urination    All the time for years    Frequent UTI    Due to kidney stones blockages    Gestational diabetes (HCC)    Heart murmur    My mother said I was born with one but I never really checke    Heart palpitations    Pvc’s and irregular heart rate    Hemorrhoids    After last pregnancy or a couple years after that    High blood pressure    History of cardiac murmur    Mother told me I was born with one & my dr as well    History of D&C    History of depression    When I had a miscarriage    History of stomach ulcers    Indigestion    Longer than this but this is an estimate    Itch of skin    Not severe but I have been itching for no apparent reason    IUD threads lost    5/13/25 C/o continued irregular bleeding. Mirena IUD strings NOT seen at time of exam. 5/29/25 Pelvic US - IUD in good position. Uterus somewhat bulky & heterogeneous. Pre-ovulatory follicles bilateral ovaries.       Leaking of urine    After first pregnancy    Leg swelling    My left leg slightly swelled up and my ankle area was hurtin    Lesion of ulnar nerve    Log Date: 03/08/2013         Loss of appetite    I noticed this lately too    Menometrorrhagia    Hysteroscopy 10/2020 path favors anovulatory breakdown bleeding.    Menometrorrhagia    Migraine with aura    Migraines    But ever since my 2 blood patches in 2002 No more migraines    Multiple thyroid nodules    Myofascial pain    Nausea    Longer than this but this is an estimate    Nausea & vomiting    Night sweats    I feel like I’m on fire at night.    OAB (overactive bladder)    PTNS - posterior tibial nerve stimulation with urogynecologist Dr. Lara Jim    Painful urination    When passing stones    Postcoital bleeding    Prior to hysteroscopy done  10/2020. Also having since at least  or     Postcoital bleeding    Prediabetes    Prediabetes    3/8/25 A1c 5.9%    PVC's (premature ventricular contractions)    Rash    Broke out with unknown rash all over legs and arms    Sexually transmitted disease    HPV    Shortness of breath    Seen cardiologist about this    Sleep disturbance    For years sometimes I can’t lay on my left side    Stress    Rough up bringing and also regular family stresses    Uncomfortable fullness after meals    Longer than this but this is an estimate    Unspecified condition originating in the  period    Ureteral stone with hydronephrosis    Ureteral stricture, right    Urinary incontinence    Constantly going can’t hold it    Ventral hernia without obstruction or gangrene    Visual impairment    glasses    Vitamin D deficiency    Wears glasses    Since I was about 14 yrs old    Weight gain    After last son was born   [2]   Past Surgical History:  Procedure Laterality Date    Abdominal surgery      2 hernia repairs right & left side      ,2016    x2    Colonoscopy N/A 2021    Procedure: COLONOSCOPY, ESOPHAGOGASTRODUODENOSCOPY with biopsy;  Surgeon: Prashant Lilly DO;  Location:  ENDOSCOPY    Colonoscopy  2021 EGD WITH BIOPSY. Colonoscopy internal hemorrhoids. No gross findings to explain left sided pain. Recall 10 years. Prashant Lilly DO    Cryocautery of cervix      For abnormal cells    Cysto/uretero w/lithotripsy Left 2024    Cystoscopy, LEFT ureteroscopy, laser lithotripsy, basket stone extraction, retrograde pyelogram, ureteral stent placement, Right retrograde pyelogram Dr. Bryant Garza    Cysto/uretero w/up stricture Right 10/15/2019    Cysto Rt RPG, Rt URS w/ Laser Litho Dilation of Rtight Stricture Rt URS Stent Exchange w/ Dr Jim    Cysto/uretero, stone remove Right 2019    right ureter and kidney stones extraction, URS, stent placement     Cystoscopy,ureteroscopy,lithotripsy Right 08/23/2019    Cysto, B/L RPG, URS, laser litho, stone ext, Rt stent Dr. Snell    D & c  10/2021 I believe    Lining of Uterus was way too thick, did biospy as well    Egd  04/07/2021    for left sided pain    Hc endometrial sampling w or wo endocerv sampling  08/18/2023    EMB- Dr. Ramirez - disordered proliferative endometrium - recommended medical management options to regulate cycle.    Hernia surgery  01/2000    L hernia repair    Hysteroscopy,with sampling  10/08/2020    Hysteroscopy, D&C for intermittent menorrhagia, thickened endometrium, cervical stenosis. H/o menometrorrhagia & postcoital bleeding. Dr. Linnette Antoine. Path Endometrium with stromal collapse, superficial fibrin thrombi, and some polypoid tissue fragments with increased stromal fibrous component. Endometrial glands are proliferative. Overall pattern favors anovulatory breakdown bleeding.    Hysteroscopy,with sampling  07/11/2024    Hysteroscopy, D&C, insertion Mirena IUD. Done for menometrorrhagia & postcoital bleeding. Dr. Ema Randall. Path benign -Fragments of weakly proliferative endometrium with stromal breakdown/condensation. -Fragments of endocervical glands with inflammation and reactive changes.    Insert intrauterine device  07/11/2024    Mirena IUD insertion. 8 cm. Anteverted/mid position. Dr. Ema Randall    Laparoscopy,pelvic,biopsy      Kidney stones & examine    Lithotripsy  2001, 2007 & 11/11    Needle biopsy left  02/2021    fibroadenoma    Needle biopsy right  02/2021    fibroadenoma    Needle biopsy right Right 07/03/2024    fibroadenoma-benign    Other Bilateral 2012    nerve surgery to bilateral arms about 1 month apart    Other      percutaneous nephrolithotomy    Other surgical history  12/27/2019    cysto stent removal - dr. snell     Other surgical history  06/03/2020    Cysto Stent Removal w/ Dr Snell    Other surgical history  07/07/2021    Cysto/Stent  Removal Dr. Jim    Other surgical history  2022    Right ureteroscopy, laser lithotripsy, stent placement, nephrostomy tube removal with Dr. Tariq    Removal gallbladder      Remove stent via transureth Right 2019    Cysto Stent Removal w/ Dr Jim    Repair ing hernia,7+y/o,reducibl  2017    Hernia in stomach and previous ones in abdomen    Us breast biopsy 1 site right (cpt=[id number]) Right 2024    12:00 right breast. Fibroadenoma. Tiny microcalcifications. Breast tissue with hypocellular stromal fibrosis. No atypia or malignancy. Recommend follow-up mammogram and right breast ultrasound in 6 months.   [3]   Family History  Problem Relation Age of Onset    Diabetes Mother     High Cholesterol Mother     Hypertension Mother     Thyroid disease Mother         hypothroid    Other (Other) Mother         Part of thyroid removed    Depression Mother     Heart Disorder Father         3 stents in heart    Diabetes Father     Hypertension Father     Heart Disease Father     Heart Surgery Father         3 stents put in    Hypertension Sister     Other (ovarian problem) Sister         work up in progress    Diabetes Sister     Other (Other) Sister         Liver problem    Hypertension Sister     Depression Sister     Diabetes Sister     Other (Other) Daughter         Appendicitis    Other (Other) Son         Appendicitis    Diabetes Maternal Grandmother     Ovarian Cancer Maternal Grandmother         My grandma  from it at age 54    Cancer Maternal Grandmother         Ovarian Cancer    Depression Maternal Grandmother     Diabetes Maternal Grandfather     Dementia Maternal Grandfather     Diabetes Paternal Grandmother     Diabetes Paternal Grandfather     Dementia Paternal Grandfather         He was 90    Pancreatic Cancer Maternal Uncle     Colon Cancer Neg    [4]   Social History  Socioeconomic History    Marital status: Life Partner   Tobacco Use    Smoking status: Never    Smokeless  tobacco: Never   Vaping Use    Vaping status: Never Used   Substance and Sexual Activity    Alcohol use: No    Drug use: No    Sexual activity: Yes     Partners: Male     Birth control/protection: Mirena, I.U.D.     Comment: Mirena IUD 7/11/24   Other Topics Concern    Caffeine Concern No    Exercise No    Seat Belt Yes   [5]   Current Outpatient Medications   Medication Sig Dispense Refill    Calcium Polycarbophil (FIBER) 625 MG Oral Tab Take by mouth.      Multiple Vitamin (MULTIVITAMIN TAB/CAP) Take 1 tablet by mouth daily.      Magnesium Glycinate 120 MG Oral Cap Take 240 mg by mouth daily.      Ferrous Sulfate (IRON OR) Take by mouth. 3X WEEKLY      potassium citrate (UROCIT-K 10) 10 MEQ (1080 MG) Oral Tab CR Take 2 tablets (20 mEq total) by mouth in the morning and 2 tablets (20 mEq total) before bedtime. (Patient taking differently: Take 1 tablet (10 mEq total) by mouth in the morning and 1 tablet (10 mEq total) before bedtime.) 120 tablet 3    hydroCHLOROthiazide 25 MG Oral Tab Take 1 tablet (25 mg total) by mouth daily. 30 tablet 3    ergocalciferol 1.25 MG (86629 UT) Oral Cap Take 1 capsule (50,000 Units total) by mouth once a week. 24 capsule 0    Levonorgestrel (MIRENA, 52 MG,) 20 MCG/DAY Intrauterine IUD 20 mcg (1 each total) by Intrauterine route one time.      albuterol (PROAIR HFA) 108 (90 Base) MCG/ACT Inhalation Aero Soln Inhale 2 puffs into the lungs every 4 (four) hours as needed for Wheezing. 1 each 1    HYDROcodone-acetaminophen 5-325 MG Oral Tab Take 1-2 tablets by mouth every 4 (four) hours as needed for Pain. 12 tablet 0    oxybutynin 5 MG Oral Tab Take 1 tablet (5 mg total) by mouth 3 (three) times daily as needed (Bladder spasms). Stop 12 hours before Jett catheter removal in clinic (if applicable) 15 tablet 0    phenazopyridine (PYRIDIUM) 100 MG Oral Tab Take 1 tablet (100 mg total) by mouth 3 (three) times daily as needed for Pain. This will turn your urine orange. 10 tablet 0     tamsulosin 0.4 MG Oral Cap Take 1 capsule (0.4 mg total) by mouth every evening for 14 days. 14 capsule 0    Drospirenone (SLYND) 4 MG Oral Tab Take 1 tablet by mouth daily. 84 tablet 3    dexamethasone 1 MG Oral Tab Take 1 tablet (1 mg total) by mouth one time for 1 dose. Take at 11 PM night before lab tests. Lab draw at 8 AM the next morning. (Patient not taking: Reported on 5/13/2025) 1 tablet 1   [6]   Allergies  Allergen Reactions    Amlodipine SWELLING     Leg swelling    Metoprolol SHORTNESS OF BREATH    Toradol [Ketorolac Tromethamine] SHORTNESS OF BREATH    Tramadol SHORTNESS OF BREATH    Dilaudid [Hydromorphone] ITCHING and PAIN     Headache - per pt this is only with long term use - pt states she can tolerate this medication    Lisinopril ITCHING and DIZZINESS    Oxycodone PAIN     headache    Wellbutrin [Bupropion Hcl] PAIN and DIZZINESS     Headache    Valsartan OTHER (SEE COMMENTS)     Chest pain      Codeine Sulfate ITCHING     TABS    Hydrocodone ITCHING    Lisinopril ITCHING    Morphine ITCHING    Seasonal Runny nose

## 2025-06-25 ENCOUNTER — APPOINTMENT (OUTPATIENT)
Dept: GENERAL RADIOLOGY | Age: 50
End: 2025-06-25
Attending: EMERGENCY MEDICINE
Payer: MEDICAID

## 2025-06-25 ENCOUNTER — HOSPITAL ENCOUNTER (OUTPATIENT)
Facility: HOSPITAL | Age: 50
Setting detail: HOSPITAL OUTPATIENT SURGERY
Discharge: HOME | End: 2025-06-25
Attending: SURGERY | Admitting: SURGERY
Payer: MEDICAID

## 2025-06-25 ENCOUNTER — HOSPITAL ENCOUNTER (EMERGENCY)
Age: 50
Discharge: HOME | End: 2025-06-25
Attending: EMERGENCY MEDICINE
Payer: MEDICAID

## 2025-06-25 ENCOUNTER — ANESTHESIA (OUTPATIENT)
Dept: SURGERY | Facility: HOSPITAL | Age: 50
End: 2025-06-25
Payer: MEDICAID

## 2025-06-25 ENCOUNTER — APPOINTMENT (OUTPATIENT)
Dept: GENERAL RADIOLOGY | Facility: HOSPITAL | Age: 50
End: 2025-06-25
Attending: SURGERY
Payer: MEDICAID

## 2025-06-25 VITALS
TEMPERATURE: 98 F | HEART RATE: 91 BPM | BODY MASS INDEX: 36.08 KG/M2 | DIASTOLIC BLOOD PRESSURE: 98 MMHG | WEIGHT: 179 LBS | OXYGEN SATURATION: 94 % | HEIGHT: 59 IN | SYSTOLIC BLOOD PRESSURE: 164 MMHG | RESPIRATION RATE: 19 BRPM

## 2025-06-25 VITALS
HEART RATE: 101 BPM | OXYGEN SATURATION: 91 % | SYSTOLIC BLOOD PRESSURE: 145 MMHG | TEMPERATURE: 98 F | DIASTOLIC BLOOD PRESSURE: 95 MMHG | RESPIRATION RATE: 18 BRPM | BODY MASS INDEX: 36.08 KG/M2 | WEIGHT: 179 LBS | HEIGHT: 59 IN

## 2025-06-25 DIAGNOSIS — N20.0 RENAL STONES: ICD-10-CM

## 2025-06-25 DIAGNOSIS — G89.18 POSTOPERATIVE PAIN: Primary | ICD-10-CM

## 2025-06-25 LAB
ALBUMIN SERPL-MCNC: 4.7 G/DL (ref 3.2–4.8)
ALBUMIN/GLOB SERPL: 1.5 {RATIO} (ref 1–2)
ALP LIVER SERPL-CCNC: 129 U/L (ref 39–100)
ALT SERPL-CCNC: 120 U/L (ref 10–49)
ANION GAP SERPL CALC-SCNC: 9 MMOL/L (ref 0–18)
AST SERPL-CCNC: 97 U/L (ref ?–34)
B-HCG UR QL: NEGATIVE
BASOPHILS # BLD AUTO: 0.02 X10(3) UL (ref 0–0.2)
BASOPHILS NFR BLD AUTO: 0.1 %
BILIRUB SERPL-MCNC: 0.6 MG/DL (ref 0.3–1.2)
BILIRUB UR QL STRIP.AUTO: NEGATIVE
BUN BLD-MCNC: 13 MG/DL (ref 9–23)
CALCIUM BLD-MCNC: 9 MG/DL (ref 8.7–10.6)
CHLORIDE SERPL-SCNC: 104 MMOL/L (ref 98–112)
CO2 SERPL-SCNC: 21 MMOL/L (ref 21–32)
CREAT BLD-MCNC: 0.97 MG/DL (ref 0.55–1.02)
EGFRCR SERPLBLD CKD-EPI 2021: 72 ML/MIN/1.73M2 (ref 60–?)
EOSINOPHIL # BLD AUTO: 0 X10(3) UL (ref 0–0.7)
EOSINOPHIL NFR BLD AUTO: 0 %
ERYTHROCYTE [DISTWIDTH] IN BLOOD BY AUTOMATED COUNT: 12.8 %
GLOBULIN PLAS-MCNC: 3.1 G/DL (ref 2–3.5)
GLUCOSE BLD-MCNC: 141 MG/DL (ref 70–99)
GLUCOSE UR STRIP.AUTO-MCNC: NEGATIVE MG/DL
HCT VFR BLD AUTO: 43.8 % (ref 35–48)
HGB BLD-MCNC: 15.1 G/DL (ref 12–16)
IMM GRANULOCYTES # BLD AUTO: 0.07 X10(3) UL (ref 0–1)
IMM GRANULOCYTES NFR BLD: 0.5 %
KETONES UR STRIP.AUTO-MCNC: NEGATIVE MG/DL
LEUKOCYTE ESTERASE UR QL STRIP.AUTO: NEGATIVE
LYMPHOCYTES # BLD AUTO: 0.57 X10(3) UL (ref 1–4)
LYMPHOCYTES NFR BLD AUTO: 4.2 %
MCH RBC QN AUTO: 30.6 PG (ref 26–34)
MCHC RBC AUTO-ENTMCNC: 34.5 G/DL (ref 31–37)
MCV RBC AUTO: 88.7 FL (ref 80–100)
METANEPHRINE: 30.9 PG/ML
MONOCYTES # BLD AUTO: 0.21 X10(3) UL (ref 0.1–1)
MONOCYTES NFR BLD AUTO: 1.6 %
NEUTROPHILS # BLD AUTO: 12.67 X10 (3) UL (ref 1.5–7.7)
NEUTROPHILS # BLD AUTO: 12.67 X10(3) UL (ref 1.5–7.7)
NEUTROPHILS NFR BLD AUTO: 93.6 %
NITRITE UR QL STRIP.AUTO: NEGATIVE
NORMETANEPHRINE: 53.9 PG/ML
OSMOLALITY SERPL CALC.SUM OF ELEC: 280 MOSM/KG (ref 275–295)
PH UR STRIP.AUTO: 7 [PH] (ref 5–8)
PLATELET # BLD AUTO: 407 10(3)UL (ref 150–450)
POTASSIUM SERPL-SCNC: 4.4 MMOL/L (ref 3.5–5.1)
PROT SERPL-MCNC: 7.8 G/DL (ref 5.7–8.2)
PROT UR STRIP.AUTO-MCNC: >=300 MG/DL
RBC # BLD AUTO: 4.94 X10(6)UL (ref 3.8–5.3)
RBC #/AREA URNS AUTO: >10 /HPF
SODIUM SERPL-SCNC: 134 MMOL/L (ref 136–145)
SP GR UR STRIP.AUTO: 1.02 (ref 1–1.03)
UROBILINOGEN UR STRIP.AUTO-MCNC: 0.2 MG/DL
WBC # BLD AUTO: 13.5 X10(3) UL (ref 4–11)

## 2025-06-25 PROCEDURE — 99284 EMERGENCY DEPT VISIT MOD MDM: CPT

## 2025-06-25 PROCEDURE — 74018 RADEX ABDOMEN 1 VIEW: CPT | Performed by: EMERGENCY MEDICINE

## 2025-06-25 PROCEDURE — 96375 TX/PRO/DX INJ NEW DRUG ADDON: CPT

## 2025-06-25 PROCEDURE — 80053 COMPREHEN METABOLIC PANEL: CPT | Performed by: EMERGENCY MEDICINE

## 2025-06-25 PROCEDURE — 74420 UROGRAPHY RTRGR +-KUB: CPT | Performed by: SURGERY

## 2025-06-25 PROCEDURE — 52332 CYSTOSCOPY AND TREATMENT: CPT | Performed by: SURGERY

## 2025-06-25 PROCEDURE — 85025 COMPLETE CBC W/AUTO DIFF WBC: CPT | Performed by: EMERGENCY MEDICINE

## 2025-06-25 PROCEDURE — 96374 THER/PROPH/DIAG INJ IV PUSH: CPT

## 2025-06-25 PROCEDURE — 52352 CYSTOURETERO W/STONE REMOVE: CPT | Performed by: SURGERY

## 2025-06-25 PROCEDURE — 96376 TX/PRO/DX INJ SAME DRUG ADON: CPT

## 2025-06-25 PROCEDURE — 81015 MICROSCOPIC EXAM OF URINE: CPT | Performed by: EMERGENCY MEDICINE

## 2025-06-25 PROCEDURE — 99285 EMERGENCY DEPT VISIT HI MDM: CPT

## 2025-06-25 PROCEDURE — 96361 HYDRATE IV INFUSION ADD-ON: CPT

## 2025-06-25 PROCEDURE — 81001 URINALYSIS AUTO W/SCOPE: CPT | Performed by: EMERGENCY MEDICINE

## 2025-06-25 DEVICE — URETERAL STENT
Type: IMPLANTABLE DEVICE | Site: URETER | Status: FUNCTIONAL
Brand: CONTOUR™

## 2025-06-25 RX ORDER — NICOTINE POLACRILEX 4 MG
30 LOZENGE BUCCAL
Status: DISCONTINUED | OUTPATIENT
Start: 2025-06-25 | End: 2025-06-25

## 2025-06-25 RX ORDER — HYDROMORPHONE HYDROCHLORIDE 1 MG/ML
INJECTION, SOLUTION INTRAMUSCULAR; INTRAVENOUS; SUBCUTANEOUS
Status: COMPLETED
Start: 2025-06-25 | End: 2025-06-25

## 2025-06-25 RX ORDER — ALPRAZOLAM 0.5 MG
0.5 TABLET ORAL 3 TIMES DAILY PRN
Qty: 10 TABLET | Refills: 0 | Status: SHIPPED | OUTPATIENT
Start: 2025-06-25 | End: 2025-06-25

## 2025-06-25 RX ORDER — GLYCOPYRROLATE 0.2 MG/ML
INJECTION, SOLUTION INTRAMUSCULAR; INTRAVENOUS AS NEEDED
Status: DISCONTINUED | OUTPATIENT
Start: 2025-06-25 | End: 2025-06-25 | Stop reason: SURG

## 2025-06-25 RX ORDER — SULFAMETHOXAZOLE AND TRIMETHOPRIM 800; 160 MG/1; MG/1
1 TABLET ORAL 2 TIMES DAILY
Qty: 4 TABLET | Refills: 0 | Status: SHIPPED | OUTPATIENT
Start: 2025-06-25 | End: 2025-06-27

## 2025-06-25 RX ORDER — IOPAMIDOL 612 MG/ML
INJECTION, SOLUTION INTRAVASCULAR AS NEEDED
Status: DISCONTINUED | OUTPATIENT
Start: 2025-06-25 | End: 2025-06-25 | Stop reason: HOSPADM

## 2025-06-25 RX ORDER — TAMSULOSIN HYDROCHLORIDE 0.4 MG/1
0.4 CAPSULE ORAL EVERY EVENING
Qty: 14 CAPSULE | Refills: 0 | Status: SHIPPED | OUTPATIENT
Start: 2025-06-25 | End: 2025-07-09

## 2025-06-25 RX ORDER — PHENAZOPYRIDINE HYDROCHLORIDE 100 MG/1
100 TABLET, FILM COATED ORAL 3 TIMES DAILY PRN
Qty: 10 TABLET | Refills: 0 | Status: SHIPPED | OUTPATIENT
Start: 2025-06-25

## 2025-06-25 RX ORDER — OXYBUTYNIN CHLORIDE 5 MG/1
5 TABLET ORAL 3 TIMES DAILY PRN
Qty: 15 TABLET | Refills: 0 | Status: SHIPPED | OUTPATIENT
Start: 2025-06-25

## 2025-06-25 RX ORDER — ONDANSETRON 2 MG/ML
INJECTION INTRAMUSCULAR; INTRAVENOUS
Status: COMPLETED
Start: 2025-06-25 | End: 2025-06-25

## 2025-06-25 RX ORDER — SCOPOLAMINE 1 MG/3D
1 PATCH, EXTENDED RELEASE TRANSDERMAL ONCE
Status: DISCONTINUED | OUTPATIENT
Start: 2025-06-25 | End: 2025-06-25 | Stop reason: HOSPADM

## 2025-06-25 RX ORDER — LIDOCAINE HYDROCHLORIDE 10 MG/ML
INJECTION, SOLUTION EPIDURAL; INFILTRATION; INTRACAUDAL; PERINEURAL AS NEEDED
Status: DISCONTINUED | OUTPATIENT
Start: 2025-06-25 | End: 2025-06-25 | Stop reason: SURG

## 2025-06-25 RX ORDER — HYDROMORPHONE HYDROCHLORIDE 1 MG/ML
0.4 INJECTION, SOLUTION INTRAMUSCULAR; INTRAVENOUS; SUBCUTANEOUS EVERY 5 MIN PRN
Status: DISCONTINUED | OUTPATIENT
Start: 2025-06-25 | End: 2025-06-25

## 2025-06-25 RX ORDER — ALPRAZOLAM 0.5 MG
0.5 TABLET ORAL 3 TIMES DAILY PRN
Qty: 6 TABLET | Refills: 0 | Status: SHIPPED | OUTPATIENT
Start: 2025-06-25 | End: 2025-07-02

## 2025-06-25 RX ORDER — LORAZEPAM 2 MG/ML
1 INJECTION INTRAMUSCULAR ONCE
Status: COMPLETED | OUTPATIENT
Start: 2025-06-25 | End: 2025-06-25

## 2025-06-25 RX ORDER — OXYCODONE AND ACETAMINOPHEN 5; 325 MG/1; MG/1
1-2 TABLET ORAL EVERY 6 HOURS PRN
Qty: 20 TABLET | Refills: 0 | Status: SHIPPED | OUTPATIENT
Start: 2025-06-25 | End: 2025-06-30

## 2025-06-25 RX ORDER — OXYCODONE HYDROCHLORIDE 5 MG/1
5 TABLET ORAL EVERY 4 HOURS PRN
Refills: 0 | Status: DISCONTINUED | OUTPATIENT
Start: 2025-06-25 | End: 2025-06-25

## 2025-06-25 RX ORDER — ACETAMINOPHEN 500 MG
1000 TABLET ORAL ONCE
Status: DISCONTINUED | OUTPATIENT
Start: 2025-06-25 | End: 2025-06-25 | Stop reason: HOSPADM

## 2025-06-25 RX ORDER — LABETALOL HYDROCHLORIDE 5 MG/ML
INJECTION, SOLUTION INTRAVENOUS EVERY 10 MIN PRN
Status: DISCONTINUED | OUTPATIENT
Start: 2025-06-25 | End: 2025-06-25

## 2025-06-25 RX ORDER — ONDANSETRON 2 MG/ML
4 INJECTION INTRAMUSCULAR; INTRAVENOUS EVERY 6 HOURS PRN
Status: DISCONTINUED | OUTPATIENT
Start: 2025-06-25 | End: 2025-06-25

## 2025-06-25 RX ORDER — HYDROMORPHONE HYDROCHLORIDE 1 MG/ML
0.2 INJECTION, SOLUTION INTRAMUSCULAR; INTRAVENOUS; SUBCUTANEOUS EVERY 5 MIN PRN
Status: DISCONTINUED | OUTPATIENT
Start: 2025-06-25 | End: 2025-06-25

## 2025-06-25 RX ORDER — HYDROCODONE BITARTRATE AND ACETAMINOPHEN 5; 325 MG/1; MG/1
2 TABLET ORAL ONCE AS NEEDED
Status: DISCONTINUED | OUTPATIENT
Start: 2025-06-25 | End: 2025-06-25

## 2025-06-25 RX ORDER — HYDROMORPHONE HYDROCHLORIDE 1 MG/ML
0.6 INJECTION, SOLUTION INTRAMUSCULAR; INTRAVENOUS; SUBCUTANEOUS EVERY 5 MIN PRN
Status: DISCONTINUED | OUTPATIENT
Start: 2025-06-25 | End: 2025-06-25

## 2025-06-25 RX ORDER — ROCURONIUM BROMIDE 10 MG/ML
INJECTION, SOLUTION INTRAVENOUS AS NEEDED
Status: DISCONTINUED | OUTPATIENT
Start: 2025-06-25 | End: 2025-06-25 | Stop reason: SURG

## 2025-06-25 RX ORDER — SODIUM CHLORIDE, SODIUM LACTATE, POTASSIUM CHLORIDE, CALCIUM CHLORIDE 600; 310; 30; 20 MG/100ML; MG/100ML; MG/100ML; MG/100ML
INJECTION, SOLUTION INTRAVENOUS CONTINUOUS
Status: DISCONTINUED | OUTPATIENT
Start: 2025-06-25 | End: 2025-06-25

## 2025-06-25 RX ORDER — NEOSTIGMINE METHYLSULFATE 1 MG/ML
INJECTION INTRAVENOUS AS NEEDED
Status: DISCONTINUED | OUTPATIENT
Start: 2025-06-25 | End: 2025-06-25 | Stop reason: SURG

## 2025-06-25 RX ORDER — ONDANSETRON 2 MG/ML
INJECTION INTRAMUSCULAR; INTRAVENOUS AS NEEDED
Status: DISCONTINUED | OUTPATIENT
Start: 2025-06-25 | End: 2025-06-25 | Stop reason: SURG

## 2025-06-25 RX ORDER — ACETAMINOPHEN 500 MG
1000 TABLET ORAL ONCE AS NEEDED
Status: DISCONTINUED | OUTPATIENT
Start: 2025-06-25 | End: 2025-06-25

## 2025-06-25 RX ORDER — OXYCODONE AND ACETAMINOPHEN 5; 325 MG/1; MG/1
1-2 TABLET ORAL EVERY 6 HOURS PRN
Qty: 20 TABLET | Refills: 0 | Status: SHIPPED | OUTPATIENT
Start: 2025-06-25 | End: 2025-06-25

## 2025-06-25 RX ORDER — ONDANSETRON 2 MG/ML
4 INJECTION INTRAMUSCULAR; INTRAVENOUS ONCE
Status: COMPLETED | OUTPATIENT
Start: 2025-06-25 | End: 2025-06-25

## 2025-06-25 RX ORDER — NALOXONE HYDROCHLORIDE 0.4 MG/ML
0.08 INJECTION, SOLUTION INTRAMUSCULAR; INTRAVENOUS; SUBCUTANEOUS AS NEEDED
Status: DISCONTINUED | OUTPATIENT
Start: 2025-06-25 | End: 2025-06-25

## 2025-06-25 RX ORDER — NICOTINE POLACRILEX 4 MG
15 LOZENGE BUCCAL
Status: DISCONTINUED | OUTPATIENT
Start: 2025-06-25 | End: 2025-06-25

## 2025-06-25 RX ORDER — OXYCODONE HYDROCHLORIDE 5 MG/1
2.5 TABLET ORAL EVERY 4 HOURS PRN
Refills: 0 | Status: DISCONTINUED | OUTPATIENT
Start: 2025-06-25 | End: 2025-06-25

## 2025-06-25 RX ORDER — KETOROLAC TROMETHAMINE 10 MG/1
10 TABLET, FILM COATED ORAL EVERY 8 HOURS PRN
Qty: 10 TABLET | Refills: 0 | Status: SHIPPED | OUTPATIENT
Start: 2025-06-25

## 2025-06-25 RX ORDER — DIPHENHYDRAMINE HYDROCHLORIDE 50 MG/ML
25 INJECTION, SOLUTION INTRAMUSCULAR; INTRAVENOUS EVERY 30 MIN PRN
Status: DISCONTINUED | OUTPATIENT
Start: 2025-06-25 | End: 2025-06-25

## 2025-06-25 RX ORDER — HYDROCODONE BITARTRATE AND ACETAMINOPHEN 5; 325 MG/1; MG/1
1 TABLET ORAL ONCE AS NEEDED
Status: DISCONTINUED | OUTPATIENT
Start: 2025-06-25 | End: 2025-06-25

## 2025-06-25 RX ORDER — DEXTROSE MONOHYDRATE 25 G/50ML
50 INJECTION, SOLUTION INTRAVENOUS
Status: DISCONTINUED | OUTPATIENT
Start: 2025-06-25 | End: 2025-06-25

## 2025-06-25 RX ORDER — DEXAMETHASONE SODIUM PHOSPHATE 4 MG/ML
VIAL (ML) INJECTION AS NEEDED
Status: DISCONTINUED | OUTPATIENT
Start: 2025-06-25 | End: 2025-06-25 | Stop reason: SURG

## 2025-06-25 RX ADMIN — ONDANSETRON 4 MG: 2 INJECTION INTRAMUSCULAR; INTRAVENOUS at 09:17:00

## 2025-06-25 RX ADMIN — DEXAMETHASONE SODIUM PHOSPHATE 4 MG: 4 MG/ML VIAL (ML) INJECTION at 08:26:00

## 2025-06-25 RX ADMIN — LIDOCAINE HYDROCHLORIDE 50 MG: 10 INJECTION, SOLUTION EPIDURAL; INFILTRATION; INTRACAUDAL; PERINEURAL at 08:21:00

## 2025-06-25 RX ADMIN — NEOSTIGMINE METHYLSULFATE 3 MG: 1 INJECTION INTRAVENOUS at 09:19:00

## 2025-06-25 RX ADMIN — ROCURONIUM BROMIDE 50 MG: 10 INJECTION, SOLUTION INTRAVENOUS at 08:21:00

## 2025-06-25 RX ADMIN — SODIUM CHLORIDE, SODIUM LACTATE, POTASSIUM CHLORIDE, CALCIUM CHLORIDE: 600; 310; 30; 20 INJECTION, SOLUTION INTRAVENOUS at 08:15:00

## 2025-06-25 RX ADMIN — GLYCOPYRROLATE 0.6 MG: 0.2 INJECTION, SOLUTION INTRAMUSCULAR; INTRAVENOUS at 09:19:00

## 2025-06-25 NOTE — ED PROVIDER NOTES
Patient Seen in: [facility] In [place]        History  Chief Complaint   Patient presents with    Postop/Procedure Problem     Stated Complaint: Kidney stone removal, pain    Subjective:   HPI            50-year-old female status post uroscopy, laser lithotripsy, right ureteral stent placement this morning comes to the emergency department complaining of severe right flank and right sided abdominal pain since getting home around 11 AM.  The patient was prescribed oral Toradol and took a dose without symptom improvement.  She admits to chills but no fever.  No nausea or vomiting.      Objective:     Past Medical History:    Abdominal distention    Longer than this but this is an estimate    Abdominal hernia    I had hernia repair around by my belly button & abdominal    Abdominal pain    Currently seeing obgyn, urologist & nephrologist    Allergic rhinitis    A long time ago    Arrhythmia    PVC'S, irregular heartbeat    Back pain    Mid to lower back pain & hip pain    Benign essential HTN    Bloating    Longer than this but this is an estimate    Blood in urine    Due to severe kidney stones    Blurred vision    I always wore glasses or contacts since about 14 yrs old    Calculus of kidney    hydronephrosis/ several times kidney stones/ 13 th procedure for stones    Cervicalgia    Log Date: 05/04/2012         Change in hair    My hair is thinning more than the usual    Chest pain    I did see a cardiologist about this    Chest pain on exertion    I did see a cardiologists about this    COVID-19    headache, back pain, shortness of breath, sore throat, runny nose, chills. Not hospitalized    COVID-19    high fever, fatigue, not hospitalized    Dense breasts    heterogeneously dense breasts    Depression    I lost my son    Diarrhea, unspecified    I notice that certain things run right through me now    Disorder of liver    fatty liver    Disorder of thyroid    Dizziness    Dysmenorrhea    Ever  since I started Menstrating at 14 yrs old    Dyspareunia    Sometimes    Dyspepsia    Easy bruising    I find bruises on me and don’t really know how I got them    Elevated fasting glucose    Enthesopathy of the wrist and carpus    Log Date: 05/31/2011         ESBL E. coli carrier    Excessive bleeding in premenopausal period    Falls    Tripped/fell into muskrat hole    Fatigue    I noticed that I’m alot more tired lately more than usual    Flatulence/gas pain/belching    Longer than this but this is an estimate    Food intolerance    I think I am lactose intolerant been that way for a few year    Frequent urination    All the time for years    Frequent UTI    Due to kidney stones blockages    Gestational diabetes (HCC)    Heart murmur    My mother said I was born with one but I never really checke    Heart palpitations    Pvc’s and irregular heart rate    Hemorrhoids    After last pregnancy or a couple years after that    High blood pressure    History of cardiac murmur    Mother told me I was born with one & my dr as well    History of D&C    History of depression    When I had a miscarriage    History of stomach ulcers    Indigestion    Longer than this but this is an estimate    Itch of skin    Not severe but I have been itching for no apparent reason    IUD threads lost    5/13/25 C/o continued irregular bleeding. Mirena IUD strings NOT seen at time of exam. 5/29/25 Pelvic US - IUD in good position. Uterus somewhat bulky & heterogeneous. Pre-ovulatory follicles bilateral ovaries.       Leaking of urine    After first pregnancy    Leg swelling    My left leg slightly swelled up and my ankle area was hurtin    Lesion of ulnar nerve    Log Date: 03/08/2013         Loss of appetite    I noticed this lately too    Menometrorrhagia    Hysteroscopy 10/2020 path favors anovulatory breakdown bleeding.    Menometrorrhagia    Migraine with aura    Migraines    But ever since my 2 blood patches in 2002 No more migraines     Multiple thyroid nodules    Myofascial pain    Nausea    Longer than this but this is an estimate    Nausea & vomiting    Night sweats    I feel like I’m on fire at night.    OAB (overactive bladder)    PTNS - posterior tibial nerve stimulation with urogynecologist Dr. Lara Jim    Painful urination    When passing stones    Postcoital bleeding    Prior to hysteroscopy done 10/2020. Also having since at least  or     Postcoital bleeding    Prediabetes    Prediabetes    3/8/25 A1c 5.9%    PVC's (premature ventricular contractions)    Rash    Broke out with unknown rash all over legs and arms    Sexually transmitted disease    HPV    Shortness of breath    Seen cardiologist about this    Sleep disturbance    For years sometimes I can’t lay on my left side    Stress    Rough up bringing and also regular family stresses    Uncomfortable fullness after meals    Longer than this but this is an estimate    Unspecified condition originating in the  period    Ureteral stone with hydronephrosis    Ureteral stricture, right    Urinary incontinence    Constantly going can’t hold it    Ventral hernia without obstruction or gangrene    Visual impairment    glasses    Vitamin D deficiency    Wears glasses    Since I was about 14 yrs old    Weight gain    After last son was born              Past Surgical History:   Procedure Laterality Date    Abdominal surgery      2 hernia repairs right & left side      ,2016    x2    Colonoscopy N/A 2021    Procedure: COLONOSCOPY, ESOPHAGOGASTRODUODENOSCOPY with biopsy;  Surgeon: Prashant Lilly DO;  Location:  ENDOSCOPY    Colonoscopy  2021 EGD WITH BIOPSY. Colonoscopy internal hemorrhoids. No gross findings to explain left sided pain. Recall 10 years. Prashant Lilly DO    Cryocautery of cervix      For abnormal cells    Cysto/uretero w/lithotripsy Left 2024    Cystoscopy, LEFT ureteroscopy, laser lithotripsy,  basket stone extraction, retrograde pyelogram, ureteral stent placement, Right retrograde pyelogram Dr. Bryant Garza    Cysto/uretero w/up stricture Right 10/15/2019    Cysto Rt RPG, Rt URS w/ Laser Litho Dilation of Rtight Stricture Rt URS Stent Exchange w/ Dr Jim    Cysto/uretero, stone remove Right 12/18/2019    right ureter and kidney stones extraction, URS, stent placement    Cystoscopy,ureteroscopy,lithotripsy Right 08/23/2019    Cysto, B/L RPG, URS, laser litho, stone ext, Rt stent Dr. Jim    D & c  10/2021 I believe    Lining of Uterus was way too thick, did biospy as well    Egd  04/07/2021    for left sided pain    Hc endometrial sampling w or wo endocerv sampling  08/18/2023    EMB- Dr. Ramirez - disordered proliferative endometrium - recommended medical management options to regulate cycle.    Hernia surgery  01/2000    L hernia repair    Hysteroscopy,with sampling  10/08/2020    Hysteroscopy, D&C for intermittent menorrhagia, thickened endometrium, cervical stenosis. H/o menometrorrhagia & postcoital bleeding. Dr. Linnette Antoine. Path Endometrium with stromal collapse, superficial fibrin thrombi, and some polypoid tissue fragments with increased stromal fibrous component. Endometrial glands are proliferative. Overall pattern favors anovulatory breakdown bleeding.    Hysteroscopy,with sampling  07/11/2024    Hysteroscopy, D&C, insertion Mirena IUD. Done for menometrorrhagia & postcoital bleeding. Dr. Ema Randall. Path benign -Fragments of weakly proliferative endometrium with stromal breakdown/condensation. -Fragments of endocervical glands with inflammation and reactive changes.    Insert intrauterine device  07/11/2024    Mirena IUD insertion. 8 cm. Anteverted/mid position. Dr. Ema Randall    Laparoscopy,pelvic,biopsy      Kidney stones & examine    Lithotripsy  2001, 2007 & 11/11    Needle biopsy left  02/2021    fibroadenoma    Needle biopsy right  02/2021    fibroadenoma    Needle  biopsy right Right 07/03/2024    fibroadenoma-benign    Other Bilateral 2012    nerve surgery to bilateral arms about 1 month apart    Other      percutaneous nephrolithotomy    Other surgical history  12/27/2019    cysto stent removal - dr. jim     Other surgical history  06/03/2020    Cysto Stent Removal w/ Dr Jim    Other surgical history  07/07/2021    Cysto/Stent Removal Dr. Jim    Other surgical history  07/08/2022    Right ureteroscopy, laser lithotripsy, stent placement, nephrostomy tube removal with Dr. Tariq    Removal gallbladder  1998    Remove stent via transureth Right 11/13/2019    Cysto Stent Removal w/ Dr Jim    Repair ing hernia,5+y/o,reducibl  03/2017    Hernia in stomach and previous ones in abdomen    Us breast biopsy 1 site right (cpt=19083) Right 07/03/2024    12:00 right breast. Fibroadenoma. Tiny microcalcifications. Breast tissue with hypocellular stromal fibrosis. No atypia or malignancy. Recommend follow-up mammogram and right breast ultrasound in 6 months.                Social History     Socioeconomic History    Marital status: Life Partner   Tobacco Use    Smoking status: Never    Smokeless tobacco: Never   Vaping Use    Vaping status: Never Used   Substance and Sexual Activity    Alcohol use: No    Drug use: No    Sexual activity: Yes     Partners: Male     Birth control/protection: Mirena, I.U.D.     Comment: Mirena IUD 7/11/24   Other Topics Concern    Caffeine Concern No    Exercise No    Seat Belt Yes                                Physical Exam    ED Triage Vitals [06/25/25 1737]   BP (!) 161/101   Pulse 96   Resp 20   Temp 98.1 °F (36.7 °C)   Temp src Axillary   SpO2 96 %   O2 Device None (Room air)       Current Vitals:   Vital Signs  BP: (!) 145/95  Pulse: 101  Resp: 18  Temp: 98.1 °F (36.7 °C)  Temp src: Axillary    Oxygen Therapy  SpO2: 91 %  O2 Device: None (Room air)            Physical Exam     General Appearance: This is a middle-aged female  lying on a gurney.  Vital signs were reviewed per nurses notes.  Temperature is 98 .1.  Axillary.  HEENT: Normocephalic/atraumatic.  Anicteric sclera.  Oral mucosa is moist.  Oropharynx is normal.  Neck: No adenopathy or thyromegaly.  Lungs are clear to auscultation.  Heart exam: Normal S1-S2 without extra sounds or murmurs.  Regular rate and rhythm.  Abdomen is soft with some mild diffuse right-sided tenderness.  No masses rebound or guarding.  Extremities are atraumatic.  Skin is dry without rashes or lesions.  Neuroexam: Awake, conversive and moving all 4 extremities well.      ED Course  Labs Reviewed   CBC WITH DIFFERENTIAL WITH PLATELET - Abnormal; Notable for the following components:       Result Value    WBC 13.5 (*)     Neutrophil Absolute Prelim 12.67 (*)     Neutrophil Absolute 12.67 (*)     Lymphocyte Absolute 0.57 (*)     All other components within normal limits   COMP METABOLIC PANEL (14) - Abnormal; Notable for the following components:    Glucose 141 (*)     Sodium 134 (*)     AST 97 (*)      (*)     Alkaline Phosphatase 129 (*)     All other components within normal limits   URINALYSIS WITH CULTURE REFLEX - Abnormal; Notable for the following components:    Urine Color Martina (*)     Clarity Urine Cloudy (*)     Blood Urine Large (*)     Protein Urine >=300 (*)     All other components within normal limits   UA MICROSCOPIC ONLY, URINE - Abnormal; Notable for the following components:    RBC Urine >10 (*)     Squamous Epi. Cells Few (*)     All other components within normal limits          Intravenous access was obtained.  Laboratory studies were drawn.  IV fluids, analgesics and antiemetics were administered.     XR ABDOMEN (1 VIEW) (CPT=74018)  Result Date: 6/25/2025  PROCEDURE: XR ABDOMEN (1 VIEW) (CPT=74018) INDICATIONS: Kidney stone removal, pain PATIENT STATED HISTORY: Right side abdomen pain; anterior, posterior and flank. Patient had lithotripsy this morning at 8am.  Pain since she  woke up in recovery at 10am, getting worse. Recent bodychills and slight nausea post op as well. COMPARISON: There are no comparisons for this exam. FINDINGS: BOWEL GAS PATTERN: There is mild gaseous distention of the transverse colon and stomach. No dilated small bowel identified. There is no gross free air on the supine views obtained. CALCIFICATIONS: There is fecal filled bowel seen projecting over both kidneys which limits evaluation. There are couple of tiny punctate possible calculi seen projecting over the expected location of the left kidney which may reflect tiny left renal calculi. Similar possible tiny 1 to 2 mm calculus is seen projecting in the posterior location of the right renal pelvis. There is a right ureteral stent identified. No other calcifications are seen along the course of the stent. Calcifications in the left lower lateral pelvis are likely related to phleboliths. OTHER: Surgical clips in the right upper quadrant consistent with previous cholecystectomy.     CONCLUSION: Mild gaseous distention of the colon could reflect an adynamic ileus. Right ureteral stent noted. Possible tiny bilateral renal calculi.  Electronically Verified and Signed by Attending Radiologist: Seamus Varela MD 6/25/2025 8:17 PM Workstation: [id number]    CT ABDOMEN+PELVIS(CONTRAST ONLY)(CPT=74177)  Result Date: 6/18/2025  PROCEDURE:  CT ABDOMEN+PELVIS (CONTRAST ONLY) (CPT=74177)  COMPARISON:  [facility], CT, CT ABDOMEN+PELVIS(ALL W+WO)(CPT=74178), 3/27/2025, 6:36 PM.  INDICATIONS:  Kidney stents placed approx. 1 week PTA, bulge reported in abdomen  TECHNIQUE:  CT scanning was performed from the dome of the diaphragm to the pubic symphysis with non-ionic intravenous contrast material. Post contrast coronal MPR imaging was performed.  Dose reduction techniques were used. Dose information is transmitted to the ACR (American College of Radiology) NRDR (National Radiology Data Registry) which includes the Dose Index  Registry.  PATIENT STATED HISTORY:(As transcribed by Technologist)  Patient had kidney stents placed around one week ago. Patient stated she has a bulge, and pain on her left abdomen.   CONTRAST USED:  90cc of Isovue 370  FINDINGS:  LIVER:  Diffuse hepatic steatosis is noted.  No focal hepatic lesions BILIARY:  No visible dilatation or calcification.  Status post cholecystectomy PANCREAS:  No lesion, fluid collection, ductal dilatation, or atrophy.  SPLEEN:  No enlargement or focal lesion.  KIDNEYS:  Bilateral ureteral stents have been placed.  Nonobstructing bilateral renal calculi noted.  A calculus in the midpole of the right kidney measures up to 0.9 cm (image 38).  An index cluster of calcifications in the lower pole of the right kidney measures up to 1.0 cm (image 44).  And nonobstructing calculus in the lower pole of the left kidney measures up to 0.6 cm (image 44)..  ADRENALS:  No mass or enlargement.  AORTA/VASCULAR:  No aneurysm or dissection.  RETROPERITONEUM:  No mass or adenopathy.  BOWEL/MESENTERY:  No visible mass, obstruction, or bowel wall thickening.  ABDOMINAL WALL:  No mass or hernia.  URINARY BLADDER:  No visible focal wall thickening, lesion, or calculus.  PELVIC NODES:  No adenopathy.  PELVIC ORGANS:  No visible mass.  Pelvic organs appropriate for patient age.  BONES:  No bony lesion or fracture.  LUNG BASES:  Trace bilateral pleural effusions OTHER:  Negative.             CONCLUSION:  1. Trace bilateral pleural effusions. 2. Bilateral ureteral stents are noted in place.  Nonobstructing bilateral renal calculi present.   LOCATION:  [facility]   Dictated by (CST): Rian Hutchison MD on 6/18/2025 at 11:42 PM     Finalized by (CST): Rian Hutchison MD on 6/18/2025 at 11:44 PM       XR OR - N/C  Result Date: 6/11/2025  PROCEDURE:  XR OR - N/C  COMPARISON:  [facility] , [facility], XR OR - N/C, 1/22/2024, 4:20 PM.  INDICATIONS:  Cystogram, retrograde pyelogram   FLUOROSCOPY IMAGES OBTAINED:  6 FLUOROSCOPY TIME:  16  seconds TECHNOLOGIST TIME:  1 hour 20 minutes RADIATION DOSE (AIR KERMA PRODUCT):  141.4uGy/m2   FINDINGS:             CONCLUSION:  Multiple fluoroscopic images document retrograde cannulation and opacification of bilateral collecting systems with subsequent stent placement.  Please refer to dedicated procedure report for full detail.   LOCATION:  [facility]    Dictated by (CST): Ulises Mckenzie MD on 6/11/2025 at 2:45 PM     Finalized by (CST): Ulises Mckenzie MD on 6/11/2025 at 2:46 PM       US BREAST RIGHT LIMITED (PRAVEEN SAME DAY US)(CPT=76642)  Result Date: 6/3/2025  PROCEDURE:  US BREAST RIGHT LIMITED (PRAVEEN SAME DAY US)(CPT=76642)  COMPARISON:  [facility] , MG, PRAVEEN LIZ 2D+3D DIAGNOSTIC PRAVEEN  BILAT (YSH=[id number]), 6/03/2025, 1:04 PM.  TECHNIQUE:  Breast ultrasound was performed, evaluating specific areas of concern.              CONCLUSION:  **PLEASE SEE REPORT FOR DIAGNOSTIC MAMMOGRAM**   LOCATION:  [facility]            Dictated by (CST): Stromberg, LeRoy, MD on 6/03/2025 at 2:16 PM     Finalized by (CST): Stromberg, LeRoy, MD on 6/03/2025 at 2:16 PM   [address] [address] [phone]    PRAVEEN LIZ 2D+3D DIAGNOSTIC PRAVEEN  BILAT (THI=22731/81627)  Result Date: 6/3/2025  PROCEDURE:  PRAVEEN LIZ 2D+3D DIAGNOSTIC PRAVEEN  BILAT (CPT=77066/90886)  COMPARISON:  US [facility], US BREAST RIGHT LIMITED (PRAVEEN SAME DAY US)(CPT=76642), 6/03/2025, 1:54 PM.  [facility], PRAVEEN LIZ 2D+3D DIAGNOSTIC PRAVEEN  BILAT (TDL=[id number]), 7/19/2021, 11:07 AM.  [facility], PRAVEEN LIZ 2D+3D DIAGNOSTIC PRAVEEN  BILAT (ZFI=[id number]), 6/26/2024, 9:03 AM.  [facility] ANUP, PRAVEEN LIZ 2D+3D DIAGNOSTIC PRAVEEN RIGHT (VNS=40235/63155), 1/28/2025, 9:22 AM.  INDICATIONS:  Z98.890 History of right breast biopsy R22.31 Axillary mass, right  VIEWS OBTAINED:  Diagnostic views of both breasts were obtained. Right focal breast ultrasound was performed. Standard 2D and additional multiplane thin sections of the breast were obtained for the purpose of  Tomosynthesis evaluation.  The images were reconstructed and reviewed on the dedicated Tomosynthesis workstation.  BREAST COMPOSITION:  Category C - The breasts are heterogeneously dense, which may obscure small masses.  FINDINGS:   Mammographic findings: No suspicious mass or microcalcification is seen. No architectural distortion is evident.  Scattered benign appearing calcification.  Stable bilateral breast parenchymal pattern.  Focal right breast ultrasound:  Benign appearing right axillary lymph nodes are noted.  Comparison imaging of the left axilla demonstrates similar appearing benign-appearing lymph nodes.  No suspicious sonographic finding is identified.             CONCLUSION:  Benign findings.  BI-RADS CATEGORY:  DIAGNOSTIC CATEGORY 2 - BENIGN FINDING:   RECOMMENDATIONS:  ROUTINE MAMMOGRAM AND CLINICAL EVALUATION IN 12 MONTHS.      A letter explaining the results in lay terms has been sent to the patient.  This exam was evaluated with a computer-aided device.  This patient's information has been entered into a reminder system with a target due date for the next mammogram.   LOCATION:  [facility]   Dictated by (CST): Stromberg, LeRoy, MD on 6/03/2025 at 1:33 PM     Finalized by (CST): Stromberg, LeRoy, MD on 6/03/2025 at 2:15 PM   [address] [address] [phone]    US PELVIS W EV (CPT=76856/70433)  Result Date: 5/29/2025  PROCEDURE:  US PELVIS W EV (CPT=76856,44959)  COMPARISON:  None.  INDICATIONS:  G89.29 Chronic LLQ pain R10.32 Chronic LLQ pain N93.0 Postcoital bleeding Z80.41 Family history of ovarian cancer T83.32XA Intrauterine contraceptive device th*  TECHNIQUE:  Pelvic ultrasound using transabdominal and endovaginal technique.  Transvaginal ultrasound was used to better evaluate adnexal and endometrial detail.  PATIENT STATED HISTORY: (As transcribed by Technologist)  Patient presents for an evaluation of her pelvis, IUD threads lost.    FINDINGS:             UTERUS:  10.7 x 4.8 x 6.6 cm   Endometrium Thickness:  0.3 cm   The uterus appears normal in size, shape, and echogenicity.  IUD is within the endometrium in satisfactory position. RIGHT OVARY:  4.5 x 2.5 x 2.9 cm   There is a 1.8 x 1.4 x 1.2 cm cyst. LEFT OVARY:  3.50 cm x 2.65 cm x 3.02 cm   1.6 x 1.2 x 1.4 cm cyst CUL-DE-SAC:  No free fluid in the cul de sac             CONCLUSION:  IUD is in the endometrium in satisfactory position.  Bilateral ovarian cysts.  No sonographic evidence for ovarian torsion.   LOCATION:  [facility]   Dictated by (CST): Savanna Maldonado MD on 5/29/2025 at 9:19 PM     Finalized by (CST): Savanna Maldonado MD on 5/29/2025 at 9:21 PM       I personally reviewed the images myself and went over results with patient.    I viewed the KUB film from today myself and the stent in the right kidney and ureter is in proper positioning.    The patient was treated with intravenous fentanyl and Ativan with symptomatic improvement.    I confirmed that the patient was sent home with a prescription for oral Toradol.  She was given a prescription for Percocet which she has taken and tolerated previously for the same condition and was also given prescription for a few doses of Xanax.  Urologic follow-up tomorrow was recommended.  Test results and treatment plan discussed prior to disposition.             MDM     #1.  Right renal colic postprocedure from lureteral stone extraction and stent placement.  No evidence of urinary tract infection.  Symptomatically improved with analgesics and discharged to home.        Medical Decision Making      Disposition and Plan     Clinical Impression:  1. Postoperative pain         Disposition:  Discharge  6/25/2025  8:04 pm    Follow-up:  Bryant Garza MD  [address] [place] [address] [phone]    Call in 1 day(s)            Medications Prescribed:  Discharge Medication List as of 6/25/2025  8:27 PM        START taking these medications    Details    oxyCODONE-acetaminophen 5-325 MG Oral Tab Take 1-2 tablets by mouth every 6 (six) hours as needed for Pain., Normal, Disp-20 tablet, R-0      Naloxone HCl 4 MG/0.1ML Nasal Liquid 4 mg by Nasal route as needed. If patient remains unresponsive, repeat dose in other nostril 2-5 minutes after first dose., Normal, Disp-1 kit, R-0      ALPRAZolam 0.5 MG Oral Tab Take 1 tablet (0.5 mg total) by mouth 3 (three) times daily as needed for Anxiety., Normal, Disp-10 tablet, R-0                   Supplementary Documentation:

## 2025-06-25 NOTE — OPERATIVE REPORT
Urology Operative Note    Attending Surgeon: Bryant Garza MD    Assistant Surgeon: None    Patient Name: Nadine Calix    Date of Surgery: 6/25/2025    Preoperative Diagnosis: Bilateral nephrolithiasis    Postoperative Diagnosis: Same    Procedure Performed:   Cystoscopy, RIGHT ureteroscopy, laser lithotripsy, basket stone extraction, retrograde pyelogram, ureteral stent exchange  LEFT ureteral stent removal    Indication:  Patient is a 50 year old female who presented with bilateral renal stones here for treatment of the right side. The patient was counseled on options, risks, and benefits and elected to undergo the above procedure. We discussed risks including, but not limited to, bleeding, infection, damage to surrounding structures, need for repeat procedure(s). The patient understood these risks and wished to proceed with surgery.    Findings:  Cystoscopy - normal urethra without strictures, normal bladder. Left stent removed. Stones in right kidney fully treated.    Procedure:  The patient was taken to the operating room and a timeout was performed confirming the correct patient and procedure. The patient was prepped and draped in lithotomy position after undergoing general anesthesia. Pre-operative prophylactic antibiotics were given in the form of Ancef.    The cystoscope was inserted per urethra and the bladder was inspected and drained. The left ureteral stent was grasped and removed.      The right ureteral stent was grasped and pulled to the urethral meatus with a cystoscopic grasper. The stent was cannulated with a Sensor wire, and the wire was passed into the renal pelvis which I confirmed using fluoroscopy. The stent was fully removed.    A 12/14 Mongolian ureteral access sheath was inserted over the first wire. It was advanced without resistance to the mid ureter. The inner cannula was removed and a second wire was inserted through the sheath and into the kidney, which was confirmed fluoroscopically.   The access sheath was removed and the second safety wire was secured to the drape.  The access sheath was then reinserted over the first working wire up to the proximal ureter. The wire and inner cannula of the access sheath were removed, leaving the safety wire in place alongside the access sheath.     The flexible ureteroscope was advanced into the sheath and up into the renal pelvis. Stone(s) was/were seen in the mid and lower pole of the kidney. The stone(s) was/were fragmented using a laser, and then the fragments were extracted using a zero tip basket. Any smaller remaining stone fragments were lasered to dust, and thoroughly irrigated. All renal calyces were surveyed once more, and no large fragments were seen. A retrograde pyelogram was performed through the scope and showed no extravasation. The ureter was inspected while slowly removing the scope and access sheath, and it appeared healthy without stone fragments seen.    A 6-Maltese x 24 cm JJ ureteral stent was inserted over the remaining wire. The proximal coil was formed in the kidney under fluoroscopic guidance. The distal coil was formed within the bladder using the pusher and fluoroscopy. The stent string was removed prior to stent placement.    The bladder was drained and the cystoscope was removed. The patient was awoken from anesthesia and transferred to PACU in stable condition. The patient tolerated the procedure well. All instrument/supply counts were correct at the end of the case.    Specimens:   Stone fragments for chemical analysis    Estimated Blood Loss:  2 mL    Tubes/Drains:  6-Maltese x 24 cm JJ right ureteral stent    Complications:   None immediate    Condition from OR:  Stable    Plan:   The patient will follow up in the office for stent removal in 1 week.      Bryant Garza MD  Staff Urologist  [facility]  Office: [phone]

## 2025-06-25 NOTE — ANESTHESIA PREPROCEDURE EVALUATION
PRE-OP EVALUATION    Patient Name: Nadine Calix    Admit Diagnosis: Renal stones [N20.0]    Pre-op Diagnosis: Renal stones [N20.0]    CYSTOSCOPY RIGHT URETEROSCOPY, LASER LITHOTRIPSY, STONE EXTRACTION, RIGHT RETROGRADE PYELOGRAM, RIGHT URETERAL STENT  PLACEMENT, LEFT URETERAL STENT REMVOAL    Anesthesia Procedure: CYSTOSCOPY RIGHT URETEROSCOPY, LASER LITHOTRIPSY, STONE EXTRACTION, RIGHT RETROGRADE PYELOGRAM, RIGHT URETERAL STENT  PLACEMENT, LEFT URETERAL STENT REMVOAL (Right)    Surgeons and Role:     * Bryant Garza MD - Primary    Pre-op vitals reviewed.  Temp: 96.6 °F (35.9 °C)  Pulse: 63  Resp: 16  BP: 134/88  SpO2: 98 %  Body mass index is 36.15 kg/m².    Current medications reviewed.  Hospital Medications:  Current Medications[1]    Outpatient Medications:   Prescriptions Prior to Admission[2]    Allergies: Amlodipine, Metoprolol, Toradol [ketorolac tromethamine], Tramadol, Dilaudid [hydromorphone], Lisinopril, Oxycodone, Wellbutrin [bupropion hcl], Valsartan, Codeine sulfate, Hydrocodone, Lisinopril, Morphine, and Seasonal      Anesthesia Evaluation    Patient summary reviewed.    Anesthetic Complications  (-) history of anesthetic complications         GI/Hepatic/Renal                (+) liver disease                 Cardiovascular            MET: >4      (+) hypertension                                     Endo/Other                                  Pulmonary               (+) shortness of breath            Neuro/Psych      (+) depression           (+) neuromuscular disease                     Past Surgical History[3]  Social Hx on file[4]  History   Drug Use No     Available pre-op labs reviewed.  Lab Results   Component Value Date    WBC 10.0 06/18/2025    RBC 4.89 06/18/2025    HGB 14.9 06/18/2025    HCT 43.7 06/18/2025    MCV 89.4 06/18/2025    MCH 30.5 06/18/2025    MCHC 34.1 06/18/2025    RDW 13.2 06/18/2025    .0 06/18/2025     Lab Results   Component Value Date     06/18/2025    K  3.7 06/18/2025     06/18/2025    CO2 24.0 06/18/2025    BUN 13 06/18/2025    CREATSERUM 0.82 06/18/2025     (H) 06/18/2025    CA 9.1 06/18/2025            Airway      Mallampati: II  Mouth opening: 3 FB  TM distance: 4 - 6 cm  Neck ROM: full Cardiovascular      Rhythm: regular  Rate: normal     Dental             Pulmonary      Breath sounds clear to auscultation bilaterally.               Other findings              ASA: 2   Plan: general  NPO status verified and patient meets guidelines.  Patient has not taken beta blockers in last 24 hours.  Post-procedure pain management plan discussed with surgeon and patient.      Plan/risks discussed with: patient                Present on Admission:  **None**             [1]    acetaminophen (Tylenol Extra Strength) tab 1,000 mg  1,000 mg Oral Once    scopolamine (Transderm-Scop) 1 MG/3DAYS patch 1 patch  1 patch Transdermal Once    lactated ringers infusion   Intravenous Continuous    ceFAZolin (Ancef) 2g in 10mL IV syringe premix  2 g Intravenous Once    ceFAZolin (Ancef) 2 g/10mL IV syringe premix        [COMPLETED] ceFAZolin (Ancef) 2g in 10mL IV syringe premix  2 g Intravenous Once   [2]   Medications Prior to Admission   Medication Sig Dispense Refill Last Dose/Taking    HYDROcodone-acetaminophen 5-325 MG Oral Tab Take 1-2 tablets by mouth every 4 (four) hours as needed for Pain. 12 tablet 0 Past Week    oxybutynin 5 MG Oral Tab Take 1 tablet (5 mg total) by mouth 3 (three) times daily as needed (Bladder spasms). Stop 12 hours before Jett catheter removal in clinic (if applicable) 15 tablet 0 Past Month    phenazopyridine (PYRIDIUM) 100 MG Oral Tab Take 1 tablet (100 mg total) by mouth 3 (three) times daily as needed for Pain. This will turn your urine orange. 10 tablet 0 Past Month    tamsulosin 0.4 MG Oral Cap Take 1 capsule (0.4 mg total) by mouth every evening for 14 days. 14 capsule 0 Past Month    Calcium Polycarbophil (FIBER) 625 MG Oral Tab Take  by mouth.   Past Month    Multiple Vitamin (MULTIVITAMIN TAB/CAP) Take 1 tablet by mouth daily.   Past Month    Magnesium Glycinate 120 MG Oral Cap Take 240 mg by mouth daily.   Past Month    Ferrous Sulfate (IRON OR) Take by mouth. 3X WEEKLY   Past Month    potassium citrate (UROCIT-K 10) 10 MEQ (1080 MG) Oral Tab CR Take 2 tablets (20 mEq total) by mouth in the morning and 2 tablets (20 mEq total) before bedtime. (Patient taking differently: Take 1 tablet (10 mEq total) by mouth in the morning and 1 tablet (10 mEq total) before bedtime.) 120 tablet 3 Past Week    ergocalciferol 1.25 MG (50112 UT) Oral Cap Take 1 capsule (50,000 Units total) by mouth once a week. 24 capsule 0 Past Month    Levonorgestrel (MIRENA, 52 MG,) 20 MCG/DAY Intrauterine IUD 20 mcg (1 each total) by Intrauterine route one time.   Taking    [] sulfamethoxazole-trimethoprim DS (BACTRIM DS) 800-160 MG Oral Tab per tablet Take 1 tablet by mouth 2 (two) times daily for 2 days. 4 tablet 0     Drospirenone (SLYND) 4 MG Oral Tab Take 1 tablet by mouth daily. 84 tablet 3     hydroCHLOROthiazide 25 MG Oral Tab Take 1 tablet (25 mg total) by mouth daily. 30 tablet 3 More than a month    dexamethasone 1 MG Oral Tab Take 1 tablet (1 mg total) by mouth one time for 1 dose. Take at 11 PM night before lab tests. Lab draw at 8 AM the next morning. (Patient not taking: Reported on 2025) 1 tablet 1     albuterol (PROAIR HFA) 108 (90 Base) MCG/ACT Inhalation Aero Soln Inhale 2 puffs into the lungs every 4 (four) hours as needed for Wheezing. 1 each 1 More than a month   [3]   Past Surgical History:  Procedure Laterality Date    Abdominal surgery      2 hernia repairs right & left side      ,2016    x2    Colonoscopy N/A 2021    Procedure: COLONOSCOPY, ESOPHAGOGASTRODUODENOSCOPY with biopsy;  Surgeon: Prashant Lilly DO;  Location:  ENDOSCOPY    Colonoscopy  2021 EGD WITH BIOPSY. Colonoscopy internal  hemorrhoids. No gross findings to explain left sided pain. Recall 10 years. Prashant Lilly,     Cryocautery of cervix  2012    For abnormal cells    Cysto/uretero w/lithotripsy Left 01/22/2024    Cystoscopy, LEFT ureteroscopy, laser lithotripsy, basket stone extraction, retrograde pyelogram, ureteral stent placement, Right retrograde pyelogram Dr. Bryant Garza    Cysto/uretero w/up stricture Right 10/15/2019    Cysto Rt RPG, Rt URS w/ Laser Litho Dilation of Rtight Stricture Rt URS Stent Exchange w/ Dr Jim    Cysto/uretero, stone remove Right 12/18/2019    right ureter and kidney stones extraction, URS, stent placement    Cystoscopy,ureteroscopy,lithotripsy Right 08/23/2019    Cysto, B/L RPG, URS, laser litho, stone ext, Rt stent Dr. Jim    D & c  10/2021 I believe    Lining of Uterus was way too thick, did biospy as well    Egd  04/07/2021    for left sided pain    Hc endometrial sampling w or wo endocerv sampling  08/18/2023    EMB- Dr. Ramirez - disordered proliferative endometrium - recommended medical management options to regulate cycle.    Hernia surgery  01/2000    L hernia repair    Hysteroscopy,with sampling  10/08/2020    Hysteroscopy, D&C for intermittent menorrhagia, thickened endometrium, cervical stenosis. H/o menometrorrhagia & postcoital bleeding. Dr. Linnette Antoine. Path Endometrium with stromal collapse, superficial fibrin thrombi, and some polypoid tissue fragments with increased stromal fibrous component. Endometrial glands are proliferative. Overall pattern favors anovulatory breakdown bleeding.    Hysteroscopy,with sampling  07/11/2024    Hysteroscopy, D&C, insertion Mirena IUD. Done for menometrorrhagia & postcoital bleeding. Dr. Ema Randall. Path benign -Fragments of weakly proliferative endometrium with stromal breakdown/condensation. -Fragments of endocervical glands with inflammation and reactive changes.    Insert intrauterine device  07/11/2024    Mirena IUD  insertion. 8 cm. Anteverted/mid position. Dr. Ema Randall    Laparoscopy,pelvic,biopsy      Kidney stones & examine    Lithotripsy  2001, 2007 & 11/11    Needle biopsy left  02/2021    fibroadenoma    Needle biopsy right  02/2021    fibroadenoma    Needle biopsy right Right 07/03/2024    fibroadenoma-benign    Other Bilateral 2012    nerve surgery to bilateral arms about 1 month apart    Other      percutaneous nephrolithotomy    Other surgical history  12/27/2019    cysto stent removal - dr. jim     Other surgical history  06/03/2020    Cysto Stent Removal w/ Dr Jim    Other surgical history  07/07/2021    Cysto/Stent Removal Dr. Jim    Other surgical history  07/08/2022    Right ureteroscopy, laser lithotripsy, stent placement, nephrostomy tube removal with Dr. Tariq    Removal gallbladder  1998    Remove stent via transureth Right 11/13/2019    Cysto Stent Removal w/ Dr Jim    Repair ing hernia,5+y/o,reducibl  03/2017    Hernia in stomach and previous ones in abdomen    Us breast biopsy 1 site right (cpt=19083) Right 07/03/2024    12:00 right breast. Fibroadenoma. Tiny microcalcifications. Breast tissue with hypocellular stromal fibrosis. No atypia or malignancy. Recommend follow-up mammogram and right breast ultrasound in 6 months.   [4]   Social History  Socioeconomic History    Marital status: Life Partner   Tobacco Use    Smoking status: Never    Smokeless tobacco: Never   Vaping Use    Vaping status: Never Used   Substance and Sexual Activity    Alcohol use: No    Drug use: No    Sexual activity: Yes     Partners: Male     Birth control/protection: Mirena, I.U.D.     Comment: Mirena IUD 7/11/24   Other Topics Concern    Caffeine Concern No    Exercise No    Seat Belt Yes

## 2025-06-25 NOTE — ANESTHESIA PROCEDURE NOTES
Airway  Date/Time: 6/25/2025 8:22 AM  Reason: elective      General Information and Staff   Patient location during procedure: OR  Anesthesiologist: Nick Thompson MD  Performed: anesthesiologist   Performed by: Nick Thompson MD  Authorized by: Nick Thompson MD        Indications and Patient Condition  Indications for airway management: anesthesia  Sedation level: deep      Preoxygenated: yesPatient position: sniffing    Mask difficulty assessment: 0 - not attempted    Final Airway Details    Final airway type: endotracheal airway    Successful airway: ETT  Cuffed: yes   Successful intubation technique: direct laryngoscopy  Endotracheal tube insertion site: oral  Blade: Miller  Blade size: #3  ETT size (mm): 7.0    Cormack-Lehane Classification: grade I - full view of glottis  Placement verified by: capnometry   Measured from: lips  ETT to lips (cm): 22  Number of attempts at approach: 1

## 2025-06-25 NOTE — ANESTHESIA POSTPROCEDURE EVALUATION
[facility]    Nadine Calix Patient Status:  Hospital Outpatient Surgery   Age/Gender 50 year old female MRN [id number]   Location [facility] SURGERY Attending Bryant Garza MD   Hosp Day # 0 PCP Fan Hernandez MD       Anesthesia Post-op Note    CYSTOSCOPY RIGHT URETEROSCOPY, LASER LITHOTRIPSY, STONE EXTRACTION, RIGHT RETROGRADE PYELOGRAM, RIGHT URETERAL STENT  PLACEMENT, LEFT URETERAL STENT REMVOAL    Procedure Summary       Date: 06/25/25 Room / Location:  MAIN OR 07 /  MAIN OR    Anesthesia Start: 0815 Anesthesia Stop: 0936    Procedure: CYSTOSCOPY RIGHT URETEROSCOPY, LASER LITHOTRIPSY, STONE EXTRACTION, RIGHT RETROGRADE PYELOGRAM, RIGHT URETERAL STENT  PLACEMENT, LEFT URETERAL STENT REMVOAL (Right) Diagnosis:       Renal stones      (Renal stones [N20.0])    Surgeons: Bryant Garza MD Anesthesiologist: Nick Thompson MD    Anesthesia Type: general ASA Status: 2            Anesthesia Type: general    Vitals Value Taken Time   /100 06/25/25 09:37   Temp 97 °F (36.1 °C) 06/25/25 09:37   Pulse 94 06/25/25 09:37   Resp 16 06/25/25 09:37   SpO2 95 % 06/25/25 09:37           Patient Location: PACU    Anesthesia Type: general    Airway Patency: patent    Postop Pain Control: adequate    Mental Status: mildly sedated but able to meaningfully participate in the post-anesthesia evaluation    Nausea/Vomiting: none    Cardiopulmonary/Hydration status: stable euvolemic    Complications: no apparent anesthesia related complications    Postop vital signs: stable    Dental Exam: Unchanged from Preop    Patient to be discharged from PACU when criteria met.

## 2025-06-25 NOTE — DISCHARGE INSTRUCTIONS
You had cystoscopy, ureteroscopy, and stent in the operating room today.    Instructions:    - No heavy lifting or strenuous activity for 1 day. You may resume regular activity tomorrow.  With increased activity you may notice more blood in the urine from stent movement inside the bladder.    - Your follow-up appointment is listed below. Please contact us at [phone] if you need to change your appointment.    Your appointments       Date & Time Appointment Department ([place])    Jul 03, 2025 10:30 AM CDT Procedure with Bryant Garza MD [facility] ([place] [address])        Aug 12, 2025 2:00 PM CDT Exam - Established with Greyson Orellana MD [facility] ([address])              [facility]  [address]  [address] [address] [address]  [facility] [id number] [phone] [facility] [address]  [address] [facility] [address]  [facility] [id number] [phone]           - You have a stent (small plastic tube) inside your kidney and ureter to allow the swelling from surgery to resolve. This is only temporary and must be removed, so you should not forget about it. We will remove your stent via a brief cystoscopy in clinic when you return. If you have to miss this appointment for some reason please make sure you re-schedule it.     - With a ureteral stent in place you may have some discomfort on your side/flank. You can feel pain worsen when you urinate, so try to avoid holding urine and void every 2-3 hours. You also may notice increased blood in the urine as you increase your activity. If this happens increase your hydration and take it easy for a day. Warm baths/hot packs can help with the discomfort as well as your prescribed medications and Advil/Motrin (if you are able to take these).     - You may experience mild pain after the  procedure for a few days.  If the pain becomes intolerable please contact our office or go to the nearest Emergency Room or Urgent Care. You should take over the counter ibuprofen (AKA motrin, advil) for mild pain (provided you do not have a medical condition such as stomach ulcers or kidney disease which prohibits you from taking these). You may alternate this with tylenol as well. If pain is still not relieved by tylenol and/or ibuprofen, you may take narcotic pain medication if prescribed (typically oxycodone or tramadol). If you are taking narcotic pain medication this can make you constipated, so you should take over the counter stool softeners or miralax if prescribed.    - Warm packs over the lower abdomen or hot baths often help with discomfort after cystoscopy.    - If you take blood thinners (such as aspirin or plavix) please hold these medications until 3 days after surgery.     - You may experience burning and frequency of urination over the next few days. This will improve after a few days if you stay well hydrated. If you were prescribed phenazopyridine (Pyridium) this may relieve urinary discomfort but you can only take this for 3 days. Pyridium will make your urine orange.     - You are likely to see some blood in your urine (pink or light red urine) that should clear up within a few days. Staying well hydrated should help this clear up. If you notice the urine stays dark red or there are multiple large blood clots despite good hydration, please call the urology clinic ([phone]).     - Try to abstain from alcohol, coffee, tea, artificial sweeteners, and spicy food for the next 48 hours as these can irritate the bladder.     - If you develop fevers / chills, difficulty urinating, or abdominal pain that does not improve with pain medications, please call the office.     - Drink 1.5 to 2 liters of fluid today (water is preferable). If you are on a fluid restriction due to other medical reasons then  you need to adhere to your fluid restriction recommendations.    Bryant Garza MD  Staff Urologist  [facility]  Office: [phone]

## 2025-06-26 ENCOUNTER — TELEPHONE (OUTPATIENT)
Dept: SURGERY | Facility: CLINIC | Age: 50
End: 2025-06-26

## 2025-06-26 NOTE — TELEPHONE ENCOUNTER
Per patient calling stating she is in extreme pain. Patient reports pain in her kidney as well as being unable to walk. Patient had procedure with Dr. Garza yesterday (6/25/2025). Please call back.

## 2025-06-26 NOTE — TELEPHONE ENCOUNTER
This encounter is now closed.     S/P lithotripsy on 6/25  RN called patient. Reports pain not controlled with tab pain meds.  Was at ED yesterday, 6/25 as well and got discharged  Same pain. Oxycodone makes her high and still having pain  RN recommends ER for evaluation and to managed her pain  She agreed to plans.

## 2025-07-02 ENCOUNTER — OFFICE VISIT (OUTPATIENT)
Dept: FAMILY MEDICINE CLINIC | Facility: CLINIC | Age: 50
End: 2025-07-02
Payer: MEDICAID

## 2025-07-02 VITALS
RESPIRATION RATE: 18 BRPM | BODY MASS INDEX: 36 KG/M2 | SYSTOLIC BLOOD PRESSURE: 138 MMHG | WEIGHT: 178.19 LBS | DIASTOLIC BLOOD PRESSURE: 88 MMHG | OXYGEN SATURATION: 97 % | HEART RATE: 80 BPM | TEMPERATURE: 99 F

## 2025-07-02 DIAGNOSIS — H01.002 BLEPHARITIS OF RIGHT LOWER EYELID, UNSPECIFIED TYPE: Primary | ICD-10-CM

## 2025-07-02 PROCEDURE — 99213 OFFICE O/P EST LOW 20 MIN: CPT | Performed by: NURSE PRACTITIONER

## 2025-07-02 RX ORDER — ERYTHROMYCIN 5 MG/G
1 OINTMENT OPHTHALMIC EVERY 6 HOURS
Qty: 3.5 G | Refills: 0 | Status: SHIPPED | OUTPATIENT
Start: 2025-07-02 | End: 2025-07-09

## 2025-07-02 NOTE — PROGRESS NOTES
CHIEF COMPLAINT:     Chief Complaint   Patient presents with    Eye Problem     Eye lid is swollen and tender and painful. - Entered by patient  S/s for 1 week, per pt symptoms getting worst. Right eye upper/lower eye lid swelling and pain.        HPI:   Nadine Calix is a 50 year old female who presents with chief complaint of eye irritation. Symptoms began 1  weeks ago. Symptoms have been slightly worse since onset.   Patient reports right eye upper and lower eyelid redness, pain, mild swelling. no discharge, no itching, no eyelid/lash crusting.     Denies photophobia, pain with movement of eye, fever, cold symptoms, or contact with irritant.  Treatments tried: none    Current Medications[1]   Past Medical History[2]   Past Surgical History[3]   Family History[4]   Short Social Hx on File[5]      REVIEW OF SYSTEMS:   GENERAL: feels well otherwise  SKIN: no rashes  EYES:  See HPI  HENT: denies ear pain, congestion, sore throat  LUNGS: denies shortness of breath or cough  CARDIOVASCULAR: denies chest pain or palpitations   GI: denies N/V/C or abdominal pain    EXAM:   /88   Pulse 80   Temp 98.8 °F (37.1 °C) (Oral)   Resp 18   Wt 178 lb 3.2 oz (80.8 kg)   LMP 05/03/2025 (Approximate)   SpO2 97%   BMI 35.99 kg/m²   Visual Acuity     Vision Screen Test Type: Snellen Wall Chart    Right Eye Visual Acuity: Corrected Right Eye Chart Acuity: 20/40   Left Eye Visual Acuity: Corrected Left Eye Chart Acuity: 20/50   Both Eyes Visual Acuity: Corrected Both Eyes Chart Acuity: 20/50       GENERAL: well developed, well nourished,in no apparent distress  SKIN: no rashes,no suspicious lesions  EYES: PERRLA, EOMI, bilat conjunctiva not erythematous, no discharge. Right lower lateral lid with mild swelling, mild erythema  HENT: atraumatic, normocephalic.   NECK: supple, non tender  LUNGS: clear to auscultation bilaterally.   CARDIO: RRR without murmur  LYMPH: no preauricular lymphadenopathy. No cervical  lymphadenopathy  PSYCH: pleasant mood and affect  NEURO: no focal deficits    ASSESSMENT AND PLAN:   Nadine Calix is a 50 year old female who presents with:    ASSESSMENT:   Encounter Diagnosis   Name Primary?    Blepharitis of right lower eyelid, unspecified type Yes       PLAN:   Medication as listed below.    Hygiene and comfort care as listed below and in patient instructions.    Advised patient to avoid touching eyes.    Stressed importance of good handwashing.    Warm compresses to affected eye prn.      Requested Prescriptions     Signed Prescriptions Disp Refills    erythromycin 5 MG/GM Ophthalmic Ointment 3.5 g 0     Sig: Place 1 Application into the right eye every 6 (six) hours for 7 days.       Risks, benefits, complications and side effects of meds discussed.  See PCP or ophthalmologist if not improved in 2-3 days.    There are no Patient Instructions on file for this visit.           [1]   Current Outpatient Medications   Medication Sig Dispense Refill    erythromycin 5 MG/GM Ophthalmic Ointment Place 1 Application into the right eye every 6 (six) hours for 7 days. 3.5 g 0    tamsulosin 0.4 MG Oral Cap Take 1 capsule (0.4 mg total) by mouth every evening for 14 days. 14 capsule 0    Ketorolac Tromethamine 10 MG Oral Tab Take 1 tablet (10 mg total) by mouth every 8 (eight) hours as needed for Pain (Use sparingly and with plenty of water). 10 tablet 0    phenazopyridine (PYRIDIUM) 100 MG Oral Tab Take 1 tablet (100 mg total) by mouth 3 (three) times daily as needed for Pain. This will turn your urine orange. 10 tablet 0    oxybutynin 5 MG Oral Tab Take 1 tablet (5 mg total) by mouth 3 (three) times daily as needed (Bladder spasms). Stop 12 hours before Jett catheter removal in clinic (if applicable) 15 tablet 0    ALPRAZolam 0.5 MG Oral Tab Take 1 tablet (0.5 mg total) by mouth 3 (three) times daily as needed for Anxiety. 6 tablet 0    Naloxone HCl 4 MG/0.1ML Nasal Liquid 4 mg by Nasal route as  needed. If patient remains unresponsive, repeat dose in other nostril 2-5 minutes after first dose. 1 kit 0    Drospirenone (SLYND) 4 MG Oral Tab Take 1 tablet by mouth daily. 84 tablet 3    Calcium Polycarbophil (FIBER) 625 MG Oral Tab Take by mouth.      Multiple Vitamin (MULTIVITAMIN TAB/CAP) Take 1 tablet by mouth daily.      Magnesium Glycinate 120 MG Oral Cap Take 240 mg by mouth daily.      Ferrous Sulfate (IRON OR) Take by mouth. 3X WEEKLY      potassium citrate (UROCIT-K 10) 10 MEQ (1080 MG) Oral Tab CR Take 2 tablets (20 mEq total) by mouth in the morning and 2 tablets (20 mEq total) before bedtime. (Patient taking differently: Take 1 tablet (10 mEq total) by mouth in the morning and 1 tablet (10 mEq total) before bedtime.) 120 tablet 3    hydroCHLOROthiazide 25 MG Oral Tab Take 1 tablet (25 mg total) by mouth daily. 30 tablet 3    ergocalciferol 1.25 MG (66239 UT) Oral Cap Take 1 capsule (50,000 Units total) by mouth once a week. 24 capsule 0    Levonorgestrel (MIRENA, 52 MG,) 20 MCG/DAY Intrauterine IUD 20 mcg (1 each total) by Intrauterine route one time.      albuterol (PROAIR HFA) 108 (90 Base) MCG/ACT Inhalation Aero Soln Inhale 2 puffs into the lungs every 4 (four) hours as needed for Wheezing. 1 each 1   [2]   Past Medical History:   Abdominal distention    Longer than this but this is an estimate    Abdominal hernia    I had hernia repair around by my belly button & abdominal    Abdominal pain    Currently seeing obgyn, urologist & nephrologist    Allergic rhinitis    A long time ago    Arrhythmia    PVC'S, irregular heartbeat    Back pain    Mid to lower back pain & hip pain    Benign essential HTN    Bloating    Longer than this but this is an estimate    Blood in urine    Due to severe kidney stones    Blurred vision    I always wore glasses or contacts since about 14 yrs old    Calculus of kidney    hydronephrosis/ several times kidney stones/ 13 th procedure for stones    Cervicalgia    Log  Date: 05/04/2012         Change in hair    My hair is thinning more than the usual    Chest pain    I did see a cardiologist about this    Chest pain on exertion    I did see a cardiologists about this    COVID-19    headache, back pain, shortness of breath, sore throat, runny nose, chills. Not hospitalized    COVID-19    high fever, fatigue, not hospitalized    Dense breasts    heterogeneously dense breasts    Depression    I lost my son    Diarrhea, unspecified    I notice that certain things run right through me now    Disorder of liver    fatty liver    Disorder of thyroid    Dizziness    Dysmenorrhea    Ever since I started Menstrating at 14 yrs old    Dyspareunia    Sometimes    Dyspepsia    Easy bruising    I find bruises on me and don’t really know how I got them    Elevated fasting glucose    Enthesopathy of the wrist and carpus    Log Date: 05/31/2011         ESBL E. coli carrier    Excessive bleeding in premenopausal period    Falls    Tripped/fell into muskrat hole    Fatigue    I noticed that I’m alot more tired lately more than usual    Flatulence/gas pain/belching    Longer than this but this is an estimate    Food intolerance    I think I am lactose intolerant been that way for a few year    Frequent urination    All the time for years    Frequent UTI    Due to kidney stones blockages    Gestational diabetes (HCC)    Heart murmur    My mother said I was born with one but I never really checke    Heart palpitations    Pvc’s and irregular heart rate    Hemorrhoids    After last pregnancy or a couple years after that    High blood pressure    History of cardiac murmur    Mother told me I was born with one & my dr as well    History of D&C    History of depression    When I had a miscarriage    History of stomach ulcers    Indigestion    Longer than this but this is an estimate    Itch of skin    Not severe but I have been itching for no apparent reason    IUD threads lost    5/13/25 C/o continued  irregular bleeding. Mirena IUD strings NOT seen at time of exam. 25 Pelvic US - IUD in good position. Uterus somewhat bulky & heterogeneous. Pre-ovulatory follicles bilateral ovaries.       Leaking of urine    After first pregnancy    Leg swelling    My left leg slightly swelled up and my ankle area was hurtin    Lesion of ulnar nerve    Log Date: 2013         Loss of appetite    I noticed this lately too    Menometrorrhagia    Hysteroscopy 10/2020 path favors anovulatory breakdown bleeding.    Menometrorrhagia    Migraine with aura    Migraines    But ever since my 2 blood patches in  No more migraines    Multiple thyroid nodules    Myofascial pain    Nausea    Longer than this but this is an estimate    Nausea & vomiting    Night sweats    I feel like I’m on fire at night.    OAB (overactive bladder)    PTNS - posterior tibial nerve stimulation with urogynecologist Dr. Lara Jim    Painful urination    When passing stones    Postcoital bleeding    Prior to hysteroscopy done 10/2020. Also having since at least  or     Postcoital bleeding    Prediabetes    Prediabetes    3/8/25 A1c 5.9%    PVC's (premature ventricular contractions)    Rash    Broke out with unknown rash all over legs and arms    Sexually transmitted disease    HPV    Shortness of breath    Seen cardiologist about this    Sleep disturbance    For years sometimes I can’t lay on my left side    Stress    Rough up bringing and also regular family stresses    Uncomfortable fullness after meals    Longer than this but this is an estimate    Unspecified condition originating in the  period    Ureteral stone with hydronephrosis    Ureteral stricture, right    Urinary incontinence    Constantly going can’t hold it    Ventral hernia without obstruction or gangrene    Visual impairment    glasses    Vitamin D deficiency    Wears glasses    Since I was about 14 yrs old    Weight gain    After last son was born   [3]   Past  Surgical History:  Procedure Laterality Date    Abdominal surgery      2 hernia repairs right & left side      ,2016    x2    Colonoscopy N/A 2021    Procedure: COLONOSCOPY, ESOPHAGOGASTRODUODENOSCOPY with biopsy;  Surgeon: Prashant Lilly DO;  Location:  ENDOSCOPY    Colonoscopy  2021 EGD WITH BIOPSY. Colonoscopy internal hemorrhoids. No gross findings to explain left sided pain. Recall 10 years. Prashant Lilly DO    Cryocautery of cervix      For abnormal cells    Cysto/uretero w/lithotripsy Left 2024    Cystoscopy, LEFT ureteroscopy, laser lithotripsy, basket stone extraction, retrograde pyelogram, ureteral stent placement, Right retrograde pyelogram Dr. Bryant Garza    Cysto/uretero w/up stricture Right 10/15/2019    Cysto Rt RPG, Rt URS w/ Laser Litho Dilation of Rtight Stricture Rt URS Stent Exchange w/ Dr Jim    Cysto/uretero, stone remove Right 2019    right ureter and kidney stones extraction, URS, stent placement    Cystoscopy,ureteroscopy,lithotripsy Right 2019    Cysto, B/L RPG, URS, laser litho, stone ext, Rt stent Dr. Jim    D & c  10/2021 I believe    Lining of Uterus was way too thick, did biospy as well    Egd  2021    for left sided pain    Hc endometrial sampling w or wo endocerv sampling  2023    EMB- Dr. Ramirez - disordered proliferative endometrium - recommended medical management options to regulate cycle.    Hernia surgery  2000    L hernia repair    Hysteroscopy,with sampling  10/08/2020    Hysteroscopy, D&C for intermittent menorrhagia, thickened endometrium, cervical stenosis. H/o menometrorrhagia & postcoital bleeding. Dr. Linnette Antoine. Path Endometrium with stromal collapse, superficial fibrin thrombi, and some polypoid tissue fragments with increased stromal fibrous component. Endometrial glands are proliferative. Overall pattern favors anovulatory breakdown bleeding.    Hysteroscopy,with  sampling  07/11/2024    Hysteroscopy, D&C, insertion Mirena IUD. Done for menometrorrhagia & postcoital bleeding. Dr. Ema Rnadall. Path benign -Fragments of weakly proliferative endometrium with stromal breakdown/condensation. -Fragments of endocervical glands with inflammation and reactive changes.    Insert intrauterine device  07/11/2024    Mirena IUD insertion. 8 cm. Anteverted/mid position. Dr. Ema Randall    Laparoscopy,pelvic,biopsy      Kidney stones & examine    Lithotripsy  2001, 2007 & 11/11    Needle biopsy left  02/2021    fibroadenoma    Needle biopsy right  02/2021    fibroadenoma    Needle biopsy right Right 07/03/2024    fibroadenoma-benign    Other Bilateral 2012    nerve surgery to bilateral arms about 1 month apart    Other      percutaneous nephrolithotomy    Other surgical history  12/27/2019    cysto stent removal - dr. jim     Other surgical history  06/03/2020    Cysto Stent Removal w/ Dr Jim    Other surgical history  07/07/2021    Cysto/Stent Removal Dr. Jim    Other surgical history  07/08/2022    Right ureteroscopy, laser lithotripsy, stent placement, nephrostomy tube removal with Dr. Tariq    Removal gallbladder  1998    Remove stent via transureth Right 11/13/2019    Cysto Stent Removal w/ Dr Jim    Repair ing hernia,5+y/o,reducibl  03/2017    Hernia in stomach and previous ones in abdomen    Us breast biopsy 1 site right (cpt=19083) Right 07/03/2024    12:00 right breast. Fibroadenoma. Tiny microcalcifications. Breast tissue with hypocellular stromal fibrosis. No atypia or malignancy. Recommend follow-up mammogram and right breast ultrasound in 6 months.   [4]   Family History  Problem Relation Age of Onset    Diabetes Mother     High Cholesterol Mother     Hypertension Mother     Thyroid disease Mother         hypothroid    Other (Other) Mother         Part of thyroid removed    Depression Mother     Heart Disorder Father         3 stents in heart     Diabetes Father     Hypertension Father     Heart Disease Father     Heart Surgery Father         3 stents put in    Hypertension Sister     Other (ovarian problem) Sister         work up in progress    Diabetes Sister     Other (Other) Sister         Liver problem    Hypertension Sister     Depression Sister     Diabetes Sister     Other (Other) Daughter         Appendicitis    Other (Other) Son         Appendicitis    Diabetes Maternal Grandmother     Ovarian Cancer Maternal Grandmother         My grandma  from it at age 54    Cancer Maternal Grandmother         Ovarian Cancer    Depression Maternal Grandmother     Diabetes Maternal Grandfather     Dementia Maternal Grandfather     Diabetes Paternal Grandmother     Diabetes Paternal Grandfather     Dementia Paternal Grandfather         He was 90    Pancreatic Cancer Maternal Uncle     Colon Cancer Neg    [5]   Social History  Socioeconomic History    Marital status: Life Partner   Tobacco Use    Smoking status: Never    Smokeless tobacco: Never   Vaping Use    Vaping status: Never Used   Substance and Sexual Activity    Alcohol use: No    Drug use: No    Sexual activity: Yes     Partners: Male     Birth control/protection: Mirena, I.U.ASHLEY.     Comment: Mirena IUD 24   Other Topics Concern    Caffeine Concern No    Exercise No    Seat Belt Yes

## 2025-07-03 ENCOUNTER — PROCEDURE (OUTPATIENT)
Dept: SURGERY | Facility: CLINIC | Age: 50
End: 2025-07-03
Payer: MEDICAID

## 2025-07-03 DIAGNOSIS — N20.0 RENAL STONES: Primary | ICD-10-CM

## 2025-07-03 LAB
APPEARANCE: CLEAR
BILIRUBIN: NEGATIVE
CAOX MONOHYDRATE: 20 %
GLUCOSE (URINE DIPSTICK): NEGATIVE MG/DL
HYDROXYAPATITE: 80 %
KETONES (URINE DIPSTICK): NEGATIVE MG/DL
LEUKOCYTES: NEGATIVE
MULTISTIX LOT#: ABNORMAL NUMERIC
NITRITE, URINE: NEGATIVE
PH, URINE: 6 (ref 4.5–8)
PROTEIN (URINE DIPSTICK): >=300 MG/DL
SPECIFIC GRAVITY: 1.03 (ref 1–1.03)
URINE-COLOR: YELLOW
UROBILINOGEN,SEMI-QN: 0.2 MG/DL (ref 0–1.9)
WEIGHT-STONE: 182 MG

## 2025-07-03 PROCEDURE — 81003 URINALYSIS AUTO W/O SCOPE: CPT | Performed by: SURGERY

## 2025-07-03 PROCEDURE — 52310 CYSTOSCOPY AND TREATMENT: CPT | Performed by: SURGERY

## 2025-07-03 PROCEDURE — 99213 OFFICE O/P EST LOW 20 MIN: CPT | Performed by: SURGERY

## 2025-07-03 RX ORDER — SULFAMETHOXAZOLE AND TRIMETHOPRIM 800; 160 MG/1; MG/1
1 TABLET ORAL ONCE
Status: COMPLETED | OUTPATIENT
Start: 2025-07-03 | End: 2025-07-03

## 2025-07-03 RX ORDER — POTASSIUM CITRATE 15 MEQ/1
1 TABLET, EXTENDED RELEASE ORAL 2 TIMES DAILY
Qty: 180 TABLET | Refills: 6 | Status: SHIPPED | OUTPATIENT
Start: 2025-07-03

## 2025-07-03 RX ADMIN — SULFAMETHOXAZOLE AND TRIMETHOPRIM 1 TABLET: 800; 160 TABLET ORAL at 10:15:00

## 2025-07-03 NOTE — PROCEDURES
Clinic Procedure Note    INDICATIONS:   Nadine Calix is a 50 year old female with hx of anxiety, GERD, HTN, OAB, who presents for follow up stones.     Patient was seen last month for bilateral flank pain.     She had CT scan completed 3/27/25 that showed many bilateral stones, largest on the right 9mm and largest on the left 8mm.  Some calcifications may be intraparenchymal.     OR 25 for left URS/LL/stent and right stent. ~10 stones extracted from left kidney, extensive Jero's plaques. Plan for R URS/LL today.    OR 2025 for cystoscopy, left ureteral stent removal, right ureteroscopy, laser lithotripsy, stone extraction, stent exchange.  Stones in right kidney fully treated.     Stone analysis: 60% COM, 30% COD, 10% calcium phos    Adrenal labs 2025 were all normal.    Repeat 24-hour urine study on 2025 showed low urine volume, high urine sodium, low citrate and pH.  Will restart potassium citrate and recheck a 24-hour urine in a few months.    24 Hour Metabolic Urine Study Results     Volume: 2300 mL/day > 1600  Goal >2500  Sodium: 153 mmol/day > 195  Goal <150  Calcium: 177 mg/day > 233  Goal <250  Uric acid: 629 mg/day > 708  Goal <750  Citrate: 195 mg/day > 261  Goal >450  Oxalate: 27 mg/day > 18  Goal <40  pH: 6.65 > 5.43  Goal 5.8 - 6.2 (>6 for UA stones, >7 for cystine stone)  Creatinine: 1275 mg/day > 1550  Normal 18-20 mg/kilogram/day (female)  Normal 20-22 mg/kilogram/day (male)  Cystinuria present? No    PROCEDURE:       1. Flexible cystoscopy, removal of right ureteral stent (43393)    DATE OF PROCEDURE: 7/3/2025     PRE-PROCEDURE DIAGNOSIS: Nephrolithiasis    POST-PROCEDURE DIAGNOSIS: Same     SURGEON: Bryant Garza MD    FINDINGS:  Stent successfully removed in its entirety.     PROCEDURE:   Patient was brought to the procedure suite and a time-out was performed identifiying the patient,  and procedure to be performed. The risks and benefits of the procedure were once  again discussed with the patient including bleeding, infection, and dysuria. The patient agreed to proceed. The patient did not have any signs or symptoms of active UTI. Prophylactic PO antibiotics were given in clinic.    The patient was placed in supine position on the table and the genital area was prepped and draped in the standard sterile fashion. Urojet was used prior for local anesthesia effect. A flexible cystoscope was inserted per urethra. There were no urethral strictures present. The bladder was entered and the distal coil of the stent was seen protruding from the ureteral orifice. The stent was grasped with the flexible stent grasper, and then the stent and cystoscope were both removed. The stent was visualized outside the body and confirmed to be intact and fully removed.     There were no complications and the patient tolerated the procedure well.    IMPRESSION:  Successful stent removal after ureteroscopy today.    PLAN:   -Renal/bladder ultrasound in 6 weeks to ensure no silent hydronephrosis  -Restart potassium citrate  -BMP in a few weeks to check potassium  -24 hour urine study for metabolic workup in a few months    In total, 20 minutes were spent on this patient encounter (including chart review, patient history, physical, counseling, documentation, and communication).      Bryant Garza MD  Staff Urologist  [facility]  Office: [phone]

## 2025-07-05 RX ORDER — ERGOCALCIFEROL 1.25 MG/1
50000 CAPSULE, LIQUID FILLED ORAL WEEKLY
Qty: 24 CAPSULE | Refills: 0 | Status: SHIPPED | OUTPATIENT
Start: 2025-07-05

## 2025-07-07 NOTE — PROGRESS NOTES
Stone analysis: 80% calcium phosphate, 20% COM    Future Appointments  8/12/2025  2:00 PM    Greyson Orellana MD          [id number]        [facility] [address]  9/5/2025   10:45 AM   Bryant Garza MD           [id number]           [facility] [address]

## 2025-07-08 DIAGNOSIS — E66.9 OBESITY: ICD-10-CM

## 2025-07-08 DIAGNOSIS — E66.01 CLASS 2 SEVERE OBESITY DUE TO EXCESS CALORIES WITH SERIOUS COMORBIDITY AND BODY MASS INDEX (BMI) OF 36.0 TO 36.9 IN ADULT (CMD): ICD-10-CM

## 2025-07-08 DIAGNOSIS — R06.83 SNORING: Primary | ICD-10-CM

## 2025-07-08 DIAGNOSIS — E66.812 CLASS 2 SEVERE OBESITY DUE TO EXCESS CALORIES WITH SERIOUS COMORBIDITY AND BODY MASS INDEX (BMI) OF 36.0 TO 36.9 IN ADULT (CMD): ICD-10-CM

## 2025-07-08 DIAGNOSIS — I10 UNCONTROLLED HYPERTENSION: ICD-10-CM

## 2025-07-11 ENCOUNTER — APPOINTMENT (OUTPATIENT)
Dept: SLEEP MEDICINE | Age: 50
End: 2025-07-11
Attending: FAMILY MEDICINE

## 2025-07-14 ENCOUNTER — LAB ENCOUNTER (OUTPATIENT)
Dept: LAB | Age: 50
End: 2025-07-14
Attending: FAMILY MEDICINE
Payer: MEDICAID

## 2025-07-14 ENCOUNTER — OFFICE VISIT (OUTPATIENT)
Dept: INTERNAL MEDICINE CLINIC | Facility: CLINIC | Age: 50
End: 2025-07-14
Payer: MEDICAID

## 2025-07-14 VITALS
DIASTOLIC BLOOD PRESSURE: 88 MMHG | BODY MASS INDEX: 35.56 KG/M2 | SYSTOLIC BLOOD PRESSURE: 138 MMHG | HEIGHT: 59 IN | OXYGEN SATURATION: 95 % | HEART RATE: 69 BPM | TEMPERATURE: 98 F | WEIGHT: 176.38 LBS

## 2025-07-14 DIAGNOSIS — R10.9 STOMACH DISCOMFORT: ICD-10-CM

## 2025-07-14 DIAGNOSIS — M25.50 ARTHRALGIA, UNSPECIFIED JOINT: ICD-10-CM

## 2025-07-14 DIAGNOSIS — R25.1 TREMOR OF BOTH HANDS: ICD-10-CM

## 2025-07-14 DIAGNOSIS — R21 RASH: Primary | ICD-10-CM

## 2025-07-14 DIAGNOSIS — R42 INTERMITTENT LIGHTHEADEDNESS: ICD-10-CM

## 2025-07-14 DIAGNOSIS — R21 RASH: ICD-10-CM

## 2025-07-14 DIAGNOSIS — I49.9 IRREGULAR HEART RATE: ICD-10-CM

## 2025-07-14 LAB
CRP SERPL-MCNC: <0.5 MG/DL (ref ?–0.5)
ERYTHROCYTE [SEDIMENTATION RATE] IN BLOOD: 32 MM/HR (ref 0–20)
RHEUMATOID FACT SERPL-ACNC: <3.5 IU/ML (ref ?–14)

## 2025-07-14 PROCEDURE — 36415 COLL VENOUS BLD VENIPUNCTURE: CPT

## 2025-07-14 PROCEDURE — 86038 ANTINUCLEAR ANTIBODIES: CPT

## 2025-07-14 PROCEDURE — 86200 CCP ANTIBODY: CPT

## 2025-07-14 PROCEDURE — 85652 RBC SED RATE AUTOMATED: CPT

## 2025-07-14 PROCEDURE — 86431 RHEUMATOID FACTOR QUANT: CPT

## 2025-07-14 PROCEDURE — 86225 DNA ANTIBODY NATIVE: CPT

## 2025-07-14 PROCEDURE — 99214 OFFICE O/P EST MOD 30 MIN: CPT | Performed by: FAMILY MEDICINE

## 2025-07-14 PROCEDURE — 86140 C-REACTIVE PROTEIN: CPT

## 2025-07-14 NOTE — PROGRESS NOTES
CHIEF COMPLAINT:     Chief Complaint   Patient presents with    Follow - Up       HPI:   Nadine Calix is a 50 year old female .  Patient here with several concerns. Pt has been having a itchy blotchy hive like red rash on her legs that have spread up to her thighs and is on her arms for the past few days. Pt has noticed some tremors in her hands on and off, dizziness on and off, gets winded at times doing simple tasks like washing the dishes.  Pt still has some stomach pains since having her kidney surgeries. Pt has been having some GERD symptoms too. BM's have been ok.Pt has been also getting hot flashes and sweats. Pt thinks it is just perimenopause related but is not sure.  Pt also still having her heart race and then the rate drop into the 40-50's. Pt has not followed up with Cardiology yet but plans on it.    Current Medications[1]   Past Medical History[2]   Social History:  Short Social Hx on File[3]     REVIEW OF SYSTEMS:   GENERAL: Denies fever, chills,weight change, decreased appetite  SKIN: see HPI  EYES: Denies blurred vision or double vision  HENT: Denies congestion, rhinorrhea, sore throat or ear pain  CHEST: Denies chest pain, or palpitations. See HPI  LUNGS: Denies shortness of breath, cough, or wheezing  GI: Denies N/V/C/D. Stomach pains on and off  MUSCULOSKELETAL: no arthralgia or swollen joints  LYMPH:  Denies lymphadenopathy  NEURO: + lightheadedness and tremors       EXAM:   /88   Pulse 69   Temp 97.5 °F (36.4 °C) (Temporal)   Ht 4' 11\" (1.499 m)   Wt 176 lb 6.4 oz (80 kg)   LMP 05/03/2025 (Approximate)   SpO2 95%   BMI 35.63 kg/m²   GENERAL: well developed, well nourished,in no apparent distress  SKIN: - red blotchy maculopapular rash. No hives, no vesicles,no drg  HEAD: atraumatic, normocephalic  EYES: conjunctiva clear, EOM intact, PERRLA  EARS: TM's clear, no bulging, no retraction  NOSE: nostrils patent, no exudates, nasal mucosa pink and noninflamed  THROAT: oral mucosa  pink, moist. No visible dental caries. Posterior pharynx is no erythematous. no exudates.  NECK: supple, non-tender  LUNGS: clear to auscultation bilaterally, no wheezes or rhonchi. Breathing is non labored.  CARDIO: RRR without murmur  GI: No visible scars, or masses. BS's present x4. No palpable masses or hepatosplenomegaly.  + tenderness on palpation in LUQ  EXTREMITIES: no cyanosis, clubbing or edema  LYMPH:  no lymphadenopathy.    NEURO: Alert & Oriented x 3.  CN II-XII intact. Moving all 4 extremities without difficulty.Gait and station normal.   No facial droop.  No slurred speech.  Muscle tone and strength normal and symmetric. Motor and sensation grossly normal.    Dr. Hernandez consulted and examined the pt.. He would like her to get labs done, see Rheumatology and see Cardiology. Feels rash could be autoimmune related.    Physical Exam    ASSESSMENT AND PLAN:     Encounter Diagnoses   Name Primary?    Rash Yes    Arthralgia, unspecified joint     Tremor of both hands     Intermittent lightheadedness     Irregular heart rate     Stomach discomfort        Orders Placed This Encounter   Procedures    Connective Tissue Disease (HAYDE) Screen, Reflex Specific Antibody ([place])    Rheumatoid Arthritis Factor    Cyclic Citrullinate Peptide (CCP) antibodies    Sed Rate, Westergren (Automated)    C-Reactive Protein       Meds & Refills for this Visit:  Requested Prescriptions      No prescriptions requested or ordered in this encounter       Imaging & Consults:  RHEUMATOLOGY - INTERNAL    PLAN:  1. Rash  Check labs. May need to see Derm  - Connective Tissue Disease (HAYDE) Screen, Reflex Specific Antibody ([place]/Wright); Future  - Rheumatoid Arthritis Factor; Future  - Cyclic Citrullinate Peptide (CCP) antibodies; Future  - Sed Rate, Westergren (Automated); Future  - C-Reactive Protein; Future  - Rheumatology Referral - [facility])    2. Arthralgia, unspecified joint    - Connective Tissue Disease  (HAYDE) Screen, Reflex Specific Antibody ([place]); Future  - Rheumatoid Arthritis Factor; Future  - Cyclic Citrullinate Peptide (CCP) antibodies; Future  - Sed Rate, Westergren (Automated); Future  - C-Reactive Protein; Future  - Rheumatology Referral - Ariel ([place])    3. Tremor of both hands  Continue to monitor, anil if worsens. May be related to all other current symptoms    4. Intermittent lightheadedness  Advised to increase fluids, rest and eat on a regular basis. Pt is not to skip meals. Pt to avoid alcohol and caffeine .Avoid sudden change in positions. Keep all area well lit. Don't drive.    5. Irregular heart rate  Pt to f/u with cardiology as previously discussed    6. Stomach discomfort  - continue to monitor if worsens may need to see GI    The patient indicates understanding of these issues and agrees to the plan.  The patient is asked to call if symptoms worsen. Pt may need to go to ER to then be assessed..           [1]   Current Outpatient Medications   Medication Sig Dispense Refill    ERGOCALCIFEROL 1.25 MG (26522 UT) Oral Cap Take 1 capsule by mouth once a week 24 capsule 0    Potassium Citrate ER 15 MEQ (1620 MG) Oral Tab CR Take 1 tablet by mouth in the morning and 1 tablet before bedtime. 180 tablet 6    Naloxone HCl 4 MG/0.1ML Nasal Liquid 4 mg by Nasal route as needed. If patient remains unresponsive, repeat dose in other nostril 2-5 minutes after first dose. 1 kit 0    Calcium Polycarbophil (FIBER) 625 MG Oral Tab Take by mouth.      Multiple Vitamin (MULTIVITAMIN TAB/CAP) Take 1 tablet by mouth daily.      Magnesium Glycinate 120 MG Oral Cap Take 240 mg by mouth daily.      Ferrous Sulfate (IRON OR) Take by mouth. 3X WEEKLY      potassium citrate (UROCIT-K 10) 10 MEQ (1080 MG) Oral Tab CR Take 2 tablets (20 mEq total) by mouth in the morning and 2 tablets (20 mEq total) before bedtime. (Patient taking differently: Take 1 tablet (10 mEq total) by mouth in the morning and 1  tablet (10 mEq total) before bedtime.) 120 tablet 3    hydroCHLOROthiazide 25 MG Oral Tab Take 1 tablet (25 mg total) by mouth daily. 30 tablet 3    Levonorgestrel (MIRENA, 52 MG,) 20 MCG/DAY Intrauterine IUD 20 mcg (1 each total) by Intrauterine route one time.      albuterol (PROAIR HFA) 108 (90 Base) MCG/ACT Inhalation Aero Soln Inhale 2 puffs into the lungs every 4 (four) hours as needed for Wheezing. 1 each 1    Ketorolac Tromethamine 10 MG Oral Tab Take 1 tablet (10 mg total) by mouth every 8 (eight) hours as needed for Pain (Use sparingly and with plenty of water). (Patient not taking: Reported on 7/14/2025) 10 tablet 0    phenazopyridine (PYRIDIUM) 100 MG Oral Tab Take 1 tablet (100 mg total) by mouth 3 (three) times daily as needed for Pain. This will turn your urine orange. (Patient not taking: Reported on 7/14/2025) 10 tablet 0    oxybutynin 5 MG Oral Tab Take 1 tablet (5 mg total) by mouth 3 (three) times daily as needed (Bladder spasms). Stop 12 hours before Jett catheter removal in clinic (if applicable) (Patient not taking: Reported on 7/14/2025) 15 tablet 0    Drospirenone (SLYND) 4 MG Oral Tab Take 1 tablet by mouth daily. (Patient not taking: Reported on 7/14/2025) 84 tablet 3   [2]   Past Medical History:   Abdominal distention    Longer than this but this is an estimate    Abdominal hernia    I had hernia repair around by my belly button & abdominal    Abdominal pain    Currently seeing obgyn, urologist & nephrologist    Allergic rhinitis    A long time ago    Arrhythmia    PVC'S, irregular heartbeat    Back pain    Mid to lower back pain & hip pain    Benign essential HTN    Bloating    Longer than this but this is an estimate    Blood in urine    Due to severe kidney stones    Blurred vision    I always wore glasses or contacts since about 14 yrs old    Calculus of kidney    hydronephrosis/ several times kidney stones/ 13 th procedure for stones    Cervicalgia    Log Date: 05/04/2012          Change in hair    My hair is thinning more than the usual    Chest pain    I did see a cardiologist about this    Chest pain on exertion    I did see a cardiologists about this    COVID-19    headache, back pain, shortness of breath, sore throat, runny nose, chills. Not hospitalized    COVID-19    high fever, fatigue, not hospitalized    Dense breasts    heterogeneously dense breasts    Depression    I lost my son    Diarrhea, unspecified    I notice that certain things run right through me now    Disorder of liver    fatty liver    Disorder of thyroid    Dizziness    Dysmenorrhea    Ever since I started Menstrating at 14 yrs old    Dyspareunia    Sometimes    Dyspepsia    Easy bruising    I find bruises on me and don’t really know how I got them    Elevated fasting glucose    Enthesopathy of the wrist and carpus    Log Date: 05/31/2011         ESBL E. coli carrier    Excessive bleeding in premenopausal period    Falls    Tripped/fell into muskrat hole    Fatigue    I noticed that I’m alot more tired lately more than usual    Flatulence/gas pain/belching    Longer than this but this is an estimate    Food intolerance    I think I am lactose intolerant been that way for a few year    Frequent urination    All the time for years    Frequent UTI    Due to kidney stones blockages    Gestational diabetes (HCC)    Heart murmur    My mother said I was born with one but I never really checke    Heart palpitations    Pvc’s and irregular heart rate    Hemorrhoids    After last pregnancy or a couple years after that    High blood pressure    History of cardiac murmur    Mother told me I was born with one & my dr as well    History of D&C    History of depression    When I had a miscarriage    History of stomach ulcers    Indigestion    Longer than this but this is an estimate    Itch of skin    Not severe but I have been itching for no apparent reason    IUD threads lost    5/13/25 C/o continued irregular bleeding. Mirena IUD  strings NOT seen at time of exam. 25 Pelvic US - IUD in good position. Uterus somewhat bulky & heterogeneous. Pre-ovulatory follicles bilateral ovaries.       Leaking of urine    After first pregnancy    Leg swelling    My left leg slightly swelled up and my ankle area was hurtin    Lesion of ulnar nerve    Log Date: 2013         Loss of appetite    I noticed this lately too    Menometrorrhagia    Hysteroscopy 10/2020 path favors anovulatory breakdown bleeding.    Menometrorrhagia    Migraine with aura    Migraines    But ever since my 2 blood patches in  No more migraines    Multiple thyroid nodules    Myofascial pain    Nausea    Longer than this but this is an estimate    Nausea & vomiting    Night sweats    I feel like I’m on fire at night.    OAB (overactive bladder)    PTNS - posterior tibial nerve stimulation with urogynecologist Dr. Lara Jim    Painful urination    When passing stones    Postcoital bleeding    Prior to hysteroscopy done 10/2020. Also having since at least  or     Postcoital bleeding    Prediabetes    Prediabetes    3/8/25 A1c 5.9%    PVC's (premature ventricular contractions)    Rash    Broke out with unknown rash all over legs and arms    Sexually transmitted disease    HPV    Shortness of breath    Seen cardiologist about this    Sleep disturbance    For years sometimes I can’t lay on my left side    Stress    Rough up bringing and also regular family stresses    Uncomfortable fullness after meals    Longer than this but this is an estimate    Unspecified condition originating in the  period    Ureteral stone with hydronephrosis    Ureteral stricture, right    Urinary incontinence    Constantly going can’t hold it    Ventral hernia without obstruction or gangrene    Visual impairment    glasses    Vitamin D deficiency    Wears glasses    Since I was about 14 yrs old    Weight gain    After last son was born   [3]   Social History  Socioeconomic  History    Marital status: Life Partner   Tobacco Use    Smoking status: Never    Smokeless tobacco: Never   Vaping Use    Vaping status: Never Used   Substance and Sexual Activity    Alcohol use: No    Drug use: No    Sexual activity: Yes     Partners: Male     Birth control/protection: Mirena, I.U.D.     Comment: Mirena IUD 7/11/24   Other Topics Concern    Caffeine Concern No    Exercise No    Seat Belt Yes

## 2025-07-14 NOTE — PROGRESS NOTES
Nadine Calix is a 50 year old female with a hx of :    Problem List[1]  Past Medical History[2]    HPI:  Chief Complaint   Patient presents with    Follow - Up     This is a *** problem. The current episode started in the past *** days. The problem has been *** since onset. The affected locations include ***. The rash is characterized by ***. Exposure: ***.Pertinent negatives include no anorexia, diarrhea, eye pain, facial edema, fatigue, fever, joint pain, nail changes, shortness of breath, or vomiting. Past treatments include ***.   New Soaps:  ***  New Detergents:  ***  New Clothes:  ***  New plants/working outdoors: ***  URI symptoms recently: ***    REVIEW OF SYSTEMS:  GENERAL: Patient has been doing well otherwise  SKIN: As above  LUNGS: No shortness of breath or wheezing  GI: No vomiting or diarrhea,or abdominal pain      EXAM:  BP (!) 144/98 (BP Location: Right arm, Patient Position: Sitting, Cuff Size: adult)   Pulse 69   Temp 97.5 °F (36.4 °C) (Temporal)   Ht 4' 11\" (1.499 m)   Wt 176 lb 6.4 oz (80 kg)   LMP 2025 (Approximate)   SpO2 95%   BMI 35.63 kg/m²   GENERAL: WNWD, 50 year old female in NAD, non-toxic    HEENT: NCAT, Oropharynx is clear.  TM's: Clear bilaterally  NECK: supple,no adenopathy  LUNGS: clear to auscultation, Bilaterally  CARDIO: RRR without murmur  GI: good BS's,no masses, NO HSM or tenderness  SKIN:  ***    ASSESSMENT AND PLAN:  ***   Skin care discussed regarding proper cleansers and lotions/creams.  F/U if symptoms worsen or do not improve or concerns increase.  The patient indicates understanding of these issues and agrees to the plan.       [1]   Patient Active Problem List  Diagnosis    Dyspepsia    Depression    Migraine with aura    Body mass index between 19-24, adult    Lesion of ulnar nerve    Cervicalgia    Enthesopathy of the wrist and carpus    H/O:  section    Ventral hernia without obstruction or gangrene    Thyroid nodule    Elevated LFTs     Benign essential HTN    Ureteral stone with hydronephrosis    Ureteral stricture, right    Vitamin D deficiency    PVC's (premature ventricular contractions)    Nephrolithiasis    Myofascial pain    Hyperglycemia    Chronic migraine without aura without status migrainosus, not intractable    Excessive bleeding in premenopausal period    Obstructive uropathy    Multiple thyroid nodules    Abnormal uterine bleeding (AUB)    Postcoital bleeding    Family history of ovarian cancer    Family history of pancreatic cancer    Menometrorrhagia    Elevated fasting glucose    Encounter for insertion of Mirena IUD    Pelvic floor dysfunction    Urinary frequency    Acute PID (pelvic inflammatory disease)    Prediabetes    Axillary mass, right    Dense breasts    History of right breast biopsy    IUD threads lost    Breakthrough bleeding with IUD    History of acute PID    Chronic LLQ pain    Cervical high risk human papillomavirus (HPV) DNA test positive   [2]   Past Medical History:   Abdominal distention    Longer than this but this is an estimate    Abdominal hernia    I had hernia repair around by my belly button & abdominal    Abdominal pain    Currently seeing obgyn, urologist & nephrologist    Allergic rhinitis    A long time ago    Arrhythmia    PVC'S, irregular heartbeat    Back pain    Mid to lower back pain & hip pain    Benign essential HTN    Bloating    Longer than this but this is an estimate    Blood in urine    Due to severe kidney stones    Blurred vision    I always wore glasses or contacts since about 14 yrs old    Calculus of kidney    hydronephrosis/ several times kidney stones/ 13 th procedure for stones    Cervicalgia    Log Date: 05/04/2012         Change in hair    My hair is thinning more than the usual    Chest pain    I did see a cardiologist about this    Chest pain on exertion    I did see a cardiologists about this    COVID-19    headache, back pain, shortness of breath, sore throat, runny nose,  chills. Not hospitalized    COVID-19    high fever, fatigue, not hospitalized    Dense breasts    heterogeneously dense breasts    Depression    I lost my son    Diarrhea, unspecified    I notice that certain things run right through me now    Disorder of liver    fatty liver    Disorder of thyroid    Dizziness    Dysmenorrhea    Ever since I started Menstrating at 14 yrs old    Dyspareunia    Sometimes    Dyspepsia    Easy bruising    I find bruises on me and don’t really know how I got them    Elevated fasting glucose    Enthesopathy of the wrist and carpus    Log Date: 05/31/2011         ESBL E. coli carrier    Excessive bleeding in premenopausal period    Falls    Tripped/fell into muskrat hole    Fatigue    I noticed that I’m alot more tired lately more than usual    Flatulence/gas pain/belching    Longer than this but this is an estimate    Food intolerance    I think I am lactose intolerant been that way for a few year    Frequent urination    All the time for years    Frequent UTI    Due to kidney stones blockages    Gestational diabetes (HCC)    Heart murmur    My mother said I was born with one but I never really checke    Heart palpitations    Pvc’s and irregular heart rate    Hemorrhoids    After last pregnancy or a couple years after that    High blood pressure    History of cardiac murmur    Mother told me I was born with one & my dr as well    History of D&C    History of depression    When I had a miscarriage    History of stomach ulcers    Indigestion    Longer than this but this is an estimate    Itch of skin    Not severe but I have been itching for no apparent reason    IUD threads lost    5/13/25 C/o continued irregular bleeding. Mirena IUD strings NOT seen at time of exam. 5/29/25 Pelvic US - IUD in good position. Uterus somewhat bulky & heterogeneous. Pre-ovulatory follicles bilateral ovaries.       Leaking of urine    After first pregnancy    Leg swelling    My left leg slightly swelled up  and my ankle area was hurtin    Lesion of ulnar nerve    Log Date: 2013         Loss of appetite    I noticed this lately too    Menometrorrhagia    Hysteroscopy 10/2020 path favors anovulatory breakdown bleeding.    Menometrorrhagia    Migraine with aura    Migraines    But ever since my 2 blood patches in  No more migraines    Multiple thyroid nodules    Myofascial pain    Nausea    Longer than this but this is an estimate    Nausea & vomiting    Night sweats    I feel like I’m on fire at night.    OAB (overactive bladder)    PTNS - posterior tibial nerve stimulation with urogynecologist Dr. Lara Jim    Painful urination    When passing stones    Postcoital bleeding    Prior to hysteroscopy done 10/2020. Also having since at least  or     Postcoital bleeding    Prediabetes    Prediabetes    3/8/25 A1c 5.9%    PVC's (premature ventricular contractions)    Rash    Broke out with unknown rash all over legs and arms    Sexually transmitted disease    HPV    Shortness of breath    Seen cardiologist about this    Sleep disturbance    For years sometimes I can’t lay on my left side    Stress    Rough up bringing and also regular family stresses    Uncomfortable fullness after meals    Longer than this but this is an estimate    Unspecified condition originating in the  period    Ureteral stone with hydronephrosis    Ureteral stricture, right    Urinary incontinence    Constantly going can’t hold it    Ventral hernia without obstruction or gangrene    Visual impairment    glasses    Vitamin D deficiency    Wears glasses    Since I was about 14 yrs old    Weight gain    After last son was born

## 2025-07-15 ENCOUNTER — TELEPHONE (OUTPATIENT)
Facility: CLINIC | Age: 50
End: 2025-07-15

## 2025-07-15 LAB
CCP IGG SERPL-ACNC: 0.9 U/ML (ref 0–6.9)
DSDNA IGG SERPL IA-ACNC: 1 IU/ML (ref ?–10)
ENA AB SER QL IA: 0.4 UG/L (ref ?–0.7)
ENA AB SER QL IA: NEGATIVE

## 2025-07-15 NOTE — TELEPHONE ENCOUNTER
Patient has had 2 kidney surgeries, and never started her Slynd Rx. She reports that she is no longer experiencing intermittent bleeding and wonders if the doctor still wants her to start the prescription. She no longer feels that it is necessary.

## 2025-07-15 NOTE — TELEPHONE ENCOUNTER
Spoke with patient. She was prescribed Slynd back in April for AUB. Has not started the BCP. She has not had any bleeding since May.     She also has intermittent pelvic pain, both sides. Pain not severe but becoming more frequent. 5/29/25 pelvic ultrasound showed bilateral ovarian cysts.    Patient would like to know if Dr. Randall still recommends starting Slynd. Aware Dr. Randall is out of the office this week. Patient would like to wait for Dr. Randall's recommendations when she is back. To call the office with worsening pelvic pain or any other concerns. Verbalized understanding.

## 2025-07-18 ENCOUNTER — TELEPHONE (OUTPATIENT)
Dept: SURGERY | Facility: CLINIC | Age: 50
End: 2025-07-18

## 2025-07-18 ENCOUNTER — TELEPHONE (OUTPATIENT)
Facility: CLINIC | Age: 50
End: 2025-07-18

## 2025-07-18 DIAGNOSIS — E04.2 MULTIPLE THYROID NODULES: Primary | ICD-10-CM

## 2025-07-18 NOTE — TELEPHONE ENCOUNTER
Verbalization to understanding below. Patient scheduled for end of March. Patient will get ultrasound done.     Closing encounter.

## 2025-07-18 NOTE — TELEPHONE ENCOUNTER
Received call from patient regarding a few things- she has had her kidney surgeries recently    1.States she has repeat iron labs in the system however was never able to start supplementation due to surgery, she has started this 1 week ago and would like to know when to have her labs done    2.She saw Dr. Orellana in April- states he asked her if she ever discussed her high Aldosterone/renin level from 6/10/24- in [facility]. Dr. Orellana was wondering what Endocrinology would think about this and for follow up.    3. Patient aware to repeat US in one year, April 2026 to follow up on nodules. Are there anymore tests she should have done prior? Does she need to follow up with a different Endo, or just have a year visit April 2026.       Component      Latest Ref Rng 6/10/2024   ALDOSTERONE      0.0 - 30.0 ng/dL 9.2    RENIN ACTIVITY      0.167 - 5.380 ng/mL/hr 0.188    Aldost/Renin Ratio      0.0 - 30.0  48.9 (H)    NORMETANEPHRINE      0.0 - 218.9 pg/mL Comment:    METANEPHRINE      0.0 - 88.0 pg/mL Comment:    Cortisol      ug/dL 0.9       Legend:  (H) High    Most recent levels were in range:   Component      Latest Ref Rng 6/20/2025   ALDOSTERONE      0.0 - 30.0 ng/dL 19.1    RENIN ACTIVITY      0.167 - 5.380 ng/mL/hr 0.864    Aldost/Renin Ratio      0.0 - 30.0  22.1    NORMETANEPHRINE      0.0 - 218.9 pg/mL 53.9    METANEPHRINE      0.0 - 88.0 pg/mL 30.9    Cortisol      ug/dL 0.8

## 2025-07-18 NOTE — TELEPHONE ENCOUNTER
1) Can repeat iron labs after at least 2-3 months of iron supplementation, can also discuss with her PCP  2) Eric/renin and adrenal workup has been discussed numerous times at our appointments, her urologist is primarily following this and she had recent normalization of levels this year. She can continue with uro for this and they will let us know if questions/concerns.   3) Calcium and parathyroid levels have been stable.     She can do a 1 year repeat US in March/April 2026 and I can see her for a follow up after that, no need to see another provider in the interim.

## 2025-07-21 NOTE — TELEPHONE ENCOUNTER
Spoke with patient. Aware it would be a good idea to at least try the Slynd to help suppress her ovaries from trying to create cysts as this can cause pain. WWE scheduled 5/15/26. Verbalized understanding.

## 2025-07-24 ENCOUNTER — PATIENT MESSAGE (OUTPATIENT)
Dept: INTERNAL MEDICINE CLINIC | Facility: CLINIC | Age: 50
End: 2025-07-24

## 2025-07-24 ENCOUNTER — NURSE TRIAGE (OUTPATIENT)
Dept: INTERNAL MEDICINE CLINIC | Facility: CLINIC | Age: 50
End: 2025-07-24

## 2025-07-24 NOTE — TELEPHONE ENCOUNTER
Pt called in regards to getting a referral from [facility] or  to dermatologist for rash that was just seen on 07/14. PT is stating that the rash is spreading and getting worst. She said it has also changed in appearance. Rash is again on arms and legs. Asked if pt can please try to send a picture of the rash via [facility] for [facility] to see. PT did schedule appt with Rheumatologist but is not for a bit.

## 2025-07-24 NOTE — TELEPHONE ENCOUNTER
Action Requested: Summary for Provider     []  Critical Lab, Recommendations Needed  [x] Need Additional Advice  []   FYI    []   Need Orders  [] Need Medications Sent to Pharmacy  []  Other   Reason for call: Rash on calf  and thigh.     TO: Any recommendations? Please advise. Ty   SUMMARY: Spoke to patient who has had a rash since 07/17/25. Patient states the rash is on the calf and has now spread to her thigh. Rash came back darker and now painful when taking long walks. The rash ranges in size from dime size to smaller spots. Itching is mild. Patient took benadryl but that did not help. Patient denies fever, difficulty breathing. Patient will be sending her rash pictures through [employer].       This RN recommended the ER for any fever, chest pain, difficulty breathing, shortness of breath.

## 2025-07-28 NOTE — TELEPHONE ENCOUNTER
TO: Please see attached photos of patient's skin as a follow up to triage call 7/24/2025 and advise. TY

## 2025-07-28 NOTE — TELEPHONE ENCOUNTER
Her autoimmune labs looked OK     not sure why she has this    Could be allergy to something she is on   antibiotics ?    Rec see Derm as Mitra suggested

## 2025-08-05 ENCOUNTER — OFFICE VISIT (OUTPATIENT)
Dept: SLEEP CENTER | Age: 50
End: 2025-08-05
Attending: Other

## 2025-08-05 DIAGNOSIS — R06.83 SNORING: ICD-10-CM

## 2025-08-05 DIAGNOSIS — E66.812 CLASS 2 SEVERE OBESITY DUE TO EXCESS CALORIES WITH SERIOUS COMORBIDITY AND BODY MASS INDEX (BMI) OF 36.0 TO 36.9 IN ADULT (HCC): ICD-10-CM

## 2025-08-05 DIAGNOSIS — I10 UNCONTROLLED HYPERTENSION: ICD-10-CM

## 2025-08-05 DIAGNOSIS — E66.01 CLASS 2 SEVERE OBESITY DUE TO EXCESS CALORIES WITH SERIOUS COMORBIDITY AND BODY MASS INDEX (BMI) OF 36.0 TO 36.9 IN ADULT (HCC): ICD-10-CM

## 2025-08-05 DIAGNOSIS — R53.83 FATIGUE, UNSPECIFIED TYPE: ICD-10-CM

## 2025-08-05 PROCEDURE — 95806 SLEEP STUDY UNATT&RESP EFFT: CPT

## 2025-08-07 ENCOUNTER — HOSPITAL ENCOUNTER (OUTPATIENT)
Dept: ULTRASOUND IMAGING | Age: 50
Discharge: HOME | End: 2025-08-07
Attending: SURGERY

## 2025-08-07 ENCOUNTER — TELEPHONE (OUTPATIENT)
Dept: INTERNAL MEDICINE CLINIC | Facility: CLINIC | Age: 50
End: 2025-08-07

## 2025-08-07 DIAGNOSIS — N20.0 RENAL STONES: ICD-10-CM

## 2025-08-07 PROCEDURE — 76770 US EXAM ABDO BACK WALL COMP: CPT | Performed by: SURGERY

## 2025-08-08 ENCOUNTER — TELEPHONE (OUTPATIENT)
Dept: SURGERY | Facility: CLINIC | Age: 50
End: 2025-08-08

## 2025-08-12 ENCOUNTER — TELEPHONE (OUTPATIENT)
Facility: CLINIC | Age: 50
End: 2025-08-12

## 2025-08-12 DIAGNOSIS — N83.202 BILATERAL OVARIAN CYSTS: Primary | ICD-10-CM

## 2025-08-12 DIAGNOSIS — N83.201 BILATERAL OVARIAN CYSTS: Primary | ICD-10-CM

## 2025-08-16 ENCOUNTER — HOSPITAL ENCOUNTER (OUTPATIENT)
Dept: CT IMAGING | Age: 50
Discharge: HOME | End: 2025-08-16
Attending: SURGERY

## 2025-08-16 DIAGNOSIS — N20.0 NEPHROLITHIASIS: ICD-10-CM

## 2025-08-16 PROCEDURE — 74176 CT ABD & PELVIS W/O CONTRAST: CPT | Performed by: SURGERY

## 2025-08-18 ENCOUNTER — LAB ENCOUNTER (OUTPATIENT)
Dept: LAB | Age: 50
End: 2025-08-18
Attending: INTERNAL MEDICINE

## 2025-08-18 ENCOUNTER — OFFICE VISIT (OUTPATIENT)
Dept: SURGERY | Facility: CLINIC | Age: 50
End: 2025-08-18

## 2025-08-18 ENCOUNTER — LAB ENCOUNTER (OUTPATIENT)
Dept: LAB | Age: 50
End: 2025-08-18
Attending: FAMILY MEDICINE

## 2025-08-18 ENCOUNTER — TELEPHONE (OUTPATIENT)
Dept: SURGERY | Facility: CLINIC | Age: 50
End: 2025-08-18

## 2025-08-18 DIAGNOSIS — N13.2 URETERAL STONE WITH HYDRONEPHROSIS: ICD-10-CM

## 2025-08-18 DIAGNOSIS — N20.0 RENAL STONES: ICD-10-CM

## 2025-08-18 DIAGNOSIS — N20.1 LEFT URETERAL STONE: Primary | ICD-10-CM

## 2025-08-18 DIAGNOSIS — N20.0 KIDNEY STONE: ICD-10-CM

## 2025-08-18 DIAGNOSIS — N20.0 KIDNEY STONE: Primary | ICD-10-CM

## 2025-08-18 PROBLEM — N83.202 BILATERAL OVARIAN CYSTS: Status: ACTIVE | Noted: 2025-05-29

## 2025-08-18 PROBLEM — N83.201 BILATERAL OVARIAN CYSTS: Status: ACTIVE | Noted: 2025-05-29

## 2025-08-18 LAB
ANION GAP SERPL CALC-SCNC: 14 MMOL/L (ref 0–18)
APPEARANCE: CLEAR
BASOPHILS # BLD AUTO: 0.04 X10(3) UL (ref 0–0.2)
BASOPHILS NFR BLD AUTO: 0.6 %
BILIRUBIN: NEGATIVE
BUN BLD-MCNC: 16 MG/DL (ref 9–23)
CALCIUM BLD-MCNC: 10.2 MG/DL (ref 8.7–10.6)
CHLORIDE SERPL-SCNC: 104 MMOL/L (ref 98–112)
CO2 SERPL-SCNC: 23 MMOL/L (ref 21–32)
CREAT BLD-MCNC: 0.89 MG/DL (ref 0.55–1.02)
EGFRCR SERPLBLD CKD-EPI 2021: 80 ML/MIN/1.73M2 (ref 60–?)
EOSINOPHIL # BLD AUTO: 0.11 X10(3) UL (ref 0–0.7)
EOSINOPHIL NFR BLD AUTO: 1.6 %
ERYTHROCYTE [DISTWIDTH] IN BLOOD BY AUTOMATED COUNT: 12.7 %
FASTING STATUS PATIENT QL REPORTED: YES
GLUCOSE (URINE DIPSTICK): NEGATIVE MG/DL
GLUCOSE BLD-MCNC: 98 MG/DL (ref 70–99)
HCT VFR BLD AUTO: 48.1 % (ref 35–48)
HGB BLD-MCNC: 16.2 G/DL (ref 12–16)
IMM GRANULOCYTES # BLD AUTO: 0.01 X10(3) UL (ref 0–1)
IMM GRANULOCYTES NFR BLD: 0.1 %
KETONES (URINE DIPSTICK): NEGATIVE MG/DL
LEUKOCYTES: NEGATIVE
LYMPHOCYTES # BLD AUTO: 1.24 X10(3) UL (ref 1–4)
LYMPHOCYTES NFR BLD AUTO: 18.4 %
MAGNESIUM SERPL-MCNC: 2.1 MG/DL (ref 1.6–2.6)
MCH RBC QN AUTO: 30.2 PG (ref 26–34)
MCHC RBC AUTO-ENTMCNC: 33.7 G/DL (ref 31–37)
MCV RBC AUTO: 89.6 FL (ref 80–100)
MONOCYTES # BLD AUTO: 0.41 X10(3) UL (ref 0.1–1)
MONOCYTES NFR BLD AUTO: 6.1 %
MULTISTIX LOT#: ABNORMAL NUMERIC
NEUTROPHILS # BLD AUTO: 4.92 X10 (3) UL (ref 1.5–7.7)
NEUTROPHILS # BLD AUTO: 4.92 X10(3) UL (ref 1.5–7.7)
NEUTROPHILS NFR BLD AUTO: 73.2 %
NITRITE, URINE: NEGATIVE
OSMOLALITY SERPL CALC.SUM OF ELEC: 293 MOSM/KG (ref 275–295)
PH, URINE: 7.5 (ref 4.5–8)
PHOSPHATE SERPL-MCNC: 2.5 MG/DL (ref 2.4–5.1)
PLATELET # BLD AUTO: 366 10(3)UL (ref 150–450)
POTASSIUM SERPL-SCNC: 3.6 MMOL/L (ref 3.5–5.1)
PROTEIN (URINE DIPSTICK): >=300 MG/DL
RBC # BLD AUTO: 5.37 X10(6)UL (ref 3.8–5.3)
SODIUM SERPL-SCNC: 141 MMOL/L (ref 136–145)
SPECIFIC GRAVITY: 1.02 (ref 1–1.03)
URINE-COLOR: YELLOW
UROBILINOGEN,SEMI-QN: 0.2 MG/DL (ref 0–1.9)
WBC # BLD AUTO: 6.7 X10(3) UL (ref 4–11)

## 2025-08-18 PROCEDURE — 81050 URINALYSIS VOLUME MEASURE: CPT

## 2025-08-18 PROCEDURE — 84105 ASSAY OF URINE PHOSPHORUS: CPT

## 2025-08-18 PROCEDURE — 82436 ASSAY OF URINE CHLORIDE: CPT

## 2025-08-18 PROCEDURE — 81003 URINALYSIS AUTO W/O SCOPE: CPT | Performed by: PHYSICIAN ASSISTANT

## 2025-08-18 PROCEDURE — 84133 ASSAY OF URINE POTASSIUM: CPT

## 2025-08-18 PROCEDURE — 83735 ASSAY OF MAGNESIUM: CPT

## 2025-08-18 PROCEDURE — 84100 ASSAY OF PHOSPHORUS: CPT

## 2025-08-18 PROCEDURE — 99214 OFFICE O/P EST MOD 30 MIN: CPT | Performed by: PHYSICIAN ASSISTANT

## 2025-08-18 PROCEDURE — 84392 ASSAY OF URINE SULFATE: CPT

## 2025-08-18 PROCEDURE — 83945 ASSAY OF OXALATE: CPT

## 2025-08-18 PROCEDURE — 82340 ASSAY OF CALCIUM IN URINE: CPT

## 2025-08-18 PROCEDURE — 84560 ASSAY OF URINE/URIC ACID: CPT

## 2025-08-18 PROCEDURE — 80048 BASIC METABOLIC PNL TOTAL CA: CPT

## 2025-08-18 PROCEDURE — 84300 ASSAY OF URINE SODIUM: CPT

## 2025-08-18 PROCEDURE — 82507 ASSAY OF CITRATE: CPT

## 2025-08-18 PROCEDURE — 83986 ASSAY PH BODY FLUID NOS: CPT

## 2025-08-18 PROCEDURE — 85025 COMPLETE CBC W/AUTO DIFF WBC: CPT

## 2025-08-18 PROCEDURE — 36415 COLL VENOUS BLD VENIPUNCTURE: CPT

## 2025-08-18 RX ORDER — TAMSULOSIN HYDROCHLORIDE 0.4 MG/1
0.4 CAPSULE ORAL DAILY
Qty: 30 CAPSULE | Refills: 0 | Status: SHIPPED | OUTPATIENT
Start: 2025-08-18 | End: 2025-09-17

## 2025-08-22 ENCOUNTER — TELEPHONE (OUTPATIENT)
Dept: SURGERY | Facility: CLINIC | Age: 50
End: 2025-08-22

## 2025-08-28 ENCOUNTER — LAB ENCOUNTER (OUTPATIENT)
Dept: LAB | Facility: HOSPITAL | Age: 50
End: 2025-08-28
Attending: STUDENT IN AN ORGANIZED HEALTH CARE EDUCATION/TRAINING PROGRAM

## 2025-08-28 ENCOUNTER — OFFICE VISIT (OUTPATIENT)
Dept: DERMATOLOGY CLINIC | Facility: CLINIC | Age: 50
End: 2025-08-28

## 2025-08-28 DIAGNOSIS — R21 RASH AND OTHER NONSPECIFIC SKIN ERUPTION: ICD-10-CM

## 2025-08-28 DIAGNOSIS — N20.0 KIDNEY STONE: ICD-10-CM

## 2025-08-28 DIAGNOSIS — G71.038 LIMB-GIRDLE MUSCULAR DYSTROPHY R21 ASSOCIATED WITH MUTATION IN POGLUT1 GENE (HCC): ICD-10-CM

## 2025-08-28 DIAGNOSIS — N20.0 RENAL CALCULUS OR STONE: Primary | ICD-10-CM

## 2025-08-28 DIAGNOSIS — R21 RASH AND OTHER NONSPECIFIC SKIN ERUPTION: Primary | ICD-10-CM

## 2025-08-28 LAB
ANION GAP SERPL CALC-SCNC: 12 MMOL/L (ref 0–18)
B2 MICROGLOB SERPL-MCNC: 2 MG/L (ref 1–2.4)
BUN BLD-MCNC: 13 MG/DL (ref 9–23)
C3 SERPL-MCNC: 209.6 MG/DL (ref 90–170)
C4 SERPL-MCNC: 45.8 MG/DL (ref 12–36)
CALCIUM BLD-MCNC: 9.9 MG/DL (ref 8.7–10.6)
CHLORIDE SERPL-SCNC: 104 MMOL/L (ref 98–112)
CO2 SERPL-SCNC: 27 MMOL/L (ref 21–32)
CREAT BLD-MCNC: 0.85 MG/DL (ref 0.55–1.02)
EGFRCR SERPLBLD CKD-EPI 2021: 84 ML/MIN/1.73M2 (ref 60–?)
FASTING STATUS PATIENT QL REPORTED: YES
GLUCOSE BLD-MCNC: 131 MG/DL (ref 70–99)
HAV AB SER QL IA: REACTIVE
HAV IGM SER QL: NONREACTIVE
HBV CORE AB SERPL QL IA: NONREACTIVE
HBV SURFACE AB SER QL: NONREACTIVE
HBV SURFACE AB SERPL IA-ACNC: <3.1 MIU/ML
HBV SURFACE AG SERPL QL IA: NONREACTIVE
HCV AB SERPL QL IA: NONREACTIVE
HCYS SERPL-SCNC: 8.2 UMOL/L (ref 3.7–13.9)
OSMOLALITY SERPL CALC.SUM OF ELEC: 298 MOSM/KG (ref 275–295)
POTASSIUM SERPL-SCNC: 3.1 MMOL/L (ref 3.5–5.1)
SODIUM SERPL-SCNC: 143 MMOL/L (ref 136–145)

## 2025-08-28 PROCEDURE — 86480 TB TEST CELL IMMUN MEASURE: CPT

## 2025-08-28 PROCEDURE — 82232 ASSAY OF BETA-2 PROTEIN: CPT

## 2025-08-28 PROCEDURE — 86803 HEPATITIS C AB TEST: CPT

## 2025-08-28 PROCEDURE — 86706 HEP B SURFACE ANTIBODY: CPT

## 2025-08-28 PROCEDURE — 86037 ANCA TITER EACH ANTIBODY: CPT

## 2025-08-28 PROCEDURE — 86160 COMPLEMENT ANTIGEN: CPT

## 2025-08-28 PROCEDURE — 85303 CLOT INHIBIT PROT C ACTIVITY: CPT

## 2025-08-28 PROCEDURE — 86618 LYME DISEASE ANTIBODY: CPT

## 2025-08-28 PROCEDURE — 82595 ASSAY OF CRYOGLOBULIN: CPT

## 2025-08-28 PROCEDURE — 86147 CARDIOLIPIN ANTIBODY EA IG: CPT

## 2025-08-28 PROCEDURE — 86157 COLD AGGLUTININ TITER: CPT

## 2025-08-28 PROCEDURE — 81241 F5 GENE: CPT

## 2025-08-28 PROCEDURE — 83516 IMMUNOASSAY NONANTIBODY: CPT

## 2025-08-28 PROCEDURE — 83090 ASSAY OF HOMOCYSTEINE: CPT

## 2025-08-28 PROCEDURE — 86708 HEPATITIS A ANTIBODY: CPT

## 2025-08-28 PROCEDURE — 86709 HEPATITIS A IGM ANTIBODY: CPT

## 2025-08-28 PROCEDURE — 85610 PROTHROMBIN TIME: CPT

## 2025-08-28 PROCEDURE — 86334 IMMUNOFIX E-PHORESIS SERUM: CPT

## 2025-08-28 PROCEDURE — 36415 COLL VENOUS BLD VENIPUNCTURE: CPT

## 2025-08-28 PROCEDURE — 86704 HEP B CORE ANTIBODY TOTAL: CPT

## 2025-08-28 PROCEDURE — 85240 CLOT FACTOR VIII AHG 1 STAGE: CPT

## 2025-08-28 PROCEDURE — 84165 PROTEIN E-PHORESIS SERUM: CPT

## 2025-08-28 PROCEDURE — 85300 ANTITHROMBIN III ACTIVITY: CPT

## 2025-08-28 PROCEDURE — 85705 THROMBOPLASTIN INHIBITION: CPT

## 2025-08-28 PROCEDURE — 99204 OFFICE O/P NEW MOD 45 MIN: CPT | Performed by: STUDENT IN AN ORGANIZED HEALTH CARE EDUCATION/TRAINING PROGRAM

## 2025-08-28 PROCEDURE — 80503 PATH CLIN CONSLTJ SF 5-20: CPT

## 2025-08-28 PROCEDURE — 82585 ASSAY OF CRYOFIBRINOGEN: CPT

## 2025-08-28 PROCEDURE — 83521 IG LIGHT CHAINS FREE EACH: CPT

## 2025-08-28 PROCEDURE — 80048 BASIC METABOLIC PNL TOTAL CA: CPT

## 2025-08-28 PROCEDURE — 87340 HEPATITIS B SURFACE AG IA: CPT

## 2025-08-28 PROCEDURE — 85306 CLOT INHIBIT PROT S FREE: CPT

## 2025-08-28 PROCEDURE — 85732 THROMBOPLASTIN TIME PARTIAL: CPT

## 2025-08-28 PROCEDURE — 85613 RUSSELL VIPER VENOM DILUTED: CPT

## 2025-08-29 ENCOUNTER — OFFICE VISIT (OUTPATIENT)
Dept: NEPHROLOGY | Facility: CLINIC | Age: 50
End: 2025-08-29

## 2025-08-29 VITALS — DIASTOLIC BLOOD PRESSURE: 86 MMHG | WEIGHT: 179.19 LBS | SYSTOLIC BLOOD PRESSURE: 132 MMHG | BODY MASS INDEX: 36 KG/M2

## 2025-08-29 DIAGNOSIS — R80.9 PROTEINURIA, UNSPECIFIED TYPE: ICD-10-CM

## 2025-08-29 DIAGNOSIS — E87.6 HYPOKALEMIA: Primary | ICD-10-CM

## 2025-08-29 LAB
ANTITHROMBIN: 104 %
APTT PPP: 28.3 SECONDS (ref 23–36)
B BURGDOR IGG+IGM SER QL: NEGATIVE
CARDIOLIPIN IGG SERPL-ACNC: 0.9 GPL (ref ?–10)
CARDIOLIPIN IGM SERPL-ACNC: 12 MPL (ref ?–10)
F5 P.R506Q BLD/T QL: NORMAL
INR BLD: 0.99 (ref 0.85–1.16)
LA 3 SCREEN W REFLEX-IMP: NEGATIVE
PROTEIN S FUNCTION: 88 %
PROTHROMBIN TIME: 13.2 SECONDS (ref 11.6–14.8)
SCREEN DRVVT: 1.09 (ref 0–1.29)
SCREEN DRVVT: NEGATIVE
STACLOT LA DELTA: 3 SECONDS (ref ?–8)

## 2025-08-29 PROCEDURE — 99215 OFFICE O/P EST HI 40 MIN: CPT | Performed by: INTERNAL MEDICINE

## 2025-08-29 RX ORDER — CHOLECALCIFEROL (VITAMIN D3) 50 MCG
TABLET ORAL
COMMUNITY

## 2025-08-29 RX ORDER — TRIAMTERENE AND HYDROCHLOROTHIAZIDE 37.5; 25 MG/1; MG/1
1 CAPSULE ORAL EVERY MORNING
Qty: 30 CAPSULE | Refills: 1 | Status: SHIPPED | OUTPATIENT
Start: 2025-08-29

## 2025-08-30 LAB
FACTOR VIII ACT: 122 %
PROTEIN C FUNCTIONAL: 146 %

## 2025-09-01 LAB
M TB IFN-G CD4+ T-CELLS BLD-ACNC: 0.11 IU/ML
M TB TUBERC IFN-G BLD QL: NEGATIVE
M TB TUBERC IGNF/MITOGEN IGNF CONTROL: >10 IU/ML
QFT TB1 AG MINUS NIL: -0.04 IU/ML
QFT TB2 AG MINUS NIL: -0.04 IU/ML

## (undated) DEVICE — ADHESIVE MASTISOL 2/3CC VL

## (undated) DEVICE — SINGLE ACTION PUMPING SYSTEM

## (undated) DEVICE — URETERAL ACCESS SHEATH SET: Brand: NAVIGATOR HD

## (undated) DEVICE — SCD SLEEVE KNEE HI BLEND

## (undated) DEVICE — PROXIS URETERAL ACCESS SHEATH

## (undated) DEVICE — GAMMEX® NON-LATEX PI TEXTURED SIZE 6.5, STERILE POLYISOPRENE POWDER-FREE SURGICAL GLOVE: Brand: GAMMEX

## (undated) DEVICE — 20 ML SYRINGE LUER-LOCK TIP: Brand: MONOJECT

## (undated) DEVICE — SYRINGE 30ML LL TIP

## (undated) DEVICE — SLEEVE KENDALL SCD EXPRESS MED

## (undated) DEVICE — SOL H2O 1000ML BTL

## (undated) DEVICE — 2000CC GUARDIAN II: Brand: GUARDIAN

## (undated) DEVICE — SPECIMEN SOCK - STANDARD: Brand: MEDI-VAC

## (undated) DEVICE — STERILE POLYISOPRENE POWDER-FREE SURGICAL GLOVES: Brand: PROTEXIS

## (undated) DEVICE — PERC NCIRCLE, NITINOL TIPLESS STONE EXTRACTOR: Brand: PERC NCIRCLE

## (undated) DEVICE — DRAPE,TOWEL,LARGE,INVISISHIELD: Brand: MEDLINE

## (undated) DEVICE — MOSES 200 FIBER

## (undated) DEVICE — ADAPTER BX SEAL Y BIOPSY PORT ADJ FOR HYSTEROSCOPE

## (undated) DEVICE — MEDI-VAC NON-CONDUCTIVE SUCTION TUBING: Brand: CARDINAL HEALTH

## (undated) DEVICE — NITINOL WIRE STR 035

## (undated) DEVICE — SEAL BIOPSY PORT ACMI

## (undated) DEVICE — Device

## (undated) DEVICE — FLEXIVA  PULSE  AND  FLEXIVA  PULSE  TRACTIP  LASER  FIBERS  ARE  HIGH  POWER  SINGLE-USE FIBER: Brand: FLEXIVA PULSE ID

## (undated) DEVICE — KENDALL SCD EXPRESS SLEEVES, KNEE LENGTH, MEDIUM: Brand: KENDALL SCD

## (undated) DEVICE — SYRINGE 20CC LL TIP

## (undated) DEVICE — NITINOL WIRE ANGLED 035

## (undated) DEVICE — SOL  .9 3000ML

## (undated) DEVICE — CYSTO CDS-LF: Brand: MEDLINE INDUSTRIES, INC.

## (undated) DEVICE — CATHETER URET 5FR L70CM FLX OPN TIP NONPORTED

## (undated) DEVICE — SYRINGE MED 20ML STD CLR PLAS LL TIP N CTRL

## (undated) DEVICE — GLOVE SUR 7 SENSICARE PI PIP CRM PWD F

## (undated) DEVICE — HIGH PRESSURE NEPHROSTOMY BALLOON CATHETER KIT: Brand: NEPHROMAX KIT

## (undated) DEVICE — SET TB INFLO FOR TRUCLEAR SYS HYSTEROLUX

## (undated) DEVICE — SPONGE STICK WITH PVP-I: Brand: KENDALL

## (undated) DEVICE — SEAL Y BIOPSY PORT P6R SCOPE

## (undated) DEVICE — NITINOL WIRE STR STIFF 035

## (undated) DEVICE — VIOLET BRAIDED (POLYGLACTIN 910), SYNTHETIC ABSORBABLE SUTURE: Brand: COATED VICRYL

## (undated) DEVICE — TIGERTAIL 5F FLXTIP 70CM

## (undated) DEVICE — ZIPWIRE GUIDEWIRE .038X150 STR

## (undated) DEVICE — GAMMEX® PI HYBRID SIZE 7.5, STERILE POWDER-FREE SURGICAL GLOVE, POLYISOPRENE AND NEOPRENE BLEND: Brand: GAMMEX

## (undated) DEVICE — GAMMEX® PI HYBRID SIZE 7, STERILE POWDER-FREE SURGICAL GLOVE, POLYISOPRENE AND NEOPRENE BLEND: Brand: GAMMEX

## (undated) DEVICE — SINGLE ACTION PUMPING SYSTEM CONTINUOUS FLOW

## (undated) DEVICE — ENDOSCOPY PACK - LOWER: Brand: MEDLINE INDUSTRIES, INC.

## (undated) DEVICE — 3M(TM) MEDIPORE(TM) +PAD SOFT CLOTH ADHESIVE WOUND DRESSING 3566: Brand: 3M™ MEDIPORE™

## (undated) DEVICE — NITINOL WIRE STR 038

## (undated) DEVICE — SEAL TRUCLEAR  HYSTERSCOP

## (undated) DEVICE — FIBER LSR 200UM 2J 80HZ 60W DL FOR LITHO

## (undated) DEVICE — PACK GYNE CUSTOM

## (undated) DEVICE — FORCEP BIOPSY RJ4 LG CAP W/ND

## (undated) DEVICE — 3M(TM) TEGADERM(TM) TRANSPARENT FILM DRESSING FRAME STYLE 9505W: Brand: 3M™ TEGADERM™

## (undated) DEVICE — 3M™ TEGADERM™ TRANSPARENT FILM DRESSING, 1626W, 4 IN X 4-3/4 IN (10 CM X 12 CM), 50 EACH/CARTON, 4 CARTON/CASE: Brand: 3M™ TEGADERM™

## (undated) DEVICE — SOLUTION IRRIG 3000ML 0.9% NACL FLX CONT

## (undated) DEVICE — PACK PBDS CYSTOSCOPY

## (undated) DEVICE — FIBER

## (undated) DEVICE — NITINOL STONE RETRIEVAL BASKET: Brand: ZERO TIP

## (undated) DEVICE — GLOVE RADIATION SZ 7-1/2

## (undated) DEVICE — SOLUTION IRRIG 1000ML 0.9% NACL USP BTL

## (undated) DEVICE — SINGLE-USE DIGITAL FLEXIBLE URETEROSCOPE: Brand: LITHOVUE™ ELITE

## (undated) DEVICE — SOL  .9 1000ML BTL

## (undated) DEVICE — SUTURE SILK 0 FSL

## (undated) DEVICE — HYSTEROSCOPIC INFLOW TUBE SET

## (undated) DEVICE — ZIPWIRE GUIDEWIRE .035X150 STR

## (undated) DEVICE — SPECIMEN CONTAINER,POSITIVE SEAL INDICATOR, OR PACKAGED: Brand: PRECISION

## (undated) DEVICE — GAUZE SPONGES,12 PLY: Brand: CURITY

## (undated) DEVICE — SOL H2O 3000ML IRRIG

## (undated) DEVICE — STENT URET 6F 26CM WO GW INL
Type: IMPLANTABLE DEVICE | Status: NON-FUNCTIONAL
Removed: 2019-09-01

## (undated) DEVICE — NITINOL WIRE WITH HYDROPHILIC TIP: Brand: SENSOR

## (undated) DEVICE — SLEEVE COMPR MD KNEE LEN SGL USE KENDALL SCD

## (undated) DEVICE — 1.9FR NITINOL TIPLESS BSKT

## (undated) DEVICE — SUTURE POLYESTER 1 CT-1

## (undated) DEVICE — PROBE KIT TRIOLOGY 3.9X440

## (undated) DEVICE — GLOVE SUR 6.5 SENSICARE PI PIP CRM PWD F

## (undated) DEVICE — RETRIEVAL DEPLOYMENT DEVICE: Brand: LITHOVUE EMPOWER™

## (undated) DEVICE — SOLUTION  .9 3000ML

## (undated) DEVICE — BAG DRAIN INFECTION CNTRL 2000

## (undated) DEVICE — SUTURE VICRYL 2-0

## (undated) DEVICE — DEV REMOVAL TRUCLEAR DNS PLUS

## (undated) DEVICE — CATH FOLEY COUNCIL 18FR 5CC

## (undated) DEVICE — ZIPWIRE GUIDE .035X150 STR/STF

## (undated) DEVICE — SYRINGE MED 10ML LL TIP W/O SFTY DISP

## (undated) DEVICE — OUTFLOW HYSTER S&N

## (undated) DEVICE — SOLUTION  .9 1000ML BTL

## (undated) DEVICE — Device: Brand: DEFENDO AIR/WATER/SUCTION AND BIOPSY VALVE

## (undated) DEVICE — SLEEVE COMPR M KNEE LEN SGL USE KENDALL SCD

## (undated) DEVICE — STENT URET 6F 24CM ULSMTH: Type: IMPLANTABLE DEVICE

## (undated) DEVICE — 3M™ MEDIPORE™ SOFT CLOTH TAPE, 4 INCH X 10 YARDS, 12 ROLLS/CASE, 2964: Brand: 3M™ MEDIPORE™

## (undated) DEVICE — DEV REMOVAL TRUCLEAR SFT MINI

## (undated) DEVICE — PERCUTANEUOS NEPHROLOGY CDS: Brand: MEDLINE INDUSTRIES, INC.

## (undated) DEVICE — ENDOSCOPY PACK UPPER: Brand: MEDLINE INDUSTRIES, INC.

## (undated) DEVICE — SUTURE MONOCRYL 4-0 PS-2

## (undated) DEVICE — DRAIN RELIAVAC 100CC

## (undated) DEVICE — CATH SECURING DEVICE STATLOCK

## (undated) DEVICE — LITHOVUE STD WITH EMPOWER

## (undated) DEVICE — DUAL LUMEN URETERAL CATHETER

## (undated) DEVICE — TUBING CYSTO

## (undated) DEVICE — LASER

## (undated) DEVICE — SEAL BIOPSY PORT ACMI BX BLACK CAP

## (undated) DEVICE — DUAL LUMEN CATHETER

## (undated) DEVICE — FILTERLINE NASAL ADULT O2/CO2

## (undated) DEVICE — CYSTOGRAFIN 300ML

## (undated) DEVICE — GYN CDS: Brand: MEDLINE INDUSTRIES, INC.

## (undated) DEVICE — SEAL BX PRT Y ADPT ADJ

## (undated) DEVICE — CHLORAPREP 26ML APPLICATOR

## (undated) DEVICE — 3M™ RED DOT™ MONITORING ELECTRODE WITH FOAM TAPE AND STICKY GEL, 50/BAG, 20/CASE, 72/PLT 2570: Brand: RED DOT™

## (undated) DEVICE — DEV REMOVAL TRUCLEAR SFT PLUS

## (undated) DEVICE — DRAIN CHANNEL 19FR BLAKE

## (undated) DEVICE — ABDOMINAL PAD: Brand: DERMACEA

## (undated) DEVICE — LAPAROTOMY CDS: Brand: MEDLINE INDUSTRIES, INC.

## (undated) DEVICE — NEEDLE SPNL 22GA L3.5IN BLK QNCKE STYL DISP

## (undated) DEVICE — TUBING CYSTO TUR DUAL

## (undated) DEVICE — SINGLE-USE DIGITAL FLEXIBLE URETEROSCOPE: Brand: LITHOVUE

## (undated) DEVICE — DILATOR/SHEATH SET: Brand: 8/10 DILATOR/SHEATH SET

## (undated) DEVICE — ZIPWIRE GUIDEWIRE .038X150 ANG

## (undated) DEVICE — TOWEL OR BLU 16X26 STRL

## (undated) DEVICE — PROXIMATE SKIN STAPLERS (35 WIDE) CONTAINS 35 STAINLESS STEEL STAPLES (FIXED HEAD): Brand: PROXIMATE

## (undated) DEVICE — COVER,BOOT,FOAM,NON-SKID,HOOK-LOOP,XLG: Brand: MEDLINE INDUSTRIES, INC.

## (undated) DEVICE — PERCUTANEOUS ACCESS NEEDLE

## (undated) DEVICE — SET ADMINISTRATION 20 GTT/ML L

## (undated) DEVICE — SUTURE ETHILON 2-0 FS

## (undated) DEVICE — 1200CC GUARDIAN II: Brand: GUARDIAN

## (undated) DEVICE — UROFORCE BALLOON 5MMX4CM W/INF

## (undated) DEVICE — HYSTEROSCOPIC OUTFLOW TUBE SET

## (undated) DEVICE — MEDI-VAC SUCTION HANDLE REGULAR CAPACITY: Brand: CARDINAL HEALTH

## (undated) DEVICE — ELITE HYSTEROSCOPE SEAL: Brand: TRUCLEAR

## (undated) NOTE — LETTER
Nadine Calix, :3/22/1975    CONSENT FOR PROCEDURE/SEDATION    1. I authorize the performance upon Nadine Calix  the following: IUD (mirena) insertion    2. I authorize Dr. Ema Randall MD (and whomever is designated as the doctor’s assistant), to perform the above-mentioned procedures.    3. If any unforeseen conditions arise during this procedure calling for additional  procedures, operations, or medications (including anesthesia and blood transfusion), I further request and authorize the doctor to do whatever he/she deems advisable in my interest.    4. I consent to the taking and reproduction of any photographs in the course of this procedure for professional purposes.    5. I consent to the administration of such sedation as may be considered necessary or advisable by the physician responsible for this service, with the exception of ______________________________________________________    6. I have been informed by my doctor of the nature and purpose of this procedure sedation, possible alternative methods of treatment, risk involved and possible complications.    7. If I have a Do Not Resuscitate (DNR) order in place, the physician and I (or the individual authorized to consent on my behalf) will discuss and agree as to whether the Do Not Resuscitate (DNR) order will remain in effect or will be discontinued during the performance of the procedure and the applicable recovery period. If the Do Not Resuscitate (DNR) order is discontinued and is to be reinstated following the procedure/recovery period, the physician will determine when the applicable recovery period ends for purposes of reinstating the Do Not Resuscitate (DNR) order.    Signature of Patient:_______________________________________________    Signature of person authorized to consent for patient:  _______________________________________________________________    Relationship to patient:  ____________________________________________    Witness: _________________________________________ Date:___________     Physician Signature: _______________________________ Date:___________

## (undated) NOTE — Clinical Note
Pt does not have HFU appt scheduled at this time. She states she prefers to f/u with urology as scheduled on 9/18 instead. Please forward to triage if you'd like to see pt sooner. Thank you!

## (undated) NOTE — LETTER
21    Patient: Madalyn Morrison  : 3/22/1975 Visit date: 2021    Dear  Myah Gloria MD    Thank you for referring Madalyn Morrison to my practice. Please find my assessment and plan below.     Assessment   Myofascial pain  (primary encounter d

## (undated) NOTE — IP AVS SNAPSHOT
[facility] [place] [address] [place], [address] ~ [phone]                Discharge Summary   3/29/2017    Dusty Kitchen           Admission Information        Provider Department    3/29/2017 Alvino Martin MD  [id number]         T Bring a paper prescription for each of these medications    - Cefadroxil 500 MG Caps  - TraMADol HCl 50 MG Tabs              Patient Instructions       1. Home today with drain. 2. Do not lift more than 15 pounds.   3. Do not drive a car or return to work Is there another family member we can call?:       needed:  No    History of Sleep Apnea?:  No    Nursing Home / Special Facility:  No    :      Performing physician's office phone number:  [phone]    Performing physician's offi ALT Bilirubin,Total Total Protein Albumin Sodium Potassium Chloride    (02/26/17)  71 (H) (02/26/17)  0.4 (02/26/17)  8.1 (02/26/17)  4.1 (02/26/17)  140 (02/26/17)  3.1 (L) (02/26/17)  105      Radiology Exams     None         Additional Information review your medications with you before you are discharged, and can provide you with additional printed information. Not all patients will experience these side effects or respond to medications the same.  Please call your provider or healthcare team if you

## (undated) NOTE — MR AVS SNAPSHOT
After Visit Summary   5/5/2020    Vida Yanez    MRN: [id number]           Visit Information     Date & Time  5/5/2020 12:00 PM Provider  [facility] Shafer [facility] [address] [facility] Dept.  Phone  [phone] HPV HIGH RISK , THIN PREP COLLECTION [[id number] CUSTOM]  5/5/2020 5/5/2021    THINPREP PAP SMEAR B [[id number] CUSTOM]  5/5/2020 5/5/2021    VAGINITIS/VAGINOSIS, DNA PROBE [[id number] CUSTOM]  5/5/2020 (Approximate) 5/5/2021      Future Appointments        [facility] now offers Video Visits through [address] for adult and pediatric patients. Video Visits are available Monday - Friday for many common conditions such as allergies, colds, cough, fever, rash, sore throat, headache and pink eye.   The cost for a Video Vi [address]   Monday – Friday  4:00 pm – 10:00 pm   Saturday – Sunday  10:00 am – 4:00 pm  WALK-IN CARE  Emergency Medicine Providers  Conditions needing urgent attention, but are   non-life-threatening.     Also available by appointment Average cost  $120*

## (undated) NOTE — LETTER
Tabatha Roberts [address] [address]  [place], [address] [facility]    Consent for Operation  Date: __________________                                Time: _______________    1.  I authorize the performance upon Holly Moreland the following operation:  Procedure 6. I consent to the photographing or videotaping of the operations or procedures to be performed, including appropriate portions of my body for medical, scientific, or educational purposes, provided my identity is not revealed by the pictures or by descrip 10. If I have a Do Not Resuscitate order in place, the surgeon and I (or the individual authorized to consent on my behalf) will discuss and agree as to whether the Do Not Resuscitate order will remain in effect or will be discontinued during the performan a. Allow the anesthesiologist (anesthesia doctor) to give me medicine and do additional procedures as necessary.  Some examples are: Starting or using an “IV” to give me medicine, fluids or blood during my procedure, and having a breathing tube placed to he 7. Regional Anesthesia (“spinal”, “epidural”, & “nerve blocks”): I understand that rare but potential complications include headache, bleeding, infection, seizure, irregular heart rhythms, and nerve injury.     I can change my mind about having anesthesia

## (undated) NOTE — LETTER
Date: 8/28/2023    Patient Name: Luis Alberto Villavicencio          To Whom it may concern: This letter has been written at the patient's request. The above patient was seen at the [facility] for treatment of a medical condition. This patient should be excused from attending work/school from 8/28/2023 through 8/29/2023. The patient may return to work/school on 8/30/2023 if improved and no fevers.  .         Sincerely,           FIDEL Pate

## (undated) NOTE — ED AVS SNAPSHOT
Lefty Mckinney   MRN: [id number]    Department:  THE [facility] Emergency Department in [place]   Date of Visit:  1/15/2018           Disclosure     Insurance plans vary and the physician(s) referred by the ER may not be covered by your plan.  Please contac tell this physician (or your personal doctor if your instructions are to return to your personal doctor) about any new or lasting problems. The primary care or specialist physician will see patients referred from the [facility] Emergency Department.  Jose Luis Boggs

## (undated) NOTE — LETTER
Patient Name: Spenser Jimenez  YOB: 1975          MRN :  [id number]  Date:  5/9/2023  Referring Physician:  Aditya Cain Summary    Dear Dr. Jhonny Bryson,     Pt has attended 9, cancelled 4 ( due to managing other medical problems), and no shown 0 visits in Physical Therapy. Subjective:   Nocturia has improved to 5 x night vs 12 times a night. Voiding during day can go sometimes 1 hour but not often. When urgency hits, breathing strategies are not working. Pelvic pain has reduced to 0/10. Went ER about upper flank pain with constant pain. Nauseated and could not sleep from pain. Scans are normal but blood work was off. Recent blood in stool when contacted  PCP. ER MD suspects an ulcer. Intermittent return with pepcid helping. Supposed to see GI doc but trying to help daughter with kidney stone. Patient has not had resolve with medical consult on BP. Dr. Edmonds Pleva - heart is cleared. More blood pressure issues. Seeing me in 6 months. 18 seconds A-fib but not classified as A-fib without 36 seconds. Parathyroid has two nodules. Yesterday, top part of thigh red and felt hot. Bladder Pain only with urgency:0/10 now      Objective:     Good anterior pelvic flexibility. Pelvic floor increase 4/5 MMT for 5 second holds for 4 reps. NMRED 8.0 mvc for 8 second x 10, Resting tone 2.5  Restricted bladder mobility both lateral directions. Assessment: Patient has achieved 100% short term goals and progressing toward long term goals. Progress is slow with reducing urgency. Patient had US of kidney and will follow up with urologist. Urgency can be related to kidney and bladder prolapse. Patient has improve pelvic flexibility anteriorly which can account for the 20% improvememt. Patient is still frequently voiding during the day and night beside her bladder retraining efforts.  Recommend focusing treatment on improving pelvic floor strength functionally for increase support and patient follow up for urgency with urologist and urogynecologist. . Recommending 3 more visits per original goals to progress strength training. Insurance authorization  and recommending extension          3/28/2023Patient working toward urge deferment first for 30 minute vs 4 times in 30 minutes. Patient is not having any bladder irritants so possible structural causes of urgency. US for pelvis  today. Recommend patient contact PCP about long wait for GI appt. Recommend getting in sooner because of nausea, blood in stool and pain. 3/16/2023  Patient has been cleared to return to PT. BP was safe to exercise. Note received from attending cardiologist Dr. Kaylee Lea for clearance to return to PT. Patient is following up with PCP in upcoming week and making appointment with GI doctor to address GI pain and blood in stool. Patient had good awareness today of evie pelvic floor with diaphragmatic breathing. She also had good PF control with initiation of LE movement in anti-gravity positions. Next session address bladder training. Patient has disruptive sleep of waking to void 8-12 times a night. This amount of voiding limited patient from achieving restorative sleep that affects both physical and emotional health. May benefit from 2 x week in the near future to progress patient with care due to delay of care with medical managemetn. 2/15/2023  Xuan Gutierrez went to [facility] ED on Saturday with SOB with eating and Kidney pain with elevated BP. She reports that Diaphragmatic helped and ED ECG revealed ST and T wave abnormality. She is scheduled with Dr. Kaylee Lea on 3/2/23. Reviewed Signs & Symptoms of DVT and when to  call 911. Treatment consisted of Nu step light cardiovascular exercise without SOB and 4/7/8 DB for PNS activation. Patient's O2 stat was 98%;  HR 73 bpm; and BP: R 140/100; L 130/100.  Xuan Gutierrez takes Amlodipine 5mg and Trampoline HT CZ 35.3 mg and Potassium Citrate 180 mg at 10-11:00 am daily but did not take medication prior to Pelvic PT.  waiting in car, walked patient to car then patient took the above medication. Patient will Call Dr. Desmond Sanchez regarding ECG and continuation of Pelvic PT. (Evaluation: Ms. Royer Epps is a 41year old y/o female who presents to therapy with initial complaints of UI with Nocturia 12x/ night due to incomplete Bladder emptying; Urination 25 x/ day; post Void dribble; Kidney stones with recurrent UTIs; Abdominal / Hip/ Low Back pain (due to Kidney Stones) and rocks on toilet on the to empty Bladder. She has a Bowel Movement daily with Maries stool #2-3; started Bene Fiber recently; and denies Fecal Incontinence now, but had a recent Upper Respiratory Infection with Bronchiospasm causing Fecal Incontinence and Stress Urinary Incontinence. Patient 5 Vaginal deliveries with tear and 2 supra pubic  deliveries. Xuan Gutierrez reports pain with penetration and deer thrust due to prolapse. Nadine's father recently passed and she seeing Cardiologist for rapid Heart Rate with possible A-fib; and has heart monitor. She also has significant PMH with multiple co morbidities. The results of the objective tests and measures show with painful palpation of R>L Kidney;  Laparoscopic scar from Gall Bladder Removal; Umbilical hernia repair; R inguinal scar tissue from repair; Supra Pubic scar tissue adhesion to Bladder; R of midline line scar from exploratory surgery; Poor Bladder mobility; Transverse Colon Visceral restrictions; Transverse Abdominis 3/5 weakness;  Urethral meatus Hypomobile due to guarding; Perineal body Scar; Vestibule & Anal [place] Reflex hypo bilateral;  Levator Ani 3/5 weakness with 4 count endurance; 3/5 External Anal Sphincter strength; Marked Anterior Vaginal wall descent with valsalva; and Moderate Posterior/ Apical vaginal decent with valsalva.        Functional deficits include but are not limited to  back pain, abdominal pain, vaginal pain, rectal pain, urgency/frequency, pressure, incomplete emptying, constipation, hesitancy, post void dribble, leakage and leaking at night with a Pelvic Floor Distress Inventory of 248.96 /300. 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 Pt and PT discussed evaluation findings, pathology, POC and HEP. Pt voiced understanding and performs HEP correctly without reported pain. Skilled Pelvic Physical Therapy is medically necessary to address the above impairments and reach functional goals.)      Goals:   STG 4-6 visits  Patient instructed on bladder/ bowel diary, and fluid/ food intake diary for 3 days. MET     Patient instructed on bladder irritants, increased water intake to 64 ounces /day, and increased fiber intake to 25g/ day for bowel/ bladder health. MET     Patient instructed on use of step stool to allow for defecation without straining. MET     Patient instructed on diaphragmatic breathing for parasympathetic nervous stimulation and to allow for increased intra abdominal pressure with defecation. MET     Patient instructed on Levator Ani/ Transverse Abdominis (Pelvic Brace) contraction to prevent Urinary incontinence with ADLs. MET      Patient exhibits an increase in myofascial pelvic floor soft tissue mobility allowing for urination and defecation without painful straining. MET     LTG 6-12 visits     Patient instructed on Levator Ani contraction inverse to diaphragmatic breathing to allow for pelvic brace with ADLs without valsalva. Patient exhibits an increase in Levator Ani/ Transverse abdominis strength from 3/5 with 4 count hold to 4/5  with 10 count hold to allow for pelvic brace with ADLs. Patient reports a reduction in KIKE/ UI from 12x/ day to 6x/ day resulting in decrease pad use from 6/ day. Patient reports change in [place] stool #2-3 to #4 with daily bowel moment without straining and prevention of further pelvic organ prolapse. Patient reports a change in [address] scale from 3 to 1-0 to allow for intercourse and pelvic exam with minimal pain. Patient understands importance of performing HEP to prevent reoccurrence of symptoms. MET      Pt goals include \"To stop urgency with leakage, and retrain Bladder to allow for surgery. \" improving    Plan: Continue PT with focus of increase pelvic floor functional support of cystocele. Patient/Family/Caregiver was advised of these findings, precautions, and treatment options and has agreed to actively participate in planning and for this course of care. Thank you for your referral. If you have any questions, please contact me at Dept: [phone]. Sincerely,  Electronically signed by therapist: Verónica Barger, PT    Physician's certification required: Yes  Please co-sign or sign and return this letter via fax as soon as possible to [phone]. I certify the need for these services furnished under this plan of treatment and while under my care. X___________________________________________________ Date____________________    Certification From: 2/2/4890  To:8/7/2023 21st Century Cures Act Notice to Patient: Medical documents like this are made available to patients in the interest of transparency. However, be advised this is a medical document and it is intended as [id number] communication between your medical providers. This medical document may contain abbreviations, assessments, medical data, and results or other terms that are unfamiliar. Medical documents are intended to carry relevant information, facts as evident, and the clinical opinion of the practitioner. As such, this medical document may be written in language that appears blunt or direct.  You are encouraged to contact your medical provider and/or [facility] Experience if you have any questions about this medical document.

## (undated) NOTE — LETTER
10/14/21    Patient: Amador Castle  : 3/22/1975 Visit date: 10/14/2021    Dear  Erwin Bhat MD    Thank you for referring Amador Castle to my practice. Please find my assessment and plan below.       Assessment   Suture granuloma, subsequent encou

## (undated) NOTE — MR AVS SNAPSHOT
[facility]  [address] [address] [phone]               Thank you for choosing us for your health care visit with Mesha Lopez MD.  We are glad to serve you and happy to provide you with this summary of you They appear to be incisional hernias possibly in combination with a previous umbilical hernia. None are reducible. None are currently tender. There is no evidence of recurrent inguinal hernia. Bowel sounds have normal activity normal pitch.   There are You can access your MyChart to more actively manage your health care and view more details from this visit by going to [email or web address].   If you've recently had a stay at the Hospital you can access your discharge instructions in [address] by cristina

## (undated) NOTE — ED AVS SNAPSHOT
Alessandra Garcia   MRN: [id number]    Department:  [address] Emergency Department in [place]   Date of Visit:  2/27/2020           Disclosure     Insurance plans vary and the physician(s) referred by the ER may not be covered by your plan.  Please contact yo tell this physician (or your personal doctor if your instructions are to return to your personal doctor) about any new or lasting problems. The primary care or specialist physician will see patients referred from the [facility] Emergency Department.  Sanya Nieto

## (undated) NOTE — LETTER
Patient Name: Jordan Gilmore  YOB: 1975          MRN :  [id number]  Date:  5/18/2021  Referring Physician:  Sara Richter Summary  Pt has attended 4 visits in Physical Therapy. Subjective:   Pain 7/10 today.  Pain is located lef Recommended discharge as therapy is not providing pain relief. The patient was understanding and appreciative of the assistance in her care. She will follow up with her physician to discuss further evaluation.      Goals: (to be met in 8 visits)   · No di

## (undated) NOTE — Clinical Note
HFU call completed today. The patient does have an appointment scheduled for 12/27/2019. The patient has been experiencing right flank pain. A call was sent to Dr. Roberto Davis office for this. Thank you.

## (undated) NOTE — Clinical Note
Thank you for allowing me to care for your patient! I will follow up on her next imaging. Going forward she will also benefit from screening ultrasound which I have ordered and copied you on results. Thanks, Katty Friend

## (undated) NOTE — LETTER
Tabatha Roberts [address] [address]  [place] [facility]    Consent for Operation  Date: __________________                                Time: _______________    1.  I authorize the performance upon Linden Belling the following operation:  Procedure 5. I consent to the photographing or videotaping of the operations or procedures to be performed, including appropriate portions of my body for medical, scientific, or educational purposes, provided my identity is not revealed by the pictures or by descrip 9. If I have a Do Not Resuscitate order in place, the surgeon and I (or the individual authorized to consent on my behalf) will discuss and agree as to whether the Do Not Resuscitate order will remain in effect or will be discontinued during the performanc

## (undated) NOTE — ED AVS SNAPSHOT
Mike Mejia   MRN: [id number]    Department:  THE [facility] Emergency Department in [place]   Date of Visit:  8/27/2018           Disclosure     Insurance plans vary and the physician(s) referred by the ER may not be covered by your plan.  Please contac tell this physician (or your personal doctor if your instructions are to return to your personal doctor) about any new or lasting problems. The primary care or specialist physician will see patients referred from the [facility] Emergency Department.  Prosper Paige

## (undated) NOTE — ED AVS SNAPSHOT
THE [facility] Emergency Department in [address]    Phone:  [phone]    Fax:  [phone]           Amador Janelltrent   MRN: [id number]    Department:  THE [facility] Emergency Department in [place]   Date of Visit: IF THERE IS ANY CHANGE OR WORSENING OF YOUR CONDITION, CALL YOUR PRIMARY CARE PHYSICIAN AT ONCE OR RETURN IMMEDIATELY TO THE EMERGENCY DEPARTMENT.     If you have been prescribed any medication(s), please fill your prescription right away and begin taking t

## (undated) NOTE — LETTER
[facility] [address], [address] [place] [address]    Consent for Anesthesia   1.    Suhail Bruno agree to be cared for by a physician anesthesiologist alone and/or with a nurse anesthetist, who is specially trained to monitor me and give me allergic reactions to medications, injury to my airway, heart, lungs, vision, nerves, or muscles and in extremely rare instances death. 5. My doctor has explained to me other choices available to me for my care (alternatives).   6. Pregnant Patients (“epid Printed: 9/1/2019 at 12:45 PM    Medical Record #: [id number]                                            Page 1 of 1

## (undated) NOTE — LETTER
No referring provider defined for this encounter. 01/22/19        Patient: Mike Mejia   YOB: 1975   Date of Visit: 1/22/2019       Dear  Dr. Lakisha Gooden MD,      Thank you for referring Mike Mejia to my practice.   Please find

## (undated) NOTE — ED AVS SNAPSHOT
Devan Blue   MRN: [id number]    Department:  [facility] Emergency Department in [place]   Date of Visit:  8/13/2019           Disclosure     Insurance plans vary and the physician(s) referred by the ER may not be covered by your plan.  Please contac tell this physician (or your personal doctor if your instructions are to return to your personal doctor) about any new or lasting problems. The primary care or specialist physician will see patients referred from the [facility] Emergency Department.  Miryam Sena

## (undated) NOTE — LETTER
[address]  [address]  Authorization for Surgical Operation and Procedure     Date:___________                                                                                                         Time:__________  I hereby authorize Surgeon(s):  Bryant Garza MD, my physician and his/her assistants (if applicable), which may include medical students, residents, and/or fellows, to perform the following surgical operation/ procedure and administer such anesthesia as may be determined necessary by my physician:  Operation/Procedure name (s) Procedure(s):  CYSTOSCOPY BILATERAL URETEROSCOPIES, LASER LITHOTRIPSY, STONE EXTRACTION, BILATERAL  RETROGRADE PYELOGRAMS,  BILATERAL URETERAL STENT PLACEMENT on Nadine Calix   2.   I recognize that during the surgical operation/procedure, unforeseen conditions may necessitate additional or different procedures than those listed above.  I, therefore, further authorize and request that the above-named surgeon, assistants, or designees perform such procedures as are, in their judgment, necessary and desirable.    3.   My surgeon/physician has discussed prior to my surgery the potential benefits, risks and side effects of this procedure; the likelihood of achieving goals; and potential problems that might occur during recuperation.  They also discussed reasonable alternatives to the procedure, including risks, benefits, and side effects related to the alternatives and risks related to not receiving this procedure.  I have had all my questions answered and I acknowledge that no guarantee has been made as to the result that may be obtained.    4.   Should the need arise during my operation/procedure, which includes change of level of care prior to discharge, I also consent to the administration of blood and/or blood products.  Further, I understand that despite careful testing and screening of blood or blood products by collecting agencies, I  may still be subject to ill effects as a result of receiving a blood transfusion and/or blood products.  The following are some, but not all, of the potential risks that can occur: fever and allergic reactions, hemolytic reactions, transmission of diseases such as Hepatitis, AIDS and Cytomegalovirus (CMV) and fluid overload.  In the event that I wish to have an autologous transfusion of my own blood, or a directed donor transfusion, I will discuss this with my physician.  Check only if Refusing Blood or Blood Products  I understand refusal of blood or blood products as deemed necessary by my physician may have serious consequences to my condition to include possible death. I hereby assume responsibility for my refusal and release the hospital, its personnel, and my physicians from any responsibility for the consequences of my refusal.          o  Refuse      5.   I authorize the use of any specimen, organs, tissues, body parts or foreign objects that may be removed from my body during the operation/procedure for diagnosis, research or teaching purposes and their subsequent disposal by hospital authorities.  I also authorize the release of specimen test results and/or written reports to my treating physician on the hospital medical staff or other referring or consulting physicians involved in my care, at the discretion of the Pathologist or my treating physician.    6.   I consent to the photographing or videotaping of the operations or procedures to be performed, including appropriate portions of my body for medical, scientific, or educational purposes, provided my identity is not revealed by the pictures or by descriptive texts accompanying them.  If the procedure has been photographed/videotaped, the surgeon will obtain the original picture, image, videotape or CD.  The hospital will not be responsible for storage, release or maintenance of the picture, image, tape or CD.    7.   I consent to the presence of a   or observers in the operating room as deemed necessary by my physician or their designees.    8.   I recognize that in the event my procedure results in extended X-Ray/fluoroscopy time, I may develop a skin reaction.    9. If I have a Do Not Attempt Resuscitation (DNAR) order in place, that status will be suspended while in the operating room, procedural suite, and during the recovery period unless otherwise explicitly stated by me (or a person authorized to consent on my behalf). The surgeon or my attending physician will determine when the applicable recovery period ends for purposes of reinstating the DNAR order.  10. Patients having a sterilization procedure: I understand that if the procedure is successful the results will be permanent and it will therefore be impossible for me to inseminate, conceive, or bear children.  I also understand that the procedure is intended to result in sterility, although the result has not been guaranteed.   11. I acknowledge that my physician has explained sedation/analgesia administration to me including the risk and benefits I consent to the administration of sedation/analgesia as may be necessary or desirable in the judgment of my physician.    I CERTIFY THAT I HAVE READ AND FULLY UNDERSTAND THE ABOVE CONSENT TO OPERATION and/or OTHER PROCEDURE.    _________________________________________  __________________________________  Signature of Patient     Signature of Responsible Person         ___________________________________         Printed Name of Responsible Person           _________________________________                 Relationship to Patient  _________________________________________  ______________________________  Signature of Witness          Date  Time      Patient Name: Nadine Calix     : 3/22/1975                 Printed: 2025     Medical Record #: [id number]                     Page 2 of 3                                     [address]  [address]    Consent for Anesthesia    I, Nadine Calix agree to be cared for by an anesthesiologist, who is specially trained to monitor me and give me medicine to put me to sleep or keep me comfortable during my procedure    I understand that my anesthesiologist is not an employee or agent of [facility] or [employer]. He or she works for [employer].    As the patient asking for anesthesia services, I agree to:  Allow the anesthesiologist (anesthesia doctor) to give me medicine and do additional procedures as necessary. Some examples are: Starting or using an “IV” to give me medicine, fluids or blood during my procedure, and having a breathing tube placed to help me breathe when I’m asleep (intubation). In the event that my heart stops working properly, I understand that my anesthesiologist will make every effort to sustain my life, unless otherwise directed by [facility] Do Not Resuscitate documents.  Tell my anesthesia doctor before my procedure:  If I am pregnant.  The last time that I ate or drank.  All of the medicines I take (including prescriptions, herbal supplements, and pills I can buy without a prescription (including street drugs/illegal medications). Failure to inform my anesthesiologist about these medicines may increase my risk of anesthetic complications.  If I am allergic to anything or have had a reaction to anesthesia before.  I understand how the anesthesia medicine will help me (benefits).  I understand that with any type of anesthesia medicine there are risks:  The most common risks are: nausea, vomiting, sore throat, muscle soreness, damage to my eyes, mouth, or teeth (from breathing tube placement).  Rare risks include: remembering what happened during my procedure, allergic reactions to medications, injury to my airway, heart, lungs, vision, nerves, or muscles and in extremely rare instances  death.  My doctor has explained to me other choices available to me for my care (alternatives).  Pregnant Patients (“epidural”):  I understand that the risks of having an epidural (medicine given into my back to help control pain during labor), include itching, low blood pressure, difficulty urinating, headache or slowing of the baby’s heart. Very rare risks include infection, bleeding, seizure, irregular heart rhythms and nerve injury.  Regional Anesthesia (“spinal”, “epidural”, & “nerve blocks”):  I understand that rare but potential complications include headache, bleeding, infection, seizure, irregular heart rhythms, and nerve injury.    I can change my mind about having anesthesia services at any time before I get the medicine.    _____________________________________________________________________________  Patient (or Representative) Signature/Relationship to Patient  Date   Time    _____________________________________________________________________________   Name (if used)    Language/Organization   Time    _____________________________________________________________________________  Anesthesiologist Signature     Date   Time  I have discussed the procedure and information above with the patient (or patient’s representative) and answered their questions. The patient or their representative has agreed to have anesthesia services.    _____________________________________________________________________________  Witness        Date   Time  I have verified that the signature is that of the patient or patient’s representative, and that it was signed before the procedure  Patient Name: Nadine Calix     : 3/22/1975                 Printed: 2025     Medical Record #: [id number]                     Page 3 of 3

## (undated) NOTE — LETTER
[facility]  After Care Instructions for IUD      Bleeding   You may experience irregular bleeding for the fist 3-6 months.  If your bleeding becomes heavier than a normal menstrual cycle, please contact our office.    Pain  You may experience mild menstrual cramping after the IUD insertion that will typically last 24-48hrs.  You may take Ibuprofen, Tylenol or Aleve to relieve the discomfort.  If you experience severe or persistent pain, please contact our office.       Restrictions    You should avoid sexual intercourse or tampon use for 1 day after insertion.  Please use a backup method of contraception for 4-7 days with the Mirena and Alessandra.    When to contact our office  If you are experiencing discomfort described as worse than menstrual cramps that is not relieved by ibuprofen   Fever of 100.4 or greater  Vaginal bleeding that is saturating 1 pad per hour    Any discomfort or poking sensation during intercourse or other sexual activity      Follow up in 4-6 weeks for IUD check.   If you have additional questions or concerns, please call us at [phone].

## (undated) NOTE — LETTER
21    Patient: Vida Yanez  : 3/22/1975 Visit date: 2021    Dear  Lieutenant Cj MD    Thank you for referring Vida Yanez to my practice. Please find my assessment and plan below.     Assessment   Suture granuloma, subsequent encounte patient undergo this procedure at the [facility] in [facility].     Sincerely,   Ora Cowden, MD

## (undated) NOTE — Clinical Note
FYI: Hospital f/u outreach completed. Sent TE to PCP office re: assistance scheduling HFU appt and re: patient's questions/update.

## (undated) NOTE — ED AVS SNAPSHOT
[facility] Emergency Department in [address] [place] [address]    Phone:  [phone]    Fax:  [phone]           Geeta Alexander   MRN: [id number]    Department:  [facility] Emergency Department in [place]   Date of Visit: IF THERE IS ANY CHANGE OR WORSENING OF YOUR CONDITION, CALL YOUR PRIMARY CARE PHYSICIAN AT ONCE OR RETURN IMMEDIATELY TO THE EMERGENCY DEPARTMENT.     If you have been prescribed any medication(s), please fill your prescription right away and begin taking t

## (undated) NOTE — LETTER
21    Patient: Tevin Esteban  : 3/22/1975 Visit date: 2021    Dear  Terrence Law MD    Thank you for referring Filomenarodrigo LaraEsteban to my practice. Please find my assessment and plan below.     Assessment   Myofascial pain  (primary encounter d palpation in the left periumbilical region, bowel sounds are normal activity normal pitch. There  is no rebounding tenderness or guarding. There are no signs of ascites or peritonitis. The liver and spleen are nonpalpable.   There are no palpable masses

## (undated) NOTE — Clinical Note
2017    Patient: Madalyn Morrison  : 3/22/1975 Visit date: 2017    Dear  Dr. Rsoanna Gil MD,    Thank you for referring Madalyn Morrison to my practice. Please find my assessment and plan below.            Assessment   Ventral hernia without obstruc Sincerely,       Matt Briscoe MD   CC: Samuel Bennett MD

## (undated) NOTE — ED AVS SNAPSHOT
THE [facility] Emergency Department in [address] [facility]    Phone:  [phone]    Fax:  [phone]           Malka Downing   MRN: [id number]    Department:  THE [facility] Emergency Department in [place]   Date of Visit: Where to Get Your Medications      You can get these medications from any pharmacy     Bring a paper prescription for each of these medications    - Amoxicillin-Pot Clavulanate 875-125 MG Tabs  - Ketorolac Tromethamine 10 MG Tabs            Discharge Refer [facility] Emergency Department. Follow-up care is at the discretion of that Physician. IF THERE IS ANY CHANGE OR WORSENING OF YOUR CONDITION, CALL YOUR PRIMARY CARE PHYSICIAN AT ONCE OR RETURN IMMEDIATELY TO THE EMERGENCY DEPARTMENT.     If you hav harming yourself, contact [facility] at [phone]. - If you don’t have insurance, Maria Eugenia Milner has partnered with Patient [address] to help you get signed up for insurance coverage.   Patient [place]

## (undated) NOTE — LETTER
Tabatha Roberts [address]  [place], [address] [facility]    Consent for Operation  Date: __________________                                Time: _______________    1.  I authorize the performance upon Camila Cortez the following operation:  Procedure 6. I consent to the photographing or videotaping of the operations or procedures to be performed, including appropriate portions of my body for medical, scientific, or educational purposes, provided my identity is not revealed by the pictures or by descrip 10. If I have a Do Not Resuscitate order in place, the surgeon and I (or the individual authorized to consent on my behalf) will discuss and agree as to whether the Do Not Resuscitate order will remain in effect or will be discontinued during the performan a. Allow the anesthesiologist (anesthesia doctor) to give me medicine and do additional procedures as necessary.  Some examples are: Starting or using an “IV” to give me medicine, fluids or blood during my procedure, and having a breathing tube placed to he 7. Regional Anesthesia (“spinal”, “epidural”, & “nerve blocks”): I understand that rare but potential complications include headache, bleeding, infection, seizure, irregular heart rhythms, and nerve injury.     I can change my mind about having anesthesia

## (undated) NOTE — ED AVS SNAPSHOT
Ayo Nice   MRN: [id number]    Department:  [facility] in [place]   Date of Visit:  1/15/2018           Disclosure     Insurance plans vary and the physician(s) referred by the ER may not be covered by your plan.  Please contac tell this physician (or your personal doctor if your instructions are to return to your personal doctor) about any new or lasting problems. The primary care or specialist physician will see patients referred from the [facility] Emergency Department.  Arthor Bernheim

## (undated) NOTE — LETTER
Tabatha Roberts [address]  [place], [address] [facility]    Consent for Operation  Date: __________________                                Time: _______________    1.  I authorize the performance upon Lamar Barbosa the following operation:  Procedure procedure has been videotaped, the surgeon will obtain the original videotape. The hospital will not be responsible for storage or maintenance of this tape.   7. For the purpose of advancing medical education, I consent to the admittance of observers to the STATEMENTS REQUIRING INSERTION OR COMPLETION WERE FILLED IN.     Signature of Patient:   ___________________________    When the patient is a minor or mentally incompetent to give consent:  Signature of person authorized to consent for patient: ____________ supplements, and pills I can buy without a prescription (including street drugs/illegal medications). Failure to inform my anesthesiologist about these medicines may increase my risk of anesthetic complications. iv.  If I am allergic to anything or have ha Anesthesiologist Signature     Date   Time  I have discussed the procedure and information above with the patient (or patient’s representative) and answered their questions. The patient or their representative has agreed to have anesthesia services.     ___

## (undated) NOTE — LETTER
[facility]  Phone: [phone]  OUTSIDE TESTING RESULT REQUEST      TO:   Dr Lisy Dietrich or Dr Stefany Zuleta Today's Date: 6/18/21    FAX #: [phone]  Or [phone]     IMPORTANT: FOR YOUR IMMEDIATE ATTENTION  Please FAX all test res

## (undated) NOTE — LETTER
Xugillian Jackie, :3/22/1975    CONSENT FOR PROCEDURE/SEDATION    1. I authorize the performance upon Xugillian Mejia  the following: Colposcopy with Endocervical curettage    2.  I authorize Dr. Luda Corral MD (and whomever is designated as the doct Relationship to patient: ____________________________________________    Witness: _________________________________________ Date:___________     Physician Signature: _______________________________ Date:___________

## (undated) NOTE — ED AVS SNAPSHOT
Tobias Monsivais   MRN: [id number]    Department:  THE [facility] Emergency Department in [place]   Date of Visit:  7/14/2019           Disclosure     Insurance plans vary and the physician(s) referred by the ER may not be covered by your plan.  Please contac tell this physician (or your personal doctor if your instructions are to return to your personal doctor) about any new or lasting problems. The primary care or specialist physician will see patients referred from the [facility] Emergency Department.  Latasha Nino

## (undated) NOTE — LETTER
Tabatha Roberts [address]  [place], [address] [facility]    Consent for Operation  Date: __________________                                Time: _______________    1.  I authorize the performance upon Emory Ratliff the following operation:  Procedure medical, scientific, or educational purposes, provided my identity is not revealed by the pictures or by descriptive texts accompanying them. If the procedure has been videotaped, the surgeon will obtain the original videotape.  The hospital will not be res I CERTIFY THAT I HAVE READ AND UNDERSTAND THE ABOVE CONSENT TO OPERATION, THAT MY DOCTOR PROVIDED ME WITH THE ABOVE EXPLANATIONS, THAT ALL BLANKS OR STATEMENTS REQUIRING INSERTION OR COMPLETION WERE FILLED IN.     Signature of Patient:   ___________________ ii. The last time that I ate or drank.  iii. All of the medicines I take (including prescriptions, herbal supplements, and pills I can buy without a prescription (including street drugs/illegal medications).  Failure to inform my anesthesiologist about thes _____________________________________________________________________________  Anesthesiologist Signature     Date   Time  I have discussed the procedure and information above with the patient (or patient’s representative) and answered their questions.  The

## (undated) NOTE — ED AVS SNAPSHOT
Trey Russel   MRN: [id number]    Department:  [facility] in [facility]   Date of Visit:  3/7/2020           Disclosure     Insurance plans vary and the physician(s) referred by the ER may not be covered by your plan.  Please contact tell this physician (or your personal doctor if your instructions are to return to your personal doctor) about any new or lasting problems. The primary care or specialist physician will see patients referred from the [facility] Emergency Department.  Hilton Bryant

## (undated) NOTE — MR AVS SNAPSHOT
After Visit Summary   7/28/2021    Geeta Alexander    MRN: [id number]           Visit Information     Date & Time  7/28/2021  8:00 AM Provider  Alexandra Nieves MD [facility] [facility]  Phone  [phone] CUSTOM]     Future Labs/Procedures Expected by Expires    HPV HIGH RISK , THIN PREP COLLECTION [PXK0096 CUSTOM]  7/28/2021 7/28/2022    THINPREP PAP SMEAR B [HNV7374 CUSTOM]  7/28/2021 7/28/2022    US AXILLARY RIGHT SH(CPT=76882) [27897 CPT(R)]  7/28/2021 Treatment for mild illness or injury that does not require immediate attention VIDEO VISITS  Average cost  $35*    e-VISTS  Average cost  $35*     SAME DAY APPOINTMENTS   Available at primary care offices    [address] [place] [address]  OFFICE VISIT   [facility]

## (undated) NOTE — ED AVS SNAPSHOT
[facility] Emergency Department in [address] [facility]    Phone:  [phone]    Fax:  [phone]           Jordan Mando   MRN: [id number]    Department:  [facility] Emergency Department in [place]   Date of Visit: Follow the directions for taking your medications provided by your doctor. Please ask your health care provider, pharmacist or nurse if you have any questions regarding your home medications, including potential side effects.               Medication List [facility] Cent[facility] adminstrador de natali mcclain [phone]    Expect to receive an electronic request (by e-mail or text) to complete a self-assessment the day after your visit. You may also receive a call from our patient liason soon after your visit.  Also, some p [facility] [address]  ([address]) [address] [facility] [phone] [address] ([address] [place] [address]) [phone]   [address] [address] [address] ( COMPARISON:  [place], CTA BRAIN + CTA CAROTIDS (SKU=[id number]), 9/26/2015, 23:09.      INDICATIONS:  HEADACHE/NAUSEA     TECHNIQUE:  Unenhanced followed by contrast enhanced multislice CT angiography of the brain vasculature using non-ionic contrast. Clemencia Hernandez arteries are unremarkable. 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 MyChart     Visit [facility]  You can access your MyChart to more actively manage your health care and view more details from this visit by going to [email or web address]

## (undated) NOTE — LETTER
Coral Shields Md  [facility] [facility] [address]       01/17/19        Patient: Dusty Wright   YOB: 1975   Date of Visit: 1/17/2019       Dear  Dr. Shannan Singh MD,      Thank you for referring Dusty Wrihgt to my practic

## (undated) NOTE — Clinical Note
Kofi Ovalles I saw Viral  today with mixed UI & bulge. I've recommended bladder testing. I will work to manage her sx. I appreciate the opportunity to participate in her care.  Thanks, [employer]

## (undated) NOTE — ED AVS SNAPSHOT
Rachel Acosta   MRN: [id number]    Department:  [facility] in [place]   Date of Visit:  4/20/2018           Disclosure     Insurance plans vary and the physician(s) referred by the ER may not be covered by your plan.  Please contac tell this physician (or your personal doctor if your instructions are to return to your personal doctor) about any new or lasting problems. The primary care or specialist physician will see patients referred from the [facility] Emergency Department.  Sb Sarah